# Patient Record
Sex: MALE | Race: WHITE | NOT HISPANIC OR LATINO | Employment: OTHER | URBAN - METROPOLITAN AREA
[De-identification: names, ages, dates, MRNs, and addresses within clinical notes are randomized per-mention and may not be internally consistent; named-entity substitution may affect disease eponyms.]

---

## 2017-01-06 ENCOUNTER — TRANSFERRED RECORDS (OUTPATIENT)
Dept: HEALTH INFORMATION MANAGEMENT | Facility: CLINIC | Age: 82
End: 2017-01-06

## 2017-01-06 ENCOUNTER — ANTICOAGULATION THERAPY VISIT (OUTPATIENT)
Dept: ANTICOAGULATION | Facility: CLINIC | Age: 82
End: 2017-01-06
Payer: COMMERCIAL

## 2017-01-06 VITALS — HEART RATE: 64 BPM | DIASTOLIC BLOOD PRESSURE: 88 MMHG | SYSTOLIC BLOOD PRESSURE: 144 MMHG

## 2017-01-06 DIAGNOSIS — Z79.01 LONG-TERM (CURRENT) USE OF ANTICOAGULANTS: Primary | ICD-10-CM

## 2017-01-06 LAB — INR POINT OF CARE: 2.5 (ref 0.86–1.14)

## 2017-01-06 PROCEDURE — 99207 ZZC NO CHARGE NURSE ONLY: CPT

## 2017-01-06 PROCEDURE — 36416 COLLJ CAPILLARY BLOOD SPEC: CPT

## 2017-01-06 PROCEDURE — 85610 PROTHROMBIN TIME: CPT | Mod: QW

## 2017-01-06 NOTE — PROGRESS NOTES
ANTICOAGULATION FOLLOW-UP CLINIC VISIT    Patient Name:  Melvin Abad  Date:  1/6/2017  Contact Type:  Face to Face    SUBJECTIVE:     Patient Findings     Positives No Problem Findings           OBJECTIVE    INR PROTIME   Date Value Ref Range Status   01/06/2017 2.5* 0.86 - 1.14 Final       ASSESSMENT / PLAN  INR assessment THER    Recheck INR In: 6 WEEKS    INR Location Clinic      Anticoagulation Summary as of 1/6/2017     INR goal 2.0-3.0   Selected INR 2.5 (1/6/2017)   Maintenance plan 2.5 mg (5 mg x 0.5) on Mon, Fri; 5 mg (5 mg x 1) all other days   Full instructions 2.5 mg on Mon, Fri; 5 mg all other days   Weekly total 30 mg   No change documented Shani Collins RN   Plan last modified Shani Collins RN (4/11/2016)   Next INR check 2/15/2017   Target end date     Indications   Atrial fibrillation (Resolved) [I48.91]  Long-term (current) use of anticoagulants [Z79.01] [Z79.01]         Anticoagulation Episode Summary     INR check location     Preferred lab     Send INR reminders to [facility] POOL    Comments 5 mg tablets, AM taker, aies book       Anticoagulation Care Providers     Provider Role Specialty Phone number    Lazaro Huston MD [facility] [phone]            See the Encounter Report to view Anticoagulation Flowsheet and Dosing Calendar (Go to Encounters tab in chart review, and find the Anticoagulation Therapy Visit)    Dosage adjustment made based on physician directed care plan.    Shani Collins, ZULEIMA

## 2017-02-07 ENCOUNTER — OFFICE VISIT (OUTPATIENT)
Dept: INTERNAL MEDICINE | Facility: CLINIC | Age: 82
End: 2017-02-07
Payer: COMMERCIAL

## 2017-02-07 VITALS
OXYGEN SATURATION: 95 % | BODY MASS INDEX: 34.69 KG/M2 | WEIGHT: 221 LBS | HEART RATE: 82 BPM | RESPIRATION RATE: 16 BRPM | TEMPERATURE: 97 F | DIASTOLIC BLOOD PRESSURE: 84 MMHG | HEIGHT: 67 IN | SYSTOLIC BLOOD PRESSURE: 144 MMHG

## 2017-02-07 DIAGNOSIS — I48.20 CHRONIC ATRIAL FIBRILLATION (H): Primary | ICD-10-CM

## 2017-02-07 DIAGNOSIS — Z79.01 LONG-TERM (CURRENT) USE OF ANTICOAGULANTS: ICD-10-CM

## 2017-02-07 DIAGNOSIS — E11.9 TYPE 2 DIABETES MELLITUS WITHOUT COMPLICATION, WITHOUT LONG-TERM CURRENT USE OF INSULIN (H): ICD-10-CM

## 2017-02-07 DIAGNOSIS — E78.5 HYPERLIPIDEMIA LDL GOAL <100: ICD-10-CM

## 2017-02-07 DIAGNOSIS — I10 ESSENTIAL HYPERTENSION, BENIGN: ICD-10-CM

## 2017-02-07 LAB
ALBUMIN SERPL-MCNC: 3.7 G/DL (ref 3.4–5)
ALP SERPL-CCNC: 64 U/L (ref 40–150)
ALT SERPL W P-5'-P-CCNC: 20 U/L (ref 0–70)
ANION GAP SERPL CALCULATED.3IONS-SCNC: 5 MMOL/L (ref 3–14)
AST SERPL W P-5'-P-CCNC: 17 U/L (ref 0–45)
BILIRUB SERPL-MCNC: 1.3 MG/DL (ref 0.2–1.3)
BUN SERPL-MCNC: 18 MG/DL (ref 7–30)
CALCIUM SERPL-MCNC: 9 MG/DL (ref 8.5–10.1)
CHLORIDE SERPL-SCNC: 109 MMOL/L (ref 94–109)
CHOLEST SERPL-MCNC: 144 MG/DL
CO2 SERPL-SCNC: 32 MMOL/L (ref 20–32)
CREAT SERPL-MCNC: 0.97 MG/DL (ref 0.66–1.25)
GFR SERPL CREATININE-BSD FRML MDRD: 73 ML/MIN/1.7M2
GLUCOSE SERPL-MCNC: 122 MG/DL (ref 70–99)
HBA1C MFR BLD: 6.3 % (ref 4.3–6)
HDLC SERPL-MCNC: 51 MG/DL
LDLC SERPL CALC-MCNC: 75 MG/DL
NONHDLC SERPL-MCNC: 93 MG/DL
POTASSIUM SERPL-SCNC: 4.5 MMOL/L (ref 3.4–5.3)
PROT SERPL-MCNC: 6.9 G/DL (ref 6.8–8.8)
SODIUM SERPL-SCNC: 146 MMOL/L (ref 133–144)
TRIGL SERPL-MCNC: 88 MG/DL

## 2017-02-07 PROCEDURE — 99214 OFFICE O/P EST MOD 30 MIN: CPT | Performed by: INTERNAL MEDICINE

## 2017-02-07 PROCEDURE — 36415 COLL VENOUS BLD VENIPUNCTURE: CPT | Performed by: INTERNAL MEDICINE

## 2017-02-07 PROCEDURE — 83036 HEMOGLOBIN GLYCOSYLATED A1C: CPT | Performed by: INTERNAL MEDICINE

## 2017-02-07 PROCEDURE — 80061 LIPID PANEL: CPT | Performed by: INTERNAL MEDICINE

## 2017-02-07 PROCEDURE — 80053 COMPREHEN METABOLIC PANEL: CPT | Performed by: INTERNAL MEDICINE

## 2017-02-07 RX ORDER — WARFARIN SODIUM 5 MG/1
TABLET ORAL
Qty: 90 TABLET | Refills: 3 | Status: SHIPPED | OUTPATIENT
Start: 2017-02-07 | End: 2018-03-13

## 2017-02-07 RX ORDER — SIMVASTATIN 40 MG
TABLET ORAL
Qty: 90 TABLET | Refills: 3 | Status: SHIPPED | OUTPATIENT
Start: 2017-02-07 | End: 2018-03-13

## 2017-02-07 RX ORDER — LISINOPRIL 5 MG/1
5 TABLET ORAL DAILY
Qty: 90 TABLET | Refills: 3 | Status: SHIPPED | OUTPATIENT
Start: 2017-02-07 | End: 2018-03-13

## 2017-02-07 RX ORDER — METOPROLOL SUCCINATE 50 MG/1
50 TABLET, EXTENDED RELEASE ORAL DAILY
Qty: 90 TABLET | Refills: 3 | Status: SHIPPED | OUTPATIENT
Start: 2017-02-07 | End: 2018-03-13

## 2017-02-07 ASSESSMENT — PAIN SCALES - GENERAL: PAINLEVEL: NO PAIN (0)

## 2017-02-07 NOTE — Clinical Note
[address] [address]  Phone: [phone]          February 7, 2017    Melvin Abad  [address] [place] [address]          Dear Melvin,      LAB RESULTS:     The results of your recent lab work were good, no change with plan of care.  If you have any further questions or problems, please contact our office.          Sincerely,      Lazaro Huston MD

## 2017-02-07 NOTE — NURSING NOTE
"Chief Complaint   Patient presents with     Recheck Medication     diabetes, a fib and lipids       Initial /84 mmHg  Pulse 82  Temp(Src) 97  F (36.1  C) (Temporal)  Resp 16  Ht 5' 6.5\" (1.689 m)  Wt 221 lb (100.245 kg)  BMI 35.14 kg/m2  SpO2 95% Estimated body mass index is 35.14 kg/(m^2) as calculated from the following:    Height as of this encounter: 5' 6.5\" (1.689 m).    Weight as of this encounter: 221 lb (100.245 kg).  Medication Reconciliation: complete   Denae Warren MA    "

## 2017-02-07 NOTE — PROGRESS NOTES
SUBJECTIVE:                                                    Melvin Abad is a 88 year old male who presents to clinic today for the following health issues:      Chief Complaint   Patient presents with     Recheck Medication     diabetes, a fib and lipids     Feeling good, just needs refills.  Some hand numbness and lower blood flow, fingers go cold at times. BP has been ok at 140/70 range.  Glucose is very goo at  range.     Has coumadin clinic next week. Sees dermatology next week.    Past Medical History   Diagnosis Date     Unspecified essential hypertension      Pure hypercholesterolemia      Cough      chronic cough     Cardiac dysrhythmia, unspecified      Generalized osteoarthrosis, unspecified site      djd of the knees, hands, and neck     Atrial fibrillation (H)      Other diseases of lung, not elsewhere classified      pulmonary nodule, benign     Basal cell carcinoma nos 06/10     Mohs excision, rt upper lip & rt temple     Diabetic eye exam (H) 8/29/14     Actinic keratosis      Current Outpatient Prescriptions   Medication     metoprolol (TOPROL-XL) 50 MG 24 hr tablet     lisinopril (PRINIVIL,ZESTRIL) 5 MG tablet     simvastatin (ZOCOR) 40 MG tablet     warfarin (COUMADIN) 5 MG tablet     Cholecalciferol (VITAMIN D) 2000 UNITS tablet     polyethylene glycol (MIRALAX) powder     acetaminophen 650 MG TABS     multivitamin (OCUVITE) TABS     blood glucose monitoring (ONE TOUCH ULTRASOFT) lancets     blood glucose monitoring (NO BRAND SPECIFIED) test strip     No current facility-administered medications for this visit.     Social History   Substance Use Topics     Smoking status: Former Smoker     Types: Cigarettes     Smokeless tobacco: Never Used      Comment: 5 years     Alcohol Use: No      Comment: denies     Review of Systems  Constitutional-No fevers, chills, or weight changes..  ENT-No earpain, sore throat, voice changes or rhinitis.  Cardiac-No chest pain or  "palpitations.  Respiratory-No cough, sob, or hemoptysis.    Physical Exam  /84 mmHg  Pulse 82  Temp(Src) 97  F (36.1  C) (Temporal)  Resp 16  Ht 5' 6.5\" (1.689 m)  Wt 221 lb (100.245 kg)  BMI 35.14 kg/m2  SpO2 95%  General Appearance-healthy, alert, no distress  ENT-ENT exam normal, no neck nodes or sinus tenderness  Cardiac-regular rate and rhythm  with normal S1, S2 ; no murmur, rub or gallops  Lungs-clear to auscultation    ASSESSMENT:  Patient is here for his annual recheck. He is overall doing well. He is 88 years old. Lives on his own and takes care of his place.    Problem #1 chronic atrial fibrillation-the patient is rate controlled on beta blocker. He is on anticoagulation with Coumadin and sees the Coumadin clinic.    Problem #2 diabetes-the patient is doing all of his diabetes does check his sugar and the running . We will check an A1c.    Problem #3 hyperlipidemia-patient is stable takes a statin or changes.      Electronically signed by Lazaro Huston MD    "

## 2017-02-07 NOTE — MR AVS SNAPSHOT
"              After Visit Summary   2/7/2017    Melvin Abad    MRN: [id number]           Patient Information     Date Of Birth          4/24/1928        Visit Information        Provider Department      2/7/2017 8:00 AM Lazaro Huston MD [facility]         Follow-ups after your visit        Your next 10 appointments already scheduled     Feb 14, 2017 10:45 AM   Return Visit with Sandra Amaya MD   [facility]    [address] [place] [address] [phone]            Feb 15, 2017  8:30 AM   Anticoagulation Visit with PH ANTI COAG   [facility] ([facility])    [address] [address] [phone]              Who to contact     If you have questions or need follow up information about today's clinic visit or your schedule please contact [facility] directly at [phone].  Normal or non-critical lab and imaging results will be communicated to you by Oncolixhart, letter or phone within 4 business days after the clinic has received the results. If you do not hear from us within 7 days, please contact the clinic through WSO2t or phone. If you have a critical or abnormal lab result, we will notify you by phone as soon as possible.  Submit refill requests through Floobits or call your pharmacy and they will forward the refill request to us. Please allow 3 business days for your refill to be completed.          Additional Information About Your Visit        Oncolixhart Information     Floobits lets you send messages to your doctor, view your test results, renew your prescriptions, schedule appointments and more. To sign up, go to [email or web address] . Click on \"Log in\" on the left side of the screen, which will take you to the Welcome page. Then click on \"Sign up Now\" on the right side of the page.     You will be asked to enter the access code listed below, as well as " "some personal information. Please follow the directions to create your username and password.     Your access code is: [email or web address]  Expires: 2017  8:01 AM     Your access code will  in 90 days. If you need help or a new code, please call your [facility] clinic or [phone].        Care EveryWhere ID     This is your Care EveryWhere ID. This could be used by other organizations to access your [facility] medical records  [id number]        Your Vitals Were     Pulse Temperature Respirations Height BMI (Body Mass Index) Pulse Oximetry    82 97  F (36.1  C) (Temporal) 16 5' 6.5\" (1.689 m) 35.14 kg/m2 95%       Blood Pressure from Last 3 Encounters:   17 144/84   17 144/88   16 138/78    Weight from Last 3 Encounters:   17 221 lb (100.245 kg)   16 221 lb (100.245 kg)   08/28/15 219 lb (99.338 kg)              Today, you had the following     No orders found for display       Primary Care Provider Office Phone # Fax #    Lazaro Huston [phone]       [facility] [address] [place] [address]        Thank you!     Thank you for choosing [facility]  for your care. Our goal is always to provide you with excellent care. Hearing back from our patients is one way we can continue to improve our services. Please take a few minutes to complete the written survey that you may receive in the mail after your visit with us. Thank you!             Your Updated Medication List - Protect others around you: Learn how to safely use, store and throw away your medicines at www.disposemymeds.org.          This list is accurate as of: 17  8:01 AM.  Always use your most recent med list.                   Brand Name Dispense Instructions for use    ACETAMINOPHEN 8 HOUR 650 MG 8 hour tablet   Generic drug:  acetaminophen     100 tablet    Take 650 mg by mouth every 4 hours as needed       blood glucose monitoring lancets     1 Box    1 each 2 " times daily Test before meals and at bedtime       blood glucose monitoring test strip    no brand specified    100 each    Test before meals and at bedtime.       lisinopril 5 MG tablet    PRINIVIL/ZESTRIL    90 tablet    TAKE ONE TABLET BY MOUTH ONCE DAILY       metoprolol 50 MG 24 hr tablet    TOPROL-XL    90 tablet    TAKE ONE TABLET BY MOUTH ONCE DAILY       multivitamin Tabs tablet      Take 1 tablet by mouth daily.       polyethylene glycol powder    MIRALAX    510 g    Take 17 g by mouth daily       simvastatin 40 MG tablet    ZOCOR    90 tablet    TAKE ONE TABLET BY MOUTH AT BEDTIME       vitamin D 2000 UNITS tablet      Take 1 tablet by mouth daily       warfarin 5 MG tablet    COUMADIN    90 tablet    TAKE ONE-HALF TABLET ON MONDAYS AND FRIDAYS AND ONE TABLET ON ALL THE OTHER DAYS OF THE WEEK OR AS DIRECTED BY COUMADIN CLINIC

## 2017-02-14 ENCOUNTER — OFFICE VISIT (OUTPATIENT)
Dept: DERMATOLOGY | Facility: CLINIC | Age: 82
End: 2017-02-14
Payer: COMMERCIAL

## 2017-02-14 DIAGNOSIS — Z85.828 HISTORY OF NONMELANOMA SKIN CANCER: Primary | ICD-10-CM

## 2017-02-14 DIAGNOSIS — R21 RASH: ICD-10-CM

## 2017-02-14 DIAGNOSIS — D18.01 CHERRY ANGIOMA: ICD-10-CM

## 2017-02-14 DIAGNOSIS — D48.5 NEOPLASM OF UNCERTAIN BEHAVIOR OF SKIN: ICD-10-CM

## 2017-02-14 DIAGNOSIS — L82.1 SEBORRHEIC KERATOSIS: ICD-10-CM

## 2017-02-14 PROCEDURE — 99213 OFFICE O/P EST LOW 20 MIN: CPT | Mod: 25 | Performed by: DERMATOLOGY

## 2017-02-14 PROCEDURE — 88305 TISSUE EXAM BY PATHOLOGIST: CPT | Performed by: DERMATOLOGY

## 2017-02-14 PROCEDURE — 11100 HC BIOPSY SKIN/SUBQ/MUC MEM, SINGLE LESION: CPT | Performed by: DERMATOLOGY

## 2017-02-14 RX ORDER — HYDROCORTISONE VALERATE CREAM 2 MG/G
CREAM TOPICAL
Qty: 60 G | Refills: 0 | Status: SHIPPED | OUTPATIENT
Start: 2017-02-14 | End: 2017-03-30

## 2017-02-14 NOTE — PATIENT INSTRUCTIONS

## 2017-02-14 NOTE — MR AVS SNAPSHOT
After Visit Summary   2/14/2017    Melvin Abad    MRN: [id number]           Patient Information     Date Of Birth          4/24/1928        Visit Information        Provider Department      2/14/2017 10:45 AM Sandra Amaya MD [facility]        Today's Diagnoses     History of nonmelanoma skin cancer    -  1    Neoplasm of uncertain behavior of skin        Rash        Seborrheic keratosis        Cherry angioma          Care Instructions    Wound Care After a Biopsy    What is a skin biopsy?  A skin biopsy allows the doctor to examine a very small piece of tissue under the microscope to determine the diagnosis and the best treatment for the skin condition. A local anesthetic (numbing medicine)  is injected with a very small needle into the skin area to be tested. A small piece of skin is taken from the area. Sometimes a suture (stitch) is used.     What are the risks of a skin biopsy?  I will experience scar, bleeding, swelling, pain, crusting and redness. I may experience incomplete removal or recurrence. Risks of this procedure are excessive bleeding, bruising, infection, nerve damage, numbness, thick (hypertrophic or keloidal) scar and non-diagnostic biopsy.    How should I care for my wound for the first 24 hours?    Keep the wound dry and covered for 24 hours    If it bleeds, hold direct pressure on the area for 15 minutes. If bleeding does not stop then go to the emergency room    Avoid strenuous exercise the first 1-2 days or as your doctor instructs you    How should I care for the wound after 24 hours?    After 24 hours, remove the bandage    You may bathe or shower as normal    If you had a scalp biopsy, you can shampoo as usual and can use shower water to clean the biopsy site daily    Clean the wound twice a day with gentle soap and water    Do not scrub, be gentle    Apply white petroleum/Vaseline after cleaning the wound with a cotton swab or a clean finger, and keep  the site covered with a Bandaid /bandage. Bandages are not necessary with a scalp biopsy    If you are unable to cover the site with a Bandaid /bandage, re-apply ointment 2-3 times a day to keep the site moist. Moisture will help with healing    Avoid strenuous activity for first 1-2 days    Avoid lakes, rivers, pools, and oceans until the stitches are removed or the site is healed    How do I clean my wound?    Wash hands for 15 minutes with soap or use hand  before all wound care    Clean the wound with gentle soap and water    Apply white petroleum/Vaseline  to wound after it is clean    Replace the Bandaid /bandage to keep the wound covered for the first few days or as instructed by your doctor    If you had a scalp biopsy, warm shower water to the area on a daily basis should suffice    What should I use to clean my wound?     Cotton-tipped applicators (Qtips )    White petroleum jelly (Vaseline ). Use a clean new container and use Q-tips to apply.    Bandaids   as needed    Gentle soap     How should I care for my wound long term?    Do not get your wound dirty    Keep up with wound care for one week or until the area is healed.    A small scab will form and fall off by itself when the area is completely healed. The area will be red and will become pink in color as it heals. Sun protection is very important for how your scar will turn out. Sunscreen with an SPF 30 or greater is recommended once the area is healed.    You should have some soreness but it should be mild and slowly go away over several days. Talk to your doctor about using tylenol for pain,    When should I call my doctor?  If you have increased:     Pain or swelling    Pus or drainage (clear or slightly yellow drainage is ok)    Temperature over 100F    Spreading redness or warmth around wound    When will I hear about my results?  The biopsy results can take 2-3 weeks to come back. The clinic will call you with the results, send you a  Coraid message, or have you schedule a follow-up clinic or phone time to discuss the results. Contact our clinics if you do not hear from us in 3 weeks.     Who should I call with questions?    [facility]: [phone]     [facility]: [phone]    For urgent needs outside of business hours call the [facility] at [phone] and ask for the dermatology resident on call            Follow-ups after your visit        Your next 10 appointments already scheduled     Feb 15, 2017  8:30 AM CST   Anticoagulation Visit with PH ANTI COAG   [facility]    [address] [address] [phone]            Apr 25, 2017 11:00 AM CDT   [facility]   [facility]    [address] [address] [place] [address] [phone]              Who to contact     If you have questions or need follow up information about today's clinic visit or your schedule please contact [facility] directly at [phone].  Normal or non-critical lab and imaging results will be communicated to you by Everyclickhart, letter or phone within 4 business days after the clinic has received the results. If you do not hear from us within 7 days, please contact the clinic through Everyclickhart or phone. If you have a critical or abnormal lab result, we will notify you by phone as soon as possible.  Submit refill requests through Xenoport or call your pharmacy and they will forward the refill request to us. Please allow 3 business days for your refill to be completed.          Additional Information About Your Visit        Xenoport Information     Xenoport is an electronic gateway that provides easy, online access to your medical records. With Xenoport, you can request a clinic appointment, read your test results, renew a prescription or communicate with  your care team.     To sign up for GuestMetricshart visit the website at [email or web address]   You will be asked to enter the access code listed below, as well as some personal information. Please follow the directions to create your username and password.     Your access code is: [email or web address]  Expires: 2017  8:01 AM     Your access code will  in 90 days. If you need help or a new code, please contact your [facility] or call [phone] for assistance.        Care EveryWhere ID     This is your Care EveryWhere ID. This could be used by other organizations to access your [facility] medical records  [id number]         Blood Pressure from Last 3 Encounters:   17 144/84   17 144/88   16 138/78    Weight from Last 3 Encounters:   17 100.2 kg (221 lb)   16 100.2 kg (221 lb)   08/28/15 99.3 kg (219 lb)              We Performed the Following     BIOPSY SKIN/SUBQ/MUC MEM, SINGLE LESION     Surgical pathology exam          Today's Medication Changes          These changes are accurate as of: 17 11:56 AM.  If you have any questions, ask your nurse or doctor.               Start taking these medicines.        Dose/Directions    hydrocortisone 0.2 % cream   Commonly known as:  WESTCORT   Used for:  Rash        Apply to neck twice daily for 14 days.   Quantity:  60 g   Refills:  0            Where to get your medicines      These medications were sent to [facility] - [place], [place] - [address]  [address], [facility] [address]     Phone:  [phone]     hydrocortisone 0.2 % cream                Primary Care Provider Office Phone # Fax #    Lazaro Huston [phone]       [facility] [address] [place] [address]        Thank you!     Thank you for choosing [facility]  for your care. Our goal is always to provide you with excellent care. Hearing back from our patients is one way we can  continue to improve our services. Please take a few minutes to complete the written survey that you may receive in the mail after your visit with us. Thank you!             Your Updated Medication List - Protect others around you: Learn how to safely use, store and throw away your medicines at www.disposemymeds.org.          This list is accurate as of: 2/14/17 11:56 AM.  Always use your most recent med list.                   Brand Name Dispense Instructions for use    ACETAMINOPHEN 8 HOUR 650 MG 8 hour tablet   Generic drug:  acetaminophen     100 tablet    Take 650 mg by mouth every 4 hours as needed       blood glucose monitoring lancets     1 Box    1 each 2 times daily Test before meals and at bedtime       blood glucose monitoring test strip    no brand specified    100 each    Test before meals and at bedtime.       hydrocortisone 0.2 % cream    WESTCORT    60 g    Apply to neck twice daily for 14 days.       lisinopril 5 MG tablet    PRINIVIL/ZESTRIL    90 tablet    Take 1 tablet (5 mg) by mouth daily       metoprolol 50 MG 24 hr tablet    TOPROL-XL    90 tablet    Take 1 tablet (50 mg) by mouth daily       multivitamin Tabs tablet      Take 1 tablet by mouth daily.       polyethylene glycol powder    MIRALAX    510 g    Take 17 g by mouth daily       simvastatin 40 MG tablet    ZOCOR    90 tablet    TAKE ONE TABLET BY MOUTH AT BEDTIME       vitamin D 2000 UNITS tablet      Take 1 tablet by mouth daily       warfarin 5 MG tablet    COUMADIN    90 tablet    TAKE ONE-HALF TABLET ON MONDAYS AND FRIDAYS AND ONE TABLET ON ALL THE OTHER DAYS OF THE WEEK OR AS DIRECTED BY COUMADIN CLINIC

## 2017-02-14 NOTE — NURSING NOTE
Dermatology Rooming Note    Melvin Abad's goals for this visit include:   Chief Complaint   Patient presents with     RECHECK     1 year skin check - Hx of BCC - used Efudex on scalp about 2 months ago       Is a scribe okay for this visit: YES    Are records needed for this visit(If yes, obtain release of information): NO     Vitals: There were no vitals taken for this visit.    Referring Provider:  No referring provider defined for this encounter.    Jyoti Montoya, CMA

## 2017-02-14 NOTE — LETTER
Patient:  Melvin Cohen  :   1928  MRN:     [id number]        Mr.Edward KELLEE Cohen  [address] [place] [address]        2017    Dear ,    We are writing to inform you of your test results that show a precancer called an actinic keratosis on your right forearm on 2/15/2017.  We would like to see you in clinic to make sure it is gone so it does not turn into skin cancer and cause seriously health problems. If you refuse returning to dermatology, a second option would be to have you primary care doctor look at the area the next time you are in their clinic. Please, let us know if we can do anything to make this process easier. We want to make sure you receive the best care. If you would like to seek care at another clinic, we are happy to help you coordinate.     Thank you for taking the time to be seen in our dermatology clinic. If you have further questions or concerns, please contact the clinic(see phone number listed below).      Sincerely,    Sandra Zacarias MD    Department of Dermatology  [facility]: Phone: [phone], Fax:[phone]  [facility]: Phone: [phone], Fax: [phone]    Resulted Orders   Surgical pathology exam   Result Value Ref Range    Copath Report       Patient Name: MELVIN COHEN  MR#: [id number]  Specimen #:   Collected: 2017  Received: 2/15/2017  Reported: 2017 11:36  Ordering Phy(s): SANDRA ZACARIAS    For improved result formatting, select 'View Enhanced Report Format'  under Linked Documents section.    SPECIMEN(S):  Skin, right forearm    FINAL DIAGNOSIS:  Skin, back, left lower:  - Lichenoid actinic keratosis - (see comment)    COMMENT:  The lesion broadly extends to the deep margin.  Thus if it persists or  recurs, additional deeper sampling is recommended.    I have personally reviewed all specimens and or slides,  "including the  listed special stains, and used them with my medical judgement to  determine the final diagnosis.    Electronically signed out by:    Barry Benton M.D., [facility]    CLINICAL HISTORY:  The patient is an 88-year-old male.    GROSS:  The specimen is received in formalin with proper patient's  identification, labeled \"skin, left lower back\".  it consists of 0.5 x  0.4 x 0.1 cm  shave skin biopsy tissue fragment.  The skin shows a  pigmented macule. The base margin is inked in black.  Bisected and  entirely submitted in one cassette. (Dictated by: Matheus Mathew 2/15/2017  11:35 AM)    MICROSCOPIC:  The epidermis is hyperkeratotic and focally parakeratotic.  The chief  change is dysplasia of the rete with atypical cells and lack of normal  maturation progression.  The basement membrane appears intact and there  is no evidence of invasive carcinoma.  The specimen also exhibits a  lichenoid lymphocytic infiltrate.    CPT Codes:  A: 39090-NA0.T, 86617-KK3.P    TESTING LAB LOCATION:  [facility], [address]   [address]  [phone]    COLLECTION SITE:  Client: [facility]  Location: [facility] ([facility])                       "

## 2017-02-14 NOTE — LETTER
Patient:  Melvin Cohen  :   1928  MRN:     [id number]        Mr.Edward KELLEE Cohen  [address] [place] [id number]        2017    Dear ,    We are writing to inform you of your test results that show a precancer called an actinic keratosis on your left lower back on 2/15/2017.  We would like to see you in clinic to make sure it is gone so it does not turn into skin cancer and cause seriously health problems. If you refuse returning to dermatology, a second option would be to have you primary care doctor look at the area the next time you are in their clinic. Please, let us know if we can do anything to make this process easier. We want to make sure you receive the best care. If you would like to seek care at another clinic, we are happy to help you coordinate.     Thank you for taking the time to be seen in our dermatology clinic. If you have further questions or concerns, please contact the clinic(see phone number listed below).      Sincerely,    Sandra Zacarias MD    Department of Dermatology  [facility]: Phone: [phone], Fax:[phone]  [facility]: Phone: [phone], Fax: [phone]    Resulted Orders   Surgical pathology exam   Result Value Ref Range    Copath Report       Patient Name: MELVIN COHEN  MR#: [id number]  Specimen #:   Collected: 2017  Received: 2/15/2017  Reported: 2017 11:36  Ordering Phy(s): SANDRA ZACARIAS    For improved result formatting, select 'View Enhanced Report Format'  under Linked Documents section.    SPECIMEN(S):  Skin, right forearm    FINAL DIAGNOSIS:  Skin, back, left lower:  - Lichenoid actinic keratosis - (see comment)    COMMENT:  The lesion broadly extends to the deep margin.  Thus if it persists or  recurs, additional deeper sampling is recommended.    I have personally reviewed all specimens and or  "slides, including the  listed special stains, and used them with my medical judgement to  determine the final diagnosis.    Electronically signed out by:    Barry Benton M.D., [facility]    CLINICAL HISTORY:  The patient is an 88-year-old male.    GROSS:  The specimen is received in formalin with proper patient's  identification, labeled \"skin, left lower back\".  it consists of 0.5 x  0.4 x 0.1 cm  shave skin biopsy tissue fragment.  The skin shows a  pigmented macule. The base margin is inked in black.  Bisected and  entirely submitted in one cassette. (Dictated by: Matheus Mathew 2/15/2017  11:35 AM)    MICROSCOPIC:  The epidermis is hyperkeratotic and focally parakeratotic.  The chief  change is dysplasia of the rete with atypical cells and lack of normal  maturation progression.  The basement membrane appears intact and there  is no evidence of invasive carcinoma.  The specimen also exhibits a  lichenoid lymphocytic infiltrate.    CPT Codes:  A: 20792-UN0.T, 15350-EY8.P    TESTING LAB LOCATION:  [facility], [address]   [address]  [phone]    COLLECTION SITE:  Client: [facility]  Location: [facility] ([facility])                       "

## 2017-02-15 ENCOUNTER — ANTICOAGULATION THERAPY VISIT (OUTPATIENT)
Dept: ANTICOAGULATION | Facility: CLINIC | Age: 82
End: 2017-02-15
Payer: COMMERCIAL

## 2017-02-15 VITALS — DIASTOLIC BLOOD PRESSURE: 87 MMHG | SYSTOLIC BLOOD PRESSURE: 135 MMHG | HEART RATE: 71 BPM

## 2017-02-15 DIAGNOSIS — Z79.01 LONG-TERM (CURRENT) USE OF ANTICOAGULANTS: ICD-10-CM

## 2017-02-15 LAB — INR POINT OF CARE: 2.2 (ref 0.86–1.14)

## 2017-02-15 PROCEDURE — 36416 COLLJ CAPILLARY BLOOD SPEC: CPT

## 2017-02-15 PROCEDURE — 85610 PROTHROMBIN TIME: CPT | Mod: QW

## 2017-02-15 PROCEDURE — 99207 ZZC NO CHARGE NURSE ONLY: CPT

## 2017-02-15 NOTE — MR AVS SNAPSHOT
Felicewilmar KELLEE Abad   2/15/2017 8:30 AM   Anticoagulation Therapy Visit    Description:  88 year old male   Provider:  PH ANTI COAG   Department:  Ph Anticoag           INR as of 2/15/2017     Today's INR 2.2      Anticoagulation Summary as of 2/15/2017     INR goal 2.0-3.0   Today's INR 2.2   Full instructions 2.5 mg on Mon, Fri; 5 mg all other days   Next INR check 3/29/2017    Indications   Atrial fibrillation (Resolved) [I48.91]  Long-term (current) use of anticoagulants [Z79.01] [Z79.01]         Your next Anticoagulation Clinic appointment(s)     Feb 15, 2017  8:30 AM CST   Anticoagulation Visit with PH ANTI COAG   [facility] ([address] [address]   [phone]            Mar 29, 2017  8:30 AM CDT   Anticoagulation Visit with PH ANTI COAG   [facility] ([address] [address]   [phone]              Contact Numbers     Clinic Number:         February 2017 Details    Sun Mon Tue Wed Thu Fri Sat        1               2               3               4                 5               6               7               8               9               10               11                 12               13               14               15      5 mg   See details      16      5 mg         17      2.5 mg         18      5 mg           19      5 mg         20      2.5 mg         21      5 mg         22      5 mg         23      5 mg         24      2.5 mg         25      5 mg           26      5 mg         27      2.5 mg         28      5 mg              Date Details   02/15 This INR check               How to take your warfarin dose     To take:  2.5 mg Take 0.5 of a 5 mg tablet.    To take:  5 mg Take 1 of the 5 mg tablets.           March 2017 Details    Sun Mon Tue Wed Thu Fri Sat        1      5 mg         2      5 mg         3      2.5 mg         4      5 mg           5       5 mg         6      2.5 mg         7      5 mg         8      5 mg         9      5 mg         10      2.5 mg         11      5 mg           12      5 mg         13      2.5 mg         14      5 mg         15      5 mg         16      5 mg         17      2.5 mg         18      5 mg           19      5 mg         20      2.5 mg         21      5 mg         22      5 mg         23      5 mg         24      2.5 mg         25      5 mg           26      5 mg         27      2.5 mg         28      5 mg         29            30               31                 Date Details   No additional details    Date of next INR:  3/29/2017         How to take your warfarin dose     To take:  2.5 mg Take 0.5 of a 5 mg tablet.    To take:  5 mg Take 1 of the 5 mg tablets.

## 2017-02-15 NOTE — PROGRESS NOTES
ANTICOAGULATION FOLLOW-UP CLINIC VISIT    Patient Name:  Melvin Abad  Date:  2/15/2017  Contact Type:  Face to Face    SUBJECTIVE:     Patient Findings     Positives No Problem Findings           OBJECTIVE    INR Protime   Date Value Ref Range Status   02/15/2017 2.2 (A) 0.86 - 1.14 Final       ASSESSMENT / PLAN  INR assessment THER    Recheck INR In: 6 WEEKS    INR Location Clinic      Anticoagulation Summary as of 2/15/2017     INR goal 2.0-3.0   Today's INR 2.2   Maintenance plan 2.5 mg (5 mg x 0.5) on Mon, Fri; 5 mg (5 mg x 1) all other days   Full instructions 2.5 mg on Mon, Fri; 5 mg all other days   Weekly total 30 mg   No change documented Shani Collins RN   Plan last modified Shani Collins RN (4/11/2016)   Next INR check 3/29/2017   Target end date     Indications   Atrial fibrillation (Resolved) [I48.91]  Long-term (current) use of anticoagulants [Z79.01] [Z79.01]         Anticoagulation Episode Summary     INR check location     Preferred lab     Send INR reminders to [facility] POOL    Comments 5 mg tablets, AM taker, lkes book       Anticoagulation Care Providers     Provider Role Specialty Phone number    Lazaro Huston MD [facility] Internal Medicine [phone]            See the Encounter Report to view Anticoagulation Flowsheet and Dosing Calendar (Go to Encounters tab in chart review, and find the Anticoagulation Therapy Visit)    Dosage adjustment made based on physician directed care plan.      Shani Collins RN

## 2017-02-20 LAB — COPATH REPORT: NORMAL

## 2017-02-28 ENCOUNTER — TELEPHONE (OUTPATIENT)
Dept: DERMATOLOGY | Facility: CLINIC | Age: 82
End: 2017-02-28

## 2017-02-28 NOTE — TELEPHONE ENCOUNTER
Left message for patient to call [employer] at [phone]. Please give pathology results to patient. Follow up appointment already scheduled.    Status:  Final result   Visible to patient:  No (Not Released) Dx:  Neoplasm of uncertain behavior of skin Order: [id number]       Notes Recorded by Stephen Amaya MD on 2/27/2017 at 12:49 PM  --- Dr. Benton, site is right forearm. Can you addend? I checked the note and the photo.     Nursing- we will recheck precancerous lesion at his 2 month follow up he should have scheduled. Please, call        2wk ago       Copath Report Patient Name: WIL COHEN   MR#: [id number]   Specimen #:    Collected: 2/14/2017   Received: 2/15/2017   Reported: 2/20/2017 11:36   Ordering Phy(s): STEPHEN AMAYA     For improved result formatting, select 'View Enhanced Report Format'   under Linked Documents section.     SPECIMEN(S):   Skin, right forearm     FINAL DIAGNOSIS:   Skin, back, left lower:   - Lichenoid actinic keratosis - (see comment)               Eunice Brown LPN

## 2017-03-06 ENCOUNTER — TELEPHONE (OUTPATIENT)
Dept: DERMATOLOGY | Facility: CLINIC | Age: 82
End: 2017-03-06

## 2017-03-06 NOTE — TELEPHONE ENCOUNTER
Left a detailed message explaining that Dr. Amaya will not be in clinic on 4/25/17 and needed to reschedule there appointment. Please give our office a call [phone] [facility]    Maddy Chew Medical Assistant

## 2017-03-07 NOTE — TELEPHONE ENCOUNTER
Patient called back again and stated he doesn't want to reschedule at this time.    Amrita Pratt CMA

## 2017-03-07 NOTE — TELEPHONE ENCOUNTER
Patient returned call and I notified him of the need to reschedule his appointment as Dr. Amaya won't be in that day.  Patient stated he would call back to reschedule.    Mildred Ferro RN

## 2017-03-29 ENCOUNTER — ANTICOAGULATION THERAPY VISIT (OUTPATIENT)
Dept: ANTICOAGULATION | Facility: CLINIC | Age: 82
End: 2017-03-29
Payer: COMMERCIAL

## 2017-03-29 VITALS — SYSTOLIC BLOOD PRESSURE: 140 MMHG | DIASTOLIC BLOOD PRESSURE: 91 MMHG | HEART RATE: 71 BPM

## 2017-03-29 DIAGNOSIS — Z79.01 LONG-TERM (CURRENT) USE OF ANTICOAGULANTS: ICD-10-CM

## 2017-03-29 LAB — INR POINT OF CARE: 2.3 (ref 0.86–1.14)

## 2017-03-29 PROCEDURE — 36416 COLLJ CAPILLARY BLOOD SPEC: CPT

## 2017-03-29 PROCEDURE — 85610 PROTHROMBIN TIME: CPT | Mod: QW

## 2017-03-29 PROCEDURE — 99207 ZZC NO CHARGE NURSE ONLY: CPT

## 2017-03-29 NOTE — PROGRESS NOTES
ANTICOAGULATION FOLLOW-UP CLINIC VISIT    Patient Name:  Melvin Abad  Date:  3/29/2017  Contact Type:  Face to Face    SUBJECTIVE:     Patient Findings     Positives No Problem Findings           OBJECTIVE    INR Protime   Date Value Ref Range Status   03/29/2017 2.3 (A) 0.86 - 1.14 Final       ASSESSMENT / PLAN  INR assessment THER    Recheck INR In: 6 WEEKS    INR Location Clinic      Anticoagulation Summary as of 3/29/2017     INR goal 2.0-3.0   Today's INR 2.3   Maintenance plan 2.5 mg (5 mg x 0.5) on Mon, Fri; 5 mg (5 mg x 1) all other days   Full instructions 2.5 mg on Mon, Fri; 5 mg all other days   Weekly total 30 mg   No change documented Shani Collins RN   Plan last modified Shani Collins RN (4/11/2016)   Next INR check 5/10/2017   Target end date     Indications   Atrial fibrillation (Resolved) [I48.91]  Long-term (current) use of anticoagulants [Z79.01] [Z79.01]         Anticoagulation Episode Summary     INR check location     Preferred lab     Send INR reminders to [facility] POOL    Comments 5 mg tablets, AM taker, lkes book       Anticoagulation Care Providers     Provider Role Specialty Phone number    Lazaro Huston MD [facility] Internal Medicine [phone]            See the Encounter Report to view Anticoagulation Flowsheet and Dosing Calendar (Go to Encounters tab in chart review, and find the Anticoagulation Therapy Visit)    Dosage adjustment made based on physician directed care plan.      Shani Collins RN

## 2017-03-29 NOTE — MR AVS SNAPSHOT
Melvin Abad   3/29/2017 8:30 AM   Anticoagulation Therapy Visit    Description:  88 year old male   Provider:  PH ANTI COAG   Department:  Ph Anticoag           INR as of 3/29/2017     Today's INR 2.3      Anticoagulation Summary as of 3/29/2017     INR goal 2.0-3.0   Today's INR 2.3   Full instructions 2.5 mg on Mon, Fri; 5 mg all other days   Next INR check 5/10/2017    Indications   Atrial fibrillation (Resolved) [I48.91]  Long-term (current) use of anticoagulants [Z79.01] [Z79.01]         Your next Anticoagulation Clinic appointment(s)     Mar 29, 2017  8:30 AM CDT   Anticoagulation Visit with PH ANTI COAG   [facility] ([address] [address]   [phone]            May 10, 2017  8:15 AM CDT   Anticoagulation Visit with PH ANTI COAG   [facility] ([address] [address]   [phone]              Contact Numbers     Clinic Number:         March 2017 Details    Sun Mon Tue Wed Thu Fri Sat        1               2               3               4                 5               6               7               8               9               10               11                 12               13               14               15               16               17               18                 19               20               21               22               23               24               25                 26               27               28               29      5 mg   See details      30      5 mg         31      2.5 mg           Date Details   03/29 This INR check               How to take your warfarin dose     To take:  2.5 mg Take 0.5 of a 5 mg tablet.    To take:  5 mg Take 1 of the 5 mg tablets.           April 2017 Details    Sun Mon Tue Wed Thu Fri Sat           1      5 mg           2      5 mg         3      2.5 mg         4      5 mg         5       5 mg         6      5 mg         7      2.5 mg         8      5 mg           9      5 mg         10      2.5 mg         11      5 mg         12      5 mg         13      5 mg         14      2.5 mg         15      5 mg           16      5 mg         17      2.5 mg         18      5 mg         19      5 mg         20      5 mg         21      2.5 mg         22      5 mg           23      5 mg         24      2.5 mg         25      5 mg         26      5 mg         27      5 mg         28      2.5 mg         29      5 mg           30      5 mg                Date Details   No additional details            How to take your warfarin dose     To take:  2.5 mg Take 0.5 of a 5 mg tablet.    To take:  5 mg Take 1 of the 5 mg tablets.           May 2017 Details    Sun Mon Tue Wed Thu Fri Sat      1      2.5 mg         2      5 mg         3      5 mg         4      5 mg         5      2.5 mg         6      5 mg           7      5 mg         8      2.5 mg         9      5 mg         10            11               12               13                 14               15               16               17               18               19               20                 21               22               23               24               25               26               27                 28               29               30               31                   Date Details   No additional details    Date of next INR:  5/10/2017         How to take your warfarin dose     To take:  2.5 mg Take 0.5 of a 5 mg tablet.    To take:  5 mg Take 1 of the 5 mg tablets.

## 2017-03-30 ENCOUNTER — APPOINTMENT (OUTPATIENT)
Dept: GENERAL RADIOLOGY | Facility: CLINIC | Age: 82
End: 2017-03-30
Attending: EMERGENCY MEDICINE
Payer: COMMERCIAL

## 2017-03-30 ENCOUNTER — HOSPITAL ENCOUNTER (OUTPATIENT)
Facility: CLINIC | Age: 82
Setting detail: OBSERVATION
Discharge: HOME | End: 2017-03-31
Attending: EMERGENCY MEDICINE | Admitting: FAMILY MEDICINE
Payer: COMMERCIAL

## 2017-03-30 DIAGNOSIS — S80.11XA TRAUMATIC HEMATOMA OF RIGHT LOWER LEG, INITIAL ENCOUNTER: ICD-10-CM

## 2017-03-30 LAB
ANION GAP SERPL CALCULATED.3IONS-SCNC: 10 MMOL/L (ref 3–14)
APTT PPP: 48 SEC (ref 22–37)
BASOPHILS # BLD AUTO: 0 10E9/L (ref 0–0.2)
BASOPHILS NFR BLD AUTO: 0.1 %
BUN SERPL-MCNC: 21 MG/DL (ref 7–30)
CALCIUM SERPL-MCNC: 9 MG/DL (ref 8.5–10.1)
CHLORIDE SERPL-SCNC: 108 MMOL/L (ref 94–109)
CO2 SERPL-SCNC: 26 MMOL/L (ref 20–32)
CREAT SERPL-MCNC: 1.02 MG/DL (ref 0.66–1.25)
DIFFERENTIAL METHOD BLD: NORMAL
EOSINOPHIL # BLD AUTO: 0.2 10E9/L (ref 0–0.7)
EOSINOPHIL NFR BLD AUTO: 1.6 %
ERYTHROCYTE [DISTWIDTH] IN BLOOD BY AUTOMATED COUNT: 13.2 % (ref 10–15)
GFR SERPL CREATININE-BSD FRML MDRD: 69 ML/MIN/1.7M2
GLUCOSE SERPL-MCNC: 117 MG/DL (ref 70–99)
HCT VFR BLD AUTO: 45 % (ref 40–53)
HGB BLD-MCNC: 15 G/DL (ref 13.3–17.7)
IMM GRANULOCYTES # BLD: 0 10E9/L (ref 0–0.4)
IMM GRANULOCYTES NFR BLD: 0.2 %
INR PPP: 2.58 (ref 0.86–1.14)
LYMPHOCYTES # BLD AUTO: 2.4 10E9/L (ref 0.8–5.3)
LYMPHOCYTES NFR BLD AUTO: 26.3 %
MCH RBC QN AUTO: 32 PG (ref 26.5–33)
MCHC RBC AUTO-ENTMCNC: 33.3 G/DL (ref 31.5–36.5)
MCV RBC AUTO: 96 FL (ref 78–100)
MONOCYTES # BLD AUTO: 0.9 10E9/L (ref 0–1.3)
MONOCYTES NFR BLD AUTO: 10.3 %
NEUTROPHILS # BLD AUTO: 5.6 10E9/L (ref 1.6–8.3)
NEUTROPHILS NFR BLD AUTO: 61.5 %
PLATELET # BLD AUTO: 171 10E9/L (ref 150–450)
POTASSIUM SERPL-SCNC: 4.3 MMOL/L (ref 3.4–5.3)
RBC # BLD AUTO: 4.69 10E12/L (ref 4.4–5.9)
SODIUM SERPL-SCNC: 144 MMOL/L (ref 133–144)
WBC # BLD AUTO: 9.2 10E9/L (ref 4–11)

## 2017-03-30 PROCEDURE — 99284 EMERGENCY DEPT VISIT MOD MDM: CPT | Performed by: EMERGENCY MEDICINE

## 2017-03-30 PROCEDURE — 99285 EMERGENCY DEPT VISIT HI MDM: CPT | Mod: 25

## 2017-03-30 PROCEDURE — 73562 X-RAY EXAM OF KNEE 3: CPT | Mod: TC,RT

## 2017-03-30 PROCEDURE — 85610 PROTHROMBIN TIME: CPT | Performed by: EMERGENCY MEDICINE

## 2017-03-30 PROCEDURE — 85025 COMPLETE CBC W/AUTO DIFF WBC: CPT | Performed by: EMERGENCY MEDICINE

## 2017-03-30 PROCEDURE — 80048 BASIC METABOLIC PNL TOTAL CA: CPT | Performed by: EMERGENCY MEDICINE

## 2017-03-30 PROCEDURE — 73590 X-RAY EXAM OF LOWER LEG: CPT | Mod: TC,RT

## 2017-03-30 PROCEDURE — 25000128 H RX IP 250 OP 636: Performed by: EMERGENCY MEDICINE

## 2017-03-30 PROCEDURE — 85730 THROMBOPLASTIN TIME PARTIAL: CPT | Performed by: EMERGENCY MEDICINE

## 2017-03-30 PROCEDURE — 96374 THER/PROPH/DIAG INJ IV PUSH: CPT

## 2017-03-30 RX ORDER — MORPHINE SULFATE 4 MG/ML
4 INJECTION, SOLUTION INTRAMUSCULAR; INTRAVENOUS ONCE
Status: DISCONTINUED | OUTPATIENT
Start: 2017-03-30 | End: 2017-03-30

## 2017-03-30 RX ORDER — MORPHINE SULFATE 4 MG/ML
4 INJECTION, SOLUTION INTRAMUSCULAR; INTRAVENOUS ONCE
Status: COMPLETED | OUTPATIENT
Start: 2017-03-30 | End: 2017-03-30

## 2017-03-30 RX ORDER — LIDOCAINE 40 MG/G
CREAM TOPICAL
Status: DISCONTINUED | OUTPATIENT
Start: 2017-03-30 | End: 2017-03-31 | Stop reason: HOSPADM

## 2017-03-30 RX ADMIN — MORPHINE SULFATE 4 MG: 4 INJECTION, SOLUTION INTRAMUSCULAR; INTRAVENOUS at 22:58

## 2017-03-30 NOTE — IP AVS SNAPSHOT
MRN:[id number]                      After Visit Summary   3/30/2017    Melvin Abad    MRN: [id number]           Thank you!     Thank you for choosing [facility] for your care. Our goal is always to provide you with excellent care. Hearing back from our patients is one way we can continue to improve our services. Please take a few minutes to complete the written survey that you may receive in the mail after you visit with us. Thank you!        Patient Information     Date Of Birth          4/24/1928        About your hospital stay     You were admitted on:  March 31, 2017 You last received care in the:  [address] [facility]    You were discharged on:  March 31, 2017       Who to Call     For medical emergencies, please call 911.  For non-urgent questions about your medical care, please call your primary care provider or clinic, [phone]          Attending Provider     Provider Specialty    Elliott Obregon MD Emergency Medicine    Key, Foster Arredondo MD Family Practice       Primary Care Provider Office Phone # Fax #    Lazaro Huston [phone]       [address] [address]        After Care Instructions     Activity       Your activity upon discharge: activity as tolerated            Diet       Follow this diet upon discharge: Orders Placed This Encounter      Moderate Consistent CHO Diet                  Follow-up Appointments     Follow-up and recommended labs and tests        Follow up with primary care provider, Lazaro Huston, within 7 days for hospital follow- up.  No follow up labs or test are needed.                  Your next 10 appointments already scheduled     May 10, 2017  8:15 AM CDT   [facility]    [address] [place] [id number] [phone]              Further instructions from your care  "team       Monitor bruising for worsening conditions and increased pain.  Dr Huston's office will call Monday to schedule follow up appt    Pending Results     No orders found for last 3 day(s).            Statement of Approval     Ordered          17 1737  I have reviewed and agree with all the recommendations and orders detailed in this document.  EFFECTIVE NOW     Approved and electronically signed by:  Foster Key MD             Admission Information     Date & Time Provider Department Dept. Phone    3/30/2017 Foster Key MD [address] [facility] [phone]      Your Vitals Were     Blood Pressure Pulse Temperature Respirations Height Weight    153/87 (BP Location: Left arm) 68 96  F (35.6  C) (Oral) 18 1.753 m (5' 9\") 98.5 kg (217 lb 2.5 oz)    Pulse Oximetry BMI (Body Mass Index)                97% 32.07 kg/m2          [employer] Information     [employer] lets you send messages to your doctor, view your test results, renew your prescriptions, schedule appointments and more. To sign up, go to [email or web address] . Click on \"Log in\" on the left side of the screen, which will take you to the Welcome page. Then click on \"Sign up Now\" on the right side of the page.     You will be asked to enter the access code listed below, as well as some personal information. Please follow the directions to create your username and password.     Your access code is: [email or web address]  Expires: 2017  9:01 AM     Your access code will  in 90 days. If you need help or a new code, please call your [facility] clinic or [phone].        Care EveryWhere ID     This is your Care EveryWhere ID. This could be used by other organizations to access your [facility] medical records  [id number]           Review of your medicines      CONTINUE these medicines which have NOT CHANGED        Dose / Directions    ACETAMINOPHEN 8 HOUR 650 MG 8 hour tablet   Generic drug:  acetaminophen        Dose:  " 650 mg   Take 650 mg by mouth every 4 hours as needed   Quantity:  100 tablet   Refills:  0       blood glucose monitoring lancets   Used for:  Type 2 diabetes mellitus without complication (H)        Dose:  1 each   1 each 2 times daily Test before meals and at bedtime   Quantity:  1 Box   Refills:  3       blood glucose monitoring test strip   Commonly known as:  no brand specified   Used for:  Type 2 diabetes mellitus without complication (H)        Test before meals and at bedtime.   Quantity:  100 each   Refills:  3       lisinopril 5 MG tablet   Commonly known as:  PRINIVIL/ZESTRIL        Dose:  5 mg   Take 1 tablet (5 mg) by mouth daily   Quantity:  90 tablet   Refills:  3       metoprolol 50 MG 24 hr tablet   Commonly known as:  TOPROL-XL   Used for:  Essential hypertension, benign        Dose:  50 mg   Take 1 tablet (50 mg) by mouth daily   Quantity:  90 tablet   Refills:  3       multivitamin Tabs tablet        Dose:  1 tablet   Take 1 tablet by mouth daily.   Refills:  0       polyethylene glycol powder   Commonly known as:  MIRALAX   Used for:  Constipation, unspecified constipation type        Dose:  1 capful   Take 17 g by mouth daily   Quantity:  510 g   Refills:  1       simvastatin 40 MG tablet   Commonly known as:  ZOCOR   Used for:  Hyperlipidemia LDL goal <100        TAKE ONE TABLET BY MOUTH AT BEDTIME   Quantity:  90 tablet   Refills:  3       vitamin D 2000 UNITS tablet        Dose:  1 tablet   Take 1 tablet by mouth daily   Refills:  0       warfarin 5 MG tablet   Commonly known as:  COUMADIN   Used for:  Long-term (current) use of anticoagulants, Chronic atrial fibrillation (H)        TAKE ONE-HALF TABLET ON MONDAYS AND FRIDAYS AND ONE TABLET ON ALL THE OTHER DAYS OF THE WEEK OR AS DIRECTED BY COUMADIN CLINIC   Quantity:  90 tablet   Refills:  3                Protect others around you: Learn how to safely use, store and throw away your medicines at www.disposemymeds.org.              Medication List: This is a list of all your medications and when to take them. Check marks below indicate your daily home schedule. Keep this list as a reference.      Medications           Morning Afternoon Evening Bedtime As Needed    ACETAMINOPHEN 8 HOUR 650 MG 8 hour tablet   Take 650 mg by mouth every 4 hours as needed   Generic drug:  acetaminophen                                   blood glucose monitoring lancets   1 each 2 times daily Test before meals and at bedtime                                   blood glucose monitoring test strip   Commonly known as:  no brand specified   Test before meals and at bedtime.                                   lisinopril 5 MG tablet   Commonly known as:  PRINIVIL/ZESTRIL   Take 1 tablet (5 mg) by mouth daily   Last time this was given:  5 mg on 3/31/2017  8:18 AM                                   metoprolol 50 MG 24 hr tablet   Commonly known as:  TOPROL-XL   Take 1 tablet (50 mg) by mouth daily   Last time this was given:  50 mg on 3/31/2017  8:18 AM                                   multivitamin Tabs tablet   Take 1 tablet by mouth daily.                                   polyethylene glycol powder   Commonly known as:  MIRALAX   Take 17 g by mouth daily                                   simvastatin 40 MG tablet   Commonly known as:  ZOCOR   TAKE ONE TABLET BY MOUTH AT BEDTIME                                   vitamin D 2000 UNITS tablet   Take 1 tablet by mouth daily                                   warfarin 5 MG tablet   Commonly known as:  COUMADIN   TAKE ONE-HALF TABLET ON MONDAYS AND FRIDAYS AND ONE TABLET ON ALL THE OTHER DAYS OF THE WEEK OR AS DIRECTED BY COUMADIN CLINIC   Last time this was given:  5 mg on 3/31/2017  5:50 PM

## 2017-03-30 NOTE — IP AVS SNAPSHOT
[address] [facility]    [address] [place] [address]    Phone:  [phone]                                       After Visit Summary   3/30/2017    Melvin Abad    MRN: [id number]           After Visit Summary Signature Page     I have received my discharge instructions, and my questions have been answered. I have discussed any challenges I see with this plan with the nurse or doctor.    ..........................................................................................................................................  Patient/Patient Representative Signature      ..........................................................................................................................................  Patient Representative Print Name and Relationship to Patient    ..................................................               ................................................  Date                                            Time    ..........................................................................................................................................  Reviewed by Signature/Title    ...................................................              ..............................................  Date                                                            Time

## 2017-03-31 ENCOUNTER — TELEPHONE (OUTPATIENT)
Dept: INTERNAL MEDICINE | Facility: CLINIC | Age: 82
End: 2017-03-31

## 2017-03-31 VITALS
HEART RATE: 68 BPM | WEIGHT: 217.15 LBS | DIASTOLIC BLOOD PRESSURE: 87 MMHG | HEIGHT: 69 IN | BODY MASS INDEX: 32.16 KG/M2 | RESPIRATION RATE: 18 BRPM | OXYGEN SATURATION: 97 % | SYSTOLIC BLOOD PRESSURE: 153 MMHG | TEMPERATURE: 96 F

## 2017-03-31 PROBLEM — M79.661 PAIN OF RIGHT LOWER LEG: Status: ACTIVE | Noted: 2017-03-31

## 2017-03-31 PROBLEM — M79.606 LEG PAIN: Status: ACTIVE | Noted: 2017-03-31

## 2017-03-31 PROBLEM — S80.11XA CONTUSION OF RIGHT LOWER EXTREMITY: Status: ACTIVE | Noted: 2017-03-31

## 2017-03-31 LAB
GLUCOSE BLDC GLUCOMTR-MCNC: 130 MG/DL (ref 70–99)
GLUCOSE BLDC GLUCOMTR-MCNC: 143 MG/DL (ref 70–99)
HGB BLD-MCNC: 14.4 G/DL (ref 13.3–17.7)
INR PPP: 2.5 (ref 0.86–1.14)

## 2017-03-31 PROCEDURE — G0378 HOSPITAL OBSERVATION PER HR: HCPCS

## 2017-03-31 PROCEDURE — 36415 COLL VENOUS BLD VENIPUNCTURE: CPT | Performed by: FAMILY MEDICINE

## 2017-03-31 PROCEDURE — 85018 HEMOGLOBIN: CPT | Performed by: FAMILY MEDICINE

## 2017-03-31 PROCEDURE — 99235 HOSP IP/OBS SAME DATE MOD 70: CPT | Performed by: FAMILY MEDICINE

## 2017-03-31 PROCEDURE — 25000132 ZZH RX MED GY IP 250 OP 250 PS 637: Performed by: FAMILY MEDICINE

## 2017-03-31 PROCEDURE — 00000146 ZZHCL STATISTIC GLUCOSE BY METER IP

## 2017-03-31 PROCEDURE — 85610 PROTHROMBIN TIME: CPT | Performed by: FAMILY MEDICINE

## 2017-03-31 RX ORDER — METOPROLOL SUCCINATE 50 MG/1
50 TABLET, EXTENDED RELEASE ORAL DAILY
Status: DISCONTINUED | OUTPATIENT
Start: 2017-03-31 | End: 2017-03-31 | Stop reason: HOSPADM

## 2017-03-31 RX ORDER — LISINOPRIL 2.5 MG/1
5 TABLET ORAL DAILY
Status: DISCONTINUED | OUTPATIENT
Start: 2017-03-31 | End: 2017-03-31 | Stop reason: HOSPADM

## 2017-03-31 RX ORDER — WARFARIN SODIUM 5 MG/1
5 TABLET ORAL
Status: COMPLETED | OUTPATIENT
Start: 2017-03-31 | End: 2017-03-31

## 2017-03-31 RX ORDER — ACETAMINOPHEN 325 MG/1
650 TABLET ORAL EVERY 4 HOURS PRN
Status: DISCONTINUED | OUTPATIENT
Start: 2017-03-31 | End: 2017-03-31 | Stop reason: HOSPADM

## 2017-03-31 RX ORDER — HYDROCODONE BITARTRATE AND ACETAMINOPHEN 5; 325 MG/1; MG/1
1-2 TABLET ORAL EVERY 4 HOURS PRN
Status: DISCONTINUED | OUTPATIENT
Start: 2017-03-31 | End: 2017-03-31 | Stop reason: HOSPADM

## 2017-03-31 RX ORDER — NALOXONE HYDROCHLORIDE 0.4 MG/ML
.1-.4 INJECTION, SOLUTION INTRAMUSCULAR; INTRAVENOUS; SUBCUTANEOUS
Status: DISCONTINUED | OUTPATIENT
Start: 2017-03-31 | End: 2017-03-31 | Stop reason: HOSPADM

## 2017-03-31 RX ADMIN — HYDROCODONE BITARTRATE AND ACETAMINOPHEN 2 TABLET: 5; 325 TABLET ORAL at 02:12

## 2017-03-31 RX ADMIN — METOPROLOL SUCCINATE 50 MG: 50 TABLET, EXTENDED RELEASE ORAL at 08:18

## 2017-03-31 RX ADMIN — WARFARIN SODIUM 5 MG: 5 TABLET ORAL at 17:50

## 2017-03-31 RX ADMIN — LISINOPRIL 5 MG: 2.5 TABLET ORAL at 08:18

## 2017-03-31 ASSESSMENT — ENCOUNTER SYMPTOMS
WOUND: 1
CHEST TIGHTNESS: 0
NUMBNESS: 0
WEAKNESS: 0
SHORTNESS OF BREATH: 0
ARTHRALGIAS: 1

## 2017-03-31 NOTE — PLAN OF CARE
Problem: Goal Outcome Summary  Goal: Goal Outcome Summary  Outcome: Improving  VSS, afebrile, Ra good appetite an UOP, patient states the pain is at a tolerable level and denies medication throughout shift. Ambulated in the halls with SBA with a decrease in the pain since yesterday. Good CMS to the RL/extremity.  Will continue to monitor per POC.

## 2017-03-31 NOTE — DISCHARGE SUMMARY
[facility]    Discharge Summary  Hospitalist    Date of Admission:  3/30/2017  Date of Discharge:  3/31/2017  Discharging Provider: Foster Key MD  Date of Service (when I saw the patient): 03/31/17    Discharge Diagnoses   Principal Problem:    Pain of right lower leg  Active Problems:    Contusion of right lower extremity    Type 2 diabetes mellitus without complication (H)    Chronic atrial fibrillation (H)    Leg pain    Hyperlipidemia LDL goal <100        History of Present Illness   Copied from H&P:  Melvin Abad is a 88 year old male who presents with right lower leg pain. Intricate this morning when he was putting his riding lawnmower in the garage. He was sitting on it when it lurched forward and then stopped and he hit his knee against the dashboard. Add some immediate pain, but was able to get to the house with little difficulty. 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 During the day he's had some increase in pain and by this evening was having more discomfort and felt that he could not sleep. He called the son who brought him to the emergency room. In the emergency department is having some pain down the front of the right shin. Seems worse when he attempts to pull up his toes. Seems better when he actually walks. He has some bruising on the inside of the right knee which is minimally tender. He has chronic numbness in both feet which has not changed. Son who was with him is concerned that patient's wife had a fracture of her leg complicated by compartment syndrome while she was on Coumadin. Patient himself is on Coumadin with history of chronic atrial fibrillation. His INR check today was normal. Patient has history of diet controlled diabetes. He's been feeling well with no shortness of breath, no chest pain, no bowel pain.    Hospital Course   Melvin Abad was admitted on 3/30/2017.  The following problems were addressed during  his hospitalization:    Principal Problem:    Pain of right lower leg  Active Problems:    Contusion of right lower extremity    Type 2 diabetes mellitus without complication (H)    Chronic atrial fibrillation (H)    Leg pain    Hyperlipidemia LDL goal <100    Patient was registered to observation and treated with one dose of norco and later tylenol for pain. He had minimal pain and was able to ambulate without any issues. By evening he was feeling at baseline and asking to go home. He will be discharged to home, to continue his home regimen. Suggest for leg pain to continue ice to area as needed, tylenol for pain. There was no adjustments made to his coumadin dosing.    Foster Key MD    Significant Results and Procedures   No procedures performed during this admission    Pending Results   These results will be followed up by Dr. Huston  Unresulted Labs Ordered in the Past 30 Days of this Admission     No orders found for last 61 day(s).          Code Status   DNR / DNI       Primary Care Physician   Lazaro Huston    Physical Exam   Temp: 96  F (35.6  C) Temp src: Oral BP: 153/87 Pulse: 68 Heart Rate: 68 Resp: 18 SpO2: 97 % O2 Device: None (Room air)    Vitals:    03/30/17 2213 03/31/17 0212   Weight: 99.3 kg (219 lb) 98.5 kg (217 lb 2.5 oz)     Vital Signs with Ranges  Temp:  [96  F (35.6  C)-98  F (36.7  C)] 96  F (35.6  C)  Pulse:  [64-76] 68  Heart Rate:  [64-82] 68  Resp:  [16-20] 18  BP: (135-169)/() 153/87  SpO2:  [94 %-97 %] 97 %       Constitutional: awake, alert, cooperative, no apparent distress, and appears stated age  Musculoskeletal: mild bruising below and medial to right knee. Mild tenderness. Full ROM of knee and leg, ambulating without pain    Discharge Disposition   Discharged to home  Condition at discharge: Good    Consultations This Hospital Stay   PHARMACY TO DOSE WARFARIN    Time Spent on this Encounter   I, Foster Key MD, personally saw the patient today and spent  less than or equal to 30 minutes discharging this patient.    Discharge Orders     Follow-up and recommended labs and tests    Follow up with primary care provider, Lazaro Huston, within 7 days for hospital follow- up.  No follow up labs or test are needed.     Activity   Your activity upon discharge: activity as tolerated     DNR/DNI     Diet   Follow this diet upon discharge: Orders Placed This Encounter     Moderate Consistent CHO Diet       Discharge Medications   Current Discharge Medication List      CONTINUE these medications which have NOT CHANGED    Details   metoprolol (TOPROL-XL) 50 MG 24 hr tablet Take 1 tablet (50 mg) by mouth daily  Qty: 90 tablet, Refills: 3    Associated Diagnoses: Essential hypertension, benign      warfarin (COUMADIN) 5 MG tablet TAKE ONE-HALF TABLET ON MONDAYS AND FRIDAYS AND ONE TABLET ON ALL THE OTHER DAYS OF THE WEEK OR AS DIRECTED BY COUMADIN CLINIC  Qty: 90 tablet, Refills: 3    Associated Diagnoses: Long-term (current) use of anticoagulants; Chronic atrial fibrillation (H)      simvastatin (ZOCOR) 40 MG tablet TAKE ONE TABLET BY MOUTH AT BEDTIME  Qty: 90 tablet, Refills: 3    Associated Diagnoses: Hyperlipidemia LDL goal <100      lisinopril (PRINIVIL/ZESTRIL) 5 MG tablet Take 1 tablet (5 mg) by mouth daily  Qty: 90 tablet, Refills: 3      Cholecalciferol (VITAMIN D) 2000 UNITS tablet Take 1 tablet by mouth daily      polyethylene glycol (MIRALAX) powder Take 17 g by mouth daily  Qty: 510 g, Refills: 1    Associated Diagnoses: Constipation, unspecified constipation type      acetaminophen 650 MG TABS Take 650 mg by mouth every 4 hours as needed  Qty: 100 tablet      multivitamin (OCUVITE) TABS Take 1 tablet by mouth daily.  Refills: 0      blood glucose monitoring (ONE TOUCH ULTRASOFT) lancets 1 each 2 times daily Test before meals and at bedtime  Qty: 1 Box, Refills: 3    Associated Diagnoses: Type 2 diabetes mellitus without complication (H)      blood glucose monitoring  (NO BRAND SPECIFIED) test strip Test before meals and at bedtime.  Qty: 100 each, Refills: 3    Associated Diagnoses: Type 2 diabetes mellitus without complication (H)           Allergies   No Known Allergies  Data   Most Recent 3 CBC's:  Recent Labs   Lab Test  03/31/17   0945  03/30/17   2245  07/15/15   1213  11/21/13   0604   WBC   --   9.2  6.8  7.2   HGB  14.4  15.0  15.0  13.3   MCV   --   96  97  103*   PLT   --   171  158  193      Most Recent 3 BMP's:  Recent Labs   Lab Test  03/30/17 2245 02/07/17   0833  01/19/16   0823   NA  144  146*  143   POTASSIUM  4.3  4.5  4.4   CHLORIDE  108  109  108   CO2  26  32  28   BUN  21  18  19   CR  1.02  0.97  0.89   ANIONGAP  10  5  7   DARIUS  9.0  9.0  8.7   GLC  117*  122*  117*     Most Recent 2 LFT's:  Recent Labs   Lab Test  02/07/17   0833  01/19/16   0823   AST  17  17   ALT  20  22   ALKPHOS  64  70   BILITOTAL  1.3  1.1     Most Recent INR's and Anticoagulation Dosing History:  Anticoagulation Dose History     Recent Dosing and Labs Latest Ref Rng & Units 10/11/2016 11/22/2016 1/6/2017 2/15/2017 3/29/2017 3/30/2017 3/31/2017    INR 0.86 - 1.14 - - - - - 2.58(H) 2.50(H)    INR 0.86 - 1.14 1.6(A) 2.1(A) 2.5(A) 2.2(A) 2.3(A) - -    INR Point of Care 0.86 - 1.14 - - - - - - -        Most Recent 3 Troponin's:No lab results found.  Most Recent Cholesterol Panel:  Recent Labs   Lab Test  02/07/17   0833   CHOL  144   LDL  75   HDL  51   TRIG  88     Most Recent 6 Bacteria Isolates From Any Culture (See EPIC Reports for Culture Details):  Recent Labs   Lab Test  11/20/13   2000   CULT  No MRSA isolated     Most Recent TSH, T4 and A1c Labs:  Recent Labs   Lab Test  02/07/17   0833   07/15/15   1213   TSH   --    --   1.56   A1C  6.3*   < >  6.5*    < > = values in this interval not displayed.     Results for orders placed or performed during the hospital encounter of 03/30/17   Knee XR, 3 views, right    Narrative    RIGHT KNEE AND TIBIA AND FIBULA 5 VIEWS  3/30/2017  11:49 PM     HISTORY: Pain.    COMPARISON: None.      Impression    IMPRESSION:   1. A right knee arthroplasty is in place. No evidence of hardware  failure or malalignment.  2. No visualized acute fracture, malalignment or other acute osseous  abnormality of the right knee, tibia or fibula.    ALFRED GUERRA MD   Tib/Fib XR, right    Narrative    RIGHT KNEE AND TIBIA AND FIBULA 5 VIEWS  3/30/2017 11:49 PM     HISTORY: Pain.    COMPARISON: None.      Impression    IMPRESSION:   1. A right knee arthroplasty is in place. No evidence of hardware  failure or malalignment.  2. No visualized acute fracture, malalignment or other acute osseous  abnormality of the right knee, tibia or fibula.    ALFRED GUERRA MD

## 2017-03-31 NOTE — PROGRESS NOTES
S-(situation): Patient discharged to home via walkout with patient son    B-(background): Observation goals met. Pt was pain free et had met baseline ambulatory status    A-(assessment): VS stable. Pain denies pain. Ambulates as tolerated independently. DC instructions were given. Pt was instructed that Dr Moss office would call him on Monday to schedule an appt. Scheduling could not schedule a follow up today.    R-(recommendations): Discharge instructions reviewed with pt. Listed belongings gathered and returned to patient.  Patient Education resolved: Yes  New medications-Pt. Has been educated about reason of use and side effects NA  Home and hospital acquired medications returned to patient NA  Medication Bin checked and emptied on discharge Yes

## 2017-03-31 NOTE — TELEPHONE ENCOUNTER
Female from [facility] called wanting to schedule an important for a follow up appointment for patient with Dr. Huston in the next 7 days. Nothing available that I can schedule for. Please contact patient at [phone], or son Terry at [phone] for availabilities.     Outreach ,  Treasure Adams

## 2017-03-31 NOTE — H&P
[facility]    History and Physical  Hospitalist       Date of Admission:  3/30/2017  Date of Service (when I saw the patient): 03/31/17    Assessment & Plan   Melvin Abad is a 88 year old male who presents with worsening pain involving this right lower leg. Patient has chronic atrial fibrillation on chronic Coumadin therapy and when getting off his lawn tractor this morning dumped it against tractor. Had a little bit of pain at the time, was able ambulate but as the day is gone on, he's had more pain and some swelling involving the inside of his right knee and increasing pain down the front of his leg. He presents to the emergency room with his son who is concerned that his mother had swelling after a broken leg well taking Coumadin which led to compartment syndrome and loss of muscle. He is very concerned that this could happen to his father. In the emergency department the pain was worsening somewhat, not well controlled with IV morphine. Imaging of the leg is unremarkable showing an old right total knee arthroplasty. There is no signs of any new bone injury. Clinical exam showing good peripheral pulses with somewhat diminished sensation which is chronic due to neuropathy. Patient will be registered observation with monitoring. Will attempt to control his pain with use of oral meds including Tylenol and Norco. If the pain is controlled in the swelling is not worsening, we anticipate discharged home later today.    Principal Problem:    Pain of right lower leg    Assessment: related to bumping his leg this morning. Exam showing some mild hematoma formation on the inner side of the lower leg. He has tenderness down the front of the anterior tibia with no obvious bony deformity. X-ray exam of the leg is unremarkable.    Plan: likely due to bruising, no real signs to suggest compartment syndrome. Will treat with ice, elevation, Tylenol and Norco. If worsening, consider orthopedic  consultation later.  Active Problems:    Contusion of right lower extremity    Assessment: as above    Plan: as above    Type 2 diabetes mellitus without complication (H)    Assessment: well-controlled on diet, last hemoglobin A-1 C was 6.3    Plan: continue diet    Chronic atrial fibrillation (H)    Assessment: chronic, on rate control and Coumadin. INR today therapeutic at 2.53    Plan: no change. Pharmacy to manage Coumadin dose    Hyperlipidemia LDL goal <100    Assessment: stable, taking simvastatin    Plan: restart at discharge  DVT Prophylaxis: Warfarin  Code Status: DNR / DNI per previous discussions    Disposition: Expected discharge in 1 days once pain controlled, able to ambulate.    Foster Key MD    Primary Care Physician   Lazaro Huston    Chief Complaint   88-year-old male with increasing pain in his right lower leg    History is obtained from the patient, electronic health record, emergency department physician and patient's son    History of Present Illness   Melvin Abad is a 88 year old male who presents with right lower leg pain. Intricate this morning when he was putting his riding lawnmower in the garage. He was sitting on it when it lurched forward and then stopped and he hit his knee against the dashboard. Add some immediate pain, but was able to get to the house with little difficulty. 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 During the day he's had some increase in pain and by this evening was having more discomfort and felt that he could not sleep. He called the son who brought him to the emergency room. In the emergency department is having some pain down the front of the right shin. Seems worse when he attempts to pull up his toes. Seems better when he actually walks. He has some bruising on the inside of the right knee which is minimally tender. He has chronic numbness in both feet which has not changed. Son who was with him is concerned that patient's  wife had a fracture of her leg complicated by compartment syndrome while she was on Coumadin. Patient himself is on Coumadin with history of chronic atrial fibrillation. His INR check today was normal. Patient has history of diet controlled diabetes. He's been feeling well with no shortness of breath, no chest pain, no bowel pain.    Past Medical History    I have reviewed this patient's medical history and updated it with pertinent information if needed.   Past Medical History:   Diagnosis Date     Actinic keratosis      Atrial fibrillation (H)      Basal cell carcinoma nos 06/10    Mohs excision, rt upper lip & rt temple     Cardiac dysrhythmia, unspecified      Cough     chronic cough     Diabetic eye exam (H) 8/29/14     Generalized osteoarthrosis, unspecified site     djd of the knees, hands, and neck     Other diseases of lung, not elsewhere classified     pulmonary nodule, benign     Pure hypercholesterolemia      Unspecified essential hypertension        Past Surgical History   I have reviewed this patient's surgical history and updated it with pertinent information if needed.  Past Surgical History:   Procedure Laterality Date     C TOTAL KNEE ARTHROPLASTY  1997    Knee Replacement, Total     COLONOSCOPY  05/02/2007    Multiple bxs of diminutive polyps of ascending colon.     COLONOSCOPY  11/10/2010    COLONOSCOPY performed by MARISELA GRIMM at  GI     HC COLONOSCOPY W/WO BRUSH/WASH  10/19/2001     HC COLONOSCOPY W/WO BRUSH/WASH  11/02/2004     HC HEMORRHOIDOPEXY BY STAPLING  09/26/08       Prior to Admission Medications   Prior to Admission Medications   Prescriptions Last Dose Informant Patient Reported? Taking?   Cholecalciferol (VITAMIN D) 2000 UNITS tablet 3/30/2017 at 0700  Yes Yes   Sig: Take 1 tablet by mouth daily   acetaminophen 650 MG TABS 3/30/2017 at 2000  Yes Yes   Sig: Take 650 mg by mouth every 4 hours as needed   blood glucose monitoring (NO BRAND SPECIFIED) test strip   No No   Sig: Test  before meals and at bedtime.   blood glucose monitoring (ONE TOUCH ULTRASOFT) lancets   No No   Si each 2 times daily Test before meals and at bedtime   lisinopril (PRINIVIL/ZESTRIL) 5 MG tablet 3/30/2017 at 2100  No Yes   Sig: Take 1 tablet (5 mg) by mouth daily   metoprolol (TOPROL-XL) 50 MG 24 hr tablet 3/30/2017 at 0700  No Yes   Sig: Take 1 tablet (50 mg) by mouth daily   multivitamin (OCUVITE) TABS 3/30/2017 at 0700  Yes Yes   Sig: Take 1 tablet by mouth daily.   polyethylene glycol (MIRALAX) powder Past Week at Unknown time  No Yes   Sig: Take 17 g by mouth daily   simvastatin (ZOCOR) 40 MG tablet 3/30/2017 at 0700  No Yes   Sig: TAKE ONE TABLET BY MOUTH AT BEDTIME   warfarin (COUMADIN) 5 MG tablet 3/30/2017 at 0700  No Yes   Sig: TAKE ONE-HALF TABLET ON  AND  AND ONE TABLET ON ALL THE OTHER DAYS OF THE WEEK OR AS DIRECTED BY COUMADIN CLINIC      Facility-Administered Medications: None     Allergies   No Known Allergies    Social History   I have reviewed this patient's social history and updated it with pertinent information if needed. Melvin Abad  reports that he has quit smoking. His smoking use included Cigarettes. He has never used smokeless tobacco. He reports that he does not drink alcohol or use illicit drugs.    Family History   I have reviewed this patient's family history and updated it with pertinent information if needed.   Family History   Problem Relation Age of Onset     CANCER Mother      Prostate Cancer Father        Review of Systems   The 10 point Review of Systems is negative other than noted in the HPI or here.     Physical Exam   Temp: 97.4  F (36.3  C) Temp src: Temporal BP: (!) 148/106   Heart Rate: 82 Resp: 20 SpO2: 96 % O2 Device: None (Room air)    Vital Signs with Ranges  Temp:  [97.4  F (36.3  C)] 97.4  F (36.3  C)  Heart Rate:  [82] 82  Resp:  [20] 20  BP: (145-160)/() 148/106  SpO2:  [94 %-96 %] 96 %  219 lbs 0 oz    EXAM:  Constitutional: healthy,  alert and no distress   Cardiovascular: irregular, irregular rate and rhythm. No overheard  Respiratory: Percussion normal. Good diaphragmatic excursion. Lungs clear  Psychiatric: mentation appears normal and affect normal/bright  Head: Normocephalic. No masses, lesions, tenderness or abnormalities  Neck: Neck supple. No adenopathy. Thyroid symmetric, normal size,, Carotids without bruits.  ENT: ENT exam normal, no neck nodes or sinus tenderness  Abdomen: Abdomen soft, non-tender. BS normal. No masses, organomegaly  NEURO: subjective decreased sensation in both feet.  SKIN: stasis changes in both lower legs. Appears to have a fresh bruise just below the knee on the medial aspect of the right lower leg. Maybe a small scrape on the anterior shin.  LYMPH: Normal cervical lymph nodes  JOINT/EXTREMITIES: normal muscle tone     Data   Data reviewed today:  I personally reviewed the X-ray image(s) showing No deformity involving the right to fib. Right knee is showing a normal appearing knee arthroplasty..    Recent Labs  Lab 03/30/17  2245 03/29/17   WBC 9.2  --    HGB 15.0  --    MCV 96  --      --    INR 2.58* 2.3*     --    POTASSIUM 4.3  --    CHLORIDE 108  --    CO2 26  --    BUN 21  --    CR 1.02  --    ANIONGAP 10  --    DARIUS 9.0  --    *  --        Recent Results (from the past 24 hour(s))   Tib/Fib XR, right    Narrative    RIGHT KNEE AND TIBIA AND FIBULA 5 VIEWS  3/30/2017 11:49 PM     HISTORY: Pain.    COMPARISON: None.      Impression    IMPRESSION:   1. A right knee arthroplasty is in place. No evidence of hardware  failure or malalignment.  2. No visualized acute fracture, malalignment or other acute osseous  abnormality of the right knee, tibia or fibula.    ALFRED GUERRA MD   Knee XR, 3 views, right    Narrative    RIGHT KNEE AND TIBIA AND FIBULA 5 VIEWS  3/30/2017 11:49 PM     HISTORY: Pain.    COMPARISON: None.      Impression    IMPRESSION:   1. A right knee arthroplasty is in place.  No evidence of hardware  failure or malalignment.  2. No visualized acute fracture, malalignment or other acute osseous  abnormality of the right knee, tibia or fibula.    ALFRED GUERRA MD

## 2017-03-31 NOTE — DISCHARGE INSTRUCTIONS
Monitor bruising for worsening conditions and increased pain.  Dr Huston's office will call Monday to schedule follow up appt

## 2017-03-31 NOTE — ED NOTES
Was putting away his lawn tractor about 10 this morning and bumped his right knee on something. Pt reports he has some pain at time of incident but is now having increased pain and swelling.

## 2017-03-31 NOTE — ED PROVIDER NOTES
History     Chief Complaint   Patient presents with     Knee Pain     The history is provided by the patient, a relative and medical records.     Melvin Abad is a 88 year old male who presents to the ED for evaluation of an injury she incurred to his right leg at about 10 this morning when he stepped off of his riding lawnmower and bumped the knee against the edge of the tractor.  The patient is anticoagulated with warfarin for chronic atrial fibrillation.    I have reviewed the Medications, Allergies, Past Medical and Surgical History, and Social History in the [facility] system.    Past Medical History:   Diagnosis Date     Actinic keratosis      Atrial fibrillation (H)      Basal cell carcinoma nos 06/10    Mohs excision, rt upper lip & rt temple     Cardiac dysrhythmia, unspecified      Cough     chronic cough     Diabetic eye exam (H) 8/29/14     Generalized osteoarthrosis, unspecified site     djd of the knees, hands, and neck     Other diseases of lung, not elsewhere classified     pulmonary nodule, benign     Pure hypercholesterolemia      Unspecified essential hypertension        Past Surgical History:   Procedure Laterality Date     C TOTAL KNEE ARTHROPLASTY  1997    Knee Replacement, Total     COLONOSCOPY  05/02/2007    Multiple bxs of diminutive polyps of ascending colon.     COLONOSCOPY  11/10/2010    COLONOSCOPY performed by MARISELA GRIMM at  GI     HC COLONOSCOPY W/WO BRUSH/WASH  10/19/2001     HC COLONOSCOPY W/WO BRUSH/WASH  11/02/2004     HC HEMORRHOIDOPEXY BY STAPLING  09/26/08       Family History   Problem Relation Age of Onset     CANCER Mother      Prostate Cancer Father        Social History   Substance Use Topics     Smoking status: Former Smoker     Types: Cigarettes     Smokeless tobacco: Never Used      Comment: 5 years     Alcohol use No      Comment: denies        Immunization History   Administered Date(s) Administered     Influenza (High Dose) 3 valent vaccine 10/07/2010,  "11/03/2011, 11/16/2012     Influenza (IIV3) 10/24/1995, 10/24/1996, 10/14/1997, 10/09/1998, 10/08/1999, 11/10/2000, 10/24/2001, 10/23/2002, 10/07/2003, 11/12/2004, 10/12/2005, 12/11/2006, 10/15/2007, 09/24/2013, 09/08/2014     Pneumococcal 23 valent 12/11/2006     TD (ADULT, 7+) 08/30/2007        No Known Allergies    Current Outpatient Prescriptions   Medication Sig Dispense Refill     metoprolol (TOPROL-XL) 50 MG 24 hr tablet Take 1 tablet (50 mg) by mouth daily 90 tablet 3     warfarin (COUMADIN) 5 MG tablet TAKE ONE-HALF TABLET ON MONDAYS AND FRIDAYS AND ONE TABLET ON ALL THE OTHER DAYS OF THE WEEK OR AS DIRECTED BY COUMADIN CLINIC 90 tablet 3     simvastatin (ZOCOR) 40 MG tablet TAKE ONE TABLET BY MOUTH AT BEDTIME 90 tablet 3     lisinopril (PRINIVIL/ZESTRIL) 5 MG tablet Take 1 tablet (5 mg) by mouth daily 90 tablet 3     Cholecalciferol (VITAMIN D) 2000 UNITS tablet Take 1 tablet by mouth daily       polyethylene glycol (MIRALAX) powder Take 17 g by mouth daily 510 g 1     acetaminophen 650 MG TABS Take 650 mg by mouth every 4 hours as needed 100 tablet      multivitamin (OCUVITE) TABS Take 1 tablet by mouth daily.  0     blood glucose monitoring (ONE TOUCH ULTRASOFT) lancets 1 each 2 times daily Test before meals and at bedtime 1 Box 3     blood glucose monitoring (NO BRAND SPECIFIED) test strip Test before meals and at bedtime. 100 each 3      Review of Systems   Respiratory: Negative for chest tightness and shortness of breath.    Cardiovascular: Positive for leg swelling. Negative for chest pain.   Musculoskeletal: Positive for arthralgias.   Skin: Positive for wound.   Neurological: Negative for weakness and numbness.   All other systems reviewed and are negative.      Physical Exam   BP: (!) 160/98  Heart Rate: 82  Temp: 97.4  F (36.3  C)  Resp: 20  Height: 175.3 cm (5' 9\")  Weight: 99.3 kg (219 lb)  SpO2: 95 %  Physical Exam   Constitutional: He is oriented to person, place, and time. No distress. "   HENT:   Head: Normocephalic.   Musculoskeletal:        Right knee: He exhibits swelling and ecchymosis. He exhibits normal range of motion.        Legs:  Neurological: He is alert and oriented to person, place, and time. He has normal strength. No cranial nerve deficit or sensory deficit. GCS eye subscore is 4. GCS verbal subscore is 5. GCS motor subscore is 6.   No evidence for acute neurologic changes distal to the injury on the right leg.   Skin: He is not diaphoretic.        Nursing note and vitals reviewed.      ED Course     ED Course     Procedures        Results for orders placed or performed during the hospital encounter of 03/30/17 (from the past 24 hour(s))   CBC with platelets differential   Result Value Ref Range    WBC 9.2 4.0 - 11.0 10e9/L    RBC Count 4.69 4.4 - 5.9 10e12/L    Hemoglobin 15.0 13.3 - 17.7 g/dL    Hematocrit 45.0 40.0 - 53.0 %    MCV 96 78 - 100 fl    MCH 32.0 26.5 - 33.0 pg    MCHC 33.3 31.5 - 36.5 g/dL    RDW 13.2 10.0 - 15.0 %    Platelet Count 171 150 - 450 10e9/L    Diff Method Automated Method     % Neutrophils 61.5 %    % Lymphocytes 26.3 %    % Monocytes 10.3 %    % Eosinophils 1.6 %    % Basophils 0.1 %    % Immature Granulocytes 0.2 %    Absolute Neutrophil 5.6 1.6 - 8.3 10e9/L    Absolute Lymphocytes 2.4 0.8 - 5.3 10e9/L    Absolute Monocytes 0.9 0.0 - 1.3 10e9/L    Absolute Eosinophils 0.2 0.0 - 0.7 10e9/L    Absolute Basophils 0.0 0.0 - 0.2 10e9/L    Abs Immature Granulocytes 0.0 0 - 0.4 10e9/L   INR   Result Value Ref Range    INR 2.58 (H) 0.86 - 1.14   Partial thromboplastin time   Result Value Ref Range    PTT 48 (H) 22 - 37 sec   Basic metabolic panel   Result Value Ref Range    Sodium 144 133 - 144 mmol/L    Potassium 4.3 3.4 - 5.3 mmol/L    Chloride 108 94 - 109 mmol/L    Carbon Dioxide 26 20 - 32 mmol/L    Anion Gap 10 3 - 14 mmol/L    Glucose 117 (H) 70 - 99 mg/dL    Urea Nitrogen 21 7 - 30 mg/dL    Creatinine 1.02 0.66 - 1.25 mg/dL    GFR Estimate 69 >60  mL/min/1.7m2    GFR Estimate If Black 83 >60 mL/min/1.7m2    Calcium 9.0 8.5 - 10.1 mg/dL   Tib/Fib XR, right    Narrative    RIGHT KNEE AND TIBIA AND FIBULA 5 VIEWS  3/30/2017 11:49 PM     HISTORY: Pain.    COMPARISON: None.      Impression    IMPRESSION:   1. A right knee arthroplasty is in place. No evidence of hardware  failure or malalignment.  2. No visualized acute fracture, malalignment or other acute osseous  abnormality of the right knee, tibia or fibula.    ALFRED GUERRA MD   Knee XR, 3 views, right    Narrative    RIGHT KNEE AND TIBIA AND FIBULA 5 VIEWS  3/30/2017 11:49 PM     HISTORY: Pain.    COMPARISON: None.      Impression    IMPRESSION:   1. A right knee arthroplasty is in place. No evidence of hardware  failure or malalignment.  2. No visualized acute fracture, malalignment or other acute osseous  abnormality of the right knee, tibia or fibula.    ALFRED GUERRA MD              Assessments & Plan (with Medical Decision Making)  Melvin Abad is an 88-year-old male who presents to the ED for evaluation of worsening right lower leg pain that started around 10 AM this morning when the patient was stepping off of his lawn tractor and bumped his right knee on the edge of the tractor.  He reports initially the pain was not very severe, however, throughout the day it continued to swell and worsen prompting his presentation for evaluation tonight.  The patient is anticoagulated on Coumadin for chronic atrial fibrillation.  On examination, there is significant swelling on the medial aspect of the tibia as well as some abrasions to the anterior upper tibia and upper fibula.  X-rays of the knee and tibia/fibula were obtained and were unremarkable for any acute changes.  The patient does have a prosthetic knee replacement which appears to be well seated and appropriately aligned.  In discussion of the findings on my exam with the patient and his son, the son was concerned that he might be developing  compartment syndrome as he is anticoagulated and apparently his wife developed swelling similar to this in relation to a fracture and ended up with compartment syndrome and ultimately lost her leg as a result.  With that information, the patient will be admitted under observation for pain control and further observation with the intent that he'll probably go home tomorrow.  The concern is that nobody will be with the patient early this morning and this afternoon.  I discussed the case with Dr. Key who has agreed to admit the patient under observation for this purpose.       I have reviewed the nursing notes.    I have reviewed the findings, diagnosis, plan and need for follow up with the patient.    New Prescriptions    No medications on file       Final diagnoses:   Traumatic hematoma of right lower leg, initial encounter       3/30/2017   [facility] EMERGENCY DEPARTMENT     Elliott Obregon MD  03/31/17 0136

## 2017-03-31 NOTE — PROGRESS NOTES
S-(situation): Patient registered to Observation. Patient arrived to room 248 via W/C from ED    B-(background): Right knee injury    A-(assessment): Pt is A&O.  VSS.  Afebrile.  Has bruises noted on arms and legs.  Lower extremities have a dusky cruz color to them.  Pt states this is normal.  Hematoma noted on inner right knee area where pt states had injury yesterday from his tractor.  Ice applied and medication given as pt states pain was starting to shoot down to his lower leg.       R-(recommendations): Orders and observation goals reviewed with pt.  Will cont to monitor the above.    Nursing Observation criteria listed below was met:    Skin issues/needs documented:Yes  Isolation needs addressed, if appropriate: NA  Fall Prevention: Education given and documented: Yes  Education Assessment documented:Yes  Education Documented (Pre-existing chronic infection such as, MRSA/VRE need education on admission): No  New medication patient education completed and documented (Possible Side Effects of Common Medications handout): Yes  Home medications if not able to send immediately home with family stored here: No  Reminder note placed in discharge instructions: NA  Patient has discharge needs (If yes, please explain): No- will return to home

## 2017-03-31 NOTE — CONSULTS
Clinical Pharmacy - Warfarin Dosing Consult     Pharmacy has been consulted to manage this patient s warfarin therapy.  Indication: Atrial Fibrillation  Therapy Goal: INR 2-3  Provider/Team: Dr. Key  Warfarin Prior to Admission: Yes  Warfarin PTA Regimen: 2.5 mg on Mon and Fri, 5 mg all other days  Significant drug interactions: none  Dose Comments: took dose on 3/30    INR   Date Value Ref Range Status   03/30/2017 2.58 (H) 0.86 - 1.14 Final     INR Protime   Date Value Ref Range Status   03/29/2017 2.3 (A) 0.86 - 1.14 Final       Recommend warfarin 2.5 mg today.  Pharmacy will monitor Melvin Abad daily and order warfarin doses to achieve specified goal.      Please contact pharmacy as soon as possible if the warfarin needs to be held for a procedure or if the warfarin goals change.

## 2017-03-31 NOTE — ED NOTES
ED Nursing criteria listed below was addressed during verbal handoff:     Abnormal vitals: Yes  Abnormal results: Yes  Med Reconciliation completed: Yes  Meds given in ED: Yes  Any Overdue Meds: Yes  Core Measures: Yes    Isolation: NA      Special needs: Yes  Skin assessment: Yes    Observation Patient  Education provided: Yes

## 2017-04-03 ENCOUNTER — OFFICE VISIT (OUTPATIENT)
Dept: INTERNAL MEDICINE | Facility: CLINIC | Age: 82
End: 2017-04-03
Payer: COMMERCIAL

## 2017-04-03 VITALS
WEIGHT: 223 LBS | HEART RATE: 82 BPM | OXYGEN SATURATION: 95 % | RESPIRATION RATE: 16 BRPM | TEMPERATURE: 97.8 F | SYSTOLIC BLOOD PRESSURE: 144 MMHG | BODY MASS INDEX: 32.93 KG/M2 | DIASTOLIC BLOOD PRESSURE: 84 MMHG

## 2017-04-03 DIAGNOSIS — Z79.01 LONG-TERM (CURRENT) USE OF ANTICOAGULANTS: ICD-10-CM

## 2017-04-03 DIAGNOSIS — I48.20 CHRONIC ATRIAL FIBRILLATION (H): ICD-10-CM

## 2017-04-03 DIAGNOSIS — S80.11XD CONTUSION OF RIGHT LOWER EXTREMITY, SUBSEQUENT ENCOUNTER: Primary | ICD-10-CM

## 2017-04-03 PROCEDURE — 99495 TRANSJ CARE MGMT MOD F2F 14D: CPT | Performed by: INTERNAL MEDICINE

## 2017-04-03 ASSESSMENT — PAIN SCALES - GENERAL: PAINLEVEL: NO PAIN (0)

## 2017-04-03 NOTE — NURSING NOTE
"Chief Complaint   Patient presents with     Hospital F/U       Initial /84 (BP Location: Right arm, Patient Position: Chair, Cuff Size: Adult Large)  Pulse 82  Temp 97.8  F (36.6  C) (Temporal)  Resp 16  Wt 223 lb (101.2 kg)  SpO2 95%  BMI 32.93 kg/m2 Estimated body mass index is 32.93 kg/(m^2) as calculated from the following:    Height as of 3/30/17: 5' 9\" (1.753 m).    Weight as of this encounter: 223 lb (101.2 kg).  Medication Reconciliation: complete    "

## 2017-04-03 NOTE — PROGRESS NOTES
SUBJECTIVE:                                                    Melvin Abad is a 88 year old male who presents to clinic today for the following health issues:          Hospital Follow-up Visit:    Hospital/Nursing Home/IP Rehab Facility: [facility]  Date of Admission: 3/30/17  Date of Discharge: 3/31/17  Reason(s) for Admission: right lower leg pain            Problems taking medications regularly:  None       Medication changes since discharge: None       Problems adhering to non-medication therapy:  None    Summary of hospitalization:  [facility] discharge summary reviewed  Diagnostic Tests/Treatments reviewed.  Follow up needed: none  Other Healthcare Providers Involved in Patient s Care:         None  Update since discharge: improved.     Post Discharge Medication Reconciliation: discharge medications reconciled and changed, per note/orders (see AVS).  Plan of care communicated with patient        He was driving his lawn tractor and lurched into the wall, hit his right knee on the dash.  Right knee had a large hematoma on the medial side.  Was in the hospital over night and got one pain pill.  Now pain is ok, stretches it slowly, getting better daily.      Past Medical History:   Diagnosis Date     Actinic keratosis      Atrial fibrillation (H)      Basal cell carcinoma nos 06/10    Mohs excision, rt upper lip & rt temple     Cardiac dysrhythmia, unspecified      Cough     chronic cough     Diabetic eye exam (H) 8/29/14     Generalized osteoarthrosis, unspecified site     djd of the knees, hands, and neck     Other diseases of lung, not elsewhere classified     pulmonary nodule, benign     Pure hypercholesterolemia      Unspecified essential hypertension      Current Outpatient Prescriptions   Medication     metoprolol (TOPROL-XL) 50 MG 24 hr tablet     warfarin (COUMADIN) 5 MG tablet     simvastatin (ZOCOR) 40 MG tablet     lisinopril (PRINIVIL/ZESTRIL) 5 MG tablet      Cholecalciferol (VITAMIN D) 2000 UNITS tablet     polyethylene glycol (MIRALAX) powder     blood glucose monitoring (ONE TOUCH ULTRASOFT) lancets     blood glucose monitoring (NO BRAND SPECIFIED) test strip     acetaminophen 650 MG TABS     multivitamin (OCUVITE) TABS     No current facility-administered medications for this visit.      Physical Exam  /84 (BP Location: Right arm, Patient Position: Chair, Cuff Size: Adult Large)  Pulse 82  Temp 97.8  F (36.6  C) (Temporal)  Resp 16  Wt 223 lb (101.2 kg)  SpO2 95%  BMI 32.93 kg/m2  General Appearance-healthy, alert, no distress  Right knee with good movement, medial side with a knobby hematoma     ASSESSMENT:  Patient on coumadin with a traumatic injury to the right knee, medial edge.  He had a hematoma and pain from bleeding.  Swelling and hematoma are stable.  INR was ok at 2.5 still needs to be on coumadin with his a fib.  hgb was stable.  Explained that it will take weeks to resolve the hematoma.       Electronically signed by Lazaro Huston MD

## 2017-04-03 NOTE — MR AVS SNAPSHOT
After Visit Summary   4/3/2017    Melvin Abad    MRN: [id number]           Patient Information     Date Of Birth          4/24/1928        Visit Information        Provider Department      4/3/2017 2:45 PM Lazaro Huston MD [facility]         Follow-ups after your visit        Your next 10 appointments already scheduled     Apr 03, 2017  2:45 PM CDT   Office Visit with Lazaro Huston MD   [facility] ([facility])    [address] [place] [address] [id number] [phone]           Bring a current list of meds and any records pertaining to this visit.  For Physicals, please bring immunization records and any forms needing to be filled out.  Please arrive 10 minutes early to complete paperwork.            May 10, 2017  8:15 AM CDT   Anticoagulation Visit with PH ANTI COAG   [facility] ([facility])    [address] [place] [address] [id number] [phone]              Who to contact     If you have questions or need follow up information about today's clinic visit or your schedule please contact [facility] directly at [phone].  Normal or non-critical lab and imaging results will be communicated to you by MyChart, letter or phone within 4 business days after the clinic has received the results. If you do not hear from us within 7 days, please contact the clinic through [employer] or phone. If you have a critical or abnormal lab result, we will notify you by phone as soon as possible.  Submit refill requests through [employer] or call your pharmacy and they will forward the refill request to us. Please allow 3 business days for your refill to be completed.          Additional Information About Your Visit        MyChart Information     [employer] lets you send messages to your doctor, view your test results, renew your prescriptions, schedule appointments and more. To sign up, go to  [email or web address] . Click on \"Log in\" on the left side of the screen, which will take you to the Welcome page. Then click on \"Sign up Now\" on the right side of the page.     You will be asked to enter the access code listed below, as well as some personal information. Please follow the directions to create your username and password.     Your access code is: [email or web address]  Expires: 2017  9:01 AM     Your access code will  in 90 days. If you need help or a new code, please call your [facility] clinic or [phone].        Care EveryWhere ID     This is your Care EveryWhere ID. This could be used by other organizations to access your [facility] medical records  [id number]        Your Vitals Were     Pulse Temperature Respirations Pulse Oximetry BMI (Body Mass Index)       82 97.8  F (36.6  C) (Temporal) 16 95% 32.93 kg/m2        Blood Pressure from Last 3 Encounters:   17 144/84   17 153/87   17 (!) 140/91    Weight from Last 3 Encounters:   17 223 lb (101.2 kg)   17 217 lb 2.5 oz (98.5 kg)   17 221 lb (100.2 kg)              Today, you had the following     No orders found for display       Primary Care Provider Office Phone # Fax #    Lazaro Huston [phone]       [facility] [address] [place] [address]        Thank you!     Thank you for choosing [facility]  for your care. Our goal is always to provide you with excellent care. Hearing back from our patients is one way we can continue to improve our services. Please take a few minutes to complete the written survey that you may receive in the mail after your visit with us. Thank you!             Your Updated Medication List - Protect others around you: Learn how to safely use, store and throw away your medicines at www.disposemymeds.org.          This list is accurate as of: 4/3/17  2:43 PM.  Always use your most recent med list.                   Brand Name " Dispense Instructions for use    ACETAMINOPHEN 8 HOUR 650 MG 8 hour tablet   Generic drug:  acetaminophen     100 tablet    Take 650 mg by mouth every 4 hours as needed       blood glucose monitoring lancets     1 Box    1 each 2 times daily Test before meals and at bedtime       blood glucose monitoring test strip    no brand specified    100 each    Test before meals and at bedtime.       lisinopril 5 MG tablet    PRINIVIL/ZESTRIL    90 tablet    Take 1 tablet (5 mg) by mouth daily       metoprolol 50 MG 24 hr tablet    TOPROL-XL    90 tablet    Take 1 tablet (50 mg) by mouth daily       multivitamin Tabs tablet      Take 1 tablet by mouth daily.       polyethylene glycol powder    MIRALAX    510 g    Take 17 g by mouth daily       simvastatin 40 MG tablet    ZOCOR    90 tablet    TAKE ONE TABLET BY MOUTH AT BEDTIME       vitamin D 2000 UNITS tablet      Take 1 tablet by mouth daily       warfarin 5 MG tablet    COUMADIN    90 tablet    TAKE ONE-HALF TABLET ON MONDAYS AND FRIDAYS AND ONE TABLET ON ALL THE OTHER DAYS OF THE WEEK OR AS DIRECTED BY COUMADIN CLINIC

## 2017-04-05 ENCOUNTER — HOSPITAL ENCOUNTER (EMERGENCY)
Facility: CLINIC | Age: 82
Discharge: HOME | End: 2017-04-05
Attending: FAMILY MEDICINE | Admitting: FAMILY MEDICINE
Payer: COMMERCIAL

## 2017-04-05 ENCOUNTER — TELEPHONE (OUTPATIENT)
Dept: INTERNAL MEDICINE | Facility: CLINIC | Age: 82
End: 2017-04-05

## 2017-04-05 VITALS
TEMPERATURE: 97.6 F | RESPIRATION RATE: 18 BRPM | BODY MASS INDEX: 32.58 KG/M2 | SYSTOLIC BLOOD PRESSURE: 163 MMHG | HEART RATE: 77 BPM | HEIGHT: 69 IN | WEIGHT: 220 LBS | OXYGEN SATURATION: 97 % | DIASTOLIC BLOOD PRESSURE: 105 MMHG

## 2017-04-05 DIAGNOSIS — L03.115 CELLULITIS OF RIGHT LOWER LEG: Primary | ICD-10-CM

## 2017-04-05 LAB
ANION GAP SERPL CALCULATED.3IONS-SCNC: 8 MMOL/L (ref 3–14)
BASOPHILS # BLD AUTO: 0 10E9/L (ref 0–0.2)
BASOPHILS NFR BLD AUTO: 0.3 %
BUN SERPL-MCNC: 20 MG/DL (ref 7–30)
CALCIUM SERPL-MCNC: 8.6 MG/DL (ref 8.5–10.1)
CHLORIDE SERPL-SCNC: 109 MMOL/L (ref 94–109)
CO2 SERPL-SCNC: 26 MMOL/L (ref 20–32)
CREAT SERPL-MCNC: 0.89 MG/DL (ref 0.66–1.25)
DIFFERENTIAL METHOD BLD: NORMAL
EOSINOPHIL # BLD AUTO: 0.2 10E9/L (ref 0–0.7)
EOSINOPHIL NFR BLD AUTO: 1.9 %
ERYTHROCYTE [DISTWIDTH] IN BLOOD BY AUTOMATED COUNT: 13.1 % (ref 10–15)
GFR SERPL CREATININE-BSD FRML MDRD: 80 ML/MIN/1.7M2
GLUCOSE SERPL-MCNC: 107 MG/DL (ref 70–99)
HCT VFR BLD AUTO: 43.6 % (ref 40–53)
HGB BLD-MCNC: 14.5 G/DL (ref 13.3–17.7)
IMM GRANULOCYTES # BLD: 0 10E9/L (ref 0–0.4)
IMM GRANULOCYTES NFR BLD: 0.1 %
INR PPP: 2.37 (ref 0.86–1.14)
LYMPHOCYTES # BLD AUTO: 1.8 10E9/L (ref 0.8–5.3)
LYMPHOCYTES NFR BLD AUTO: 22.6 %
MCH RBC QN AUTO: 31.9 PG (ref 26.5–33)
MCHC RBC AUTO-ENTMCNC: 33.3 G/DL (ref 31.5–36.5)
MCV RBC AUTO: 96 FL (ref 78–100)
MONOCYTES # BLD AUTO: 0.8 10E9/L (ref 0–1.3)
MONOCYTES NFR BLD AUTO: 10.4 %
NEUTROPHILS # BLD AUTO: 5.1 10E9/L (ref 1.6–8.3)
NEUTROPHILS NFR BLD AUTO: 64.7 %
PLATELET # BLD AUTO: 195 10E9/L (ref 150–450)
POTASSIUM SERPL-SCNC: 4.1 MMOL/L (ref 3.4–5.3)
RBC # BLD AUTO: 4.55 10E12/L (ref 4.4–5.9)
SODIUM SERPL-SCNC: 143 MMOL/L (ref 133–144)
WBC # BLD AUTO: 7.9 10E9/L (ref 4–11)

## 2017-04-05 PROCEDURE — 85610 PROTHROMBIN TIME: CPT | Performed by: FAMILY MEDICINE

## 2017-04-05 PROCEDURE — 85025 COMPLETE CBC W/AUTO DIFF WBC: CPT | Performed by: FAMILY MEDICINE

## 2017-04-05 PROCEDURE — 80048 BASIC METABOLIC PNL TOTAL CA: CPT | Performed by: FAMILY MEDICINE

## 2017-04-05 PROCEDURE — 36415 COLL VENOUS BLD VENIPUNCTURE: CPT | Performed by: FAMILY MEDICINE

## 2017-04-05 PROCEDURE — 99284 EMERGENCY DEPT VISIT MOD MDM: CPT | Mod: Z6 | Performed by: FAMILY MEDICINE

## 2017-04-05 PROCEDURE — 99283 EMERGENCY DEPT VISIT LOW MDM: CPT | Performed by: FAMILY MEDICINE

## 2017-04-05 RX ORDER — CEPHALEXIN 500 MG/1
500 CAPSULE ORAL 4 TIMES DAILY
Qty: 28 CAPSULE | Refills: 0 | Status: SHIPPED | OUTPATIENT
Start: 2017-04-05 | End: 2017-04-12

## 2017-04-05 ASSESSMENT — ENCOUNTER SYMPTOMS
BRUISES/BLEEDS EASILY: 1
COUGH: 0
NAUSEA: 0
WEAKNESS: 0
BACK PAIN: 0
SHORTNESS OF BREATH: 0
FEVER: 0
WOUND: 0
VOMITING: 0
CHILLS: 0
ACTIVITY CHANGE: 0
LIGHT-HEADEDNESS: 0
MYALGIAS: 0
DIFFICULTY URINATING: 0
DIZZINESS: 0
ARTHRALGIAS: 0

## 2017-04-05 NOTE — ED NOTES
"Pt recently was admitted and discharged for fall/leg pain while on coumadin.  He got home and was doing fine until last evening where he noted a redness to his right medial LE.  Pulses are present.  Area is not warm to touch.  Pt has chronic areas of irritation on both LE that have not changed.  Pt is concerned about possible infx.  BP (!) 165/111  Pulse 72  Temp 97.6  F (36.4  C) (Oral)  Resp 18  Ht 1.753 m (5' 9\")  Wt 99.8 kg (220 lb)  SpO2 96%  BMI 32.49 kg/m2     Pt states he did take his bp meds this am.  Will cont to eval bp.     "

## 2017-04-05 NOTE — DISCHARGE INSTRUCTIONS
Discharge Instructions for Cellulitis  You have been diagnosed with cellulitis. This is an infection in the deepest layer of the skin. In some cases, the infection also affects the muscle. Cellulitis is caused by bacteria. The bacteria can enter the body through broken skin. This can happen with a cut, scratch, animal bite, or an insect bite that has been scratched. I did send a prescription to [facility] for antibiotics.     This sheet will help you take care of yourself at home.  Home Care  When you are home:    Take the prescribed antibiotic medication you are given as directed until it is gone. Take it even if you feel better. It treats the infection and stops it from returning. Not taking all of the medication can make future infections hard to treat.    Keep the infected area clean.    When possible, raise the infected area above the level of your heart. This helps keep swelling down.    Talk to your doctor if you are in pain. Ask what kind of over-the-counter medication you can take for pain.    Apply clean bandages as advised.    Take your temperature once a day for a week.    Wash your hands often to prevent spreading the infection.  In the future, wash your hands before and after you touch cuts, scratches, or bandages. This will help prevent infection.      Follow Up  Please follow up with Dr Alejandro on Thursday, April 13, at 11:00 AM in [place].     When to Call Your Doctor  Call your doctor immediately if you have any of the following:    Vomiting    Fever of100.4 F (38 C) or higher, or as directed by your health care provider    Shaking chills    Redness that gets worse in or around the infected area    Swelling of the infected area    Pain that gets worse in or around the infected area    Difficulty or pain when moving the joints above or below the infected area    Discharge or pus draining from the area     Thank you for choosing our Emergency Department for your care.     Sincerely,    Dr Tidwell  NAY Brown.

## 2017-04-05 NOTE — ED PROVIDER NOTES
History     Chief Complaint   Patient presents with     Leg Pain     The history is provided by the patient and a relative.     Melvin Abad is a 88 year old male who is here with increased right knee pain and right lower leg redness.  He has a right total knee and unfortunately fell a few days ago.  He had quite a bit of swelling and pain.  He was admitted to the hospital March 30 and discharged the next day.  He was part of that note:  Principal Problem:  Pain of right lower leg  Active Problems:  Contusion of right lower extremity  Type 2 diabetes mellitus without complication (H)  Chronic atrial fibrillation (H)  Leg pain  Hyperlipidemia LDL goal <100     Patient was registered to observation and treated with one dose of norco and later tylenol for pain. He had minimal pain and was able to ambulate without any issues. By evening he was feeling at baseline and asking to go home. He will be discharged to home, to continue his home regimen. Suggest for leg pain to continue ice to area as needed, tylenol for pain. There was no adjustments made to his coumadin dosing.    Since that time the patient has increased redness of the right lower leg.  He has some anterior tibial skin discoloration and skin rash which is chronic, but now he has redness and tenderness down by the inside of the ankle.  Both he and his son are concerned he might have a skin infection here.    Nurse Note from today:  Pt recently was admitted and discharged for fall/leg pain while on coumadin. He got home and was doing fine until last evening where he noted a redness to his right medial LE. Pulses are present. Area is not warm to touch. Pt has chronic areas of irritation on both LE that have not changed. Pt is concerned about possible infx.       I have reviewed the Medications, Allergies, Past Medical and Surgical History, and Social History in the Epic system.    Review of Systems   Constitutional: Negative for activity change, chills  "and fever.   Respiratory: Negative for cough and shortness of breath.    Cardiovascular: Negative for leg swelling.   Gastrointestinal: Negative for nausea and vomiting.   Genitourinary: Negative for decreased urine volume and difficulty urinating.   Musculoskeletal: Negative for arthralgias, back pain and myalgias.   Skin: Positive for rash. Negative for wound.   Neurological: Negative for dizziness, weakness and light-headedness.   Hematological: Bruises/bleeds easily.        He is on Coumadin and his last INR was 2.8   All other systems reviewed and are negative.      Physical Exam   BP: (!) 165/111  Pulse: 72  Temp: 97.6  F (36.4  C)  Resp: 18  Height: 175.3 cm (5' 9\")  Weight: 99.8 kg (220 lb)  SpO2: 96 %    Physical Exam   Constitutional: He is oriented to person, place, and time. He appears well-developed and well-nourished. No distress.   HENT:   Head: Normocephalic and atraumatic.   Right Ear: External ear normal.   Left Ear: External ear normal.   Nose: Nose normal.   Mouth/Throat: Oropharynx is clear and moist.   Eyes: Conjunctivae and EOM are normal.   Neck: Normal range of motion. Neck supple.   Cardiovascular: Normal rate, normal heart sounds and intact distal pulses.    No murmur heard.  Pulmonary/Chest: Effort normal and breath sounds normal. No respiratory distress. He has no wheezes. He has no rales.   Abdominal: Soft. Bowel sounds are normal. He exhibits no distension. There is no tenderness.   Musculoskeletal: He exhibits no tenderness or deformity.   He has chronic anterior tibial skin rash.  This is bilateral and is not warm to the touch.  He has medial lower right extremity erythema with warmth to the touch as well, consistent with cellulitis.  He has no tenderness of the right calf with palpation.   Neurological: He is alert and oriented to person, place, and time.   Skin: Skin is warm and dry. Rash noted. There is erythema.   Nursing note and vitals reviewed.      ED Course     ED Course "     Procedures    Results for orders placed or performed during the hospital encounter of 04/05/17 (from the past 24 hour(s))   CBC with platelets differential   Result Value Ref Range    WBC 7.9 4.0 - 11.0 10e9/L    RBC Count 4.55 4.4 - 5.9 10e12/L    Hemoglobin 14.5 13.3 - 17.7 g/dL    Hematocrit 43.6 40.0 - 53.0 %    MCV 96 78 - 100 fl    MCH 31.9 26.5 - 33.0 pg    MCHC 33.3 31.5 - 36.5 g/dL    RDW 13.1 10.0 - 15.0 %    Platelet Count 195 150 - 450 10e9/L    Diff Method Automated Method     % Neutrophils 64.7 %    % Lymphocytes 22.6 %    % Monocytes 10.4 %    % Eosinophils 1.9 %    % Basophils 0.3 %    % Immature Granulocytes 0.1 %    Absolute Neutrophil 5.1 1.6 - 8.3 10e9/L    Absolute Lymphocytes 1.8 0.8 - 5.3 10e9/L    Absolute Monocytes 0.8 0.0 - 1.3 10e9/L    Absolute Eosinophils 0.2 0.0 - 0.7 10e9/L    Absolute Basophils 0.0 0.0 - 0.2 10e9/L    Abs Immature Granulocytes 0.0 0 - 0.4 10e9/L   Basic metabolic panel   Result Value Ref Range    Sodium 143 133 - 144 mmol/L    Potassium 4.1 3.4 - 5.3 mmol/L    Chloride 109 94 - 109 mmol/L    Carbon Dioxide 26 20 - 32 mmol/L    Anion Gap 8 3 - 14 mmol/L    Glucose 107 (H) 70 - 99 mg/dL    Urea Nitrogen 20 7 - 30 mg/dL    Creatinine 0.89 0.66 - 1.25 mg/dL    GFR Estimate 80 >60 mL/min/1.7m2    GFR Estimate If Black >90   GFR Calc   >60 mL/min/1.7m2    Calcium 8.6 8.5 - 10.1 mg/dL   INR   Result Value Ref Range    INR 2.37 (H) 0.86 - 1.14       Medications - No data to display      Assessments & Plan (with Medical Decision Making)  Melvin is an 88-year-old male who was recently hospitalized for right lower leg pain and swelling after a fall.  He has had increased redness of the skin of his right lower leg.  He does have a bump on the inside of his knee where there is swelling and a hematoma from his fall.  This is not painful and he has been able to walk without any difficulty.  He apparently has a cellulitis in the right lower leg as well however, on  the medial side of the ankle.  His white count is normal today.  His basic metabolic panel shows a sugar of 107 and is otherwise normal.  His INR is 2.37.  He has no tenderness of the right calf and does have a therapeutic INR, so we did not to an ultrasound of the right lower extremity.  I am going to treat him with antibiotics for cellulitis.  We do have a follow-up set up with Dr. Robert Alejandro in one week, and both he and his son said that they will come back if he's does not seem to be doing well prior to that visit.  He was discharged from the ED.       I have reviewed the nursing notes.    I have reviewed the findings, diagnosis, plan and need for follow up with the patient.    Discharge Medication List as of 4/5/2017 10:19 AM      START taking these medications    Details   cephALEXin (KEFLEX) 500 MG capsule Take 1 capsule (500 mg) by mouth 4 times daily for 7 days, Disp-28 capsule, R-0, E-Prescribe             Final diagnoses:   Cellulitis of right lower leg       4/5/2017   [facility] EMERGENCY DEPARTMENT     Lisandro Brown MD  04/05/17 1027

## 2017-04-05 NOTE — ED AVS SNAPSHOT
[facility] Emergency Department    [address]     [place] [address]    Phone:  [phone]    Fax:  [phone]                                       Melvin Abad   MRN: [id number]    Department:  [facility] Emergency Department   Date of Visit:  4/5/2017           Patient Information     Date Of Birth          4/24/1928        Your diagnoses for this visit were:     Cellulitis of right lower leg        You were seen by Lisandro Brown MD.      Follow-up Information     Go to Alfredo Alejandro DO.    Specialty:  Internal Medicine    Why:  For wound re-check    Contact information:    [place] [address] [place] [address]  [phone]          Discharge Instructions         Discharge Instructions for Cellulitis  You have been diagnosed with cellulitis. This is an infection in the deepest layer of the skin. In some cases, the infection also affects the muscle. Cellulitis is caused by bacteria. The bacteria can enter the body through broken skin. This can happen with a cut, scratch, animal bite, or an insect bite that has been scratched. I did send a prescription to [facility] for antibiotics.     This sheet will help you take care of yourself at home.  Home Care  When you are home:    Take the prescribed antibiotic medication you are given as directed until it is gone. Take it even if you feel better. It treats the infection and stops it from returning. Not taking all of the medication can make future infections hard to treat.    Keep the infected area clean.    When possible, raise the infected area above the level of your heart. This helps keep swelling down.    Talk to your doctor if you are in pain. Ask what kind of over-the-counter medication you can take for pain.    Apply clean bandages as advised.    Take your temperature once a day for a week.    Wash your hands often to prevent spreading the infection.  In the future, wash your hands  before and after you touch cuts, scratches, or bandages. This will help prevent infection.      Follow Up  Please follow up with Dr Alejandro on Thursday, April 13, at 11:00 AM in [place].     When to Call Your Doctor  Call your doctor immediately if you have any of the following:    Vomiting    Fever of100.4 F (38 C) or higher, or as directed by your health care provider    Shaking chills    Redness that gets worse in or around the infected area    Swelling of the infected area    Pain that gets worse in or around the infected area    Difficulty or pain when moving the joints above or below the infected area    Discharge or pus draining from the area     Thank you for choosing our Emergency Department for your care.     Sincerely,    Dr Yaw Brown M.D.          Future Appointments        Provider Department Dept Phone Center    4/13/2017 11:00 AM Alfredo Alejadnro, DO [facility] [phone] [facility]    5/10/2017 8:15 AM [facility] [phone] [facility]      24 Hour Appointment Hotline       To make an appointment at any [facility], call [phone] ([phone]). If you don't have a family doctor or clinic, we will help you find one. [facility] are conveniently located to serve the needs of you and your family.             Review of your medicines      START taking        Dose / Directions Last dose taken    cephALEXin 500 MG capsule   Commonly known as:  KEFLEX   Dose:  500 mg   Quantity:  28 capsule        Take 1 capsule (500 mg) by mouth 4 times daily for 7 days   Refills:  0          Our records show that you are taking the medicines listed below. If these are incorrect, please call your family doctor or clinic.        Dose / Directions Last dose taken    ACETAMINOPHEN 8 HOUR 650 MG 8 hour tablet   Dose:  650 mg   Quantity:  100 tablet   Generic drug:  acetaminophen        Take 650 mg by mouth every 4 hours as  needed   Refills:  0        blood glucose monitoring lancets   Dose:  1 each   Quantity:  1 Box        1 each 2 times daily Test before meals and at bedtime   Refills:  3        blood glucose monitoring test strip   Commonly known as:  no brand specified   Quantity:  100 each        Test before meals and at bedtime.   Refills:  3        lisinopril 5 MG tablet   Commonly known as:  PRINIVIL/ZESTRIL   Dose:  5 mg   Quantity:  90 tablet        Take 1 tablet (5 mg) by mouth daily   Refills:  3        metoprolol 50 MG 24 hr tablet   Commonly known as:  TOPROL-XL   Dose:  50 mg   Quantity:  90 tablet        Take 1 tablet (50 mg) by mouth daily   Refills:  3        multivitamin Tabs tablet   Dose:  1 tablet        Take 1 tablet by mouth daily.   Refills:  0        polyethylene glycol powder   Commonly known as:  MIRALAX   Dose:  1 capful   Quantity:  510 g        Take 17 g by mouth daily   Refills:  1        simvastatin 40 MG tablet   Commonly known as:  ZOCOR   Quantity:  90 tablet        TAKE ONE TABLET BY MOUTH AT BEDTIME   Refills:  3        vitamin D 2000 UNITS tablet   Dose:  1 tablet        Take 1 tablet by mouth daily   Refills:  0        warfarin 5 MG tablet   Commonly known as:  COUMADIN   Quantity:  90 tablet        TAKE ONE-HALF TABLET ON MONDAYS AND FRIDAYS AND ONE TABLET ON ALL THE OTHER DAYS OF THE WEEK OR AS DIRECTED BY COUMADIN CLINIC   Refills:  3                Prescriptions were sent or printed at these locations (1 Prescription)                   [facility] [address] [place] [address] - [address]   [address] [address]    Telephone:  [phone]   Fax:  [phone]   Hours:                  E-Prescribed (1 of 1)         cephALEXin (KEFLEX) 500 MG capsule                Procedures and tests performed during your visit     Basic metabolic panel    CBC with platelets differential    INR      Orders Needing Specimen Collection     None      Pending Results     No orders found from 4/3/2017 to  "2017.            Pending Culture Results     No orders found from 4/3/2017 to 2017.            Thank you for choosing [facility]       Thank you for choosing [facility] for your care. Our goal is always to provide you with excellent care. Hearing back from our patients is one way we can continue to improve our services. Please take a few minutes to complete the written survey that you may receive in the mail after you visit with us. Thank you!        [employer] Information     [employer] lets you send messages to your doctor, view your test results, renew your prescriptions, schedule appointments and more. To sign up, go to [email or web address] . Click on \"Log in\" on the left side of the screen, which will take you to the Welcome page. Then click on \"Sign up Now\" on the right side of the page.     You will be asked to enter the access code listed below, as well as some personal information. Please follow the directions to create your username and password.     Your access code is: [email or web address]  Expires: 2017  9:01 AM     Your access code will  in 90 days. If you need help or a new code, please call your [facility] clinic or [phone].        Care EveryWhere ID     This is your Care EveryWhere ID. This could be used by other organizations to access your [facility] medical records  [id number]        After Visit Summary       This is your record. Keep this with you and show to your community pharmacist(s) and doctor(s) at your next visit.                  "

## 2017-04-05 NOTE — TELEPHONE ENCOUNTER
Patient's grand daughter is calling today with patient in the background. Patient was seen in the ER on 3/30/17. Grand daughter said the patient hit his knee on a  and was having shooting pain. Patient is on Coumadin. Patient saw Dr. Huston for a follow up on 4/3/17 and everything was good.     Grand daughter said this morning the patient is having a lot of pain, redness, dark purple, and warmth to the lower leg. She said the area is about 8 inches by 3 inches. She is wondering what they should do as it is red all the way up to his knee.     Since calf on leg is red, warm to the touch and very painful, RN advised he should be seen. Advised the ER would be a great option as they can check out the area and run any needed labs. Also advised a message could go out to Dr. Huston to see if he can work the patient in. Grand daughter asked the patient and he would like to head back to the ER this morning. Grand daughter said she would call the patient's son to get him to the ER. They will call with any other questions or concerns.     RECOMMENDED DISPOSITION:  To ED, another person to drive - patient agrees. Patient's son will bring him in now  Will comply with recommendation: YES   If further questions/concerns or if Sx do not improve, worsen or new Sx develop, call your PCP or [facility] as soon as possible.    NOTES:  Disposition was determined by the first positive assessment question, therefore all previous assessment questions were negative.  Informed to check provider manual or call insurance company to assure coverage.    Guideline used: Leg pain/swelling  Telephone Triage Protocols for Nurses, Fifth Edition, Claire White RN

## 2017-04-05 NOTE — ED AVS SNAPSHOT
[facility] Emergency Department    [address] [place] [address]    Phone:  [phone]    Fax:  [phone]                                       Melvin Abad   MRN: [id number]    Department:  [facility] Emergency Department   Date of Visit:  4/5/2017           After Visit Summary Signature Page     I have received my discharge instructions, and my questions have been answered. I have discussed any challenges I see with this plan with the nurse or doctor.    ..........................................................................................................................................  Patient/Patient Representative Signature      ..........................................................................................................................................  Patient Representative Print Name and Relationship to Patient    ..................................................               ................................................  Date                                            Time    ..........................................................................................................................................  Reviewed by Signature/Title    ...................................................              ..............................................  Date                                                            Time

## 2017-04-13 ENCOUNTER — OFFICE VISIT (OUTPATIENT)
Dept: INTERNAL MEDICINE | Facility: CLINIC | Age: 82
End: 2017-04-13
Payer: COMMERCIAL

## 2017-04-13 VITALS
OXYGEN SATURATION: 96 % | HEART RATE: 67 BPM | SYSTOLIC BLOOD PRESSURE: 132 MMHG | BODY MASS INDEX: 32.93 KG/M2 | DIASTOLIC BLOOD PRESSURE: 82 MMHG | TEMPERATURE: 97.3 F | WEIGHT: 223 LBS

## 2017-04-13 DIAGNOSIS — Z71.89 ADVANCED DIRECTIVES, COUNSELING/DISCUSSION: ICD-10-CM

## 2017-04-13 DIAGNOSIS — E11.9 TYPE 2 DIABETES MELLITUS WITHOUT COMPLICATION, WITHOUT LONG-TERM CURRENT USE OF INSULIN (H): ICD-10-CM

## 2017-04-13 DIAGNOSIS — L03.119 CELLULITIS AND ABSCESS OF LEG: Primary | ICD-10-CM

## 2017-04-13 DIAGNOSIS — L02.419 CELLULITIS AND ABSCESS OF LEG: Primary | ICD-10-CM

## 2017-04-13 DIAGNOSIS — I48.20 CHRONIC ATRIAL FIBRILLATION (H): ICD-10-CM

## 2017-04-13 DIAGNOSIS — Z79.01 LONG-TERM (CURRENT) USE OF ANTICOAGULANTS: ICD-10-CM

## 2017-04-13 PROCEDURE — 99213 OFFICE O/P EST LOW 20 MIN: CPT | Performed by: INTERNAL MEDICINE

## 2017-04-13 RX ORDER — CEPHALEXIN 500 MG/1
500 CAPSULE ORAL 3 TIMES DAILY
Qty: 30 CAPSULE | Refills: 0 | Status: SHIPPED | OUTPATIENT
Start: 2017-04-13 | End: 2017-05-18

## 2017-04-13 ASSESSMENT — PAIN SCALES - GENERAL: PAINLEVEL: MILD PAIN (2)

## 2017-04-13 NOTE — NURSING NOTE
"Chief Complaint   Patient presents with     ER F/U       Initial /82 (BP Location: Right arm, Patient Position: Chair, Cuff Size: Adult Large)  Pulse 67  Temp 97.3  F (36.3  C) (Temporal)  Wt 223 lb (101.2 kg)  SpO2 96%  BMI 32.93 kg/m2 Estimated body mass index is 32.93 kg/(m^2) as calculated from the following:    Height as of 4/5/17: 5' 9\" (1.753 m).    Weight as of this encounter: 223 lb (101.2 kg).  Medication Reconciliation: complete   Tracy FOUNTAIN      "

## 2017-04-13 NOTE — MR AVS SNAPSHOT
"              After Visit Summary   4/13/2017    Melvin Abad    MRN: [id number]           Patient Information     Date Of Birth          4/24/1928        Visit Information        Provider Department      4/13/2017 11:00 AM Alfredo Alejandro DO [facility]        Today's Diagnoses     Cellulitis and abscess of leg    -  1    Type 2 diabetes mellitus without complication, without long-term current use of insulin (H)        Chronic atrial fibrillation (H)        Long-term (current) use of anticoagulants [Z79.01]        Advanced directives, counseling/discussion           Follow-ups after your visit        Your next 10 appointments already scheduled     May 10, 2017  8:15 AM CDT   An[facility] with [facility] ANTI COAG   [facility]    [address] [address] [phone]              Who to contact     If you have questions or need follow up information about today's clinic visit or your schedule please contact [facility] directly at [phone].  Normal or non-critical lab and imaging results will be communicated to you by [employer], letter or phone within 4 business days after the clinic has received the results. If you do not hear from us within 7 days, please contact the clinic through [employer] or phone. If you have a critical or abnormal lab result, we will notify you by phone as soon as possible.  Submit refill requests through [facility] or call your pharmacy and they will forward the refill request to us. Please allow 3 business days for your refill to be completed.          Additional Information About Your Visit        [employer] Information     [employer] lets you send messages to your doctor, view your test results, renew your prescriptions, schedule appointments and more. To sign up, go to [email or web address] . Click on \"Log in\" on the left side of the screen, which will take you to the Welcome " "page. Then click on \"Sign up Now\" on the right side of the page.     You will be asked to enter the access code listed below, as well as some personal information. Please follow the directions to create your username and password.     Your access code is: [email or web address]  Expires: 2017  9:01 AM     Your access code will  in 90 days. If you need help or a new code, please call your [facility] or [phone].        Care EveryWhere ID     This is your Care EveryWhere ID. This could be used by other organizations to access your [facility] medical records  [id number]        Your Vitals Were     Pulse Temperature Pulse Oximetry BMI (Body Mass Index)          67 97.3  F (36.3  C) (Temporal) 96% 32.93 kg/m2         Blood Pressure from Last 3 Encounters:   17 132/82   17 (!) 163/105   17 144/84    Weight from Last 3 Encounters:   17 223 lb (101.2 kg)   17 220 lb (99.8 kg)   17 223 lb (101.2 kg)              Today, you had the following     No orders found for display         Today's Medication Changes          These changes are accurate as of: 17 11:59 PM.  If you have any questions, ask your nurse or doctor.               Start taking these medicines.        Dose/Directions    cephALEXin 500 MG capsule   Commonly known as:  KEFLEX   Used for:  Cellulitis and abscess of leg   Started by:  Alfredo Alejandro DO        Dose:  500 mg   Take 1 capsule (500 mg) by mouth 3 times daily   Quantity:  30 capsule   Refills:  0            Where to get your medicines      These medications were sent to [facility] - [place] - [address]  [address], [facility] [address]     Phone:  [phone]     cephALEXin 500 MG capsule                Primary Care Provider Office Phone # Fax #    Lazaro Huston [phone]       [facility] [address] [place] [address]        Thank you!     Thank you for choosing [facility] " [place]  for your care. Our goal is always to provide you with excellent care. Hearing back from our patients is one way we can continue to improve our services. Please take a few minutes to complete the written survey that you may receive in the mail after your visit with us. Thank you!             Your Updated Medication List - Protect others around you: Learn how to safely use, store and throw away your medicines at www.disposemymeds.org.          This list is accurate as of: 4/13/17 11:59 PM.  Always use your most recent med list.                   Brand Name Dispense Instructions for use    ACETAMINOPHEN 8 HOUR 650 MG 8 hour tablet   Generic drug:  acetaminophen     100 tablet    Take 650 mg by mouth every 4 hours as needed       blood glucose monitoring lancets     1 Box    1 each 2 times daily Test before meals and at bedtime       blood glucose monitoring test strip    no brand specified    100 each    Test before meals and at bedtime.       cephALEXin 500 MG capsule    KEFLEX    30 capsule    Take 1 capsule (500 mg) by mouth 3 times daily       lisinopril 5 MG tablet    PRINIVIL/ZESTRIL    90 tablet    Take 1 tablet (5 mg) by mouth daily       metoprolol 50 MG 24 hr tablet    TOPROL-XL    90 tablet    Take 1 tablet (50 mg) by mouth daily       multivitamin Tabs tablet      Take 1 tablet by mouth daily.       polyethylene glycol powder    MIRALAX    510 g    Take 17 g by mouth daily       simvastatin 40 MG tablet    ZOCOR    90 tablet    TAKE ONE TABLET BY MOUTH AT BEDTIME       vitamin D 2000 UNITS tablet      Take 1 tablet by mouth daily       warfarin 5 MG tablet    COUMADIN    90 tablet    TAKE ONE-HALF TABLET ON MONDAYS AND FRIDAYS AND ONE TABLET ON ALL THE OTHER DAYS OF THE WEEK OR AS DIRECTED BY COUMADIN CLINIC

## 2017-04-13 NOTE — PROGRESS NOTES
SUBJECTIVE:                                                    Melvin Abad is a 88 year old male who presents to clinic today for the following health issues:    ED/UC Followup:    Facility:  [facility]  Date of visit: 4/5/17  Reason for visit: cellulitis  Current Status: looks better       CHIEF COMPLAINT:    The patient is a pleasant 89-year-old gentleman who presents today with a family member.notably, he is recently had some redness and inflammation in his right lower leg.  He was previously hospitalized with us last month.  He was seen about a week ago in the emergency department placed on additional antibiotics.  DVT was ruled out.  He is now doing much better in the area of cellulitis has decreased based on the previous geographic outline markings.  He notes that it is still somewhat tender touch in the middle which is the anterior aspect of the lower tibial area.  No evidence of abscess is seen at this time.  He denies any fever or chills, he is eating well. Does have a history of type II diabetes which is generally well-controlled and is on chronic anticoagulation for atrial fibrillation.  This decreases his likelihood/treatment options for DVT.                         PAST, FAMILY,SOCIAL HISTORY:     Medical  History:   has a past medical history of Actinic keratosis; Atrial fibrillation (H); Basal cell carcinoma nos (06/10); Cardiac dysrhythmia, unspecified; Cough; Diabetic eye exam (H) (8/29/14); Generalized osteoarthrosis, unspecified site; Other diseases of lung, not elsewhere classified; Pure hypercholesterolemia; and Unspecified essential hypertension.     Surgical History:   has a past surgical history that includes TOTAL KNEE ARTHROPLASTY (1997); Colonoscopy w/wo [place] **Performed** (10/19/2001); Colonoscopy w/wo Brush **Performed** (11/02/2004); colonoscopy (05/02/2007); HEMORRHOIDOPEXY BY STAPLING (09/26/08); and Colonoscopy (11/10/2010).     Social History:   reports that he has  quit smoking. His smoking use included Cigarettes. He has never used smokeless tobacco. He reports that he does not drink alcohol or use illicit drugs.     Family History:  family history includes CANCER in his mother; Prostate Cancer in his father.            MEDICATIONS  Current Outpatient Prescriptions   Medication Sig Dispense Refill     cephALEXin (KEFLEX) 500 MG capsule Take 1 capsule (500 mg) by mouth 3 times daily 30 capsule 0     metoprolol (TOPROL-XL) 50 MG 24 hr tablet Take 1 tablet (50 mg) by mouth daily 90 tablet 3     warfarin (COUMADIN) 5 MG tablet TAKE ONE-HALF TABLET ON MONDAYS AND FRIDAYS AND ONE TABLET ON ALL THE OTHER DAYS OF THE WEEK OR AS DIRECTED BY COUMADIN CLINIC 90 tablet 3     simvastatin (ZOCOR) 40 MG tablet TAKE ONE TABLET BY MOUTH AT BEDTIME 90 tablet 3     lisinopril (PRINIVIL/ZESTRIL) 5 MG tablet Take 1 tablet (5 mg) by mouth daily 90 tablet 3     Cholecalciferol (VITAMIN D) 2000 UNITS tablet Take 1 tablet by mouth daily       polyethylene glycol (MIRALAX) powder Take 17 g by mouth daily 510 g 1     blood glucose monitoring (ONE TOUCH ULTRASOFT) lancets 1 each 2 times daily Test before meals and at bedtime 1 Box 3     blood glucose monitoring (NO BRAND SPECIFIED) test strip Test before meals and at bedtime. 100 each 3     acetaminophen 650 MG TABS Take 650 mg by mouth every 4 hours as needed 100 tablet      multivitamin (OCUVITE) TABS Take 1 tablet by mouth daily.  0         --------------------------------------------------------------------------------------------------------------------                  Review of Systems     LUNGS: Pt denies: cough,excess sputum, hemoptysis, or shortness of breath.   HEART: Pt denies: chest pain,sensed arrythmia, syncope, tachy or bradyarrhythmia.   GI: Pt denies: nausea, vomitting, diarrhea, constipation, melena, or hematochezia.   NEURO: Pt denies: seizures, strokes, diplopia, weakness, paraesthesias, or paralysis.                         Examination       Vital Signs:  B/P: 132/82, T: 97.3, P: 67, R: Data Unavailable, BMI: Body mass index is 32.93 kg/(m^2).   Constitutional: The patient appears to be in no acute distress. The patient appears to be adequately hydrated. No acute respiratory or hemodynamic distress is noted at this time.   LUNGS: clear bilaterally, airflow is brisk, no intercostal retraction or stridor is noted. No coughing is noted during visit.   HEART:  Irregularly irregular without rubs, clicks, gallops, or murmurs. PMI is nondisplaced. Upstrokes are brisk. S1,S2 are heard.   SKIN:  warm and dry.an area of cellulitis on the lower leg is as described.  It is primarily anterior and non-circumferential at this time.  Measures approximately 4 cm in width and 6 cm in overall vertical extension.                         Decision-Making  1. Cellulitis and abscess of leg  Continue to elevate leg and continue antibiotics for an additional 10 days  - cephALEXin (KEFLEX) 500 MG capsule; Take 1 capsule (500 mg) by mouth 3 times daily  Dispense: 30 capsule; Refill: 0    2. Type 2 diabetes mellitus without complication, without long-term current use of insulin (H)  Continue carbohydrate management, statin, ACE inhibitor.  No aspirin as patient is on warfarin.  Last hemoglobin A1c was well-controlled at 6.3.    3. Chronic atrial fibrillation (H)  Continue anticoagulation and rate control    4. Long-term (current) use of anticoagulants [Z79.01]  Follow INR    5. Advanced directives, counseling/discussion  Discussed                             FOLLOW UP   I have asked the patient to make an appointment for followup with me In 10 days if not completely resolved.  He will return sooner or if there are any problems.  He will follow up with Dr. Huston for his routine health maintenance and follow-up and surveillance of his medical conditions          I have carefully explained the diagnosis and treatment options with the patient. The patient has  displayed an understanding of the above, and all subsequent questions were answered.               DO LAURENCE Osorio    Portions of this note were produced using vLine  Although every attempt at real-time proof reading has been made, occasional grammar/syntax errors may have been missed.

## 2017-05-10 ENCOUNTER — ANTICOAGULATION THERAPY VISIT (OUTPATIENT)
Dept: ANTICOAGULATION | Facility: CLINIC | Age: 82
End: 2017-05-10
Payer: COMMERCIAL

## 2017-05-10 ENCOUNTER — OFFICE VISIT (OUTPATIENT)
Dept: FAMILY MEDICINE | Facility: OTHER | Age: 82
End: 2017-05-10
Payer: COMMERCIAL

## 2017-05-10 VITALS
WEIGHT: 220 LBS | BODY MASS INDEX: 32.49 KG/M2 | OXYGEN SATURATION: 94 % | SYSTOLIC BLOOD PRESSURE: 132 MMHG | TEMPERATURE: 97.4 F | HEART RATE: 84 BPM | DIASTOLIC BLOOD PRESSURE: 80 MMHG | RESPIRATION RATE: 16 BRPM

## 2017-05-10 VITALS — HEART RATE: 63 BPM | DIASTOLIC BLOOD PRESSURE: 75 MMHG | SYSTOLIC BLOOD PRESSURE: 132 MMHG

## 2017-05-10 DIAGNOSIS — Z79.01 LONG-TERM (CURRENT) USE OF ANTICOAGULANTS: ICD-10-CM

## 2017-05-10 DIAGNOSIS — L03.115 CELLULITIS OF RIGHT LOWER EXTREMITY: Primary | ICD-10-CM

## 2017-05-10 DIAGNOSIS — E11.51 TYPE II DIABETES MELLITUS WITH PERIPHERAL CIRCULATORY DISORDER (H): ICD-10-CM

## 2017-05-10 PROBLEM — E11.9 TYPE 2 DIABETES MELLITUS WITHOUT COMPLICATION, WITHOUT LONG-TERM CURRENT USE OF INSULIN (H): Status: ACTIVE | Noted: 2017-05-10

## 2017-05-10 LAB — INR POINT OF CARE: 2.9 (ref 0.86–1.14)

## 2017-05-10 PROCEDURE — 85610 PROTHROMBIN TIME: CPT | Mod: QW

## 2017-05-10 PROCEDURE — 36416 COLLJ CAPILLARY BLOOD SPEC: CPT

## 2017-05-10 PROCEDURE — 99214 OFFICE O/P EST MOD 30 MIN: CPT | Performed by: INTERNAL MEDICINE

## 2017-05-10 PROCEDURE — 99207 ZZC NO CHARGE NURSE ONLY: CPT

## 2017-05-10 RX ORDER — CEPHALEXIN 500 MG/1
500 CAPSULE ORAL 3 TIMES DAILY
Qty: 30 CAPSULE | Refills: 0 | Status: SHIPPED | OUTPATIENT
Start: 2017-05-10 | End: 2017-08-21

## 2017-05-10 RX ORDER — SILVER SULFADIAZINE 10 MG/G
CREAM TOPICAL 2 TIMES DAILY
Qty: 50 G | Refills: 1 | Status: SHIPPED | OUTPATIENT
Start: 2017-05-10 | End: 2017-05-17

## 2017-05-10 ASSESSMENT — PAIN SCALES - GENERAL: PAINLEVEL: NO PAIN (0)

## 2017-05-10 NOTE — PROGRESS NOTES
SUBJECTIVE:                                                    Melvin Abad is a 89 year old male who presents to clinic today for the following health issues:    Patient reports a he had a blister over the integument on covering the anterior tibia.  The blister burst and he now has a healing ulcer.  He describes that it was sore and had quite a bit of redness around the area.  It has been painful, aching in quality, and constant. He states that he hasn't seen a podiatrist since he had an partial amputation of the right toe after a safe fell on it and crushed it in the 1950s. He states the pain is almost gone now and is just worried about the sore and possible infection.     He denies any fever, chills, appetite loss, headache, recent illness, chest pain, dyspnea, abdominal pain, constipation, diarrhea, dysuria.    Chief Complaint   Patient presents with     Wound Check     Right lower leg. hurt his leg on a lawm mower and now wants to look at the wound       Problem list and histories reviewed & adjusted, as indicated.  Additional history: as documented    Patient Active Problem List   Diagnosis     Long term current use of anticoagulant therapy     Hyperlipidemia LDL goal <100     Type 2 diabetes mellitus without complication (H)     Diverticulosis     Health Care Home     Advanced directives, counseling/discussion     History of skin cancer     AK (actinic keratosis)     Gilbert syndrome     CTS (carpal tunnel syndrome)     Cellulitis     Anemia due to blood loss, acute     DVT prophylaxis     Obesity (BMI 30-39.9)     Chronic atrial fibrillation (H)     Long-term (current) use of anticoagulants [Z79.01]     Pain of right lower leg     Contusion of right lower extremity     Leg pain     Type 2 diabetes mellitus without complication, without long-term current use of insulin (H)     Past Surgical History:   Procedure Laterality Date     C TOTAL KNEE ARTHROPLASTY  1997    Knee Replacement, Total      COLONOSCOPY  05/02/2007    Multiple bxs of diminutive polyps of ascending colon.     COLONOSCOPY  11/10/2010    COLONOSCOPY performed by MARISELA GRIMM at  GI      COLONOSCOPY W/WO BRUSH/WASH  10/19/2001     HC COLONOSCOPY W/WO BRUSH/WASH  11/02/2004      HEMORRHOIDOPEXY BY STAPLING  09/26/08       Social History   Substance Use Topics     Smoking status: Former Smoker     Types: Cigarettes     Smokeless tobacco: Never Used      Comment: 5 years     Alcohol use No      Comment: denies     Family History   Problem Relation Age of Onset     CANCER Mother      Prostate Cancer Father          Current Outpatient Prescriptions   Medication Sig Dispense Refill     silver sulfADIAZINE (SILVADENE) 1 % cream Apply topically 2 times daily for 7 days 50 g 1     cephALEXin (KEFLEX) 500 MG capsule Take 1 capsule (500 mg) by mouth 3 times daily 30 capsule 0     cephALEXin (KEFLEX) 500 MG capsule Take 1 capsule (500 mg) by mouth 3 times daily 30 capsule 0     metoprolol (TOPROL-XL) 50 MG 24 hr tablet Take 1 tablet (50 mg) by mouth daily 90 tablet 3     warfarin (COUMADIN) 5 MG tablet TAKE ONE-HALF TABLET ON MONDAYS AND FRIDAYS AND ONE TABLET ON ALL THE OTHER DAYS OF THE WEEK OR AS DIRECTED BY COUMADIN CLINIC 90 tablet 3     simvastatin (ZOCOR) 40 MG tablet TAKE ONE TABLET BY MOUTH AT BEDTIME 90 tablet 3     lisinopril (PRINIVIL/ZESTRIL) 5 MG tablet Take 1 tablet (5 mg) by mouth daily 90 tablet 3     Cholecalciferol (VITAMIN D) 2000 UNITS tablet Take 1 tablet by mouth daily       polyethylene glycol (MIRALAX) powder Take 17 g by mouth daily 510 g 1     blood glucose monitoring (ONE TOUCH ULTRASOFT) lancets 1 each 2 times daily Test before meals and at bedtime 1 Box 3     blood glucose monitoring (NO BRAND SPECIFIED) test strip Test before meals and at bedtime. 100 each 3     acetaminophen 650 MG TABS Take 650 mg by mouth every 4 hours as needed 100 tablet      multivitamin (OCUVITE) TABS Take 1 tablet by mouth daily.  0     No  Known Allergies  Recent Labs   Lab Test  04/05/17   0930  03/30/17   2245  02/07/17   0833  01/19/16   0823  07/15/15   1213  05/30/14   0732   A1C   --    --   6.3*  6.3*  6.5*  6.2*   LDL   --    --   75   --   64  91   HDL   --    --   51   --   49  46   TRIG   --    --   88   --   98  83   ALT   --    --   20  22  21  19   CR  0.89  1.02  0.97  0.89  0.94  1.06   GFRESTIMATED  80  69  73  81  76  66   GFRESTBLACK  >90   GFR Calc    83  89  >90   GFR Calc    >90   GFR Calc    80   POTASSIUM  4.1  4.3  4.5  4.4  4.2  4.4   TSH   --    --    --    --   1.56  2.53        Reviewed and updated as needed this visit by clinical staff       Reviewed and updated as needed this visit by Provider         ROS:  Constitutional, HEENT, cardiovascular, pulmonary, gi and gu systems are negative, except as otherwise noted.    OBJECTIVE:                                                    /80  Pulse 84  Temp 97.4  F (36.3  C) (Tympanic)  Resp 16  Wt 220 lb (99.8 kg)  SpO2 94%  BMI 32.49 kg/m2  Body mass index is 32.49 kg/(m^2).  General: A&Ox3, not toxic appearing, in no acute distress  Head: Normocephalic/atraumatic.  Neck: Trachea midline, no anterior or posterior cervical adenopathy.  No masses present.  Lungs: Respirations easy, CTA bilaterally with good chest wall excursion  Heart: RRR w/o rubs, murmurs, S3 or S4  Abdomen: Bowel sounds active, no tenderness to palpation  Integument: Intact. Bilateral lipodermatosclerosis over bilateral anterior tibial integument in gator's area.  Healing ulcer over right anterior tibia with some surrounding erythema.  No pain with palpation.  Extremities: Feet: Right second toe shifted midline quite severely with erythema around it's toenail that is quite thickened and yellow from presumed fungal infection.  Right great toe partially amputated.    Diagnostic Test Results:  none      ASSESSMENT/PLAN:                                                     1. Cellulitis of right lower extremity  Blister appeared, then an ulcer over the anterior tibia on the right.  This appeared to patient to then become infected.  He has treated it well and it is healing and has minimal pain  - silver sulfADIAZINE (SILVADENE) 1 % cream; Apply topically 2 times daily for 7 days  Dispense: 50 g; Refill: 1  - Keflex 500 mg TID for ten days  - Be mindful of your legs and avoid trauma if at all possible.    2. Type 2 diabetes mellitus with peripheral circulatory disorder  Patient has what appears to be PVD with poor pulses in bilateral feet.  As well as the healing ulcer on his anterior tibia he has a slightly erythematous right fourth toe around the toe nail. The right great toe is partially amputated after a crush trauma in the 1950s.  His right fourth toe, with the toe nail and erythema in question is severely shifted to the midline and pressing on his shoe.  Accompanied by his diabetes this is suspicious for some deeper pathology or worsening integument integrity at the toe in question.  - PODIATRY/FOOT & ANKLE SURGERY REFERRAL    Follow up in 1-3 months following podiatry appointment to see how things are going    Alfredo Alejandro,   [facility]    Accompanied by Giorgio Porter, MS4

## 2017-05-10 NOTE — MR AVS SNAPSHOT
After Visit Summary   5/10/2017    Melvin Abad    MRN: [id number]           Patient Information     Date Of Birth          4/24/1928        Visit Information        Provider Department      5/10/2017 3:00 PM Alrfedo Alejandro DO [facility]        Today's Diagnoses     Cellulitis of right lower extremity    -  1    Type II diabetes mellitus with peripheral circulatory disorder (H)           Follow-ups after your visit        Additional Services     PODIATRY/FOOT & ANKLE SURGERY REFERRAL       Your provider has referred you to: [facility]: [facility]  [phone]    Please be aware that coverage of these services is subject to the terms and limitations of your health insurance plan.  Call member services at your health plan with any benefit or coverage questions.      Please bring the following to your appointment:  >>   Any x-rays, CTs or MRIs which have been performed.  Contact the facility where they were done to arrange for  prior to your scheduled appointment.    >>   List of current medications   >>   This referral request   >>   Any documents/labs given to you for this referral                  Your next 10 appointments already scheduled     May 15, 2017  1:15 PM CDT   Anticoagulation Visit with [facility]   [facility] ([address] [id number] [phone]            Jun 21, 2017  8:15 AM CDT   Anticoagulation Visit with PH ANTI COAG   [facility] ([address] [id number] [phone]              Who to contact     If you have questions or need follow up information about today's clinic visit or your schedule please contact [facility] directly at [phone].  Normal or non-critical lab and imaging results will be communicated to you by MyChart, letter or phone within 4 business days after the clinic  "has received the results. If you do not hear from us within 7 days, please contact the clinic through [employer] or phone. If you have a critical or abnormal lab result, we will notify you by phone as soon as possible.  Submit refill requests through [employer] or call your pharmacy and they will forward the refill request to us. Please allow 3 business days for your refill to be completed.          Additional Information About Your Visit        [employer] Information     [employer] lets you send messages to your doctor, view your test results, renew your prescriptions, schedule appointments and more. To sign up, go to [email or web address] . Click on \"Log in\" on the left side of the screen, which will take you to the Welcome page. Then click on \"Sign up Now\" on the right side of the page.     You will be asked to enter the access code listed below, as well as some personal information. Please follow the directions to create your username and password.     Your access code is: [email or web address]  Expires: 2017  3:55 PM     Your access code will  in 90 days. If you need help or a new code, please call your [facility] clinic or [phone].        Care EveryWhere ID     This is your Care EveryWhere ID. This could be used by other organizations to access your [facility] medical records  [id number]        Your Vitals Were     Pulse Temperature Respirations Pulse Oximetry BMI (Body Mass Index)       84 97.4  F (36.3  C) (Tympanic) 16 94% 32.49 kg/m2        Blood Pressure from Last 3 Encounters:   05/10/17 132/80   05/10/17 132/75   17 132/82    Weight from Last 3 Encounters:   05/10/17 220 lb (99.8 kg)   17 223 lb (101.2 kg)   17 220 lb (99.8 kg)              We Performed the Following     PODIATRY/FOOT & ANKLE SURGERY REFERRAL          Today's Medication Changes          These changes are accurate as of: 5/10/17  3:55 PM.  If you have any questions, ask your nurse or doctor.               Start taking these " medicines.        Dose/Directions    silver sulfADIAZINE 1 % cream   Commonly known as:  SILVADENE   Used for:  Cellulitis of right lower extremity   Started by:  Alfredo Alejandro DO        Apply topically 2 times daily for 7 days   Quantity:  50 g   Refills:  1         These medicines have changed or have updated prescriptions.        Dose/Directions    * cephALEXin 500 MG capsule   Commonly known as:  KEFLEX   This may have changed:  Another medication with the same name was added. Make sure you understand how and when to take each.   Used for:  Cellulitis and abscess of leg   Changed by:  Alfredo Alejandro DO        Dose:  500 mg   Take 1 capsule (500 mg) by mouth 3 times daily   Quantity:  30 capsule   Refills:  0       * cephALEXin 500 MG capsule   Commonly known as:  KEFLEX   This may have changed:  You were already taking a medication with the same name, and this prescription was added. Make sure you understand how and when to take each.   Used for:  Cellulitis of right lower extremity   Changed by:  Alfredo Alejandro DO        Dose:  500 mg   Take 1 capsule (500 mg) by mouth 3 times daily   Quantity:  30 capsule   Refills:  0       * Notice:  This list has 2 medication(s) that are the same as other medications prescribed for you. Read the directions carefully, and ask your doctor or other care provider to review them with you.         Where to get your medicines      These medications were sent to [facility] [address] [place] [address] - [address]  [address], [facility] [address]     Phone:  [phone]     cephALEXin 500 MG capsule    silver sulfADIAZINE 1 % cream                Primary Care Provider Office Phone # Fax #    Lazaro Huston [phone]       [place] [id number] [facility]   [facility] [place] [address]        Thank you!     Thank you for choosing [facility]  for your care. Our goal is always to provide you with excellent care.  Hearing back from our patients is one way we can continue to improve our services. Please take a few minutes to complete the written survey that you may receive in the mail after your visit with us. Thank you!             Your Updated Medication List - Protect others around you: Learn how to safely use, store and throw away your medicines at www.disposemymeds.org.          This list is accurate as of: 5/10/17  3:55 PM.  Always use your most recent med list.                   Brand Name Dispense Instructions for use    ACETAMINOPHEN 8 HOUR 650 MG 8 hour tablet   Generic drug:  acetaminophen     100 tablet    Take 650 mg by mouth every 4 hours as needed       blood glucose monitoring lancets     1 Box    1 each 2 times daily Test before meals and at bedtime       blood glucose monitoring test strip    no brand specified    100 each    Test before meals and at bedtime.       * cephALEXin 500 MG capsule    KEFLEX    30 capsule    Take 1 capsule (500 mg) by mouth 3 times daily       * cephALEXin 500 MG capsule    KEFLEX    30 capsule    Take 1 capsule (500 mg) by mouth 3 times daily       lisinopril 5 MG tablet    PRINIVIL/ZESTRIL    90 tablet    Take 1 tablet (5 mg) by mouth daily       metoprolol 50 MG 24 hr tablet    TOPROL-XL    90 tablet    Take 1 tablet (50 mg) by mouth daily       multivitamin Tabs tablet      Take 1 tablet by mouth daily.       polyethylene glycol powder    MIRALAX    510 g    Take 17 g by mouth daily       silver sulfADIAZINE 1 % cream    SILVADENE    50 g    Apply topically 2 times daily for 7 days       simvastatin 40 MG tablet    ZOCOR    90 tablet    TAKE ONE TABLET BY MOUTH AT BEDTIME       vitamin D 2000 UNITS tablet      Take 1 tablet by mouth daily       warfarin 5 MG tablet    COUMADIN    90 tablet    TAKE ONE-HALF TABLET ON MONDAYS AND FRIDAYS AND ONE TABLET ON ALL THE OTHER DAYS OF THE WEEK OR AS DIRECTED BY COUMADIN CLINIC       * Notice:  This list has 2 medication(s) that are the  same as other medications prescribed for you. Read the directions carefully, and ask your doctor or other care provider to review them with you.

## 2017-05-10 NOTE — MR AVS SNAPSHOT
Melvin Abad   5/10/2017 8:15 AM   Anticoagulation Therapy Visit    Description:  89 year old male   Provider:  PH ANTI COAG   Department:  Ph Anticoag           INR as of 5/10/2017     Today's INR 2.9      Anticoagulation Summary as of 5/10/2017     INR goal 2.0-3.0   Today's INR 2.9   Full instructions 2.5 mg on Mon, Fri; 5 mg all other days   Next INR check 6/21/2017    Indications   Atrial fibrillation (Resolved) [I48.91]  Long-term (current) use of anticoagulants [Z79.01] [Z79.01]         Your next Anticoagulation Clinic appointment(s)     May 15, 2017  1:15 PM CDT   Anticoagulation Visit with PH ANTI COAG   [facility] ([address] [address]   [phone]            Jun 21, 2017  8:15 AM CDT   Anticoagulation Visit with PH ANTI COAG   [facility] ([address] [address]   [phone]              Contact Numbers     Clinic Number:         May 2017 Details    Sun Mon Tue Wed Thu Fri Sat      1               2               3               4               5               6                 7               8               9               10      5 mg   See details      11      5 mg         12      2.5 mg         13      5 mg           14      5 mg         15      2.5 mg         16      5 mg         17      5 mg         18      5 mg         19      2.5 mg         20      5 mg           21      5 mg         22      2.5 mg         23      5 mg         24      5 mg         25      5 mg         26      2.5 mg         27      5 mg           28      5 mg         29      2.5 mg         30      5 mg         31      5 mg             Date Details   05/10 This INR check               How to take your warfarin dose     To take:  2.5 mg Take 0.5 of a 5 mg tablet.    To take:  5 mg Take 1 of the 5 mg tablets.           June 2017 Details    Sun Mon Tue Wed Thu Fri Sat         1       5 mg         2      2.5 mg         3      5 mg           4      5 mg         5      2.5 mg         6      5 mg         7      5 mg         8      5 mg         9      2.5 mg         10      5 mg           11      5 mg         12      2.5 mg         13      5 mg         14      5 mg         15      5 mg         16      2.5 mg         17      5 mg           18      5 mg         19      2.5 mg         20      5 mg         21            22               23               24                 25               26               27               28               29               30                 Date Details   No additional details    Date of next INR:  6/21/2017         How to take your warfarin dose     To take:  2.5 mg Take 0.5 of a 5 mg tablet.    To take:  5 mg Take 1 of the 5 mg tablets.

## 2017-05-10 NOTE — NURSING NOTE
"Chief Complaint   Patient presents with     Wound Check     Right lower leg. hurt his leg on a lawm mower and now wants to look at the wound       Initial /80  Pulse 84  Temp 97.4  F (36.3  C) (Tympanic)  Resp 16  Wt 220 lb (99.8 kg)  SpO2 94%  BMI 32.49 kg/m2 Estimated body mass index is 32.49 kg/(m^2) as calculated from the following:    Height as of 4/5/17: 5' 9\" (1.753 m).    Weight as of this encounter: 220 lb (99.8 kg).  Medication Reconciliation: complete    "

## 2017-05-10 NOTE — PROGRESS NOTES
ANTICOAGULATION FOLLOW-UP CLINIC VISIT    Patient Name:  Melvin Abad  Date:  5/10/2017  Contact Type:  Face to Face    SUBJECTIVE:     Patient Findings     Positives Change in medications (on keflex about a week ago)           OBJECTIVE    INR Protime   Date Value Ref Range Status   05/10/2017 2.9 (A) 0.86 - 1.14 Final       ASSESSMENT / PLAN  INR assessment THER    Recheck INR In: 6 WEEKS    INR Location Clinic      Anticoagulation Summary as of 5/10/2017     INR goal 2.0-3.0   Today's INR 2.9   Maintenance plan 2.5 mg (5 mg x 0.5) on Mon, Fri; 5 mg (5 mg x 1) all other days   Full instructions 2.5 mg on Mon, Fri; 5 mg all other days   Weekly total 30 mg   No change documented Shani Collins RN   Plan last modified Shani Collins RN (4/11/2016)   Next INR check 6/21/2017   Target end date     Indications   Atrial fibrillation (Resolved) [I48.91]  Long-term (current) use of anticoagulants [Z79.01] [Z79.01]         Anticoagulation Episode Summary     INR check location     Preferred lab     Send INR reminders to [facility] POOL    Comments 5 mg tablets, AM taker, mariola book       Anticoagulation Care Providers     Provider Role Specialty Phone number    Lazaro Huston MD [facility] [phone]            See the Encounter Report to view Anticoagulation Flowsheet and Dosing Calendar (Go to Encounters tab in chart review, and find the Anticoagulation Therapy Visit)    Dosage adjustment made based on physician directed care plan.    Pt is seeing provider today to see if he needs another round of antibiotics for cellulitis. I did schedule him to see me on Monday if he is started on a new med.     Shani Collins, ZULEIMA

## 2017-05-15 ENCOUNTER — ANTICOAGULATION THERAPY VISIT (OUTPATIENT)
Dept: ANTICOAGULATION | Facility: CLINIC | Age: 82
End: 2017-05-15
Payer: COMMERCIAL

## 2017-05-15 VITALS — SYSTOLIC BLOOD PRESSURE: 159 MMHG | HEART RATE: 71 BPM | DIASTOLIC BLOOD PRESSURE: 77 MMHG

## 2017-05-15 DIAGNOSIS — Z79.01 LONG-TERM (CURRENT) USE OF ANTICOAGULANTS: ICD-10-CM

## 2017-05-15 LAB — INR POINT OF CARE: 3 (ref 0.86–1.14)

## 2017-05-15 PROCEDURE — 36416 COLLJ CAPILLARY BLOOD SPEC: CPT

## 2017-05-15 PROCEDURE — 99207 ZZC NO CHARGE NURSE ONLY: CPT

## 2017-05-15 PROCEDURE — 85610 PROTHROMBIN TIME: CPT | Mod: QW

## 2017-05-15 NOTE — PROGRESS NOTES
ANTICOAGULATION FOLLOW-UP CLINIC VISIT    Patient Name:  Melvin Abad  Date:  5/15/2017  Contact Type:  Face to Face    SUBJECTIVE:     Patient Findings     Positives Change in medications (keflex 5/10-5/20)           OBJECTIVE    INR Protime   Date Value Ref Range Status   05/15/2017 3.0 (A) 0.86 - 1.14 Final       ASSESSMENT / PLAN  INR assessment THER    Recheck INR In: 6 WEEKS    INR Location Clinic      Anticoagulation Summary as of 5/15/2017     INR goal 2.0-3.0   Today's INR 3.0   Maintenance plan 2.5 mg (5 mg x 0.5) on Mon, Fri; 5 mg (5 mg x 1) all other days   Full instructions 2.5 mg on Mon, Fri; 5 mg all other days   Weekly total 30 mg   No change documented Shani Collins RN   Plan last modified Shani Collins RN (4/11/2016)   Next INR check    Target end date     Indications   Atrial fibrillation (Resolved) [I48.91]  Long-term (current) use of anticoagulants [Z79.01] [Z79.01]         Anticoagulation Episode Summary     INR check location     Preferred lab     Send INR reminders to [facility] POOL    Comments 5 mg tablets, AM taker, mariola book       Anticoagulation Care Providers     Provider Role Specialty Phone number    Lazaro Huston MD [facility] [phone]            See the Encounter Report to view Anticoagulation Flowsheet and Dosing Calendar (Go to Encounters tab in chart review, and find the Anticoagulation Therapy Visit)    Dosage adjustment made based on physician directed care plan.      Shani Collins, RN

## 2017-05-15 NOTE — MR AVS SNAPSHOT
Melvin Abad   5/15/2017 1:15 PM   Anticoagulation Therapy Visit    Description:  89 year old male   Provider:  PH ANTI COAG   Department:  Ph Anticoag           INR as of 5/15/2017     Today's INR 3.0      Anticoagulation Summary as of 5/15/2017     INR goal 2.0-3.0   Today's INR 3.0   Full instructions 2.5 mg on Mon, Fri; 5 mg all other days   Next INR check 6/21/2017    Indications   Atrial fibrillation (Resolved) [I48.91]  Long-term (current) use of anticoagulants [Z79.01] [Z79.01]         Your next Anticoagulation Clinic appointment(s)     May 15, 2017  1:15 PM CDT   Anticoagulation Visit with PH ANTI COAG   [facility] ([address] [address]   [phone]            Jun 21, 2017  8:15 AM CDT   Anticoagulation Visit with PH ANTI COAG   [facility] ([address] [address]   [phone]              Contact Numbers     Clinic Number:         May 2017 Details    Sun Mon Tue Wed Thu Fri Sat      1               2               3               4               5               6                 7               8               9               10               11               12               13                 14               15      2.5 mg   See details      16      5 mg         17      5 mg         18      5 mg         19      2.5 mg         20      5 mg           21      5 mg         22      2.5 mg         23      5 mg         24      5 mg         25      5 mg         26      2.5 mg         27      5 mg           28      5 mg         29      2.5 mg         30      5 mg         31      5 mg             Date Details   05/15 This INR check               How to take your warfarin dose     To take:  2.5 mg Take 0.5 of a 5 mg tablet.    To take:  5 mg Take 1 of the 5 mg tablets.           June 2017 Details    Sun Mon Tue Wed Thu Fri Sat         1      5 mg         2       2.5 mg         3      5 mg           4      5 mg         5      2.5 mg         6      5 mg         7      5 mg         8      5 mg         9      2.5 mg         10      5 mg           11      5 mg         12      2.5 mg         13      5 mg         14      5 mg         15      5 mg         16      2.5 mg         17      5 mg           18      5 mg         19      2.5 mg         20      5 mg         21            22               23               24                 25               26               27               28               29               30                 Date Details   No additional details    Date of next INR:  6/21/2017         How to take your warfarin dose     To take:  2.5 mg Take 0.5 of a 5 mg tablet.    To take:  5 mg Take 1 of the 5 mg tablets.

## 2017-05-18 ENCOUNTER — OFFICE VISIT (OUTPATIENT)
Dept: PODIATRY | Facility: CLINIC | Age: 82
End: 2017-05-18
Payer: COMMERCIAL

## 2017-05-18 VITALS — TEMPERATURE: 97.2 F | WEIGHT: 220 LBS | BODY MASS INDEX: 32.58 KG/M2 | HEIGHT: 69 IN

## 2017-05-18 DIAGNOSIS — E11.51 DIABETES MELLITUS WITH PERIPHERAL VASCULAR DISEASE (H): Primary | ICD-10-CM

## 2017-05-18 DIAGNOSIS — L85.9 HYPERKERATOSIS: ICD-10-CM

## 2017-05-18 DIAGNOSIS — E11.40 TYPE 2 DIABETES MELLITUS WITH DIABETIC NEUROPATHY, WITHOUT LONG-TERM CURRENT USE OF INSULIN (H): ICD-10-CM

## 2017-05-18 DIAGNOSIS — M20.42 HAMMER TOES OF BOTH FEET: ICD-10-CM

## 2017-05-18 DIAGNOSIS — M20.41 HAMMER TOES OF BOTH FEET: ICD-10-CM

## 2017-05-18 DIAGNOSIS — Z89.9 HISTORY OF AMPUTATION: ICD-10-CM

## 2017-05-18 PROCEDURE — 99203 OFFICE O/P NEW LOW 30 MIN: CPT | Mod: 25 | Performed by: PODIATRIST

## 2017-05-18 PROCEDURE — 11721 DEBRIDE NAIL 6 OR MORE: CPT | Mod: 59 | Performed by: PODIATRIST

## 2017-05-18 PROCEDURE — 11055 PARING/CUTG B9 HYPRKER LES 1: CPT | Mod: Q7 | Performed by: PODIATRIST

## 2017-05-18 ASSESSMENT — PAIN SCALES - GENERAL: PAINLEVEL: MILD PAIN (2)

## 2017-05-18 NOTE — PROGRESS NOTES
HPI:  Melvin Abad is a 89 year old male who is seen in consultation at the request of Alfredo Sykes DO.    Pt presents for eval of:   (Onset, Location, L/R, Character, Treatments, Injury if yes)     1. Onset 3/30/2017, cellulitis Right Lower Leg - foot slipped off foot pedal of garden tractor - currently being treated by Dr. Alejandro and is on a refill of Keflex 500mg, 1 capsule, 3 times daily for 10 days  Drainage, redness, anterior discoloration    2. Ongoing for years, Thick Right toenails    3. Ongoing for years, Left and Right ball and arch of foot pain    4. DM - Type 2    Retired.    Weight management plan: Patient was referred to their PCP to discuss a diet and exercise plan.     Review of Systems:  Patient denies fever, chills, rash, wound, stiffness, limping, numbness, weakness, heart burn, blood in stool, chest pain with activity, calf pain when walking, shortness of breath with activity, chronic cough, easy bleeding/bruising, swelling of ankles, excessive thirst, fatigue, depression, anxiety.  Patient admits only to symptoms noted in history.     PAST MEDICAL HISTORY:   Past Medical History:   Diagnosis Date     Actinic keratosis      Atrial fibrillation (H)      Basal cell carcinoma nos 06/10    Mohs excision, rt upper lip & rt temple     Cardiac dysrhythmia, unspecified      Cough     chronic cough     Diabetic eye exam (H) 8/29/14     Generalized osteoarthrosis, unspecified site     djd of the knees, hands, and neck     Other diseases of lung, not elsewhere classified     pulmonary nodule, benign     Pure hypercholesterolemia      Unspecified essential hypertension      PAST SURGICAL HISTORY:   Past Surgical History:   Procedure Laterality Date     C TOTAL KNEE ARTHROPLASTY  1997    Knee Replacement, Total     COLONOSCOPY  05/02/2007    Multiple bxs of diminutive polyps of ascending colon.     COLONOSCOPY  11/10/2010    COLONOSCOPY performed by MARISELA GRIMM at [facility] COLONOSCOPY  W/WO BRUSH/WASH  10/19/2001     HC COLONOSCOPY W/WO BRUSH/WASH  11/02/2004     HC HEMORRHOIDOPEXY BY STAPLING  09/26/08     MEDICATIONS:   Current Outpatient Prescriptions:      cephALEXin (KEFLEX) 500 MG capsule, Take 1 capsule (500 mg) by mouth 3 times daily, Disp: 30 capsule, Rfl: 0     metoprolol (TOPROL-XL) 50 MG 24 hr tablet, Take 1 tablet (50 mg) by mouth daily, Disp: 90 tablet, Rfl: 3     warfarin (COUMADIN) 5 MG tablet, TAKE ONE-HALF TABLET ON MONDAYS AND FRIDAYS AND ONE TABLET ON ALL THE OTHER DAYS OF THE WEEK OR AS DIRECTED BY COUMADIN CLINIC, Disp: 90 tablet, Rfl: 3     simvastatin (ZOCOR) 40 MG tablet, TAKE ONE TABLET BY MOUTH AT BEDTIME, Disp: 90 tablet, Rfl: 3     lisinopril (PRINIVIL/ZESTRIL) 5 MG tablet, Take 1 tablet (5 mg) by mouth daily, Disp: 90 tablet, Rfl: 3     Cholecalciferol (VITAMIN D) 2000 UNITS tablet, Take 1 tablet by mouth daily, Disp: , Rfl:      polyethylene glycol (MIRALAX) powder, Take 17 g by mouth daily, Disp: 510 g, Rfl: 1     blood glucose monitoring (ONE TOUCH ULTRASOFT) lancets, 1 each 2 times daily Test before meals and at bedtime, Disp: 1 Box, Rfl: 3     blood glucose monitoring (NO BRAND SPECIFIED) test strip, Test before meals and at bedtime., Disp: 100 each, Rfl: 3     acetaminophen 650 MG TABS, Take 650 mg by mouth every 4 hours as needed, Disp: 100 tablet, Rfl:      multivitamin (OCUVITE) TABS, Take 1 tablet by mouth daily., Disp: , Rfl: 0  ALLERGIES:  No Known Allergies  SOCIAL HISTORY:   Social History     Social History     Marital status:      Spouse name: N/A     Number of children: N/A     Years of education: N/A     Occupational History     Not on file.     Social History Main Topics     Smoking status: Former Smoker     Types: Cigarettes     Smokeless tobacco: Never Used      Comment: 5 years     Alcohol use No      Comment: denies     Drug use: No     Sexual activity: No     Other Topics Concern     Parent/Sibling W/ Cabg, Mi Or Angioplasty Before 65f  "55m? No     Social History Narrative     FAMILY HISTORY:   Family History   Problem Relation Age of Onset     CANCER Mother      Prostate Cancer Father      EXAM:Vitals: Temp 97.2  F (36.2  C) (Temporal)  Ht 5' 9\" (1.753 m)  Wt 220 lb (99.8 kg)  BMI 32.49 kg/m2  BMI= Body mass index is 32.49 kg/(m^2).    General appearance: Patient is alert and fully cooperative with history & exam.  No sign of distress is noted during the visit.     Psychiatric: Affect is pleasant & appropriate.  Patient appears motivated to improve health.     Respiratory: Breathing is regular & unlabored while sitting.     HEENT: Hearing is intact to spoken word.  Speech is clear.  No gross evidence of visual impairment that would impact ambulation.     Vascular: DP 1/4 & PT 1/4 left & right.  CFT delayed with dependent rubor noted about the digits.  Diminished hair growth distal to mid tibia and no hair about the foot and toes.  Temperature changes noted, warm to cool proximal to distal.  Hemosiderin pigmentation noted with multiple varicosities legs and feet bilateral. Generalized edema bilateral legs and feet.  Pt denies claudication history.     Neurologic: Normal plantar response bilateral.  Diminished protective threshold plus 6/10 applications of a 5.07 monofilament.  Pt admits no burning and paraesthesias about the feet and toes with palpation.     Dermatologic: All 9 nails are thickened, elongated, discolored and painful with palpation and debridement.  Diminished texture turgor and tone about the integument.  Skin is thin & shiny.  Pre ulcerative hyperkeratosis noted distal medial right fifth hammertoe.  No abscess or full thickness ulcerations noted.     Musculoskeletal: Patient is ambulatory without assistive device or brace.  Right hallux has been previously amputated with mild hyperkeratosis distal stump of the distal phalanx. Also medially deviated lesser toes on the right foot after the hallux amputation. All lesser toes are " semi-rigidly contracted partially reducible. Second toenail is very bulky and hyperkeratosis noted about the lateral border of the right fourth toenail.    Hemoglobin A1C (%)   Date Value   02/07/2017 6.3 (H)   01/19/2016 6.3 (H)   07/15/2015 6.5 (H)   05/30/2014 6.2 (H)   08/16/2013 6.3 (H)   02/06/2013 6.6 (H)   05/22/2012 6.5 (H)   12/20/2011 6.4 (H)     Creatinine (mg/dL)   Date Value   04/05/2017 0.89   03/30/2017 1.02   02/07/2017 0.97   01/19/2016 0.89   07/15/2015 0.94   05/30/2014 1.06   03/15/2011 0.8     ASSESSMENT:       ICD-10-CM    1. Diabetes mellitus with peripheral vascular disease (H) E11.51 ORTHOTICS REFERRAL   2. History of amputation Z89.9    3. Type 2 diabetes mellitus with diabetic neuropathy, without long-term current use of insulin (H) E11.40    4. 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 Hyperkeratosis L85.9      PLAN:    5/18/2017  All 9 nails were debrided with a nail nipper.   I sharply debrided 1 pre ulcerative hyperkeratotic lesions to the level of the dermis as noted above with a 15 blade.    We discussed risk factors and preventive measures.    We discussed appropriate hygiene, shoe gear, daily foot exam, and reinforced management of weight, diet, activity goals and HA1C goal for diabetic patients.    Dispensed written foot care instructions.    All questions were answered to their satisfaction.    RTC 3 months to confirm he is able to manage nails and callous and DM shoes protect hi right foot better.     Written instructions for help with hygiene and chronic nail debridement.  Follow up with me when this is not working for further instruction and eval/help.   Also order for diabetic shoes. This will accommodate his deformed toes and amputation on the right foot better. She does have neuropathy with diminished protective threshold +6/10 applications of a 5.07. May also consider compression stockings in the future. She would have to be evaluated to determine if he can apply  these appropriately and this would help protect his legs and reduce the tension on the skin. We'll monitor this over time at his next presentation.    Jey Samano DPM

## 2017-05-18 NOTE — NURSING NOTE
"Chief Complaint   Patient presents with     Consult     cellulitis Right lower leg, onset 3/30/2017; new pt     Diabetes     Type 2     Consult     Thick toenails Right foot, B/L ball of foot pain, ongoing for years       Initial Temp 97.2  F (36.2  C) (Temporal)  Ht 5' 9\" (1.753 m)  Wt 220 lb (99.8 kg)  BMI 32.49 kg/m2 Estimated body mass index is 32.49 kg/(m^2) as calculated from the following:    Height as of this encounter: 5' 9\" (1.753 m).    Weight as of this encounter: 220 lb (99.8 kg).  BP completed using cuff size: NA (Not Taken)  Medication Reconciliation: complete    Jennifer Huber CMA, May 18, 2017    "

## 2017-05-18 NOTE — MR AVS SNAPSHOT
"              After Visit Summary   5/18/2017    Melvin Abad    MRN: [id number]           Patient Information     Date Of Birth          4/24/1928        Visit Information        Provider Department      5/18/2017 7:30 AM Jey Samano DPM [facility]        Today's Diagnoses     Diabetes mellitus with peripheral vascular disease (H)    -  1      Care Instructions    Nail Debridement    A high quality instrument makes trimming toenails MUCH easier.  Search ebEyeScribes for any 5\" nail nipper manufactured by reliable brands such as Miltex, Integra or Jarit as these quality instruments will help manage difficult nails more effectively and comfortably. Search \"Miltex -CH\" for chrome nippers about $67 or \"Miltex -SS\" for stainless steel and are my preferred instrument in the clinic as they are processed through the autoclave after each use.  A physician is not necessary to trim nails even if you are taking blood thinners or are diabetic.  Your family or care givers may help manage your toenails.      Trim or sand the nails once weekly.  Do not wait until they are long and painful or trimming will become too difficult and painful and will increase your risk of complications or infection.  A course file or 120 grit sandpaper on a block can be helpful.  For very thick nails many people prefer battery operated borden such as an Amope', Personal Pedi and Emjoi for regular use or heavy painful callouses or thick toenails.    Trim or skive any portion of nail that is thick, loose, crumbling, or not well attached. Do not tear the nail away, but rather cut them with a nail nipper.  You may follow up with your Podiatric Physician if you have pain, bleeding, infection, questions or other concerns.      You may also contact the following Registered Nurses for further help with nail debridement and minor hygiene concnerns.  They will come to your home, trim your toenails and soak your feet, as well " as monitor for any complications that would require evaluation by a Physician.      [employer]  Yancy Salcido RN, [facility]  [phone]  [email or web address]    [employer]  Pauline Schneider RN  Office [phone]  [email or web address]    [employer] by Amisha Gardner RN  [phone]  Call or text for appointment        Calluses, Corns, IPKs, Porokeratosis    When there is excessive friction or pressure on the skin, the body responds by making the skin thicker.  While this may protect the deeper structures, the thickened skin can take up more space and thus increase pressure over a bony prominence or become an open sore or skin ulcer as this skin becomes less flexible.    Flat, diffuse thickening are simple calluses and they are usually caused by friction.  Often these are the result of rubbing on a shoe or going barefoot.    Calluses with a central core between the toes are called corns.  These often result from prominent joints on adjacent toes rubbing together.  Theses are often a symptom of bone malalignment and will usually recur unless the underlying bones are addressed.    Many of these lesions can be kept comfortable with routine maintenance. This consists of filing them with a Ped Egg, callus file, or 120 grit sandpaper on a block, every day during your bath or shower.  Most people prefer battery operated borden such as an Amope', Personal Pedi and Emjoi for regular use or heavy painful callouses.  Heavy creams or ointments can be applied 1-2 times every day to keep them soft. Toe spacers can be used for corns, gel pads can be used for other lesions on the bottom of the foot. If there is a deformity noted, such as a prominent bone, often this can be addressed to minimize recurrence. However, sometimes the pressure and lesion simply migrates to another spot after surgery, so it is not a guaranteed cure.     If you have severe callouses and cracking, you may apply heavy  greasy ointments that you scoop up such as Cetaphil, Eucerin, Aquaphor or Vaseline.  For more aggressive help apply heavy creams or ointment under occlusive dressings such as Saran Wrap or Jelly Feet while sleeping.   Jelly Feet can be obtained at [email or web address].     To be successful with managing hyperkeratotic skin, you must manage hygiene daily.  At your bath or shower time is the easiest time to work on this when skin is most soft.  There is no medical or surgical treatment that will absolutely eliminate many of these symptoms.      Pedifix is a reliable source for all sorts of foot pads, cushions, or interdigital spacers and foot appliances. Go to www.Storactive or request a catalog at [employer].        Please call with any additional questions.       DIABETES AND YOUR FEET    What effect does diabetes have on the feet?  Diabetes can result in several problems in the feet including contractures of the tendons leading to deformities and reduced function of the bones, skin ulcers or open sores on pressure points or prominent deformities, reduced sensation, reduced blood flow and thus reduced oxygen and immune cells to the tiny vessels in our feet. This all leads to higher risk of hospitalization, infections, and amputations.     What is neuropathy?  Neuropathy is a term used to describe a loss of nerve function.  Patients with diabetes are at risk of developing neuropathy if their sugars continue to run high and are above the normal value of 140.  The elevated blood sugar in the body enters the nerves causing it to swell and impair nerve function.  The higher the blood sugar and the longer it is elevated, the more damage is done to nerves.  This damage is permanent and irreversible.  These damaged sensory nerves can then cause reduced feeling or cause pain.  Damaged motor nerves can reduce blood flow and white blood cells into into your foot, skin and bones reducing your ability to heal a small problem.  "And neuropathy can cause tendons to become unbalanced and contribute to the formation of deformity and contractures in our feet. Often times, neuropathy can be prevented by controlling your blood sugar.  Your risk of developing neuropathy goes up dramatically as your hemoglobin A1C raises above 7.5.      How do I know if I have neuropathy?  When a person develops neuropathy, they usually begin to feel numbness or tingling in their feet and sometimes in their legs.  Other symptoms may include painful burning or hot feet, tingling, electrical sensations or feeling like insects or ants are crawling on your feet or legs.  If blood sugar remains above 140  for long periods of time, neuropathy can also occur in the hands.  When a person loses their \"protective threshold\" or ability to detect a 5.07 [place] Lilly monofilament is when they have elevated risk for developing foot deformity, contractures, foot infections, amputations, Charcot arthropathy, or other complications. Keep your hemoglobin A1C below 7.5 to reduce this risk.    What is vascular disease?  Peripheral vascular disease is a term used to describe a loss or decrease in circulation (blood flow).  There is a problem in getting blood, immune cells, and oxygen to areas that need it.  Similar to neuropathy, sugars can build up in the walls of the arteries (blood vessels) and cause them to become swollen, thickened and hardened.  This decreases the amount of blood that can go to an area that needs it.  Though this is common in the legs of diabetic patients, it can also affect other arteries (blood vessels) in the body such as in the heart, kidney, eyes, and the blood flow into bones.  It is often seen first in the small vessels of her body notably our feet and toes.    How do I know if I have vascular disease?  In the legs, vascular disease usually results in cramping.  Patients who develop leg cramps after walking the same distance every time (i.e. One block, " half a mile, ect.) need to let their doctors know so that their circulation may be checked.  Cramps causing severe pain in the feet and/or legs while sleeping and the cramps go away when you stand or hang your legs off the side of the bed, may also be a sign of poor blood circulation.  Occasional cramping in cold weather or on rare occasions with activity may not be due to poor circulation, but you should inform your doctor.    How can these problems be prevented?  The key to prevention is good blood sugar control all day every day.  Inadequate blood sugar control is the most common way patients experience these problems. Reducing, controlling and measuring your daily consumption of sugar or carbohydrates is essential to understanding and managing diabetes.  Physical activity (exercise) is a very good way to help decrease your blood sugars.  Exercise can lower your blood sugar, blood pressure, and cholesterol.  It also reduces your risk for heart disease and stroke, relieves stress, and strengthens your heart, muscles and bones. Physical activity also increases your balance and reduces development of contractures and foot deformities over time. In addition, regular activity helps insulin work better, improves your blood circulation, and keeps your joints flexible.  If you're trying to lose weight, a combination of exercise and wise food choices can help you reach your target weight and maintain it.  Activity and exercise alone can not make up for poor diet choices, eating too much, or eating too many sugars or carbohydrates.  Ask your doctor for help when you are not meeting your blood sugar goals. Changes or increases in medication are powerful tools in reducing your blood sugar.    Know your blood sugar and hemoglobin A1C trend.  Upon first diagnosis or during acute illness, checking your blood sugar 4 times a day can help you understand how your diet, activity, and lifestyle affect your blood sugar.  Monitoring  your hemoglobin A1C can help you understand how well you are managing blood sugar over the long run.  Your hemoglobin A1C tells you what your blood sugar averages all day, every day, over the past 90 days.       To experience the lowest risk of complications associated with diabetes such as neuropathy, loss of blood flow, bone or joint infection, charcot arthropathy, or amputation, the American Diabetes Association recommends a target hemoglobin A1C of less than 7.0%, while the American Association of Clinical Endocrinologists' recommendation is 6.5% or less.  Both organizations advise that the goals be individualized based on patient factors such as other health conditions, history of hypoglycemia, education, and life expectancy.  A patients risk of experiencing complications associated with diabetes is only slightly elevated with a hemoglobin A1C above 6.0.  However, this risk goes up exponentially when the hemoglobin A1C is above 7.5.  The longer the hemoglobin A1C is elevated, the more risk that patient will experience in their lifetime. The damage that occurs to nerves, blood vessels, tendons, bones and body organs, while their hemoglobin A1C is elevated is mostly irreversible and worsens with each additional time period of elevated hemoglobin A1C.     You must understand and manage your disease.  Your health insurance or medical team cannot manage this disease for you.  When you take responsibility for understanding and managing your disease, you can expect to experience fewer problems associated with diabetes in your lifetime.  You will  Also experience a higher quality of life and health and reduced cost of health care.              Diabetic Foot Care Recommendations  The following are recommendations for avoiding serious foot problems or injury    DO'S  1. Be aware of your hemoglobin A1C and continue to follow up with your medical team for adjustments in your lifestyle and medication until your reach your  A1C goal.  Keep this below 7.5 to reduce your risk of developing complications associated with diabetes.    2.  Wash your feet with lukewarm water and a mild soap and then dry them thoroughly, especially between the toes.  Gently floss your towel or washcloth between each toe at every bath.  Soaking your feet in water cannot clean dead skin, debris, and bacteria from your feet and is not necessary.   3. Examine your feet daily looking for cuts, corns, blisters, cracks, ect..., especially after wearing new shoes or increased or changed activities.  Make sure to look between your toes.  If you cannot see the bottom of your feet, set a mirror on the floor and hold your foot over it, or ask a family member to examine your feet for you daily.  Contact your doctor immediately if new problems are noted or if sores are not healing.  4.  Immediately apply moisturizer cream such as Cetaphil to the tops and bottoms of your feet, avoiding areas between the toes.  Apply sunscreen or cover your feet if they will be exposed to extended sunlight.  5.Use clean comfortable shoes.  Socks should not have thick seams or cut off the circulation around the leg.  Break in new shoes slowly and rotate with older shoes until broken in.  Check the inside of your shoes with your hand to look for areas of irritation or objects that may have fallen into your shoes.    6. Keep slippers by the side of your bed for use during the night.  7. Shoes should be fitted by a professional and should not cause areas of irritation.  Check your feet regularly when wearing a new pair of shoes and replace them as needed.  8.  Talk to your doctor about proper exercise.  Exercise and stretching stimulate blood flow to your feet and maintain proper glucose levels.  Use it or lose it!  9.  Monitor your blood glucose level and your hemoglobin A1C.  Notify your doctor immediately if your blood sugar is abnormally high or low.  10.  Cut your nails straight across, but  then gently round any sharp edges with a nail file.  If you have neuropathy, peripheral vascular disease or cannot see that well to trim your own toenails, see a medical professional for care.    DONT'S  1.  Do not soak your feet if you have an open sore or your provider has informed you that you have neuropathy or loss of protective threshold.  Use only lukewarm water and always check the temperature with your hand as hot water can easily burn your feet.    2.  Never use a hot water bottle or heating pad on your feet.  Also do not apply hot or cold compresses to your feet.  With decreased sensation, you could burn or freeze your feet.  Do not rest your feet near a heat source such as a heater or heat register.    3.  Do not apply any of these to your feet:    - over the counter medicine for corns or warts    -  Harsh chemicals like boric acid    -  Do not self-treat corns, cuts, blisters or infections.  Always consult your doctor.   4.  Do not wear sandals, slippers or walk barefoot, especially on harsh surfaces.  5.  If you smoke, stop!!!                Follow-ups after your visit        Additional Services     ORTHOTICS REFERRAL       One pair extra depth diabetic shoes and 3 pair of custom molded Plastizote inserts per Medicare guidelines.  velcro if possible, soft leather and roomy for toes on right foot are medially deviated after crushed right 4th toe and amp right hallux.    [facility] Central Scheduling staff will contact patient to arrange appointments. Central Scheduling Phone #:  [phone], [place], [place].                  Your next 10 appointments already scheduled     Jun 21, 2017  8:15 AM CDT   Anticoagulation Visit with PH ANTI COAG   [facility] ([facility])    [address] [place] [id number] [phone]              Who to contact     If you have questions or need follow up information about today's clinic visit or your schedule please  "contact [facility] directly at [phone].  Normal or non-critical lab and imaging results will be communicated to you by MyChart, letter or phone within 4 business days after the clinic has received the results. If you do not hear from us within 7 days, please contact the clinic through MyChart or phone. If you have a critical or abnormal lab result, we will notify you by phone as soon as possible.  Submit refill requests through rollApp or call your pharmacy and they will forward the refill request to us. Please allow 3 business days for your refill to be completed.          Additional Information About Your Visit        rollApp Information     rollApp lets you send messages to your doctor, view your test results, renew your prescriptions, schedule appointments and more. To sign up, go to [email or web address] . Click on \"Log in\" on the left side of the screen, which will take you to the Welcome page. Then click on \"Sign up Now\" on the right side of the page.     You will be asked to enter the access code listed below, as well as some personal information. Please follow the directions to create your username and password.     Your access code is: [email or web address]  Expires: 2017  3:55 PM     Your access code will  in 90 days. If you need help or a new code, please call your [place] clinic or [phone].        Care EveryWhere ID     This is your Care EveryWhere ID. This could be used by other organizations to access your [place] medical records  [id number]        Your Vitals Were     Temperature Height BMI (Body Mass Index)             97.2  F (36.2  C) (Temporal) 5' 9\" (1.753 m) 32.49 kg/m2          Blood Pressure from Last 3 Encounters:   05/15/17 159/77   05/10/17 132/80   05/10/17 132/75    Weight from Last 3 Encounters:   17 220 lb (99.8 kg)   05/10/17 220 lb (99.8 kg)   17 223 lb (101.2 kg)              We Performed the Following     ORTHOTICS REFERRAL        " Primary Care Provider Office Phone # Fax #    Lazaro Huston [phone]       [facility] [address] [place] [address]        Thank you!     Thank you for choosing [facility]  for your care. Our goal is always to provide you with excellent care. Hearing back from our patients is one way we can continue to improve our services. Please take a few minutes to complete the written survey that you may receive in the mail after your visit with us. Thank you!             Your Updated Medication List - Protect others around you: Learn how to safely use, store and throw away your medicines at www.disposemymeds.org.          This list is accurate as of: 5/18/17  7:55 AM.  Always use your most recent med list.                   Brand Name Dispense Instructions for use    ACETAMINOPHEN 8 HOUR 650 MG 8 hour tablet   Generic drug:  acetaminophen     100 tablet    Take 650 mg by mouth every 4 hours as needed       blood glucose monitoring lancets     1 Box    1 each 2 times daily Test before meals and at bedtime       blood glucose monitoring test strip    no brand specified    100 each    Test before meals and at bedtime.       cephALEXin 500 MG capsule    KEFLEX    30 capsule    Take 1 capsule (500 mg) by mouth 3 times daily       lisinopril 5 MG tablet    PRINIVIL/ZESTRIL    90 tablet    Take 1 tablet (5 mg) by mouth daily       metoprolol 50 MG 24 hr tablet    TOPROL-XL    90 tablet    Take 1 tablet (50 mg) by mouth daily       multivitamin Tabs tablet      Take 1 tablet by mouth daily.       polyethylene glycol powder    MIRALAX    510 g    Take 17 g by mouth daily       simvastatin 40 MG tablet    ZOCOR    90 tablet    TAKE ONE TABLET BY MOUTH AT BEDTIME       vitamin D 2000 UNITS tablet      Take 1 tablet by mouth daily       warfarin 5 MG tablet    COUMADIN    90 tablet    TAKE ONE-HALF TABLET ON MONDAYS AND FRIDAYS AND ONE TABLET ON ALL THE OTHER DAYS OF THE WEEK OR  AS DIRECTED BY COUMADIN CLINIC

## 2017-05-18 NOTE — LETTER
5/18/2017       RE: Melvin Abad  [address] [place] [id number]           Dear Colleague,    Thank you for referring your patient, Melvin Abad, to the [facility]. Please see a copy of my visit note below.    HPI:  Melvin Abad is a 89 year old male who is seen in consultation at the request of Alfredo Sykes DO.    Pt presents for eval of:   (Onset, Location, L/R, Character, Treatments, Injury if yes)     1. Onset 3/30/2017, cellulitis Right Lower Leg - foot slipped off foot pedal of garden tractor - currently being treated by Dr. Alejandro and is on a refill of Keflex 500mg, 1 capsule, 3 times daily for 10 days  Drainage, redness, anterior discoloration    2. Ongoing for years, Thick Right toenails    3. Ongoing for years, Left and Right ball and arch of foot pain    4. DM - Type 2    Retired.    Weight management plan: Patient was referred to their PCP to discuss a diet and exercise plan.     Review of Systems:  Patient denies fever, chills, rash, wound, stiffness, limping, numbness, weakness, heart burn, blood in stool, chest pain with activity, calf pain when walking, shortness of breath with activity, chronic cough, easy bleeding/bruising, swelling of ankles, excessive thirst, fatigue, depression, anxiety.  Patient admits only to symptoms noted in history.     PAST MEDICAL HISTORY:   Past Medical History:   Diagnosis Date     Actinic keratosis      Atrial fibrillation (H)      Basal cell carcinoma nos 06/10    Mohs excision, rt upper lip & rt temple     Cardiac dysrhythmia, unspecified      Cough     chronic cough     Diabetic eye exam (H) 8/29/14     Generalized osteoarthrosis, unspecified site     djd of the knees, hands, and neck     Other diseases of lung, not elsewhere classified     pulmonary nodule, benign     Pure hypercholesterolemia      Unspecified essential hypertension      PAST SURGICAL HISTORY:   Past Surgical History:   Procedure Laterality Date      C TOTAL KNEE ARTHROPLASTY  1997    Knee Replacement, Total     COLONOSCOPY  05/02/2007    Multiple bxs of diminutive polyps of ascending colon.     COLONOSCOPY  11/10/2010    COLONOSCOPY performed by MARISELA GRIMM at  GI      COLONOSCOPY W/WO BRUSH/WASH  10/19/2001     HC COLONOSCOPY W/WO BRUSH/WASH  11/02/2004      HEMORRHOIDOPEXY BY STAPLING  09/26/08     MEDICATIONS:   Current Outpatient Prescriptions:      cephALEXin (KEFLEX) 500 MG capsule, Take 1 capsule (500 mg) by mouth 3 times daily, Disp: 30 capsule, Rfl: 0     metoprolol (TOPROL-XL) 50 MG 24 hr tablet, Take 1 tablet (50 mg) by mouth daily, Disp: 90 tablet, Rfl: 3     warfarin (COUMADIN) 5 MG tablet, TAKE ONE-HALF TABLET ON MONDAYS AND FRIDAYS AND ONE TABLET ON ALL THE OTHER DAYS OF THE WEEK OR AS DIRECTED BY COUMADIN CLINIC, Disp: 90 tablet, Rfl: 3     simvastatin (ZOCOR) 40 MG tablet, TAKE ONE TABLET BY MOUTH AT BEDTIME, Disp: 90 tablet, Rfl: 3     lisinopril (PRINIVIL/ZESTRIL) 5 MG tablet, Take 1 tablet (5 mg) by mouth daily, Disp: 90 tablet, Rfl: 3     Cholecalciferol (VITAMIN D) 2000 UNITS tablet, Take 1 tablet by mouth daily, Disp: , Rfl:      polyethylene glycol (MIRALAX) powder, Take 17 g by mouth daily, Disp: 510 g, Rfl: 1     blood glucose monitoring (ONE TOUCH ULTRASOFT) lancets, 1 each 2 times daily Test before meals and at bedtime, Disp: 1 Box, Rfl: 3     blood glucose monitoring (NO BRAND SPECIFIED) test strip, Test before meals and at bedtime., Disp: 100 each, Rfl: 3     acetaminophen 650 MG TABS, Take 650 mg by mouth every 4 hours as needed, Disp: 100 tablet, Rfl:      multivitamin (OCUVITE) TABS, Take 1 tablet by mouth daily., Disp: , Rfl: 0  ALLERGIES:  No Known Allergies  SOCIAL HISTORY:   Social History     Social History     Marital status:      Spouse name: N/A     Number of children: N/A     Years of education: N/A     Occupational History     Not on file.     Social History Main Topics     Smoking status: Former  "Smoker     Types: Cigarettes     Smokeless tobacco: Never Used      Comment: 5 years     Alcohol use No      Comment: denies     Drug use: No     Sexual activity: No     Other Topics Concern     Parent/Sibling W/ Cabg, Mi Or Angioplasty Before 65f 55m? No     Social History Narrative     FAMILY HISTORY:   Family History   Problem Relation Age of Onset     CANCER Mother      Prostate Cancer Father      EXAM:Vitals: Temp 97.2  F (36.2  C) (Temporal)  Ht 5' 9\" (1.753 m)  Wt 220 lb (99.8 kg)  BMI 32.49 kg/m2  BMI= Body mass index is 32.49 kg/(m^2).    General appearance: Patient is alert and fully cooperative with history & exam.  No sign of distress is noted during the visit.     Psychiatric: Affect is pleasant & appropriate.  Patient appears motivated to improve health.     Respiratory: Breathing is regular & unlabored while sitting.     HEENT: Hearing is intact to spoken word.  Speech is clear.  No gross evidence of visual impairment that would impact ambulation.     Vascular: DP 1/4 & PT 1/4 left & right.  CFT delayed with dependent rubor noted about the digits.  Diminished hair growth distal to mid tibia and no hair about the foot and toes.  Temperature changes noted, warm to cool proximal to distal.  Hemosiderin pigmentation noted with multiple varicosities legs and feet bilateral. Generalized edema bilateral legs and feet.  Pt denies claudication history.     Neurologic: Normal plantar response bilateral.  Diminished protective threshold plus 6/10 applications of a 5.07 monofilament.  Pt admits no burning and paraesthesias about the feet and toes with palpation.     Dermatologic: All 9 nails are thickened, elongated, discolored and painful with palpation and debridement.  Diminished texture turgor and tone about the integument.  Skin is thin & shiny.  Pre ulcerative hyperkeratosis noted distal medial right fifth hammertoe.  No abscess or full thickness ulcerations noted.     Musculoskeletal: Patient is " ambulatory without assistive device or brace.  Right hallux has been previously amputated with mild hyperkeratosis distal stump of the distal phalanx. Also medially deviated lesser toes on the right foot after the hallux amputation. All lesser toes are semi-rigidly contracted partially reducible. Second toenail is very bulky and hyperkeratosis noted about the lateral border of the right fourth toenail.    Hemoglobin A1C (%)   Date Value   02/07/2017 6.3 (H)   01/19/2016 6.3 (H)   07/15/2015 6.5 (H)   05/30/2014 6.2 (H)   08/16/2013 6.3 (H)   02/06/2013 6.6 (H)   05/22/2012 6.5 (H)   12/20/2011 6.4 (H)     Creatinine (mg/dL)   Date Value   04/05/2017 0.89   03/30/2017 1.02   02/07/2017 0.97   01/19/2016 0.89   07/15/2015 0.94   05/30/2014 1.06   03/15/2011 0.8     ASSESSMENT:       ICD-10-CM    1. Diabetes mellitus with peripheral vascular disease (H) E11.51 ORTHOTICS REFERRAL   2. History of amputation Z89.9    3. Type 2 diabetes mellitus with diabetic neuropathy, without long-term current use of insulin (H) E11.40    4. 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 Hyperkeratosis L85.9      PLAN:    5/18/2017  All 9 nails were debrided with a nail nipper.   I sharply debrided 1 pre ulcerative hyperkeratotic lesions to the level of the dermis as noted above with a 15 blade.    We discussed risk factors and preventive measures.    We discussed appropriate hygiene, shoe gear, daily foot exam, and reinforced management of weight, diet, activity goals and HA1C goal for diabetic patients.    Dispensed written foot care instructions.    All questions were answered to their satisfaction.    RTC 3 months to confirm he is able to manage nails and callous and DM shoes protect hi right foot better.     Written instructions for help with hygiene and chronic nail debridement.  Follow up with me when this is not working for further instruction and eval/help.   Also order for diabetic shoes. This will accommodate his deformed  toes and amputation on the right foot better. She does have neuropathy with diminished protective threshold +6/10 applications of a 5.07. May also consider compression stockings in the future. She would have to be evaluated to determine if he can apply these appropriately and this would help protect his legs and reduce the tension on the skin. We'll monitor this over time at his next presentation.    Jey Samano DPM      Again, thank you for allowing me to participate in the care of your patient.        Sincerely,              Jey Samano DPM

## 2017-05-18 NOTE — PATIENT INSTRUCTIONS
"Nail Debridement    A high quality instrument makes trimming toenails MUCH easier.  Search [employer] for any 5\" nail nipper manufactured by reliable brands such as Miltex, Integra or Jarit as these quality instruments will help manage difficult nails more effectively and comfortably. Search \"Miltex -CH\" for chrome nippers about $67 or \"Miltex -SS\" for stainless steel and are my preferred instrument in the clinic as they are processed through the autoclave after each use.  A physician is not necessary to trim nails even if you are taking blood thinners or are diabetic.  Your family or care givers may help manage your toenails.      Trim or sand the nails once weekly.  Do not wait until they are long and painful or trimming will become too difficult and painful and will increase your risk of complications or infection.  A course file or 120 grit sandpaper on a block can be helpful.  For very thick nails many people prefer battery operated borden such as an Amope', Personal Pedi and Emjoi for regular use or heavy painful callouses or thick toenails.    Trim or skive any portion of nail that is thick, loose, crumbling, or not well attached. Do not tear the nail away, but rather cut them with a nail nipper.  You may follow up with your Podiatric Physician if you have pain, bleeding, infection, questions or other concerns.      You may also contact the following Registered Nurses for further help with nail debridement and minor hygiene concnerns.  They will come to your home, trim your toenails and soak your feet, as well as monitor for any complications that would require evaluation by a Physician.      [employer]  Yancy Salcido RN, [facility]  [phone]  [email or web address]    [employer]  Pauline Schneider RN  Office [phone]  [email or web address]    [employer] by Amisha Gardner RN  [phone]  Call or text for appointment        Calluses, Corns, IPKs, " Porokeratosis    When there is excessive friction or pressure on the skin, the body responds by making the skin thicker.  While this may protect the deeper structures, the thickened skin can take up more space and thus increase pressure over a bony prominence or become an open sore or skin ulcer as this skin becomes less flexible.    Flat, diffuse thickening are simple calluses and they are usually caused by friction.  Often these are the result of rubbing on a shoe or going barefoot.    Calluses with a central core between the toes are called corns.  These often result from prominent joints on adjacent toes rubbing together.  Theses are often a symptom of bone malalignment and will usually recur unless the underlying bones are addressed.    Many of these lesions can be kept comfortable with routine maintenance. This consists of filing them with a Ped Egg, callus file, or 120 grit sandpaper on a block, every day during your bath or shower.  Most people prefer battery operated borden such as an Amope', Personal Pedi and Emjoi for regular use or heavy painful callouses.  Heavy creams or ointments can be applied 1-2 times every day to keep them soft. Toe spacers can be used for corns, gel pads can be used for other lesions on the bottom of the foot. If there is a deformity noted, such as a prominent bone, often this can be addressed to minimize recurrence. However, sometimes the pressure and lesion simply migrates to another spot after surgery, so it is not a guaranteed cure.     If you have severe callouses and cracking, you may apply heavy greasy ointments that you scoop up such as Cetaphil, Eucerin, Aquaphor or Vaseline.  For more aggressive help apply heavy creams or ointment under occlusive dressings such as Saran Wrap or Jelly Feet while sleeping.   Jelly Feet can be obtained at [email or web address].     To be successful with managing hyperkeratotic skin, you must manage hygiene daily.  At your bath or shower time  is the easiest time to work on this when skin is most soft.  There is no medical or surgical treatment that will absolutely eliminate many of these symptoms.      Pedifix is a reliable source for all sorts of foot pads, cushions, or interdigital spacers and foot appliances. Go to [email or web address] or request a catalog at 1-456-PED2359 Media.        Please call with any additional questions.       DIABETES AND YOUR FEET    What effect does diabetes have on the feet?  Diabetes can result in several problems in the feet including contractures of the tendons leading to deformities and reduced function of the bones, skin ulcers or open sores on pressure points or prominent deformities, reduced sensation, reduced blood flow and thus reduced oxygen and immune cells to the tiny vessels in our feet. This all leads to higher risk of hospitalization, infections, and amputations.     What is neuropathy?  Neuropathy is a term used to describe a loss of nerve function.  Patients with diabetes are at risk of developing neuropathy if their sugars continue to run high and are above the normal value of 140.  The elevated blood sugar in the body enters the nerves causing it to swell and impair nerve function.  The higher the blood sugar and the longer it is elevated, the more damage is done to nerves.  This damage is permanent and irreversible.  These damaged sensory nerves can then cause reduced feeling or cause pain.  Damaged motor nerves can reduce blood flow and white blood cells into into your foot, skin and bones reducing your ability to heal a small problem. And neuropathy can cause tendons to become unbalanced and contribute to the formation of deformity and contractures in our feet. Often times, neuropathy can be prevented by controlling your blood sugar.  Your risk of developing neuropathy goes up dramatically as your hemoglobin A1C raises above 7.5.      How do I know if I have neuropathy?  When a person develops neuropathy, they  "usually begin to feel numbness or tingling in their feet and sometimes in their legs.  Other symptoms may include painful burning or hot feet, tingling, electrical sensations or feeling like insects or ants are crawling on your feet or legs.  If blood sugar remains above 140  for long periods of time, neuropathy can also occur in the hands.  When a person loses their \"protective threshold\" or ability to detect a 5.07 [employer] monofilament is when they have elevated risk for developing foot deformity, contractures, foot infections, amputations, Charcot arthropathy, or other complications. Keep your hemoglobin A1C below 7.5 to reduce this risk.    What is vascular disease?  Peripheral vascular disease is a term used to describe a loss or decrease in circulation (blood flow).  There is a problem in getting blood, immune cells, and oxygen to areas that need it.  Similar to neuropathy, sugars can build up in the walls of the arteries (blood vessels) and cause them to become swollen, thickened and hardened.  This decreases the amount of blood that can go to an area that needs it.  Though this is common in the legs of diabetic patients, it can also affect other arteries (blood vessels) in the body such as in the heart, kidney, eyes, and the blood flow into bones.  It is often seen first in the small vessels of her body notably our feet and toes.    How do I know if I have vascular disease?  In the legs, vascular disease usually results in cramping.  Patients who develop leg cramps after walking the same distance every time (i.e. One block, half a mile, ect.) need to let their doctors know so that their circulation may be checked.  Cramps causing severe pain in the feet and/or legs while sleeping and the cramps go away when you stand or hang your legs off the side of the bed, may also be a sign of poor blood circulation.  Occasional cramping in cold weather or on rare occasions with activity may not be due to poor " circulation, but you should inform your doctor.    How can these problems be prevented?  The key to prevention is good blood sugar control all day every day.  Inadequate blood sugar control is the most common way patients experience these problems. Reducing, controlling and measuring your daily consumption of sugar or carbohydrates is essential to understanding and managing diabetes.  Physical activity (exercise) is a very good way to help decrease your blood sugars.  Exercise can lower your blood sugar, blood pressure, and cholesterol.  It also reduces your risk for heart disease and stroke, relieves stress, and strengthens your heart, muscles and bones. Physical activity also increases your balance and reduces development of contractures and foot deformities over time. In addition, regular activity helps insulin work better, improves your blood circulation, and keeps your joints flexible.  If you're trying to lose weight, a combination of exercise and wise food choices can help you reach your target weight and maintain it.  Activity and exercise alone can not make up for poor diet choices, eating too much, or eating too many sugars or carbohydrates.  Ask your doctor for help when you are not meeting your blood sugar goals. Changes or increases in medication are powerful tools in reducing your blood sugar.    Know your blood sugar and hemoglobin A1C trend.  Upon first diagnosis or during acute illness, checking your blood sugar 4 times a day can help you understand how your diet, activity, and lifestyle affect your blood sugar.  Monitoring your hemoglobin A1C can help you understand how well you are managing blood sugar over the long run.  Your hemoglobin A1C tells you what your blood sugar averages all day, every day, over the past 90 days.       To experience the lowest risk of complications associated with diabetes such as neuropathy, loss of blood flow, bone or joint infection, charcot arthropathy, or  amputation, the American Diabetes Association recommends a target hemoglobin A1C of less than 7.0%, while the American Association of Clinical Endocrinologists' recommendation is 6.5% or less.  Both organizations advise that the goals be individualized based on patient factors such as other health conditions, history of hypoglycemia, education, and life expectancy.  A patients risk of experiencing complications associated with diabetes is only slightly elevated with a hemoglobin A1C above 6.0.  However, this risk goes up exponentially when the hemoglobin A1C is above 7.5.  The longer the hemoglobin A1C is elevated, the more risk that patient will experience in their lifetime. The damage that occurs to nerves, blood vessels, tendons, bones and body organs, while their hemoglobin A1C is elevated is mostly irreversible and worsens with each additional time period of elevated hemoglobin A1C.     You must understand and manage your disease.  Your health insurance or medical team cannot manage this disease for you.  When you take responsibility for understanding and managing your disease, you can expect to experience fewer problems associated with diabetes in your lifetime.  You will  Also experience a higher quality of life and health and reduced cost of health care.              Diabetic Foot Care Recommendations  The following are recommendations for avoiding serious foot problems or injury    DO'S  1. Be aware of your hemoglobin A1C and continue to follow up with your medical team for adjustments in your lifestyle and medication until your reach your A1C goal.  Keep this below 7.5 to reduce your risk of developing complications associated with diabetes.    2.  Wash your feet with lukewarm water and a mild soap and then dry them thoroughly, especially between the toes.  Gently floss your towel or washcloth between each toe at every bath.  Soaking your feet in water cannot clean dead skin, debris, and bacteria from your  feet and is not necessary.   3. Examine your feet daily looking for cuts, corns, blisters, cracks, ect..., especially after wearing new shoes or increased or changed activities.  Make sure to look between your toes.  If you cannot see the bottom of your feet, set a mirror on the floor and hold your foot over it, or ask a family member to examine your feet for you daily.  Contact your doctor immediately if new problems are noted or if sores are not healing.  4.  Immediately apply moisturizer cream such as Cetaphil to the tops and bottoms of your feet, avoiding areas between the toes.  Apply sunscreen or cover your feet if they will be exposed to extended sunlight.  5.Use clean comfortable shoes.  Socks should not have thick seams or cut off the circulation around the leg.  Break in new shoes slowly and rotate with older shoes until broken in.  Check the inside of your shoes with your hand to look for areas of irritation or objects that may have fallen into your shoes.    6. Keep slippers by the side of your bed for use during the night.  7. Shoes should be fitted by a professional and should not cause areas of irritation.  Check your feet regularly when wearing a new pair of shoes and replace them as needed.  8.  Talk to your doctor about proper exercise.  Exercise and stretching stimulate blood flow to your feet and maintain proper glucose levels.  Use it or lose it!  9.  Monitor your blood glucose level and your hemoglobin A1C.  Notify your doctor immediately if your blood sugar is abnormally high or low.  10.  Cut your nails straight across, but then gently round any sharp edges with a nail file.  If you have neuropathy, peripheral vascular disease or cannot see that well to trim your own toenails, see a medical professional for care.    DONT'S  1.  Do not soak your feet if you have an open sore or your provider has informed you that you have neuropathy or loss of protective threshold.  Use only lukewarm water and  always check the temperature with your hand as hot water can easily burn your feet.    2.  Never use a hot water bottle or heating pad on your feet.  Also do not apply hot or cold compresses to your feet.  With decreased sensation, you could burn or freeze your feet.  Do not rest your feet near a heat source such as a heater or heat register.    3.  Do not apply any of these to your feet:    - over the counter medicine for corns or warts    -  Harsh chemicals like boric acid    -  Do not self-treat corns, cuts, blisters or infections.  Always consult your doctor.   4.  Do not wear sandals, slippers or walk barefoot, especially on harsh surfaces.  5.  If you smoke, stop!!!

## 2017-06-06 NOTE — TELEPHONE ENCOUNTER
"Notes Recorded by Karina James LPN on 6/6/2017 at 2:05 PM  Writer spoke with patient who states that he cancelled his April appointment due to not being concerned with the area on his arm and that it healed well. Writer informed patient that Dr. Amaya wanted to recheck the area for it was a precancer and we wanted to do further treatment if needed so this wouldn't turn into a cancer. Patient refused at this time stating \"There is nothing I am worried about there\". Writer explained to patient that the doctors are trained in what they look for and that lots of times the providers find concerning things on patients that patients were unaware of. Writer also offered to help schedule for a follow up in August for a skin check for that will be 6 months since his last appointment to which he refused. Patient states at this time he does not have \"anything concerning\" and he will call the clinic if he feels he needs an appointment.     Karina Frias LPN    ------    Notes Recorded by Sandra Amaya MD on 6/6/2017 at 1:32 PM  Patient not rescheduled. Please, reach out once more  ------  "

## 2017-06-07 ENCOUNTER — DOCUMENTATION ONLY (OUTPATIENT)
Dept: ORTHOPEDICS | Facility: CLINIC | Age: 82
End: 2017-06-07

## 2017-06-07 NOTE — PROGRESS NOTES
"I saw Melvin Abad at [facility] for fitting and delivery of extra depth diabetic type orthopedic shoes and custom molded multi density foot orthoses per Rx of Jey Samano DPM. 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 His feet manifested challenging conditions exacerbated by diabetic peripheral neuropathy. His right 2nd toe is adducted significantly into the space left vacant by the missing 1st toe, and his 4th and 5th toes impact each other causing a pre-ulcerative condition. His second toenail was blackened, an apparent result of inadequate toe room within the shoe per Mr. Abad's own description.  Extra depth orthopedic shoes and custom molded multi density inserts are to protect his neuropathic feet with RT great toe amputation and 2nd toe compromise. I have fit three pairs of multi density trilaminar custom molded foot orthoses and a pair of Dr. Curiel \"Bronson\" extra depth orthopedic shoes, size 12 W, black. I have provided the written instruction for use, care and break in period provided by the  and cautioned him to get used to his new footwear gradually while monitoring for any changes or problems. He will follow up as needed.  "

## 2017-06-21 ENCOUNTER — ANTICOAGULATION THERAPY VISIT (OUTPATIENT)
Dept: ANTICOAGULATION | Facility: CLINIC | Age: 82
End: 2017-06-21
Payer: COMMERCIAL

## 2017-06-21 VITALS — SYSTOLIC BLOOD PRESSURE: 124 MMHG | HEART RATE: 62 BPM | DIASTOLIC BLOOD PRESSURE: 64 MMHG

## 2017-06-21 DIAGNOSIS — Z79.01 LONG-TERM (CURRENT) USE OF ANTICOAGULANTS: ICD-10-CM

## 2017-06-21 LAB — INR POINT OF CARE: 2.7 (ref 0.86–1.14)

## 2017-06-21 PROCEDURE — 99207 ZZC NO CHARGE NURSE ONLY: CPT

## 2017-06-21 PROCEDURE — 36416 COLLJ CAPILLARY BLOOD SPEC: CPT

## 2017-06-21 PROCEDURE — 85610 PROTHROMBIN TIME: CPT | Mod: QW

## 2017-06-21 NOTE — PROGRESS NOTES
ANTICOAGULATION FOLLOW-UP CLINIC VISIT    Patient Name:  Melvin Abad  Date:  6/21/2017  Contact Type:  Face to Face    SUBJECTIVE:     Patient Findings     Positives No Problem Findings           OBJECTIVE    INR Protime   Date Value Ref Range Status   06/21/2017 2.7 (A) 0.86 - 1.14 Final       ASSESSMENT / PLAN  INR assessment THER    Recheck INR In: 6 WEEKS    INR Location Clinic      Anticoagulation Summary as of 6/21/2017     INR goal 2.0-3.0   Today's INR 2.7   Maintenance plan 2.5 mg (5 mg x 0.5) on Mon, Fri; 5 mg (5 mg x 1) all other days   Full instructions 2.5 mg on Mon, Fri; 5 mg all other days   Weekly total 30 mg   No change documented Shani Ross RN   Plan last modified Shani Ross RN (4/11/2016)   Next INR check 8/2/2017   Target end date     Indications   Atrial fibrillation (Resolved) [I48.91]  Long-term (current) use of anticoagulants [Z79.01] [Z79.01]         Anticoagulation Episode Summary     INR check location     Preferred lab     Send INR reminders to [facility] POOL    Comments 5 mg tablets, AM taker, mariola book       Anticoagulation Care Providers     Provider Role Specialty Phone number    Lazaro Huston MD [facility] Internal Medicine [phone]            See the Encounter Report to view Anticoagulation Flowsheet and Dosing Calendar (Go to Encounters tab in chart review, and find the Anticoagulation Therapy Visit)    Dosage adjustment made based on physician directed care plan.      Shnai Ross RN

## 2017-06-21 NOTE — MR AVS SNAPSHOT
Melvin Abad   6/21/2017 8:15 AM   Anticoagulation Therapy Visit    Description:  89 year old male   Provider:  [facility] ANTI COAG   Department:  Ph Anticoag           INR as of 6/21/2017     Today's INR 2.7      Anticoagulation Summary as of 6/21/2017     INR goal 2.0-3.0   Today's INR 2.7   Full instructions 2.5 mg on Mon, Fri; 5 mg all other days   Next INR check 8/2/2017    Indications   Atrial fibrillation (Resolved) [I48.91]  Long-term (current) use of anticoagulants [Z79.01] [Z79.01]         Your next Anticoagulation Clinic appointment(s)     Jun 21, 2017  8:15 AM CDT   Anticoagulation Visit with [facility] ANTI COAG   [facility] ([address] [address]   [phone]            Aug 02, 2017  8:15 AM CDT   Anticoagulation Visit with PH ANTI COAG   [facility] ([address] [address]   [phone]              Contact Numbers     Clinic Number:         June 2017 Details    Sun Mon Tue Wed Thu Fri Sat         1               2               3                 4               5               6               7               8               9               10                 11               12               13               14               15               16               17                 18               19               20               21      5 mg   See details      22      5 mg         23      2.5 mg         24      5 mg           25      5 mg         26      2.5 mg         27      5 mg         28      5 mg         29      5 mg         30      2.5 mg           Date Details   06/21 This INR check               How to take your warfarin dose     To take:  2.5 mg Take 0.5 of a 5 mg tablet.    To take:  5 mg Take 1 of the 5 mg tablets.           July 2017 Details    Sun Mon Tue Wed Thu Fri Sat           1      5 mg           2      5 mg         3      2.5 mg         4      5 mg          5      5 mg         6      5 mg         7      2.5 mg         8      5 mg           9      5 mg         10      2.5 mg         11      5 mg         12      5 mg         13      5 mg         14      2.5 mg         15      5 mg           16      5 mg         17      2.5 mg         18      5 mg         19      5 mg         20      5 mg         21      2.5 mg         22      5 mg           23      5 mg         24      2.5 mg         25      5 mg         26      5 mg         27      5 mg         28      2.5 mg         29      5 mg           30      5 mg         31      2.5 mg               Date Details   No additional details            How to take your warfarin dose     To take:  2.5 mg Take 0.5 of a 5 mg tablet.    To take:  5 mg Take 1 of the 5 mg tablets.           August 2017 Details    Sun Mon Tue Wed Thu Fri Sat       1      5 mg         2            3               4               5                 6               7               8               9               10               11               12                 13               14               15               16               17               18               19                 20               21               22               23               24               25               26                 27               28               29               30               31                  Date Details   No additional details    Date of next INR:  8/2/2017         How to take your warfarin dose     To take:  5 mg Take 1 of the 5 mg tablets.

## 2017-08-02 ENCOUNTER — ANTICOAGULATION THERAPY VISIT (OUTPATIENT)
Dept: ANTICOAGULATION | Facility: CLINIC | Age: 82
End: 2017-08-02
Payer: COMMERCIAL

## 2017-08-02 VITALS — DIASTOLIC BLOOD PRESSURE: 71 MMHG | HEART RATE: 64 BPM | SYSTOLIC BLOOD PRESSURE: 134 MMHG

## 2017-08-02 DIAGNOSIS — Z79.01 LONG-TERM (CURRENT) USE OF ANTICOAGULANTS: ICD-10-CM

## 2017-08-02 LAB — INR POINT OF CARE: 1.8 (ref 0.86–1.14)

## 2017-08-02 PROCEDURE — 99207 ZZC NO CHARGE NURSE ONLY: CPT

## 2017-08-02 PROCEDURE — 36416 COLLJ CAPILLARY BLOOD SPEC: CPT

## 2017-08-02 PROCEDURE — 85610 PROTHROMBIN TIME: CPT | Mod: QW

## 2017-08-02 NOTE — MR AVS SNAPSHOT
Melvin Abad   8/2/2017 8:15 AM   Anticoagulation Therapy Visit    Description:  89 year old male   Provider:  [facility]   Department:  Ph Anticoag           INR as of 8/2/2017     Today's INR 1.8!      Anticoagulation Summary as of 8/2/2017     INR goal 2.0-3.0   Today's INR 1.8!   Full instructions 2.5 mg on Mon, Fri; 5 mg all other days   Next INR check 9/13/2017    Indications   Atrial fibrillation (Resolved) [I48.91]  Long-term (current) use of anticoagulants [Z79.01] [Z79.01]         Your next Anticoagulation Clinic appointment(s)     Aug 02, 2017  8:15 AM CDT   Anticoagulation Visit with PH ANTI COAG   [facility] ([address] [address]   [phone]            Sep 13, 2017  8:15 AM CDT   Anticoagulation Visit with PH ANTI COAG   [facility] ([address] [address]   [phone]              Contact Numbers     Clinic Number:         August 2017 Details    Sun Mon Tue Wed Thu Fri Sat       1               2      5 mg   See details      3      5 mg         4      2.5 mg         5      5 mg           6      5 mg         7      2.5 mg         8      5 mg         9      5 mg         10      5 mg         11      2.5 mg         12      5 mg           13      5 mg         14      2.5 mg         15      5 mg         16      5 mg         17      5 mg         18      2.5 mg         19      5 mg           20      5 mg         21      2.5 mg         22      5 mg         23      5 mg         24      5 mg         25      2.5 mg         26      5 mg           27      5 mg         28      2.5 mg         29      5 mg         30      5 mg         31      5 mg            Date Details   08/02 This INR check               How to take your warfarin dose     To take:  2.5 mg Take 0.5 of a 5 mg tablet.    To take:  5 mg Take 1 of the 5 mg tablets.           September 2017 Details     Sun Mon Tue Wed Thu Fri Sat          1      2.5 mg         2      5 mg           3      5 mg         4      2.5 mg         5      5 mg         6      5 mg         7      5 mg         8      2.5 mg         9      5 mg           10      5 mg         11      2.5 mg         12      5 mg         13            14               15               16                 17               18               19               20               21               22               23                 24               25               26               27               28               29               30                Date Details   No additional details    Date of next INR:  9/13/2017         How to take your warfarin dose     To take:  2.5 mg Take 0.5 of a 5 mg tablet.    To take:  5 mg Take 1 of the 5 mg tablets.

## 2017-08-02 NOTE — PROGRESS NOTES
ANTICOAGULATION FOLLOW-UP CLINIC VISIT    Patient Name:  Melvin Abad  Date:  8/2/2017  Contact Type:  Face to Face    SUBJECTIVE:     Patient Findings     Positives Missed doses (missed last nights dose)           OBJECTIVE    INR Protime   Date Value Ref Range Status   08/02/2017 1.8 (A) 0.86 - 1.14 Final       ASSESSMENT / PLAN  INR assessment THER    Recheck INR In: 6 WEEKS    INR Location Clinic      Anticoagulation Summary as of 8/2/2017     INR goal 2.0-3.0   Today's INR 1.8!   Maintenance plan 2.5 mg (5 mg x 0.5) on Mon, Fri; 5 mg (5 mg x 1) all other days   Full instructions 2.5 mg on Mon, Fri; 5 mg all other days   Weekly total 30 mg   No change documented Shani Ross RN   Plan last modified Shani Ross RN (4/11/2016)   Next INR check 9/13/2017   Target end date     Indications   Atrial fibrillation (Resolved) [I48.91]  Long-term (current) use of anticoagulants [Z79.01] [Z79.01]         Anticoagulation Episode Summary     INR check location     Preferred lab     Send INR reminders to [facility] POOL    Comments 5 mg tablets, AM taker, mariola book       Anticoagulation Care Providers     Provider Role Specialty Phone number    Lazaro Huston MD [facility] Internal Medicine [phone]            See the Encounter Report to view Anticoagulation Flowsheet and Dosing Calendar (Go to Encounters tab in chart review, and find the Anticoagulation Therapy Visit)    Dosage adjustment made based on physician directed care plan.      Shani Ross RN

## 2017-08-21 ENCOUNTER — OFFICE VISIT (OUTPATIENT)
Dept: PODIATRY | Facility: CLINIC | Age: 82
End: 2017-08-21
Payer: COMMERCIAL

## 2017-08-21 VITALS — WEIGHT: 221 LBS | HEIGHT: 69 IN | TEMPERATURE: 97.1 F | BODY MASS INDEX: 32.73 KG/M2

## 2017-08-21 DIAGNOSIS — E11.40 TYPE 2 DIABETES MELLITUS WITH DIABETIC NEUROPATHY, WITHOUT LONG-TERM CURRENT USE OF INSULIN (H): ICD-10-CM

## 2017-08-21 DIAGNOSIS — M20.42 HAMMER TOES OF BOTH FEET: ICD-10-CM

## 2017-08-21 DIAGNOSIS — M20.41 HAMMER TOES OF BOTH FEET: ICD-10-CM

## 2017-08-21 DIAGNOSIS — Z89.9 HISTORY OF AMPUTATION: ICD-10-CM

## 2017-08-21 DIAGNOSIS — E11.51 DIABETES MELLITUS WITH PERIPHERAL VASCULAR DISEASE (H): Primary | ICD-10-CM

## 2017-08-21 PROCEDURE — 99213 OFFICE O/P EST LOW 20 MIN: CPT | Performed by: PODIATRIST

## 2017-08-21 ASSESSMENT — PAIN SCALES - GENERAL: PAINLEVEL: NO PAIN (0)

## 2017-08-21 NOTE — PROGRESS NOTES
Chief Complaint   Patient presents with     WOUND CARE     improvement - cellulitis Right lower leg, onset 3/30/2017; LOV 5/18/2017     Diabetes     obtained new DM Shoes and inserts - late June 2017 - Type 2     Consult     anterior lesion Right lower leg, onset 8/14/2017; new issue       Weight management plan: Patient was referred to their PCP to discuss a diet and exercise plan.     Patient presents today with a new lesion Anterior Right lower leg, onset 8/14/2017    HPI:  Melvin Abad is a 89 year old male who is seen in consultation at the request of Alfredo Sykes DO.    Pt presents for eval of:   (Onset, Location, L/R, Character, Treatments, Injury if yes)     1. Onset 3/30/2017, cellulitis Right Lower Leg - foot slipped off foot pedal of garden alexys - currently being treated by Dr. Alejandro and is on a refill of Keflex 500mg, 1 capsule, 3 times daily for 10 days  Drainage, redness, anterior discoloration    2. Ongoing for years, Thick Right toenails    3. Ongoing for years, Left and Right ball and arch of foot pain    4. DM - Type 2    Retired.    Weight management plan: Patient was referred to their PCP to discuss a diet and exercise plan.     Review of Systems:  Patient denies fever, chills, rash, wound, stiffness, limping, numbness, weakness, heart burn, blood in stool, chest pain with activity, calf pain when walking, shortness of breath with activity, chronic cough, easy bleeding/bruising, swelling of ankles, excessive thirst, fatigue, depression, anxiety.  Patient admits only to symptoms noted in history.     PAST MEDICAL HISTORY:   Past Medical History:   Diagnosis Date     Actinic keratosis      Atrial fibrillation (H)      Basal cell carcinoma nos 06/10    Mohs excision, rt upper lip & rt temple     Cardiac dysrhythmia, unspecified      Cough     chronic cough     Diabetic eye exam (H) 8/29/14     Generalized osteoarthrosis, unspecified site     djd of the knees, hands, and neck      Other diseases of lung, not elsewhere classified     pulmonary nodule, benign     Pure hypercholesterolemia      Unspecified essential hypertension      PAST SURGICAL HISTORY:   Past Surgical History:   Procedure Laterality Date     C TOTAL KNEE ARTHROPLASTY  1997    Knee Replacement, Total     COLONOSCOPY  05/02/2007    Multiple bxs of diminutive polyps of ascending colon.     COLONOSCOPY  11/10/2010    COLONOSCOPY performed by MARISELA GRIMM at  GI      COLONOSCOPY W/WO BRUSH/WASH  10/19/2001     HC COLONOSCOPY W/WO BRUSH/WASH  11/02/2004      HEMORRHOIDOPEXY BY STAPLING  09/26/08     MEDICATIONS:   Current Outpatient Prescriptions:      metoprolol (TOPROL-XL) 50 MG 24 hr tablet, Take 1 tablet (50 mg) by mouth daily, Disp: 90 tablet, Rfl: 3     warfarin (COUMADIN) 5 MG tablet, TAKE ONE-HALF TABLET ON MONDAYS AND FRIDAYS AND ONE TABLET ON ALL THE OTHER DAYS OF THE WEEK OR AS DIRECTED BY COUMADIN CLINIC, Disp: 90 tablet, Rfl: 3     simvastatin (ZOCOR) 40 MG tablet, TAKE ONE TABLET BY MOUTH AT BEDTIME, Disp: 90 tablet, Rfl: 3     lisinopril (PRINIVIL/ZESTRIL) 5 MG tablet, Take 1 tablet (5 mg) by mouth daily, Disp: 90 tablet, Rfl: 3     Cholecalciferol (VITAMIN D) 2000 UNITS tablet, Take 1 tablet by mouth daily, Disp: , Rfl:      polyethylene glycol (MIRALAX) powder, Take 17 g by mouth daily, Disp: 510 g, Rfl: 1     blood glucose monitoring (ONE TOUCH ULTRASOFT) lancets, 1 each 2 times daily Test before meals and at bedtime, Disp: 1 Box, Rfl: 3     blood glucose monitoring (NO BRAND SPECIFIED) test strip, Test before meals and at bedtime., Disp: 100 each, Rfl: 3     acetaminophen 650 MG TABS, Take 650 mg by mouth every 4 hours as needed, Disp: 100 tablet, Rfl:      multivitamin (OCUVITE) TABS, Take 1 tablet by mouth daily., Disp: , Rfl: 0  ALLERGIES:  No Known Allergies  SOCIAL HISTORY:   Social History     Social History     Marital status:      Spouse name: N/A     Number of children: N/A     Years of  "education: N/A     Occupational History     Not on file.     Social History Main Topics     Smoking status: Former Smoker     Types: Cigarettes     Smokeless tobacco: Never Used      Comment: 5 years     Alcohol use No      Comment: denies     Drug use: No     Sexual activity: No     Other Topics Concern     Parent/Sibling W/ Cabg, Mi Or Angioplasty Before 65f 55m? No     Social History Narrative     FAMILY HISTORY:   Family History   Problem Relation Age of Onset     CANCER Mother      Prostate Cancer Father      EXAM:Vitals: Temp 97.1  F (36.2  C) (Temporal)  Ht 5' 9\" (1.753 m)  Wt 221 lb (100.2 kg)  BMI 32.64 kg/m2  BMI= Body mass index is 32.64 kg/(m^2).    General appearance: Patient is alert and fully cooperative with history & exam.  No sign of distress is noted during the visit.     Psychiatric: Affect is pleasant & appropriate.  Patient appears motivated to improve health.     Respiratory: Breathing is regular & unlabored while sitting.     HEENT: Hearing is intact to spoken word.  Speech is clear.  No gross evidence of visual impairment that would impact ambulation.     Vascular: DP 1/4 & PT 1/4 left & right.  CFT delayed with dependent rubor noted about the digits.  Diminished hair growth distal to mid tibia and no hair about the foot and toes.  Temperature changes noted, warm to cool proximal to distal.  Hemosiderin pigmentation noted with multiple varicosities legs and feet bilateral. Generalized edema bilateral legs and feet.  Pt denies claudication history.     Neurologic: Normal plantar response bilateral.  Diminished protective threshold plus 6/10 applications of a 5.07 monofilament.  Pt admits no burning and paraesthesias about the feet and toes with palpation.     Dermatologic: All 9 nails are dystrophic but in reasonable repair.  Diminished texture turgor and tone about the integument. Severely diminished texture turgor tone about the anterior shins. Skin is thin & shiny.  Minimal " hyperkeratosis noted about the distal right plantar lateral proximal phalanx of the hallux at the amputation stump. No abscess or full thickness ulcerations noted.       Musculoskeletal: Patient is ambulatory without assistive device or brace.  Right hallux has been previously amputated with mild hyperkeratosis distal stump of the distal phalanx. Also medially deviated lesser toes on the right foot after the hallux amputation. All lesser toes are semi-rigidly contracted partially reducible. Second toenail is very bulky and hyperkeratosis noted about the lateral border of the right fourth toenail.    Hemoglobin A1C (%)   Date Value   02/07/2017 6.3 (H)   01/19/2016 6.3 (H)   07/15/2015 6.5 (H)   05/30/2014 6.2 (H)   08/16/2013 6.3 (H)   02/06/2013 6.6 (H)   05/22/2012 6.5 (H)   12/20/2011 6.4 (H)     Creatinine (mg/dL)   Date Value   04/05/2017 0.89   03/30/2017 1.02   02/07/2017 0.97   01/19/2016 0.89   07/15/2015 0.94   05/30/2014 1.06   03/15/2011 0.8     ASSESSMENT:       ICD-10-CM    1. Diabetes mellitus with peripheral vascular disease (H) E11.51    2. History of amputation Z89.9    3. Type 2 diabetes mellitus with diabetic neuropathy, without long-term current use of insulin (H) E11.40    4. 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   I sharply debrided 1 pre ulcerative hyperkeratotic lesions to the level of the dermis as noted above with a 15 blade.    We discussed risk factors and preventive measures.    We discussed appropriate hygiene, shoe gear, daily foot exam, and reinforced management of weight, diet, activity goals and HA1C goal for diabetic patients.    Dispensed written foot care instructions.    All questions were answered to their satisfaction.    RTC 3 months to confirm he is able to manage nails and callous and DM shoes protect hi right foot better.     Written instructions for help with hygiene and chronic nail debridement.   Follow up with me when this is not working for further instruction and eval/help.   Also order for diabetic shoes. This will accommodate his deformed toes and amputation on the right foot better. She does have neuropathy with diminished protective threshold +6/10 applications of a 5.07. May also consider compression stockings in the future. She would have to be evaluated to determine if he can apply these appropriately and this would help protect his legs and reduce the tension on the skin. We'll monitor this over time at his next presentation.    8/21/2017  No new lesions are noted.  The diabetic shoes conform to his foot quite well and things are quite stable. He does have considerable eschar noted about the anterior shins consistent with venous stasis and minor traumas repetitive. Recommended protecting and padding the area compression as well. All questions were answered.  Follow up 5/18 for yearly diabetic foot exam and evaluation of durability of shoes.      Jey Samano DPM

## 2017-08-21 NOTE — MR AVS SNAPSHOT
After Visit Summary   8/21/2017    Melvin Abad    MRN: [id number]           Patient Information     Date Of Birth          4/24/1928        Visit Information        Provider Department      8/21/2017 8:00 AM Jey Samano DPM [facility]        Today's Diagnoses     Diabetes mellitus with peripheral vascular disease (H)    -  1    History of amputation        Type 2 diabetes mellitus with diabetic neuropathy, without long-term current use of insulin (H)        Hammer toes of both feet          Care Instructions    DIABETES AND YOUR FEET    What effect does diabetes have on the feet?  Diabetes can result in several problems in the feet including contractures of the tendons leading to deformities and reduced function of the bones, skin ulcers or open sores on pressure points or prominent deformities, reduced sensation, reduced blood flow and thus reduced oxygen and immune cells to the tiny vessels in our feet. This all leads to higher risk of hospitalization, infections, and amputations.     What is neuropathy?  Neuropathy is a term used to describe a loss of nerve function.  Patients with diabetes are at risk of developing neuropathy if their sugars continue to run high and are above the normal value of 140.  The elevated blood sugar in the body enters the nerves causing it to swell and impair nerve function.  The higher the blood sugar and the longer it is elevated, the more damage is done to nerves.  This damage is permanent and irreversible.  These damaged sensory nerves can then cause reduced feeling or cause pain.  Damaged motor nerves can reduce blood flow and white blood cells into into your foot, skin and bones reducing your ability to heal a small problem. And neuropathy can cause tendons to become unbalanced and contribute to the formation of deformity and contractures in our feet. Often times, neuropathy can be prevented by controlling your blood sugar.  Your risk  "of developing neuropathy goes up dramatically as your hemoglobin A1C raises above 7.5.      How do I know if I have neuropathy?  When a person develops neuropathy, they usually begin to feel numbness or tingling in their feet and sometimes in their legs.  Other symptoms may include painful burning or hot feet, tingling, electrical sensations or feeling like insects or ants are crawling on your feet or legs.  If blood sugar remains above 140  for long periods of time, neuropathy can also occur in the hands.  When a person loses their \"protective threshold\" or ability to detect a 5.07 [place] [employer] monofilament is when they have elevated risk for developing foot deformity, contractures, foot infections, amputations, Charcot arthropathy, or other complications. Keep your hemoglobin A1C below 7.5 to reduce this risk.    What is vascular disease?  Peripheral vascular disease is a term used to describe a loss or decrease in circulation (blood flow).  There is a problem in getting blood, immune cells, and oxygen to areas that need it.  Similar to neuropathy, sugars can build up in the walls of the arteries (blood vessels) and cause them to become swollen, thickened and hardened.  This decreases the amount of blood that can go to an area that needs it.  Though this is common in the legs of diabetic patients, it can also affect other arteries (blood vessels) in the body such as in the heart, kidney, eyes, and the blood flow into bones.  It is often seen first in the small vessels of her body notably our feet and toes.    How do I know if I have vascular disease?  In the legs, vascular disease usually results in cramping.  Patients who develop leg cramps after walking the same distance every time (i.e. One block, half a mile, ect.) need to let their doctors know so that their circulation may be checked.  Cramps causing severe pain in the feet and/or legs while sleeping and the cramps go away when you stand or hang your " legs off the side of the bed, may also be a sign of poor blood circulation.  Occasional cramping in cold weather or on rare occasions with activity may not be due to poor circulation, but you should inform your doctor.    How can these problems be prevented?  The key to prevention is good blood sugar control all day every day.  Inadequate blood sugar control is the most common way patients experience these problems. Reducing, controlling and measuring your daily consumption of sugar or carbohydrates is essential to understanding and managing diabetes.  Physical activity (exercise) is a very good way to help decrease your blood sugars.  Exercise can lower your blood sugar, blood pressure, and cholesterol.  It also reduces your risk for heart disease and stroke, relieves stress, and strengthens your heart, muscles and bones. Physical activity also increases your balance and reduces development of contractures and foot deformities over time. In addition, regular activity helps insulin work better, improves your blood circulation, and keeps your joints flexible.  If you're trying to lose weight, a combination of exercise and wise food choices can help you reach your target weight and maintain it.  Activity and exercise alone can not make up for poor diet choices, eating too much, or eating too many sugars or carbohydrates.  Ask your doctor for help when you are not meeting your blood sugar goals. Changes or increases in medication are powerful tools in reducing your blood sugar.    Know your blood sugar and hemoglobin A1C trend.  Upon first diagnosis or during acute illness, checking your blood sugar 4 times a day can help you understand how your diet, activity, and lifestyle affect your blood sugar.  Monitoring your hemoglobin A1C can help you understand how well you are managing blood sugar over the long run.  Your hemoglobin A1C tells you what your blood sugar averages all day, every day, over the past 90 days.        To experience the lowest risk of complications associated with diabetes such as neuropathy, loss of blood flow, bone or joint infection, charcot arthropathy, or amputation, the American Diabetes Association recommends a target hemoglobin A1C of less than 7.0%, while the American Association of Clinical Endocrinologists' recommendation is 6.5% or less.  Both organizations advise that the goals be individualized based on patient factors such as other health conditions, history of hypoglycemia, education, and life expectancy.  A patients risk of experiencing complications associated with diabetes is only slightly elevated with a hemoglobin A1C above 6.0.  However, this risk goes up exponentially when the hemoglobin A1C is above 7.5.  The longer the hemoglobin A1C is elevated, the more risk that patient will experience in their lifetime. The damage that occurs to nerves, blood vessels, tendons, bones and body organs, while their hemoglobin A1C is elevated is mostly irreversible and worsens with each additional time period of elevated hemoglobin A1C.     You must understand and manage your disease.  Your health insurance or medical team cannot manage this disease for you.  When you take responsibility for understanding and managing your disease, you can expect to experience fewer problems associated with diabetes in your lifetime.  You will  Also experience a higher quality of life and health and reduced cost of health care.              Diabetic Foot Care Recommendations  The following are recommendations for avoiding serious foot problems or injury    DO'S  1. Be aware of your hemoglobin A1C and continue to follow up with your medical team for adjustments in your lifestyle and medication until your reach your A1C goal.  Keep this below 7.5 to reduce your risk of developing complications associated with diabetes.    2.  Wash your feet with lukewarm water and a mild soap and then dry them thoroughly, especially  between the toes.  Gently floss your towel or washcloth between each toe at every bath.  Soaking your feet in water cannot clean dead skin, debris, and bacteria from your feet and is not necessary.   3. Examine your feet daily looking for cuts, corns, blisters, cracks, ect..., especially after wearing new shoes or increased or changed activities.  Make sure to look between your toes.  If you cannot see the bottom of your feet, set a mirror on the floor and hold your foot over it, or ask a family member to examine your feet for you daily.  Contact your doctor immediately if new problems are noted or if sores are not healing.  4.  Immediately apply moisturizer cream such as Cetaphil to the tops and bottoms of your feet, avoiding areas between the toes.  Apply sunscreen or cover your feet if they will be exposed to extended sunlight.  5.Use clean comfortable shoes.  Socks should not have thick seams or cut off the circulation around the leg.  Break in new shoes slowly and rotate with older shoes until broken in.  Check the inside of your shoes with your hand to look for areas of irritation or objects that may have fallen into your shoes.    6. Keep slippers by the side of your bed for use during the night.  7. Shoes should be fitted by a professional and should not cause areas of irritation.  Check your feet regularly when wearing a new pair of shoes and replace them as needed.  8.  Talk to your doctor about proper exercise.  Exercise and stretching stimulate blood flow to your feet and maintain proper glucose levels.  Use it or lose it!  9.  Monitor your blood glucose level and your hemoglobin A1C.  Notify your doctor immediately if your blood sugar is abnormally high or low.  10.  Cut your nails straight across, but then gently round any sharp edges with a nail file.  If you have neuropathy, peripheral vascular disease or cannot see that well to trim your own toenails, see a medical professional for  care.    DONT'S  1.  Do not soak your feet if you have an open sore or your provider has informed you that you have neuropathy or loss of protective threshold.  Use only lukewarm water and always check the temperature with your hand as hot water can easily burn your feet.    2.  Never use a hot water bottle or heating pad on your feet.  Also do not apply hot or cold compresses to your feet.  With decreased sensation, you could burn or freeze your feet.  Do not rest your feet near a heat source such as a heater or heat register.    3.  Do not apply any of these to your feet:    - over the counter medicine for corns or warts    -  Harsh chemicals like boric acid    -  Do not self-treat corns, cuts, blisters or infections.  Always consult your doctor.   4.  Do not wear sandals, slippers or walk barefoot, especially on harsh surfaces.  5.  If you smoke, stop!!!                  Follow-ups after your visit        Your next 10 appointments already scheduled     Sep 13, 2017  8:15 AM CDT   [facility] with [facility] ANTI COAG   [facility]    [address] [place] [id number] [phone]            May 21, 2018  8:00 AM CDT   Return Visit with Jey Samano DPM   [facility] ([address] [address] [phone]              Who to contact     If you have questions or need follow up information about today's clinic visit or your schedule please contact [facility] directly at [phone].  Normal or non-critical lab and imaging results will be communicated to you by MyChart, letter or phone within 4 business days after the clinic has received the results. If you do not hear from us within 7 days, please contact the clinic through MyChart or phone. If you have a critical or abnormal lab result, we will notify you by phone as soon as possible.  Submit refill requests through  "MyChart or call your pharmacy and they will forward the refill request to us. Please allow 3 business days for your refill to be completed.          Additional Information About Your Visit        MyChart Information     [employer] lets you send messages to your doctor, view your test results, renew your prescriptions, schedule appointments and more. To sign up, go to [email or web address] . Click on \"Log in\" on the left side of the screen, which will take you to the Welcome page. Then click on \"Sign up Now\" on the right side of the page.     You will be asked to enter the access code listed below, as well as some personal information. Please follow the directions to create your username and password.     Your access code is: [email or web address]  Expires: 2017  8:14 AM     Your access code will  in 90 days. If you need help or a new code, please call your [facility] clinic or [phone].        Care EveryWhere ID     This is your Care EveryWhere ID. This could be used by other organizations to access your [facility] medical records  [id number]        Your Vitals Were     Temperature Height BMI (Body Mass Index)             97.1  F (36.2  C) (Temporal) 5' 9\" (1.753 m) 32.64 kg/m2          Blood Pressure from Last 3 Encounters:   17 134/71   17 124/64   05/15/17 159/77    Weight from Last 3 Encounters:   17 221 lb (100.2 kg)   17 220 lb (99.8 kg)   05/10/17 220 lb (99.8 kg)              Today, you had the following     No orders found for display       Primary Care Provider Office Phone # Fax #    Lazaro Huston [phone]       [facility] [address] [place] [address]        Equal Access to Services     CINDY MIN : Andrew Nathan, waaxda luqadaha, qaybta kaalmada jac, akris carrero. [facility] [phone].    ATENCIÓN: Si habla español, tiene a piña disposición servicios gratuitos de asistencia " lingüística. Cornel al [phone].    We comply with applicable federal civil rights laws and [place] laws. We do not discriminate on the basis of race, color, national origin, age, disability sex, sexual orientation or gender identity.            Thank you!     Thank you for choosing [facility]  for your care. Our goal is always to provide you with excellent care. Hearing back from our patients is one way we can continue to improve our services. Please take a few minutes to complete the written survey that you may receive in the mail after your visit with us. Thank you!             Your Updated Medication List - Protect others around you: Learn how to safely use, store and throw away your medicines at www.disposemymeds.org.          This list is accurate as of: 8/21/17  8:32 AM.  Always use your most recent med list.                   Brand Name Dispense Instructions for use Diagnosis    ACETAMINOPHEN 8 HOUR 650 MG 8 hour tablet   Generic drug:  acetaminophen     100 tablet    Take 650 mg by mouth every 4 hours as needed        blood glucose monitoring lancets     1 Box    1 each 2 times daily Test before meals and at bedtime    Type 2 diabetes mellitus without complication (H)       blood glucose monitoring test strip    no brand specified    100 each    Test before meals and at bedtime.    Type 2 diabetes mellitus without complication (H)       lisinopril 5 MG tablet    PRINIVIL/ZESTRIL    90 tablet    Take 1 tablet (5 mg) by mouth daily        metoprolol 50 MG 24 hr tablet    TOPROL-XL    90 tablet    Take 1 tablet (50 mg) by mouth daily    Essential hypertension, benign       multivitamin Tabs tablet      Take 1 tablet by mouth daily.        polyethylene glycol powder    MIRALAX    510 g    Take 17 g by mouth daily    Constipation, unspecified constipation type       simvastatin 40 MG tablet    ZOCOR    90 tablet    TAKE ONE TABLET BY MOUTH AT BEDTIME    Hyperlipidemia LDL goal <100        vitamin D 2000 UNITS tablet      Take 1 tablet by mouth daily        warfarin 5 MG tablet    COUMADIN    90 tablet    TAKE ONE-HALF TABLET ON MONDAYS AND FRIDAYS AND ONE TABLET ON ALL THE OTHER DAYS OF THE WEEK OR AS DIRECTED BY COUMADIN CLINIC    Long-term (current) use of anticoagulants, Chronic atrial fibrillation (H)

## 2017-08-21 NOTE — PATIENT INSTRUCTIONS
"DIABETES AND YOUR FEET    What effect does diabetes have on the feet?  Diabetes can result in several problems in the feet including contractures of the tendons leading to deformities and reduced function of the bones, skin ulcers or open sores on pressure points or prominent deformities, reduced sensation, reduced blood flow and thus reduced oxygen and immune cells to the tiny vessels in our feet. This all leads to higher risk of hospitalization, infections, and amputations.     What is neuropathy?  Neuropathy is a term used to describe a loss of nerve function.  Patients with diabetes are at risk of developing neuropathy if their sugars continue to run high and are above the normal value of 140.  The elevated blood sugar in the body enters the nerves causing it to swell and impair nerve function.  The higher the blood sugar and the longer it is elevated, the more damage is done to nerves.  This damage is permanent and irreversible.  These damaged sensory nerves can then cause reduced feeling or cause pain.  Damaged motor nerves can reduce blood flow and white blood cells into into your foot, skin and bones reducing your ability to heal a small problem. And neuropathy can cause tendons to become unbalanced and contribute to the formation of deformity and contractures in our feet. Often times, neuropathy can be prevented by controlling your blood sugar.  Your risk of developing neuropathy goes up dramatically as your hemoglobin A1C raises above 7.5.      How do I know if I have neuropathy?  When a person develops neuropathy, they usually begin to feel numbness or tingling in their feet and sometimes in their legs.  Other symptoms may include painful burning or hot feet, tingling, electrical sensations or feeling like insects or ants are crawling on your feet or legs.  If blood sugar remains above 140  for long periods of time, neuropathy can also occur in the hands.  When a person loses their \"protective threshold\" " or ability to detect a 5.07 Michie Lilly monofilament is when they have elevated risk for developing foot deformity, contractures, foot infections, amputations, Charcot arthropathy, or other complications. Keep your hemoglobin A1C below 7.5 to reduce this risk.    What is vascular disease?  Peripheral vascular disease is a term used to describe a loss or decrease in circulation (blood flow).  There is a problem in getting blood, immune cells, and oxygen to areas that need it.  Similar to neuropathy, sugars can build up in the walls of the arteries (blood vessels) and cause them to become swollen, thickened and hardened.  This decreases the amount of blood that can go to an area that needs it.  Though this is common in the legs of diabetic patients, it can also affect other arteries (blood vessels) in the body such as in the heart, kidney, eyes, and the blood flow into bones.  It is often seen first in the small vessels of her body notably our feet and toes.    How do I know if I have vascular disease?  In the legs, vascular disease usually results in cramping.  Patients who develop leg cramps after walking the same distance every time (i.e. One block, half a mile, ect.) need to let their doctors know so that their circulation may be checked.  Cramps causing severe pain in the feet and/or legs while sleeping and the cramps go away when you stand or hang your legs off the side of the bed, may also be a sign of poor blood circulation.  Occasional cramping in cold weather or on rare occasions with activity may not be due to poor circulation, but you should inform your doctor.    How can these problems be prevented?  The key to prevention is good blood sugar control all day every day.  Inadequate blood sugar control is the most common way patients experience these problems. Reducing, controlling and measuring your daily consumption of sugar or carbohydrates is essential to understanding and managing diabetes.   Physical activity (exercise) is a very good way to help decrease your blood sugars.  Exercise can lower your blood sugar, blood pressure, and cholesterol.  It also reduces your risk for heart disease and stroke, relieves stress, and strengthens your heart, muscles and bones. Physical activity also increases your balance and reduces development of contractures and foot deformities over time. In addition, regular activity helps insulin work better, improves your blood circulation, and keeps your joints flexible.  If you're trying to lose weight, a combination of exercise and wise food choices can help you reach your target weight and maintain it.  Activity and exercise alone can not make up for poor diet choices, eating too much, or eating too many sugars or carbohydrates.  Ask your doctor for help when you are not meeting your blood sugar goals. Changes or increases in medication are powerful tools in reducing your blood sugar.    Know your blood sugar and hemoglobin A1C trend.  Upon first diagnosis or during acute illness, checking your blood sugar 4 times a day can help you understand how your diet, activity, and lifestyle affect your blood sugar.  Monitoring your hemoglobin A1C can help you understand how well you are managing blood sugar over the long run.  Your hemoglobin A1C tells you what your blood sugar averages all day, every day, over the past 90 days.       To experience the lowest risk of complications associated with diabetes such as neuropathy, loss of blood flow, bone or joint infection, charcot arthropathy, or amputation, the American Diabetes Association recommends a target hemoglobin A1C of less than 7.0%, while the American Association of Clinical Endocrinologists' recommendation is 6.5% or less.  Both organizations advise that the goals be individualized based on patient factors such as other health conditions, history of hypoglycemia, education, and life expectancy.  A patients risk of  experiencing complications associated with diabetes is only slightly elevated with a hemoglobin A1C above 6.0.  However, this risk goes up exponentially when the hemoglobin A1C is above 7.5.  The longer the hemoglobin A1C is elevated, the more risk that patient will experience in their lifetime. The damage that occurs to nerves, blood vessels, tendons, bones and body organs, while their hemoglobin A1C is elevated is mostly irreversible and worsens with each additional time period of elevated hemoglobin A1C.     You must understand and manage your disease.  Your health insurance or medical team cannot manage this disease for you.  When you take responsibility for understanding and managing your disease, you can expect to experience fewer problems associated with diabetes in your lifetime.  You will  Also experience a higher quality of life and health and reduced cost of health care.              Diabetic Foot Care Recommendations  The following are recommendations for avoiding serious foot problems or injury    DO'S  1. Be aware of your hemoglobin A1C and continue to follow up with your medical team for adjustments in your lifestyle and medication until your reach your A1C goal.  Keep this below 7.5 to reduce your risk of developing complications associated with diabetes.    2.  Wash your feet with lukewarm water and a mild soap and then dry them thoroughly, especially between the toes.  Gently floss your towel or washcloth between each toe at every bath.  Soaking your feet in water cannot clean dead skin, debris, and bacteria from your feet and is not necessary.   3. Examine your feet daily looking for cuts, corns, blisters, cracks, ect..., especially after wearing new shoes or increased or changed activities.  Make sure to look between your toes.  If you cannot see the bottom of your feet, set a mirror on the floor and hold your foot over it, or ask a family member to examine your feet for you daily.  Contact your  doctor immediately if new problems are noted or if sores are not healing.  4.  Immediately apply moisturizer cream such as Cetaphil to the tops and bottoms of your feet, avoiding areas between the toes.  Apply sunscreen or cover your feet if they will be exposed to extended sunlight.  5.Use clean comfortable shoes.  Socks should not have thick seams or cut off the circulation around the leg.  Break in new shoes slowly and rotate with older shoes until broken in.  Check the inside of your shoes with your hand to look for areas of irritation or objects that may have fallen into your shoes.    6. Keep slippers by the side of your bed for use during the night.  7. Shoes should be fitted by a professional and should not cause areas of irritation.  Check your feet regularly when wearing a new pair of shoes and replace them as needed.  8.  Talk to your doctor about proper exercise.  Exercise and stretching stimulate blood flow to your feet and maintain proper glucose levels.  Use it or lose it!  9.  Monitor your blood glucose level and your hemoglobin A1C.  Notify your doctor immediately if your blood sugar is abnormally high or low.  10.  Cut your nails straight across, but then gently round any sharp edges with a nail file.  If you have neuropathy, peripheral vascular disease or cannot see that well to trim your own toenails, see a medical professional for care.    DONT'S  1.  Do not soak your feet if you have an open sore or your provider has informed you that you have neuropathy or loss of protective threshold.  Use only lukewarm water and always check the temperature with your hand as hot water can easily burn your feet.    2.  Never use a hot water bottle or heating pad on your feet.  Also do not apply hot or cold compresses to your feet.  With decreased sensation, you could burn or freeze your feet.  Do not rest your feet near a heat source such as a heater or heat register.    3.  Do not apply any of these to your  feet:    - over the counter medicine for corns or warts    -  Harsh chemicals like boric acid    -  Do not self-treat corns, cuts, blisters or infections.  Always consult your doctor.   4.  Do not wear sandals, slippers or walk barefoot, especially on harsh surfaces.  5.  If you smoke, stop!!!

## 2017-08-21 NOTE — NURSING NOTE
"Chief Complaint   Patient presents with     WOUND CARE     improvement - cellulitis Right lower leg, onset 3/30/2017; LOV 5/18/2017     Diabetes     obtained new DM Shoes and inserts - late June 2017 - Type 2     Consult     anterior lesion Right lower leg, onset 8/14/2017; new issue       Initial Temp 97.1  F (36.2  C) (Temporal)  Ht 5' 9\" (1.753 m)  Wt 221 lb (100.2 kg)  BMI 32.64 kg/m2 Estimated body mass index is 32.64 kg/(m^2) as calculated from the following:    Height as of this encounter: 5' 9\" (1.753 m).    Weight as of this encounter: 221 lb (100.2 kg).  BP completed using cuff size: NA (Not Taken)  Medication Reconciliation: complete    Jennifer Huber CMA, August 21, 2017    "

## 2017-09-13 ENCOUNTER — ANTICOAGULATION THERAPY VISIT (OUTPATIENT)
Dept: ANTICOAGULATION | Facility: CLINIC | Age: 82
End: 2017-09-13
Payer: COMMERCIAL

## 2017-09-13 ENCOUNTER — TELEPHONE (OUTPATIENT)
Dept: ANTICOAGULATION | Facility: CLINIC | Age: 82
End: 2017-09-13

## 2017-09-13 VITALS — DIASTOLIC BLOOD PRESSURE: 86 MMHG | HEART RATE: 73 BPM | SYSTOLIC BLOOD PRESSURE: 135 MMHG

## 2017-09-13 DIAGNOSIS — I48.20 CHRONIC ATRIAL FIBRILLATION (H): Primary | ICD-10-CM

## 2017-09-13 DIAGNOSIS — Z79.01 LONG-TERM (CURRENT) USE OF ANTICOAGULANTS: ICD-10-CM

## 2017-09-13 LAB — INR POINT OF CARE: 2.4 (ref 0.86–1.14)

## 2017-09-13 PROCEDURE — 36416 COLLJ CAPILLARY BLOOD SPEC: CPT

## 2017-09-13 PROCEDURE — 85610 PROTHROMBIN TIME: CPT | Mod: QW

## 2017-09-13 PROCEDURE — 99207 ZZC NO CHARGE NURSE ONLY: CPT

## 2017-09-13 NOTE — TELEPHONE ENCOUNTER
Please review and renew this patients INR referral, orders pending. Thank you!      Shani Ross RN

## 2017-09-13 NOTE — MR AVS SNAPSHOT
Felicewilmar KELLEE Abad   9/13/2017 8:15 AM   Anticoagulation Therapy Visit    Description:  89 year old male   Provider:  [facility]   Department:  Ph Anticoag           INR as of 9/13/2017     Today's INR 2.4      Anticoagulation Summary as of 9/13/2017     INR goal 2.0-3.0   Today's INR 2.4   Full instructions 2.5 mg on Mon, Fri; 5 mg all other days   Next INR check 10/25/2017    Indications   Atrial fibrillation (Resolved) [I48.91]  Long-term (current) use of anticoagulants [Z79.01] [Z79.01]         Your next Anticoagulation Clinic appointment(s)     Oct 25, 2017  8:15 AM CDT   Anticoagulation Visit with [facility]   [facility]    [address] [place] [id number] [phone]              Contact Numbers     Clinic Number:         September 2017 Details    Sun Mon Tue Wed Thu Fri Sat          1               2                 3               4               5               6               7               8               9                 10               11               12               13      5 mg   See details      14      5 mg         15      2.5 mg         16      5 mg           17      5 mg         18      2.5 mg         19      5 mg         20      5 mg         21      5 mg         22      2.5 mg         23      5 mg           24      5 mg         25      2.5 mg         26      5 mg         27      5 mg         28      5 mg         29      2.5 mg         30      5 mg          Date Details   09/13 This INR check               How to take your warfarin dose     To take:  2.5 mg Take 0.5 of a 5 mg tablet.    To take:  5 mg Take 1 of the 5 mg tablets.           October 2017 Details    Sun Mon Tue Wed Thu Fri Sat     1      5 mg         2      2.5 mg         3      5 mg         4      5 mg         5      5 mg         6      2.5 mg         7      5 mg           8      5 mg         9      2.5 mg         10      5 mg         11      5 mg         12       5 mg         13      2.5 mg         14      5 mg           15      5 mg         16      2.5 mg         17      5 mg         18      5 mg         19      5 mg         20      2.5 mg         21      5 mg           22      5 mg         23      2.5 mg         24      5 mg         25            26               27               28                 29               30               31                    Date Details   No additional details    Date of next INR:  10/25/2017         How to take your warfarin dose     To take:  2.5 mg Take 0.5 of a 5 mg tablet.    To take:  5 mg Take 1 of the 5 mg tablets.

## 2017-09-13 NOTE — PROGRESS NOTES
ANTICOAGULATION FOLLOW-UP CLINIC VISIT    Patient Name:  Melvin Abad  Date:  9/13/2017  Contact Type:  Face to Face    SUBJECTIVE:     Patient Findings     Positives No Problem Findings           OBJECTIVE    INR Protime   Date Value Ref Range Status   09/13/2017 2.4 (A) 0.86 - 1.14 Final       ASSESSMENT / PLAN  INR assessment THER    Recheck INR In: 6 WEEKS    INR Location Clinic      Anticoagulation Summary as of 9/13/2017     INR goal 2.0-3.0   Today's INR 2.4   Maintenance plan 2.5 mg (5 mg x 0.5) on Mon, Fri; 5 mg (5 mg x 1) all other days   Full instructions 2.5 mg on Mon, Fri; 5 mg all other days   Weekly total 30 mg   No change documented Shani Ross RN   Plan last modified Shani Ross RN (4/11/2016)   Next INR check 10/25/2017   Target end date     Indications   Atrial fibrillation (Resolved) [I48.91]  Long-term (current) use of anticoagulants [Z79.01] [Z79.01]         Anticoagulation Episode Summary     INR check location     Preferred lab     Send INR reminders to [facility] POOL    Comments 5 mg tablets, AM taker, mariola book       Anticoagulation Care Providers     Provider Role Specialty Phone number    Lazaro Huston MD [facility] Internal Medicine [phone]            See the Encounter Report to view Anticoagulation Flowsheet and Dosing Calendar (Go to Encounters tab in chart review, and find the Anticoagulation Therapy Visit)    Dosage adjustment made based on physician directed care plan.      Shani Ross RN

## 2017-10-25 ENCOUNTER — ANTICOAGULATION THERAPY VISIT (OUTPATIENT)
Dept: ANTICOAGULATION | Facility: CLINIC | Age: 82
End: 2017-10-25
Payer: COMMERCIAL

## 2017-10-25 VITALS — SYSTOLIC BLOOD PRESSURE: 133 MMHG | DIASTOLIC BLOOD PRESSURE: 88 MMHG | HEART RATE: 61 BPM

## 2017-10-25 DIAGNOSIS — Z79.01 LONG-TERM (CURRENT) USE OF ANTICOAGULANTS: ICD-10-CM

## 2017-10-25 LAB — INR POINT OF CARE: 2.3 (ref 0.86–1.14)

## 2017-10-25 PROCEDURE — 36416 COLLJ CAPILLARY BLOOD SPEC: CPT

## 2017-10-25 PROCEDURE — 99207 ZZC NO CHARGE NURSE ONLY: CPT

## 2017-10-25 PROCEDURE — 85610 PROTHROMBIN TIME: CPT | Mod: QW

## 2017-10-25 NOTE — MR AVS SNAPSHOT
Melvin Abad   10/25/2017 8:15 AM   Anticoagulation Therapy Visit    Description:  89 year old male   Provider:  [facility]   Department:  Ph Anticoag           INR as of 10/25/2017     Today's INR 2.3      Anticoagulation Summary as of 10/25/2017     INR goal 2.0-3.0   Today's INR 2.3   Full instructions 2.5 mg on Mon, Fri; 5 mg all other days   Next INR check 12/6/2017    Indications   Atrial fibrillation (Resolved) [I48.91]  Long-term (current) use of anticoagulants [Z79.01] [Z79.01]         Your next Anticoagulation Clinic appointment(s)     Oct 25, 2017  8:15 AM CDT   Anticoagulation Visit with [facility]   [facility] ([address] [address]   [phone]            Dec 06, 2017  8:15 AM CST   Anticoagulation Visit with [facility] ANTI [facility]AG   [facility] ([address] [address]   [phone]              Contact Numbers     Clinic Number:         October 2017 Details    Sun Mon Tue Wed Thu Fri Sat     1               2               3               4               5               6               7                 8               9               10               11               12               13               14                 15               16               17               18               19               20               21                 22               23               24               25      5 mg   See details      26      5 mg         27      2.5 mg         28      5 mg           29      5 mg         30      2.5 mg         31      5 mg              Date Details   10/25 This INR check               How to take your warfarin dose     To take:  2.5 mg Take 0.5 of a 5 mg tablet.    To take:  5 mg Take 1 of the 5 mg tablets.           November 2017 Details    Sun Mon Tue Wed Thu Fri Sat        1      5 mg         2      5 mg         3      2.5 mg         4       5 mg           5      5 mg         6      2.5 mg         7      5 mg         8      5 mg         9      5 mg         10      2.5 mg         11      5 mg           12      5 mg         13      2.5 mg         14      5 mg         15      5 mg         16      5 mg         17      2.5 mg         18      5 mg           19      5 mg         20      2.5 mg         21      5 mg         22      5 mg         23      5 mg         24      2.5 mg         25      5 mg           26      5 mg         27      2.5 mg         28      5 mg         29      5 mg         30      5 mg            Date Details   No additional details            How to take your warfarin dose     To take:  2.5 mg Take 0.5 of a 5 mg tablet.    To take:  5 mg Take 1 of the 5 mg tablets.           December 2017 Details    Sun Mon Tue Wed Thu Fri Sat          1      2.5 mg         2      5 mg           3      5 mg         4      2.5 mg         5      5 mg         6            7               8               9                 10               11               12               13               14               15               16                 17               18               19               20               21               22               23                 24               25               26               27               28               29               30                 31                      Date Details   No additional details    Date of next INR:  12/6/2017         How to take your warfarin dose     To take:  2.5 mg Take 0.5 of a 5 mg tablet.    To take:  5 mg Take 1 of the 5 mg tablets.

## 2017-10-25 NOTE — PROGRESS NOTES
ANTICOAGULATION FOLLOW-UP CLINIC VISIT    Patient Name:  Melvin Abad  Date:  10/25/2017  Contact Type:  Face to Face    SUBJECTIVE:     Patient Findings     Positives No Problem Findings           OBJECTIVE    INR Protime   Date Value Ref Range Status   10/25/2017 2.3 (A) 0.86 - 1.14 Final       ASSESSMENT / PLAN  INR assessment THER    Recheck INR In: 6 WEEKS    INR Location Clinic      Anticoagulation Summary as of 10/25/2017     INR goal 2.0-3.0   Today's INR 2.3   Maintenance plan 2.5 mg (5 mg x 0.5) on Mon, Fri; 5 mg (5 mg x 1) all other days   Full instructions 2.5 mg on Mon, Fri; 5 mg all other days   Weekly total 30 mg   No change documented Shani Ross RN   Plan last modified Shani Ross RN (4/11/2016)   Next INR check 12/6/2017   Target end date     Indications   Atrial fibrillation (Resolved) [I48.91]  Long-term (current) use of anticoagulants [Z79.01] [Z79.01]         Anticoagulation Episode Summary     INR check location     Preferred lab     Send INR reminders to [facility] POOL    Comments 5 mg tablets, AM taker, mariola book       Anticoagulation Care Providers     Provider Role Specialty Phone number    Lazaro Huston MD [facility] Internal Medicine [phone]            See the Encounter Report to view Anticoagulation Flowsheet and Dosing Calendar (Go to Encounters tab in chart review, and find the Anticoagulation Therapy Visit)    Dosage adjustment made based on physician directed care plan.        Shani Ross RN

## 2017-12-06 ENCOUNTER — ANTICOAGULATION THERAPY VISIT (OUTPATIENT)
Dept: ANTICOAGULATION | Facility: CLINIC | Age: 82
End: 2017-12-06
Payer: COMMERCIAL

## 2017-12-06 VITALS — HEART RATE: 65 BPM | DIASTOLIC BLOOD PRESSURE: 93 MMHG | SYSTOLIC BLOOD PRESSURE: 151 MMHG

## 2017-12-06 DIAGNOSIS — Z79.01 LONG-TERM (CURRENT) USE OF ANTICOAGULANTS: ICD-10-CM

## 2017-12-06 LAB — INR POINT OF CARE: 2.7 (ref 0.86–1.14)

## 2017-12-06 PROCEDURE — 36416 COLLJ CAPILLARY BLOOD SPEC: CPT

## 2017-12-06 PROCEDURE — 85610 PROTHROMBIN TIME: CPT | Mod: QW

## 2017-12-06 PROCEDURE — 99207 ZZC NO CHARGE NURSE ONLY: CPT

## 2017-12-06 NOTE — MR AVS SNAPSHOT
Melvin Abad   12/6/2017 8:15 AM   Anticoagulation Therapy Visit    Description:  89 year old male   Provider:  [facility]   Department:  [facility]           INR as of 12/6/2017     Today's INR 2.7      Anticoagulation Summary as of 12/6/2017     INR goal 2.0-3.0   Today's INR 2.7   Full instructions 2.5 mg on Mon, Fri; 5 mg all other days   Next INR check 1/17/2018    Indications   Atrial fibrillation (Resolved) [I48.91]  Long-term (current) use of anticoagulants [Z79.01] [Z79.01]         Your next Anticoagulation Clinic appointment(s)     Jan 17, 2018  8:15 AM CST   Anticoagulation Visit with SAMUEL ANTI COAG   [facility]    [address] [place] [id number] [phone]              Contact Numbers     Clinic Number:         December 2017 Details    Sun Mon Tue Wed Thu Fri Sat          1               2                 3               4               5               6      5 mg   See details      7      5 mg         8      2.5 mg         9      5 mg           10      5 mg         11      2.5 mg         12      5 mg         13      5 mg         14      5 mg         15      2.5 mg         16      5 mg           17      5 mg         18      2.5 mg         19      5 mg         20      5 mg         21      5 mg         22      2.5 mg         23      5 mg           24      5 mg         25      2.5 mg         26      5 mg         27      5 mg         28      5 mg         29      2.5 mg         30      5 mg           31      5 mg                Date Details   12/06 This INR check               How to take your warfarin dose     To take:  2.5 mg Take 0.5 of a 5 mg tablet.    To take:  5 mg Take 1 of the 5 mg tablets.           January 2018 Details    Sun Mon Tue Wed Thu Fri Sat      1      2.5 mg         2      5 mg         3      5 mg         4      5 mg         5      2.5 mg         6      5 mg           7      5 mg         8      2.5 mg         9       5 mg         10      5 mg         11      5 mg         12      2.5 mg         13      5 mg           14      5 mg         15      2.5 mg         16      5 mg         17            18               19               20                 21               22               23               24               25               26               27                 28               29               30               31                   Date Details   No additional details    Date of next INR:  1/17/2018         How to take your warfarin dose     To take:  2.5 mg Take 0.5 of a 5 mg tablet.    To take:  5 mg Take 1 of the 5 mg tablets.

## 2017-12-06 NOTE — PROGRESS NOTES
ANTICOAGULATION FOLLOW-UP CLINIC VISIT    Patient Name:  Melvin Abad  Date:  12/6/2017  Contact Type:  Face to Face    SUBJECTIVE:     Patient Findings     Positives No Problem Findings           OBJECTIVE    INR Protime   Date Value Ref Range Status   12/06/2017 2.7 (A) 0.86 - 1.14 Final       ASSESSMENT / PLAN  INR assessment THER    Recheck INR In: 6 WEEKS    INR Location Clinic      Anticoagulation Summary as of 12/6/2017     INR goal 2.0-3.0   Today's INR 2.7   Maintenance plan 2.5 mg (5 mg x 0.5) on Mon, Fri; 5 mg (5 mg x 1) all other days   Full instructions 2.5 mg on Mon, Fri; 5 mg all other days   Weekly total 30 mg   No change documented Shani Ross RN   Plan last modified Shani Ross RN (4/11/2016)   Next INR check 1/17/2018   Target end date     Indications   Atrial fibrillation (Resolved) [I48.91]  Long-term (current) use of anticoagulants [Z79.01] [Z79.01]         Anticoagulation Episode Summary     INR check location     Preferred lab     Send INR reminders to [facility] POOL    Comments 5 mg tablets, AM taker, mariola book       Anticoagulation Care Providers     Provider Role Specialty Phone number    Lazaro Huston MD [facility] Internal Medicine [phone]            See the Encounter Report to view Anticoagulation Flowsheet and Dosing Calendar (Go to Encounters tab in chart review, and find the Anticoagulation Therapy Visit)    Dosage adjustment made based on physician directed care plan.      Shani Ross RN

## 2018-01-17 ENCOUNTER — ANTICOAGULATION THERAPY VISIT (OUTPATIENT)
Dept: ANTICOAGULATION | Facility: CLINIC | Age: 83
End: 2018-01-17
Payer: COMMERCIAL

## 2018-01-17 VITALS — SYSTOLIC BLOOD PRESSURE: 149 MMHG | DIASTOLIC BLOOD PRESSURE: 86 MMHG | HEART RATE: 61 BPM

## 2018-01-17 DIAGNOSIS — Z79.01 LONG-TERM (CURRENT) USE OF ANTICOAGULANTS: ICD-10-CM

## 2018-01-17 LAB — INR POINT OF CARE: 2.3 (ref 0.86–1.14)

## 2018-01-17 PROCEDURE — 36416 COLLJ CAPILLARY BLOOD SPEC: CPT

## 2018-01-17 PROCEDURE — 99207 ZZC NO CHARGE NURSE ONLY: CPT

## 2018-01-17 PROCEDURE — 85610 PROTHROMBIN TIME: CPT | Mod: QW

## 2018-01-17 NOTE — MR AVS SNAPSHOT
Melvin Abad   1/17/2018 8:15 AM   Anticoagulation Therapy Visit    Description:  89 year old male   Provider:  PH ANTI COAG   Department:  Ph Anticoag           INR as of 1/17/2018     Today's INR 2.3      Anticoagulation Summary as of 1/17/2018     INR goal 2.0-3.0   Today's INR 2.3   Full instructions 2.5 mg on Mon, Fri; 5 mg all other days   Next INR check 2/28/2018    Indications   Atrial fibrillation (Resolved) [I48.91]  Long-term (current) use of anticoagulants [Z79.01] [Z79.01]         Your next Anticoagulation Clinic appointment(s)     Jan 17, 2018  8:15 AM CST   [facility]    [address] [place] [address]   [phone]            Feb 28, 2018  8:30 AM CST   Anticoagulation Visit with PH ANTI COAG   [facility] ([address] [address]   [phone]              Contact Numbers     Clinic Number:         January 2018 Details    Sun Mon Tue Wed Thu Fri Sat      1               2               3               4               5               6                 7               8               9               10               11               12               13                 14               15               16               17      5 mg   See details      18      5 mg         19      2.5 mg         20      5 mg           21      5 mg         22      2.5 mg         23      5 mg         24      5 mg         25      5 mg         26      2.5 mg         27      5 mg           28      5 mg         29      2.5 mg         30      5 mg         31      5 mg             Date Details   01/17 This INR check               How to take your warfarin dose     To take:  2.5 mg Take 0.5 of a 5 mg tablet.    To take:  5 mg Take 1 of the 5 mg tablets.           February 2018 Details    Sun Mon Tue Wed Thu Fri Sat         1      5 mg         2      2.5  mg         3      5 mg           4      5 mg         5      2.5 mg         6      5 mg         7      5 mg         8      5 mg         9      2.5 mg         10      5 mg           11      5 mg         12      2.5 mg         13      5 mg         14      5 mg         15      5 mg         16      2.5 mg         17      5 mg           18      5 mg         19      2.5 mg         20      5 mg         21      5 mg         22      5 mg         23      2.5 mg         24      5 mg           25      5 mg         26      2.5 mg         27      5 mg         28                Date Details   No additional details    Date of next INR:  2/28/2018         How to take your warfarin dose     To take:  2.5 mg Take 0.5 of a 5 mg tablet.    To take:  5 mg Take 1 of the 5 mg tablets.

## 2018-01-17 NOTE — PROGRESS NOTES
ANTICOAGULATION FOLLOW-UP CLINIC VISIT    Patient Name:  Melvin Abad  Date:  1/17/2018  Contact Type:  Face to Face    SUBJECTIVE:     Patient Findings     Positives No Problem Findings           OBJECTIVE    INR Protime   Date Value Ref Range Status   01/17/2018 2.3 (A) 0.86 - 1.14 Final       ASSESSMENT / PLAN  INR assessment THER    Recheck INR In: 6 WEEKS    INR Location Clinic      Anticoagulation Summary as of 1/17/2018     INR goal 2.0-3.0   Today's INR 2.3   Maintenance plan 2.5 mg (5 mg x 0.5) on Mon, Fri; 5 mg (5 mg x 1) all other days   Full instructions 2.5 mg on Mon, Fri; 5 mg all other days   Weekly total 30 mg   No change documented Shani Ross RN   Plan last modified Shani Ross RN (4/11/2016)   Next INR check 2/28/2018   Target end date     Indications   Atrial fibrillation (Resolved) [I48.91]  Long-term (current) use of anticoagulants [Z79.01] [Z79.01]         Anticoagulation Episode Summary     INR check location     Preferred lab     Send INR reminders to [facility] POOL    Comments 5 mg tablets, AM taker, mariola book       Anticoagulation Care Providers     Provider Role Specialty Phone number    Lazaro Huston MD [facility] Internal Medicine [phone]            See the Encounter Report to view Anticoagulation Flowsheet and Dosing Calendar (Go to Encounters tab in chart review, and find the Anticoagulation Therapy Visit)    Dosage adjustment made based on physician directed care plan.      Shani Ross RN

## 2018-02-28 ENCOUNTER — ANTICOAGULATION THERAPY VISIT (OUTPATIENT)
Dept: ANTICOAGULATION | Facility: CLINIC | Age: 83
End: 2018-02-28
Payer: COMMERCIAL

## 2018-02-28 VITALS — SYSTOLIC BLOOD PRESSURE: 143 MMHG | HEART RATE: 65 BPM | DIASTOLIC BLOOD PRESSURE: 78 MMHG

## 2018-02-28 DIAGNOSIS — Z79.01 LONG-TERM (CURRENT) USE OF ANTICOAGULANTS: ICD-10-CM

## 2018-02-28 LAB — INR POINT OF CARE: 2.6 (ref 0.86–1.14)

## 2018-02-28 PROCEDURE — 85610 PROTHROMBIN TIME: CPT | Mod: QW

## 2018-02-28 PROCEDURE — 99207 ZZC NO CHARGE NURSE ONLY: CPT

## 2018-02-28 PROCEDURE — 36416 COLLJ CAPILLARY BLOOD SPEC: CPT

## 2018-02-28 NOTE — PROGRESS NOTES
ANTICOAGULATION FOLLOW-UP CLINIC VISIT    Patient Name:  Melvin Abad  Date:  2/28/2018  Contact Type:  Face to Face    SUBJECTIVE:     Patient Findings     Positives No Problem Findings           OBJECTIVE    INR Protime   Date Value Ref Range Status   02/28/2018 2.6 (A) 0.86 - 1.14 Final       ASSESSMENT / PLAN  INR assessment THER    Recheck INR In: 6 WEEKS    INR Location Clinic      Anticoagulation Summary as of 2/28/2018     INR goal 2.0-3.0   Today's INR 2.6   Maintenance plan 2.5 mg (5 mg x 0.5) on Mon, Fri; 5 mg (5 mg x 1) all other days   Full instructions 2.5 mg on Mon, Fri; 5 mg all other days   Weekly total 30 mg   No change documented Shani Ross RN   Plan last modified Shani Ross RN (4/11/2016)   Next INR check 4/11/2018   Target end date     Indications   Atrial fibrillation (Resolved) [I48.91]  Long-term (current) use of anticoagulants [Z79.01] [Z79.01]         Anticoagulation Episode Summary     INR check location     Preferred lab     Send INR reminders to [facility] POOL    Comments 5 mg tablets, AM taker, likes book, BP       Anticoagulation Care Providers     Provider Role Specialty Phone number    Lazaro Huston MD [facility] Internal Medicine [phone]            See the Encounter Report to view Anticoagulation Flowsheet and Dosing Calendar (Go to Encounters tab in chart review, and find the Anticoagulation Therapy Visit)    Dosage adjustment made based on physician directed care plan.      Shani Ross RN

## 2018-03-13 ENCOUNTER — OFFICE VISIT (OUTPATIENT)
Dept: INTERNAL MEDICINE | Facility: CLINIC | Age: 83
End: 2018-03-13
Payer: COMMERCIAL

## 2018-03-13 VITALS
TEMPERATURE: 97.8 F | BODY MASS INDEX: 32.58 KG/M2 | SYSTOLIC BLOOD PRESSURE: 128 MMHG | DIASTOLIC BLOOD PRESSURE: 76 MMHG | HEART RATE: 74 BPM | RESPIRATION RATE: 16 BRPM | WEIGHT: 220 LBS | OXYGEN SATURATION: 96 % | HEIGHT: 69 IN

## 2018-03-13 DIAGNOSIS — I48.20 CHRONIC ATRIAL FIBRILLATION (H): ICD-10-CM

## 2018-03-13 DIAGNOSIS — I10 ESSENTIAL HYPERTENSION, BENIGN: ICD-10-CM

## 2018-03-13 DIAGNOSIS — E78.5 HYPERLIPIDEMIA LDL GOAL <100: ICD-10-CM

## 2018-03-13 DIAGNOSIS — Z79.01 LONG-TERM (CURRENT) USE OF ANTICOAGULANTS: ICD-10-CM

## 2018-03-13 DIAGNOSIS — R82.90 ABNORMAL URINE ODOR: Primary | ICD-10-CM

## 2018-03-13 LAB
ALBUMIN UR-MCNC: NEGATIVE MG/DL
APPEARANCE UR: CLEAR
BILIRUB UR QL STRIP: NEGATIVE
COLOR UR AUTO: YELLOW
GLUCOSE UR STRIP-MCNC: NEGATIVE MG/DL
HGB UR QL STRIP: NEGATIVE
KETONES UR STRIP-MCNC: NEGATIVE MG/DL
LEUKOCYTE ESTERASE UR QL STRIP: NEGATIVE
NITRATE UR QL: NEGATIVE
PH UR STRIP: 7 PH (ref 5–7)
SOURCE: ABNORMAL
SP GR UR STRIP: 1.01 (ref 1–1.03)
UROBILINOGEN UR STRIP-MCNC: 4 MG/DL (ref 0–2)

## 2018-03-13 PROCEDURE — 99214 OFFICE O/P EST MOD 30 MIN: CPT | Performed by: INTERNAL MEDICINE

## 2018-03-13 PROCEDURE — 81003 URINALYSIS AUTO W/O SCOPE: CPT | Performed by: INTERNAL MEDICINE

## 2018-03-13 RX ORDER — WARFARIN SODIUM 5 MG/1
TABLET ORAL
Qty: 90 TABLET | Refills: 3 | Status: SHIPPED | OUTPATIENT
Start: 2018-03-13 | End: 2019-03-20

## 2018-03-13 RX ORDER — SIMVASTATIN 40 MG
TABLET ORAL
Qty: 90 TABLET | Refills: 3 | Status: SHIPPED | OUTPATIENT
Start: 2018-03-13 | End: 2019-03-15

## 2018-03-13 RX ORDER — LISINOPRIL 5 MG/1
5 TABLET ORAL DAILY
Qty: 90 TABLET | Refills: 3 | Status: CANCELLED | OUTPATIENT
Start: 2018-03-13

## 2018-03-13 RX ORDER — LISINOPRIL 10 MG/1
10 TABLET ORAL DAILY
Qty: 90 TABLET | Refills: 3 | Status: SHIPPED | OUTPATIENT
Start: 2018-03-13 | End: 2019-03-12

## 2018-03-13 RX ORDER — METOPROLOL SUCCINATE 50 MG/1
50 TABLET, EXTENDED RELEASE ORAL DAILY
Qty: 90 TABLET | Refills: 3 | Status: SHIPPED | OUTPATIENT
Start: 2018-03-13 | End: 2019-04-10

## 2018-03-13 RX ORDER — CARBOXYMETHYLCELLULOSE SODIUM 5 MG/ML
1 SOLUTION/ DROPS OPHTHALMIC 3 TIMES DAILY PRN
COMMUNITY
End: 2020-01-23

## 2018-03-13 ASSESSMENT — PAIN SCALES - GENERAL: PAINLEVEL: NO PAIN (0)

## 2018-03-13 NOTE — PROGRESS NOTES
SUBJECTIVE:   Melvin Abad is a 89 year old male who presents to clinic today for the following health issues:      Chief Complaint   Patient presents with     Recheck Medication     Htn  lipids     Had some abnormal urine smell, cleared up some today.  Gets some nocturia each night.      Blood pressure has been running high, 145 to 180 range. Pulse is good at 61 to 80.     Glucose is good, .    No chest pains, no sob.    Past Medical History:   Diagnosis Date     Actinic keratosis      Atrial fibrillation (H)      Basal cell carcinoma nos 06/10    Mohs excision, rt upper lip & rt temple     Cardiac dysrhythmia, unspecified      Cough     chronic cough     Diabetic eye exam (H) 8/29/14     Generalized osteoarthrosis, unspecified site     djd of the knees, hands, and neck     Other diseases of lung, not elsewhere classified     pulmonary nodule, benign     Pure hypercholesterolemia      Unspecified essential hypertension      Current Outpatient Prescriptions   Medication     Carboxymethylcellulose Sod PF (RETAINE CMC) 0.5 % SOLN ophthalmic solution     metoprolol (TOPROL-XL) 50 MG 24 hr tablet     warfarin (COUMADIN) 5 MG tablet     lisinopril (PRINIVIL/ZESTRIL) 5 MG tablet     polyethylene glycol (MIRALAX) powder     multivitamin (OCUVITE) TABS     simvastatin (ZOCOR) 40 MG tablet     Cholecalciferol (VITAMIN D) 2000 UNITS tablet     blood glucose monitoring (ONE TOUCH ULTRASOFT) lancets     blood glucose monitoring (NO BRAND SPECIFIED) test strip     No current facility-administered medications for this visit.      Social History   Substance Use Topics     Smoking status: Former Smoker     Types: Cigarettes     Smokeless tobacco: Never Used      Comment: 5 years     Alcohol use No      Comment: denies     Review of Systems  Constitutional-No fevers, chills, or weight changes..  ENT-No earpain, sore throat, voice changes or rhinitis.  Cardiac-No chest pain or palpitations.  Respiratory-No cough, sob, or  "hemoptysis.  GI-No nausea, vomitting, diarrhea, constipation, or blood in the stool.      Physical Exam  /76  Pulse 74  Temp 97.8  F (36.6  C) (Temporal)  Resp 16  Ht 5' 9\" (1.753 m)  Wt 220 lb (99.8 kg)  SpO2 96%  BMI 32.49 kg/m2  General Appearance-healthy, alert, no distress  Cardiac-irregular but controlled ratewith normal S1, S2 ; no murmur, rub or gallops  Lungs-clear to auscultation  GI-Soft, nontender.  Normal bowel sounds.  No hepatosplenomegaly or abnormal masses    ASSESSMENT:  89-year-old gentleman is here for recheck.  He does have atrial fibrillation and is on Coumadin.  He is doing well continues to the Coumadin clinic and is stable INR.  He has no signs or symptoms of bleeding.  He is on metoprolol and has good rate control.    His hypertension is been running a little bit high at the outside checks in the 150-180 range systolic.  Today's good at 128/76.  We will increase his lisinopril from 5-10 mg.    Hyperlipidemia patient continues on simvastatin 40 mg a day no changes needed.  Continue that.    Urinary symptoms with malodorous urine.  A UA is done and reviewed today it is clear, no leukocyte esterase no nitrates no white cells.  Did recommend he continue to push fluids, he has mild constipation he can take MiraLAX every day.  He also try adding cranberry juice.    Electronically signed by Lazaro Huston MD    "

## 2018-03-13 NOTE — MR AVS SNAPSHOT
"              After Visit Summary   3/13/2018    Melvin Abad    MRN: [id number]           Patient Information     Date Of Birth          4/24/1928        Visit Information        Provider Department      3/13/2018 3:30 PM Lazaro Huston MD [facility]        Today's Diagnoses     Abnormal urine odor    -  1       Follow-ups after your visit        Your next 10 appointments already scheduled     Apr 11, 2018  8:15 AM CDT   Anticoagulation Visit with PH ANTI COAG   [facility]    [address] [place] [id number] [phone]            May 21, 2018  8:00 AM CDT   Return Visit with Jey Samano DPM   [facility]    [address] [place] [address] [phone]              Who to contact     If you have questions or need follow up information about today's clinic visit or your schedule please contact [facility] directly at [phone].  Normal or non-critical lab and imaging results will be communicated to you by Liquidhart, letter or phone within 4 business days after the clinic has received the results. If you do not hear from us within 7 days, please contact the clinic through VoiceTrustt or phone. If you have a critical or abnormal lab result, we will notify you by phone as soon as possible.  Submit refill requests through Intermolecular or call your pharmacy and they will forward the refill request to us. Please allow 3 business days for your refill to be completed.          Additional Information About Your Visit        LiquidharVubiquity Information     Intermolecular lets you send messages to your doctor, view your test results, renew your prescriptions, schedule appointments and more. To sign up, go to [email or web address] . Click on \"Log in\" on the left side of the screen, which will take you to the Welcome page. Then click on \"Sign up Now\" on the right side of the page. " "    You will be asked to enter the access code listed below, as well as some personal information. Please follow the directions to create your username and password.     Your access code is: [email or web address]  Expires: 2018  3:40 PM     Your access code will  in 90 days. If you need help or a new code, please call your [facility] or [phone].        Care EveryWhere ID     This is your Care EveryWhere ID. This could be used by other organizations to access your [place] medical records  [id number]        Your Vitals Were     Pulse Temperature Respirations Height Pulse Oximetry BMI (Body Mass Index)    74 97.8  F (36.6  C) (Temporal) 16 5' 9\" (1.753 m) 96% 32.49 kg/m2       Blood Pressure from Last 3 Encounters:   18 128/76   18 143/78   18 149/86    Weight from Last 3 Encounters:   18 220 lb (99.8 kg)   17 221 lb (100.2 kg)   17 220 lb (99.8 kg)              We Performed the Following     UA reflex to Microscopic ([place])        Primary Care Provider Office Phone # Fax #    Lazaro Huston [phone]       [facility] [address] [place] [address]        Equal Access to Services     VERONICA MIN : Hadii javier ku hadasho Soomaali, waaxda luqadaha, qaybta kaalmada jellyyada, karis carrero. So [facility] [phone].    ATENCIÓN: Si habla español, tiene a piña disposición servicios gratuitos de asistencia lingüística. Cornel al [phone].    We comply with applicable federal civil rights laws and [place] laws. We do not discriminate on the basis of race, color, national origin, age, disability, sex, sexual orientation, or gender identity.            Thank you!     Thank you for choosing [facility]  for your care. Our goal is always to provide you with excellent care. Hearing back from our patients is one way we can continue to improve our services. Please take a few " minutes to complete the written survey that you may receive in the mail after your visit with us. Thank you!             Your Updated Medication List - Protect others around you: Learn how to safely use, store and throw away your medicines at www.disposemymeds.org.          This list is accurate as of 3/13/18  3:40 PM.  Always use your most recent med list.                   Brand Name Dispense Instructions for use Diagnosis    blood glucose monitoring lancets     1 Box    1 each 2 times daily Test before meals and at bedtime    Type 2 diabetes mellitus without complication (H)       blood glucose monitoring test strip    no brand specified    100 each    Test before meals and at bedtime.    Type 2 diabetes mellitus without complication (H)       lisinopril 5 MG tablet    PRINIVIL/ZESTRIL    90 tablet    Take 1 tablet (5 mg) by mouth daily        metoprolol succinate 50 MG 24 hr tablet    TOPROL-XL    90 tablet    Take 1 tablet (50 mg) by mouth daily    Essential hypertension, benign       multivitamin Tabs tablet      Take 1 tablet by mouth daily.        polyethylene glycol powder    MIRALAX    510 g    Take 17 g by mouth daily    Constipation, unspecified constipation type       simvastatin 40 MG tablet    ZOCOR    90 tablet    TAKE ONE TABLET BY MOUTH AT BEDTIME    Hyperlipidemia LDL goal <100       vitamin D 2000 UNITS tablet      Take 1 tablet by mouth daily        warfarin 5 MG tablet    COUMADIN    90 tablet    TAKE ONE-HALF TABLET ON MONDAYS AND FRIDAYS AND ONE TABLET ON ALL THE OTHER DAYS OF THE WEEK OR AS DIRECTED BY COUMADIN CLINIC    Long-term (current) use of anticoagulants, Chronic atrial fibrillation (H)

## 2018-03-13 NOTE — NURSING NOTE
"Chief Complaint   Patient presents with     Recheck Medication     Htn  lipids       Initial /76  Pulse 74  Temp 97.8  F (36.6  C) (Temporal)  Resp 16  Ht 5' 9\" (1.753 m)  Wt 220 lb (99.8 kg)  SpO2 96%  BMI 32.49 kg/m2 Estimated body mass index is 32.49 kg/(m^2) as calculated from the following:    Height as of this encounter: 5' 9\" (1.753 m).    Weight as of this encounter: 220 lb (99.8 kg).  Medication Reconciliation: complete    "

## 2018-04-11 ENCOUNTER — ANTICOAGULATION THERAPY VISIT (OUTPATIENT)
Dept: ANTICOAGULATION | Facility: CLINIC | Age: 83
End: 2018-04-11
Payer: COMMERCIAL

## 2018-04-11 VITALS — HEART RATE: 71 BPM | SYSTOLIC BLOOD PRESSURE: 155 MMHG | DIASTOLIC BLOOD PRESSURE: 82 MMHG

## 2018-04-11 DIAGNOSIS — Z79.01 LONG-TERM (CURRENT) USE OF ANTICOAGULANTS: ICD-10-CM

## 2018-04-11 LAB — INR POINT OF CARE: 2.5 (ref 0.86–1.14)

## 2018-04-11 PROCEDURE — 36416 COLLJ CAPILLARY BLOOD SPEC: CPT

## 2018-04-11 PROCEDURE — 85610 PROTHROMBIN TIME: CPT | Mod: QW

## 2018-04-11 PROCEDURE — 99207 ZZC NO CHARGE NURSE ONLY: CPT

## 2018-04-11 NOTE — MR AVS SNAPSHOT
Melvin Abad   4/11/2018 8:15 AM   Anticoagulation Therapy Visit    Description:  89 year old male   Provider:  PH ANTI COAG   Department:  Ph Anticoag           INR as of 4/11/2018     Today's INR 2.5      Anticoagulation Summary as of 4/11/2018     INR goal 2.0-3.0   Today's INR 2.5   Full instructions 2.5 mg on Mon, Fri; 5 mg all other days   Next INR check 5/23/2018    Indications   Atrial fibrillation (Resolved) [I48.91]  Long-term (current) use of anticoagulants [Z79.01] [Z79.01]         Your next Anticoagulation Clinic appointment(s)     Apr 11, 2018  8:15 AM CDT   [facility] PH ANTI [facility]AG   [facility]    [address] [place] [address]   [phone]            May 23, 2018  8:15 AM CDT   Anticoagulation Visit with PH ANTI COAG   [facility] ([address] [address]   [phone]              Contact Numbers     Clinic Number:         April 2018 Details    Sun Mon Tue Wed Thu Fri Sat     1               2               3               4               5               6               7                 8               9               10               11      5 mg   See details      12      5 mg         13      2.5 mg         14      5 mg           15      5 mg         16      2.5 mg         17      5 mg         18      5 mg         19      5 mg         20      2.5 mg         21      5 mg           22      5 mg         23      2.5 mg         24      5 mg         25      5 mg         26      5 mg         27      2.5 mg         28      5 mg           29      5 mg         30      2.5 mg               Date Details   04/11 This INR check               How to take your warfarin dose     To take:  2.5 mg Take 0.5 of a 5 mg tablet.    To take:  5 mg Take 1 of the 5 mg tablets.           May 2018 Details    Sun Mon Tue Wed Thu Fri Sat       1      5 mg         2      5 mg          3      5 mg         4      2.5 mg         5      5 mg           6      5 mg         7      2.5 mg         8      5 mg         9      5 mg         10      5 mg         11      2.5 mg         12      5 mg           13      5 mg         14      2.5 mg         15      5 mg         16      5 mg         17      5 mg         18      2.5 mg         19      5 mg           20      5 mg         21      2.5 mg         22      5 mg         23            24               25               26                 27               28               29               30               31                  Date Details   No additional details    Date of next INR:  5/23/2018         How to take your warfarin dose     To take:  2.5 mg Take 0.5 of a 5 mg tablet.    To take:  5 mg Take 1 of the 5 mg tablets.

## 2018-05-07 ENCOUNTER — HOSPITAL ENCOUNTER (EMERGENCY)
Facility: CLINIC | Age: 83
Discharge: HOME | End: 2018-05-07
Attending: FAMILY MEDICINE | Admitting: FAMILY MEDICINE
Payer: COMMERCIAL

## 2018-05-07 VITALS
RESPIRATION RATE: 14 BRPM | SYSTOLIC BLOOD PRESSURE: 186 MMHG | TEMPERATURE: 98.6 F | DIASTOLIC BLOOD PRESSURE: 92 MMHG | OXYGEN SATURATION: 100 %

## 2018-05-07 DIAGNOSIS — Z79.01 LONG TERM CURRENT USE OF ANTICOAGULANT THERAPY: ICD-10-CM

## 2018-05-07 DIAGNOSIS — S51.819A SKIN TEAR OF FOREARM WITHOUT COMPLICATION, INITIAL ENCOUNTER: ICD-10-CM

## 2018-05-07 PROCEDURE — 99283 EMERGENCY DEPT VISIT LOW MDM: CPT | Mod: 25 | Performed by: FAMILY MEDICINE

## 2018-05-07 PROCEDURE — 12001 RPR S/N/AX/GEN/TRNK 2.5CM/<: CPT | Mod: Z6 | Performed by: FAMILY MEDICINE

## 2018-05-07 PROCEDURE — 99283 EMERGENCY DEPT VISIT LOW MDM: CPT | Performed by: FAMILY MEDICINE

## 2018-05-07 PROCEDURE — 12001 RPR S/N/AX/GEN/TRNK 2.5CM/<: CPT | Performed by: FAMILY MEDICINE

## 2018-05-07 NOTE — ED TRIAGE NOTES
Here with cut to left arm. States he cut I on his screen door yesterday. He removed his dressing during the night and the bleeding started again. He is on blood thinners and was concerned when the bleeding did not stop. Currently the dressing is dry and intact

## 2018-05-07 NOTE — ED AVS SNAPSHOT
[facility]    [address] [place] [address] [address] [place] [place] [address]    Phone:  [phone]    Fax:  [phone]                                       Melvin Abad   MRN: [id number]    Department:  [facility]   Date of Visit:  5/7/2018           Patient Information     Date Of Birth          4/24/1928        Your diagnoses for this visit were:     Skin tear of forearm without complication, initial encounter flap tear, proximal forearm    Long term current use of anticoagulant therapy        You were seen by Aaron Jerry MD.      Follow-up Information     Follow up with Lazaro Huston MD.    Specialty:  Internal Medicine    Contact information:    [address] [facility] [place] [id number] [phone]          Discharge Instructions       I cauterized the edge that was bleeding.  You can keep the pressure dressing on the rest of today to help stop any further bleeding and to protect the wound.    You can gently remove the clear dressing that we put on tonight in about a week or so.  Be careful not to disrupt the flap when you are removing it.  It was a pleasure visiting with you this morning.  I hope this heals up quickly for you.    Thank you for choosing [facility]. We appreciate the opportunity to meet your urgent medical needs. Please let us know if we could have done anything to make your stay more satisfying.    After discharge, please closely monitor for any new or worsening symptoms. Return to the Emergency Department if you develop any acute worsening signs or symptoms.    If you had lab work, cultures or imaging studies done during your stay, the final results may still be pending. We will call you if your plan of care needs to change. However, if you are not improving as expected, please follow up with your primary care provider or clinic.     Start any prescription medications that were prescribed to you and take them as  directed.     Please see additional handouts that may be pertinent to your condition.        Your next 10 appointments already scheduled     May 21, 2018  8:00 AM CDT   Return Visit with Jey Samano DPM   [facility]    [address] [place] [id number] [phone]            May 23, 2018  8:15 AM CDT   Anticoagulation Visit with PH ANTI COAG   [facility] ([facility])    [address] [place] [id number] [phone]              24 Hour Appointment Hotline       To make an appointment at any [facility], call [phone] ([phone]). If you don't have a family doctor or clinic, we will help you find one. [facility] are conveniently located to serve the needs of you and your family.             Review of your medicines      Our records show that you are taking the medicines listed below. If these are incorrect, please call your family doctor or clinic.        Dose / Directions Last dose taken    blood glucose monitoring lancets   Dose:  1 each   Quantity:  1 Box        1 each 2 times daily Test before meals and at bedtime   Refills:  3        blood glucose monitoring test strip   Commonly known as:  no brand specified   Quantity:  100 each        Test before meals and at bedtime.   Refills:  3        lisinopril 10 MG tablet   Commonly known as:  PRINIVIL/ZESTRIL   Dose:  10 mg   Quantity:  90 tablet        Take 1 tablet (10 mg) by mouth daily   Refills:  3        metoprolol succinate 50 MG 24 hr tablet   Commonly known as:  TOPROL-XL   Dose:  50 mg   Quantity:  90 tablet        Take 1 tablet (50 mg) by mouth daily   Refills:  3        multivitamin Tabs tablet   Dose:  1 tablet        Take 1 tablet by mouth daily.   Refills:  0        polyethylene glycol powder   Commonly known as:  MIRALAX   Dose:  1 capful   Quantity:  510 g        Take 17 g by mouth daily   Refills:  1        RETAINE CMC  "0.5 % Soln ophthalmic solution   Dose:  1 drop   Generic drug:  Carboxymethylcellulose Sod PF        Place 1 drop into the right eye 3 times daily as needed for dry eyes   Refills:  0        simvastatin 40 MG tablet   Commonly known as:  ZOCOR   Quantity:  90 tablet        TAKE ONE TABLET BY MOUTH AT BEDTIME   Refills:  3        vitamin D 2000 units tablet   Dose:  1 tablet        Take 1 tablet by mouth daily   Refills:  0        warfarin 5 MG tablet   Commonly known as:  COUMADIN   Quantity:  90 tablet        TAKE ONE-HALF TABLET ON MONDAYS AND FRIDAYS AND ONE TABLET ON ALL THE OTHER DAYS OF THE WEEK OR AS DIRECTED BY COUMADIN CLINIC   Refills:  3                Orders Needing Specimen Collection     None      Pending Results     No orders found from 5/5/2018 to 5/8/2018.            Pending Culture Results     No orders found from 5/5/2018 to 5/8/2018.            Pending Results Instructions     If you had any lab results that were not finalized at the time of your Discharge, you can call the ED Lab Result RN at [phone]. You will be contacted by this team for any positive Lab results or changes in treatment. The nurses are available 7 days a week from 10A to 6:30P.  You can leave a message 24 hours per day and they will return your call.        Thank you for choosing [facility]       Thank you for choosing [facility] for your care. Our goal is always to provide you with excellent care. Hearing back from our patients is one way we can continue to improve our services. Please take a few minutes to complete the written survey that you may receive in the mail after you visit with us. Thank you!        [employer] Information     [employer] lets you send messages to your doctor, view your test results, renew your prescriptions, schedule appointments and more. To sign up, go to [email or web address] . Click on \"Log in\" on the left side of the screen, which will take you to the Welcome page. Then click on \"Sign up Now\" on " the right side of the page.     You will be asked to enter the access code listed below, as well as some personal information. Please follow the directions to create your username and password.     Your access code is: [email or web address]  Expires: 2018  3:40 PM     Your access code will  in 90 days. If you need help or a new code, please call your [facility] clinic or [phone].        Care EveryWhere ID     This is your Care EveryWhere ID. This could be used by other organizations to access your [facility] medical records  [id number]        Equal Access to Services     [facility] : Andrew Nathan, yana tabor, bharath nathan, karis schaeffer . So [facility] [phone].    ATENCIÓN: Si habla español, tiene a piña disposición servicios gratuitos de asistencia lingüística. Llame al [phone].    We comply with applicable federal civil rights laws and [place] laws. We do not discriminate on the basis of race, color, national origin, age, disability, sex, sexual orientation, or gender identity.            After Visit Summary       This is your record. Keep this with you and show to your community pharmacist(s) and doctor(s) at your next visit.

## 2018-05-07 NOTE — ED NOTES
Dr. Jerry at bedside-bleeding stopped with cautery. Tega derm applied to wound. Wrapped with for comfort

## 2018-05-07 NOTE — DISCHARGE INSTRUCTIONS
I cauterized the edge that was bleeding.  You can keep the pressure dressing on the rest of today to help stop any further bleeding and to protect the wound.    You can gently remove the clear dressing that we put on tonight in about a week or so.  Be careful not to disrupt the flap when you are removing it.  It was a pleasure visiting with you this morning.  I hope this heals up quickly for you.    Thank you for choosing [facility]. We appreciate the opportunity to meet your urgent medical needs. Please let us know if we could have done anything to make your stay more satisfying.    After discharge, please closely monitor for any new or worsening symptoms. Return to the Emergency Department if you develop any acute worsening signs or symptoms.    If you had lab work, cultures or imaging studies done during your stay, the final results may still be pending. We will call you if your plan of care needs to change. However, if you are not improving as expected, please follow up with your primary care provider or clinic.     Start any prescription medications that were prescribed to you and take them as directed.     Please see additional handouts that may be pertinent to your condition.

## 2018-05-07 NOTE — ED AVS SNAPSHOT
[facility] Emergency Department    [address] [place] [address]    Phone:  [phone]    Fax:  [phone]                                       Melvin Abad   MRN: [id number]    Department:  [facility] Emergency Department   Date of Visit:  5/7/2018           After Visit Summary Signature Page     I have received my discharge instructions, and my questions have been answered. I have discussed any challenges I see with this plan with the nurse or doctor.    ..........................................................................................................................................  Patient/Patient Representative Signature      ..........................................................................................................................................  Patient Representative Print Name and Relationship to Patient    ..................................................               ................................................  Date                                            Time    ..........................................................................................................................................  Reviewed by Signature/Title    ...................................................              ..............................................  Date                                                            Time

## 2018-05-07 NOTE — ED PROVIDER NOTES
History     Chief Complaint   Patient presents with     Laceration     HPI  Melvin Abad is a 89 year old male who presents to the ED with a skin tear to the left forearm.  He bumped it on the latch of his screen door yesterday afternoon about 4 PM.  He put a bandage on as it was not bleeding very much.  Seem to get it to stop but he woke up this morning around 3 AM and the bandage was loose so he was going to change it.  He took care not to disrupt the flap but it started bleeding on the edge and just will not stop.  He is on Coumadin.  His INR has been stable and his dose has not changed in quite some time.    Problem List:    Patient Active Problem List    Diagnosis Date Noted     Type 2 diabetes mellitus without complication, without long-term current use of insulin (H) 05/10/2017     Priority: Medium     Pain of right lower leg 03/31/2017     Priority: Medium     Contusion of right lower extremity 03/31/2017     Priority: Medium     Leg pain 03/31/2017     Priority: Medium     Long-term (current) use of anticoagulants [Z79.01] 04/05/2016     Priority: Medium     Chronic atrial fibrillation (H) 01/19/2016     Priority: Medium     Obesity (BMI 30-39.9) 10/28/2015     Priority: Medium     Cellulitis 11/20/2013     Priority: Medium     Anemia due to blood loss, acute 11/20/2013     Priority: Medium     DVT prophylaxis 11/20/2013     Priority: Medium     CTS (carpal tunnel syndrome) 08/16/2013     Priority: Medium     Gilbert syndrome 01/31/2012     Priority: Medium     History of skin cancer 01/03/2012     Priority: Medium     AK (actinic keratosis) 01/03/2012     Priority: Medium     Advanced directives, counseling/discussion 11/15/2011     Priority: Medium     Advance Directive Problem List Overview:   Name Relationship Phone    Primary Health Care Agent            Alternative Health Care Agent         Advance Directive Initial Visit--11/9/11  Melvin Abad presents in person for initial session  regarding completion of advanced directive form. He was referred to the facilitator by self.  He currently has no advance directive.  Plan:  Patient presents for Honoring Choices Informational meeting. Patient given Honoring Choices folder. Patient will complete Advance Care Directive and bring to clinic.  Follow up meeting: As needed.    .Vandana Prasad RN         [facility] 04/08/2011     Priority: Medium     Natalie Ferraro RN-PHN  FPA / [facility]   [phone]        DX V65.8 REPLACED WITH [id number] [facility] CARE HOME (04/08/2013)       Diverticulosis 03/25/2011     Priority: Medium     Type 2 diabetes mellitus without complication (H) 03/10/2011     Priority: Medium     Hyperlipidemia LDL goal <100 10/31/2010     Priority: Medium     Long term current use of anticoagulant therapy 01/31/2001     Priority: Medium     Problem list name updated by automated process. Provider to review          Past Medical History:    Past Medical History:   Diagnosis Date     Actinic keratosis      Atrial fibrillation (H)      Basal cell carcinoma nos 06/10     Cardiac dysrhythmia, unspecified      Cough      Diabetic eye exam (H) 8/29/14     Generalized osteoarthrosis, unspecified site      Other diseases of lung, not elsewhere classified      Pure hypercholesterolemia      Unspecified essential hypertension        Past Surgical History:    Past Surgical History:   Procedure Laterality Date     C TOTAL KNEE ARTHROPLASTY  1997    Knee Replacement, Total     COLONOSCOPY  05/02/2007    Multiple bxs of diminutive polyps of ascending colon.     COLONOSCOPY  11/10/2010    COLONOSCOPY performed by MARISELA GRIMM at  GI     HC COLONOSCOPY W/WO BRUSH/WASH  10/19/2001     HC COLONOSCOPY W/WO BRUSH/WASH  11/02/2004      HEMORRHOIDOPEXY BY STAPLING  09/26/08       Family History:    Family History   Problem Relation Age of Onset     CANCER Mother      Prostate Cancer Father        Social History:  Marital Status:    [5]  Social History   Substance Use Topics     Smoking status: Former Smoker     Types: Cigarettes     Smokeless tobacco: Never Used      Comment: 5 years     Alcohol use No      Comment: denies        Medications:      blood glucose monitoring (NO BRAND SPECIFIED) test strip   blood glucose monitoring (ONE TOUCH ULTRASOFT) lancets   Carboxymethylcellulose Sod PF (RETAINE CMC) 0.5 % SOLN ophthalmic solution   Cholecalciferol (VITAMIN D) 2000 UNITS tablet   lisinopril (PRINIVIL/ZESTRIL) 10 MG tablet   metoprolol succinate (TOPROL-XL) 50 MG 24 hr tablet   multivitamin (OCUVITE) TABS   polyethylene glycol (MIRALAX) powder   simvastatin (ZOCOR) 40 MG tablet   warfarin (COUMADIN) 5 MG tablet         Review of Systems   All other systems reviewed and are negative.      Physical Exam   BP: (!) 186/92  Heart Rate: 76  Temp: 98.6  F (37  C)  Resp: 14  SpO2: 100 %      Physical Exam   Constitutional: He is oriented to person, place, and time. He appears well-developed and well-nourished. No distress.   Very pleasant, sharp minded gentleman who appears younger than his stated age, in no acute distress.   Pulmonary/Chest: Effort normal. No respiratory distress.   Musculoskeletal:        Left forearm: He exhibits laceration (flap type skin tear, proximal forearm, inferior edge with steady ooze).   Neurological: He is alert and oriented to person, place, and time.   Psychiatric: He has a normal mood and affect.         Skin tear after cautery of the inferior edge and covered with tegaderm.            ED Course  (with Medical Decision Making)      I applied firm pressure but the inferior edge continued to bleed.  I cleaned the wound and then readjusted the skin flap as the superior edge was turned under.  I used silver nitrate to cauterize the inferior edge and applied pressure with good hemostasis.  We then applied a Tegaderm dressing and a bulky her pressure dressing over top of that that he can remove later today.  He  will leave the Tegaderm on for up to a week and then gently remove it taking care not to disrupt the flap.       ED Course     Procedures               Critical Care time:  none               No results found for this or any previous visit (from the past 24 hour(s)).    Medications - No data to display    Assessments & Plan     I have reviewed the nursing notes.    I have reviewed the findings, diagnosis, plan and need for follow up with the patient.       New Prescriptions    No medications on file       Final diagnoses:   Skin tear of forearm without complication, initial encounter - flap tear, proximal forearm   Long term current use of anticoagulant therapy       5/7/2018   [facility] EMERGENCY DEPARTMENT     Aaron Jerry MD  05/07/18 0429

## 2018-05-21 ENCOUNTER — OFFICE VISIT (OUTPATIENT)
Dept: PODIATRY | Facility: CLINIC | Age: 83
End: 2018-05-21
Payer: COMMERCIAL

## 2018-05-21 VITALS
TEMPERATURE: 97.4 F | HEIGHT: 69 IN | DIASTOLIC BLOOD PRESSURE: 80 MMHG | WEIGHT: 222 LBS | SYSTOLIC BLOOD PRESSURE: 120 MMHG | BODY MASS INDEX: 32.88 KG/M2

## 2018-05-21 DIAGNOSIS — M20.41 HAMMER TOES OF BOTH FEET: ICD-10-CM

## 2018-05-21 DIAGNOSIS — M20.42 HAMMER TOES OF BOTH FEET: ICD-10-CM

## 2018-05-21 DIAGNOSIS — E11.51 DIABETES MELLITUS WITH PERIPHERAL VASCULAR DISEASE (H): Primary | ICD-10-CM

## 2018-05-21 DIAGNOSIS — L85.9 HYPERKERATOSIS: ICD-10-CM

## 2018-05-21 DIAGNOSIS — Z89.9 HISTORY OF AMPUTATION: ICD-10-CM

## 2018-05-21 DIAGNOSIS — E11.40 TYPE 2 DIABETES MELLITUS WITH DIABETIC NEUROPATHY, WITHOUT LONG-TERM CURRENT USE OF INSULIN (H): ICD-10-CM

## 2018-05-21 PROCEDURE — 99213 OFFICE O/P EST LOW 20 MIN: CPT | Mod: 25 | Performed by: PODIATRIST

## 2018-05-21 PROCEDURE — 11056 PARNG/CUTG B9 HYPRKR LES 2-4: CPT | Mod: Q7 | Performed by: PODIATRIST

## 2018-05-21 ASSESSMENT — PAIN SCALES - GENERAL: PAINLEVEL: NO PAIN (0)

## 2018-05-21 NOTE — Clinical Note
5/21/2018         RE: Melvin Abad  [address] [place] [id number]        Dear Colleague,    Thank you for referring your patient, Melvin Abad, to the [facility]. Please see a copy of my visit note below.    Chief Complaint   Patient presents with     Diabetes     DM Type 2 exam, DM shoes; LOV 8/21/2017       Weight management plan: Patient was referred to their PCP to discuss a diet and exercise plan.     Patient presents today with a new lesion Anterior Right lower leg, onset 8/14/2017    HPI:  Melvin Abad is a 89 year old male who is seen in consultation at the request of Alfredo Sykes DO.    Pt presents for eval of:   (Onset, Location, L/R, Character, Treatments, Injury if yes)     1. Onset 3/30/2017, cellulitis Right Lower Leg - foot slipped off foot pedal of garden tractor - currently being treated by Dr. Alejandro and is on a refill of Keflex 500mg, 1 capsule, 3 times daily for 10 days  Drainage, redness, anterior discoloration    2. Ongoing for years, Thick Right toenails    3. Ongoing for years, Left and Right ball and arch of foot pain    4. DM - Type 2    Retired.    Review of Systems:  Patient denies fever, chills, rash, wound, stiffness, limping, numbness, weakness, heart burn, blood in stool, chest pain with activity, calf pain when walking, shortness of breath with activity, chronic cough, easy bleeding/bruising, swelling of ankles, excessive thirst, fatigue, depression, anxiety.  Patient admits only to symptoms noted in history.     PAST MEDICAL HISTORY:   Past Medical History:   Diagnosis Date     Actinic keratosis      Atrial fibrillation (H)      Basal cell carcinoma nos 06/10    Mohs excision, rt upper lip & rt temple     Cardiac dysrhythmia, unspecified      Cough     chronic cough     Diabetic eye exam (H) 8/29/14     Generalized osteoarthrosis, unspecified site     djd of the knees, hands, and neck     Other diseases of lung, not elsewhere  classified     pulmonary nodule, benign     Pure hypercholesterolemia      Unspecified essential hypertension      PAST SURGICAL HISTORY:   Past Surgical History:   Procedure Laterality Date     C TOTAL KNEE ARTHROPLASTY  1997    Knee Replacement, Total     COLONOSCOPY  05/02/2007    Multiple bxs of diminutive polyps of ascending colon.     COLONOSCOPY  11/10/2010    [facility]ON[facility] performed [facility] at  [facility]     HC COLONOSCOPY W/WO BRUSH/WASH  10/19/2001     HC COLONOSCOPY W/WO BRUSH/WASH  11/02/2004      HEMORRHOIDOPEXY BY STAPLING  09/26/08     MEDICATIONS:   Current Outpatient Prescriptions:      blood glucose monitoring (NO BRAND SPECIFIED) test strip, Test before meals and at bedtime., Disp: 100 each, Rfl: 3     blood glucose monitoring (ONE TOUCH ULTRASOFT) lancets, 1 each 2 times daily Test before meals and at bedtime, Disp: 1 Box, Rfl: 3     Carboxymethylcellulose Sod PF (RETAINE CMC) 0.5 % SOLN ophthalmic solution, Place 1 drop into the right eye 3 times daily as needed for dry eyes, Disp: , Rfl:      Cholecalciferol (VITAMIN D) 2000 UNITS tablet, Take 1 tablet by mouth daily, Disp: , Rfl:      lisinopril (PRINIVIL/ZESTRIL) 10 MG tablet, Take 1 tablet (10 mg) by mouth daily, Disp: 90 tablet, Rfl: 3     metoprolol succinate (TOPROL-XL) 50 MG 24 hr tablet, Take 1 tablet (50 mg) by mouth daily, Disp: 90 tablet, Rfl: 3     multivitamin (OCUVITE) TABS, Take 1 tablet by mouth daily., Disp: , Rfl: 0     polyethylene glycol (MIRALAX) powder, Take 17 g by mouth daily, Disp: 510 g, Rfl: 1     simvastatin (ZOCOR) 40 MG tablet, TAKE ONE TABLET BY MOUTH AT BEDTIME, Disp: 90 tablet, Rfl: 3     warfarin (COUMADIN) 5 MG tablet, TAKE ONE-HALF TABLET ON MONDAYS AND FRIDAYS AND ONE TABLET ON ALL THE OTHER DAYS OF THE WEEK OR AS DIRECTED BY COUMADIN CLINIC, Disp: 90 tablet, Rfl: 3  ALLERGIES:  No Known Allergies  SOCIAL HISTORY:   Social History     Social History     Marital status:      Spouse name: N/A      "Number of children: N/A     Years of education: N/A     Occupational History     Not on file.     Social History Main Topics     Smoking status: Former Smoker     Types: Cigarettes     Smokeless tobacco: Never Used      Comment: 5 years     Alcohol use No      Comment: denies     Drug use: No     Sexual activity: No     Other Topics Concern     Parent/Sibling W/ Cabg, Mi Or Angioplasty Before 65f 55m? No     Social History Narrative     FAMILY HISTORY:   Family History   Problem Relation Age of Onset     CANCER Mother      Prostate Cancer Father      EXAM:Vitals: /80  Temp 97.4  F (36.3  C) (Temporal)  Ht 5' 9\" (1.753 m)  Wt 222 lb (100.7 kg)  BMI 32.78 kg/m2  BMI= Body mass index is 32.78 kg/(m^2).    General appearance: Patient is alert and fully cooperative with history & exam.  No sign of distress is noted during the visit.     Psychiatric: Affect is pleasant & appropriate.  Patient appears motivated to improve health.     Respiratory: Breathing is regular & unlabored while sitting.     HEENT: Hearing is intact to spoken word.  Speech is clear.  No gross evidence of visual impairment that would impact ambulation.     Vascular: DP 1/4 & PT 1/4 left & right.  CFT delayed with dependent rubor noted about the digits.  Diminished hair growth distal to mid tibia and no hair about the foot and toes.  Temperature changes noted, warm to cool proximal to distal.  Hemosiderin pigmentation noted with multiple varicosities legs and feet bilateral. Generalized edema bilateral legs and feet.  Pt denies claudication history.     Neurologic: Normal plantar response bilateral.  Diminished protective threshold plus 6/10 applications of a 5.07 monofilament.  Pt admits no burning and paraesthesias about the feet and toes with palpation.     Dermatologic: All 9 nails are dystrophic but in reasonable repair.  Diminished texture turgor and tone about the integument. Severely diminished texture turgor tone about the anterior " shins. Skin is thin & shiny.  Symptomatically pre-ulcerative hyperkeratosis noted about the lateral fifth digit right foot and lateral fourth digit PIPJ right foot.  Upon debridement no full-thickness ulcerations.     Musculoskeletal: Patient is ambulatory without assistive device or brace.  Right hallux has been previously amputated with mild hyperkeratosis distal stump of the distal phalanx. Also medially deviated lesser toes on the right foot after the hallux amputation. All lesser toes are semi-rigidly contracted partially reducible. Second toenail is very bulky and hyperkeratosis noted about the lateral border of the right fourth toenail.    Hemoglobin A1C (%)   Date Value   02/07/2017 6.3 (H)   01/19/2016 6.3 (H)   07/15/2015 6.5 (H)   05/30/2014 6.2 (H)   08/16/2013 6.3 (H)   02/06/2013 6.6 (H)   05/22/2012 6.5 (H)   12/20/2011 6.4 (H)     Creatinine (mg/dL)   Date Value   04/05/2017 0.89   03/30/2017 1.02   02/07/2017 0.97   01/19/2016 0.89   07/15/2015 0.94   05/30/2014 1.06   03/15/2011 0.8     ASSESSMENT:       ICD-10-CM    1. Diabetes mellitus with peripheral vascular disease (H) E11.51 TRIM HYPERKERATOTIC SKIN LESION,2-4   2. History of amputation Z89.9 TRIM HYPERKERATOTIC SKIN LESION,2-4   3. Type 2 diabetes mellitus with diabetic neuropathy, without long-term current use of insulin (H) E11.40 TRIM HYPERKERATOTIC SKIN LESION,2-4   4. Hammer toes of both feet M20.41 TRIM HYPERKERATOTIC SKIN LESION,2-4    M20.42    5. Hyperkeratosis L85.9 TRIM HYPERKERATOTIC SKIN LESION,2-4     PLAN:    5/18/2017  All 9 nails were debrided with a nail nipper.   I sharply debrided 1 pre ulcerative hyperkeratotic lesions to the level of the dermis as noted above with a 15 blade.    We discussed risk factors and preventive measures.    We discussed appropriate hygiene, shoe gear, daily foot exam, and reinforced management of weight, diet, activity goals and HA1C goal for diabetic patients.    Dispensed written foot care  instructions.    All questions were answered to their satisfaction.    RTC 3 months to confirm he is able to manage nails and callous and DM shoes protect hi right foot better.     Written instructions for help with hygiene and chronic nail debridement.  Follow up with me when this is not working for further instruction and eval/help.   Also order for diabetic shoes. This will accommodate his deformed toes and amputation on the right foot better. She does have neuropathy with diminished protective threshold +6/10 applications of a 5.07. May also consider compression stockings in the future. She would have to be evaluated to determine if he can apply these appropriately and this would help protect his legs and reduce the tension on the skin. We'll monitor this over time at his next presentation.    8/21/2017  No new lesions are noted.  The diabetic shoes conform to his foot quite well and things are quite stable. He does have considerable eschar noted about the anterior shins consistent with venous stasis and minor traumas repetitive. Recommended protecting and padding the area compression as well. All questions were answered.  Follow up 5/18 for yearly diabetic foot exam and evaluation of durability of shoes.    5/21/2018  Order for DM shoes  Debrided two lesions sharply with 15 blade to dermis no dressing applied  Dispensed interdigital spacer  Discussed potential risks and complications  Follow-up one year or as needed      Jey Samano DPM    Again, thank you for allowing me to participate in the care of your patient.        Sincerely,        Jey Samano DPM

## 2018-05-21 NOTE — PROGRESS NOTES
Chief Complaint   Patient presents with     Diabetes     DM Type 2 exam, DM shoes; LOV 8/21/2017       Weight management plan: Patient was referred to their PCP to discuss a diet and exercise plan.     Patient presents today with a new lesion Anterior Right lower leg, onset 8/14/2017    HPI:  Melvin Abad is a 89 year old male who is seen in consultation at the request of Alfredo Sykes DO.    Pt presents for eval of:   (Onset, Location, L/R, Character, Treatments, Injury if yes)     1. Onset 3/30/2017, cellulitis Right Lower Leg - foot slipped off foot pedal of garden tractor - currently being treated by Dr. Alejandro and is on a refill of Keflex 500mg, 1 capsule, 3 times daily for 10 days  Drainage, redness, anterior discoloration    2. Ongoing for years, Thick Right toenails    3. Ongoing for years, Left and Right ball and arch of foot pain    4. DM - Type 2    Retired.    Review of Systems:  Patient denies fever, chills, rash, wound, stiffness, limping, numbness, weakness, heart burn, blood in stool, chest pain with activity, calf pain when walking, shortness of breath with activity, chronic cough, easy bleeding/bruising, swelling of ankles, excessive thirst, fatigue, depression, anxiety.  Patient admits only to symptoms noted in history.     PAST MEDICAL HISTORY:   Past Medical History:   Diagnosis Date     Actinic keratosis      Atrial fibrillation (H)      Basal cell carcinoma nos 06/10    Mohs excision, rt upper lip & rt temple     Cardiac dysrhythmia, unspecified      Cough     chronic cough     Diabetic eye exam (H) 8/29/14     Generalized osteoarthrosis, unspecified site     djd of the knees, hands, and neck     Other diseases of lung, not elsewhere classified     pulmonary nodule, benign     Pure hypercholesterolemia      Unspecified essential hypertension      PAST SURGICAL HISTORY:   Past Surgical History:   Procedure Laterality Date     C TOTAL KNEE ARTHROPLASTY  1997    Knee Replacement,  Total     COLONOSCOPY  05/02/2007    Multiple bxs of diminutive polyps of ascending colon.     COLONOSCOPY  11/10/2010    COLONOSCOPY performed by [facility]M at  [facility]     HC COLONOSCOPY W/WO BRUSH/WASH  10/19/2001     HC COLONOSCOPY W/WO BRUSH/WASH  11/02/2004      HEMORRHOIDOPEXY BY STAPLING  09/26/08     MEDICATIONS:   Current Outpatient Prescriptions:      blood glucose monitoring (NO BRAND SPECIFIED) test strip, Test before meals and at bedtime., Disp: 100 each, Rfl: 3     blood glucose monitoring (ONE TOUCH ULTRASOFT) lancets, 1 each 2 times daily Test before meals and at bedtime, Disp: 1 Box, Rfl: 3     Carboxymethylcellulose Sod PF (RETAINE CMC) 0.5 % SOLN ophthalmic solution, Place 1 drop into the right eye 3 times daily as needed for dry eyes, Disp: , Rfl:      Cholecalciferol (VITAMIN D) 2000 UNITS tablet, Take 1 tablet by mouth daily, Disp: , Rfl:      lisinopril (PRINIVIL/ZESTRIL) 10 MG tablet, Take 1 tablet (10 mg) by mouth daily, Disp: 90 tablet, Rfl: 3     metoprolol succinate (TOPROL-XL) 50 MG 24 hr tablet, Take 1 tablet (50 mg) by mouth daily, Disp: 90 tablet, Rfl: 3     multivitamin (OCUVITE) TABS, Take 1 tablet by mouth daily., Disp: , Rfl: 0     polyethylene glycol (MIRALAX) powder, Take 17 g by mouth daily, Disp: 510 g, Rfl: 1     simvastatin (ZOCOR) 40 MG tablet, TAKE ONE TABLET BY MOUTH AT BEDTIME, Disp: 90 tablet, Rfl: 3     warfarin (COUMADIN) 5 MG tablet, TAKE ONE-HALF TABLET ON MONDAYS AND FRIDAYS AND ONE TABLET ON ALL THE OTHER DAYS OF THE WEEK OR AS DIRECTED BY COUMADIN CLINIC, Disp: 90 tablet, Rfl: 3  ALLERGIES:  No Known Allergies  SOCIAL HISTORY:   Social History     Social History     Marital status:      Spouse name: N/A     Number of children: N/A     Years of education: N/A     Occupational History     Not on file.     Social History Main Topics     Smoking status: Former Smoker     Types: Cigarettes     Smokeless tobacco: Never Used      Comment: 5 years     Alcohol  "use No      Comment: denies     Drug use: No     Sexual activity: No     Other Topics Concern     Parent/Sibling W/ Cabg, Mi Or Angioplasty Before 65f 55m? No     Social History Narrative     FAMILY HISTORY:   Family History   Problem Relation Age of Onset     CANCER Mother      Prostate Cancer Father      EXAM:Vitals: /80  Temp 97.4  F (36.3  C) (Temporal)  Ht 5' 9\" (1.753 m)  Wt 222 lb (100.7 kg)  BMI 32.78 kg/m2  BMI= Body mass index is 32.78 kg/(m^2).    General appearance: Patient is alert and fully cooperative with history & exam.  No sign of distress is noted during the visit.     Psychiatric: Affect is pleasant & appropriate.  Patient appears motivated to improve health.     Respiratory: Breathing is regular & unlabored while sitting.     HEENT: Hearing is intact to spoken word.  Speech is clear.  No gross evidence of visual impairment that would impact ambulation.     Vascular: DP 1/4 & PT 1/4 left & right.  CFT delayed with dependent rubor noted about the digits.  Diminished hair growth distal to mid tibia and no hair about the foot and toes.  Temperature changes noted, warm to cool proximal to distal.  Hemosiderin pigmentation noted with multiple varicosities legs and feet bilateral. Generalized edema bilateral legs and feet.  Pt denies claudication history.     Neurologic: Normal plantar response bilateral.  Diminished protective threshold plus 6/10 applications of a 5.07 monofilament.  Pt admits no burning and paraesthesias about the feet and toes with palpation.     Dermatologic: All 9 nails are dystrophic but in reasonable repair.  Diminished texture turgor and tone about the integument. Severely diminished texture turgor tone about the anterior shins. Skin is thin & shiny.  Symptomatically pre-ulcerative hyperkeratosis noted about the lateral fifth digit right foot and lateral fourth digit PIPJ right foot.  Upon debridement no full-thickness ulcerations.     Musculoskeletal: Patient is " ambulatory without assistive device or brace.  Right hallux has been previously amputated with mild hyperkeratosis distal stump of the distal phalanx. Also medially deviated lesser toes on the right foot after the hallux amputation. All lesser toes are semi-rigidly contracted partially reducible. Second toenail is very bulky and hyperkeratosis noted about the lateral border of the right fourth toenail.    Hemoglobin A1C (%)   Date Value   02/07/2017 6.3 (H)   01/19/2016 6.3 (H)   07/15/2015 6.5 (H)   05/30/2014 6.2 (H)   08/16/2013 6.3 (H)   02/06/2013 6.6 (H)   05/22/2012 6.5 (H)   12/20/2011 6.4 (H)     Creatinine (mg/dL)   Date Value   04/05/2017 0.89   03/30/2017 1.02   02/07/2017 0.97   01/19/2016 0.89   07/15/2015 0.94   05/30/2014 1.06   03/15/2011 0.8     ASSESSMENT:       ICD-10-CM    1. Diabetes mellitus with peripheral vascular disease (H) E11.51 TRIM HYPERKERATOTIC SKIN LESION,2-4   2. History of amputation Z89.9 TRIM HYPERKERATOTIC SKIN LESION,2-4   3. Type 2 diabetes mellitus with diabetic neuropathy, without long-term current use of insulin (H) E11.40 TRIM HYPERKERATOTIC SKIN LESION,2-4   4. Hammer toes of both feet M20.41 TRIM HYPERKERATOTIC SKIN LESION,2-4    M20.42    5. Hyperkeratosis L85.9 TRIM HYPERKERATOTIC SKIN LESION,2-4     PLAN:    5/18/2017  All 9 nails were debrided with a nail nipper.   I sharply debrided 1 pre ulcerative hyperkeratotic lesions to the level of the dermis as noted above with a 15 blade.    We discussed risk factors and preventive measures.    We discussed appropriate hygiene, shoe gear, daily foot exam, and reinforced management of weight, diet, activity goals and HA1C goal for diabetic patients.    Dispensed written foot care instructions.    All questions were answered to their satisfaction.    RTC 3 months to confirm he is able to manage nails and callous and DM shoes protect hi right foot better.     Written instructions for help with hygiene and chronic nail  debridement.  Follow up with me when this is not working for further instruction and eval/help.   Also order for diabetic shoes. This will accommodate his deformed toes and amputation on the right foot better. She does have neuropathy with diminished protective threshold +6/10 applications of a 5.07. May also consider compression stockings in the future. She would have to be evaluated to determine if he can apply these appropriately and this would help protect his legs and reduce the tension on the skin. We'll monitor this over time at his next presentation.    8/21/2017  No new lesions are noted.  The diabetic shoes conform to his foot quite well and things are quite stable. He does have considerable eschar noted about the anterior shins consistent with venous stasis and minor traumas repetitive. Recommended protecting and padding the area compression as well. All questions were answered.  Follow up 5/18 for yearly diabetic foot exam and evaluation of durability of shoes.    5/21/2018  Order for DM shoes  Debrided two lesions sharply with 15 blade to dermis no dressing applied  Dispensed interdigital spacer  Discussed potential risks and complications  Follow-up one year or as needed      Jey Samano DPM

## 2018-05-21 NOTE — PATIENT INSTRUCTIONS
[employer], or 8-800-[employer]IX.  They have many types of pads and splints.    DIABETES AND YOUR FEET    What effect does diabetes have on the feet?  Diabetes can result in several problems in the feet including contractures of the tendons leading to deformities and reduced function of the bones, skin ulcers or open sores on pressure points or prominent deformities, reduced sensation, reduced blood flow and thus reduced oxygen and immune cells to the tiny vessels in our feet. This all leads to higher risk of hospitalization, infections, and amputations.     What is neuropathy?  Neuropathy is a term used to describe a loss of nerve function.  Patients with diabetes are at risk of developing neuropathy if their sugars continue to run high and are above the normal value of 140.  The elevated blood sugar in the body enters the nerves causing it to swell and impair nerve function.  The higher the blood sugar and the longer it is elevated, the more damage is done to nerves.  This damage is permanent and irreversible.  These damaged sensory nerves can then cause reduced feeling or cause pain.  Damaged motor nerves can reduce blood flow and white blood cells into into your foot, skin and bones reducing your ability to heal a small problem. And neuropathy can cause tendons to become unbalanced and contribute to the formation of deformity and contractures in our feet. Often times, neuropathy can be prevented by controlling your blood sugar.  Your risk of developing neuropathy goes up dramatically as your hemoglobin A1C raises above 7.5.      How do I know if I have neuropathy?  When a person develops neuropathy, they usually begin to feel numbness or tingling in their feet and sometimes in their legs.  Other symptoms may include painful burning or hot feet, tingling, electrical sensations or feeling like insects or ants are crawling on your feet or legs.  If blood sugar remains above 140  for long periods of time, neuropathy  "can also occur in the hands.  When a person loses their \"protective threshold\" or ability to detect a 5.[employer] [place] [employer] monofilament is when they have elevated risk for developing foot deformity, contractures, foot infections, amputations, Charcot arthropathy, or other complications. Keep your hemoglobin A1C below 7.5 to reduce this risk.    What is vascular disease?  Peripheral vascular disease is a term used to describe a loss or decrease in circulation (blood flow).  There is a problem in getting blood, immune cells, and oxygen to areas that need it.  Similar to neuropathy, sugars can build up in the walls of the arteries (blood vessels) and cause them to become swollen, thickened and hardened.  This decreases the amount of blood that can go to an area that needs it.  Though this is common in the legs of diabetic patients, it can also affect other arteries (blood vessels) in the body such as in the heart, kidney, eyes, and the blood flow into bones.  It is often seen first in the small vessels of her body notably our feet and toes.    How do I know if I have vascular disease?  In the legs, vascular disease usually results in cramping.  Patients who develop leg cramps after walking the same distance every time (i.e. One block, half a mile, ect.) need to let their doctors know so that their circulation may be checked.  Cramps causing severe pain in the feet and/or legs while sleeping and the cramps go away when you stand or hang your legs off the side of the bed, may also be a sign of poor blood circulation.  Occasional cramping in cold weather or on rare occasions with activity may not be due to poor circulation, but you should inform your doctor.    How can these problems be prevented?  The key to prevention is good blood sugar control all day every day.  Inadequate blood sugar control is the most common way patients experience these problems. Reducing, controlling and measuring your daily consumption of " sugar or carbohydrates is essential to understanding and managing diabetes.  Physical activity (exercise) is a very good way to help decrease your blood sugars.  Exercise can lower your blood sugar, blood pressure, and cholesterol.  It also reduces your risk for heart disease and stroke, relieves stress, and strengthens your heart, muscles and bones. Physical activity also increases your balance and reduces development of contractures and foot deformities over time. In addition, regular activity helps insulin work better, improves your blood circulation, and keeps your joints flexible.  If you're trying to lose weight, a combination of exercise and wise food choices can help you reach your target weight and maintain it.  Activity and exercise alone can not make up for poor diet choices, eating too much, or eating too many sugars or carbohydrates.  Ask your doctor for help when you are not meeting your blood sugar goals. Changes or increases in medication are powerful tools in reducing your blood sugar.    Know your blood sugar and hemoglobin A1C trend.  Upon first diagnosis or during acute illness, checking your blood sugar 4 times a day can help you understand how your diet, activity, and lifestyle affect your blood sugar.  Monitoring your hemoglobin A1C can help you understand how well you are managing blood sugar over the long run.  Your hemoglobin A1C tells you what your blood sugar averages all day, every day, over the past 90 days.       To experience the lowest risk of complications associated with diabetes such as neuropathy, loss of blood flow, bone or joint infection, charcot arthropathy, or amputation, the American Diabetes Association recommends a target hemoglobin A1C of less than 7.0%, while the American Association of Clinical Endocrinologists' recommendation is 6.5% or less.  Both organizations advise that the goals be individualized based on patient factors such as other health conditions, history  of hypoglycemia, education, and life expectancy.  A patients risk of experiencing complications associated with diabetes is only slightly elevated with a hemoglobin A1C above 6.0.  However, this risk goes up exponentially when the hemoglobin A1C is above 7.5.  The longer the hemoglobin A1C is elevated, the more risk that patient will experience in their lifetime. The damage that occurs to nerves, blood vessels, tendons, bones and body organs, while their hemoglobin A1C is elevated is mostly irreversible and worsens with each additional time period of elevated hemoglobin A1C.     You must understand and manage your disease.  Your health insurance or medical team cannot manage this disease for you.  When you take responsibility for understanding and managing your disease, you can expect to experience fewer problems associated with diabetes in your lifetime.  You will  Also experience a higher quality of life and health and reduced cost of health care.    Diabetic Foot Care Recommendations  The following are recommendations for avoiding serious foot problems or injury    DO'S  1. Be aware of your hemoglobin A1C and continue to follow up with your medical team for adjustments in your lifestyle and medication until your reach your A1C goal.  Keep this below 7.5 to reduce your risk of developing complications associated with diabetes.    2.  Wash your feet with lukewarm water and a mild soap and then dry them thoroughly, especially between the toes.  Gently floss your towel or washcloth between each toe at every bath.  Soaking your feet in water cannot clean dead skin, debris, and bacteria from your feet and is not necessary.   3. Examine your feet daily looking for cuts, corns, blisters, cracks, ect..., especially after wearing new shoes or increased or changed activities.  Make sure to look between your toes.  If you cannot see the bottom of your feet, set a mirror on the floor and hold your foot over it, or ask a  family member to examine your feet for you daily.  Contact your doctor immediately if new problems are noted or if sores are not healing.  4.  Immediately apply moisturizer cream such as Cetaphil to the tops and bottoms of your feet, avoiding areas between the toes.  Apply sunscreen or cover your feet if they will be exposed to extended sunlight.  5.Use clean comfortable shoes.  Socks should not have thick seams or cut off the circulation around the leg.  Break in new shoes slowly and rotate with older shoes until broken in.  Check the inside of your shoes with your hand to look for areas of irritation or objects that may have fallen into your shoes.    6. Keep slippers by the side of your bed for use during the night.  7. Shoes should be fitted by a professional and should not cause areas of irritation.  Check your feet regularly when wearing a new pair of shoes and replace them as needed.  8.  Talk to your doctor about proper exercise.  Exercise and stretching stimulate blood flow to your feet and maintain proper glucose levels.  Use it or lose it!  9.  Monitor your blood glucose level and your hemoglobin A1C.  Notify your doctor immediately if your blood sugar is abnormally high or low.  10.  Cut your nails straight across, but then gently round any sharp edges with a nail file.  If you have neuropathy, peripheral vascular disease or cannot see that well to trim your own toenails, see a medical professional for care.    DONT'S  1.  Do not soak your feet if you have an open sore or your provider has informed you that you have neuropathy or loss of protective threshold.  Use only lukewarm water and always check the temperature with your hand as hot water can easily burn your feet.    2.  Never use a hot water bottle or heating pad on your feet.  Also do not apply hot or cold compresses to your feet.  With decreased sensation, you could burn or freeze your feet.  Do not rest your feet near a heat source such as a  heater or heat register.    3.  Do not apply any of these to your feet:    - over the counter medicine for corns or warts    -  Harsh chemicals like boric acid    -  Do not self-treat corns, cuts, blisters or infections.  Always consult your doctor.   4.  Do not wear sandals, slippers or walk barefoot, especially on harsh surfaces.  5.  If you smoke, stop!!!

## 2018-05-21 NOTE — MR AVS SNAPSHOT
After Visit Summary   5/21/2018    Melvin Abad    MRN: [id number]           Patient Information     Date Of Birth          4/24/1928        Visit Information        Provider Department      5/21/2018 8:00 AM Jey Samano DPM [facility]        Today's Diagnoses     Diabetes mellitus with peripheral vascular disease (H)    -  1    History of amputation        Type 2 diabetes mellitus with diabetic neuropathy, without long-term current use of insulin (H)        Hammer toes of both feet        Hyperkeratosis          Care Instructions    [employer], or 1-800-PEDIFIX.  They have many types of pads and splints.    DIABETES AND YOUR FEET    What effect does diabetes have on the feet?  Diabetes can result in several problems in the feet including contractures of the tendons leading to deformities and reduced function of the bones, skin ulcers or open sores on pressure points or prominent deformities, reduced sensation, reduced blood flow and thus reduced oxygen and immune cells to the tiny vessels in our feet. This all leads to higher risk of hospitalization, infections, and amputations.     What is neuropathy?  Neuropathy is a term used to describe a loss of nerve function.  Patients with diabetes are at risk of developing neuropathy if their sugars continue to run high and are above the normal value of 140.  The elevated blood sugar in the body enters the nerves causing it to swell and impair nerve function.  The higher the blood sugar and the longer it is elevated, the more damage is done to nerves.  This damage is permanent and irreversible.  These damaged sensory nerves can then cause reduced feeling or cause pain.  Damaged motor nerves can reduce blood flow and white blood cells into into your foot, skin and bones reducing your ability to heal a small problem. And neuropathy can cause tendons to become unbalanced and contribute to the formation of deformity and contractures  "in our feet. Often times, neuropathy can be prevented by controlling your blood sugar.  Your risk of developing neuropathy goes up dramatically as your hemoglobin A1C raises above 7.5.      How do I know if I have neuropathy?  When a person develops neuropathy, they usually begin to feel numbness or tingling in their feet and sometimes in their legs.  Other symptoms may include painful burning or hot feet, tingling, electrical sensations or feeling like insects or ants are crawling on your feet or legs.  If blood sugar remains above 140  for long periods of time, neuropathy can also occur in the hands.  When a person loses their \"protective threshold\" or ability to detect a 5.07 Cordova Lilly monofilament is when they have elevated risk for developing foot deformity, contractures, foot infections, amputations, Charcot arthropathy, or other complications. Keep your hemoglobin A1C below 7.5 to reduce this risk.    What is vascular disease?  Peripheral vascular disease is a term used to describe a loss or decrease in circulation (blood flow).  There is a problem in getting blood, immune cells, and oxygen to areas that need it.  Similar to neuropathy, sugars can build up in the walls of the arteries (blood vessels) and cause them to become swollen, thickened and hardened.  This decreases the amount of blood that can go to an area that needs it.  Though this is common in the legs of diabetic patients, it can also affect other arteries (blood vessels) in the body such as in the heart, kidney, eyes, and the blood flow into bones.  It is often seen first in the small vessels of her body notably our feet and toes.    How do I know if I have vascular disease?  In the legs, vascular disease usually results in cramping.  Patients who develop leg cramps after walking the same distance every time (i.e. One block, half a mile, ect.) need to let their doctors know so that their circulation may be checked.  Cramps causing severe " pain in the feet and/or legs while sleeping and the cramps go away when you stand or hang your legs off the side of the bed, may also be a sign of poor blood circulation.  Occasional cramping in cold weather or on rare occasions with activity may not be due to poor circulation, but you should inform your doctor.    How can these problems be prevented?  The key to prevention is good blood sugar control all day every day.  Inadequate blood sugar control is the most common way patients experience these problems. Reducing, controlling and measuring your daily consumption of sugar or carbohydrates is essential to understanding and managing diabetes.  Physical activity (exercise) is a very good way to help decrease your blood sugars.  Exercise can lower your blood sugar, blood pressure, and cholesterol.  It also reduces your risk for heart disease and stroke, relieves stress, and strengthens your heart, muscles and bones. Physical activity also increases your balance and reduces development of contractures and foot deformities over time. In addition, regular activity helps insulin work better, improves your blood circulation, and keeps your joints flexible.  If you're trying to lose weight, a combination of exercise and wise food choices can help you reach your target weight and maintain it.  Activity and exercise alone can not make up for poor diet choices, eating too much, or eating too many sugars or carbohydrates.  Ask your doctor for help when you are not meeting your blood sugar goals. Changes or increases in medication are powerful tools in reducing your blood sugar.    Know your blood sugar and hemoglobin A1C trend.  Upon first diagnosis or during acute illness, checking your blood sugar 4 times a day can help you understand how your diet, activity, and lifestyle affect your blood sugar.  Monitoring your hemoglobin A1C can help you understand how well you are managing blood sugar over the long run.  Your  hemoglobin A1C tells you what your blood sugar averages all day, every day, over the past 90 days.       To experience the lowest risk of complications associated with diabetes such as neuropathy, loss of blood flow, bone or joint infection, charcot arthropathy, or amputation, the American Diabetes Association recommends a target hemoglobin A1C of less than 7.0%, while the American Association of Clinical Endocrinologists' recommendation is 6.5% or less.  Both organizations advise that the goals be individualized based on patient factors such as other health conditions, history of hypoglycemia, education, and life expectancy.  A patients risk of experiencing complications associated with diabetes is only slightly elevated with a hemoglobin A1C above 6.0.  However, this risk goes up exponentially when the hemoglobin A1C is above 7.5.  The longer the hemoglobin A1C is elevated, the more risk that patient will experience in their lifetime. The damage that occurs to nerves, blood vessels, tendons, bones and body organs, while their hemoglobin A1C is elevated is mostly irreversible and worsens with each additional time period of elevated hemoglobin A1C.     You must understand and manage your disease.  Your health insurance or medical team cannot manage this disease for you.  When you take responsibility for understanding and managing your disease, you can expect to experience fewer problems associated with diabetes in your lifetime.  You will  Also experience a higher quality of life and health and reduced cost of health care.    Diabetic Foot Care Recommendations  The following are recommendations for avoiding serious foot problems or injury    DO'S  1. Be aware of your hemoglobin A1C and continue to follow up with your medical team for adjustments in your lifestyle and medication until your reach your A1C goal.  Keep this below 7.5 to reduce your risk of developing complications associated with diabetes.    2.  Wash  your feet with lukewarm water and a mild soap and then dry them thoroughly, especially between the toes.  Gently floss your towel or washcloth between each toe at every bath.  Soaking your feet in water cannot clean dead skin, debris, and bacteria from your feet and is not necessary.   3. Examine your feet daily looking for cuts, corns, blisters, cracks, ect..., especially after wearing new shoes or increased or changed activities.  Make sure to look between your toes.  If you cannot see the bottom of your feet, set a mirror on the floor and hold your foot over it, or ask a family member to examine your feet for you daily.  Contact your doctor immediately if new problems are noted or if sores are not healing.  4.  Immediately apply moisturizer cream such as Cetaphil to the tops and bottoms of your feet, avoiding areas between the toes.  Apply sunscreen or cover your feet if they will be exposed to extended sunlight.  5.Use clean comfortable shoes.  Socks should not have thick seams or cut off the circulation around the leg.  Break in new shoes slowly and rotate with older shoes until broken in.  Check the inside of your shoes with your hand to look for areas of irritation or objects that may have fallen into your shoes.    6. Keep slippers by the side of your bed for use during the night.  7. Shoes should be fitted by a professional and should not cause areas of irritation.  Check your feet regularly when wearing a new pair of shoes and replace them as needed.  8.  Talk to your doctor about proper exercise.  Exercise and stretching stimulate blood flow to your feet and maintain proper glucose levels.  Use it or lose it!  9.  Monitor your blood glucose level and your hemoglobin A1C.  Notify your doctor immediately if your blood sugar is abnormally high or low.  10.  Cut your nails straight across, but then gently round any sharp edges with a nail file.  If you have neuropathy, peripheral vascular disease or cannot  see that well to trim your own toenails, see a medical professional for care.    DONT'S  1.  Do not soak your feet if you have an open sore or your provider has informed you that you have neuropathy or loss of protective threshold.  Use only lukewarm water and always check the temperature with your hand as hot water can easily burn your feet.    2.  Never use a hot water bottle or heating pad on your feet.  Also do not apply hot or cold compresses to your feet.  With decreased sensation, you could burn or freeze your feet.  Do not rest your feet near a heat source such as a heater or heat register.    3.  Do not apply any of these to your feet:    - over the counter medicine for corns or warts    -  Harsh chemicals like boric acid    -  Do not self-treat corns, cuts, blisters or infections.  Always consult your doctor.   4.  Do not wear sandals, slippers or walk barefoot, especially on harsh surfaces.  5.  If you smoke, stop!!!          Follow-ups after your visit        Your next 10 appointments already scheduled     May 23, 2018  8:15 AM CDT   Anticoagulation Visit with PH ANTI COAG   [facility]    [address] [place] [id number] [phone]              Who to contact     If you have questions or need follow up information about today's clinic visit or your schedule please contact [facility] directly at [phone].  Normal or non-critical lab and imaging results will be communicated to you by MyChart, letter or phone within 4 business days after the clinic has received the results. If you do not hear from us within 7 days, please contact the clinic through [employer] or phone. If you have a critical or abnormal lab result, we will notify you by phone as soon as possible.  Submit refill requests through [employer] or call your pharmacy and they will forward the refill request to us. Please allow 3 business days for your refill to be  "completed.          Additional Information About Your Visit        [employer] Information     [employer] lets you send messages to your doctor, view your test results, renew your prescriptions, schedule appointments and more. To sign up, go to [email or web address] . Click on \"Log in\" on the left side of the screen, which will take you to the Welcome page. Then click on \"Sign up Now\" on the right side of the page.     You will be asked to enter the access code listed below, as well as some personal information. Please follow the directions to create your username and password.     Your access code is: [email or web address]  Expires: 2018  3:40 PM     Your access code will  in 90 days. If you need help or a new code, please call your [facility] clinic or [phone].        Care EveryWhere ID     This is your Care EveryWhere ID. This could be used by other organizations to access your [facility] medical records  [id number]        Your Vitals Were     Temperature Height BMI (Body Mass Index)             97.4  F (36.3  C) (Temporal) 5' 9\" (1.753 m) 32.78 kg/m2          Blood Pressure from Last 3 Encounters:   18 120/80   18 (!) 186/92   18 155/82    Weight from Last 3 Encounters:   18 222 lb (100.7 kg)   18 220 lb (99.8 kg)   17 221 lb (100.2 kg)              We Performed the Following     TRIM HYPERKERATOTIC SKIN LESION,2-4        Primary Care Provider Office Phone # Fax #    Lazaro Huston [phone]       [address] [facility] [address]        Equal Access to Services     [facility] AH: Hadcorey Nathan, wapinada luqadaha, qaybta kaalmada jac, karis carrero. So [facility] [phone].    ATENCIÓN: Si habla español, tiene a piña disposición servicios gratuitos de asistencia lingüística. Llame al [phone].    We comply with applicable federal civil rights laws and [place] laws. We do not discriminate on the basis of " race, color, national origin, age, disability, sex, sexual orientation, or gender identity.            Thank you!     Thank you for choosing [facility]  for your care. Our goal is always to provide you with excellent care. Hearing back from our patients is one way we can continue to improve our services. Please take a few minutes to complete the written survey that you may receive in the mail after your visit with us. Thank you!             Your Updated Medication List - Protect others around you: Learn how to safely use, store and throw away your medicines at www.disposemymeds.org.          This list is accurate as of 5/21/18  8:26 AM.  Always use your most recent med list.                   Brand Name Dispense Instructions for use Diagnosis    blood glucose monitoring lancets     1 Box    1 each 2 times daily Test before meals and at bedtime    Type 2 diabetes mellitus without complication (H)       blood glucose monitoring test strip    no brand specified    100 each    Test before meals and at bedtime.    Type 2 diabetes mellitus without complication (H)       lisinopril 10 MG tablet    PRINIVIL/ZESTRIL    90 tablet    Take 1 tablet (10 mg) by mouth daily    Essential hypertension, benign       metoprolol succinate 50 MG 24 hr tablet    TOPROL-XL    90 tablet    Take 1 tablet (50 mg) by mouth daily    Essential hypertension, benign       multivitamin Tabs tablet      Take 1 tablet by mouth daily.        polyethylene glycol powder    MIRALAX    510 g    Take 17 g by mouth daily    Constipation, unspecified constipation type       RETAINE CMC 0.5 % Soln ophthalmic solution   Generic drug:  Carboxymethylcellulose Sod PF      Place 1 drop into the right eye 3 times daily as needed for dry eyes        simvastatin 40 MG tablet    ZOCOR    90 tablet    TAKE ONE TABLET BY MOUTH AT BEDTIME    Hyperlipidemia LDL goal <100       vitamin D 2000 units tablet      Take 1 tablet by mouth daily        warfarin 5  MG tablet    COUMADIN    90 tablet    TAKE ONE-HALF TABLET ON MONDAYS AND FRIDAYS AND ONE TABLET ON ALL THE OTHER DAYS OF THE WEEK OR AS DIRECTED BY COUMADIN CLINIC    Long-term (current) use of anticoagulants, Chronic atrial fibrillation (H)

## 2018-05-23 ENCOUNTER — ANTICOAGULATION THERAPY VISIT (OUTPATIENT)
Dept: ANTICOAGULATION | Facility: CLINIC | Age: 83
End: 2018-05-23
Payer: COMMERCIAL

## 2018-05-23 VITALS — HEART RATE: 57 BPM | SYSTOLIC BLOOD PRESSURE: 133 MMHG | DIASTOLIC BLOOD PRESSURE: 77 MMHG

## 2018-05-23 DIAGNOSIS — Z79.01 LONG-TERM (CURRENT) USE OF ANTICOAGULANTS: ICD-10-CM

## 2018-05-23 LAB — INR POINT OF CARE: 2.3 (ref 0.86–1.14)

## 2018-05-23 PROCEDURE — 85610 PROTHROMBIN TIME: CPT | Mod: QW

## 2018-05-23 PROCEDURE — 36416 COLLJ CAPILLARY BLOOD SPEC: CPT

## 2018-05-23 PROCEDURE — 99207 ZZC NO CHARGE NURSE ONLY: CPT

## 2018-05-23 NOTE — PROGRESS NOTES
ANTICOAGULATION FOLLOW-UP CLINIC VISIT    Patient Name:  Melvin Abad  Date:  5/23/2018  Contact Type:  Face to Face    SUBJECTIVE:     Patient Findings     Positives No Problem Findings           OBJECTIVE    INR Protime   Date Value Ref Range Status   05/23/2018 2.3 (A) 0.86 - 1.14 Final       ASSESSMENT / PLAN  INR assessment THER    Recheck INR In: 6 WEEKS    INR Location Clinic      Anticoagulation Summary as of 5/23/2018     INR goal 2.0-3.0   Today's INR 2.3   Warfarin maintenance plan 2.5 mg (5 mg x 0.5) on Mon, Fri; 5 mg (5 mg x 1) all other days   Full warfarin instructions 2.5 mg on Mon, Fri; 5 mg all other days   Weekly warfarin total 30 mg   No change documented Shani Ross RN   Plan last modified Shani Ross RN (4/11/2016)   Next INR check 7/11/2018   Target end date     Indications   Atrial fibrillation (Resolved) [I48.91]  Long-term (current) use of anticoagulants [Z79.01] [Z79.01]         Anticoagulation Episode Summary     INR check location     Preferred lab     Send INR reminders to [facility] POOL    Comments 5 mg tablets, AM taker, likes book, BP       Anticoagulation Care Providers     Provider Role Specialty Phone number    Lazaro Huston MD [facility] Internal Medicine [phone]            See the Encounter Report to view Anticoagulation Flowsheet and Dosing Calendar (Go to Encounters tab in chart review, and find the Anticoagulation Therapy Visit)    Dosage adjustment made based on physician directed care plan.      Shani Ross RN

## 2018-05-23 NOTE — MR AVS SNAPSHOT
Melvin Abad   5/23/2018 8:15 AM   Anticoagulation Therapy Visit    Description:  89 year old male   Provider:  SAMUEL ANTI COAG   Department:  Ph Anticoag           INR as of 5/23/2018     Today's INR 2.3      Anticoagulation Summary as of 5/23/2018     INR goal 2.0-3.0   Today's INR 2.3   Full warfarin instructions 2.5 mg on Mon, Fri; 5 mg all other days   Next INR check 7/11/2018    Indications   Atrial fibrillation (Resolved) [I48.91]  Long-term (current) use of anticoagulants [Z79.01] [Z79.01]         Your next Anticoagulation Clinic appointment(s)     May 23, 2018  8:15 AM CDT   Anticoagulation Visit with PH ANTI COAG   [facility] ([address] [address]   [phone]            Jul 11, 2018  8:15 AM CDT   Anticoagulation Visit with PH ANTI COAG   [facility] ([address] [address]   [phone]              Contact Numbers     Clinic Number:         May 2018 Details    Sun Mon Tue Wed Thu Fri Sat       1               2               3               4               5                 6               7               8               9               10               11               12                 13               14               15               16               17               18               19                 20               21               22               23      5 mg   See details      24      5 mg         25      2.5 mg         26      5 mg           27      5 mg         28      2.5 mg         29      5 mg         30      5 mg         31      5 mg            Date Details   05/23 This INR check               How to take your warfarin dose     To take:  2.5 mg Take 0.5 of a 5 mg tablet.    To take:  5 mg Take 1 of the 5 mg tablets.           June 2018 Details    Sun Mon Tue Wed Thu Fri Sat          1      2.5 mg         2      5 mg           3      5 mg          4      2.5 mg         5      5 mg         6      5 mg         7      5 mg         8      2.5 mg         9      5 mg           10      5 mg         11      2.5 mg         12      5 mg         13      5 mg         14      5 mg         15      2.5 mg         16      5 mg           17      5 mg         18      2.5 mg         19      5 mg         20      5 mg         21      5 mg         22      2.5 mg         23      5 mg           24      5 mg         25      2.5 mg         26      5 mg         27      5 mg         28      5 mg         29      2.5 mg         30      5 mg          Date Details   No additional details            How to take your warfarin dose     To take:  2.5 mg Take 0.5 of a 5 mg tablet.    To take:  5 mg Take 1 of the 5 mg tablets.           July 2018 Details    Sun Mon Tue Wed Thu Fri Sat     1      5 mg         2      2.5 mg         3      5 mg         4      5 mg         5      5 mg         6      2.5 mg         7      5 mg           8      5 mg         9      2.5 mg         10      5 mg         11            12               13               14                 15               16               17               18               19               20               21                 22               23               24               25               26               27               28                 29               30               31                    Date Details   No additional details    Date of next INR:  7/11/2018         How to take your warfarin dose     To take:  2.5 mg Take 0.5 of a 5 mg tablet.    To take:  5 mg Take 1 of the 5 mg tablets.

## 2018-07-10 ENCOUNTER — ANTICOAGULATION THERAPY VISIT (OUTPATIENT)
Dept: ANTICOAGULATION | Facility: CLINIC | Age: 83
End: 2018-07-10
Payer: COMMERCIAL

## 2018-07-10 VITALS — HEART RATE: 63 BPM | DIASTOLIC BLOOD PRESSURE: 81 MMHG | SYSTOLIC BLOOD PRESSURE: 146 MMHG

## 2018-07-10 DIAGNOSIS — Z79.01 LONG-TERM (CURRENT) USE OF ANTICOAGULANTS: ICD-10-CM

## 2018-07-10 LAB — INR POINT OF CARE: 3.2 (ref 0.86–1.14)

## 2018-07-10 PROCEDURE — 85610 PROTHROMBIN TIME: CPT | Mod: QW

## 2018-07-10 PROCEDURE — 36416 COLLJ CAPILLARY BLOOD SPEC: CPT

## 2018-07-10 PROCEDURE — 99207 ZZC NO CHARGE NURSE ONLY: CPT

## 2018-07-10 NOTE — PROGRESS NOTES
ANTICOAGULATION FOLLOW-UP CLINIC VISIT    Patient Name:  Melvin Abad  Date:  7/10/2018  Contact Type:  Face to Face    SUBJECTIVE:     Patient Findings     Positives Change in medications (Zyrtec for allergies -7/9), Inflammation (Allergy symptoms)           OBJECTIVE    INR Protime   Date Value Ref Range Status   07/10/2018 3.2 (A) 0.86 - 1.14 Final       ASSESSMENT / PLAN  No question data found.  Anticoagulation Summary as of 7/10/2018     INR goal 2.0-3.0   Today's INR 3.2!   Warfarin maintenance plan 2.5 mg (5 mg x 0.5) on Mon, Fri; 5 mg (5 mg x 1) all other days   Full warfarin instructions 2.5 mg on Mon, Fri; 5 mg all other days   Weekly warfarin total 30 mg   No change documented Farrah Moscoso, RN   Plan last modified Shani Ross RN (4/11/2016)   Next INR check 8/2/2018   Target end date     Indications   Atrial fibrillation (Resolved) [I48.91]  Long-term (current) use of anticoagulants [Z79.01] [Z79.01]         Anticoagulation Episode Summary     INR check location     Preferred lab     Send INR reminders to [facility] POOL    Comments 5 mg tablets, AM taker, likes book, BP       Anticoagulation Care Providers     Provider Role Specialty Phone number    Lazaro Huston MD [facility] Internal Medicine [phone]            See the Encounter Report to view Anticoagulation Flowsheet and Dosing Calendar (Go to Encounters tab in chart review, and find the Anticoagulation Therapy Visit)    Allergy symptoms.  Took a Zyrtec yesterday.  Will eat extra greens today.    Farrah Moscoso, ZULEIMA

## 2018-07-10 NOTE — MR AVS SNAPSHOT
Melvin Abad   7/10/2018 10:15 AM   Anticoagulation Therapy Visit    Description:  89 year old male   Provider:  SAMUEL ANTI COAPIPPA   Department:  [facility]           INR as of 7/10/2018     Today's INR 3.2!      Anticoagulation Summary as of 7/10/2018     INR goal 2.0-3.0   Today's INR 3.2!   Full warfarin instructions 2.5 mg on Mon, Fri; 5 mg all other days   Next INR check 8/2/2018    Indications   Atrial fibrillation (Resolved) [I48.91]  Long-term (current) use of anticoagulants [Z79.01] [Z79.01]         Your next Anticoagulation Clinic appointment(s)     Aug 02, 2018  8:00 AM CDT   Anticoagulation Visit with SAMUEL ANTI JUSTICE   [facility]    [address] [place] [id number] [phone]              Contact Numbers     Clinic Number:         July 2018 Details    Sun Mon Tue Wed Thu Fri Sat     1               2               3               4               5               6               7                 8               9               10      5 mg   See details      11      5 mg         12      5 mg         13      2.5 mg         14      5 mg           15      5 mg         16      2.5 mg         17      5 mg         18      5 mg         19      5 mg         20      2.5 mg         21      5 mg           22      5 mg         23      2.5 mg         24      5 mg         25      5 mg         26      5 mg         27      2.5 mg         28      5 mg           29      5 mg         30      2.5 mg         31      5 mg              Date Details   07/10 This INR check               How to take your warfarin dose     To take:  2.5 mg Take 0.5 of a 5 mg tablet.    To take:  5 mg Take 1 of the 5 mg tablets.           August 2018 Details    Sun Mon Tue Wed Thu Fri Sat        1      5 mg         2            3               4                 5               6               7               8               9               10               11                 12                13               14               15               16               17               18                 19               20               21               22               23               24               25                 26               27               28               29               30               31                 Date Details   No additional details    Date of next INR:  8/2/2018         How to take your warfarin dose     To take:  5 mg Take 1 of the 5 mg tablets.

## 2018-08-02 ENCOUNTER — ANTICOAGULATION THERAPY VISIT (OUTPATIENT)
Dept: ANTICOAGULATION | Facility: CLINIC | Age: 83
End: 2018-08-02
Payer: COMMERCIAL

## 2018-08-02 DIAGNOSIS — Z79.01 LONG-TERM (CURRENT) USE OF ANTICOAGULANTS: ICD-10-CM

## 2018-08-02 LAB — INR POINT OF CARE: 2.4 (ref 0.86–1.14)

## 2018-08-02 PROCEDURE — 36416 COLLJ CAPILLARY BLOOD SPEC: CPT

## 2018-08-02 PROCEDURE — 85610 PROTHROMBIN TIME: CPT | Mod: QW

## 2018-08-02 PROCEDURE — 99207 ZZC NO CHARGE NURSE ONLY: CPT

## 2018-08-02 NOTE — MR AVS SNAPSHOT
Felicewilmar KELLEE Abad   8/2/2018 8:00 AM   Anticoagulation Therapy Visit    Description:  89 year old male   Provider:  SAMUEL ANTI COAG   Department:  [facility]           INR as of 8/2/2018     Today's INR 2.4      Anticoagulation Summary as of 8/2/2018     INR goal 2.0-3.0   Today's INR 2.4   Full warfarin instructions 2.5 mg on Mon, Fri; 5 mg all other days   Next INR check 9/11/2018    Indications   Atrial fibrillation (Resolved) [I48.91]  Long-term (current) use of anticoagulants [Z79.01] [Z79.01]         Your next Anticoagulation Clinic appointment(s)     Sep 11, 2018  8:15 AM CDT   Anticoagulation Visit with SAMUEL ANTI JUSTICE   [facility]    [address] [place] [id number] [phone]              Contact Numbers     Clinic Number:         August 2018 Details    Sun Mon Tue Wed Thu Fri Sat        1               2      5 mg   See details      3      2.5 mg         4      5 mg           5      5 mg         6      2.5 mg         7      5 mg         8      5 mg         9      5 mg         10      2.5 mg         11      5 mg           12      5 mg         13      2.5 mg         14      5 mg         15      5 mg         16      5 mg         17      2.5 mg         18      5 mg           19      5 mg         20      2.5 mg         21      5 mg         22      5 mg         23      5 mg         24      2.5 mg         25      5 mg           26      5 mg         27      2.5 mg         28      5 mg         29      5 mg         30      5 mg         31      2.5 mg           Date Details   08/02 This INR check               How to take your warfarin dose     To take:  2.5 mg Take 0.5 of a 5 mg tablet.    To take:  5 mg Take 1 of the 5 mg tablets.           September 2018 Details    Sun Mon Tue Wed Thu Fri Sat           1      5 mg           2      5 mg         3      2.5 mg         4      5 mg         5      5 mg         6      5 mg         7      2.5 mg         8       5 mg           9      5 mg         10      2.5 mg         11            12               13               14               15                 16               17               18               19               20               21               22                 23               24               25               26               27               28               29                 30                      Date Details   No additional details    Date of next INR:  9/11/2018         How to take your warfarin dose     To take:  2.5 mg Take 0.5 of a 5 mg tablet.    To take:  5 mg Take 1 of the 5 mg tablets.

## 2018-08-02 NOTE — PROGRESS NOTES
ANTICOAGULATION FOLLOW-UP CLINIC VISIT    Patient Name:  Melvin Abad  Date:  8/2/2018  Contact Type:  Face to Face    SUBJECTIVE:     Patient Findings     Positives No Problem Findings           OBJECTIVE    INR Protime   Date Value Ref Range Status   08/02/2018 2.4 (A) 0.86 - 1.14 Final       ASSESSMENT / PLAN  INR assessment THER    Recheck INR In: 6 WEEKS    INR Location Clinic      Anticoagulation Summary as of 8/2/2018     INR goal 2.0-3.0   Today's INR 2.4   Warfarin maintenance plan 2.5 mg (5 mg x 0.5) on Mon, Fri; 5 mg (5 mg x 1) all other days   Full warfarin instructions 2.5 mg on Mon, Fri; 5 mg all other days   Weekly warfarin total 30 mg   Plan last modified Shani Ross RN (4/11/2016)   Next INR check 9/11/2018   Target end date     Indications   Atrial fibrillation (Resolved) [I48.91]  Long-term (current) use of anticoagulants [Z79.01] [Z79.01]         Anticoagulation Episode Summary     INR check location     Preferred lab     Send INR reminders to [facility] POOL    Comments 5 mg tablets, AM taker, likes book, BP       Anticoagulation Care Providers     Provider Role Specialty Phone number    Lazaro Huston MD [facility] Internal Medicine [phone]            See the Encounter Report to view Anticoagulation Flowsheet and Dosing Calendar (Go to Encounters tab in chart review, and find the Anticoagulation Therapy Visit)    Dosage adjustment made based on physician directed care plan.    Farrah Moscoso RN

## 2018-09-06 ENCOUNTER — TRANSFERRED RECORDS (OUTPATIENT)
Dept: HEALTH INFORMATION MANAGEMENT | Facility: CLINIC | Age: 83
End: 2018-09-06

## 2018-09-11 ENCOUNTER — ANTICOAGULATION THERAPY VISIT (OUTPATIENT)
Dept: ANTICOAGULATION | Facility: CLINIC | Age: 83
End: 2018-09-11
Payer: COMMERCIAL

## 2018-09-11 DIAGNOSIS — Z79.01 LONG-TERM (CURRENT) USE OF ANTICOAGULANTS: ICD-10-CM

## 2018-09-11 LAB — INR POINT OF CARE: 2.6 (ref 0.86–1.14)

## 2018-09-11 PROCEDURE — 99207 ZZC NO CHARGE NURSE ONLY: CPT

## 2018-09-11 PROCEDURE — 36416 COLLJ CAPILLARY BLOOD SPEC: CPT

## 2018-09-11 PROCEDURE — 85610 PROTHROMBIN TIME: CPT | Mod: QW

## 2018-09-11 NOTE — MR AVS SNAPSHOT
Felicewilmar KELLEE Abad   9/11/2018 8:15 AM   Anticoagulation Therapy Visit    Description:  89 year old male   Provider:  SAMUEL ANTI COAG   Department:  [facility]           INR as of 9/11/2018     Today's INR 2.6      Anticoagulation Summary as of 9/11/2018     INR goal 2.0-3.0   Today's INR 2.6   Full warfarin instructions 2.5 mg on Mon, Fri; 5 mg all other days   Next INR check 10/23/2018    Indications   Atrial fibrillation (Resolved) [I48.91]  Long-term (current) use of anticoagulants [Z79.01] [Z79.01]         Your next Anticoagulation Clinic appointment(s)     Oct 23, 2018  8:15 AM CDT   Anticoagulation Visit with SAMUEL ANTI JUSTICE   [facility]    [address] [place] [id number] [phone]              Contact Numbers     Clinic Number:         September 2018 Details    Sun Mon Tue Wed Thu Fri Sat           1                 2               3               4               5               6               7               8                 9               10               11      5 mg   See details      12      5 mg         13      5 mg         14      2.5 mg         15      5 mg           16      5 mg         17      2.5 mg         18      5 mg         19      5 mg         20      5 mg         21      2.5 mg         22      5 mg           23      5 mg         24      2.5 mg         25      5 mg         26      5 mg         27      5 mg         28      2.5 mg         29      5 mg           30      5 mg                Date Details   09/11 This INR check               How to take your warfarin dose     To take:  2.5 mg Take 0.5 of a 5 mg tablet.    To take:  5 mg Take 1 of the 5 mg tablets.           October 2018 Details    Sun Mon Tue Wed Thu Fri Sat      1      2.5 mg         2      5 mg         3      5 mg         4      5 mg         5      2.5 mg         6      5 mg           7      5 mg         8      2.5 mg         9      5 mg         10      5 mg          11      5 mg         12      2.5 mg         13      5 mg           14      5 mg         15      2.5 mg         16      5 mg         17      5 mg         18      5 mg         19      2.5 mg         20      5 mg           21      5 mg         22      2.5 mg         23            24               25               26               27                 28               29               30               31                   Date Details   No additional details    Date of next INR:  10/23/2018         How to take your warfarin dose     To take:  2.5 mg Take 0.5 of a 5 mg tablet.    To take:  5 mg Take 1 of the 5 mg tablets.

## 2018-09-11 NOTE — PROGRESS NOTES
ANTICOAGULATION FOLLOW-UP CLINIC VISIT    Patient Name:  Melvin Abad  Date:  9/11/2018  Contact Type:  Face to Face    SUBJECTIVE:     Patient Findings     Positives No Problem Findings           OBJECTIVE    INR Protime   Date Value Ref Range Status   09/11/2018 2.6 (A) 0.86 - 1.14 Final       ASSESSMENT / PLAN  INR assessment THER    Recheck INR In: 6 WEEKS    INR Location Clinic      Anticoagulation Summary as of 9/11/2018     INR goal 2.0-3.0   Today's INR 2.6   Warfarin maintenance plan 2.5 mg (5 mg x 0.5) on Mon, Fri; 5 mg (5 mg x 1) all other days   Full warfarin instructions 2.5 mg on Mon, Fri; 5 mg all other days   Weekly warfarin total 30 mg   No change documented Rubne Siddiqui RN   Plan last modified Shani Ross RN (4/11/2016)   Next INR check 10/23/2018   Target end date     Indications   Atrial fibrillation (Resolved) [I48.91]  Long-term (current) use of anticoagulants [Z79.01] [Z79.01]         Anticoagulation Episode Summary     INR check location     Preferred lab     Send INR reminders to [facility] POOL    Comments 5 mg tablets, AM taker, likes book, BP       Anticoagulation Care Providers     Provider Role Specialty Phone number    Lazaro Huston MD [facility] Internal Medicine [phone]            See the Encounter Report to view Anticoagulation Flowsheet and Dosing Calendar (Go to Encounters tab in chart review, and find the Anticoagulation Therapy Visit)    Dosage adjustment made based on physician directed care plan.    Ruben Siddiqui RN

## 2018-09-16 ENCOUNTER — OFFICE VISIT (OUTPATIENT)
Dept: URGENT CARE | Facility: RETAIL CLINIC | Age: 83
End: 2018-09-16
Payer: COMMERCIAL

## 2018-09-16 VITALS — SYSTOLIC BLOOD PRESSURE: 158 MMHG | TEMPERATURE: 97.1 F | HEART RATE: 68 BPM | DIASTOLIC BLOOD PRESSURE: 87 MMHG

## 2018-09-16 DIAGNOSIS — L03.119 CELLULITIS AND ABSCESS OF LEG: Primary | ICD-10-CM

## 2018-09-16 DIAGNOSIS — S80.811A ABRASION, LEG W/ INFECTION, RIGHT, INITIAL ENCOUNTER: ICD-10-CM

## 2018-09-16 DIAGNOSIS — L02.419 CELLULITIS AND ABSCESS OF LEG: Primary | ICD-10-CM

## 2018-09-16 DIAGNOSIS — L08.9 ABRASION, LEG W/ INFECTION, RIGHT, INITIAL ENCOUNTER: ICD-10-CM

## 2018-09-16 PROCEDURE — 99213 OFFICE O/P EST LOW 20 MIN: CPT | Performed by: FAMILY MEDICINE

## 2018-09-16 RX ORDER — SILVER SULFADIAZINE 10 MG/G
CREAM TOPICAL 2 TIMES DAILY
Qty: 25 G | Refills: 1 | Status: SHIPPED | OUTPATIENT
Start: 2018-09-16 | End: 2018-09-23

## 2018-09-16 RX ORDER — CEPHALEXIN 500 MG/1
500 CAPSULE ORAL 3 TIMES DAILY
Qty: 30 CAPSULE | Refills: 0 | Status: SHIPPED | OUTPATIENT
Start: 2018-09-16 | End: 2019-04-10

## 2018-09-16 NOTE — PROGRESS NOTES
SUBJECTIVE:  Melvin Abad is a 89 year old male who presents to the clinic today for red tender area on rt ankle.  Small abrasion two weeks ago.  Healing very slowly and now developing red area on periphery..    Associated symptoms include: nothing.    Past Medical History:   Diagnosis Date     Actinic keratosis      Atrial fibrillation (H)      Basal cell carcinoma nos 06/10    Mohs excision, rt upper lip & rt temple     Cardiac dysrhythmia, unspecified      Cough     chronic cough     Diabetic eye exam (H) 8/29/14     Generalized osteoarthrosis, unspecified site     djd of the knees, hands, and neck     Other diseases of lung, not elsewhere classified     pulmonary nodule, benign     Pure hypercholesterolemia      Unspecified essential hypertension      Current Outpatient Prescriptions   Medication Sig Dispense Refill     blood glucose monitoring (NO BRAND SPECIFIED) test strip Test before meals and at bedtime. 100 each 3     blood glucose monitoring (ONE TOUCH ULTRASOFT) lancets 1 each 2 times daily Test before meals and at bedtime 1 Box 3     Carboxymethylcellulose Sod PF (RETAINE CMC) 0.5 % SOLN ophthalmic solution Place 1 drop into the right eye 3 times daily as needed for dry eyes       cephALEXin (KEFLEX) 500 MG capsule Take 1 capsule (500 mg) by mouth 3 times daily 30 capsule 0     Cholecalciferol (VITAMIN D) 2000 UNITS tablet Take 1 tablet by mouth daily       lisinopril (PRINIVIL/ZESTRIL) 10 MG tablet Take 1 tablet (10 mg) by mouth daily 90 tablet 3     metoprolol succinate (TOPROL-XL) 50 MG 24 hr tablet Take 1 tablet (50 mg) by mouth daily 90 tablet 3     multivitamin (OCUVITE) TABS Take 1 tablet by mouth daily.  0     polyethylene glycol (MIRALAX) powder Take 17 g by mouth daily 510 g 1     silver sulfADIAZINE (SILVADENE) 1 % cream Apply topically 2 times daily for 7 days 25 g 1     simvastatin (ZOCOR) 40 MG tablet TAKE ONE TABLET BY MOUTH AT BEDTIME 90 tablet 3     warfarin (COUMADIN) 5 MG  tablet TAKE ONE-HALF TABLET ON MONDAYS AND FRIDAYS AND ONE TABLET ON ALL THE OTHER DAYS OF THE WEEK OR AS DIRECTED BY COUMADIN CLINIC 90 tablet 3     History   Smoking Status     Former Smoker     Types: Cigarettes   Smokeless Tobacco     Never Used     Comment: 5 years       ROS:  Review of systems negative except as stated above.    EXAM:   /87 (BP Location: Left arm)  Pulse 68  Temp 97.1  F (36.2  C) (Temporal)  GENERAL: alert, no acute distress.  SKIN: Rash description:    Small abrasion on the posterior rt ankle over tendon.  Gait is normal.  Skin is dry, no weeping or purulence.  2-3 cm of surrounding redness and tenderness.  No fluctuant areas.  No pedal edema.  ASSESSMENT:  Abrasion with moderate surrounding cellulitis.    PLAN:  Silvadene has used successfully in the past.  Cephalexin.  Cleanse daily and apply silvadene.  Follow up with primary care provider if no improvement.

## 2018-09-16 NOTE — MR AVS SNAPSHOT
"              After Visit Summary   2018    Melvin Abad    MRN: [id number]           Patient Information     Date Of Birth          1928        Visit Information        Provider Department      2018 10:20 AM Elliott Banda MD [facility]        Today's Diagnoses     Cellulitis and abscess of leg    -  1    Abrasion, leg w/ infection, right, initial encounter           Follow-ups after your visit        Your next 10 appointments already scheduled     Oct 23, 2018  8:15 AM CDT   Anticoagulation Visit with PH ANTI COAG   [facility]    [address] [place] [phone] [phone]              Who to contact     You can reach your care team any time of the day by calling [phone].  Notification of test results:  If you have an abnormal lab result, we will notify you by phone as soon as possible.         Additional Information About Your Visit        MyChart Information     [facility] lets you send messages to your doctor, view your test results, renew your prescriptions, schedule appointments and more. To sign up, go to [email or web address] . Click on \"Log in\" on the left side of the screen, which will take you to the Welcome page. Then click on \"Sign up Now\" on the right side of the page.     You will be asked to enter the access code listed below, as well as some personal information. Please follow the directions to create your username and password.     Your access code is: [email or web address]  Expires: 12/15/2018 10:34 AM     Your access code will  in 90 days. If you need help or a new code, please call your [facility] clinic or [phone].        Care EveryWhere ID     This is your Care EveryWhere ID. This could be used by other organizations to access your [facility] medical records  [id number]        Your Vitals Were     Pulse Temperature                68 97.1  F (36.2  C) (Temporal)           Blood Pressure " from Last 3 Encounters:   09/16/18 158/87   07/10/18 146/81   05/23/18 133/77    Weight from Last 3 Encounters:   05/21/18 222 lb (100.7 kg)   03/13/18 220 lb (99.8 kg)   08/21/17 221 lb (100.2 kg)              Today, you had the following     No orders found for display         Today's Medication Changes          These changes are accurate as of 9/16/18 10:34 AM.  If you have any questions, ask your nurse or doctor.               Start taking these medicines.        Dose/Directions    cephALEXin 500 MG capsule   Commonly known as:  KEFLEX   Used for:  Cellulitis and abscess of leg, Abrasion, leg w/ infection, right, initial encounter        Dose:  500 mg   Take 1 capsule (500 mg) by mouth 3 times daily   Quantity:  30 capsule   Refills:  0       silver sulfADIAZINE 1 % cream   Commonly known as:  SILVADENE   Used for:  Abrasion, leg w/ infection, right, initial encounter        Apply topically 2 times daily for 7 days   Quantity:  25 g   Refills:  1            Where to get your medicines      These medications were sent to [address]  [address] [place] [address]     Phone:  [phone]     cephALEXin 500 MG capsule    silver sulfADIAZINE 1 % cream                Primary Care Provider Office Phone # Fax #    Lazaro Huston [phone]       [facility] [address]        Equal Access to Services     [facility] AH: Hadii javier freitas hadasho Somari, waaxda luqadaha, qaybta kaalmada adenadineyada, karis schaeffer . So [facility] [phone].    ATENCIÓN: Si habla español, tiene a piña disposición servicios gratuitos de asistencia lingüística. Llame al [phone].    We comply with applicable federal civil rights laws and [place] laws. We do not discriminate on the basis of race, color, national origin, age, disability, sex, sexual orientation, or gender identity.            Thank you!     Thank you for choosing [facility]  for your care. Our goal is always to provide you with excellent care. Hearing back from our patients is one way we can continue to improve our services. Please take a few minutes to complete the written survey that you may receive in the mail after your visit with us. Thank you!             Your Updated Medication List - Protect others around you: Learn how to safely use, store and throw away your medicines at www.disposemymeds.org.          This list is accurate as of 9/16/18 10:34 AM.  Always use your most recent med list.                   Brand Name Dispense Instructions for use Diagnosis    blood glucose monitoring lancets     1 Box    1 each 2 times daily Test before meals and at bedtime    Type 2 diabetes mellitus without complication (H)       blood glucose monitoring test strip    no brand specified    100 each    Test before meals and at bedtime.    Type 2 diabetes mellitus without complication (H)       cephALEXin 500 MG capsule    KEFLEX    30 capsule    Take 1 capsule (500 mg) by mouth 3 times daily    Cellulitis and abscess of leg, Abrasion, leg w/ infection, right, initial encounter       lisinopril 10 MG tablet    PRINIVIL/ZESTRIL    90 tablet    Take 1 tablet (10 mg) by mouth daily    Essential hypertension, benign       metoprolol succinate 50 MG 24 hr tablet    TOPROL-XL    90 tablet    Take 1 tablet (50 mg) by mouth daily    Essential hypertension, benign       multivitamin Tabs tablet      Take 1 tablet by mouth daily.        polyethylene glycol powder    MIRALAX    510 g    Take 17 g by mouth daily    Constipation, unspecified constipation type       RETAINE CMC 0.5 % Soln ophthalmic solution   Generic drug:  Carboxymethylcellulose Sod PF      Place 1 drop into the right eye 3 times daily as needed for dry eyes        silver sulfADIAZINE 1 % cream    SILVADENE    25 g    Apply topically 2 times daily for 7 days    Abrasion, leg w/ infection, right, initial encounter        simvastatin 40 MG tablet    ZOCOR    90 tablet    TAKE ONE TABLET BY MOUTH AT BEDTIME    Hyperlipidemia LDL goal <100       vitamin D 2000 units tablet      Take 1 tablet by mouth daily        warfarin 5 MG tablet    COUMADIN    90 tablet    TAKE ONE-HALF TABLET ON MONDAYS AND FRIDAYS AND ONE TABLET ON ALL THE OTHER DAYS OF THE WEEK OR AS DIRECTED BY COUMADIN CLINIC    Long-term (current) use of anticoagulants, Chronic atrial fibrillation (H)

## 2018-10-18 ENCOUNTER — OFFICE VISIT (OUTPATIENT)
Dept: DERMATOLOGY | Facility: CLINIC | Age: 83
End: 2018-10-18
Payer: COMMERCIAL

## 2018-10-18 DIAGNOSIS — L82.1 SEBORRHEIC KERATOSIS: ICD-10-CM

## 2018-10-18 DIAGNOSIS — D48.5 NEOPLASM OF UNCERTAIN BEHAVIOR OF SKIN: Primary | ICD-10-CM

## 2018-10-18 DIAGNOSIS — L57.0 AK (ACTINIC KERATOSIS): ICD-10-CM

## 2018-10-18 DIAGNOSIS — D18.01 CHERRY ANGIOMA: ICD-10-CM

## 2018-10-18 DIAGNOSIS — Z85.828 HISTORY OF NONMELANOMA SKIN CANCER: ICD-10-CM

## 2018-10-18 DIAGNOSIS — L81.4 SOLAR LENTIGO: ICD-10-CM

## 2018-10-18 PROCEDURE — 11100 HC BIOPSY SKIN/SUBQ/MUC MEM, SINGLE LESION: CPT | Mod: 59 | Performed by: DERMATOLOGY

## 2018-10-18 PROCEDURE — 99214 OFFICE O/P EST MOD 30 MIN: CPT | Mod: 25 | Performed by: DERMATOLOGY

## 2018-10-18 PROCEDURE — 17003 DESTRUCT PREMALG LES 2-14: CPT | Performed by: DERMATOLOGY

## 2018-10-18 PROCEDURE — 88305 TISSUE EXAM BY PATHOLOGIST: CPT | Mod: TC | Performed by: DERMATOLOGY

## 2018-10-18 PROCEDURE — 17000 DESTRUCT PREMALG LESION: CPT | Performed by: DERMATOLOGY

## 2018-10-18 ASSESSMENT — PAIN SCALES - GENERAL: PAINLEVEL: NO PAIN (0)

## 2018-10-18 NOTE — NURSING NOTE
Drug Administration Record    Drug Name: Lidocaine with Epi  Dose: 1.5 cc  Route administered: SQ  NDC #: Lidocaine with Epi (1024-3784-79)  Amount of waste(mL): 0  Reason for waste: n/a    LOT #: -[id number]  SITE: see procedure note  : [facility]  EXPIRATION DATE: 1-sept-2019    Sandie Robins LPN

## 2018-10-18 NOTE — PATIENT INSTRUCTIONS
Cryotherapy    What is it?    Use of a very cold liquid, such as liquid nitrogen, to freeze and destroy abnormal skin cells that need to be removed    What should I expect?    Tenderness and redness    A small blister that might grow and fill with dark purple blood. There may be crusting.    More than one treatment may be needed if the lesions do not go away.    How do I care for the treated area?    Gently wash the area with your hands when bathing.    Use a thin layer of Vaseline to help with healing. You may use a Band-Aid.     The area should heal within 7-10 days and may leave behind a pink or lighter color.     Do not use an antibiotic or Neosporin ointment.     You may take acetaminophen (Tylenol) for pain.     Call your Doctor if you have:    Severe pain    Signs of infection (warmth, redness, cloudy yellow drainage, and or a bad smell)    Questions or concerns    Who should I call with questions?       [facility]: [phone]       [facility]: [phone]       For urgent needs outside of business hours call the [facility] at [phone]        and ask for the dermatology resident on call        Wound Care After a Biopsy    What is a skin biopsy?  A skin biopsy allows the doctor to examine a very small piece of tissue under the microscope to determine the diagnosis and the best treatment for the skin condition. A local anesthetic (numbing medicine)  is injected with a very small needle into the skin area to be tested. A small piece of skin is taken from the area. Sometimes a suture (stitch) is used.     What are the risks of a skin biopsy?  I will experience scar, bleeding, swelling, pain, crusting and redness. I may experience incomplete removal or recurrence. Risks of this procedure are excessive bleeding, bruising, infection, nerve damage, numbness, thick (hypertrophic or keloidal) scar and non-diagnostic biopsy.    How should I  care for my wound for the first 24 hours?    Keep the wound dry and covered for 24 hours    If it bleeds, hold direct pressure on the area for 15 minutes. If bleeding does not stop then go to the emergency room    Avoid strenuous exercise the first 1-2 days or as your doctor instructs you    How should I care for the wound after 24 hours?    After 24 hours, remove the bandage    You may bathe or shower as normal    If you had a scalp biopsy, you can shampoo as usual and can use shower water to clean the biopsy site daily    Clean the wound twice a day with gentle soap and water    Do not scrub, be gentle    Apply white petroleum/Vaseline after cleaning the wound with a cotton swab or a clean finger, and keep the site covered with a Bandaid /bandage. Bandages are not necessary with a scalp biopsy    If you are unable to cover the site with a Bandaid /bandage, re-apply ointment 2-3 times a day to keep the site moist. Moisture will help with healing    Avoid strenuous activity for first 1-2 days    Avoid lakes, rivers, pools, and oceans until the stitches are removed or the site is healed    How do I clean my wound?    Wash hands thoroughly with soap or use hand  before all wound care    Clean the wound with gentle soap and water    Apply white petroleum/Vaseline  to wound after it is clean    Replace the Bandaid /bandage to keep the wound covered for the first few days or as instructed by your doctor    If you had a scalp biopsy, warm shower water to the area on a daily basis should suffice    What should I use to clean my wound?     Cotton-tipped applicators (Qtips )    White petroleum jelly (Vaseline ). Use a clean new container and use Q-tips to apply.    Bandaids   as needed    Gentle soap     How should I care for my wound long term?    Do not get your wound dirty    Keep up with wound care for one week or until the area is healed.    A small scab will form and fall off by itself when the area is  completely healed. The area will be red and will become pink in color as it heals. Sun protection is very important for how your scar will turn out. Sunscreen with an SPF 30 or greater is recommended once the area is healed.    If you have stitches, stitches need to be removed in N/A days. You may return to our clinic for this or you may have it done locally at your doctor s office.    You should have some soreness but it should be mild and slowly go away over several days. Talk to your doctor about using tylenol for pain,    When should I call my doctor?  If you have increased:     Pain or swelling    Pus or drainage (clear or slightly yellow drainage is ok)    Temperature over 100F    Spreading redness or warmth around wound    When will I hear about my results?  The biopsy results can take 2-3 weeks to come back. The clinic will call you with the results, send you a [employer] message, or have you schedule a follow-up clinic or phone time to discuss the results. Contact our clinics if you do not hear from us in 3 weeks.     Who should I call with questions?    [facility]: [phone]     [facility]: [phone]    For urgent needs outside of business hours call the [facility] at [phone] and ask for the dermatology resident on call

## 2018-10-18 NOTE — MR AVS SNAPSHOT
After Visit Summary   10/18/2018    Melvin Abad    MRN: [id number]           Patient Information     Date Of Birth          4/24/1928        Visit Information        Provider Department      10/18/2018 11:15 AM Ryann Morocho MD [facility]        Today's Diagnoses     Neoplasm of uncertain behavior of skin    -  1      Care Instructions    Cryotherapy    What is it?    Use of a very cold liquid, such as liquid nitrogen, to freeze and destroy abnormal skin cells that need to be removed    What should I expect?    Tenderness and redness    A small blister that might grow and fill with dark purple blood. There may be crusting.    More than one treatment may be needed if the lesions do not go away.    How do I care for the treated area?    Gently wash the area with your hands when bathing.    Use a thin layer of Vaseline to help with healing. You may use a Band-Aid.     The area should heal within 7-10 days and may leave behind a pink or lighter color.     Do not use an antibiotic or Neosporin ointment.     You may take acetaminophen (Tylenol) for pain.     Call your Doctor if you have:    Severe pain    Signs of infection (warmth, redness, cloudy yellow drainage, and or a bad smell)    Questions or concerns    Who should I call with questions?       [facility]: [phone]       [facility]: [phone]       For urgent needs outside of business hours call the [facility] at [phone]        and ask for the dermatology resident on call        Wound Care After a Biopsy    What is a skin biopsy?  A skin biopsy allows the doctor to examine a very small piece of tissue under the microscope to determine the diagnosis and the best treatment for the skin condition. A local anesthetic (numbing medicine)  is injected with a very small needle into the skin area to be tested. A small piece of skin is taken from the  area. Sometimes a suture (stitch) is used.     What are the risks of a skin biopsy?  I will experience scar, bleeding, swelling, pain, crusting and redness. I may experience incomplete removal or recurrence. Risks of this procedure are excessive bleeding, bruising, infection, nerve damage, numbness, thick (hypertrophic or keloidal) scar and non-diagnostic biopsy.    How should I care for my wound for the first 24 hours?    Keep the wound dry and covered for 24 hours    If it bleeds, hold direct pressure on the area for 15 minutes. If bleeding does not stop then go to the emergency room    Avoid strenuous exercise the first 1-2 days or as your doctor instructs you    How should I care for the wound after 24 hours?    After 24 hours, remove the bandage    You may bathe or shower as normal    If you had a scalp biopsy, you can shampoo as usual and can use shower water to clean the biopsy site daily    Clean the wound twice a day with gentle soap and water    Do not scrub, be gentle    Apply white petroleum/Vaseline after cleaning the wound with a cotton swab or a clean finger, and keep the site covered with a Bandaid /bandage. Bandages are not necessary with a scalp biopsy    If you are unable to cover the site with a Bandaid /bandage, re-apply ointment 2-3 times a day to keep the site moist. Moisture will help with healing    Avoid strenuous activity for first 1-2 days    Avoid lakes, rivers, pools, and oceans until the stitches are removed or the site is healed    How do I clean my wound?    Wash hands thoroughly with soap or use hand  before all wound care    Clean the wound with gentle soap and water    Apply white petroleum/Vaseline  to wound after it is clean    Replace the Bandaid /bandage to keep the wound covered for the first few days or as instructed by your doctor    If you had a scalp biopsy, warm shower water to the area on a daily basis should suffice    What should I use to clean my wound?      Cotton-tipped applicators (Qtips )    White petroleum jelly (Vaseline ). Use a clean new container and use Q-tips to apply.    Bandaids   as needed    Gentle soap     How should I care for my wound long term?    Do not get your wound dirty    Keep up with wound care for one week or until the area is healed.    A small scab will form and fall off by itself when the area is completely healed. The area will be red and will become pink in color as it heals. Sun protection is very important for how your scar will turn out. Sunscreen with an SPF 30 or greater is recommended once the area is healed.    If you have stitches, stitches need to be removed in N/A days. You may return to our clinic for this or you may have it done locally at your doctor s office.    You should have some soreness but it should be mild and slowly go away over several days. Talk to your doctor about using tylenol for pain,    When should I call my doctor?  If you have increased:     Pain or swelling    Pus or drainage (clear or slightly yellow drainage is ok)    Temperature over 100F    Spreading redness or warmth around wound    When will I hear about my results?  The biopsy results can take 2-3 weeks to come back. The clinic will call you with the results, send you a [facility] message, or have you schedule a follow-up clinic or phone time to discuss the results. Contact our clinics if you do not hear from us in 3 weeks.     Who should I call with questions?    [facility]: [phone]     [facility]: [phone]    For urgent needs outside of business hours call the [facility] at [phone] and ask for the dermatology resident on call              Follow-ups after your visit        Your next 10 appointments already scheduled     Oct 23, 2018  8:15 AM CDT   Anticoagulation Visit with PH ANTI COAG   [facility] ([facility]    [address] [place] [address]  [place]  [facility] [address] [phone]              Who to contact     If you have questions or need follow up information about today's clinic visit or your schedule please contact [facility] directly at [phone].  Normal or non-critical lab and imaging results will be communicated to you by MyChart, letter or phone within 4 business days after the clinic has received the results. If you do not hear from us within 7 days, please contact the clinic through MyChart or phone. If you have a critical or abnormal lab result, we will notify you by phone as soon as possible.  Submit refill requests through [employer] or call your pharmacy and they will forward the refill request to us. Please allow 3 business days for your refill to be completed.          Additional Information About Your Visit        [employer] Information     [employer] is an electronic gateway that provides easy, online access to your medical records. With [employer], you can request a clinic appointment, read your test results, renew a prescription or communicate with your care team.     To sign up for [employer] visit the website at [email or web address]   You will be asked to enter the access code listed below, as well as some personal information. Please follow the directions to create your username and password.     Your access code is: [email or web address]  Expires: 12/15/2018 10:34 AM     Your access code will  in 90 days. If you need help or a new code, please contact your [facility] or call [phone] for assistance.        Care EveryWhere ID     This is your Care EveryWhere ID. This could be used by other organizations to access your [place] medical records  [id number]         Blood Pressure from Last 3 Encounters:   18 158/87   07/10/18 146/81   18 133/77    Weight from Last 3 Encounters:   18 100.7 kg (222 lb)   18 99.8 kg (220 lb)   17 100.2 kg (221 lb)               Today, you had the following     No orders found for display       Primary Care Provider Office Phone # Fax #    Lazaro Huston [phone]       [address] [place] [id number]        Equal Access to Services     VERONICA MIN : Andrew herrera ku nathanielo Sokarthikali, waaxda luqadaha, qaybta kaalmada adeegyada, waxpeewee idiin hayaan jelly carver laTellolazaro carrero. So [facility] [phone].    ATENCIÓN: Si habla español, tiene a piña disposición servicios gratuitos de asistencia lingüística. Llame al [phone].    We comply with applicable federal civil rights laws and [place] laws. We do not discriminate on the basis of race, color, national origin, age, disability, sex, sexual orientation, or gender identity.            Thank you!     Thank you for choosing [facility]  for your care. Our goal is always to provide you with excellent care. Hearing back from our patients is one way we can continue to improve our services. Please take a few minutes to complete the written survey that you may receive in the mail after your visit with us. Thank you!             Your Updated Medication List - Protect others around you: Learn how to safely use, store and throw away your medicines at www.disposemymeds.org.          This list is accurate as of 10/18/18 11:42 AM.  Always use your most recent med list.                   Brand Name Dispense Instructions for use Diagnosis    blood glucose monitoring lancets     1 Box    1 each 2 times daily Test before meals and at bedtime    Type 2 diabetes mellitus without complication (H)       blood glucose monitoring test strip    no brand specified    100 each    Test before meals and at bedtime.    Type 2 diabetes mellitus without complication (H)       cephALEXin 500 MG capsule    KEFLEX    30 capsule    Take 1 capsule (500 mg) by mouth 3 times daily    Cellulitis and abscess of leg, Abrasion, leg w/ infection, right, initial encounter       lisinopril 10  MG tablet    PRINIVIL/ZESTRIL    90 tablet    Take 1 tablet (10 mg) by mouth daily    Essential hypertension, benign       metoprolol succinate 50 MG 24 hr tablet    TOPROL-XL    90 tablet    Take 1 tablet (50 mg) by mouth daily    Essential hypertension, benign       multivitamin Tabs tablet      Take 1 tablet by mouth daily.        polyethylene glycol powder    MIRALAX    510 g    Take 17 g by mouth daily    Constipation, unspecified constipation type       RETAINE CMC 0.5 % Soln ophthalmic solution   Generic drug:  Carboxymethylcellulose Sod PF      Place 1 drop into the right eye 3 times daily as needed for dry eyes        simvastatin 40 MG tablet    ZOCOR    90 tablet    TAKE ONE TABLET BY MOUTH AT BEDTIME    Hyperlipidemia LDL goal <100       vitamin D 2000 units tablet      Take 1 tablet by mouth daily        warfarin 5 MG tablet    COUMADIN    90 tablet    TAKE ONE-HALF TABLET ON MONDAYS AND FRIDAYS AND ONE TABLET ON ALL THE OTHER DAYS OF THE WEEK OR AS DIRECTED BY COUMADIN CLINIC    Long-term (current) use of anticoagulants, Chronic atrial fibrillation (H)

## 2018-10-18 NOTE — NURSING NOTE
Melvin Abad's goals for this visit include:   Chief Complaint   Patient presents with     Skin Check     Spot of concern on left wrist, and on right ear. Personal hx of SC.     He requests these members of his care team be copied on today's visit information:     PCP: Lazaro Huston    Referring Provider:  Referred Self, MD  No address on file    There were no vitals taken for this visit.    Do you need any medication refills at today's visit? No    Sandie Robins LPN

## 2018-10-18 NOTE — LETTER
10/18/2018         RE: Melvin Abad  [address] [place] [id number]        Dear Colleague,    Thank you for referring your patient, Melvin Abad, to the [facility]. Please see a copy of my visit note below.    [facility] Dermatology Note      Dermatology Problem List:  1. NMSC  -BCC, right upper cutaneous llip, s/p Mohs 6/2010  -BCC, right lower eyelid, s/p Mohs prior to 2012  -BCC, recurrent, right [place], s/p Mohs 6/2010  2. Actinic keratoses  -Previous Tx: Efudex 1/2012 to the forehead and dorsal nose X6 weeks by Dr. Bonilla, treatment for dorsal arms recommended 5/2013  3. Hypertrophic actinic keratosis, right posterior helix  -s/p biopsy 12/11/14  4. Stasis dermatitis, evaluated by Dr. Bonilla 1/2011 and recommended compression socks  5. NUB, left forearm, bx 10/18/18. Patient self treated with efudex cream prior to biopsy. SK vs AK vs SCC      Encounter Date: Oct 18, 2018    CC:  Chief Complaint   Patient presents with     Skin Check     Spot of concern on left wrist, and on right ear. Personal hx of SC.         History of Present Illness:  Mr. Melvin Abad is a 89 year old male who presents as a follow-up for full body skin exam. His son accompanied him to the visit. Patient has a history of non-melanoma skin cancer. States that he has a couple marks on the forehead that he notices because they are rough. They do not hurt. He has another lesion of concern on the L forearm. He tried to self treat with an old tube he had of fluorouracil cream.  It did not get inflamed with treatment, but did seem to get smaller and less scaly after treatment. The area is not tender to the touch. The patient has no further areas of concern.     Past Medical History:   Patient Active Problem List   Diagnosis     Long term current use of anticoagulant therapy     Hyperlipidemia LDL goal <100     Type 2 diabetes mellitus without complication (H)      Diverticulosis     Health Care Home     Advanced directives, counseling/discussion     History of skin cancer     AK (actinic keratosis)     Gilbert syndrome     CTS (carpal tunnel syndrome)     Cellulitis     Anemia due to blood loss, acute     DVT prophylaxis     Obesity (BMI 30-39.9)     Chronic atrial fibrillation (H)     Long-term (current) use of anticoagulants [Z79.01]     Pain of right lower leg     Contusion of right lower extremity     Leg pain     Type 2 diabetes mellitus without complication, without long-term current use of insulin (H)     Past Medical History:   Diagnosis Date     Actinic keratosis      Atrial fibrillation (H)      Basal cell carcinoma nos 06/10    Mohs excision, rt upper lip & rt temple     Cardiac dysrhythmia, unspecified      Cough     chronic cough     Diabetic eye exam (H) 8/29/14     Generalized osteoarthrosis, unspecified site     djd of the knees, hands, and neck     Other diseases of lung, not elsewhere classified     pulmonary nodule, benign     Pure hypercholesterolemia      Unspecified essential hypertension      Past Surgical History:   Procedure Laterality Date     C TOTAL KNEE ARTHROPLASTY  1997    Knee Replacement, Total     COLONOSCOPY  05/02/2007    Multiple bxs of diminutive polyps of ascending colon.     COLONOSCOPY  11/10/2010    COLONOSCOPY performed by MARISELA GRIMM at  GI     HC COLONOSCOPY W/WO BRUSH/WASH  10/19/2001     HC COLONOSCOPY W/WO BRUSH/WASH  11/02/2004      HEMORRHOIDOPEXY BY STAPLING  09/26/08       Social History:   reports that he has quit smoking. His smoking use included Cigarettes. He has never used smokeless tobacco. He reports that he does not drink alcohol or use illicit drugs.    Family History:  Family History   Problem Relation Age of Onset     Cancer Mother      Prostate Cancer Father        Medications:  Current Outpatient Prescriptions   Medication Sig Dispense Refill     blood glucose monitoring (NO BRAND SPECIFIED) test strip  Test before meals and at bedtime. 100 each 3     blood glucose monitoring (ONE TOUCH ULTRASOFT) lancets 1 each 2 times daily Test before meals and at bedtime 1 Box 3     Carboxymethylcellulose Sod PF (RETAINE CMC) 0.5 % SOLN ophthalmic solution Place 1 drop into the right eye 3 times daily as needed for dry eyes       Cholecalciferol (VITAMIN D) 2000 UNITS tablet Take 1 tablet by mouth daily       lisinopril (PRINIVIL/ZESTRIL) 10 MG tablet Take 1 tablet (10 mg) by mouth daily 90 tablet 3     metoprolol succinate (TOPROL-XL) 50 MG 24 hr tablet Take 1 tablet (50 mg) by mouth daily 90 tablet 3     multivitamin (OCUVITE) TABS Take 1 tablet by mouth daily.  0     polyethylene glycol (MIRALAX) powder Take 17 g by mouth daily 510 g 1     simvastatin (ZOCOR) 40 MG tablet TAKE ONE TABLET BY MOUTH AT BEDTIME 90 tablet 3     warfarin (COUMADIN) 5 MG tablet TAKE ONE-HALF TABLET ON MONDAYS AND FRIDAYS AND ONE TABLET ON ALL THE OTHER DAYS OF THE WEEK OR AS DIRECTED BY COUMADIN CLINIC 90 tablet 3     cephALEXin (KEFLEX) 500 MG capsule Take 1 capsule (500 mg) by mouth 3 times daily (Patient not taking: Reported on 10/18/2018) 30 capsule 0       No Known Allergies    Review of Systems:  -Constitutional:  Feeling well, in usual state of health.  -Skin:  As per HPI, no additional concerns.      Physical exam:  Vitals: There were no vitals taken for this visit.  GEN: This is a well developed, well-nourished male in no acute distress, in a pleasant mood.    SKIN: Total skin excluding the undergarment areas was performed. The exam included the head/face, neck, both arms, chest, back, abdomen, both legs, digits and/or nails.   -Leo's skin type I  -Well healed scars in areas of past skin cancers. There is no erythema, telangectasias, nodularity, or pigmentation.  -Uniform solar lentigines in sun exposed areas.  -Gritty pink papules on the forehead x2 , left cheek, right nasal side wall   -There are dome shaped bright red papules on  the trunk.   -There are waxy stuck on tan to brown papules on the trunk.  -Dime size scaly hyperpigmented papule on the left forearm. No true pigment network on dermoscopy.  -No other lesions of concern on areas examined.       Impression/Plan:  1. History of non-melanoma skin cancer. No clinical evidence of recurrence. Will continue skin checks every year at this time.     Recommend sunscreens SPF #30 or greater, protective clothing and avoidance of tanning beds.    No further intervention required. Patient to report changes.    2. Neoplasm of uncertain behavior on the left foreharm. The differential diagnosis includes SK vs AK vs SCC. Most consistent with an SK clinically, but patient notes improvement in lesion after treating with an old tube of efudex (although he denies that the lesion became inflamed with treatment). Will biopsy today to rule out AK/SCC.    Shave biopsy:  After discussion of benefits and risks including but not limited to bleeding/bruising, pain/swelling, infection, scar, incomplete removal, nerve damage/numbness, recurrence, and non-diagnostic biopsy, written consent, verbal consent and photographs were obtained. Time-out was performed. The area was cleaned with isopropyl alcohol.  was injected to obtain adequate anesthesia of the lesion on the left foreharm. 0.5ml of 1% lidocaine with 1:100,000 epinephrine was injected to obtain adequate anesthesia. A  shave biopsy was performed. Hemostasis was achieved with aluminium chloride. Vaseline and a sterile dressing were applied. The patient tolerated the procedure and no complications were noted. The patient was provided with verbal and written post care instructions.      Photograph was obtained for clinical monitoring and inclusion in medical record.     3. Actinic keratosis x4    Discussed precancerous nature. Recommended treatment to prevent progression to SCC.    Cryotherapy procedure note: After verbal consent and discussion of risks and  benefits including but no limited to dyspigmentation/scar, blister, and pain, 4 was(were) treated with 1-2mm freeze border for 2 cycles with liquid nitrogen. Post cryotherapy instructions were provided.     4. Multiply benign skin findings including:  cherry angiomas, solar lentigines, and seborrheic keratoses    Reassured patient that these spots are benign     No further intervention required. Patient to report changes.     Follow-up in 1 year, earlier for new or changing lesions.       Staff Involved:    Scribe Disclosure  I, Lalito Posey, am serving as a scribe to document services personally performed by ,  based on data collection and the provider's statements to me.     Provider Disclosure:   The documentation recorded by the scribe accurately reflects the services I personally performed and the decisions made by me.    Ryann Morocho MD    Department of Dermatology  [facility]: Phone: [phone], Fax:[phone]  [facility]: Phone: [phone], Fax: [phone]                    Again, thank you for allowing me to participate in the care of your patient.        Sincerely,        Ryann Morocho MD

## 2018-10-18 NOTE — PROGRESS NOTES
[facility] Dermatology Note      Dermatology Problem List:  1. NMSC  -BCC, right upper cutaneous llip, s/p Mohs 6/2010  -BCC, right lower eyelid, s/p Mohs prior to 2012  -BCC, recurrent, right [place], s/p Mohs 6/2010  2. Actinic keratoses  -Previous Tx: Efudex 1/2012 to the forehead and dorsal nose X6 weeks by Dr. Bonilla, treatment for dorsal arms recommended 5/2013  3. Hypertrophic actinic keratosis, right posterior helix  -s/p biopsy 12/11/14  4. Stasis dermatitis, evaluated by Dr. Bonilla 1/2011 and recommended compression socks  5. NUB, left forearm, bx 10/18/18. Patient self treated with efudex cream prior to biopsy. SK vs AK vs SCC      Encounter Date: Oct 18, 2018    CC:  Chief Complaint   Patient presents with     Skin Check     Spot of concern on left wrist, and on right ear. Personal hx of SC.         History of Present Illness:  Mr. Melvin Abad is a 89 year old male who presents as a follow-up for full body skin exam. His son accompanied him to the visit. Patient has a history of non-melanoma skin cancer. States that he has a couple marks on the forehead that he notices because they are rough. They do not hurt. He has another lesion of concern on the L forearm. He tried to self treat with an old tube he had of fluorouracil cream.  It did not get inflamed with treatment, but did seem to get smaller and less scaly after treatment. The area is not tender to the touch. The patient has no further areas of concern.     Past Medical History:   Patient Active Problem List   Diagnosis     Long term current use of anticoagulant therapy     Hyperlipidemia LDL goal <100     Type 2 diabetes mellitus without complication (H)     Diverticulosis     Health Care Home     Advanced directives, counseling/discussion     History of skin cancer     AK (actinic keratosis)     Gilbert syndrome     CTS (carpal tunnel syndrome)     Cellulitis     Anemia due to blood loss, acute     DVT prophylaxis      Obesity (BMI 30-39.9)     Chronic atrial fibrillation (H)     Long-term (current) use of anticoagulants [Z79.01]     Pain of right lower leg     Contusion of right lower extremity     Leg pain     Type 2 diabetes mellitus without complication, without long-term current use of insulin (H)     Past Medical History:   Diagnosis Date     Actinic keratosis      Atrial fibrillation (H)      Basal cell carcinoma nos 06/10    Mohs excision, rt upper lip & rt temple     Cardiac dysrhythmia, unspecified      Cough     chronic cough     Diabetic eye exam (H) 8/29/14     Generalized osteoarthrosis, unspecified site     djd of the knees, hands, and neck     Other diseases of lung, not elsewhere classified     pulmonary nodule, benign     Pure hypercholesterolemia      Unspecified essential hypertension      Past Surgical History:   Procedure Laterality Date     C TOTAL KNEE ARTHROPLASTY  1997    Knee Replacement, Total     COLONOSCOPY  05/02/2007    Multiple bxs of diminutive polyps of ascending colon.     COLONOSCOPY  11/10/2010    COLONOSCOPY performed by MARISELA GRIMM at  GI     HC COLONOSCOPY W/WO BRUSH/WASH  10/19/2001     HC COLONOSCOPY W/WO BRUSH/WASH  11/02/2004      HEMORRHOIDOPEXY BY STAPLING  09/26/08       Social History:   reports that he has quit smoking. His smoking use included Cigarettes. He has never used smokeless tobacco. He reports that he does not drink alcohol or use illicit drugs.    Family History:  Family History   Problem Relation Age of Onset     Cancer Mother      Prostate Cancer Father        Medications:  Current Outpatient Prescriptions   Medication Sig Dispense Refill     blood glucose monitoring (NO BRAND SPECIFIED) test strip Test before meals and at bedtime. 100 each 3     blood glucose monitoring (ONE TOUCH ULTRASOFT) lancets 1 each 2 times daily Test before meals and at bedtime 1 Box 3     Carboxymethylcellulose Sod PF (RETAINE CMC) 0.5 % SOLN ophthalmic solution Place 1 drop into the  right eye 3 times daily as needed for dry eyes       Cholecalciferol (VITAMIN D) 2000 UNITS tablet Take 1 tablet by mouth daily       lisinopril (PRINIVIL/ZESTRIL) 10 MG tablet Take 1 tablet (10 mg) by mouth daily 90 tablet 3     metoprolol succinate (TOPROL-XL) 50 MG 24 hr tablet Take 1 tablet (50 mg) by mouth daily 90 tablet 3     multivitamin (OCUVITE) TABS Take 1 tablet by mouth daily.  0     polyethylene glycol (MIRALAX) powder Take 17 g by mouth daily 510 g 1     simvastatin (ZOCOR) 40 MG tablet TAKE ONE TABLET BY MOUTH AT BEDTIME 90 tablet 3     warfarin (COUMADIN) 5 MG tablet TAKE ONE-HALF TABLET ON MONDAYS AND FRIDAYS AND ONE TABLET ON ALL THE OTHER DAYS OF THE WEEK OR AS DIRECTED BY COUMADIN CLINIC 90 tablet 3     cephALEXin (KEFLEX) 500 MG capsule Take 1 capsule (500 mg) by mouth 3 times daily (Patient not taking: Reported on 10/18/2018) 30 capsule 0       No Known Allergies    Review of Systems:  -Constitutional:  Feeling well, in usual state of health.  -Skin:  As per HPI, no additional concerns.      Physical exam:  Vitals: There were no vitals taken for this visit.  GEN: This is a well developed, well-nourished male in no acute distress, in a pleasant mood.    SKIN: Total skin excluding the undergarment areas was performed. The exam included the head/face, neck, both arms, chest, back, abdomen, both legs, digits and/or nails.   -Leo's skin type I  -Well healed scars in areas of past skin cancers. There is no erythema, telangectasias, nodularity, or pigmentation.  -Uniform solar lentigines in sun exposed areas.  -Gritty pink papules on the forehead x2 , left cheek, right nasal side wall   -There are dome shaped bright red papules on the trunk.   -There are waxy stuck on tan to brown papules on the trunk.  -Dime size scaly hyperpigmented papule on the left forearm. No true pigment network on dermoscopy.  -No other lesions of concern on areas examined.       Impression/Plan:  1. History of  non-melanoma skin cancer. No clinical evidence of recurrence. Will continue skin checks every year at this time.     Recommend sunscreens SPF #30 or greater, protective clothing and avoidance of tanning beds.    No further intervention required. Patient to report changes.    2. Neoplasm of uncertain behavior on the left foreharm. The differential diagnosis includes SK vs AK vs SCC. Most consistent with an SK clinically, but patient notes improvement in lesion after treating with an old tube of efudex (although he denies that the lesion became inflamed with treatment). Will biopsy today to rule out AK/SCC.    Shave biopsy:  After discussion of benefits and risks including but not limited to bleeding/bruising, pain/swelling, infection, scar, incomplete removal, nerve damage/numbness, recurrence, and non-diagnostic biopsy, written consent, verbal consent and photographs were obtained. Time-out was performed. The area was cleaned with isopropyl alcohol.  was injected to obtain adequate anesthesia of the lesion on the left foreharm. 0.5ml of 1% lidocaine with 1:100,000 epinephrine was injected to obtain adequate anesthesia. A  shave biopsy was performed. Hemostasis was achieved with aluminium chloride. Vaseline and a sterile dressing were applied. The patient tolerated the procedure and no complications were noted. The patient was provided with verbal and written post care instructions.      Photograph was obtained for clinical monitoring and inclusion in medical record.     3. Actinic keratosis x4    Discussed precancerous nature. Recommended treatment to prevent progression to SCC.    Cryotherapy procedure note: After verbal consent and discussion of risks and benefits including but no limited to dyspigmentation/scar, blister, and pain, 4 was(were) treated with 1-2mm freeze border for 2 cycles with liquid nitrogen. Post cryotherapy instructions were provided.     4. Multiply benign skin findings including:  cherry  angiomas, solar lentigines, and seborrheic keratoses    Reassured patient that these spots are benign     No further intervention required. Patient to report changes.     Follow-up in 1 year, earlier for new or changing lesions.       Staff Involved:    Scribe Disclosure  I, Lalito Posey, am serving as a scribe to document services personally performed by ,  based on data collection and the provider's statements to me.     Provider Disclosure:   The documentation recorded by the scribe accurately reflects the services I personally performed and the decisions made by me.    Ryann Morocho MD    Department of Dermatology  [facility]: Phone: [phone], Fax:[phone]  [facility]: Phone: [phone], Fax: [phone]

## 2018-10-23 ENCOUNTER — TELEPHONE (OUTPATIENT)
Dept: ANTICOAGULATION | Facility: CLINIC | Age: 83
End: 2018-10-23

## 2018-10-23 ENCOUNTER — ANTICOAGULATION THERAPY VISIT (OUTPATIENT)
Dept: ANTICOAGULATION | Facility: CLINIC | Age: 83
End: 2018-10-23
Payer: COMMERCIAL

## 2018-10-23 VITALS — HEART RATE: 63 BPM | SYSTOLIC BLOOD PRESSURE: 132 MMHG | DIASTOLIC BLOOD PRESSURE: 70 MMHG

## 2018-10-23 DIAGNOSIS — I48.20 CHRONIC ATRIAL FIBRILLATION (H): Primary | ICD-10-CM

## 2018-10-23 LAB — INR POINT OF CARE: 3.1 (ref 0.86–1.14)

## 2018-10-23 PROCEDURE — 99207 ZZC NO CHARGE NURSE ONLY: CPT

## 2018-10-23 PROCEDURE — 36416 COLLJ CAPILLARY BLOOD SPEC: CPT

## 2018-10-23 PROCEDURE — 85610 PROTHROMBIN TIME: CPT | Mod: QW

## 2018-10-23 NOTE — TELEPHONE ENCOUNTER
Please review and renew this patients INR referral, orders pending. Thank you!      Has the patient previously taken warfarin? yes  If yes, for what indication? A Fib    Does the patient have any of the following indications for a higher range of 2.5-3.5:    Mitral position mechanical valve? no    Riana-Shiley, Ball and Cage or Monoleaflet valve (regardless of position) no    Other (if yes, please explain) no      Shani Ross RN

## 2018-10-23 NOTE — PROGRESS NOTES
ANTICOAGULATION FOLLOW-UP CLINIC VISIT    Patient Name:  Melvin Abad  Date:  10/23/2018  Contact Type:  Face to Face    SUBJECTIVE:     Patient Findings     Positives No Problem Findings           OBJECTIVE    INR Protime   Date Value Ref Range Status   10/23/2018 3.1 (A) 0.86 - 1.14 Final       ASSESSMENT / PLAN  INR assessment THER    Recheck INR In: 6 WEEKS    INR Location Clinic      Anticoagulation Summary as of 10/23/2018     INR goal 2.0-3.0   Today's INR 3.1!   Warfarin maintenance plan 2.5 mg (5 mg x 0.5) on Mon, Fri; 5 mg (5 mg x 1) all other days   Full warfarin instructions 2.5 mg on Mon, Fri; 5 mg all other days   Weekly warfarin total 30 mg   No change documented Shani Ross RN   Plan last modified Shani Ross RN (4/11/2016)   Next INR check 12/4/2018   Target end date     Indications   Atrial fibrillation (Resolved) [I48.91]  Long-term (current) use of anticoagulants [Z79.01] [Z79.01]         Anticoagulation Episode Summary     INR check location     Preferred lab     Send INR reminders to [facility] POOL    Comments 5 mg tablets, AM taker, likes book, BP       Anticoagulation Care Providers     Provider Role Specialty Phone number    Lazaro Huston MD [facility] Internal Medicine [phone]            See the Encounter Report to view Anticoagulation Flowsheet and Dosing Calendar (Go to Encounters tab in chart review, and find the Anticoagulation Therapy Visit)    Dosage adjustment made based on physician directed care plan.        Shani Ross RN

## 2018-10-24 LAB — COPATH REPORT: NORMAL

## 2018-10-25 ENCOUNTER — TELEPHONE (OUTPATIENT)
Dept: DERMATOLOGY | Facility: CLINIC | Age: 83
End: 2018-10-25

## 2018-10-25 NOTE — TELEPHONE ENCOUNTER
Left message for patient to call [phone].  We do not have a consent to communicate.  We also do not have a phone number for his daughter.    Jyoti Montoya, CMA

## 2018-10-25 NOTE — TELEPHONE ENCOUNTER
[facility] Call Center    Phone Message    May a detailed message be left on voicemail: yes    Reason for Call: Patient called with his daughter.  He said he was having a hard time understanding the reason for the call.  He thought it was for results.  Daughter asked if the clinic could call her.  No authorization to discuss.  They are requesting results be mailed.  Thank you.     Action Taken: Message routed to:  Adult Clinics: Dermatology p [phone]

## 2018-11-06 ENCOUNTER — OFFICE VISIT (OUTPATIENT)
Dept: INTERNAL MEDICINE | Facility: CLINIC | Age: 83
End: 2018-11-06
Payer: COMMERCIAL

## 2018-11-06 VITALS
HEART RATE: 60 BPM | BODY MASS INDEX: 32.44 KG/M2 | DIASTOLIC BLOOD PRESSURE: 88 MMHG | RESPIRATION RATE: 16 BRPM | OXYGEN SATURATION: 98 % | TEMPERATURE: 97.3 F | WEIGHT: 219.7 LBS | SYSTOLIC BLOOD PRESSURE: 138 MMHG

## 2018-11-06 DIAGNOSIS — K64.4 EXTERNAL HEMORRHOIDS: Primary | ICD-10-CM

## 2018-11-06 DIAGNOSIS — Z89.411 ACQUIRED ABSENCE OF RIGHT GREAT TOE (H): ICD-10-CM

## 2018-11-06 PROCEDURE — 99213 OFFICE O/P EST LOW 20 MIN: CPT | Performed by: INTERNAL MEDICINE

## 2018-11-06 RX ORDER — HYDROCORTISONE ACETATE 25 MG/1
25 SUPPOSITORY RECTAL 2 TIMES DAILY
Qty: 10 SUPPOSITORY | Refills: 1 | Status: SHIPPED | OUTPATIENT
Start: 2018-11-06 | End: 2019-04-10 | Stop reason: ALTCHOICE

## 2018-11-06 ASSESSMENT — PAIN SCALES - GENERAL: PAINLEVEL: NO PAIN (0)

## 2018-11-06 NOTE — PROGRESS NOTES
SUBJECTIVE:   Melvin Abad is a 89 year old male who presents to clinic today for the following health issues:      Chief Complaint   Patient presents with     Rectal Problem     hemorrhoids     Rectal itching and needs preparation H which seems to help but needs it all the time. Had surgery 2008.  Was doing ok until a month ago, had a hard BM and then felt something give way, hard to clean.  Cleans well then preparation H keeps itching.  Not bleeding.      Past Medical History:   Diagnosis Date     Actinic keratosis      Atrial fibrillation (H)      Basal cell carcinoma nos 06/10    Mohs excision, rt upper lip & rt temple     Cardiac dysrhythmia, unspecified      Cough     chronic cough     Diabetic eye exam (H) 8/29/14     Generalized osteoarthrosis, unspecified site     djd of the knees, hands, and neck     Other diseases of lung, not elsewhere classified     pulmonary nodule, benign     Pure hypercholesterolemia      Unspecified essential hypertension      Current Outpatient Prescriptions   Medication     blood glucose monitoring (NO BRAND SPECIFIED) test strip     blood glucose monitoring (ONE TOUCH ULTRASOFT) lancets     Carboxymethylcellulose Sod PF (RETAINE CMC) 0.5 % SOLN ophthalmic solution     Cholecalciferol (VITAMIN D) 2000 UNITS tablet     hydrocortisone (ANUSOL-HC) 2.5 % cream     hydrocortisone (ANUSOL-HC) 25 MG Suppository     lisinopril (PRINIVIL/ZESTRIL) 10 MG tablet     metoprolol succinate (TOPROL-XL) 50 MG 24 hr tablet     multivitamin (OCUVITE) TABS     polyethylene glycol (MIRALAX) powder     simvastatin (ZOCOR) 40 MG tablet     warfarin (COUMADIN) 5 MG tablet     cephALEXin (KEFLEX) 500 MG capsule     No current facility-administered medications for this visit.      Physical Exam  /88  Pulse 60  Temp 97.3  F (36.3  C) (Temporal)  Resp 16  Wt 219 lb 11.2 oz (99.7 kg)  SpO2 98%  BMI 32.44 kg/m2  General Appearance-healthy, alert, no distress  -rectal exam shows external  hemorrhoids and one internal hemorrhoid    ASSESSMENT:  Will treat him for hemorrhoids, will try rectal cream and suppository.  He should use miralax daily, call back if not better, could then see leonarda as option.    Electronically signed by Lazaro Huston MD

## 2018-11-06 NOTE — MR AVS SNAPSHOT
After Visit Summary   11/6/2018    Melvin Abad    MRN: [id number]           Patient Information     Date Of Birth          4/24/1928        Visit Information        Provider Department      11/6/2018 2:45 PM Lazaro Huston MD [facility]         Follow-ups after your visit        Your next 10 appointments already scheduled     Nov 21, 2018 11:15 AM CST   Return Visit with Ryann Morocho MD   [facility]    [address] [place] [address] [phone]            Dec 04, 2018  8:15 AM CST   Anticoagulation Visit with PH ANTI COAG   [facility] ([facility])    [address] [facility] [address] [phone]              Who to contact     If you have questions or need follow up information about today's clinic visit or your schedule please contact [facility] directly at [phone].  Normal or non-critical lab and imaging results will be communicated to you by MyChart, letter or phone within 4 business days after the clinic has received the results. If you do not hear from us within 7 days, please contact the clinic through MyChart or phone. If you have a critical or abnormal lab result, we will notify you by phone as soon as possible.  Submit refill requests through [employer] or call your pharmacy and they will forward the refill request to us. Please allow 3 business days for your refill to be completed.          Additional Information About Your Visit        Care EveryWhere ID     This is your Care EveryWhere ID. This could be used by other organizations to access your [facility] medical records  [id number]        Your Vitals Were     Pulse Temperature Respirations Pulse Oximetry BMI (Body Mass Index)       60 97.3  F (36.3  C) (Temporal) 16 98% 32.44 kg/m2        Blood Pressure from Last 3 Encounters:   11/06/18 138/88   10/23/18 132/70   09/16/18 158/87     Weight from Last 3 Encounters:   11/06/18 219 lb 11.2 oz (99.7 kg)   05/21/18 222 lb (100.7 kg)   03/13/18 220 lb (99.8 kg)              Today, you had the following     No orders found for display       Primary Care Provider Office Phone # Fax #    Lazaro Huston [phone]       [address] [facility] [address]        Equal Access to Services     VERONICA MIN : Hadii aad ku hadasho Soomaali, waaxda luqadaha, qaybta kaalmada adeegyada, waxay idiin hayaan adeeg kharash la'lazaro . So [facility] [phone].    ATENCIÓN: Si habla español, tiene a piña disposición servicios gratuitos de asistencia lingüística. Llame al [phone].    We comply with applicable federal civil rights laws and [place] laws. We do not discriminate on the basis of race, color, national origin, age, disability, sex, sexual orientation, or gender identity.            Thank you!     Thank you for choosing [facility]  for your care. Our goal is always to provide you with excellent care. Hearing back from our patients is one way we can continue to improve our services. Please take a few minutes to complete the written survey that you may receive in the mail after your visit with us. Thank you!             Your Updated Medication List - Protect others around you: Learn how to safely use, store and throw away your medicines at www.disposemymeds.org.          This list is accurate as of 11/6/18  2:48 PM.  Always use your most recent med list.                   Brand Name Dispense Instructions for use Diagnosis    blood glucose monitoring lancets     1 Box    1 each 2 times daily Test before meals and at bedtime    Type 2 diabetes mellitus without complication (H)       blood glucose monitoring test strip    no brand specified    100 each    Test before meals and at bedtime.    Type 2 diabetes mellitus without complication (H)       cephALEXin 500 MG capsule    KEFLEX    30 capsule    Take 1 capsule (500 mg) by  mouth 3 times daily    Cellulitis and abscess of leg, Abrasion, leg w/ infection, right, initial encounter       lisinopril 10 MG tablet    PRINIVIL/ZESTRIL    90 tablet    Take 1 tablet (10 mg) by mouth daily    Essential hypertension, benign       metoprolol succinate 50 MG 24 hr tablet    TOPROL-XL    90 tablet    Take 1 tablet (50 mg) by mouth daily    Essential hypertension, benign       multivitamin Tabs tablet      Take 1 tablet by mouth daily.        polyethylene glycol powder    MIRALAX    510 g    Take 17 g by mouth daily    Constipation, unspecified constipation type       RETAINE CMC 0.5 % Soln ophthalmic solution   Generic drug:  Carboxymethylcellulose Sod PF      Place 1 drop into the right eye 3 times daily as needed for dry eyes        simvastatin 40 MG tablet    ZOCOR    90 tablet    TAKE ONE TABLET BY MOUTH AT BEDTIME    Hyperlipidemia LDL goal <100       vitamin D 2000 units tablet      Take 1 tablet by mouth daily        warfarin 5 MG tablet    COUMADIN    90 tablet    TAKE ONE-HALF TABLET ON MONDAYS AND FRIDAYS AND ONE TABLET ON ALL THE OTHER DAYS OF THE WEEK OR AS DIRECTED BY COUMADIN CLINIC    Long-term (current) use of anticoagulants, Chronic atrial fibrillation (H)

## 2018-11-21 ENCOUNTER — OFFICE VISIT (OUTPATIENT)
Dept: DERMATOLOGY | Facility: CLINIC | Age: 83
End: 2018-11-21
Payer: COMMERCIAL

## 2018-11-21 DIAGNOSIS — L57.0 AK (ACTINIC KERATOSIS): Primary | ICD-10-CM

## 2018-11-21 PROCEDURE — 17003 DESTRUCT PREMALG LES 2-14: CPT | Performed by: DERMATOLOGY

## 2018-11-21 PROCEDURE — 17000 DESTRUCT PREMALG LESION: CPT | Performed by: DERMATOLOGY

## 2018-11-21 NOTE — LETTER
11/21/2018         RE: Melvin Aabd  [address] [place] [id number]        Dear Colleague,    Thank you for referring your patient, Melvin Abad, to the [facility]. Please see a copy of my visit note below.    [facility] Note      Dermatology Problem List:  1. NMSC  -BCC, right upper cutaneous llip, s/p Mohs 6/2010  -BCC, right lower eyelid, s/p Mohs prior to 2012  -BCC, recurrent, right [place], s/p Mohs 6/2010  2. Actinic keratoses  -left forearm, s/p cryo 11/21/2018  -Previous Tx: Efudex 1/2012 to the forehead and dorsal nose X6 weeks by Dr. Bonilla, treatment for dorsal arms recommended 5/2013  3. Hypertrophic actinic keratosis, right posterior helix  -s/p biopsy 12/11/14  4. Stasis dermatitis, evaluated by Dr. Bonilla 1/2011 and recommended compression socks  5. SK, symptomatic left and right ear, s/p cryotherapy      Encounter Date: Nov 21, 2018    CC:  Chief Complaint   Patient presents with     Lesion     AK s/p bx - cryo         History of Present Illness:  Mr. Melvin Abad is a 89 year old male who presents as a follow-up for treatment of AKs with cryotherapy. He was last seen by Dr. Morocho 10/18/2018 for a full body skin exam. Presents with son today. He also has lesions of concern on his ears that he picks at constantly. No other concerns addressed today.     Past Medical History:   Patient Active Problem List   Diagnosis     Long term current use of anticoagulant therapy     Hyperlipidemia LDL goal <100     Type 2 diabetes mellitus without complication (H)     Diverticulosis     Health Care Home     Advanced directives, counseling/discussion     History of skin cancer     AK (actinic keratosis)     Gilbert syndrome     CTS (carpal tunnel syndrome)     Cellulitis     Anemia due to blood loss, acute     DVT prophylaxis     Obesity (BMI 30-39.9)     Chronic atrial fibrillation (H)     Long-term (current) use of  anticoagulants [Z79.01]     Pain of right lower leg     Contusion of right lower extremity     Leg pain     Type 2 diabetes mellitus without complication, without long-term current use of insulin (H)     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)     Past Medical History:   Diagnosis Date     Actinic keratosis      Atrial fibrillation (H)      Basal cell carcinoma nos 06/10    Mohs excision, rt upper lip & rt temple     Cardiac dysrhythmia, unspecified      Cough     chronic cough     Diabetic eye exam (H) 8/29/14     Generalized osteoarthrosis, unspecified site     djd of the knees, hands, and neck     Other diseases of lung, not elsewhere classified     pulmonary nodule, benign     Pure hypercholesterolemia      Unspecified essential hypertension      Past Surgical History:   Procedure Laterality Date     C TOTAL KNEE ARTHROPLASTY  1997    Knee Replacement, Total     COLONOSCOPY  05/02/2007    Multiple bxs of diminutive polyps of ascending colon.     COLONOSCOPY  11/10/2010    COLONOSCOPY performed by MARISELA GRIMM at  GI     HC COLONOSCOPY W/WO BRUSH/WASH  10/19/2001     HC COLONOSCOPY W/WO BRUSH/WASH  11/02/2004      HEMORRHOIDOPEXY BY STAPLING  09/26/08       Social History:   reports that he has quit smoking. His smoking use included Cigarettes. He has never used smokeless tobacco. He reports that he does not drink alcohol or use illicit drugs.    Family History:  Family History   Problem Relation Age of Onset     Cancer Mother      Prostate Cancer Father        Medications:  Current Outpatient Prescriptions   Medication Sig Dispense Refill     blood glucose monitoring (NO BRAND SPECIFIED) test strip Test before meals and at bedtime. 100 each 3     blood glucose monitoring (ONE TOUCH ULTRASOFT) lancets 1 each 2 times daily Test before meals and at bedtime 1 Box 3     Carboxymethylcellulose Sod PF (RETAINE CMC) 0.5 % SOLN ophthalmic solution Place 1 drop into the right eye 3 times daily as needed for dry eyes        cephALEXin (KEFLEX) 500 MG capsule Take 1 capsule (500 mg) by mouth 3 times daily 30 capsule 0     Cholecalciferol (VITAMIN D) 2000 UNITS tablet Take 1 tablet by mouth daily       hydrocortisone (ANUSOL-HC) 2.5 % cream Place rectally 2 times daily as needed for hemorrhoids 30 g 1     hydrocortisone (ANUSOL-HC) 25 MG Suppository Place 1 suppository (25 mg) rectally 2 times daily 10 suppository 1     lisinopril (PRINIVIL/ZESTRIL) 10 MG tablet Take 1 tablet (10 mg) by mouth daily 90 tablet 3     metoprolol succinate (TOPROL-XL) 50 MG 24 hr tablet Take 1 tablet (50 mg) by mouth daily 90 tablet 3     multivitamin (OCUVITE) TABS Take 1 tablet by mouth daily.  0     polyethylene glycol (MIRALAX) powder Take 17 g by mouth daily 510 g 1     simvastatin (ZOCOR) 40 MG tablet TAKE ONE TABLET BY MOUTH AT BEDTIME 90 tablet 3     warfarin (COUMADIN) 5 MG tablet TAKE ONE-HALF TABLET ON MONDAYS AND FRIDAYS AND ONE TABLET ON ALL THE OTHER DAYS OF THE WEEK OR AS DIRECTED BY COUMADIN CLINIC 90 tablet 3       No Known Allergies    Review of Systems:  -Constitutional:  Feeling well, in usual state of health.  -Skin:  As per HPI, no additional concerns.      Physical exam:  Vitals: There were no vitals taken for this visit.  GEN: This is a well developed, well-nourished male in no acute distress, in a pleasant mood.    SKIN: Focused examination of the face, neck, and hands was performed.  -Leo's skin type I  -There are erythematous macules with overyling adherent scale on the left ear x 3, right ear x 2  -well healed scar on left wrist with a small amount of remaining scale  -No other lesions of concern on areas examined.       Impression/Plan:     1. Actinic keratosis. Discussed precancerous nature of these lesions. Recommended treatment to prevent progression to a squamous cell carcinoma.     Cryotherapy procedure note: After verbal consent and discussion of risks and benefits including but no limited to  dyspigmentation/scar, blister, and pain, 6 was(were) treated with 1-2mm freeze border for 2 cycles with liquid nitrogen. Post cryotherapy instructions were provided.      Follow up 1 year for skin check. Sooner for new or changing lesions.     Staff Involved:    Scribe Disclosure  I, Regine Thom, am serving as a scribe to document services personally performed by Dr. Ryann Morocho MD, based on data collection and the provider's statements to me.     Provider Disclosure:   The documentation recorded by the scribe accurately reflects the services I personally performed and the decisions made by me.    Ryann Morocho MD    Department of Dermatology  [facility]: Phone: [phone], Fax:[phone]  [facility]: Phone: [phone], Fax: [phone]                              Again, thank you for allowing me to participate in the care of your patient.        Sincerely,        Ryann Morocho MD

## 2018-11-21 NOTE — NURSING NOTE
@Melvin Abad's goals for this visit include:   Chief Complaint   Patient presents with     Lesion     AK s/p bx - cryo       He requests these members of his care team be copied on today's visit information: NO    PCP: Lazaro Huston    Referring Provider:  No referring provider defined for this encounter.    There were no vitals taken for this visit.    Do you need any medication refills at today's visit? NO    Jyoti Montoya CMA

## 2018-11-21 NOTE — MR AVS SNAPSHOT
After Visit Summary   11/21/2018    Melvin Abad    MRN: [id number]           Patient Information     Date Of Birth          4/24/1928        Visit Information        Provider Department      11/21/2018 11:15 AM Ryann Morocho MD [facility]        Today's Diagnoses     AK (actinic keratosis)    -  1      Care Instructions    Cryotherapy    What is it?    Use of a very cold liquid, such as liquid nitrogen, to freeze and destroy abnormal skin cells that need to be removed    What should I expect?    Tenderness and redness    A small blister that might grow and fill with dark purple blood. There may be crusting.    More than one treatment may be needed if the lesions do not go away.    How do I care for the treated area?    Gently wash the area with your hands when bathing.    Use a thin layer of Vaseline to help with healing. You may use a Band-Aid.     The area should heal within 7-10 days and may leave behind a pink or lighter color.     Do not use an antibiotic or Neosporin ointment.     You may take acetaminophen (Tylenol) for pain.     Call your Doctor if you have:    Severe pain    Signs of infection (warmth, redness, cloudy yellow drainage, and or a bad smell)    Questions or concerns    Who should I call with questions?       [facility]: [phone]       [facility]: [phone]       For urgent needs outside of business hours call the [facility] at [phone]        and ask for the dermatology resident on call            Follow-ups after your visit        Follow-up notes from your care team     Return in about 1 year (around 11/21/2019) for Skin check - history NMSC.      Your next 10 appointments already scheduled     Dec 04, 2018  8:15 AM CST   Anticoagulation Visit with PH ANTI COAG   [facility]    [address] [facility] [address]    [phone]            Nov 21, 2019 11:00 AM CST   Return Visit with Ryann Morocho MD   [facility]    [address] [place] [address] [phone]              Who to contact     If you have questions or need follow up information about today's clinic visit or your schedule please contact [facility] directly at [phone].  Normal or non-critical lab and imaging results will be communicated to you by MyChart, letter or phone within 4 business days after the clinic has received the results. If you do not hear from us within 7 days, please contact the clinic through MyChart or phone. If you have a critical or abnormal lab result, we will notify you by phone as soon as possible.  Submit refill requests through [employer] or call your pharmacy and they will forward the refill request to us. Please allow 3 business days for your refill to be completed.          Additional Information About Your Visit        Care EveryWhere ID     This is your Care EveryWhere ID. This could be used by other organizations to access your [facility] medical records  [id number]         Blood Pressure from Last 3 Encounters:   11/06/18 138/88   10/23/18 132/70   09/16/18 158/87    Weight from Last 3 Encounters:   11/06/18 99.7 kg (219 lb 11.2 oz)   05/21/18 100.7 kg (222 lb)   03/13/18 99.8 kg (220 lb)              We Performed the Following     DESTRUCT PREMALIGNANT LESION, 2-14     DESTRUCT PREMALIGNANT LESION, FIRST        Primary Care Provider Office Phone # Fax #    Lazaro Huston [phone] [address] [facility] [address]        Equal Access to Services     [facility] : Hadii aad ku hadasho Soomaali, waaxda luqadaha, qaybta kaalmada adeegyada, karis carrero. So [facility] [phone].    ATENCIÓN: Si habla español, tiene a piña disposición servicios gratuitos de asistencia lingüística. Llame al  [phone].    We comply with applicable federal civil rights laws and [place] laws. We do not discriminate on the basis of race, color, national origin, age, disability, sex, sexual orientation, or gender identity.            Thank you!     Thank you for choosing [facility]  for your care. Our goal is always to provide you with excellent care. Hearing back from our patients is one way we can continue to improve our services. Please take a few minutes to complete the written survey that you may receive in the mail after your visit with us. Thank you!             Your Updated Medication List - Protect others around you: Learn how to safely use, store and throw away your medicines at www.disposemymeds.org.          This list is accurate as of 11/21/18 11:25 AM.  Always use your most recent med list.                   Brand Name Dispense Instructions for use Diagnosis    blood glucose monitoring lancets     1 Box    1 each 2 times daily Test before meals and at bedtime    Type 2 diabetes mellitus without complication (H)       blood glucose monitoring test strip    no brand specified    100 each    Test before meals and at bedtime.    Type 2 diabetes mellitus without complication (H)       cephALEXin 500 MG capsule    KEFLEX    30 capsule    Take 1 capsule (500 mg) by mouth 3 times daily    Cellulitis and abscess of leg, Abrasion, leg w/ infection, right, initial encounter       hydrocortisone 2.5 % cream    ANUSOL-HC    30 g    Place rectally 2 times daily as needed for hemorrhoids    External hemorrhoids       hydrocortisone 25 MG Suppository    ANUSOL-HC    10 suppository    Place 1 suppository (25 mg) rectally 2 times daily    External hemorrhoids       lisinopril 10 MG tablet    PRINIVIL/ZESTRIL    90 tablet    Take 1 tablet (10 mg) by mouth daily    Essential hypertension, benign       metoprolol succinate 50 MG 24 hr tablet    TOPROL-XL    90 tablet    Take 1 tablet (50 mg) by mouth daily     Essential hypertension, benign       multivitamin Tabs tablet      Take 1 tablet by mouth daily.        polyethylene glycol powder    MIRALAX    510 g    Take 17 g by mouth daily    Constipation, unspecified constipation type       RETAINE CMC 0.5 % Soln ophthalmic solution   Generic drug:  Carboxymethylcellulose Sod PF      Place 1 drop into the right eye 3 times daily as needed for dry eyes        simvastatin 40 MG tablet    ZOCOR    90 tablet    TAKE ONE TABLET BY MOUTH AT BEDTIME    Hyperlipidemia LDL goal <100       vitamin D3 2000 units tablet    CHOLECALCIFEROL     Take 1 tablet by mouth daily        warfarin 5 MG tablet    COUMADIN    90 tablet    TAKE ONE-HALF TABLET ON MONDAYS AND FRIDAYS AND ONE TABLET ON ALL THE OTHER DAYS OF THE WEEK OR AS DIRECTED BY COUMADIN CLINIC    Long-term (current) use of anticoagulants, Chronic atrial fibrillation (H)

## 2018-11-21 NOTE — PATIENT INSTRUCTIONS
Cryotherapy    What is it?    Use of a very cold liquid, such as liquid nitrogen, to freeze and destroy abnormal skin cells that need to be removed    What should I expect?    Tenderness and redness    A small blister that might grow and fill with dark purple blood. There may be crusting.    More than one treatment may be needed if the lesions do not go away.    How do I care for the treated area?    Gently wash the area with your hands when bathing.    Use a thin layer of Vaseline to help with healing. You may use a Band-Aid.     The area should heal within 7-10 days and may leave behind a pink or lighter color.     Do not use an antibiotic or Neosporin ointment.     You may take acetaminophen (Tylenol) for pain.     Call your Doctor if you have:    Severe pain    Signs of infection (warmth, redness, cloudy yellow drainage, and or a bad smell)    Questions or concerns    Who should I call with questions?       [facility]: [phone]       [facility]: [phone]       For urgent needs outside of business hours call the [facility] at [phone]        and ask for the dermatology resident on call

## 2018-11-21 NOTE — PROGRESS NOTES
[facility] Note      Dermatology Problem List:  1. NMSC  -BCC, right upper cutaneous llip, s/p Mohs 6/2010  -BCC, right lower eyelid, s/p Mohs prior to 2012  -BCC, recurrent, right Restorationism, s/p Mohs 6/2010  2. Actinic keratoses  -left forearm, s/p cryo 11/21/2018  -Previous Tx: Efudex 1/2012 to the forehead and dorsal nose X6 weeks by Dr. Bonilla, treatment for dorsal arms recommended 5/2013  3. Hypertrophic actinic keratosis, right posterior helix  -s/p biopsy 12/11/14  4. Stasis dermatitis, evaluated by Dr. Bonilla 1/2011 and recommended compression socks  5. SK, symptomatic left and right ear, s/p cryotherapy      Encounter Date: Nov 21, 2018    CC:  Chief Complaint   Patient presents with     Lesion     AK s/p bx - cryo         History of Present Illness:  Mr. Melvin Abad is a 89 year old male who presents as a follow-up for treatment of AKs with cryotherapy. He was last seen by Dr. Morocho 10/18/2018 for a full body skin exam. Presents with son today. He also has lesions of concern on his ears that he picks at constantly. No other concerns addressed today.     Past Medical History:   Patient Active Problem List   Diagnosis     Long term current use of anticoagulant therapy     Hyperlipidemia LDL goal <100     Type 2 diabetes mellitus without complication (H)     Diverticulosis     Health Care Home     Advanced directives, counseling/discussion     History of skin cancer     AK (actinic keratosis)     Gilbert syndrome     CTS (carpal tunnel syndrome)     Cellulitis     Anemia due to blood loss, acute     DVT prophylaxis     Obesity (BMI 30-39.9)     Chronic atrial fibrillation (H)     Long-term (current) use of anticoagulants [Z79.01]     Pain of right lower leg     Contusion of right lower extremity     Leg pain     Type 2 diabetes mellitus without complication, without long-term current use of insulin (H)     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)     Past Medical  History:   Diagnosis Date     Actinic keratosis      Atrial fibrillation (H)      Basal cell carcinoma nos 06/10    Mohs excision, rt upper lip & rt temple     Cardiac dysrhythmia, unspecified      Cough     chronic cough     Diabetic eye exam (H) 8/29/14     Generalized osteoarthrosis, unspecified site     djd of the knees, hands, and neck     Other diseases of lung, not elsewhere classified     pulmonary nodule, benign     Pure hypercholesterolemia      Unspecified essential hypertension      Past Surgical History:   Procedure Laterality Date     C TOTAL KNEE ARTHROPLASTY  1997    Knee Replacement, Total     COLONOSCOPY  05/02/2007    Multiple bxs of diminutive polyps of ascending colon.     COLONOSCOPY  11/10/2010    COLONOSCOPY performed by MARISELA GRIMM at  [facility]     HC COLONOSCOPY W/WO BRUSH/WASH  10/19/2001     HC COLONOSCOPY W/WO BRUSH/WASH  11/02/2004     HC HEMORRHOIDOPEXY BY STAPLING  09/26/08       Social History:   reports that he has quit smoking. His smoking use included Cigarettes. He has never used smokeless tobacco. He reports that he does not drink alcohol or use illicit drugs.    Family History:  Family History   Problem Relation Age of Onset     Cancer Mother      Prostate Cancer Father        Medications:  Current Outpatient Prescriptions   Medication Sig Dispense Refill     blood glucose monitoring (NO BRAND SPECIFIED) test strip Test before meals and at bedtime. 100 each 3     blood glucose monitoring (ONE TOUCH ULTRASOFT) lancets 1 each 2 times daily Test before meals and at bedtime 1 Box 3     Carboxymethylcellulose Sod PF (RETAINE CMC) 0.5 % SOLN ophthalmic solution Place 1 drop into the right eye 3 times daily as needed for dry eyes       cephALEXin (KEFLEX) 500 MG capsule Take 1 capsule (500 mg) by mouth 3 times daily 30 capsule 0     Cholecalciferol (VITAMIN D) 2000 UNITS tablet Take 1 tablet by mouth daily       hydrocortisone (ANUSOL-HC) 2.5 % cream Place rectally 2 times daily as  needed for hemorrhoids 30 g 1     hydrocortisone (ANUSOL-HC) 25 MG Suppository Place 1 suppository (25 mg) rectally 2 times daily 10 suppository 1     lisinopril (PRINIVIL/ZESTRIL) 10 MG tablet Take 1 tablet (10 mg) by mouth daily 90 tablet 3     metoprolol succinate (TOPROL-XL) 50 MG 24 hr tablet Take 1 tablet (50 mg) by mouth daily 90 tablet 3     multivitamin (OCUVITE) TABS Take 1 tablet by mouth daily.  0     polyethylene glycol (MIRALAX) powder Take 17 g by mouth daily 510 g 1     simvastatin (ZOCOR) 40 MG tablet TAKE ONE TABLET BY MOUTH AT BEDTIME 90 tablet 3     warfarin (COUMADIN) 5 MG tablet TAKE ONE-HALF TABLET ON MONDAYS AND FRIDAYS AND ONE TABLET ON ALL THE OTHER DAYS OF THE WEEK OR AS DIRECTED BY COUMADIN CLINIC 90 tablet 3       No Known Allergies    Review of Systems:  -Constitutional:  Feeling well, in usual state of health.  -Skin:  As per HPI, no additional concerns.      Physical exam:  Vitals: There were no vitals taken for this visit.  GEN: This is a well developed, well-nourished male in no acute distress, in a pleasant mood.    SKIN: Focused examination of the face, neck, and hands was performed.  -Leo's skin type I  -There are erythematous macules with overyling adherent scale on the left ear x 3, right ear x 2  -well healed scar on left wrist with a small amount of remaining scale  -No other lesions of concern on areas examined.       Impression/Plan:     1. Actinic keratosis. Discussed precancerous nature of these lesions. Recommended treatment to prevent progression to a squamous cell carcinoma.     Cryotherapy procedure note: After verbal consent and discussion of risks and benefits including but no limited to dyspigmentation/scar, blister, and pain, 6 was(were) treated with 1-2mm freeze border for 2 cycles with liquid nitrogen. Post cryotherapy instructions were provided.      Follow up 1 year for skin check. Sooner for new or changing lesions.     Staff Involved:    Scribe  Disclosure  I, Regine Lucero, am serving as a scribe to document services personally performed by Dr. Ryann Morocho MD, based on data collection and the provider's statements to me.     Provider Disclosure:   The documentation recorded by the scribe accurately reflects the services I personally performed and the decisions made by me.    Ryann Morocho MD    Department of Dermatology  [facility]: Phone: [phone], Fax:[phone]  [facility]: Phone: [phone], Fax: [phone]

## 2018-12-04 ENCOUNTER — ANTICOAGULATION THERAPY VISIT (OUTPATIENT)
Dept: ANTICOAGULATION | Facility: CLINIC | Age: 83
End: 2018-12-04
Payer: COMMERCIAL

## 2018-12-04 VITALS — SYSTOLIC BLOOD PRESSURE: 137 MMHG | HEART RATE: 64 BPM | DIASTOLIC BLOOD PRESSURE: 79 MMHG

## 2018-12-04 LAB — INR POINT OF CARE: 2.1 (ref 0.86–1.14)

## 2018-12-04 PROCEDURE — 99207 ZZC NO CHARGE NURSE ONLY: CPT

## 2018-12-04 PROCEDURE — 36416 COLLJ CAPILLARY BLOOD SPEC: CPT

## 2018-12-04 PROCEDURE — 85610 PROTHROMBIN TIME: CPT | Mod: QW

## 2018-12-04 NOTE — PROGRESS NOTES
ANTICOAGULATION FOLLOW-UP CLINIC VISIT    Patient Name:  Melvin Abad  Date:  12/4/2018  Contact Type:  Face to Face    SUBJECTIVE:     Patient Findings     Positives No Problem Findings           OBJECTIVE    INR Protime   Date Value Ref Range Status   12/04/2018 2.1 (A) 0.86 - 1.14 Final       ASSESSMENT / PLAN  INR assessment THER    Recheck INR In: 6 WEEKS    INR Location Clinic      Anticoagulation Summary as of 12/4/2018     INR goal 2.0-3.0   Today's INR 2.1   Warfarin maintenance plan 2.5 mg (5 mg x 0.5) on Mon, Fri; 5 mg (5 mg x 1) all other days   Full warfarin instructions 2.5 mg on Mon, Fri; 5 mg all other days   Weekly warfarin total 30 mg   No change documented Shani Ross RN   Plan last modified Shani Ross RN (4/11/2016)   Next INR check 1/15/2019   Target end date     Indications   Atrial fibrillation (Resolved) [I48.91]  Long-term (current) use of anticoagulants [Z79.01] [Z79.01]         Anticoagulation Episode Summary     INR check location     Preferred lab     Send INR reminders to [facility] POOL    Comments 5 mg tablets, AM taker, likes book, BP       Anticoagulation Care Providers     Provider Role Specialty Phone number    Lazaro Huston MD [facility] Internal Medicine [phone]            See the Encounter Report to view Anticoagulation Flowsheet and Dosing Calendar (Go to Encounters tab in chart review, and find the Anticoagulation Therapy Visit)    Dosage adjustment made based on physician directed care plan.      Shani Ross RN

## 2018-12-04 NOTE — MR AVS SNAPSHOT
Melvin Abad   12/4/2018 8:15 AM   Anticoagulation Therapy Visit    Description:  89 year old male   Provider:  SAMUEL ANTI COAG   Department:  Ph Anticoag           INR as of 12/4/2018     Today's INR 2.1      Anticoagulation Summary as of 12/4/2018     INR goal 2.0-3.0   Today's INR 2.1   Full warfarin instructions 2.5 mg on Mon, Fri; 5 mg all other days   Next INR check 1/15/2019    Indications   Atrial fibrillation (Resolved) [I48.91]  Long-term (current) use of anticoagulants [Z79.01] [Z79.01]         Your next Anticoagulation Clinic appointment(s)     Dec 04, 2018  8:15 AM CST   Anticoagulation Visit with PH ANTI COAG   [facility] ([address] [address]   [phone]            Shan 15, 2019  8:15 AM CST   Anticoagulation Visit with PH ANTI COAG   [facility] ([address] [address]   [phone]              Contact Numbers     Clinic Number:         December 2018 Details    Sun Mon Tue Wed Thu Fri Sat           1                 2               3               4      5 mg   See details      5      5 mg         6      5 mg         7      2.5 mg         8      5 mg           9      5 mg         10      2.5 mg         11      5 mg         12      5 mg         13      5 mg         14      2.5 mg         15      5 mg           16      5 mg         17      2.5 mg         18      5 mg         19      5 mg         20      5 mg         21      2.5 mg         22      5 mg           23      5 mg         24      2.5 mg         25      5 mg         26      5 mg         27      5 mg         28      2.5 mg         29      5 mg           30      5 mg         31      2.5 mg               Date Details   12/04 This INR check               How to take your warfarin dose     To take:  2.5 mg Take 0.5 of a 5 mg tablet.    To take:  5 mg Take 1 of the 5 mg tablets.           January 2019  Details    Sun Mon Tue Wed Thu Fri Sat       1      5 mg         2      5 mg         3      5 mg         4      2.5 mg         5      5 mg           6      5 mg         7      2.5 mg         8      5 mg         9      5 mg         10      5 mg         11      2.5 mg         12      5 mg           13      5 mg         14      2.5 mg         15            16               17               18               19                 20               21               22               23               24               25               26                 27               28               29               30               31                  Date Details   No additional details    Date of next INR:  1/15/2019         How to take your warfarin dose     To take:  2.5 mg Take 0.5 of a 5 mg tablet.    To take:  5 mg Take 1 of the 5 mg tablets.

## 2019-01-18 ENCOUNTER — ANTICOAGULATION THERAPY VISIT (OUTPATIENT)
Dept: ANTICOAGULATION | Facility: CLINIC | Age: 84
End: 2019-01-18
Payer: COMMERCIAL

## 2019-01-18 LAB — INR POINT OF CARE: 2.5 (ref 0.86–1.14)

## 2019-01-18 PROCEDURE — 36416 COLLJ CAPILLARY BLOOD SPEC: CPT

## 2019-01-18 PROCEDURE — 85610 PROTHROMBIN TIME: CPT | Mod: QW

## 2019-01-18 PROCEDURE — 99207 ZZC NO CHARGE NURSE ONLY: CPT

## 2019-01-18 NOTE — PROGRESS NOTES
ANTICOAGULATION FOLLOW-UP CLINIC VISIT    Patient Name:  Melvin Abad  Date:  2019  Contact Type:  Face to Face    SUBJECTIVE:     Patient Findings     Positives:   No Problem Findings           OBJECTIVE    INR Protime   Date Value Ref Range Status   2019 2.5 (A) 0.86 - 1.14 Final       ASSESSMENT / PLAN  INR assessment THER    Recheck INR In: 6 WEEKS    INR Location Clinic      Anticoagulation Summary  As of 2019    INR goal:   2.0-3.0   TTR:   86.7 % (2.8 y)   INR used for dosin.5 (2019)   Warfarin maintenance plan:   2.5 mg (5 mg x 0.5) every Mon, Fri; 5 mg (5 mg x 1) all other days   Full warfarin instructions:   2.5 mg every Mon, Fri; 5 mg all other days   Weekly warfarin total:   30 mg   No change documented:   Shani Ross RN   Plan last modified:   Shani Ross RN (2016)   Next INR check:   3/1/2019   Target end date:       Indications    Atrial fibrillation (Resolved) [I48.91]  Long-term (current) use of anticoagulants [Z79.01] [Z79.01]             Anticoagulation Episode Summary     INR check location:       Preferred lab:       Send INR reminders to:   [facility] NOEMI    Comments:   5 mg tablets, AM taker, likes book, BP       Anticoagulation Care Providers     Provider Role Specialty Phone number    Lazaro Huston MD [facility] Internal Medicine [phone]            See the Encounter Report to view Anticoagulation Flowsheet and Dosing Calendar (Go to Encounters tab in chart review, and find the Anticoagulation Therapy Visit)    Dosage adjustment made based on physician directed care plan.      Shani Ross RN

## 2019-03-01 ENCOUNTER — ANTICOAGULATION THERAPY VISIT (OUTPATIENT)
Dept: ANTICOAGULATION | Facility: CLINIC | Age: 84
End: 2019-03-01
Payer: COMMERCIAL

## 2019-03-01 VITALS — SYSTOLIC BLOOD PRESSURE: 149 MMHG | DIASTOLIC BLOOD PRESSURE: 76 MMHG | HEART RATE: 65 BPM

## 2019-03-01 LAB — INR POINT OF CARE: 2.6 (ref 0.9–1.1)

## 2019-03-01 PROCEDURE — 36416 COLLJ CAPILLARY BLOOD SPEC: CPT

## 2019-03-01 PROCEDURE — 85610 PROTHROMBIN TIME: CPT | Mod: QW

## 2019-03-01 NOTE — PROGRESS NOTES
ANTICOAGULATION FOLLOW-UP CLINIC VISIT    Patient Name:  Melvin Abad  Date:  3/1/2019  Contact Type:  Face to Face    SUBJECTIVE:     Patient Findings     Positives:   No Problem Findings           OBJECTIVE    INR Protime   Date Value Ref Range Status   2019 2.6 (A) 0.9 - 1.1 Final       ASSESSMENT / PLAN  INR assessment THER    Recheck INR In: 6 WEEKS    INR Location Clinic      Anticoagulation Summary  As of 3/1/2019    INR goal:   2.0-3.0   TTR:   87.2 % (2.9 y)   INR used for dosin.6 (3/1/2019)   Warfarin maintenance plan:   2.5 mg (5 mg x 0.5) every Mon, Fri; 5 mg (5 mg x 1) all other days   Full warfarin instructions:   2.5 mg every Mon, Fri; 5 mg all other days   Weekly warfarin total:   30 mg   No change documented:   Shani Ross RN   Plan last modified:   Shani Ross RN (2016)   Next INR check:   2019   Target end date:       Indications    Atrial fibrillation (Resolved) [I48.91]  Long-term (current) use of anticoagulants [Z79.01] [Z79.01]             Anticoagulation Episode Summary     INR check location:       Preferred lab:       Send INR reminders to:   [facility] NOEMI    Comments:   5 mg tablets, AM taker, likes book, BP       Anticoagulation Care Providers     Provider Role Specialty Phone number    Lazaro Huston MD [facility] Internal Medicine [phone]            See the Encounter Report to view Anticoagulation Flowsheet and Dosing Calendar (Go to Encounters tab in chart review, and find the Anticoagulation Therapy Visit)    Dosage adjustment made based on physician directed care plan.      Shani Ross RN

## 2019-03-12 DIAGNOSIS — I10 ESSENTIAL HYPERTENSION, BENIGN: ICD-10-CM

## 2019-03-13 NOTE — TELEPHONE ENCOUNTER
"Requested Prescriptions   Pending Prescriptions Disp Refills     lisinopril (PRINIVIL/ZESTRIL) 10 MG tablet [Pharmacy Med Name: LISINOPRIL 10MG TABS] 90 tablet 3    Last Written Prescription Date:  3/13/18  Last Fill Quantity: 90,  # refills: 3   Last office visit: 11/6/2018 with prescribing provider:     Future Office Visit:     Sig: TAKE ONE TABLET BY MOUTH ONCE DAILY    ACE Inhibitors (Including Combos) Protocol Failed - 3/12/2019 10:17 AM       Failed - Blood pressure under 140/90 in past 12 months    BP Readings from Last 3 Encounters:   03/01/19 149/76   12/04/18 137/79   11/06/18 138/88          Failed - Normal serum creatinine on file in past 12 months    Recent Labs   Lab Test 04/05/17  0930  03/15/11  0809   CR 0.89   < >  --    CREAT  --   --  0.8    < > = values in this interval not displayed.          Failed - Normal serum potassium on file in past 12 months    Recent Labs   Lab Test 04/05/17  0930   POTASSIUM 4.1          Passed - Recent (12 mo) or future (30 days) visit within the authorizing provider's specialty    Patient had office visit in the last 12 months or has a visit in the next 30 days with authorizing provider or within the authorizing provider's specialty.  See \"Patient Info\" tab in inbasket, or \"Choose Columns\" in Meds & Orders section of the refill encounter.             Passed - Medication is active on med list       Passed - Patient is age 18 or older      Routing refill request to provider for review/approval because:  Labs not current:  Potassium, serum creatinine    T'd up 1 month for provider review.    Tatianna Kirkland RN            "

## 2019-03-14 RX ORDER — LISINOPRIL 10 MG/1
TABLET ORAL
Qty: 30 TABLET | Refills: 0 | Status: SHIPPED | OUTPATIENT
Start: 2019-03-14 | End: 2019-04-10

## 2019-03-15 DIAGNOSIS — E78.5 HYPERLIPIDEMIA LDL GOAL <100: ICD-10-CM

## 2019-03-18 RX ORDER — SIMVASTATIN 40 MG
TABLET ORAL
Qty: 90 TABLET | Refills: 3 | Status: SHIPPED | OUTPATIENT
Start: 2019-03-18 | End: 2020-05-11

## 2019-03-18 NOTE — TELEPHONE ENCOUNTER
"Routing to provider to address refill. Routing to scheduling to contact pt for lab appointment. Pt has appt 4/10 at 0930. Pt should be fasting for lab appt.     Last Written Prescription Date:  3/13/18  Last Fill Quantity: 90,  # refills: 3   Last office visit: 11/6/2018 with prescribing provider:  Lazaro Huston   Future Office Visit:   Next 5 appointments (look out 90 days)    Apr 10, 2019  9:30 AM CDT  Office Visit with Lazaro Huston MD  [facility] ([address] [address] [phone]         Requested Prescriptions   Pending Prescriptions Disp Refills     simvastatin (ZOCOR) 40 MG tablet [Pharmacy Med Name: SIMVASTATIN 40MG TABS] 90 tablet 3     Sig: TAKE 1 TABLET BY MOUTH EVERY NIGHT AT BEDTIME    Statins Protocol Failed - 3/15/2019  9:42 AM       Failed - LDL on file in past 12 months    Recent Labs   Lab Test 02/07/17  0833   LDL 75            Passed - No abnormal creatine kinase in past 12 months    No lab results found.            Passed - Recent (12 mo) or future (30 days) visit within the authorizing provider's specialty    Patient had office visit in the last 12 months or has a visit in the next 30 days with authorizing provider or within the authorizing provider's specialty.  See \"Patient Info\" tab in inbasket, or \"Choose Columns\" in Meds & Orders section of the refill encounter.             Passed - Medication is active on med list       Passed - Patient is age 18 or older        Routing refill request to provider for review/approval because:  Labs not current:  LDL   Routing to provider to address refill. Routing to scheduling to contact pt for lab appointment. Pt has appt 4/10 at 0930. Pt should be fasting for lab appt.     Angélica Ellis RN on 3/18/2019 at 10:01 AM        "

## 2019-03-18 NOTE — TELEPHONE ENCOUNTER
Will have lab work done at the same time as his upcoming appt.   Thank you,  Sudha Ramirez   for [facility]

## 2019-03-20 DIAGNOSIS — I48.20 CHRONIC ATRIAL FIBRILLATION (H): ICD-10-CM

## 2019-03-20 DIAGNOSIS — Z79.01 LONG TERM CURRENT USE OF ANTICOAGULANT THERAPY: ICD-10-CM

## 2019-03-20 RX ORDER — WARFARIN SODIUM 5 MG/1
TABLET ORAL
Qty: 80 TABLET | Refills: 3 | Status: SHIPPED | OUTPATIENT
Start: 2019-03-20 | End: 2020-04-07

## 2019-04-10 ENCOUNTER — OFFICE VISIT (OUTPATIENT)
Dept: INTERNAL MEDICINE | Facility: CLINIC | Age: 84
End: 2019-04-10
Payer: COMMERCIAL

## 2019-04-10 VITALS
RESPIRATION RATE: 16 BRPM | HEART RATE: 72 BPM | WEIGHT: 214 LBS | HEIGHT: 69 IN | TEMPERATURE: 96.9 F | OXYGEN SATURATION: 97 % | DIASTOLIC BLOOD PRESSURE: 84 MMHG | SYSTOLIC BLOOD PRESSURE: 124 MMHG | BODY MASS INDEX: 31.7 KG/M2

## 2019-04-10 DIAGNOSIS — I48.20 CHRONIC ATRIAL FIBRILLATION (H): Primary | ICD-10-CM

## 2019-04-10 DIAGNOSIS — Z79.01 LONG TERM CURRENT USE OF ANTICOAGULANT THERAPY: ICD-10-CM

## 2019-04-10 DIAGNOSIS — K64.4 EXTERNAL HEMORRHOIDS: ICD-10-CM

## 2019-04-10 DIAGNOSIS — I10 ESSENTIAL HYPERTENSION, BENIGN: ICD-10-CM

## 2019-04-10 DIAGNOSIS — E11.9 TYPE 2 DIABETES MELLITUS WITHOUT COMPLICATION, WITHOUT LONG-TERM CURRENT USE OF INSULIN (H): ICD-10-CM

## 2019-04-10 LAB
ALBUMIN SERPL-MCNC: 3.7 G/DL (ref 3.4–5)
ALP SERPL-CCNC: 72 U/L (ref 40–150)
ALT SERPL W P-5'-P-CCNC: 17 U/L (ref 0–70)
ANION GAP SERPL CALCULATED.3IONS-SCNC: 5 MMOL/L (ref 3–14)
AST SERPL W P-5'-P-CCNC: 15 U/L (ref 0–45)
BILIRUB SERPL-MCNC: 1.3 MG/DL (ref 0.2–1.3)
BUN SERPL-MCNC: 17 MG/DL (ref 7–30)
CALCIUM SERPL-MCNC: 8.8 MG/DL (ref 8.5–10.1)
CHLORIDE SERPL-SCNC: 107 MMOL/L (ref 94–109)
CHOLEST SERPL-MCNC: 125 MG/DL
CO2 SERPL-SCNC: 29 MMOL/L (ref 20–32)
CREAT SERPL-MCNC: 0.93 MG/DL (ref 0.66–1.25)
CREAT UR-MCNC: 62 MG/DL
ERYTHROCYTE [DISTWIDTH] IN BLOOD BY AUTOMATED COUNT: 13 % (ref 10–15)
GFR SERPL CREATININE-BSD FRML MDRD: 72 ML/MIN/{1.73_M2}
GLUCOSE SERPL-MCNC: 114 MG/DL (ref 70–99)
HBA1C MFR BLD: 6.1 % (ref 0–5.6)
HCT VFR BLD AUTO: 47.4 % (ref 40–53)
HDLC SERPL-MCNC: 55 MG/DL
HGB BLD-MCNC: 15.2 G/DL (ref 13.3–17.7)
LDLC SERPL CALC-MCNC: 55 MG/DL
MCH RBC QN AUTO: 32.1 PG (ref 26.5–33)
MCHC RBC AUTO-ENTMCNC: 32.1 G/DL (ref 31.5–36.5)
MCV RBC AUTO: 100 FL (ref 78–100)
MICROALBUMIN UR-MCNC: 11 MG/L
MICROALBUMIN/CREAT UR: 17.89 MG/G CR (ref 0–17)
NONHDLC SERPL-MCNC: 70 MG/DL
PLATELET # BLD AUTO: 164 10E9/L (ref 150–450)
POTASSIUM SERPL-SCNC: 4.2 MMOL/L (ref 3.4–5.3)
PROT SERPL-MCNC: 7 G/DL (ref 6.8–8.8)
RBC # BLD AUTO: 4.74 10E12/L (ref 4.4–5.9)
SODIUM SERPL-SCNC: 141 MMOL/L (ref 133–144)
TRIGL SERPL-MCNC: 74 MG/DL
TSH SERPL DL<=0.005 MIU/L-ACNC: 1.72 MU/L (ref 0.4–4)
WBC # BLD AUTO: 6.4 10E9/L (ref 4–11)

## 2019-04-10 PROCEDURE — 85027 COMPLETE CBC AUTOMATED: CPT | Performed by: INTERNAL MEDICINE

## 2019-04-10 PROCEDURE — 80053 COMPREHEN METABOLIC PANEL: CPT | Performed by: INTERNAL MEDICINE

## 2019-04-10 PROCEDURE — 80061 LIPID PANEL: CPT | Performed by: INTERNAL MEDICINE

## 2019-04-10 PROCEDURE — 36415 COLL VENOUS BLD VENIPUNCTURE: CPT | Performed by: INTERNAL MEDICINE

## 2019-04-10 PROCEDURE — 99214 OFFICE O/P EST MOD 30 MIN: CPT | Performed by: INTERNAL MEDICINE

## 2019-04-10 PROCEDURE — 84443 ASSAY THYROID STIM HORMONE: CPT | Performed by: INTERNAL MEDICINE

## 2019-04-10 PROCEDURE — 83036 HEMOGLOBIN GLYCOSYLATED A1C: CPT | Performed by: INTERNAL MEDICINE

## 2019-04-10 PROCEDURE — 82043 UR ALBUMIN QUANTITATIVE: CPT | Performed by: INTERNAL MEDICINE

## 2019-04-10 RX ORDER — LISINOPRIL 10 MG/1
10 TABLET ORAL DAILY
Qty: 90 TABLET | Refills: 3 | Status: SHIPPED | OUTPATIENT
Start: 2019-04-10 | End: 2020-05-11

## 2019-04-10 RX ORDER — METOPROLOL SUCCINATE 50 MG/1
50 TABLET, EXTENDED RELEASE ORAL DAILY
Qty: 90 TABLET | Refills: 3 | Status: SHIPPED | OUTPATIENT
Start: 2019-04-10 | End: 2020-05-11

## 2019-04-10 RX ORDER — LANCETS
1 EACH MISCELLANEOUS DAILY
Qty: 100 EACH | Refills: 1 | Status: SHIPPED | OUTPATIENT
Start: 2019-04-10 | End: 2020-02-06

## 2019-04-10 ASSESSMENT — MIFFLIN-ST. JEOR: SCORE: 1621.08

## 2019-04-10 ASSESSMENT — PAIN SCALES - GENERAL: PAINLEVEL: NO PAIN (0)

## 2019-04-10 NOTE — LETTER
April 10, 2019      Melvin Abad  [address] [place] [id number]        Dear ,    We are writing to inform you of your test results.    Your test results fall within the expected range(s) or remain unchanged from previous results.  Please continue with current treatment plan.    Resulted Orders   Lipid Profile   Result Value Ref Range    Cholesterol 125 <200 mg/dL    Triglycerides 74 <150 mg/dL      Comment:      Fasting specimen    HDL Cholesterol 55 >39 mg/dL    LDL Cholesterol Calculated 55 <100 mg/dL      Comment:      Desirable:       <100 mg/dl    Non HDL Cholesterol 70 <130 mg/dL   TSH with free T4 reflex   Result Value Ref Range    TSH 1.72 0.40 - 4.00 mU/L   CBC with platelets   Result Value Ref Range    WBC 6.4 4.0 - 11.0 10e9/L    RBC Count 4.74 4.4 - 5.9 10e12/L    Hemoglobin 15.2 13.3 - 17.7 g/dL    Hematocrit 47.4 40.0 - 53.0 %     78 - 100 fl    MCH 32.1 26.5 - 33.0 pg    MCHC 32.1 31.5 - 36.5 g/dL    RDW 13.0 10.0 - 15.0 %    Platelet Count 164 150 - 450 10e9/L   Comprehensive metabolic panel   Result Value Ref Range    Sodium 141 133 - 144 mmol/L    Potassium 4.2 3.4 - 5.3 mmol/L    Chloride 107 94 - 109 mmol/L    Carbon Dioxide 29 20 - 32 mmol/L    Anion Gap 5 3 - 14 mmol/L    Glucose 114 (H) 70 - 99 mg/dL      Comment:      Fasting specimen    Urea Nitrogen 17 7 - 30 mg/dL    Creatinine 0.93 0.66 - 1.25 mg/dL    GFR Estimate 72 >60 mL/min/[1.73_m2]      Comment:      Non  GFR Calc  Starting 12/18/2018, serum creatinine based estimated GFR (eGFR) will be   calculated using the Chronic Kidney Disease Epidemiology Collaboration   (CKD-EPI) equation.      GFR Estimate If Black 83 >60 mL/min/[1.73_m2]      Comment:       GFR Calc  Starting 12/18/2018, serum creatinine based estimated GFR (eGFR) will be   calculated using the Chronic Kidney Disease Epidemiology Collaboration   (CKD-EPI) equation.      Calcium 8.8 8.5 - 10.1 mg/dL     Bilirubin Total 1.3 0.2 - 1.3 mg/dL    Albumin 3.7 3.4 - 5.0 g/dL    Protein Total 7.0 6.8 - 8.8 g/dL    Alkaline Phosphatase 72 40 - 150 U/L    ALT 17 0 - 70 U/L    AST 15 0 - 45 U/L   Hemoglobin A1c   Result Value Ref Range    Hemoglobin A1C 6.1 (H) 0 - 5.6 %      Comment:      Normal <5.7% Prediabetes 5.7-6.4%  Diabetes 6.5% or higher - adopted from ADA   consensus guidelines.     Albumin Random Urine Quantitative with Creat Ratio   Result Value Ref Range    Creatinine Urine 62 mg/dL    Albumin Urine mg/L 11 mg/L    Albumin Urine mg/g Cr 17.89 (H) 0 - 17 mg/g Cr       If you have any questions or concerns, please call the clinic at the number listed above.       Sincerely,        Lazaro Huston MD

## 2019-04-10 NOTE — PROGRESS NOTES
SUBJECTIVE:   Melvin Abad is a 89 year old male who presents to clinic today for the following   health issues:    Chief Complaint   Patient presents with     Recheck Medication     Diabetes  Lipids  Atrial Fib     Lives here in town, has a renter in the basement.  Sons help him as well.      BP at home are 124-140 range normally.    Sugars are  range.  Weight is down a few pounds.  Eats at senior dining.  Breakfast may go out at times.    No treatment for diabetes, just checking sugars.      No heart problems, tires out at times. Doing coumadin, no bleeding issues.      Having some issues with hemorrhoids. Had some constipation and then felt something let loose, now hard to keep clean or empty out.  No pain.      Past Medical History:   Diagnosis Date     Actinic keratosis      Atrial fibrillation (H)      Basal cell carcinoma nos 06/10    Mohs excision, rt upper lip & rt temple     Cardiac dysrhythmia, unspecified      Cough     chronic cough     Diabetic eye exam (H) 8/29/14     Generalized osteoarthrosis, unspecified site     djd of the knees, hands, and neck     Other diseases of lung, not elsewhere classified     pulmonary nodule, benign     Pure hypercholesterolemia      Unspecified essential hypertension      Current Outpatient Medications   Medication     blood glucose monitoring (NO BRAND SPECIFIED) test strip     blood glucose monitoring (ONE TOUCH ULTRASOFT) lancets     Carboxymethylcellulose Sod PF (RETAINE CMC) 0.5 % SOLN ophthalmic solution     Cholecalciferol (VITAMIN D) 2000 UNITS tablet     hydrocortisone (ANUSOL-HC) 2.5 % cream     lisinopril (PRINIVIL/ZESTRIL) 10 MG tablet     metoprolol succinate (TOPROL-XL) 50 MG 24 hr tablet     multivitamin (OCUVITE) TABS     simvastatin (ZOCOR) 40 MG tablet     warfarin (COUMADIN) 5 MG tablet     polyethylene glycol (MIRALAX) powder     No current facility-administered medications for this visit.      Social History     Tobacco Use     Smoking  "status: Former Smoker     Types: Cigarettes     Smokeless tobacco: Never Used     Tobacco comment: 5 years   Substance Use Topics     Alcohol use: No     Comment: denies     Drug use: No     Review of Systems  Constitutional-No fevers, chills, or weight changes..  ENT-No earpain, sore throat, voice changes or rhinitis.  Cardiac-No chest pain or palpitations.  Respiratory-No cough, sob, or hemoptysis.  GI-difficult to empty stool. .    Physical Exam  BP (!) 142/94   Pulse 72   Temp 96.9  F (36.1  C) (Temporal)   Resp 16   Ht 1.753 m (5' 9\")   Wt 97.1 kg (214 lb)   SpO2 97%   BMI 31.60 kg/m    General Appearance-healthy, alert, no distress  Cardiac-regular rate and rhythm  with normal S1, S2 ; no murmur, rub or gallops  Lungs-clear to auscultation  GI-Soft, nontender.  Normal bowel sounds.  No hepatosplenomegaly or abnormal masses  -rectal exam is normal, no stricture, a couple ext hem    ASSESSMENT:  This is a 89-year-old patient who comes in for recheck.  He is been doing rather well he continues to live on his own, he drives, goes to [employer].    Atrial fibrillation he is rate controlled, he is on Coumadin doing well, will check his CBC today.  Refill his Coumadin as needed.    Diabetes is diet controlled he checks his sugars daily his blood sugars are anywhere from , he is not on any medications we will check an A1c but no change in therapy at this time.    Hypertension the patient is on lisinopril and metoprolol we will refill those today, check his kidney function and microalbumin.    Hyperlipidemia the patient is on simvastatin 40 mg a day we will check his lipids and continue that medication.    He is having some issues with his bowels, he has some external hemorrhoids some irritation at the rectum and difficulty with fully emptying.  Would like to get him on Metamucil daily and we will prescribe him some hydrocortisone cream as needed.    Electronically signed by Lazaro Huston MD          "

## 2019-04-12 ENCOUNTER — ANTICOAGULATION THERAPY VISIT (OUTPATIENT)
Dept: ANTICOAGULATION | Facility: CLINIC | Age: 84
End: 2019-04-12
Payer: COMMERCIAL

## 2019-04-12 LAB — INR POINT OF CARE: 2.6 (ref 0.9–1.1)

## 2019-04-12 PROCEDURE — 85610 PROTHROMBIN TIME: CPT | Mod: QW

## 2019-04-12 PROCEDURE — 99207 ZZC NO CHARGE NURSE ONLY: CPT

## 2019-04-12 PROCEDURE — 36416 COLLJ CAPILLARY BLOOD SPEC: CPT

## 2019-04-12 NOTE — PROGRESS NOTES
ANTICOAGULATION FOLLOW-UP CLINIC VISIT    Patient Name:  Melvin Abad  Date:  2019  Contact Type:  Face to Face    SUBJECTIVE:     Patient Findings     Comments:   The patient was assessed for diet, medication, and activity level changes, missed or extra doses, bruising or bleeding, with no problem findings.  Shani Ross RN               OBJECTIVE    INR Protime   Date Value Ref Range Status   2019 2.6 (A) 0.9 - 1.1 Final       ASSESSMENT / PLAN  INR assessment THER    Recheck INR In: 5 WEEKS    INR Location Clinic      Anticoagulation Summary  As of 2019    INR goal:   2.0-3.0   TTR:   87.7 % (3 y)   INR used for dosin.6 (2019)   Warfarin maintenance plan:   2.5 mg (5 mg x 0.5) every Mon, Fri; 5 mg (5 mg x 1) all other days   Full warfarin instructions:   2.5 mg every Mon, Fri; 5 mg all other days   Weekly warfarin total:   30 mg   No change documented:   Shani Ross RN   Plan last modified:   Shani Ross RN (2016)   Next INR check:   2019   Target end date:       Indications    Atrial fibrillation (Resolved) [I48.91]  Long-term (current) use of anticoagulants [Z79.01] [Z79.01]             Anticoagulation Episode Summary     INR check location:       Preferred lab:       Send INR reminders to:   [facility] NOEMI    Comments:   5 mg tablets, AM taker, likes book, BP       Anticoagulation Care Providers     Provider Role Specialty Phone number    Lazaro Huston MD [facility] Internal Medicine [phone]            See the Encounter Report to view Anticoagulation Flowsheet and Dosing Calendar (Go to Encounters tab in chart review, and find the Anticoagulation Therapy Visit)    Dosage adjustment made based on physician directed care plan.      Shani Ross RN

## 2019-05-17 ENCOUNTER — ANTICOAGULATION THERAPY VISIT (OUTPATIENT)
Dept: ANTICOAGULATION | Facility: CLINIC | Age: 84
End: 2019-05-17
Payer: COMMERCIAL

## 2019-05-17 LAB — INR POINT OF CARE: 2.8 (ref 0.9–1.1)

## 2019-05-17 PROCEDURE — 85610 PROTHROMBIN TIME: CPT | Mod: QW

## 2019-05-17 PROCEDURE — 99207 ZZC NO CHARGE NURSE ONLY: CPT

## 2019-05-17 PROCEDURE — 36416 COLLJ CAPILLARY BLOOD SPEC: CPT

## 2019-05-17 NOTE — PROGRESS NOTES
ANTICOAGULATION FOLLOW-UP CLINIC VISIT    Patient Name:  Melvin Abad  Date:  2019  Contact Type:  Face to Face    SUBJECTIVE:  Patient Findings     Comments:   The patient was assessed for diet, medication, and activity level changes, missed or extra doses, bruising or bleeding, with no problem findings.  Shani Ross RN            Clinical Outcomes     Negatives:   Major bleeding event, Thromboembolic event, Anticoagulation-related hospital admission, Anticoagulation-related ED visit, Anticoagulation-related fatality    Comments:   The patient was assessed for diet, medication, and activity level changes, missed or extra doses, bruising or bleeding, with no problem findings.  Shani Ross RN               OBJECTIVE    INR Protime   Date Value Ref Range Status   2019 2.8 (A) 0.9 - 1.1 Final       ASSESSMENT / PLAN  INR assessment THER    Recheck INR In: 6 WEEKS    INR Location Clinic      Anticoagulation Summary  As of 2019    INR goal:   2.0-3.0   TTR:   88.1 % (3.1 y)   INR used for dosin.8 (2019)   Warfarin maintenance plan:   2.5 mg (5 mg x 0.5) every Mon, Fri; 5 mg (5 mg x 1) all other days   Full warfarin instructions:   2.5 mg every Mon, Fri; 5 mg all other days   Weekly warfarin total:   30 mg   No change documented:   Shani Ross RN   Plan last modified:   Shani Ross RN (2016)   Next INR check:   2019   Target end date:       Indications    Atrial fibrillation (Resolved) [I48.91]  Long-term (current) use of anticoagulants [Z79.01] [Z79.01]             Anticoagulation Episode Summary     INR check location:       Preferred lab:       Send INR reminders to:   CHANELLE FRANKLIN    Comments:   5 mg tablets, AM taker, likes book, BP       Anticoagulation Care Providers     Provider Role Specialty Phone number    Lazaro Huston MD [facility] Internal Medicine [phone]            See the Encounter Report to view Anticoagulation Flowsheet and  Dosing Calendar (Go to Encounters tab in chart review, and find the Anticoagulation Therapy Visit)    Dosage adjustment made based on physician directed care plan.    Shani Ross RN

## 2019-06-28 ENCOUNTER — ANTICOAGULATION THERAPY VISIT (OUTPATIENT)
Dept: ANTICOAGULATION | Facility: CLINIC | Age: 84
End: 2019-06-28
Payer: COMMERCIAL

## 2019-06-28 VITALS — SYSTOLIC BLOOD PRESSURE: 121 MMHG | HEART RATE: 58 BPM | DIASTOLIC BLOOD PRESSURE: 71 MMHG

## 2019-06-28 DIAGNOSIS — Z79.01 LONG TERM CURRENT USE OF ANTICOAGULANT THERAPY: ICD-10-CM

## 2019-06-28 LAB — INR POINT OF CARE: 2.7 (ref 0.9–1.1)

## 2019-06-28 PROCEDURE — 99207 ZZC NO CHARGE NURSE ONLY: CPT

## 2019-06-28 PROCEDURE — 36416 COLLJ CAPILLARY BLOOD SPEC: CPT

## 2019-06-28 PROCEDURE — 85610 PROTHROMBIN TIME: CPT | Mod: QW

## 2019-06-28 NOTE — PROGRESS NOTES
ANTICOAGULATION FOLLOW-UP CLINIC VISIT    Patient Name:  Melvin Abad  Date:  2019  Contact Type:  Face to Face    SUBJECTIVE:  Patient Findings     Comments:   The patient was assessed for diet, medication, and activity level changes, missed or extra doses, bruising or bleeding, with no problem findings.  Shani Ross RN            Clinical Outcomes     Negatives:   Major bleeding event, Thromboembolic event, Anticoagulation-related hospital admission, Anticoagulation-related ED visit, Anticoagulation-related fatality    Comments:   The patient was assessed for diet, medication, and activity level changes, missed or extra doses, bruising or bleeding, with no problem findings.  Shani Ross RN               OBJECTIVE    INR Protime   Date Value Ref Range Status   2019 2.7 (A) 0.9 - 1.1 Final       ASSESSMENT / PLAN  INR assessment THER    Recheck INR In: 6 WEEKS    INR Location Clinic      Anticoagulation Summary  As of 2019    INR goal:   2.0-3.0   TTR:   88.5 % (3.2 y)   INR used for dosin.7 (2019)   Warfarin maintenance plan:   2.5 mg (5 mg x 0.5) every Mon, Fri; 5 mg (5 mg x 1) all other days   Full warfarin instructions:   2.5 mg every Mon, Fri; 5 mg all other days   Weekly warfarin total:   30 mg   No change documented:   Shani Ross RN   Plan last modified:   Shani Ross RN (2016)   Next INR check:   2019   Target end date:       Indications    Atrial fibrillation (Resolved) [I48.91]  Long-term (current) use of anticoagulants [Z79.01] [Z79.01]             Anticoagulation Episode Summary     INR check location:       Preferred lab:       Send INR reminders to:   [facility] [place]    Comments:   5 mg tablets, AM taker, likes book, BP       Anticoagulation Care Providers     Provider Role Specialty Phone number    Lazaro Huston MD [facility] Internal Medicine [phone]            See the Encounter Report to view Anticoagulation Flowsheet  and Dosing Calendar (Go to Encounters tab in chart review, and find the Anticoagulation Therapy Visit)    Dosage adjustment made based on physician directed care plan.      Shani Ross RN

## 2019-07-21 ENCOUNTER — HOSPITAL ENCOUNTER (EMERGENCY)
Facility: CLINIC | Age: 84
Discharge: HOME | End: 2019-07-21
Attending: FAMILY MEDICINE | Admitting: FAMILY MEDICINE
Payer: COMMERCIAL

## 2019-07-21 VITALS
OXYGEN SATURATION: 97 % | RESPIRATION RATE: 18 BRPM | DIASTOLIC BLOOD PRESSURE: 117 MMHG | TEMPERATURE: 98.8 F | SYSTOLIC BLOOD PRESSURE: 151 MMHG

## 2019-07-21 DIAGNOSIS — S80.829A BLISTER (NONTHERMAL), UNSPECIFIED LOWER LEG, INITIAL ENCOUNTER: ICD-10-CM

## 2019-07-21 PROCEDURE — 99282 EMERGENCY DEPT VISIT SF MDM: CPT | Performed by: FAMILY MEDICINE

## 2019-07-21 PROCEDURE — 99284 EMERGENCY DEPT VISIT MOD MDM: CPT | Mod: Z6 | Performed by: FAMILY MEDICINE

## 2019-07-21 RX ORDER — CEPHALEXIN 500 MG/1
500 CAPSULE ORAL 3 TIMES DAILY
Qty: 21 CAPSULE | Refills: 0 | Status: SHIPPED | OUTPATIENT
Start: 2019-07-21 | End: 2019-12-18

## 2019-07-21 ASSESSMENT — ENCOUNTER SYMPTOMS
WOUND: 1
SHORTNESS OF BREATH: 0
FEVER: 0

## 2019-07-21 NOTE — ED AVS SNAPSHOT
[facility] Emergency Department  [address] [place] [address]  Phone:  [phone]  Fax:  [phone]                                    Melvin Abad   MRN: [id number]    Department:  [facility] Emergency Department   Date of Visit:  7/21/2019           After Visit Summary Signature Page    I have received my discharge instructions, and my questions have been answered. I have discussed any challenges I see with this plan with the nurse or doctor.    ..........................................................................................................................................  Patient/Patient Representative Signature      ..........................................................................................................................................  Patient Representative Print Name and Relationship to Patient    ..................................................               ................................................  Date                                   Time    ..........................................................................................................................................  Reviewed by Signature/Title    ...................................................              ..............................................  Date                                               Time          22EPIC Rev 08/18

## 2019-07-22 NOTE — ED TRIAGE NOTES
"Patient has bilateral lower leg wounds that are open.  Patient in concerned about possible infection.  Patient states they started with \"water blisters\" that burst open.  Patient denies fevers.  "

## 2019-07-22 NOTE — ED PROVIDER NOTES
"  History     Chief Complaint   Patient presents with     Wound Check     Bilateral Lower Legs     The history is provided by the patient and a relative.     Melvin Abad is a 89 year old male who presents to the ED complaining of sores on his left  LE for the past two weeks, he is concerned that it may be infected. Patient stated that he bumps his leg and it causes a blister on his skin, clear liquid with blood comes from that area. He has been using silvadene cream and dressings to help with the treatment. Patient does take blood thinners. He had not had new shoes, socks or lotions.    Last INR was therapeutic at 2.7.  He is quite concerned that he could be developing a skin infection and asked about \"cellulitis\".  Accompanied by his son, Thuan.      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) 11/06/2018     Priority: Medium     Type 2 diabetes mellitus without complication, without long-term current use of insulin (H) 05/10/2017     Priority: Medium     Pain of right lower leg 03/31/2017     Priority: Medium     Contusion of right lower extremity 03/31/2017     Priority: Medium     Leg pain 03/31/2017     Priority: Medium     Long-term (current) use of anticoagulants [Z79.01] 04/05/2016     Priority: Medium     Chronic atrial fibrillation (H) 01/19/2016     Priority: Medium     Obesity (BMI 30-39.9) 10/28/2015     Priority: Medium     Cellulitis 11/20/2013     Priority: Medium     Anemia due to blood loss, acute 11/20/2013     Priority: Medium     DVT prophylaxis 11/20/2013     Priority: Medium     CTS (carpal tunnel syndrome) 08/16/2013     Priority: Medium     Gilbert syndrome 01/31/2012     Priority: Medium     History of skin cancer 01/03/2012     Priority: Medium     AK (actinic keratosis) 01/03/2012     Priority: Medium     Advanced directives, counseling/discussion 11/15/2011     Priority: Medium     " Advance Directive Problem List Overview:   Name Relationship Phone    Primary Health Care Agent            Alternative Health Care Agent         Advance Directive Initial Visit--11/9/11  Melvin Abad presents in person for initial session regarding completion of advanced directive form. He was referred to the facilitator by self.  He currently has no advance directive.  Plan:  Patient presents for [employer] Informational meeting. Patient given Honoring Choices folder. Patient will complete Advance Care Directive and bring to clinic.  Follow up meeting: As needed.    .Vandana Prasad RN         [facility] 04/08/2011     Priority: Medium     Natalie Ferraro RN-PHN  F[facility] / [facility]   [phone]        DX V65.8 REPLACED WITH [id number] [facility] (04/08/2013)       Diverticulosis 03/25/2011     Priority: Medium     Type 2 diabetes mellitus without complication (H) 03/10/2011     Priority: Medium     Hyperlipidemia LDL goal <100 10/31/2010     Priority: Medium     Long term current use of anticoagulant therapy 01/31/2001     Priority: Medium     Problem list name updated by automated process. Provider to review          Past Medical History:    Past Medical History:   Diagnosis Date     Actinic keratosis      Atrial fibrillation (H)      Basal cell carcinoma nos 06/10     Cardiac dysrhythmia, unspecified      Cough      Diabetic eye exam (H) 8/29/14     Generalized osteoarthrosis, unspecified site      Other diseases of lung, not elsewhere classified      Pure hypercholesterolemia      Unspecified essential hypertension        Past Surgical History:    Past Surgical History:   Procedure Laterality Date     C TOTAL KNEE ARTHROPLASTY  1997    Knee Replacement, Total     COLONOSCOPY  05/02/2007    Multiple bxs of diminutive polyps of ascending colon.     COLONOSCOPY  11/10/2010    COLONOSCOPY performed by MARISELA GRIMM at  GI     HC COLONOSCOPY W/WO BRUSH/WASH  10/19/2001       COLONOSCOPY W/WO BRUSH/WASH  11/02/2004      HEMORRHOIDOPEXY BY STAPLING  09/26/08       Family History:    Family History   Problem Relation Age of Onset     Cancer Mother      Prostate Cancer Father        Social History:  Marital Status:   [5]  Social History     Tobacco Use     Smoking status: Former Smoker     Types: Cigarettes     Smokeless tobacco: Never Used     Tobacco comment: 5 years   Substance Use Topics     Alcohol use: No     Comment: denies     Drug use: No        Medications:      cephALEXin (KEFLEX) 500 MG capsule   blood glucose (NO BRAND SPECIFIED) test strip   blood glucose monitoring (ONE TOUCH ULTRASOFT) lancets   Carboxymethylcellulose Sod PF (RETAINE CMC) 0.5 % SOLN ophthalmic solution   Cholecalciferol (VITAMIN D) 2000 UNITS tablet   hydrocortisone (ANUSOL-HC) 2.5 % cream   hydrocortisone (ANUSOL-HC) 2.5 % cream   lisinopril (PRINIVIL/ZESTRIL) 10 MG tablet   metoprolol succinate ER (TOPROL-XL) 50 MG 24 hr tablet   multivitamin (OCUVITE) TABS   polyethylene glycol (MIRALAX) powder   simvastatin (ZOCOR) 40 MG tablet   warfarin (COUMADIN) 5 MG tablet         Review of Systems   Constitutional: Negative for fever.   Respiratory: Negative for shortness of breath.    Cardiovascular: Negative for chest pain.   Skin: Positive for wound.   All other systems reviewed and are negative.      Physical Exam   BP: (!) 151/117  Heart Rate: 72  Temp: 98.8  F (37.1  C)  Resp: 18  SpO2: 97 %      Physical Exam   Constitutional: He is oriented to person, place, and time. He appears well-developed and well-nourished. No distress.   HENT:   Head: Normocephalic.   Eyes: No scleral icterus.   Musculoskeletal: He exhibits edema (1+ bilateral).   Neurological: He is alert and oriented to person, place, and time.   Skin: Skin is warm and dry. He is not diaphoretic. There is erythema ( mild).        Chronic stasis changes on both legs.  He has several small deroofed blisters on the posterior aspect of both  lower legs some mild surrounding edema.  No purulence is noted.   Psychiatric: He has a normal mood and affect. His behavior is normal.   Nursing note and vitals reviewed.      ED Course  (with Medical Decision Making)    89-year-old gentleman with mild lower extremity edema and blister type lesions on the backs of both legs without any known trauma.  They drained clear to slightly bloody fluid but nothing that sounds purulent.  Some mild surrounding erythema and he is worried that he could be developing an infection.  On exam I do not appreciate any significant warmth.  Is a little bit of mild erythema surrounding these deroofed blisters.  These were dressed and suggested support stockings and elevation to help with the swelling.  And he is worried about infection so I did put him on Keflex 500 mg 3 times a day for 7 days and he can use topical Silvadene that he has at home or bacitracin.  I would like him to follow-up in clinic with Dr. Huston this coming week.  I asked registration to set up an appointment for him.  He will keep the wounds covered.  He is comfortable with this plan.              Procedures               Critical Care time:  none               No results found for this or any previous visit (from the past 24 hour(s)).    Medications - No data to display    Assessments & Plan     I have reviewed the nursing notes.    I have reviewed the findings, diagnosis, plan and need for follow up with the patient.          Medication List      Started    cephALEXin 500 MG capsule  Commonly known as:  KEFLEX  500 mg, Oral, 3 TIMES DAILY            Final diagnoses:   Blister (nonthermal), unspecified lower leg, initial encounter - bilateral lower legs, posteriorly       This document serves as a record of services personally performed by Aaron Kearns MD.  It was created on their behalf by Carlene Powell, a trained medical scribe. The creation of this record is based on the provider's personal observations  and the statements of the patient. This document has been checked and approved by the attending provider.    Disclaimer : This note consists of symbols derived from keyboarding, dictation and/or voice recognition software. As a result, there may be errors in the script that have gone undetected. Please consider this when interpreting information found in this chart.      7/21/2019   [facility] EMERGENCY DEPARTMENT     Aaron Jerry MD  07/21/19 [id number]

## 2019-07-22 NOTE — DISCHARGE INSTRUCTIONS
Keep the wounds covered.  Elevate your legs above heart level at least 2 or 3 times a day to help decrease the swelling.  Compression stockings may be helpful.  You can take the antibiotic to cover any infection.  You can use your Silvadene cream or bacitracin when you change the dressings.  See Dr. Huston in clinic this week.  It was nice visiting with both of you this evening.  I hope this settles down quickly for you.    Thank you for choosing [facility]. We appreciate the opportunity to meet your urgent medical needs. Please let us know if we could have done anything to make your stay more satisfying.    After discharge, please closely monitor for any new or worsening symptoms. Return to the Emergency Department if you develop any acute worsening signs or symptoms.    If you had lab work, cultures or imaging studies done during your stay, the final results may still be pending. We will call you if your plan of care needs to change. However, if you are not improving as expected, please follow up with your primary care provider or clinic.     Start any prescription medications that were prescribed to you and take them as directed.     Please see additional handouts that may be pertinent to your condition.

## 2019-07-24 ENCOUNTER — OFFICE VISIT (OUTPATIENT)
Dept: INTERNAL MEDICINE | Facility: CLINIC | Age: 84
End: 2019-07-24
Payer: COMMERCIAL

## 2019-07-24 VITALS
TEMPERATURE: 97.5 F | OXYGEN SATURATION: 97 % | SYSTOLIC BLOOD PRESSURE: 148 MMHG | DIASTOLIC BLOOD PRESSURE: 82 MMHG | HEART RATE: 64 BPM | WEIGHT: 213 LBS | BODY MASS INDEX: 31.45 KG/M2 | RESPIRATION RATE: 16 BRPM

## 2019-07-24 DIAGNOSIS — B36.9 FUNGAL RASH OF TORSO: ICD-10-CM

## 2019-07-24 DIAGNOSIS — L03.119 CELLULITIS OF LOWER LEG: Primary | ICD-10-CM

## 2019-07-24 PROCEDURE — 99213 OFFICE O/P EST LOW 20 MIN: CPT | Performed by: INTERNAL MEDICINE

## 2019-07-24 RX ORDER — MICONAZOLE NITRATE 20 MG/G
CREAM TOPICAL 2 TIMES DAILY
Qty: 56 G | Refills: 0 | Status: SHIPPED | OUTPATIENT
Start: 2019-07-24 | End: 2020-01-23

## 2019-07-24 ASSESSMENT — PAIN SCALES - GENERAL: PAINLEVEL: NO PAIN (0)

## 2019-07-24 NOTE — PROGRESS NOTES
Subjective     Melvin Abad is a 89 year old male who presents to clinic today for the following health issues:    HPI   ED/UC Followup:    Facility:  [facility]  Date of visit: 7/21/19  Reason for visit: Blister lower leg  Current Status: Follow up     Was in the ER on Sunday, he got some antibiotics. Healing up now.  No cuts or sores on his toes.  Had some swelling.      Rash under the left arm, tried otc medications.  Comes and goes.      Past Medical History:   Diagnosis Date     Actinic keratosis      Atrial fibrillation (H)      Basal cell carcinoma nos 06/10    Mohs excision, rt upper lip & rt temple     Cardiac dysrhythmia, unspecified      Cough     chronic cough     Diabetic eye exam (H) 8/29/14     Generalized osteoarthrosis, unspecified site     djd of the knees, hands, and neck     Other diseases of lung, not elsewhere classified     pulmonary nodule, benign     Pure hypercholesterolemia      Unspecified essential hypertension      Current Outpatient Medications   Medication     blood glucose (NO BRAND SPECIFIED) test strip     blood glucose monitoring (ONE TOUCH ULTRASOFT) lancets     Carboxymethylcellulose Sod PF (RETAINE CMC) 0.5 % SOLN ophthalmic solution     cephALEXin (KEFLEX) 500 MG capsule     Cholecalciferol (VITAMIN D) 2000 UNITS tablet     hydrocortisone (ANUSOL-HC) 2.5 % cream     lisinopril (PRINIVIL/ZESTRIL) 10 MG tablet     metoprolol succinate ER (TOPROL-XL) 50 MG 24 hr tablet     multivitamin (OCUVITE) TABS     polyethylene glycol (MIRALAX) powder     simvastatin (ZOCOR) 40 MG tablet     warfarin (COUMADIN) 5 MG tablet     No current facility-administered medications for this visit.      Physical Exam  /82   Pulse 64   Temp 97.5  F (36.4  C) (Temporal)   Resp 16   Wt 96.6 kg (213 lb)   SpO2 97%   BMI 31.45 kg/m    General Appearance-healthy, alert, no distress  Skin-left underarm has erythema, small red dots  Posterior lower leg with healing sores.      ASSESSMENT:  Left underarm with a fungal rash, we will try him on some Micatin cream can continue to use cornstarch topically.    Healing lower legs sores swelling is down recommended to continue to elevate his legs.  Will continue to take the Keflex.    Electronically signed by Lazaro Huston MD

## 2019-08-09 ENCOUNTER — ANTICOAGULATION THERAPY VISIT (OUTPATIENT)
Dept: ANTICOAGULATION | Facility: CLINIC | Age: 84
End: 2019-08-09
Payer: COMMERCIAL

## 2019-08-09 VITALS — HEART RATE: 62 BPM | SYSTOLIC BLOOD PRESSURE: 133 MMHG | DIASTOLIC BLOOD PRESSURE: 70 MMHG

## 2019-08-09 DIAGNOSIS — Z79.01 LONG TERM CURRENT USE OF ANTICOAGULANT THERAPY: ICD-10-CM

## 2019-08-09 LAB — INR POINT OF CARE: 2.8 (ref 0.9–1.1)

## 2019-08-09 PROCEDURE — 85610 PROTHROMBIN TIME: CPT | Mod: QW

## 2019-08-09 PROCEDURE — 36416 COLLJ CAPILLARY BLOOD SPEC: CPT

## 2019-08-09 PROCEDURE — 99207 ZZC NO CHARGE NURSE ONLY: CPT

## 2019-08-09 NOTE — PROGRESS NOTES
ANTICOAGULATION FOLLOW-UP CLINIC VISIT    Patient Name:  Melvin Abad  Date:  2019  Contact Type:  Face to Face    SUBJECTIVE:  Patient Findings     Comments:   The patient was assessed for diet, medication, and activity level changes, missed or extra doses, bruising or bleeding, with no problem findings.  Shani Ross RN          Clinical Outcomes     Negatives:   Major bleeding event, Thromboembolic event, Anticoagulation-related hospital admission, Anticoagulation-related ED visit, Anticoagulation-related fatality    Comments:   The patient was assessed for diet, medication, and activity level changes, missed or extra doses, bruising or bleeding, with no problem findings.  Shani Ross RN             OBJECTIVE    INR Protime   Date Value Ref Range Status   2019 2.8 (A) 0.9 - 1.1 Final       ASSESSMENT / PLAN  INR assessment THER    Recheck INR In: 6 WEEKS    INR Location Clinic      Anticoagulation Summary  As of 2019    INR goal:   2.0-3.0   TTR:   88.9 % (3.3 y)   INR used for dosin.8 (2019)   Warfarin maintenance plan:   2.5 mg (5 mg x 0.5) every Mon, Fri; 5 mg (5 mg x 1) all other days   Full warfarin instructions:   2.5 mg every Mon, Fri; 5 mg all other days   Weekly warfarin total:   30 mg   No change documented:   Shani Ross RN   Plan last modified:   Shani Ross RN (2016)   Next INR check:   2019   Target end date:       Indications    Atrial fibrillation (Resolved) [I48.91]  Long-term (current) use of anticoagulants [Z79.01] [Z79.01]             Anticoagulation Episode Summary     INR check location:       Preferred lab:       Send INR reminders to:   [facility] [facility]    Comments:   5 mg tablets, AM taker, likes book, BP       Anticoagulation Care Providers     Provider Role Specialty Phone number    Lazaro Huston MD [facility] [phone]            See the Encounter Report to view Anticoagulation Flowsheet and  Dosing Calendar (Go to Encounters tab in chart review, and find the Anticoagulation Therapy Visit)    Dosage adjustment made based on physician directed care plan.      Shani Ross RN

## 2019-08-31 ENCOUNTER — HOSPITAL ENCOUNTER (EMERGENCY)
Facility: CLINIC | Age: 84
Discharge: HOME | End: 2019-08-31
Attending: EMERGENCY MEDICINE | Admitting: EMERGENCY MEDICINE
Payer: COMMERCIAL

## 2019-08-31 ENCOUNTER — NURSE TRIAGE (OUTPATIENT)
Dept: NURSING | Facility: CLINIC | Age: 84
End: 2019-08-31

## 2019-08-31 VITALS
DIASTOLIC BLOOD PRESSURE: 72 MMHG | TEMPERATURE: 97 F | SYSTOLIC BLOOD PRESSURE: 132 MMHG | RESPIRATION RATE: 18 BRPM | HEART RATE: 66 BPM | OXYGEN SATURATION: 96 % | BODY MASS INDEX: 31.38 KG/M2 | WEIGHT: 212.5 LBS

## 2019-08-31 DIAGNOSIS — L03.119 CELLULITIS AND ABSCESS OF LEG: ICD-10-CM

## 2019-08-31 DIAGNOSIS — L02.419 CELLULITIS AND ABSCESS OF LEG: ICD-10-CM

## 2019-08-31 LAB
ANION GAP SERPL CALCULATED.3IONS-SCNC: 8 MMOL/L (ref 3–14)
BASOPHILS # BLD AUTO: 0 10E9/L (ref 0–0.2)
BASOPHILS NFR BLD AUTO: 0.1 %
BUN SERPL-MCNC: 16 MG/DL (ref 7–30)
CALCIUM SERPL-MCNC: 9.1 MG/DL (ref 8.5–10.1)
CHLORIDE SERPL-SCNC: 110 MMOL/L (ref 94–109)
CO2 SERPL-SCNC: 26 MMOL/L (ref 20–32)
CREAT SERPL-MCNC: 0.84 MG/DL (ref 0.66–1.25)
DIFFERENTIAL METHOD BLD: ABNORMAL
EOSINOPHIL NFR BLD AUTO: 1.3 %
ERYTHROCYTE [DISTWIDTH] IN BLOOD BY AUTOMATED COUNT: 12.8 % (ref 10–15)
GFR SERPL CREATININE-BSD FRML MDRD: 76 ML/MIN/{1.73_M2}
GLUCOSE SERPL-MCNC: 106 MG/DL (ref 70–99)
HCT VFR BLD AUTO: 44.4 % (ref 40–53)
HGB BLD-MCNC: 14.9 G/DL (ref 13.3–17.7)
IMM GRANULOCYTES # BLD: 0 10E9/L (ref 0–0.4)
IMM GRANULOCYTES NFR BLD: 0.4 %
INR PPP: 2.49 (ref 0.86–1.14)
LYMPHOCYTES # BLD AUTO: 1.7 10E9/L (ref 0.8–5.3)
LYMPHOCYTES NFR BLD AUTO: 22.4 %
MCH RBC QN AUTO: 33.1 PG (ref 26.5–33)
MCHC RBC AUTO-ENTMCNC: 33.6 G/DL (ref 31.5–36.5)
MCV RBC AUTO: 99 FL (ref 78–100)
MONOCYTES # BLD AUTO: 0.7 10E9/L (ref 0–1.3)
MONOCYTES NFR BLD AUTO: 8.9 %
NEUTROPHILS # BLD AUTO: 5.2 10E9/L (ref 1.6–8.3)
NEUTROPHILS NFR BLD AUTO: 66.9 %
NRBC # BLD AUTO: 0 10*3/UL
NRBC BLD AUTO-RTO: 0 /100
PLATELET # BLD AUTO: 162 10E9/L (ref 150–450)
POTASSIUM SERPL-SCNC: 4.3 MMOL/L (ref 3.4–5.3)
RBC # BLD AUTO: 4.5 10E12/L (ref 4.4–5.9)
SODIUM SERPL-SCNC: 144 MMOL/L (ref 133–144)
WBC # BLD AUTO: 7.8 10E9/L (ref 4–11)

## 2019-08-31 PROCEDURE — 99284 EMERGENCY DEPT VISIT MOD MDM: CPT | Mod: Z6 | Performed by: EMERGENCY MEDICINE

## 2019-08-31 PROCEDURE — 99283 EMERGENCY DEPT VISIT LOW MDM: CPT | Performed by: EMERGENCY MEDICINE

## 2019-08-31 PROCEDURE — 85610 PROTHROMBIN TIME: CPT | Performed by: EMERGENCY MEDICINE

## 2019-08-31 PROCEDURE — 80048 BASIC METABOLIC PNL TOTAL CA: CPT | Performed by: EMERGENCY MEDICINE

## 2019-08-31 PROCEDURE — 85025 COMPLETE CBC W/AUTO DIFF WBC: CPT | Performed by: EMERGENCY MEDICINE

## 2019-08-31 RX ORDER — CEPHALEXIN 500 MG/1
500 CAPSULE ORAL 4 TIMES DAILY
Qty: 40 CAPSULE | Refills: 0 | Status: SHIPPED | OUTPATIENT
Start: 2019-08-31 | End: 2019-12-18

## 2019-08-31 NOTE — TELEPHONE ENCOUNTER
Vadim, son, calls to ask if patient should be seen today.  FNA advised that triage needs to be with the patient.   Vadim reports he will go over to his dad's place and call back if needed.     Reason for Disposition    [1] Caller is not with the adult (patient) AND [2] probable NON-URGENT symptoms    Additional Information    Negative: [1] Caller is not with the adult (patient) AND [2] reporting urgent symptoms    Negative: Lab result questions    Negative: Medication questions    Negative: Caller can't be reached by phone    Negative: Caller has already spoken to PCP or another triager    Negative: RN needs further essential information from caller in order to complete triage    Negative: Requesting regular office appointment    Negative: [1] Caller requesting NON-URGENT health information AND [2] PCP's office is the best resource    Negative: Health Information question, no triage required and triager able to answer question    Negative: General information question, no triage required and triager able to answer question    Negative: Question about upcoming scheduled test, no triage required and triager able to answer question    Protocols used: INFORMATION ONLY CALL-A-

## 2019-08-31 NOTE — DISCHARGE INSTRUCTIONS
Return to the ER if you develop new or worsening symptoms.  Please take the antibiotic as directed.  Call your clinic early next week and let them know that you are taking antibiotics as this may affect your INR.  Your labs look good today.  Your INR was 2.49.

## 2019-08-31 NOTE — ED TRIAGE NOTES
"Presents to ED with sore to the lower left leg that has been present approx. 10 days. States it started as \"a blister with clear liquid\" and thinks it popped during the night at some point a few nights ago. Near the wound is bright red and warm to touch.  Notes a new small light red spot to the top of the foot that is new today.  "

## 2019-08-31 NOTE — ED AVS SNAPSHOT
[facility] Emergency Department  [address] [place] [address]  Phone:  [phone]  Fax:  [phone]                                    Melvin Abad   MRN: [id number]    Department:  [facility] Emergency Department   Date of Visit:  8/31/2019           After Visit Summary Signature Page    I have received my discharge instructions, and my questions have been answered. I have discussed any challenges I see with this plan with the nurse or doctor.    ..........................................................................................................................................  Patient/Patient Representative Signature      ..........................................................................................................................................  Patient Representative Print Name and Relationship to Patient    ..................................................               ................................................  Date                                   Time    ..........................................................................................................................................  Reviewed by Signature/Title    ...................................................              ..............................................  Date                                               Time          22EPIC Rev 08/18

## 2019-08-31 NOTE — ED PROVIDER NOTES
History     Chief Complaint   Patient presents with     Wound Check     HPI  Melvin Abad is a 89 year old male who resents to the emergency department with skin changes to the right lower extremity.  He had a blister in the area approximately 10 days ago and feels like he may have rubbed it and now he has redness swelling and pain and warmth to that area concerning for cellulitis.  No fever, chills, nausea, vomiting, chest pain, abdominal pain or other symptoms.    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) 11/06/2018     Priority: Medium     Type 2 diabetes mellitus without complication, without long-term current use of insulin (H) 05/10/2017     Priority: Medium     Pain of right lower leg 03/31/2017     Priority: Medium     Contusion of right lower extremity 03/31/2017     Priority: Medium     Leg pain 03/31/2017     Priority: Medium     Long-term (current) use of anticoagulants [Z79.01] 04/05/2016     Priority: Medium     Chronic atrial fibrillation (H) 01/19/2016     Priority: Medium     Obesity (BMI 30-39.9) 10/28/2015     Priority: Medium     Cellulitis 11/20/2013     Priority: Medium     Anemia due to blood loss, acute 11/20/2013     Priority: Medium     DVT prophylaxis 11/20/2013     Priority: Medium     CTS (carpal tunnel syndrome) 08/16/2013     Priority: Medium     Gilbert syndrome 01/31/2012     Priority: Medium     History of skin cancer 01/03/2012     Priority: Medium     AK (actinic keratosis) 01/03/2012     Priority: Medium     Advanced directives, counseling/discussion 11/15/2011     Priority: Medium     Advance Directive Problem List Overview:   Name Relationship Phone    Primary Health Care Agent            Alternative Health Care Agent         Advance Directive Initial Visit--11/9/11  Melvin Abad presents in person for initial session regarding completion of advanced directive  form. He was referred to the facilitator by self.  He currently has no advance directive.  Plan:  Patient presents for Honoring Choices Informational meeting. Patient given Honoring Choices folder. Patient will complete Advance Care Directive and bring to clinic.  Follow up meeting: As needed.    .Vandana Prasad RN         [facility] 04/08/2011     Priority: Medium     Natalie Ferraro RN-PHN  FPJEAN / DANNY [facility]   [phone]        DX V65.8 REPLACED WITH [id number] [facility] (04/08/2013)       Diverticulosis 03/25/2011     Priority: Medium     Type 2 diabetes mellitus without complication (H) 03/10/2011     Priority: Medium     Hyperlipidemia LDL goal <100 10/31/2010     Priority: Medium     Long term current use of anticoagulant therapy 01/31/2001     Priority: Medium     Problem list name updated by automated process. Provider to review          Past Medical History:    Past Medical History:   Diagnosis Date     Actinic keratosis      Atrial fibrillation (H)      Basal cell carcinoma nos 06/10     Cardiac dysrhythmia, unspecified      Cough      Diabetic eye exam (H) 8/29/14     Generalized osteoarthrosis, unspecified site      Other diseases of lung, not elsewhere classified      Pure hypercholesterolemia      Unspecified essential hypertension        Past Surgical History:    Past Surgical History:   Procedure Laterality Date     C TOTAL KNEE ARTHROPLASTY  1997    Knee Replacement, Total     COLONOSCOPY  05/02/2007    Multiple bxs of diminutive polyps of ascending colon.     COLONOSCOPY  11/10/2010    COLONOSCOPY performed by MARISELA GRIMM at  GI     HC COLONOSCOPY W/WO BRUSH/WASH  10/19/2001     HC COLONOSCOPY W/WO BRUSH/WASH  11/02/2004      HEMORRHOIDOPEXY BY STAPLING  09/26/08       Family History:    Family History   Problem Relation Age of Onset     Cancer Mother      Prostate Cancer Father        Social History:  Marital Status:   [5]  Social History     Tobacco  Use     Smoking status: Former Smoker     Types: Cigarettes     Smokeless tobacco: Never Used     Tobacco comment: 5 years   Substance Use Topics     Alcohol use: No     Comment: denies     Drug use: No        Medications:      cephALEXin (KEFLEX) 500 MG capsule   blood glucose (NO BRAND SPECIFIED) test strip   blood glucose monitoring (ONE TOUCH ULTRASOFT) lancets   Carboxymethylcellulose Sod PF (RETAINE CMC) 0.5 % SOLN ophthalmic solution   Cholecalciferol (VITAMIN D) 2000 UNITS tablet   hydrocortisone (ANUSOL-HC) 2.5 % cream   lisinopril (PRINIVIL/ZESTRIL) 10 MG tablet   metoprolol succinate ER (TOPROL-XL) 50 MG 24 hr tablet   miconazole (MICATIN) 2 % external cream   multivitamin (OCUVITE) TABS   polyethylene glycol (MIRALAX) powder   simvastatin (ZOCOR) 40 MG tablet   warfarin (COUMADIN) 5 MG tablet         Review of Systems   All other systems reviewed and are negative.      Physical Exam   BP: (!) 157/106  Pulse: 72  Temp: 97  F (36.1  C)  Resp: 18  Weight: 96.4 kg (212 lb 8 oz)  SpO2: 96 %      Physical Exam   Constitutional: He is oriented to person, place, and time. He appears well-developed and well-nourished. No distress.   HENT:   Head: Normocephalic and atraumatic.   Eyes: No scleral icterus.   Neck: Normal range of motion. Neck supple.   Cardiovascular: Normal rate.   Pulmonary/Chest: Effort normal.   Neurological: He is alert and oriented to person, place, and time.   Skin: Skin is warm and dry. No rash noted. He is not diaphoretic. There is erythema.   Approximately 3 cm oval scab on the distal aspect of the right shin with surrounding erythema especially distally down towards the ankle.  There is warmth and tenderness to this area.  No fluctuance.  The proximal shin is a dark chronic venous stasis disease looking skin which matches the skin on the left shin.   Psychiatric: He has a normal mood and affect. His behavior is normal.   Nursing note and vitals reviewed.      ED Course         Procedures                   Results for orders placed or performed during the hospital encounter of 08/31/19 (from the past 24 hour(s))   CBC with platelets differential   Result Value Ref Range    WBC 7.8 4.0 - 11.0 10e9/L    RBC Count 4.50 4.4 - 5.9 10e12/L    Hemoglobin 14.9 13.3 - 17.7 g/dL    Hematocrit 44.4 40.0 - 53.0 %    MCV 99 78 - 100 fl    MCH 33.1 (H) 26.5 - 33.0 pg    MCHC 33.6 31.5 - 36.5 g/dL    RDW 12.8 10.0 - 15.0 %    Platelet Count 162 150 - 450 10e9/L    Diff Method Automated Method     % Neutrophils 66.9 %    % Lymphocytes 22.4 %    % Monocytes 8.9 %    % Eosinophils 1.3 %    % Basophils 0.1 %    % Immature Granulocytes 0.4 %    Nucleated RBCs 0 0 /100    Absolute Neutrophil 5.2 1.6 - 8.3 10e9/L    Absolute Lymphocytes 1.7 0.8 - 5.3 10e9/L    Absolute Monocytes 0.7 0.0 - 1.3 10e9/L    Absolute Basophils 0.0 0.0 - 0.2 10e9/L    Abs Immature Granulocytes 0.0 0 - 0.4 10e9/L    Absolute Nucleated RBC 0.0    Basic metabolic panel   Result Value Ref Range    Sodium 144 133 - 144 mmol/L    Potassium 4.3 3.4 - 5.3 mmol/L    Chloride 110 (H) 94 - 109 mmol/L    Carbon Dioxide 26 20 - 32 mmol/L    Anion Gap 8 3 - 14 mmol/L    Glucose 106 (H) 70 - 99 mg/dL    Urea Nitrogen 16 7 - 30 mg/dL    Creatinine 0.84 0.66 - 1.25 mg/dL    GFR Estimate 76 >60 mL/min/[1.73_m2]    GFR Estimate If Black 88 >60 mL/min/[1.73_m2]    Calcium 9.1 8.5 - 10.1 mg/dL   INR   Result Value Ref Range    INR 2.49 (H) 0.86 - 1.14       Medications - No data to display    Assessments & Plan (with Medical Decision Making)  89-year-old male with cellulitis.  He has tolerated Keflex in the past.  The diagnosis, treatment options, risks and follow-up discussed with a competent patient who agrees with the plan.  I also discussed it with his son who is in the room with him.  Labs are unremarkable.  INR is 2.49.     I have reviewed the nursing notes.    I have reviewed the findings, diagnosis, plan and need for follow up with the  patient.      New Prescriptions    CEPHALEXIN (KEFLEX) 500 MG CAPSULE    Take 1 capsule (500 mg) by mouth 4 times daily for 10 days       Final diagnoses:   Cellulitis and abscess of leg       8/31/2019   [facility] EMERGENCY DEPARTMENT     Barry Llanos MD  08/31/19 1043

## 2019-09-03 ENCOUNTER — TELEPHONE (OUTPATIENT)
Dept: ANTICOAGULATION | Facility: CLINIC | Age: 84
End: 2019-09-03

## 2019-09-03 NOTE — TELEPHONE ENCOUNTER
Pt was in the ED on 8/31 and was started on keflex x10 days (8/31-9/10). This can increase INR so he will need to be seen this week for an INR check.   I attempted to contact him, but the line was busy x2. Will need to try again later.   Shani Ross RN

## 2019-09-04 ENCOUNTER — ANTICOAGULATION THERAPY VISIT (OUTPATIENT)
Dept: ANTICOAGULATION | Facility: CLINIC | Age: 84
End: 2019-09-04
Payer: COMMERCIAL

## 2019-09-04 VITALS — SYSTOLIC BLOOD PRESSURE: 130 MMHG | DIASTOLIC BLOOD PRESSURE: 87 MMHG | HEART RATE: 69 BPM

## 2019-09-04 DIAGNOSIS — Z79.01 LONG TERM CURRENT USE OF ANTICOAGULANT THERAPY: ICD-10-CM

## 2019-09-04 LAB — INR POINT OF CARE: 2.3 (ref 0.9–1.1)

## 2019-09-04 PROCEDURE — 99207 ZZC NO CHARGE NURSE ONLY: CPT

## 2019-09-04 PROCEDURE — 36416 COLLJ CAPILLARY BLOOD SPEC: CPT

## 2019-09-04 PROCEDURE — 85610 PROTHROMBIN TIME: CPT | Mod: QW

## 2019-09-04 NOTE — PROGRESS NOTES
ANTICOAGULATION FOLLOW-UP CLINIC VISIT    Patient Name:  Melvin Abad  Date:  2019  Contact Type:  Face to Face    SUBJECTIVE:  Patient Findings         Clinical Outcomes     Negatives:   Major bleeding event, Thromboembolic event, Anticoagulation-related hospital admission, Anticoagulation-related ED visit, Anticoagulation-related fatality           OBJECTIVE    INR Protime   Date Value Ref Range Status   2019 2.3 (A) 0.9 - 1.1 Final       ASSESSMENT / PLAN  INR assessment THER    Recheck INR In: 6 WEEKS    INR Location Clinic      Anticoagulation Summary  As of 2019    INR goal:   2.0-3.0   TTR:   89.1 % (3.4 y)   INR used for dosin.3 (2019)   Warfarin maintenance plan:   2.5 mg (5 mg x 0.5) every Mon, Fri; 5 mg (5 mg x 1) all other days   Full warfarin instructions:   2.5 mg every Mon, Fri; 5 mg all other days   Weekly warfarin total:   30 mg   No change documented:   Shani Ross RN   Plan last modified:   Shani Ross RN (2016)   Next INR check:   10/16/2019   Target end date:       Indications    Atrial fibrillation (Resolved) [I48.91]  Long-term (current) use of anticoagulants [Z79.01] [Z79.01]             Anticoagulation Episode Summary     INR check location:       Preferred lab:       Send INR reminders to:   [facility] [place]    Comments:   5 mg tablets, AM taker, likes book, BP       Anticoagulation Care Providers     Provider Role Specialty Phone number    Lazaro Huston MD [facility] [phone]            See the Encounter Report to view Anticoagulation Flowsheet and Dosing Calendar (Go to Encounters tab in chart review, and find the Anticoagulation Therapy Visit)    Dosage adjustment made based on physician directed care plan.      Shani Ross RN

## 2019-09-04 NOTE — MR AVS SNAPSHOT
Melvin Abad   2/28/2018 8:30 AM   Anticoagulation Therapy Visit    Description:  89 year old male   Provider:  SAMUEL ANTI [facility]AG   Department:  Ph Anticoag           INR as of 2/28/2018     Today's INR 2.6      Anticoagulation Summary as of 2/28/2018     INR goal 2.0-3.0   Today's INR 2.6   Full instructions 2.5 mg on Mon, Fri; 5 mg all other days   Next INR check 4/11/2018    Indications   Atrial fibrillation (Resolved) [I48.91]  Long-term (current) use of anticoagulants [Z79.01] [Z79.01]         Your next Anticoagulation Clinic appointment(s)     Feb 28, 2018  8:30 AM CST   Anticoagulation Visit with PH ANTI COAG   [facility] ([address] [address]   [phone]            Apr 11, 2018  8:15 AM CDT   Anticoagulation Visit with PH ANTI COAG   [facility] ([address] [address]   [phone]              Contact Numbers     Clinic Number:         February 2018 Details    Sun Mon Tue Wed Thu Fri Sat         1               2               3                 4               5               6               7               8               9               10                 11               12               13               14               15               16               17                 18               19               20               21               22               23               24                 25               26               27               28      5 mg   See details          Date Details   02/28 This INR check               How to take your warfarin dose     To take:  5 mg Take 1 of the 5 mg tablets.           March 2018 Details    Sun Mon Tue Wed Thu Fri Sat         1      5 mg         2      2.5 mg         3      5 mg           4      5 mg         5      2.5 mg         6      5 mg         7      5 mg         8      5 mg         9      2.5 mg         10       5 mg           11      5 mg         12      2.5 mg         13      5 mg         14      5 mg         15      5 mg         16      2.5 mg         17      5 mg           18      5 mg         19      2.5 mg         20      5 mg         21      5 mg         22      5 mg         23      2.5 mg         24      5 mg           25      5 mg         26      2.5 mg         27      5 mg         28      5 mg         29      5 mg         30      2.5 mg         31      5 mg          Date Details   No additional details            How to take your warfarin dose     To take:  2.5 mg Take 0.5 of a 5 mg tablet.    To take:  5 mg Take 1 of the 5 mg tablets.           April 2018 Details    Sun Mon Tue Wed Thu Fri Sat     1      5 mg         2      2.5 mg         3      5 mg         4      5 mg         5      5 mg         6      2.5 mg         7      5 mg           8      5 mg         9      2.5 mg         10      5 mg         11            12               13               14                 15               16               17               18               19               20               21                 22               23               24               25               26               27               28                 29               30                     Date Details   No additional details    Date of next INR:  4/11/2018         How to take your warfarin dose     To take:  2.5 mg Take 0.5 of a 5 mg tablet.    To take:  5 mg Take 1 of the 5 mg tablets.            Yes

## 2019-10-16 ENCOUNTER — ANTICOAGULATION THERAPY VISIT (OUTPATIENT)
Dept: ANTICOAGULATION | Facility: CLINIC | Age: 84
End: 2019-10-16
Payer: COMMERCIAL

## 2019-10-16 VITALS — HEART RATE: 66 BPM | DIASTOLIC BLOOD PRESSURE: 74 MMHG | SYSTOLIC BLOOD PRESSURE: 137 MMHG

## 2019-10-16 DIAGNOSIS — Z79.01 LONG TERM CURRENT USE OF ANTICOAGULANT THERAPY: ICD-10-CM

## 2019-10-16 LAB — INR POINT OF CARE: 2.4 (ref 0.9–1.1)

## 2019-10-16 PROCEDURE — 85610 PROTHROMBIN TIME: CPT | Mod: QW

## 2019-10-16 PROCEDURE — 99207 ZZC NO CHARGE NURSE ONLY: CPT

## 2019-10-16 PROCEDURE — 36416 COLLJ CAPILLARY BLOOD SPEC: CPT

## 2019-11-21 ENCOUNTER — OFFICE VISIT (OUTPATIENT)
Dept: DERMATOLOGY | Facility: CLINIC | Age: 84
End: 2019-11-21
Payer: COMMERCIAL

## 2019-11-21 DIAGNOSIS — L81.4 SOLAR LENTIGO: ICD-10-CM

## 2019-11-21 DIAGNOSIS — D22.9 MULTIPLE BENIGN NEVI: ICD-10-CM

## 2019-11-21 DIAGNOSIS — D18.01 CHERRY ANGIOMA: ICD-10-CM

## 2019-11-21 DIAGNOSIS — L82.1 SEBORRHEIC KERATOSIS: ICD-10-CM

## 2019-11-21 DIAGNOSIS — I87.2 VENOUS STASIS DERMATITIS OF BOTH LOWER EXTREMITIES: ICD-10-CM

## 2019-11-21 DIAGNOSIS — Z85.828 HISTORY OF NONMELANOMA SKIN CANCER: ICD-10-CM

## 2019-11-21 DIAGNOSIS — L57.8 SUN-DAMAGED SKIN: ICD-10-CM

## 2019-11-21 DIAGNOSIS — L57.0 ACTINIC KERATOSIS: Primary | ICD-10-CM

## 2019-11-21 PROCEDURE — 99214 OFFICE O/P EST MOD 30 MIN: CPT | Mod: 25 | Performed by: DERMATOLOGY

## 2019-11-21 PROCEDURE — 17003 DESTRUCT PREMALG LES 2-14: CPT | Performed by: DERMATOLOGY

## 2019-11-21 PROCEDURE — 17000 DESTRUCT PREMALG LESION: CPT | Performed by: DERMATOLOGY

## 2019-11-21 ASSESSMENT — PAIN SCALES - GENERAL: PAINLEVEL: NO PAIN (0)

## 2019-11-21 NOTE — NURSING NOTE
Melvin Abad's goals for this visit include:   Chief Complaint   Patient presents with     Skin Check     Melvin is visiting with areas of concern on both ears     He requests these members of his care team be copied on today's visit information:     PCP: Lazaro Huston    Referring Provider:  No referring provider defined for this encounter.    There were no vitals taken for this visit.    Do you need any medication refills at today's visit? No    Janis Sparks LPN

## 2019-11-21 NOTE — LETTER
"    11/21/2019         RE: Melvin Abad  [address]  [facility] [address]        Dear Colleague,    Thank you for referring your patient, Melvin Abad, to the [facility]. Please see a copy of my visit note below.    [facility] Dermatology Note    Dermatology Problem List:  1. Hx of NMSC  -BCC, right upper cutaneous llip, s/p Mohs 6/2010  -BCC, right lower eyelid, s/p Mohs prior to 2012  -BCC, recurrent, right Yarsanism, s/p Mohs 6/2010  2. Actinic keratoses  -s/p cryotherapy   -s/p: Efudex 1/2012 to the forehead and dorsal nose X6 weeks by Dr. Bonilla, treatment for dorsal arms recommended 5/2013  -HAK, right posterior helix, s/p biopsy 12/11/14  3. Stasis dermatitis, evaluated by Dr. Bonilla 1/2011 and recommended compression socks and moisturizers. Not pruritic.   4. iSK  -s/p cryotherapy    Encounter Date: Nov 21, 2019    CC:  Chief Complaint   Patient presents with     Skin Check     Melvin is visiting with areas of concern on both ears     History of Present Illness:  Mr. Melvin Abad is a 89 year old male who presents as a follow-up skin check. He was last seen on 11/21/18 when AKs were treated with cryotherapy. He presents with his son Harshad. Today he reports several rough areas on his scalp and tops of his ears. He is concerned these need treatment today. He also notes that the skin on his shins is \"bad\". He has been putting CeraVe on this area as well as cornstarch on his socks. This seems to help. The legs are not itchy or symptomatic. He only notices that they look \"bad.\" Denies any other tender, nonhealing, bleeding skin lesions. No other concerns addressed today.    Past Medical History:   Patient Active Problem List   Diagnosis     Long term current use of anticoagulant therapy     Hyperlipidemia LDL goal <100     Type 2 diabetes mellitus without complication (H)     Diverticulosis     Health Care Home     Advanced directives, " counseling/discussion     History of skin cancer     AK (actinic keratosis)     Gilbert syndrome     CTS (carpal tunnel syndrome)     Cellulitis     Anemia due to blood loss, acute     DVT prophylaxis     Obesity (BMI 30-39.9)     Chronic atrial fibrillation     Long-term (current) use of anticoagulants [Z79.01]     Pain of right lower leg     Contusion of right lower extremity     Leg pain     Type 2 diabetes mellitus without complication, without long-term current use of insulin (H)     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)     Past Medical History:   Diagnosis Date     Actinic keratosis      Atrial fibrillation (H)      Basal cell carcinoma nos 06/10    Mohs excision, rt upper lip & rt temple     Cardiac dysrhythmia, unspecified      Cough     chronic cough     Diabetic eye exam (H) 8/29/14     Generalized osteoarthrosis, unspecified site     djd of the knees, hands, and neck     Other diseases of lung, not elsewhere classified     pulmonary nodule, benign     Pure hypercholesterolemia      Unspecified essential hypertension      Past Surgical History:   Procedure Laterality Date     C TOTAL KNEE ARTHROPLASTY  1997    Knee Replacement, Total     COLONOSCOPY  05/02/2007    Multiple bxs of diminutive polyps of ascending colon.     COLONOSCOPY  11/10/2010    COLONOSCOPY performed by [facility]ELA GRIMM at  GI     HC COLONOSCOPY W/WO BRUSH/WASH  10/19/2001     HC COLONOSCOPY W/WO BRUSH/WASH  11/02/2004      HEMORRHOIDOPEXY BY STAPLING  09/26/08     Social History:  Patient has quit smoking. Has a son Harshad.     Family History:  No family history of skin cancer.   Reviewed and left in chart for clinician convenience.     Medications:  Current Outpatient Medications   Medication Sig Dispense Refill     blood glucose (NO BRAND SPECIFIED) test strip Test daily 100 each 1     blood glucose monitoring (ONE TOUCH ULTRASOFT) lancets 1 each daily Test before meals and at bedtime 100 each 1     Carboxymethylcellulose Sod PF  (RETAINE CMC) 0.5 % SOLN ophthalmic solution Place 1 drop into the right eye 3 times daily as needed for dry eyes       Cholecalciferol (VITAMIN D) 2000 UNITS tablet Take 1 tablet by mouth daily       hydrocortisone (ANUSOL-HC) 2.5 % cream Place rectally 2 times daily as needed for hemorrhoids 30 g 1     lisinopril (PRINIVIL/ZESTRIL) 10 MG tablet Take 1 tablet (10 mg) by mouth daily 90 tablet 3     metoprolol succinate ER (TOPROL-XL) 50 MG 24 hr tablet Take 1 tablet (50 mg) by mouth daily 90 tablet 3     miconazole (MICATIN) 2 % external cream Apply topically 2 times daily 56 g 0     multivitamin (OCUVITE) TABS Take 1 tablet by mouth daily.  0     polyethylene glycol (MIRALAX) powder Take 17 g by mouth daily (Patient taking differently: Take 1 capful by mouth as needed ) 510 g 1     simvastatin (ZOCOR) 40 MG tablet TAKE 1 TABLET BY MOUTH EVERY NIGHT AT BEDTIME 90 tablet 3     warfarin (COUMADIN) 5 MG tablet TAKE ONE-HALF TABLET ON MONDAYS AND FRIDAYS AND ONE TABLET ON ALL THE OTHER DAYS OF THE WEEK OR AS DIRECTED BY COUMADIN CLINIC 80 tablet 3     No Known Allergies     Review of Systems:  -Constitutional: Patient is otherwise feeling well, in usual state of health.   -Skin: As above in HPI. No additional skin concerns.    Physical exam:  Vitals: There were no vitals taken for this visit.  GEN: This is a well developed, well-nourished male in no acute distress, in a pleasant mood.    SKIN: Total skin excluding the undergarment areas was performed. The exam included the head/face, neck, both arms, chest, back, abdomen, both legs, digits and/or nails and buttocks.   - Leo Type I  - Scattered brown macules on sun exposed areas.  - There are erythematous macules with overyling adherent scale on the right antihelix, left helix    - Well healed scars in areas of past NMSC. No pearly appearance or telangiectasias in any.   - There are dome shaped bright red papules on the trunk.   - Multiple regular brown pigmented  macules and papules are identified on the body, very few.   - There are waxy stuck on tan to brown papules on the trunk.  - Coppery brown hyperpigmentation on the bilateral lower extremities. Lichenified pink-purple plaques consistent with LSC on the anterior shins.   - No other lesions of concern on areas examined.     Impression/Plan:  1. History of NMSC. No evidence of recurrence.     Recommend sunscreens SPF #30 or greater, protective clothing and avoidance of tanning beds.     Recommended yearly skin exams.    2.  Sun damaged skin with solar lentigines    Recommend sunscreens SPF #30 or greater, protective clothing and avoidance of tanning beds.    3. Benign findings including: seborrheic keratoses, cherry angioma    No further intervention required. Patient to report changes.     Patient reassured of the benign nature of these lesions.    4. Stasis dermatitis: Not symptomatic. No further treatment needed. Recommended gentle skin care.    5. Multiple clinically benign nevi    No further intervention required. Patient to report changes.     Patient reassured of the benign nature of these lesions.    6. Actinic keratosis. Discussed precancerous nature of these lesions. Recommended treatment to prevent progression to a squamous cell carcinoma. Advised patient to call for follow up if not resolved in 1 month.     Cryotherapy procedure note: After verbal consent and discussion of risks and benefits including but no limited to dyspigmentation/scar, blister, and pain, 2 was(were) treated with 1-2mm freeze border for 2 cycles with liquid nitrogen. Post cryotherapy instructions were provided.    Follow up 1 year for skin check. Sooner for new or changing lesions.     Staff Involved:  Scribe/Staff    Scribe Disclosure  I, Suzy Warren am serving as a scribe to document services personally performed by Dr. Ryann Morocho MD, based on data collection and the provider's statements to me.     Provider Disclosure:   The  documentation recorded by the scribe accurately reflects the services I personally performed and the decisions made by me.    Ryann Morocho MD    Department of Dermatology  [facility]: Phone: [phone], Fax:[phone]  [facility] [place]: Phone: [phone], Fax: [phone]              Again, thank you for allowing me to participate in the care of your patient.        Sincerely,        Ryann Morocho MD

## 2019-11-21 NOTE — PATIENT INSTRUCTIONS
Cryotherapy    What is it?    Use of a very cold liquid, such as liquid nitrogen, to freeze and destroy abnormal skin cells that need to be removed    What should I expect?    Tenderness and redness    A small blister that might grow and fill with dark purple blood. There may be crusting.    More than one treatment may be needed if the lesions do not go away.    How do I care for the treated area?    Gently wash the area with your hands when bathing.    Use a thin layer of Vaseline to help with healing. You may use a Band-Aid.     The area should heal within 7-10 days and may leave behind a pink or lighter color.     Do not use an antibiotic or Neosporin ointment.     You may take acetaminophen (Tylenol) for pain.     Call your Doctor if you have:    Severe pain    Signs of infection (warmth, redness, cloudy yellow drainage, and or a bad smell)    Questions or concerns    Who should I call with questions?       [facility]: [phone]       [facility]: [phone]       For urgent needs outside of business hours call the [facility] at [phone]        and ask for the dermatology resident on call

## 2019-11-21 NOTE — PROGRESS NOTES
"[facility] Dermatology Note    Dermatology Problem List:  1. Hx of NMSC  -BCC, right upper cutaneous llip, s/p Mohs 6/2010  -BCC, right lower eyelid, s/p Mohs prior to 2012  -BCC, recurrent, right Scientologist, s/p Mohs 6/2010  2. Actinic keratoses  -s/p cryotherapy   -s/p: Efudex 1/2012 to the forehead and dorsal nose X6 weeks by Dr. Bonilla, treatment for dorsal arms recommended 5/2013  -HAK, right posterior helix, s/p biopsy 12/11/14  3. Stasis dermatitis, evaluated by Dr. Bonilla 1/2011 and recommended compression socks and moisturizers. Not pruritic.   4. iSK  -s/p cryotherapy    Encounter Date: Nov 21, 2019    CC:  Chief Complaint   Patient presents with     Skin Check     Melvin is visiting with areas of concern on both ears     History of Present Illness:  Mr. Melvin Abad is a 89 year old male who presents as a follow-up skin check. He was last seen on 11/21/18 when AKs were treated with cryotherapy. He presents with his son Harshad. Today he reports several rough areas on his scalp and tops of his ears. He is concerned these need treatment today. He also notes that the skin on his shins is \"bad\". He has been putting CeraVe on this area as well as cornstarch on his socks. This seems to help. The legs are not itchy or symptomatic. He only notices that they look \"bad.\" Denies any other tender, nonhealing, bleeding skin lesions. No other concerns addressed today.    Past Medical History:   Patient Active Problem List   Diagnosis     Long term current use of anticoagulant therapy     Hyperlipidemia LDL goal <100     Type 2 diabetes mellitus without complication (H)     Diverticulosis     Health Care Home     Advanced directives, counseling/discussion     History of skin cancer     AK (actinic keratosis)     Gilbert syndrome     CTS (carpal tunnel syndrome)     Cellulitis     Anemia due to blood loss, acute     DVT prophylaxis     Obesity (BMI 30-39.9)     Chronic atrial fibrillation     " Long-term (current) use of anticoagulants [Z79.01]     Pain of right lower leg     Contusion of right lower extremity     Leg pain     Type 2 diabetes mellitus without complication, without long-term current use of insulin (H)     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)     Past Medical History:   Diagnosis Date     Actinic keratosis      Atrial fibrillation (H)      Basal cell carcinoma nos 06/10    Mohs excision, rt upper lip & rt temple     Cardiac dysrhythmia, unspecified      Cough     chronic cough     Diabetic eye exam (H) 8/29/14     Generalized osteoarthrosis, unspecified site     djd of the knees, hands, and neck     Other diseases of lung, not elsewhere classified     pulmonary nodule, benign     Pure hypercholesterolemia      Unspecified essential hypertension      Past Surgical History:   Procedure Laterality Date     C TOTAL KNEE ARTHROPLASTY  1997    Knee Replacement, Total     COLONOSCOPY  05/02/2007    Multiple bxs of diminutive polyps of ascending colon.     COLONOSCOPY  11/10/2010    COLONOSCOPY performed by [facility]ELA GRIMM at  [facility]     HC COLONOSCOPY W/WO BRUSH/WASH  10/19/2001     HC COLONOSCOPY W/WO BRUSH/WASH  11/02/2004      HEMORRHOIDOPEXY BY STAPLING  09/26/08     Social History:  Patient has quit smoking. Has a son Harshad.     Family History:  No family history of skin cancer.   Reviewed and left in chart for clinician convenience.     Medications:  Current Outpatient Medications   Medication Sig Dispense Refill     blood glucose (NO BRAND SPECIFIED) test strip Test daily 100 each 1     blood glucose monitoring (ONE TOUCH ULTRASOFT) lancets 1 each daily Test before meals and at bedtime 100 each 1     Carboxymethylcellulose Sod PF (RETAINE CMC) 0.5 % SOLN ophthalmic solution Place 1 drop into the right eye 3 times daily as needed for dry eyes       Cholecalciferol (VITAMIN D) 2000 UNITS tablet Take 1 tablet by mouth daily       hydrocortisone (ANUSOL-HC) 2.5 % cream Place rectally 2 times  daily as needed for hemorrhoids 30 g 1     lisinopril (PRINIVIL/ZESTRIL) 10 MG tablet Take 1 tablet (10 mg) by mouth daily 90 tablet 3     metoprolol succinate ER (TOPROL-XL) 50 MG 24 hr tablet Take 1 tablet (50 mg) by mouth daily 90 tablet 3     miconazole (MICATIN) 2 % external cream Apply topically 2 times daily 56 g 0     multivitamin (OCUVITE) TABS Take 1 tablet by mouth daily.  0     polyethylene glycol (MIRALAX) powder Take 17 g by mouth daily (Patient taking differently: Take 1 capful by mouth as needed ) 510 g 1     simvastatin (ZOCOR) 40 MG tablet TAKE 1 TABLET BY MOUTH EVERY NIGHT AT BEDTIME 90 tablet 3     warfarin (COUMADIN) 5 MG tablet TAKE ONE-HALF TABLET ON MONDAYS AND FRIDAYS AND ONE TABLET ON ALL THE OTHER DAYS OF THE WEEK OR AS DIRECTED BY COUMADIN CLINIC 80 tablet 3     No Known Allergies     Review of Systems:  -Constitutional: Patient is otherwise feeling well, in usual state of health.   -Skin: As above in HPI. No additional skin concerns.    Physical exam:  Vitals: There were no vitals taken for this visit.  GEN: This is a well developed, well-nourished male in no acute distress, in a pleasant mood.    SKIN: Total skin excluding the undergarment areas was performed. The exam included the head/face, neck, both arms, chest, back, abdomen, both legs, digits and/or nails and buttocks.   - Leo Type I  - Scattered brown macules on sun exposed areas.  - There are erythematous macules with overyling adherent scale on the right antihelix, left helix    - Well healed scars in areas of past NMSC. No pearly appearance or telangiectasias in any.   - There are dome shaped bright red papules on the trunk.   - Multiple regular brown pigmented macules and papules are identified on the body, very few.   - There are waxy stuck on tan to brown papules on the trunk.  - Coppery brown hyperpigmentation on the bilateral lower extremities. Lichenified pink-purple plaques consistent with LSC on the anterior  shins.   - No other lesions of concern on areas examined.     Impression/Plan:  1. History of NMSC. No evidence of recurrence.     Recommend sunscreens SPF #30 or greater, protective clothing and avoidance of tanning beds.     Recommended yearly skin exams.    2.  Sun damaged skin with solar lentigines    Recommend sunscreens SPF #30 or greater, protective clothing and avoidance of tanning beds.    3. Benign findings including: seborrheic keratoses, cherry angioma    No further intervention required. Patient to report changes.     Patient reassured of the benign nature of these lesions.    4. Stasis dermatitis: Not symptomatic. No further treatment needed. Recommended gentle skin care.    5. Multiple clinically benign nevi    No further intervention required. Patient to report changes.     Patient reassured of the benign nature of these lesions.    6. Actinic keratosis. Discussed precancerous nature of these lesions. Recommended treatment to prevent progression to a squamous cell carcinoma. Advised patient to call for follow up if not resolved in 1 month.     Cryotherapy procedure note: After verbal consent and discussion of risks and benefits including but no limited to dyspigmentation/scar, blister, and pain, 2 was(were) treated with 1-2mm freeze border for 2 cycles with liquid nitrogen. Post cryotherapy instructions were provided.    Follow up 1 year for skin check. Sooner for new or changing lesions.     Staff Involved:  Scribe/Staff    Scribe Disclosure  I, Suzy Warren, am serving as a scribe to document services personally performed by Dr. Ryann Morocho MD, based on data collection and the provider's statements to me.     Provider Disclosure:   The documentation recorded by the scribe accurately reflects the services I personally performed and the decisions made by me.    Ryann Morocho MD    Department of Dermatology  [facility]  Clinics: Phone: [phone], Fax:[phone]  [facility]: Phone: [phone], Fax: [phone]

## 2019-11-27 ENCOUNTER — ANTICOAGULATION THERAPY VISIT (OUTPATIENT)
Dept: ANTICOAGULATION | Facility: CLINIC | Age: 84
End: 2019-11-27
Payer: COMMERCIAL

## 2019-11-27 DIAGNOSIS — Z79.01 LONG TERM CURRENT USE OF ANTICOAGULANT THERAPY: ICD-10-CM

## 2019-11-27 LAB — INR POINT OF CARE: 2.4 (ref 0.9–1.1)

## 2019-11-27 PROCEDURE — 99207 ZZC NO CHARGE NURSE ONLY: CPT

## 2019-11-27 PROCEDURE — 85610 PROTHROMBIN TIME: CPT | Mod: QW

## 2019-11-27 PROCEDURE — 36416 COLLJ CAPILLARY BLOOD SPEC: CPT

## 2019-11-27 NOTE — PROGRESS NOTES
ANTICOAGULATION FOLLOW-UP CLINIC VISIT    Patient Name:  Melvin Abad  Date:  2019  Contact Type:  Face to Face    SUBJECTIVE:  Patient Findings     Comments:   The patient was assessed for diet, medication, and activity level changes, missed or extra doses, bruising or bleeding, with no problem findings.  Shani Ross RN          Clinical Outcomes     Negatives:   Major bleeding event, Thromboembolic event, Anticoagulation-related hospital admission, Anticoagulation-related ED visit, Anticoagulation-related fatality    Comments:   The patient was assessed for diet, medication, and activity level changes, missed or extra doses, bruising or bleeding, with no problem findings.  Shani Ross RN             OBJECTIVE    INR Protime   Date Value Ref Range Status   2019 2.4 (A) 0.9 - 1.1 Final       ASSESSMENT / PLAN  INR assessment THER    Recheck INR In: 6 WEEKS    INR Location Clinic      Anticoagulation Summary  As of 2019    INR goal:   2.0-3.0   TTR:   100.0 % (1 y)   INR used for dosin.4 (2019)   Warfarin maintenance plan:   2.5 mg (5 mg x 0.5) every Mon, Fri; 5 mg (5 mg x 1) all other days   Full warfarin instructions:   2.5 mg every Mon, Fri; 5 mg all other days   Weekly warfarin total:   30 mg   No change documented:   Shani Ross RN   Plan last modified:   Shani Ross RN (2016)   Next INR check:   2020   Target end date:       Indications    Atrial fibrillation (Resolved) [I48.91]  Long-term (current) use of anticoagulants [Z79.01] [Z79.01]             Anticoagulation Episode Summary     INR check location:       Preferred lab:       Send INR reminders to:   [facility] [place]    Comments:   5 mg tablets, AM taker, likes book, BP       Anticoagulation Care Providers     Provider Role Specialty Phone number    Lazaro Huston MD [facility] Internal Medicine [phone]            See the Encounter Report to view Anticoagulation Flowsheet and  Dosing Calendar (Go to Encounters tab in chart review, and find the Anticoagulation Therapy Visit)    Dosage adjustment made based on physician directed care plan.      Shani Ross RN

## 2019-12-18 ENCOUNTER — HOSPITAL ENCOUNTER (EMERGENCY)
Facility: CLINIC | Age: 84
Discharge: HOME | End: 2019-12-18
Attending: EMERGENCY MEDICINE | Admitting: EMERGENCY MEDICINE
Payer: COMMERCIAL

## 2019-12-18 ENCOUNTER — APPOINTMENT (OUTPATIENT)
Dept: GENERAL RADIOLOGY | Facility: CLINIC | Age: 84
End: 2019-12-18
Attending: EMERGENCY MEDICINE
Payer: COMMERCIAL

## 2019-12-18 VITALS
BODY MASS INDEX: 29.49 KG/M2 | HEART RATE: 67 BPM | OXYGEN SATURATION: 97 % | DIASTOLIC BLOOD PRESSURE: 92 MMHG | RESPIRATION RATE: 20 BRPM | TEMPERATURE: 97.5 F | HEIGHT: 70 IN | WEIGHT: 206 LBS | SYSTOLIC BLOOD PRESSURE: 158 MMHG

## 2019-12-18 DIAGNOSIS — R29.898 WEAKNESS OF BOTH LOWER EXTREMITIES: ICD-10-CM

## 2019-12-18 LAB
ALBUMIN SERPL-MCNC: 3.5 G/DL (ref 3.4–5)
ALBUMIN UR-MCNC: NEGATIVE MG/DL
ALP SERPL-CCNC: 69 U/L (ref 40–150)
ALT SERPL W P-5'-P-CCNC: 18 U/L (ref 0–70)
ANION GAP SERPL CALCULATED.3IONS-SCNC: 4 MMOL/L (ref 3–14)
APPEARANCE UR: CLEAR
AST SERPL W P-5'-P-CCNC: 15 U/L (ref 0–45)
BASOPHILS # BLD AUTO: 0 10E9/L (ref 0–0.2)
BASOPHILS NFR BLD AUTO: 0.3 %
BILIRUB SERPL-MCNC: 1.4 MG/DL (ref 0.2–1.3)
BILIRUB UR QL STRIP: NEGATIVE
BUN SERPL-MCNC: 16 MG/DL (ref 7–30)
CALCIUM SERPL-MCNC: 8.5 MG/DL (ref 8.5–10.1)
CHLORIDE SERPL-SCNC: 110 MMOL/L (ref 94–109)
CO2 SERPL-SCNC: 25 MMOL/L (ref 20–32)
COLOR UR AUTO: YELLOW
CREAT SERPL-MCNC: 0.8 MG/DL (ref 0.66–1.25)
DIFFERENTIAL METHOD BLD: ABNORMAL
EOSINOPHIL NFR BLD AUTO: 0.6 %
ERYTHROCYTE [DISTWIDTH] IN BLOOD BY AUTOMATED COUNT: 13.1 % (ref 10–15)
GFR SERPL CREATININE-BSD FRML MDRD: 78 ML/MIN/{1.73_M2}
GLUCOSE SERPL-MCNC: 118 MG/DL (ref 70–99)
GLUCOSE UR STRIP-MCNC: NEGATIVE MG/DL
HCT VFR BLD AUTO: 45.1 % (ref 40–53)
HGB BLD-MCNC: 15.1 G/DL (ref 13.3–17.7)
HGB UR QL STRIP: NEGATIVE
IMM GRANULOCYTES # BLD: 0 10E9/L (ref 0–0.4)
IMM GRANULOCYTES NFR BLD: 0.3 %
INR PPP: 2.2 (ref 0.86–1.14)
KETONES UR STRIP-MCNC: NEGATIVE MG/DL
LEUKOCYTE ESTERASE UR QL STRIP: NEGATIVE
LYMPHOCYTES # BLD AUTO: 1.6 10E9/L (ref 0.8–5.3)
LYMPHOCYTES NFR BLD AUTO: 24.1 %
MCH RBC QN AUTO: 32.5 PG (ref 26.5–33)
MCHC RBC AUTO-ENTMCNC: 33.5 G/DL (ref 31.5–36.5)
MCV RBC AUTO: 97 FL (ref 78–100)
MONOCYTES # BLD AUTO: 0.5 10E9/L (ref 0–1.3)
MONOCYTES NFR BLD AUTO: 7 %
NEUTROPHILS # BLD AUTO: 4.4 10E9/L (ref 1.6–8.3)
NEUTROPHILS NFR BLD AUTO: 67.7 %
NITRATE UR QL: NEGATIVE
NRBC # BLD AUTO: 0 10*3/UL
NRBC BLD AUTO-RTO: 0 /100
PH UR STRIP: 6 PH (ref 5–7)
PLATELET # BLD AUTO: 134 10E9/L (ref 150–450)
POTASSIUM SERPL-SCNC: 4.1 MMOL/L (ref 3.4–5.3)
PROT SERPL-MCNC: 6.8 G/DL (ref 6.8–8.8)
RBC # BLD AUTO: 4.64 10E12/L (ref 4.4–5.9)
SODIUM SERPL-SCNC: 139 MMOL/L (ref 133–144)
SOURCE: NORMAL
SP GR UR STRIP: 1.01 (ref 1–1.03)
TROPONIN I SERPL-MCNC: 0.01 UG/L (ref 0–0.04)
UROBILINOGEN UR STRIP-MCNC: 0 MG/DL (ref 0–2)
WBC # BLD AUTO: 6.4 10E9/L (ref 4–11)

## 2019-12-18 PROCEDURE — 80053 COMPREHEN METABOLIC PANEL: CPT | Performed by: EMERGENCY MEDICINE

## 2019-12-18 PROCEDURE — 71046 X-RAY EXAM CHEST 2 VIEWS: CPT | Mod: TC

## 2019-12-18 PROCEDURE — 99285 EMERGENCY DEPT VISIT HI MDM: CPT | Mod: 25 | Performed by: EMERGENCY MEDICINE

## 2019-12-18 PROCEDURE — 36415 COLL VENOUS BLD VENIPUNCTURE: CPT | Performed by: EMERGENCY MEDICINE

## 2019-12-18 PROCEDURE — 93005 ELECTROCARDIOGRAM TRACING: CPT | Performed by: EMERGENCY MEDICINE

## 2019-12-18 PROCEDURE — 96360 HYDRATION IV INFUSION INIT: CPT | Performed by: EMERGENCY MEDICINE

## 2019-12-18 PROCEDURE — 85610 PROTHROMBIN TIME: CPT | Performed by: EMERGENCY MEDICINE

## 2019-12-18 PROCEDURE — 85025 COMPLETE CBC W/AUTO DIFF WBC: CPT | Performed by: EMERGENCY MEDICINE

## 2019-12-18 PROCEDURE — 93010 ELECTROCARDIOGRAM REPORT: CPT | Mod: Z6 | Performed by: EMERGENCY MEDICINE

## 2019-12-18 PROCEDURE — 81003 URINALYSIS AUTO W/O SCOPE: CPT | Performed by: EMERGENCY MEDICINE

## 2019-12-18 PROCEDURE — 25800030 ZZH RX IP 258 OP 636: Performed by: EMERGENCY MEDICINE

## 2019-12-18 PROCEDURE — 84484 ASSAY OF TROPONIN QUANT: CPT | Performed by: EMERGENCY MEDICINE

## 2019-12-18 PROCEDURE — 96361 HYDRATE IV INFUSION ADD-ON: CPT | Performed by: EMERGENCY MEDICINE

## 2019-12-18 RX ORDER — SODIUM CHLORIDE 9 MG/ML
1000 INJECTION, SOLUTION INTRAVENOUS CONTINUOUS
Status: DISCONTINUED | OUTPATIENT
Start: 2019-12-18 | End: 2019-12-18 | Stop reason: HOSPADM

## 2019-12-18 RX ORDER — DOCUSATE SODIUM 100 MG/1
100 CAPSULE, LIQUID FILLED ORAL 2 TIMES DAILY PRN
COMMUNITY
End: 2020-01-23

## 2019-12-18 RX ADMIN — SODIUM CHLORIDE 500 ML: 9 INJECTION, SOLUTION INTRAVENOUS at 09:45

## 2019-12-18 RX ADMIN — SODIUM CHLORIDE 1000 ML: 9 INJECTION, SOLUTION INTRAVENOUS at 11:04

## 2019-12-18 ASSESSMENT — MIFFLIN-ST. JEOR: SCORE: 1595.66

## 2019-12-18 NOTE — ED AVS SNAPSHOT
[facility] Emergency Department  [address] [place] [address]  Phone:  [phone]  Fax:  [phone]                                    Melvin Abad   MRN: [id number]    Department:  [facility] Emergency Department   Date of Visit:  12/18/2019           After Visit Summary Signature Page    I have received my discharge instructions, and my questions have been answered. I have discussed any challenges I see with this plan with the nurse or doctor.    ..........................................................................................................................................  Patient/Patient Representative Signature      ..........................................................................................................................................  Patient Representative Print Name and Relationship to Patient    ..................................................               ................................................  Date                                   Time    ..........................................................................................................................................  Reviewed by Signature/Title    ...................................................              ..............................................  Date                                               Time          22EPIC Rev 08/18

## 2019-12-18 NOTE — DISCHARGE INSTRUCTIONS
I am not sure what is causing your weakness but you may have a problem with your lumbar spine.  If your symptoms persist you may need to get an MRI.  Please follow-up with Dr. Huston as soon as possible.  We did send him a message to try to get you worked into the clinic.

## 2019-12-18 NOTE — ED TRIAGE NOTES
He was walking across the living room yesterday and his legs collapsed.  He lives in his home and has a renter downstairs that helps him. He denies chest pain but has had indigestion for the past 2 days.

## 2019-12-18 NOTE — ED NOTES
Pt gotten up and walked down hallway with nurse with transfer belt on and pt pushing WC. Then pt walked back to bed. Had to go the BR so walked pt with belt and walker to BR. Pt has a walker at home. States walking behind walker felt better. When asked pt stated he felt a little weak knees and down. Also states having some pain in LLback area with movement. Pt was able to get in and out of bed slowly by self. [place] walk to BR with walker went well. Son in room with pt. States he will stay with pt today if he goes home. Dr Llanos updated.

## 2019-12-18 NOTE — ED PROVIDER NOTES
"  History     Chief Complaint   Patient presents with     Generalized Weakness     HPI  Melvin Abad is a 89 year old diabetic male with a history of atrial fibrillation on Coumadin presents to the emergency department complaining of weakness which started yesterday and it is not associated with pain.  He has had difficulty climbing steps and also difficulty getting up from a seated position.  Yesterday his knees buckled and he fell to the ground bumping his left knee.  He did not sustain a head injury or neck injury.  There is no bruising over his knee.  He feels like his legs are weak.  He had malodorous urine but he no longer does.  No dysuria, hematuria, melena, abdominal pain.  He has had intermittent problems with \"sour stomach \".  It does not last long.  It is associated with bad taste in his mouth.  He has had minimal shortness of breath and no chest pain.    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) 11/06/2018     Priority: Medium     Type 2 diabetes mellitus without complication, without long-term current use of insulin (H) 05/10/2017     Priority: Medium     Pain of right lower leg 03/31/2017     Priority: Medium     Contusion of right lower extremity 03/31/2017     Priority: Medium     Leg pain 03/31/2017     Priority: Medium     Long-term (current) use of anticoagulants [Z79.01] 04/05/2016     Priority: Medium     Chronic atrial fibrillation 01/19/2016     Priority: Medium     Obesity (BMI 30-39.9) 10/28/2015     Priority: Medium     Cellulitis 11/20/2013     Priority: Medium     Anemia due to blood loss, acute 11/20/2013     Priority: Medium     DVT prophylaxis 11/20/2013     Priority: Medium     CTS (carpal tunnel syndrome) 08/16/2013     Priority: Medium     Gilbert syndrome 01/31/2012     Priority: Medium     History of skin cancer 01/03/2012     Priority: Medium     AK (actinic keratosis) " 01/03/2012     Priority: Medium     Advanced directives, counseling/discussion 11/15/2011     Priority: Medium     Advance Directive Problem List Overview:   Name Relationship Phone    Primary Health Care Agent            Alternative Health Care Agent         Advance Directive Initial Visit--11/9/11  Melvin Abad presents in person for initial session regarding completion of advanced directive form. He was referred to the facilitator by self.  He currently has no advance directive.  Plan:  Patient presents for [employer] Informational meeting. Patient given Honoring Choices folder. Patient will complete Advance Care Directive and bring to clinic.  Follow up meeting: As needed.    .Vandana Prasad RN         [facility] 04/08/2011     Priority: Medium     Natalie Ferraro RN-PHN  FPA / [facility]   [phone]        DX V65.8 REPLACED WITH [id number] [facility] (04/08/2013)       Diverticulosis 03/25/2011     Priority: Medium     Type 2 diabetes mellitus without complication (H) 03/10/2011     Priority: Medium     Hyperlipidemia LDL goal <100 10/31/2010     Priority: Medium     Long term current use of anticoagulant therapy 01/31/2001     Priority: Medium     Problem list name updated by automated process. Provider to review          Past Medical History:    Past Medical History:   Diagnosis Date     Actinic keratosis      Atrial fibrillation (H)      Basal cell carcinoma nos 06/10     Cardiac dysrhythmia, unspecified      Cough      Diabetic eye exam (H) 8/29/14     Generalized osteoarthrosis, unspecified site      Other diseases of lung, not elsewhere classified      Pure hypercholesterolemia      Unspecified essential hypertension        Past Surgical History:    Past Surgical History:   Procedure Laterality Date     C TOTAL KNEE ARTHROPLASTY  1997    Knee Replacement, Total     COLONOSCOPY  05/02/2007    Multiple bxs of diminutive polyps of ascending colon.     COLONOSCOPY   "11/10/2010    COLONOSCOPY performed by MARISELA GRIMM at  GI     HC COLONOSCOPY W/WO BRUSH/WASH  10/19/2001     HC COLONOSCOPY W/WO BRUSH/WASH  11/02/2004      HEMORRHOIDOPEXY BY STAPLING  09/26/08       Family History:    Family History   Problem Relation Age of Onset     Cancer Mother      Prostate Cancer Father        Social History:  Marital Status:   [5]  Social History     Tobacco Use     Smoking status: Former Smoker     Types: Cigarettes     Smokeless tobacco: Never Used     Tobacco comment: 5 years   Substance Use Topics     Alcohol use: No     Comment: denies     Drug use: No        Medications:    Carboxymethylcellulose Sod PF (RETAINE CMC) 0.5 % SOLN ophthalmic solution  Cholecalciferol (VITAMIN D) 2000 UNITS tablet  docusate sodium (COLACE) 100 MG capsule  lisinopril (PRINIVIL/ZESTRIL) 10 MG tablet  metoprolol succinate ER (TOPROL-XL) 50 MG 24 hr tablet  miconazole (MICATIN) 2 % external cream  multivitamin (OCUVITE) TABS  polyethylene glycol (MIRALAX) powder  simvastatin (ZOCOR) 40 MG tablet  warfarin (COUMADIN) 5 MG tablet  blood glucose (NO BRAND SPECIFIED) test strip  blood glucose monitoring (ONE TOUCH ULTRASOFT) lancets  hydrocortisone (ANUSOL-HC) 2.5 % cream          Review of Systems   All other systems reviewed and are negative.      Physical Exam   BP: (!) 174/111  Pulse: 76  Heart Rate: 71  Temp: 97.5  F (36.4  C)  Resp: 18  Height: 177.8 cm (5' 10\")  Weight: 93.4 kg (206 lb)  SpO2: 94 %      Physical Exam  Vitals signs and nursing note reviewed.   Constitutional:       General: He is not in acute distress.     Appearance: He is well-developed. He is not diaphoretic.   HENT:      Head: Normocephalic and atraumatic.      Nose: Nose normal. No congestion or rhinorrhea.      Mouth/Throat:      Mouth: Mucous membranes are moist.   Eyes:      General: No scleral icterus.  Neck:      Musculoskeletal: Normal range of motion and neck supple.   Cardiovascular:      Rate and Rhythm: Normal " rate and regular rhythm.   Pulmonary:      Effort: Pulmonary effort is normal.      Breath sounds: Normal breath sounds.   Musculoskeletal:         General: No swelling, tenderness, deformity or signs of injury.      Right lower leg: No edema.   Skin:     General: Skin is warm and dry.      Findings: No rash.      Comments: Chronic venous stasis appearing shins without edema.  No calf tenderness   Neurological:      General: No focal deficit present.      Mental Status: He is alert and oriented to person, place, and time.      Cranial Nerves: No cranial nerve deficit.      Sensory: No sensory deficit.      Motor: No weakness.      Coordination: Coordination normal.      Comments: Good  strength bilaterally.  Straight leg raise bilaterally is normal.  Legs are strong with good flexion and extension of the hips.   Psychiatric:         Mood and Affect: Mood normal.         Behavior: Behavior normal.         ED Course        Procedures               EKG Interpretation:      Interpreted by Barry Llanos MD  Time reviewed: 0833  Symptoms at time of EKG: weakness   Rhythm: normal sinus   Rate: normal  Axis: normal  Ectopy: none  Conduction: normal  ST Segments/ T Waves: No ST-T wave changes  Q Waves: none  Comparison to prior: no change    Clinical Impression: atrial fibrillation RBBB                 Results for orders placed or performed during the hospital encounter of 12/18/19 (from the past 24 hour(s))   CBC with platelets differential   Result Value Ref Range    WBC 6.4 4.0 - 11.0 10e9/L    RBC Count 4.64 4.4 - 5.9 10e12/L    Hemoglobin 15.1 13.3 - 17.7 g/dL    Hematocrit 45.1 40.0 - 53.0 %    MCV 97 78 - 100 fl    MCH 32.5 26.5 - 33.0 pg    MCHC 33.5 31.5 - 36.5 g/dL    RDW 13.1 10.0 - 15.0 %    Platelet Count 134 (L) 150 - 450 10e9/L    Diff Method Automated Method     % Neutrophils 67.7 %    % Lymphocytes 24.1 %    % Monocytes 7.0 %    % Eosinophils 0.6 %    % Basophils 0.3 %    % Immature Granulocytes 0.3 %     Nucleated RBCs 0 0 /100    Absolute Neutrophil 4.4 1.6 - 8.3 10e9/L    Absolute Lymphocytes 1.6 0.8 - 5.3 10e9/L    Absolute Monocytes 0.5 0.0 - 1.3 10e9/L    Absolute Basophils 0.0 0.0 - 0.2 10e9/L    Abs Immature Granulocytes 0.0 0 - 0.4 10e9/L    Absolute Nucleated RBC 0.0    Comprehensive metabolic panel   Result Value Ref Range    Sodium 139 133 - 144 mmol/L    Potassium 4.1 3.4 - 5.3 mmol/L    Chloride 110 (H) 94 - 109 mmol/L    Carbon Dioxide 25 20 - 32 mmol/L    Anion Gap 4 3 - 14 mmol/L    Glucose 118 (H) 70 - 99 mg/dL    Urea Nitrogen 16 7 - 30 mg/dL    Creatinine 0.80 0.66 - 1.25 mg/dL    GFR Estimate 78 >60 mL/min/[1.73_m2]    GFR Estimate If Black >90 >60 mL/min/[1.73_m2]    Calcium 8.5 8.5 - 10.1 mg/dL    Bilirubin Total 1.4 (H) 0.2 - 1.3 mg/dL    Albumin 3.5 3.4 - 5.0 g/dL    Protein Total 6.8 6.8 - 8.8 g/dL    Alkaline Phosphatase 69 40 - 150 U/L    ALT 18 0 - 70 U/L    AST 15 0 - 45 U/L   INR   Result Value Ref Range    INR 2.20 (H) 0.86 - 1.14   Troponin I   Result Value Ref Range    Troponin I ES 0.015 0.000 - 0.045 ug/L   UA reflex to Microscopic and Culture   Result Value Ref Range    Color Urine Yellow     Appearance Urine Clear     Glucose Urine Negative NEG^Negative mg/dL    Bilirubin Urine Negative NEG^Negative    Ketones Urine Negative NEG^Negative mg/dL    Specific Gravity Urine 1.009 1.003 - 1.035    Blood Urine Negative NEG^Negative    pH Urine 6.0 5.0 - 7.0 pH    Protein Albumin Urine Negative NEG^Negative mg/dL    Urobilinogen mg/dL 0.0 0.0 - 2.0 mg/dL    Nitrite Urine Negative NEG^Negative    Leukocyte Esterase Urine Negative NEG^Negative    Source Unspecified Urine    XR Chest 2 Views    Narrative    CHEST TWO VIEWS December 18, 2019 9:59 AM     HISTORY: Shortness of breath.    COMPARISON: Chest x-rays dated 10/27/2014.    FINDINGS: Calcified granulomata in the left base is again noted. Lungs  are otherwise grossly clear. Heart size and pulmonary vascularity are  grossly within  normal limits. There is mildly upturned cardiac apex  which could represent left ventricular hypertrophy as can be seen with  hypertension. No pneumothorax, significant pleural fluid collection,  or infiltrate is seen. There is mild tortuosity of thoracic aorta  which is stable since the prior study.       Impression    IMPRESSION: Stable findings of chronic granulomatous disease of the  chest, tortuous thoracic aorta, and mildly prominent left ventricle.  No other evidence of acute cardiopulmonary disease is seen.       Medications   0.9% sodium chloride BOLUS (0 mLs Intravenous Stopped 12/18/19 1103)     Followed by   sodium chloride 0.9% infusion (1,000 mLs Intravenous New Bag 12/18/19 1104)       Assessments & Plan (with Medical Decision Making)  89-year-old male with lower extremity weakness.  Labs and urinalysis along with chest x-ray are reassuring.  We were able to get him up and walking in the hallways with his walker.  He did well going to the bathroom independently.  They felt safe bringing him home.  His son will watch him and stay with him for the next couple of days.  Hopefully he can have follow-up with Dr. Huston.  Of asked registration to contact him.  He has had some problems with left leg pains and low back pain.  That seems to have improved from a few days ago.  He may need a lumbar MRI if his leg weakness persists.  The diagnosis, treatment options, risks and follow-up discussed with a competent patient and son who agree with the plan.     I have reviewed the nursing notes.    I have reviewed the findings, diagnosis, plan and need for follow up with the patient.      New Prescriptions    No medications on file       Final diagnoses:   Weakness of both lower extremities       12/18/2019   [facility] EMERGENCY DEPARTMENT     Barry Llanos MD  12/18/19 [id number]

## 2019-12-20 ENCOUNTER — APPOINTMENT (OUTPATIENT)
Dept: GENERAL RADIOLOGY | Facility: CLINIC | Age: 84
End: 2019-12-20
Attending: FAMILY MEDICINE
Payer: COMMERCIAL

## 2019-12-20 ENCOUNTER — HOSPITAL ENCOUNTER (EMERGENCY)
Facility: CLINIC | Age: 84
Discharge: HOME | End: 2019-12-20
Attending: FAMILY MEDICINE | Admitting: FAMILY MEDICINE
Payer: COMMERCIAL

## 2019-12-20 ENCOUNTER — APPOINTMENT (OUTPATIENT)
Dept: PHYSICAL THERAPY | Facility: CLINIC | Age: 84
End: 2019-12-20
Attending: FAMILY MEDICINE
Payer: COMMERCIAL

## 2019-12-20 ENCOUNTER — APPOINTMENT (OUTPATIENT)
Dept: CT IMAGING | Facility: CLINIC | Age: 84
End: 2019-12-20
Attending: FAMILY MEDICINE
Payer: COMMERCIAL

## 2019-12-20 ENCOUNTER — TELEPHONE (OUTPATIENT)
Dept: EMERGENCY MEDICINE | Facility: CLINIC | Age: 84
End: 2019-12-20

## 2019-12-20 VITALS
BODY MASS INDEX: 30.13 KG/M2 | SYSTOLIC BLOOD PRESSURE: 135 MMHG | DIASTOLIC BLOOD PRESSURE: 84 MMHG | HEART RATE: 61 BPM | RESPIRATION RATE: 17 BRPM | OXYGEN SATURATION: 96 % | WEIGHT: 210 LBS

## 2019-12-20 DIAGNOSIS — M54.50 ACUTE LOW BACK PAIN WITHOUT SCIATICA, UNSPECIFIED BACK PAIN LATERALITY: ICD-10-CM

## 2019-12-20 DIAGNOSIS — M62.81 GENERALIZED MUSCLE WEAKNESS: ICD-10-CM

## 2019-12-20 LAB
ALBUMIN SERPL-MCNC: 3.7 G/DL (ref 3.4–5)
ALBUMIN UR-MCNC: NEGATIVE MG/DL
ALP SERPL-CCNC: 68 U/L (ref 40–150)
ALT SERPL W P-5'-P-CCNC: 19 U/L (ref 0–70)
ANION GAP SERPL CALCULATED.3IONS-SCNC: 3 MMOL/L (ref 3–14)
APPEARANCE UR: CLEAR
AST SERPL W P-5'-P-CCNC: 16 U/L (ref 0–45)
BASOPHILS # BLD AUTO: 0 10E9/L (ref 0–0.2)
BASOPHILS NFR BLD AUTO: 0.3 %
BILIRUB SERPL-MCNC: 1.4 MG/DL (ref 0.2–1.3)
BILIRUB UR QL STRIP: NEGATIVE
BUN SERPL-MCNC: 16 MG/DL (ref 7–30)
CALCIUM SERPL-MCNC: 8.7 MG/DL (ref 8.5–10.1)
CHLORIDE SERPL-SCNC: 107 MMOL/L (ref 94–109)
CO2 SERPL-SCNC: 29 MMOL/L (ref 20–32)
COLOR UR AUTO: YELLOW
CREAT SERPL-MCNC: 0.91 MG/DL (ref 0.66–1.25)
DIFFERENTIAL METHOD BLD: NORMAL
EOSINOPHIL NFR BLD AUTO: 1.3 %
ERYTHROCYTE [DISTWIDTH] IN BLOOD BY AUTOMATED COUNT: 13.1 % (ref 10–15)
GFR SERPL CREATININE-BSD FRML MDRD: 73 ML/MIN/{1.73_M2}
GLUCOSE SERPL-MCNC: 160 MG/DL (ref 70–99)
GLUCOSE UR STRIP-MCNC: NEGATIVE MG/DL
HCT VFR BLD AUTO: 46.8 % (ref 40–53)
HGB BLD-MCNC: 15.5 G/DL (ref 13.3–17.7)
HGB UR QL STRIP: NEGATIVE
HYALINE CASTS #/AREA URNS LPF: 1 /LPF (ref 0–2)
IMM GRANULOCYTES # BLD: 0 10E9/L (ref 0–0.4)
IMM GRANULOCYTES NFR BLD: 0.3 %
KETONES UR STRIP-MCNC: NEGATIVE MG/DL
LEUKOCYTE ESTERASE UR QL STRIP: NEGATIVE
LYMPHOCYTES # BLD AUTO: 2.2 10E9/L (ref 0.8–5.3)
LYMPHOCYTES NFR BLD AUTO: 28.7 %
MCH RBC QN AUTO: 32.7 PG (ref 26.5–33)
MCHC RBC AUTO-ENTMCNC: 33.1 G/DL (ref 31.5–36.5)
MCV RBC AUTO: 99 FL (ref 78–100)
MONOCYTES # BLD AUTO: 0.6 10E9/L (ref 0–1.3)
MONOCYTES NFR BLD AUTO: 7.4 %
MUCOUS THREADS #/AREA URNS LPF: PRESENT /LPF
NEUTROPHILS # BLD AUTO: 4.7 10E9/L (ref 1.6–8.3)
NEUTROPHILS NFR BLD AUTO: 62 %
NITRATE UR QL: NEGATIVE
NRBC # BLD AUTO: 0 10*3/UL
NRBC BLD AUTO-RTO: 0 /100
PH UR STRIP: 5 PH (ref 5–7)
PLATELET # BLD AUTO: 164 10E9/L (ref 150–450)
POTASSIUM SERPL-SCNC: 3.8 MMOL/L (ref 3.4–5.3)
PROT SERPL-MCNC: 7 G/DL (ref 6.8–8.8)
RBC # BLD AUTO: 4.74 10E12/L (ref 4.4–5.9)
RBC #/AREA URNS AUTO: 0 /HPF (ref 0–2)
SODIUM SERPL-SCNC: 139 MMOL/L (ref 133–144)
SOURCE: ABNORMAL
SP GR UR STRIP: 1.01 (ref 1–1.03)
TROPONIN I SERPL-MCNC: 0.03 UG/L (ref 0–0.04)
UROBILINOGEN UR STRIP-MCNC: 0 MG/DL (ref 0–2)
WBC # BLD AUTO: 7.5 10E9/L (ref 4–11)
WBC #/AREA URNS AUTO: 1 /HPF (ref 0–5)

## 2019-12-20 PROCEDURE — 97162 PT EVAL MOD COMPLEX 30 MIN: CPT | Mod: GP

## 2019-12-20 PROCEDURE — 70450 CT HEAD/BRAIN W/O DYE: CPT

## 2019-12-20 PROCEDURE — 72100 X-RAY EXAM L-S SPINE 2/3 VWS: CPT | Mod: TC

## 2019-12-20 PROCEDURE — 85025 COMPLETE CBC W/AUTO DIFF WBC: CPT | Performed by: FAMILY MEDICINE

## 2019-12-20 PROCEDURE — 80053 COMPREHEN METABOLIC PANEL: CPT | Performed by: FAMILY MEDICINE

## 2019-12-20 PROCEDURE — 71045 X-RAY EXAM CHEST 1 VIEW: CPT | Mod: TC

## 2019-12-20 PROCEDURE — 81001 URINALYSIS AUTO W/SCOPE: CPT | Performed by: FAMILY MEDICINE

## 2019-12-20 PROCEDURE — 99291 CRITICAL CARE FIRST HOUR: CPT | Mod: 25 | Performed by: FAMILY MEDICINE

## 2019-12-20 PROCEDURE — 84484 ASSAY OF TROPONIN QUANT: CPT | Performed by: FAMILY MEDICINE

## 2019-12-20 PROCEDURE — 99285 EMERGENCY DEPT VISIT HI MDM: CPT | Mod: Z6 | Performed by: FAMILY MEDICINE

## 2019-12-20 NOTE — TELEPHONE ENCOUNTER
Reason for call:  Other   Patient called regarding (reason for call): appointment  Additional comments: ed follow up with Dr Huston for 12/23 or 24 for leg weakness per Dr Neri     Phone number to reach patient:  Home number on file [phone] (home)    Best Time:  anytime    Can we leave a detailed message on this number?  YES

## 2019-12-20 NOTE — ED PROVIDER NOTES
History     Chief Complaint   Patient presents with     Generalized Weakness     HPI  Melvin Abad is a 89 year old male who presents back to the emergency department with continued leg weakness.  Patient was seen here 2 days ago for the same problem.  Family is concerned because at that time there was no imaging done.  He had labs done which were normal.  They feel like he was better on yesterday and then symptoms got worse again this morning.  Before all this he was not having to use his walker to get around but now he has had to use this.  He had a lot of trouble even getting up out of the bed here today.  Patient denies any new symptoms like extremity numbness or weakness.  Patient denies any chest pain.  He has been dealing with some intermittent back pain, but they deny any recent trauma.  Patient denies a headache, or any visual changes.    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) 11/06/2018     Priority: Medium     Type 2 diabetes mellitus without complication, without long-term current use of insulin (H) 05/10/2017     Priority: Medium     Pain of right lower leg 03/31/2017     Priority: Medium     Contusion of right lower extremity 03/31/2017     Priority: Medium     Leg pain 03/31/2017     Priority: Medium     Long-term (current) use of anticoagulants [Z79.01] 04/05/2016     Priority: Medium     Chronic atrial fibrillation 01/19/2016     Priority: Medium     Obesity (BMI 30-39.9) 10/28/2015     Priority: Medium     Cellulitis 11/20/2013     Priority: Medium     Anemia due to blood loss, acute 11/20/2013     Priority: Medium     DVT prophylaxis 11/20/2013     Priority: Medium     CTS (carpal tunnel syndrome) 08/16/2013     Priority: Medium     Gilbert syndrome 01/31/2012     Priority: Medium     History of skin cancer 01/03/2012     Priority: Medium     AK (actinic keratosis) 01/03/2012      Priority: Medium     Advanced directives, counseling/discussion 11/15/2011     Priority: Medium     Advance Directive Problem List Overview:   Name Relationship Phone    Primary Health Care Agent            Alternative Health Care Agent         Advance Directive Initial Visit--11/9/11  Melvin Abad presents in person for initial session regarding completion of advanced directive form. He was referred to the facilitator by self.  He currently has no advance directive.  Plan:  Patient presents for [employer] Informational meeting. Patient given Honoring Choices folder. Patient will complete Advance Care Directive and bring to clinic.  Follow up meeting: As needed.    .Vandana Prasad RN         [facility] 04/08/2011     Priority: Medium     Natalie Ferraro RN-PHN  FPA / DAN[facility]   [phone]        DX V65.8 REPLACED WITH [id number] [facility] (04/08/2013)       Diverticulosis 03/25/2011     Priority: Medium     Type 2 diabetes mellitus without complication (H) 03/10/2011     Priority: Medium     Hyperlipidemia LDL goal <100 10/31/2010     Priority: Medium     Long term current use of anticoagulant therapy 01/31/2001     Priority: Medium     Problem list name updated by automated process. Provider to review          Past Medical History:    Past Medical History:   Diagnosis Date     Actinic keratosis      Atrial fibrillation (H)      Basal cell carcinoma nos 06/10     Cardiac dysrhythmia, unspecified      Cough      Diabetic eye exam (H) 8/29/14     Generalized osteoarthrosis, unspecified site      Other diseases of lung, not elsewhere classified      Pure hypercholesterolemia      Unspecified essential hypertension        Past Surgical History:    Past Surgical History:   Procedure Laterality Date     C TOTAL KNEE ARTHROPLASTY  1997    Knee Replacement, Total     COLONOSCOPY  05/02/2007    Multiple bxs of diminutive polyps of ascending colon.     COLONOSCOPY  11/10/2010     COLONOSCOPY performed by MARISELA GRIMM at  GI      COLONOSCOPY W/WO BRUSH/WASH  10/19/2001     HC COLONOSCOPY W/WO BRUSH/WASH  11/02/2004      HEMORRHOIDOPEXY BY STAPLING  09/26/08       Family History:    Family History   Problem Relation Age of Onset     Cancer Mother      Prostate Cancer Father        Social History:  Marital Status:   [5]  Social History     Tobacco Use     Smoking status: Former Smoker     Types: Cigarettes     Smokeless tobacco: Never Used     Tobacco comment: 5 years   Substance Use Topics     Alcohol use: No     Comment: denies     Drug use: No        Medications:    blood glucose (NO BRAND SPECIFIED) test strip  blood glucose monitoring (ONE TOUCH ULTRASOFT) lancets  Carboxymethylcellulose Sod PF (RETAINE CMC) 0.5 % SOLN ophthalmic solution  Cholecalciferol (VITAMIN D) 2000 UNITS tablet  docusate sodium (COLACE) 100 MG capsule  hydrocortisone (ANUSOL-HC) 2.5 % cream  lisinopril (PRINIVIL/ZESTRIL) 10 MG tablet  metoprolol succinate ER (TOPROL-XL) 50 MG 24 hr tablet  miconazole (MICATIN) 2 % external cream  multivitamin (OCUVITE) TABS  polyethylene glycol (MIRALAX) powder  simvastatin (ZOCOR) 40 MG tablet  warfarin (COUMADIN) 5 MG tablet          Review of Systems   All other systems reviewed and are negative.      Physical Exam   BP: (!) 172/102  Pulse: 60  Heart Rate: 68  Resp: 18  Weight: 95.3 kg (210 lb)  SpO2: 97 %      Physical Exam  Vitals signs and nursing note reviewed.   Constitutional:       General: He is not in acute distress.     Appearance: He is well-developed. He is not diaphoretic.   HENT:      Head: Normocephalic and atraumatic.   Eyes:      Conjunctiva/sclera: Conjunctivae normal.   Neck:      Musculoskeletal: Normal range of motion.   Cardiovascular:      Rate and Rhythm: Normal rate and regular rhythm.      Heart sounds: Normal heart sounds. No murmur.   Pulmonary:      Effort: Pulmonary effort is normal. No respiratory distress.      Breath sounds: Normal  breath sounds. No stridor. No wheezing.   Abdominal:      General: Bowel sounds are normal. There is no distension.      Palpations: Abdomen is soft.      Tenderness: There is no abdominal tenderness. There is no guarding.   Musculoskeletal: Normal range of motion.   Skin:     General: Skin is warm and dry.      Findings: No rash.   Neurological:      Mental Status: He is alert and oriented to person, place, and time.   Psychiatric:         Judgment: Judgment normal.         ED Course        Procedures        Results for orders placed or performed during the hospital encounter of 12/20/19 (from the past 24 hour(s))   CBC with platelets differential   Result Value Ref Range    WBC 7.5 4.0 - 11.0 10e9/L    RBC Count 4.74 4.4 - 5.9 10e12/L    Hemoglobin 15.5 13.3 - 17.7 g/dL    Hematocrit 46.8 40.0 - 53.0 %    MCV 99 78 - 100 fl    MCH 32.7 26.5 - 33.0 pg    MCHC 33.1 31.5 - 36.5 g/dL    RDW 13.1 10.0 - 15.0 %    Platelet Count 164 150 - 450 10e9/L    Diff Method Automated Method     % Neutrophils 62.0 %    % Lymphocytes 28.7 %    % Monocytes 7.4 %    % Eosinophils 1.3 %    % Basophils 0.3 %    % Immature Granulocytes 0.3 %    Nucleated RBCs 0 0 /100    Absolute Neutrophil 4.7 1.6 - 8.3 10e9/L    Absolute Lymphocytes 2.2 0.8 - 5.3 10e9/L    Absolute Monocytes 0.6 0.0 - 1.3 10e9/L    Absolute Basophils 0.0 0.0 - 0.2 10e9/L    Abs Immature Granulocytes 0.0 0 - 0.4 10e9/L    Absolute Nucleated RBC 0.0    Comprehensive metabolic panel   Result Value Ref Range    Sodium 139 133 - 144 mmol/L    Potassium 3.8 3.4 - 5.3 mmol/L    Chloride 107 94 - 109 mmol/L    Carbon Dioxide 29 20 - 32 mmol/L    Anion Gap 3 3 - 14 mmol/L    Glucose 160 (H) 70 - 99 mg/dL    Urea Nitrogen 16 7 - 30 mg/dL    Creatinine 0.91 0.66 - 1.25 mg/dL    GFR Estimate 73 >60 mL/min/[1.73_m2]    GFR Estimate If Black 84 >60 mL/min/[1.73_m2]    Calcium 8.7 8.5 - 10.1 mg/dL    Bilirubin Total 1.4 (H) 0.2 - 1.3 mg/dL    Albumin 3.7 3.4 - 5.0 g/dL    Protein  Total 7.0 6.8 - 8.8 g/dL    Alkaline Phosphatase 68 40 - 150 U/L    ALT 19 0 - 70 U/L    AST 16 0 - 45 U/L   Troponin I   Result Value Ref Range    Troponin I ES 0.031 0.000 - 0.045 ug/L   CT Head w/o Contrast    Narrative    CT SCAN OF THE HEAD WITHOUT CONTRAST   12/20/2019 9:18 AM     HISTORY: bilateral leg weakness    TECHNIQUE:  Axial images of the head and coronal reformations without  IV contrast material. Radiation dose for this scan was reduced using  automated exposure control, adjustment of the mA and/or kV according  to patient size, or iterative reconstruction technique.    COMPARISON: None.    FINDINGS:     Intracranial contents: There is diffuse parenchymal volume loss.   White matter changes are present in the cerebral hemispheres that are  consistent with small vessel ischemic disease in this age patient.  There is no evidence of intracranial hemorrhage, mass, acute infarct  or anomaly.    Visualized orbits/sinuses/mastoids:  The visualized portions of the  sinuses and mastoids appear normal.    Osseous structures/soft tissues:  Severe degenerative changes seen in  the temporomandibular joints.      Impression    IMPRESSION: No acute pathology. No bleed, mass, or areas of acute  infarction are identified.      SONIA CEE MD   UA with Microscopic   Result Value Ref Range    Color Urine Yellow     Appearance Urine Clear     Glucose Urine Negative NEG^Negative mg/dL    Bilirubin Urine Negative NEG^Negative    Ketones Urine Negative NEG^Negative mg/dL    Specific Gravity Urine 1.013 1.003 - 1.035    Blood Urine Negative NEG^Negative    pH Urine 5.0 5.0 - 7.0 pH    Protein Albumin Urine Negative NEG^Negative mg/dL    Urobilinogen mg/dL 0.0 0.0 - 2.0 mg/dL    Nitrite Urine Negative NEG^Negative    Leukocyte Esterase Urine Negative NEG^Negative    Source Unspecified Urine     WBC Urine 1 0 - 5 /HPF    RBC Urine 0 0 - 2 /HPF    Mucous Urine Present (A) NEG^Negative /LPF    Hyaline Casts 1 0 - 2 /LPF   XR  Chest Port 1 View    Narrative    XR CHEST PORT 1 VW 12/20/2019 9:48 AM    HISTORY: Weakness.     COMPARISON: December 18, 2019.       Impression    IMPRESSION: Right basilar atelectasis. Stable granuloma at the left  lung base. No focal pulmonary opacities otherwise. No pleural  effusions or pneumothorax. Stable heart size.     RADHA RIVAS MD   XR Lumbar Spine 2/3 Views    Narrative    LUMBAR SPINE TWO-THREE  VIEWS  12/20/2019 10:21 AM     HISTORY: low back pain    COMPARISON: None.    FINDINGS: There is normal osseous alignment.  No definite fractures  are identified.  There are degenerative changes in the facet joints of  the lower lumbar spine.      Impression    IMPRESSION: No definite fractures are identified on these views. There  are degenerative changes in the facet joints.    SONIA CEE MD       Medications - No data to display     Labs are reviewed and were unremarkable again.  I did do a scan of the head and it did not show any signs of stroke or head bleed.  For the back pain I did do x-rays of the back and did not see any new fractures.  X-ray was also unremarkable.  Patient was able to ambulate with a walker while here and did not need much assistance with this.  I did have physical therapy come do an eval and they were also concerned that the patient did have what appeared to be some left leg weakness but not significantly noticeable.  It seemed to be worse when he was upright and one thought I had was could the patient possibly have some degree of spinal stenosis.  It seems to be better when he is leaning forward on the walker which physical therapy seem to agree with.  At this point I do not see any signs of a stroke as a cause of the symptoms.  I think patient is safe to be discharged home with follow-up if things are not improving over the next couple days.  I would recommend patient to follow-up with his doctor on Monday.  May need to consider an MRI as an outpatient for further evaluation of  this back pain to see if there is some spinal stenosis.  Patient will use the walker over the weekend to get around.  The sons were very concerned initially and had lots of questions but after physical therapy evaluated the patient and we went over everything they were okay with this plan of taking the patient home.    Assessments & Plan (with Medical Decision Making)  Weakness, back pain     I have reviewed the nursing notes.    I have reviewed the findings, diagnosis, plan and need for follow up with the patient.              12/20/2019   [facility] EMERGENCY DEPARTMENT     Sky Neri MD  12/20/19 [id number]

## 2019-12-20 NOTE — ED TRIAGE NOTES
Was here on Wednesday with weakness, was doing better yesterday then this morning had trouble walking to the bathroom.

## 2019-12-20 NOTE — PROGRESS NOTES
12/20/19 1157   Quick Adds   Type of Visit Initial OP PT Evaluation       Present No   General Information   Start of Care Date 12/20/19   Referring Physician Sky Neri MD   Orders Evaluate and Treat as Indicated   Order Date 12/20/19   Medical Diagnosis Lower Extremity Weakness   Onset of illness/injury or Date of Surgery 12/19/19   Precautions/Limitations no known precautions/limitations   Surgical/Medical history reviewed Yes   Pertinent history of current problem (include personal factors and/or comorbidities that impact the POC) Patient is 89 year old male who has visited the emergency department for the second time this week due to gait instability and lower extremity weakness. Patient has undergone brain CT imaging and lumbar xray with unremarkable results. The patient has no history of trauma and reports one fall at home when his left leg gave out on him when walking from the bathroom to the bedroom. His weakness is inconsistent and does not come every day, however when it does it occurs most often at night when he is ambulating to/from the bathroom.  The patient has some low back pain that is also inconsistent with no numbness in his legs, no radicular pain but some left sided tingliness in his feet that is inconsistent. The patient's pain is worse with prone lying and lumbar extension while it is also relieved with slight trunk flexion or neutral stance. Patient presents with left sided lower extremity weakness (4+/5 hip flexion, knee extension, hip abduction and ankle dorsiflexion) as well as a trendelenburg gait pattern with an externally rotated left foot while ambulating. Patient has two sons that live less than 10 miles away from him who check on hime frequently. The patient is aware of his condition and has no signs of cognitive deficits.   Prior level of functional mobility Transfers;Ambulation;ADL   Transfers Independent   Ambulation Independent   ADL Independent    Diagnostic Tests X-ray;CT Scan   X-ray Results unremarkable   CT Results unremarkable   Patient role/Employment history Retired   Living environment House/[facility]   Home/Community Accessibility Comments No longer uses the stairs due to difficulty. Has rugs but family will remove them. Patient limits himself from high-fall risk situations. The patient has a 20 foot walk from his bedroom to his bathroom which is where most of his weakness occurs when ambulating this path during the night.   Assistive Devices Comments None at home. Patient recommended to use FWW and Bedside Commode.   Patient/Family Goals Statement To be able to walk aound his home without limitations or feeling his legs buckle.   Fall Risk Screen   Fall screen completed by PT   Have you fallen 2 or more times in the past year? No   Have you fallen and had an injury in the past year? No   Is patient a fall risk? Yes   Pain   Patient currently in pain Yes   Pain comments Pain only occurs with lumbar extension and prone lying and occurs in the left sided L4-L5-S1 region indicating possible stenosis.   Cognitive Status Examination   Orientation orientation to person, place and time   Level of Consciousness alert   Follows Commands and Answers Questions 100% of the time   Personal Safety and Judgment intact   Memory intact   Observation   Observation Left foot externally rotates with standing and gait. Patient has left trendelenburg with ambualtion.   Integumentary   Integumentary No deficits were identified   Posture   Posture Forward head position;Protracted shoulders;Kyphosis   Palpation   Palpation Pain with palpation of left lumbar erector spinae in L4-L5-S1 region   Range of Motion (ROM)   ROM Comment Slightly limitations with Hip ER otherwise WNL   Strength   Strength Comments All 5/5 except for: 4+ Left hip Flexion, 4+ left knee extension, 4+ left ankle dorsiflexion and 4+ left hip abduction   Musculoskeletal Special Tests   Musculoskeletal  Special Tests Negative Knee and Hip Exam. Prone lying positive for pain. Negative Slump Test.   Bed Mobility   Bed Mobility Bed mobility skill: Rolling/Turning;Bed mobility skill: Scooting/Bridging   Bed Mobility Skill: Rolling/Turning   Level of Bent: Rolling/Turning minimum assist (75% patients effort)   Physical/Nonphysical Assist: Rolling/Turning 1 person assist   Assistive Device: Rolling/Turning bed rails   Bed Mobility Skill: Scooting/Bridging, Rehab Eval   Level of Bent: Scooting/Bridging minimum assist (75% patients effort)   Physical/Nonphysical Assist: Scooting/Bridging 1 person assist   Assistive Device: Scooting/Bridging bed rails   Transfer Skills   Transfer Transfer Skill: Sit/Stand   Transfer Comments Had difficulty with sit to stand transfer from cushioned chair.   Transfer Skill: Sit to Stand   Level of Bent: Sit to Stand stand-by assist   Physical/Nonphysical Assist: Sit to Stand 1 person assist   Weighbearing Restrictions: Sit to Stand full weight-bearing   Assistive Device: Sit to Stand standard walker   Gait   Gait Gait Analysis;Gait Skill   Gait Skills   Level of Bent: Gait contact guard   Physical Assist/Nonphysical Assist: Gait supervision   Weight-Bearing Restrictions: Gait full weight-bearing   Assistive Device for Transfer: Gait standard walker   Gait Distance 50 feet   Gait Analysis   Gait Pattern Used 2-point gait   Gait Deviations Noted decreased henrry;decreased stride length;decreased step length;increased time in double stance;decreased toe-to-floor clearance   Impairments Contributing to Gait Deviations decreased strength;impaired balance   Balance   Balance other (describe)   Balance Comments Balance likely imparied by left leg weakness. Patient is safest using FWW.   Sensory Examination   Sensory Perception Comments Patient reported inconsistent tingliness in the left foot that was unable to be reproduced during the exam.   Coordination    Coordination no deficits were identified   Muscle Tone   Muscle Tone no deficits were identified   Clinical Impression   Criteria for Skilled Therapeutic Interventions Met evaluation only   PT Diagnosis Left Lower Extremity Weakness   Influenced by the following impairments Left Lower Extremity Weakness, Impaired Gait, Increased Fall Risk, Assistive Device needed for ambulation.   Functional limitations due to impairments Patient unable to walk, ambulate stairs, transfer or perform bed mobility without slight assistance due to left LE weakness.   Clinical Presentation Evolving/Changing   Clinical Presentation Rationale Based on systems involved, variability of symptoms, evaluation and clinical judgement   Clinical Decision Making (Complexity) Moderate complexity   Therapy Frequency other (see comments)   Predicted Duration of Therapy Intervention (days/wks) Eval Only   Risk & Benefits of therapy have been explained Yes   Patient, Family & other staff in agreement with plan of care Yes   Clinical Impression Comments Patient presents with left leg weakness possibly associated with Lumbar Stenosis but unconfirmed.  Patient is safe to discharge home with FWW, bedside commode and follow up with his primary care physician. The patient has family nearby who check on him frequently as well as responds quickly to any phone calls he makes to them.   GOALS   PT Eval Goals 1   Goal 1   Goal Identifier Independent   Goal Description Patient will be independent with use of a FWW for ambulation up to 50 feet.   Target Date 12/20/19   Date Met 12/20/19   Total Evaluation Time   PT Eval, Moderate Complexity Minutes (86409) 35

## 2019-12-20 NOTE — ED AVS SNAPSHOT
[facility] Emergency Department  [address] [place] [address]  Phone:  [phone]  Fax:  [phone]                                    Melvin Abad   MRN: [id number]    Department:  [facility] Emergency Department   Date of Visit:  12/20/2019           After Visit Summary Signature Page    I have received my discharge instructions, and my questions have been answered. I have discussed any challenges I see with this plan with the nurse or doctor.    ..........................................................................................................................................  Patient/Patient Representative Signature      ..........................................................................................................................................  Patient Representative Print Name and Relationship to Patient    ..................................................               ................................................  Date                                   Time    ..........................................................................................................................................  Reviewed by Signature/Title    ...................................................              ..............................................  Date                                               Time          22EPIC Rev 08/18

## 2019-12-20 NOTE — PROGRESS NOTES
[facility]        OUTPATIENT PHYSICAL THERAPY FUNCTIONAL EVALUATION  PLAN OF TREATMENT FOR OUTPATIENT REHABILITATION  (COMPLETE FOR INITIAL CLAIMS ONLY)  Patient's Last Name, First Name, M.I.  YOB: 1928  Melvin Abad     Provider's Name   [facility]   Medical Record No.  [id number]     Start of Care Date:  12/20/19   Onset Date:  12/19/19   Type:     _X__PT   ____OT  ____SLP Medical Diagnosis: Generalized Weakness       PT Diagnosis:  Left Lower Extremity Weakness Visits from SOC:  1                              __________________________________________________________________________________  Plan of Treatment/Functional Goals: Evaluation Only with discharge home and recommendations for FWW, Bedside Commode and Follow-Up Visit with PCP.              GOALS  Independent  Patient will be independent with use of a FWW for ambulation up to 50 feet.  12/20/19                    Therapy Frequency:  Eval Only  Predicted Duration of Therapy Intervention:  Eval Only    Josh Bee, PT, DPT, CSCS, CLT                                    I CERTIFY THE NEED FOR THESE SERVICES FURNISHED UNDER        THIS PLAN OF TREATMENT AND WHILE UNDER MY CARE     (Physician co-signature of this document indicates review and certification of the therapy plan).                Certification Date From:   12/20/2019  Certification Date To:   12/20/2019    Referring Provider:  Sky eNri MD    Initial Assessment  See Epic Evaluation- Start of Care Date: 12/20/19

## 2019-12-21 ENCOUNTER — TRANSFERRED RECORDS (OUTPATIENT)
Dept: HEALTH INFORMATION MANAGEMENT | Facility: CLINIC | Age: 84
End: 2019-12-21

## 2019-12-23 ENCOUNTER — PATIENT OUTREACH (OUTPATIENT)
Dept: FAMILY MEDICINE | Facility: CLINIC | Age: 84
End: 2019-12-23

## 2019-12-23 DIAGNOSIS — Z71.89 OTHER SPECIFIED COUNSELING: ICD-10-CM

## 2019-12-23 NOTE — PROGRESS NOTES
After chart review I noticed that the patient has an appointment with his PCP today for a follow up from the emergency room on Friday.     I will wait to see what Dr. Huston has to say and then reach out the family and offer care coordination services.     Referral: ED discharge report.  Patient was seen for Generalized weakness

## 2019-12-26 ENCOUNTER — TRANSFERRED RECORDS (OUTPATIENT)
Dept: HEALTH INFORMATION MANAGEMENT | Facility: CLINIC | Age: 84
End: 2019-12-26

## 2019-12-26 ENCOUNTER — PATIENT OUTREACH (OUTPATIENT)
Dept: FAMILY MEDICINE | Facility: CLINIC | Age: 84
End: 2019-12-26

## 2019-12-26 NOTE — PROGRESS NOTES
Scheduled Follow Up Call: Attempt 1 Community Health Worker called and left a message for the patient. If the patient is returning my call, please transfer the patient to Ling DAS at [phone].        Referral: ED discharge report.  Patient was seen for Generalized weakness    Notes:   Patient no showed his appointment with Dr. Huston on 12/23.     Patient has no follow up.

## 2019-12-27 ENCOUNTER — PATIENT OUTREACH (OUTPATIENT)
Dept: CARE COORDINATION | Facility: CLINIC | Age: 84
End: 2019-12-27

## 2019-12-27 NOTE — LETTER
Dear Melvin,                                                                         You were recently referred to the [facility]'s Clinic Care Coordination service.  This is a service offered through your Primary Care Clinic which can help you access resources, services in regard to your health and well-being goals. The clinic Community Health Worker has placed two calls to you to discuss the nature of this service and to offer enrollment.  If you are interested in learning more about Clinic Care Coordination, please call your Primary Care Clinic's Community Health Worker,  Ling DAS at [phone]                                                Sincerely,                                                                              ANIA Dubon                                                                                          Clinic Care Coordination                                                  [facility]

## 2019-12-27 NOTE — PROGRESS NOTES
Community Health Worker called and left a message for the patient.  If the patient is returning my call, please transfer the patient Radharandi DAS at [phone]   Patient has been mailed a unreachable letter and was provided with CHW contact information if they are interested in accessing Clinic Care Coordination.  Order for Care Management has been closed, no further outreach will be done at this time and patient can be re-referred.

## 2020-01-16 ENCOUNTER — TRANSFERRED RECORDS (OUTPATIENT)
Dept: HEALTH INFORMATION MANAGEMENT | Facility: CLINIC | Age: 85
End: 2020-01-16

## 2020-01-17 ASSESSMENT — MIFFLIN-ST. JEOR: SCORE: 1613.8

## 2020-01-20 ENCOUNTER — HOSPITAL LABORATORY (OUTPATIENT)
Dept: NURSING HOME | Facility: OTHER | Age: 85
End: 2020-01-20

## 2020-01-20 ENCOUNTER — NURSING HOME VISIT (OUTPATIENT)
Dept: GERIATRICS | Facility: CLINIC | Age: 85
End: 2020-01-20
Payer: COMMERCIAL

## 2020-01-20 VITALS
DIASTOLIC BLOOD PRESSURE: 74 MMHG | HEIGHT: 70 IN | RESPIRATION RATE: 18 BRPM | TEMPERATURE: 96.4 F | WEIGHT: 210 LBS | SYSTOLIC BLOOD PRESSURE: 126 MMHG | BODY MASS INDEX: 30.06 KG/M2 | OXYGEN SATURATION: 92 % | HEART RATE: 76 BPM

## 2020-01-20 DIAGNOSIS — E11.51 DIABETES MELLITUS WITH PERIPHERAL VASCULAR DISEASE (H): ICD-10-CM

## 2020-01-20 DIAGNOSIS — Z79.01 ANTICOAGULATION GOAL OF INR 2 TO 3: ICD-10-CM

## 2020-01-20 DIAGNOSIS — I63.50 RIGHT PONTINE STROKE (H): Primary | ICD-10-CM

## 2020-01-20 DIAGNOSIS — Z79.01 LONG TERM CURRENT USE OF ANTICOAGULANT THERAPY: ICD-10-CM

## 2020-01-20 DIAGNOSIS — E11.9 TYPE 2 DIABETES MELLITUS WITHOUT COMPLICATION, WITHOUT LONG-TERM CURRENT USE OF INSULIN (H): ICD-10-CM

## 2020-01-20 DIAGNOSIS — K59.01 SLOW TRANSIT CONSTIPATION: ICD-10-CM

## 2020-01-20 DIAGNOSIS — G56.01 CARPAL TUNNEL SYNDROME OF RIGHT WRIST: ICD-10-CM

## 2020-01-20 DIAGNOSIS — I69.354 HEMIPARESIS AFFECTING LEFT SIDE AS LATE EFFECT OF CEREBROVASCULAR ACCIDENT (CVA) (H): ICD-10-CM

## 2020-01-20 DIAGNOSIS — Z51.81 ANTICOAGULATION GOAL OF INR 2 TO 3: ICD-10-CM

## 2020-01-20 DIAGNOSIS — R13.12 OROPHARYNGEAL DYSPHAGIA: ICD-10-CM

## 2020-01-20 DIAGNOSIS — I48.11 LONGSTANDING PERSISTENT ATRIAL FIBRILLATION (H): ICD-10-CM

## 2020-01-20 DIAGNOSIS — E78.5 HYPERLIPIDEMIA LDL GOAL <100: ICD-10-CM

## 2020-01-20 DIAGNOSIS — G47.9 SLEEP DISTURBANCES: ICD-10-CM

## 2020-01-20 DIAGNOSIS — I10 ESSENTIAL HYPERTENSION: ICD-10-CM

## 2020-01-20 DIAGNOSIS — H04.123 DRY EYES: ICD-10-CM

## 2020-01-20 DIAGNOSIS — G25.81 RESTLESS LEGS SYNDROME (RLS): ICD-10-CM

## 2020-01-20 LAB — INR PPP: 1.84 (ref 0.86–1.14)

## 2020-01-20 PROCEDURE — 99310 SBSQ NF CARE HIGH MDM 45: CPT | Performed by: NURSE PRACTITIONER

## 2020-01-20 RX ORDER — ALBUTEROL SULFATE 90 UG/1
2 AEROSOL, METERED RESPIRATORY (INHALATION) EVERY 4 HOURS PRN
COMMUNITY
End: 2020-01-23

## 2020-01-20 RX ORDER — GABAPENTIN 100 MG/1
200 CAPSULE ORAL 2 TIMES DAILY
COMMUNITY
End: 2020-05-11

## 2020-01-20 RX ORDER — ACETAMINOPHEN 325 MG/1
325-650 TABLET ORAL EVERY 4 HOURS PRN
COMMUNITY
End: 2020-04-07

## 2020-01-20 RX ORDER — BISACODYL 10 MG
10 SUPPOSITORY, RECTAL RECTAL DAILY PRN
COMMUNITY
End: 2020-01-23

## 2020-01-20 RX ORDER — AMOXICILLIN 250 MG
2 CAPSULE ORAL DAILY
COMMUNITY
End: 2022-04-13

## 2020-01-20 RX ORDER — BACLOFEN 10 MG/1
5 TABLET ORAL AT BEDTIME
COMMUNITY
End: 2020-01-23

## 2020-01-20 RX ORDER — LANOLIN ALCOHOL/MO/W.PET/CERES
3 CREAM (GRAM) TOPICAL AT BEDTIME
COMMUNITY
End: 2020-08-10

## 2020-01-20 RX ORDER — CARBOXYMETHYLCELLULOSE SODIUM 10 MG/ML
1 GEL OPHTHALMIC 2 TIMES DAILY
COMMUNITY
End: 2020-02-06

## 2020-01-20 NOTE — PROGRESS NOTES
[place] [facility]  PRIMARY CARE PROVIDER AND CLINIC:  Lazaro Huston MD, [address] [facility] [address]  Chief Complaint   Patient presents with     Hospital F/U     [place] Medical Record Number:  [id number]  Place of Service where encounter took place:  [facility] [place] ([facility]) [[id number]]    Melvin Abad  is a 89 year old  (4/24/1928), admitted to the above facility from  [facility]. Hospital stay 12/21/19 through 1/17/20..  Admitted to this facility for  rehab, medical management and nursing care.    HPI:    HPI information obtained from: facility chart records, facility staff, patient report, [facility] chart review and Care Everywhere [facility] chart review.   Brief Summary of Hospital Course: Melvin was seen at [place] ED twice with intermittent weakness, possible back pain and no sensory changes.  CT scans and xrays were negative.  Did have a fall on 12/21/19 with increased left side weakness and inability to get out of bed.  With increased symptoms, Melvin was brought to [facility].  MRI done and showed right pontine stroke.  INR = 3.0  On coumadin for Atrial fib and that showed rate controlled while on Telemetry.  Ed continued to show left dense hemiparesis and so admitted to [facility] for rehab.  He made good gains with therapies.  LUE strength improved.  Ambulating with therapies with a right platform walker, mod assist and a wheelchair follow.  Dysphagia persisted and was on nectar thick liquids.  It was felt that subacute TCU would be beneficial.    While in the hospital, Melvin had right hand pain and numbness.  EMG reveled severe carpal tunnel syndrome and ulnar nerve neuropathy.  Right wrist brace and gabapentin 100mg twice a day started.      Updates on Status Since Skilled nursing Admission: meeting Ed today in his room.  He already had gone to therapies and stated he had walked with them.  His walker was in his room.  Has some  movement in the left upper extremity as well as the left leg.  Right wrist has a brace present.  Continues to have pain present.    Reviewed each medication with resident as wanting to know if he recognized certain ones.  Baclofen and Gabapentin are new to him.  Devised a plan of care and discussed how the MD/NP work and discharge plans.  He knows he has a meeting in 2 weeks and so explained the care conference to him.    CODE STATUS/ADVANCE DIRECTIVES DISCUSSION:   DNR / DNI  Patient's living condition: lives in a house with a renter  ALLERGIES: Patient has no known allergies.  PAST MEDICAL HISTORY:  has a past medical history of Actinic keratosis, Atrial fibrillation (H), Basal cell carcinoma nos (06/10), Cardiac dysrhythmia, unspecified, Cough, Diabetic eye exam (H) (8/29/14), Generalized osteoarthrosis, unspecified site, Other diseases of lung, not elsewhere classified, Pure hypercholesterolemia, and Unspecified essential hypertension.  PAST SURGICAL HISTORY:   has a past surgical history that includes TOTAL KNEE ARTHROPLASTY (1997); Colonoscopy w/wo [place] **Performed** (10/19/2001); Colonoscopy w/wo Brush **Performed** (11/02/2004); colonoscopy (05/02/2007); HEMORRHOIDOPEXY BY STAPLING (09/26/08); and Colonoscopy (11/10/2010).  FAMILY HISTORY: family history includes Cancer in his mother; Prostate Cancer in his father.  SOCIAL HISTORY:   reports that he has quit smoking. His smoking use included cigarettes. He has never used smokeless tobacco. He reports that he does not drink alcohol or use drugs.    Post Discharge Medication Reconciliation Status: discharge medications reconciled and changed, per note/orders (see AVS)    Current Outpatient Medications   Medication Sig Dispense Refill     acetaminophen (TYLENOL) 325 MG tablet Take 325-650 mg by mouth every 4 hours as needed for mild pain       carboxymethylcellulose PF (CELLUVISC/REFRESH LIQUIGEL) 1 % ophthalmic gel Place 1 drop into both eyes 2 times daily    "    cholecalciferol (VITAMIN D3) 1000 units (25 mcg) capsule Take 1 capsule by mouth daily       gabapentin (NEURONTIN) 100 MG capsule Take 200 mg by mouth 2 times daily        lisinopril (PRINIVIL/ZESTRIL) 10 MG tablet Take 1 tablet (10 mg) by mouth daily 90 tablet 3     melatonin 3 MG tablet Take 3 mg by mouth At Bedtime        metoprolol succinate ER (TOPROL-XL) 50 MG 24 hr tablet Take 1 tablet (50 mg) by mouth daily 90 tablet 3     polyethylene glycol (MIRALAX) powder Take 17 g by mouth daily 510 g 1     senna-docusate (SENOKOT-S/PERICOLACE) 8.6-50 MG tablet Take 2 tablets by mouth daily       simvastatin (ZOCOR) 40 MG tablet TAKE 1 TABLET BY MOUTH EVERY NIGHT AT BEDTIME 90 tablet 3     warfarin (COUMADIN) 5 MG tablet TAKE ONE-HALF TABLET ON MONDAYS AND FRIDAYS AND ONE TABLET ON ALL THE OTHER DAYS OF THE WEEK OR AS DIRECTED BY COUMADIN CLINIC 80 tablet 3     blood glucose (NO BRAND SPECIFIED) test strip Test daily 100 each 1     blood glucose monitoring (ONE TOUCH ULTRASOFT) lancets 1 each daily Test before meals and at bedtime 100 each 1     order for DME Equipment being ordered: bedside commode 1 Device 0     order for DME Equipment being ordered: 4 legged walker 1 Device 0       ROS:  10 point ROS of systems including Constitutional, Eyes, Respiratory, Cardiovascular, Gastroenterology, Genitourinary, Integumentary, Musculoskeletal, Psychiatric were all negative except for pertinent positives noted in my HPI.    Vitals:  /74   Pulse 76   Temp 96.4  F (35.8  C)   Resp 18   Ht 1.778 m (5' 10\")   Wt 95.3 kg (210 lb)   SpO2 92%   BMI 30.13 kg/m    Exam:  GENERAL APPEARANCE:  Alert, in no distress, oriented, cooperative  EYES:  PERRL, Conjunctiva and lids normal, wears corrective lenses  NECK:  No adenopathy,masses or thyromegaly  RESP:  respiratory effort and palpation of chest normal, lungs clear to auscultation , no respiratory distress  CV:  Palpation and auscultation of heart done , no edema, " heart rate regular/irregular, PVD present as staining seen on both shins.  scattered scabs on left shin that show no signs of infection  ABDOMEN:  normal bowel sounds, soft, nontender, no hepatosplenomegaly or other masses, no guarding or rebound  M/S:   Gait and station abnormal assist from staff for transfers, working in therapies for mobility, exercises, strengthening and ADLs  SKIN:  skin is pink, dry, and warm.  frail to touch.  NEURO:   Cranial nerves 2-12 are normal tested and grossly at patient's baseline  PSYCH:  oriented X 3, normal insight, judgement and memory, affect and mood normal    Lab/Diagnostic data:    1/16/20 - video swallow results  Impressions Performed At   IMPRESSION:      1.  Mild penetration and aspiration with thin liquid which resolved when the patient held the bolus in his mouth for 3 seconds prior to swallowing.    2.  Premature fall over base of tongue with thin liquid and mild stasis with thin liquid and cracker.        REPORT SIGNED BY DR. Shaheed Villasenor   TEST      Lab Results   Component Value Date    INR 1.84 01/20/2020     12.22.19   Ref Range & Units Value   SPECIMEN TYPE   Fasting    CHOLESTEROL <200 mg/dL 135    TRIGLYCERIDES PROFILE <150 mg/dL 76    LDL CHOL, CALC <100 mg/dL 60    HDL CHOLESTEROL >40 mg/dL 60    CHOL/HDL RATIO 0.0 - 4.9  2.3        ASSESSMENT/PLAN:  Right pontine stroke (H)  Hemiparesis affecting left side as late effect of cerebrovascular accident (CVA) (H)  Oropharyngeal dysphagia  - here for rehab after acute pontine stroke.  To have follow up with neurology in 4 weeks and Ed thought his daughter in law would know more of that as she handles those things.  Did leave a message for son about this visit and left number for any questions.    Goal is to improve in order for him to return to his community home.  Has a care conference end of February to discuss from all care providers here at the NH his progress.  He feels he continues to gain strength.  1.   Will check a CBC and BMP for monitoring.    Longstanding persistent atrial fibrillation  Long term current use of anticoagulant therapy  Anticoagulation goal of INR 2 to 3  - INR done today and below goal of 2-3.  Currently on coumadin 2.5mg daily.    Will increase to 3mg daily of coumadin  INR on Friday.    Hyperlipidemia LDL goal <100  - came with simvastatin 40mg at HS.  He recognized this medication and so will continue.  Good for having a CVA as well.    Type 2 diabetes mellitus without complication, without long-term current use of insulin (H)  Diabetes mellitus with peripheral vascular disease (H)  No medications nor sugar checks.  Lab Results   Component Value Date    A1C 6.1 04/10/2019     Monitor for symptoms of low or high sugars.    Essential Hypertension  Vitals done daily.  So far range is 110-140's/60-80's.  Came with Lisinopril 10mg daily and Toprol XL 50mg daily.    No adjustments.    Carpal tunnel syndrome of right wrist  Restless Leg Syndrome  - Gabapentin new to him as well an order for Baclofen 5mg daily.  Ed mentioned his right leg twitches especially at night and do not sleep well.  Since he is on Gabapentin, explained to him about the reason of being on the medication and having it used as well for RLS instead of starting a new medication.  Edward was for that thought.    1.  Discontinue Baclofen - such a small dose  2.  Increase Gabapentin to 200mg twice a day - carpel tunnel neuropathy and RLS  Will monitor effectiveness.    Slow transit constipation  Ed felt that his bowels were not working like they should. Is on PRN Senna and would like to change this to Senna S and continue with Miralax daily.    1.  Discontinue Senna  2.  Senna-S 2 tab po daily for constipation.     Sleep disturbances  With his trouble sleeping here, did mention to him he is on Melatonin PRN.  Offered to schedule this for him and he was open to the idea.  Will change the Melatonin to 3mg at HS.    Discontinue the PRN  order    Dry Eyes  Melvin requested that he get eyes drops that he brought in but medication taken away.  1.  Thera tears 2 drops each eye twice a day.             Orders written by provider at facility and transcribed by : Alessandra Jaime MA  1.  INR 1.84 - Increase Warfarin 3 mg po every day.  INR on Friday.  Dx: A fib  2.  Discontinue Baclofen, Bisacodyl.  3.  Increase Gabapentin 200 mg po BID   from 100 mg BID.  Dx: carpel tunnel right hand restless legs  4.  Thera-tears 1 gtt BID.  Dx: dry eyes  5.  Change Melatonin to 3 mg po at bedtime.  Dx: sleep disturbances.  6.  Discontinue Proventil.  7.  CBC & BMP on Friday.  Dx: CVA, HTN    Total time spent with patient visit at the skilled nursing facility was 45 min including patient visit, review of past records and phone call to patient contact. Greater than 50% of total time spent with counseling and coordinating care due to review of medications with explanations, hospitalization course, discharge planning.  Review of lab done today and orders for INR/coumadin.    Electronically signed by:  DINH Silva CNP

## 2020-01-20 NOTE — LETTER
1/20/2020        RE: Melvin Abad  [address]  [facility] [address]        [place] [facility]  PRIMARY CARE PROVIDER AND CLINIC:  Lazaro Huston MD, [address] [facility] [address]  Chief Complaint   Patient presents with     Hospital F/U     [place] Medical Record Number:  [id number]  Place of Service where encounter took place:  [facility] [[id number]]    Melvin Abad  is a 89 year old  (4/24/1928), admitted to the above facility from  [facility]. Hospital stay 12/21/19 through 1/17/20..  Admitted to this facility for  rehab, medical management and nursing care.    HPI:    HPI information obtained from: facility chart records, facility staff, patient report, [facility] chart review and Care Everywhere [facility] chart review.   Brief Summary of Hospital Course: Melvin was seen at [place] ED twice with intermittent weakness, possible back pain and no sensory changes.  CT scans and xrays were negative.  Did have a fall on 12/21/19 with increased left side weakness and inability to get out of bed.  With increased symptoms, Melvin was brought to [facility].  MRI done and showed right pontine stroke.  INR = 3.0  On coumadin for Atrial fib and that showed rate controlled while on Telemetry.  Ed continued to show left dense hemiparesis and so admitted to [facility] for rehab.  He made good gains with therapies.  LUE strength improved.  Ambulating with therapies with a right platform walker, mod assist and a wheelchair follow.  Dysphagia persisted and was on nectar thick liquids.  It was felt that subacute TCU would be beneficial.    While in the hospital, Melvin had right hand pain and numbness.  EMG reveled severe carpal tunnel syndrome and ulnar nerve neuropathy.  Right wrist brace and gabapentin 100mg twice a day started.      Updates on Status Since Skilled nursing Admission: meeting Ed today in his room.  He already had  gone to therapies and stated he had walked with them.  His walker was in his room.  Has some movement in the left upper extremity as well as the left leg.  Right wrist has a brace present.  Continues to have pain present.    Reviewed each medication with resident as wanting to know if he recognized certain ones.  Baclofen and Gabapentin are new to him.  Devised a plan of care and discussed how the MD/NP work and discharge plans.  He knows he has a meeting in 2 weeks and so explained the care conference to him.    CODE STATUS/ADVANCE DIRECTIVES DISCUSSION:   DNR / DNI  Patient's living condition: lives in a house with a renter  ALLERGIES: Patient has no known allergies.  PAST MEDICAL HISTORY:  has a past medical history of Actinic keratosis, Atrial fibrillation (H), Basal cell carcinoma nos (06/10), Cardiac dysrhythmia, unspecified, Cough, Diabetic eye exam (H) (8/29/14), Generalized osteoarthrosis, unspecified site, Other diseases of lung, not elsewhere classified, Pure hypercholesterolemia, and Unspecified essential hypertension.  PAST SURGICAL HISTORY:   has a past surgical history that includes TOTAL KNEE ARTHROPLASTY (1997); Colonoscopy w/wo [place] **Performed** (10/19/2001); Colonoscopy w/wo [place] **Performed** (11/02/2004); colonoscopy (05/02/2007); HEMORRHOIDOPEXY BY STAPLING (09/26/08); and Colonoscopy (11/10/2010).  FAMILY HISTORY: family history includes Cancer in his mother; Prostate Cancer in his father.  SOCIAL HISTORY:   reports that he has quit smoking. His smoking use included cigarettes. He has never used smokeless tobacco. He reports that he does not drink alcohol or use drugs.    Post Discharge Medication Reconciliation Status: discharge medications reconciled and changed, per note/orders (see AVS)    Current Outpatient Medications   Medication Sig Dispense Refill     acetaminophen (TYLENOL) 325 MG tablet Take 325-650 mg by mouth every 4 hours as needed for mild pain       carboxymethylcellulose PF  "(CELLUVISC/REFRESH LIQUIGEL) 1 % ophthalmic gel Place 1 drop into both eyes 2 times daily       cholecalciferol (VITAMIN D3) 1000 units (25 mcg) capsule Take 1 capsule by mouth daily       gabapentin (NEURONTIN) 100 MG capsule Take 200 mg by mouth 2 times daily        lisinopril (PRINIVIL/ZESTRIL) 10 MG tablet Take 1 tablet (10 mg) by mouth daily 90 tablet 3     melatonin 3 MG tablet Take 3 mg by mouth At Bedtime        metoprolol succinate ER (TOPROL-XL) 50 MG 24 hr tablet Take 1 tablet (50 mg) by mouth daily 90 tablet 3     polyethylene glycol (MIRALAX) powder Take 17 g by mouth daily 510 g 1     senna-docusate (SENOKOT-S/PERICOLACE) 8.6-50 MG tablet Take 2 tablets by mouth daily       simvastatin (ZOCOR) 40 MG tablet TAKE 1 TABLET BY MOUTH EVERY NIGHT AT BEDTIME 90 tablet 3     warfarin (COUMADIN) 5 MG tablet TAKE ONE-HALF TABLET ON MONDAYS AND FRIDAYS AND ONE TABLET ON ALL THE OTHER DAYS OF THE WEEK OR AS DIRECTED BY COUMADIN CLINIC 80 tablet 3     blood glucose (NO BRAND SPECIFIED) test strip Test daily 100 each 1     blood glucose monitoring (ONE TOUCH ULTRASOFT) lancets 1 each daily Test before meals and at bedtime 100 each 1     order for DME Equipment being ordered: bedside commode 1 Device 0     order for DME Equipment being ordered: 4 legged walker 1 Device 0       ROS:  10 point ROS of systems including Constitutional, Eyes, Respiratory, Cardiovascular, Gastroenterology, Genitourinary, Integumentary, Musculoskeletal, Psychiatric were all negative except for pertinent positives noted in my HPI.    Vitals:  /74   Pulse 76   Temp 96.4  F (35.8  C)   Resp 18   Ht 1.778 m (5' 10\")   Wt 95.3 kg (210 lb)   SpO2 92%   BMI 30.13 kg/m     Exam:  GENERAL APPEARANCE:  Alert, in no distress, oriented, cooperative  EYES:  PERRL, Conjunctiva and lids normal, wears corrective lenses  NECK:  No adenopathy,masses or thyromegaly  RESP:  respiratory effort and palpation of chest normal, lungs clear to " auscultation , no respiratory distress  CV:  Palpation and auscultation of heart done , no edema, heart rate regular/irregular, PVD present as staining seen on both shins.  scattered scabs on left shin that show no signs of infection  ABDOMEN:  normal bowel sounds, soft, nontender, no hepatosplenomegaly or other masses, no guarding or rebound  M/S:   Gait and station abnormal assist from staff for transfers, working in therapies for mobility, exercises, strengthening and ADLs  SKIN:  skin is pink, dry, and warm.  frail to touch.  NEURO:   Cranial nerves 2-12 are normal tested and grossly at patient's baseline  PSYCH:  oriented X 3, normal insight, judgement and memory, affect and mood normal    Lab/Diagnostic data:    1/16/20 - video swallow results  Impressions Performed At   IMPRESSION:      1.  Mild penetration and aspiration with thin liquid which resolved when the patient held the bolus in his mouth for 3 seconds prior to swallowing.    2.  Premature fall over base of tongue with thin liquid and mild stasis with thin liquid and cracker.        REPORT SIGNED BY DR. Shaheed Villasenor   TEST      Lab Results   Component Value Date    INR 1.84 01/20/2020     12.22.19   Ref Range & Units Value   SPECIMEN TYPE   Fasting    CHOLESTEROL <200 mg/dL 135    TRIGLYCERIDES PROFILE <150 mg/dL 76    LDL CHOL, CALC <100 mg/dL 60    HDL CHOLESTEROL >40 mg/dL 60    CHOL/HDL RATIO 0.0 - 4.9  2.3        ASSESSMENT/PLAN:  Right pontine stroke (H)  Hemiparesis affecting left side as late effect of cerebrovascular accident (CVA) (H)  Oropharyngeal dysphagia  - here for rehab after acute pontine stroke.  To have follow up with neurology in 4 weeks and Ed thought his daughter in law would know more of that as she handles those things.  Did leave a message for son about this visit and left number for any questions.    Goal is to improve in order for him to return to his community home.  Has a care conference end of February to discuss  from all care providers here at the NH his progress.  He feels he continues to gain strength.  1.  Will check a CBC and BMP for monitoring.    Longstanding persistent atrial fibrillation  Long term current use of anticoagulant therapy  Anticoagulation goal of INR 2 to 3  - INR done today and below goal of 2-3.  Currently on coumadin 2.5mg daily.    Will increase to 3mg daily of coumadin  INR on Friday.    Hyperlipidemia LDL goal <100  - came with simvastatin 40mg at HS.  He recognized this medication and so will continue.  Good for having a CVA as well.    Type 2 diabetes mellitus without complication, without long-term current use of insulin (H)  Diabetes mellitus with peripheral vascular disease (H)  No medications nor sugar checks.  Lab Results   Component Value Date    A1C 6.1 04/10/2019     Monitor for symptoms of low or high sugars.    Essential Hypertension  Vitals done daily.  So far range is 110-140's/60-80's.  Came with Lisinopril 10mg daily and Toprol XL 50mg daily.    No adjustments.    Carpal tunnel syndrome of right wrist  Restless Leg Syndrome  - Gabapentin new to him as well an order for Baclofen 5mg daily.  Ed mentioned his right leg twitches especially at night and do not sleep well.  Since he is on Gabapentin, explained to him about the reason of being on the medication and having it used as well for RLS instead of starting a new medication.  Edward was for that thought.    1.  Discontinue Baclofen - such a small dose  2.  Increase Gabapentin to 200mg twice a day - carpel tunnel neuropathy and RLS  Will monitor effectiveness.    Slow transit constipation  Ed felt that his bowels were not working like they should. Is on PRN Senna and would like to change this to Senna S and continue with Miralax daily.    1.  Discontinue Senna  2.  Senna-S 2 tab po daily for constipation.     Sleep disturbances  With his trouble sleeping here, did mention to him he is on Melatonin PRN.  Offered to schedule this for  him and he was open to the idea.  Will change the Melatonin to 3mg at HS.    Discontinue the PRN order    Dry Eyes  Melvin requested that he get eyes drops that he brought in but medication taken away.  1.  Thera tears 2 drops each eye twice a day.             Orders written by provider at facility and transcribed by : Alessandra Jaime MA  1.  INR 1.84 - Increase Warfarin 3 mg po every day.  INR on Friday.  Dx: A fib  2.  Discontinue Baclofen, Bisacodyl.  3.  Increase Gabapentin 200 mg po BID   from 100 mg BID.  Dx: carpel tunnel right hand restless legs  4.  Thera-tears 1 gtt BID.  Dx: dry eyes  5.  Change Melatonin to 3 mg po at bedtime.  Dx: sleep disturbances.  6.  Discontinue Proventil.  7.  CBC & BMP on Friday.  Dx: CVA, HTN    Total time spent with patient visit at the [facility] was 45 min including patient visit, review of past records and phone call to patient contact. Greater than 50% of total time spent with counseling and coordinating care due to review of medications with explanations, hospitalization course, discharge planning.  Review of lab done today and orders for INR/coumadin.    Electronically signed by:  DINH Silva CNP                         Sincerely,        [facility] Silva CNP

## 2020-01-21 NOTE — PROGRESS NOTES
[facility]  PRIMARY CARE PROVIDER AND CLINIC:  Lazaro Huston MD, [address] [facility] [address]  No chief complaint on file.    [place] Medical Record Number:  [id number]  Place of Service where encounter took place:  No question data found.    Melvin Abad  is a 89 year old  (4/24/1928), admitted to the above facility from  [facility]. Hospital stay 12/21/19 through 1/17/20..  Admitted to this facility for  rehab, medical management and nursing care.    HPI:    HPI information obtained from: facility staff, patient report and [facility] chart review.   Brief Summary of Hospital Course:   - - Pt with significant PMH of  Atrial fib on coumadin, had a fall on 12/21, presented to the hospital with intermittent weakness, was found to have right pontine stroke, causing left dens hemiparesis, started on rehab with improvement in LUE strength; started on dysphagia diet.  - Hospitalization was c/w  right hand numbness and tingling, EMG revealed  CTS and ulnar neuropathy, wrist brace applied and gabapentin started.     Updates on Status Since Skilled nursing Admission:   - GNP reports insomnia, started on melatonin with improvement, and neuropathy gabapentin increase.   - Pt seen and examined, reports soreness over left hand, aggravated with action, better with rest and meds.   - reports left arm strength is improving. On regular diet and has no concern.   - GNP reports started rehab program and transfer with one assist.    CODE STATUS/ADVANCE DIRECTIVES DISCUSSION:   DNR / DNI  Patient's living condition: lives alone  ALLERGIES: Patient has no known allergies.  PAST MEDICAL HISTORY:  has a past medical history of Actinic keratosis, Atrial fibrillation (H), Basal cell carcinoma nos (06/10), Cardiac dysrhythmia, unspecified, Cough, Diabetic eye exam (H) (8/29/14), Generalized osteoarthrosis, unspecified site, Other diseases of lung, not elsewhere classified, Pure  hypercholesterolemia, and Unspecified essential hypertension.  PAST SURGICAL HISTORY:   has a past surgical history that includes TOTAL KNEE ARTHROPLASTY (1997); Colonoscopy w/wo [place] **Performed** (10/19/2001); Colonoscopy w/wo Brush **Performed** (11/02/2004); colonoscopy (05/02/2007); HEMORRHOIDOPEXY BY STAPLING (09/26/08); and Colonoscopy (11/10/2010).  FAMILY HISTORY: family history includes Cancer in his mother; Prostate Cancer in his father.  SOCIAL HISTORY:   reports that he has quit smoking. His smoking use included cigarettes. He has never used smokeless tobacco. He reports that he does not drink alcohol or use drugs.    Post Discharge Medication Reconciliation Status: discharge medications reconciled and changed, per note/orders (see AVS)  Current Outpatient Medications   Medication Sig Dispense Refill     acetaminophen (TYLENOL) 325 MG tablet Take 325-650 mg by mouth every 4 hours as needed for mild pain       blood glucose (NO BRAND SPECIFIED) test strip Test daily 100 each 1     blood glucose monitoring (ONE TOUCH ULTRASOFT) lancets 1 each daily Test before meals and at bedtime 100 each 1     carboxymethylcellulose PF (CELLUVISC/REFRESH LIQUIGEL) 1 % ophthalmic gel Place 1 drop into both eyes 2 times daily       cholecalciferol (VITAMIN D3) 1000 units (25 mcg) capsule Take 1 capsule by mouth daily       gabapentin (NEURONTIN) 100 MG capsule Take 200 mg by mouth 2 times daily        lisinopril (PRINIVIL/ZESTRIL) 10 MG tablet Take 1 tablet (10 mg) by mouth daily 90 tablet 3     melatonin 3 MG tablet Take 3 mg by mouth At Bedtime        metoprolol succinate ER (TOPROL-XL) 50 MG 24 hr tablet Take 1 tablet (50 mg) by mouth daily 90 tablet 3     order for DME Equipment being ordered: bedside commode 1 Device 0     order for DME Equipment being ordered: 4 legged walker 1 Device 0     polyethylene glycol (MIRALAX) powder Take 17 g by mouth daily 510 g 1     senna-docusate (SENOKOT-S/PERICOLACE) 8.6-50 MG  tablet Take 2 tablets by mouth daily       simvastatin (ZOCOR) 40 MG tablet TAKE 1 TABLET BY MOUTH EVERY NIGHT AT BEDTIME 90 tablet 3     warfarin (COUMADIN) 5 MG tablet TAKE ONE-HALF TABLET ON MONDAYS AND FRIDAYS AND ONE TABLET ON ALL THE OTHER DAYS OF THE WEEK OR AS DIRECTED BY COUMADIN CLINIC 80 tablet 3     ROS:  10 point ROS of systems including Constitutional, Eyes, Respiratory, Cardiovascular, Gastroenterology, Genitourinary, Integumentary, Musculoskeletal, Psychiatric were all negative except for pertinent positives noted in my HPI.    Vitals:  /87   Pulse 67   Temp 96.7  F (35.9  C)   Resp 18   Wt 91.6 kg (202 lb)   SpO2 95%   BMI 28.98 kg/m    Exam:   GENERAL APPEARANCE:  in no distress, cooperative  ENT:  Mouth and posterior oropharynx normal, moist mucous membranes, oral mucosa moist, no lesion noted.   EYES:  EOMI, Pupil rounded and equal.  RESP:  lungs clear to auscultation   CV:  S1S2 audible, regular HR, no murmur appreciated.   ABDOMEN:  soft, NT/ND, BS audible. no mass appreciated on palpation.   M/S:   Splint right hand  SKIN:  Deep copper discoloration b/l leg.   NEURO:  CN 2-12 grossly intact. Ms strength: LUE 3/5, LLE 3.5, however 5/5 on the right side.  No NFD appreciated on observation. Hand  5/5 b/l  PSYCH:  normal insight, judgement and memory, affect and mood normal    Lab/Diagnostic data:Reviewed in the chart and EHR.      ASSESSMENT/PLAN:  -----------------------------  Hemiparesis affecting left side as late effect of cerebrovascular accident (CVA) (H)  Hx of Right pontine stroke  Dysphagia, Oropharyngeal  Hyperlipidemia LDL goal <100  Essential HTN  General weakness  - on simvastatin 40 mg,   - on lisinopril 10 mg, metoprolol succinate 50 mg. Keep SBP around 140 in this age group.   - started rehab program, making a progress, continue until desired goal is achieved.  - followed by neurology    # Type 2 diabetes mellitus diet controlled. (H)  # Diabetes mellitus with  peripheral vascular disease (H)   A1C 6.1 04/10/2019    A1C 6.3 02/07/2017   - over controlled in this patient with limited life expectancy to < 5 years.   - basaglar recently increased.     # Longstanding persistent atrial fibrillation  - Long term current use of anticoagulant therapy. On coumadin with INR followed closely.  - on metoprolol, CVR.     Carpal tunnel syndrome of right wrist  Restless Leg Syndrome  Sleep disturbances  - analgesia optimal, on gabapentin.   - sleep better with melatonin.   - continue splint for CTS, and rehab program.     Order: See above, otherwise, continue the rest of the current POC.       Electronically signed by:  Jacques Purcell MD

## 2020-01-23 ENCOUNTER — NURSING HOME VISIT (OUTPATIENT)
Dept: GERIATRICS | Facility: CLINIC | Age: 85
End: 2020-01-23
Payer: COMMERCIAL

## 2020-01-23 VITALS
WEIGHT: 202 LBS | HEART RATE: 67 BPM | OXYGEN SATURATION: 95 % | DIASTOLIC BLOOD PRESSURE: 87 MMHG | TEMPERATURE: 96.7 F | SYSTOLIC BLOOD PRESSURE: 126 MMHG | RESPIRATION RATE: 18 BRPM | BODY MASS INDEX: 28.98 KG/M2

## 2020-01-23 DIAGNOSIS — E78.5 HYPERLIPIDEMIA LDL GOAL <100: ICD-10-CM

## 2020-01-23 DIAGNOSIS — M62.81 GENERALIZED MUSCLE WEAKNESS: ICD-10-CM

## 2020-01-23 DIAGNOSIS — E11.51 DIABETES MELLITUS WITH PERIPHERAL VASCULAR DISEASE (H): ICD-10-CM

## 2020-01-23 DIAGNOSIS — R13.12 OROPHARYNGEAL DYSPHAGIA: ICD-10-CM

## 2020-01-23 DIAGNOSIS — I69.354 HEMIPARESIS AFFECTING LEFT SIDE AS LATE EFFECT OF CEREBROVASCULAR ACCIDENT (CVA) (H): Primary | ICD-10-CM

## 2020-01-23 DIAGNOSIS — I10 ESSENTIAL HYPERTENSION: ICD-10-CM

## 2020-01-23 DIAGNOSIS — E11.9 TYPE 2 DIABETES MELLITUS WITHOUT COMPLICATION, WITHOUT LONG-TERM CURRENT USE OF INSULIN (H): ICD-10-CM

## 2020-01-23 PROBLEM — Z79.01 ANTICOAGULATION GOAL OF INR 2 TO 3: Status: ACTIVE | Noted: 2020-01-23

## 2020-01-23 PROBLEM — G47.9 SLEEP DISTURBANCES: Status: ACTIVE | Noted: 2020-01-23

## 2020-01-23 PROBLEM — I48.11 LONGSTANDING PERSISTENT ATRIAL FIBRILLATION (H): Status: ACTIVE | Noted: 2020-01-23

## 2020-01-23 PROBLEM — I63.50 RIGHT PONTINE STROKE (H): Status: ACTIVE | Noted: 2020-01-23

## 2020-01-23 PROBLEM — H04.123 DRY EYES: Status: ACTIVE | Noted: 2020-01-23

## 2020-01-23 PROBLEM — G25.81 RESTLESS LEGS SYNDROME (RLS): Status: ACTIVE | Noted: 2020-01-23

## 2020-01-23 PROBLEM — Z51.81 ANTICOAGULATION GOAL OF INR 2 TO 3: Status: ACTIVE | Noted: 2020-01-23

## 2020-01-23 PROBLEM — K59.01 SLOW TRANSIT CONSTIPATION: Status: ACTIVE | Noted: 2020-01-23

## 2020-01-23 PROCEDURE — 99305 1ST NF CARE MODERATE MDM 35: CPT | Performed by: FAMILY MEDICINE

## 2020-01-23 NOTE — LETTER
1/23/2020        RE: Melvin Abad  [address]  [facility] [address]        [place] [facility]  PRIMARY CARE PROVIDER AND CLINIC:  Lazaro Huston MD, [address] [facility] [address]  No chief complaint on file.    [place] Medical Record Number:  [id number]  Place of Service where encounter took place:  No question data found.    Melvin Abad  is a 89 year old  (4/24/1928), admitted to the above facility from  [facility]. Hospital stay 12/21/19 through 1/17/20..  Admitted to this facility for  rehab, medical management and nursing care.    HPI:    HPI information obtained from: facility staff, patient report and [facility] chart review.   Brief Summary of Hospital Course:   - - Pt with significant PMH of  Atrial fib on coumadin, had a fall on 12/21, presented to the hospital with intermittent weakness, was found to have right pontine stroke, causing left dens hemiparesis, started on rehab with improvement in LUE strength; started on dysphagia diet.  - Hospitalization was c/w  right hand numbness and tingling, EMG revealed  CTS and ulnar neuropathy, wrist brace applied and gabapentin started.     Updates on Status Since Skilled nursing Admission:   - GNP reports insomnia, started on melatonin with improvement, and neuropathy gabapentin increase.   - Pt seen and examined, reports soreness over left hand, aggravated with action, better with rest and meds.   - reports left arm strength is improving. On regular diet and has no concern.   - GNP reports started rehab program and transfer with one assist.    CODE STATUS/ADVANCE DIRECTIVES DISCUSSION:   DNR / DNI  Patient's living condition: lives alone  ALLERGIES: Patient has no known allergies.  PAST MEDICAL HISTORY:  has a past medical history of Actinic keratosis, Atrial fibrillation (H), Basal cell carcinoma nos (06/10), Cardiac dysrhythmia, unspecified, Cough, Diabetic eye exam (H) (8/29/14), Generalized  osteoarthrosis, unspecified site, Other diseases of lung, not elsewhere classified, Pure hypercholesterolemia, and Unspecified essential hypertension.  PAST SURGICAL HISTORY:   has a past surgical history that includes TOTAL KNEE ARTHROPLASTY (1997); Colonoscopy w/wo [place] **Performed** (10/19/2001); Colonoscopy w/wo Brush **Performed** (11/02/2004); colonoscopy (05/02/2007); HEMORRHOIDOPEXY BY STAPLING (09/26/08); and Colonoscopy (11/10/2010).  FAMILY HISTORY: family history includes Cancer in his mother; Prostate Cancer in his father.  SOCIAL HISTORY:   reports that he has quit smoking. His smoking use included cigarettes. He has never used smokeless tobacco. He reports that he does not drink alcohol or use drugs.    Post Discharge Medication Reconciliation Status: discharge medications reconciled and changed, per note/orders (see AVS)  Current Outpatient Medications   Medication Sig Dispense Refill     acetaminophen (TYLENOL) 325 MG tablet Take 325-650 mg by mouth every 4 hours as needed for mild pain       blood glucose (NO BRAND SPECIFIED) test strip Test daily 100 each 1     blood glucose monitoring (ONE TOUCH ULTRASOFT) lancets 1 each daily Test before meals and at bedtime 100 each 1     carboxymethylcellulose PF (CELLUVISC/REFRESH LIQUIGEL) 1 % ophthalmic gel Place 1 drop into both eyes 2 times daily       cholecalciferol (VITAMIN D3) 1000 units (25 mcg) capsule Take 1 capsule by mouth daily       gabapentin (NEURONTIN) 100 MG capsule Take 200 mg by mouth 2 times daily        lisinopril (PRINIVIL/ZESTRIL) 10 MG tablet Take 1 tablet (10 mg) by mouth daily 90 tablet 3     melatonin 3 MG tablet Take 3 mg by mouth At Bedtime        metoprolol succinate ER (TOPROL-XL) 50 MG 24 hr tablet Take 1 tablet (50 mg) by mouth daily 90 tablet 3     order for DME Equipment being ordered: bedside commode 1 Device 0     order for DME Equipment being ordered: 4 legged walker 1 Device 0     polyethylene glycol (MIRALAX) powder  Take 17 g by mouth daily 510 g 1     senna-docusate (SENOKOT-S/PERICOLACE) 8.6-50 MG tablet Take 2 tablets by mouth daily       simvastatin (ZOCOR) 40 MG tablet TAKE 1 TABLET BY MOUTH EVERY NIGHT AT BEDTIME 90 tablet 3     warfarin (COUMADIN) 5 MG tablet TAKE ONE-HALF TABLET ON MONDAYS AND FRIDAYS AND ONE TABLET ON ALL THE OTHER DAYS OF THE WEEK OR AS DIRECTED BY COUMADIN CLINIC 80 tablet 3     ROS:  10 point ROS of systems including Constitutional, Eyes, Respiratory, Cardiovascular, Gastroenterology, Genitourinary, Integumentary, Musculoskeletal, Psychiatric were all negative except for pertinent positives noted in my HPI.    Vitals:  /87   Pulse 67   Temp 96.7  F (35.9  C)   Resp 18   Wt 91.6 kg (202 lb)   SpO2 95%   BMI 28.98 kg/m     Exam:   GENERAL APPEARANCE:  in no distress, cooperative  ENT:  Mouth and posterior oropharynx normal, moist mucous membranes, oral mucosa moist, no lesion noted.   EYES:  EOMI, Pupil rounded and equal.  RESP:  lungs clear to auscultation   CV:  S1S2 audible, regular HR, no murmur appreciated.   ABDOMEN:  soft, NT/ND, BS audible. no mass appreciated on palpation.   M/S:   Splint right hand  SKIN:  Deep copper discoloration b/l leg.   NEURO:  CN 2-12 grossly intact. Ms strength: LUE 3/5, LLE 3.5, however 5/5 on the right side.  No NFD appreciated on observation. Hand  5/5 b/l  PSYCH:  normal insight, judgement and memory, affect and mood normal    Lab/Diagnostic data:Reviewed in the chart and EHR.      ASSESSMENT/PLAN:  -----------------------------  Hemiparesis affecting left side as late effect of cerebrovascular accident (CVA) (H)  Hx of Right pontine stroke  Dysphagia, Oropharyngeal  Hyperlipidemia LDL goal <100  Essential HTN  General weakness  - on simvastatin 40 mg,   - on lisinopril 10 mg, metoprolol succinate 50 mg. Keep SBP around 140 in this age group.   - started rehab program, making a progress, continue until desired goal is achieved.  - followed by  neurology    # Type 2 diabetes mellitus diet controlled. (H)  # Diabetes mellitus with peripheral vascular disease (H)   A1C 6.1 04/10/2019    A1C 6.3 02/07/2017   - over controlled in this patient with limited life expectancy to < 5 years.   - basaglar recently increased.     # Longstanding persistent atrial fibrillation  - Long term current use of anticoagulant therapy. On coumadin with INR followed closely.  - on metoprolol, CVR.     Carpal tunnel syndrome of right wrist  Restless Leg Syndrome  Sleep disturbances  - analgesia optimal, on gabapentin.   - sleep better with melatonin.   - continue splint for CTS, and rehab program.     Order: See above, otherwise, continue the rest of the current POC.       Electronically signed by:  Jacques Purcell MD                         Sincerely,        Jacques Purcell MD

## 2020-01-24 ENCOUNTER — NURSING HOME VISIT (OUTPATIENT)
Dept: GERIATRICS | Facility: CLINIC | Age: 85
End: 2020-01-24
Payer: COMMERCIAL

## 2020-01-24 ENCOUNTER — HOSPITAL LABORATORY (OUTPATIENT)
Dept: NURSING HOME | Facility: OTHER | Age: 85
End: 2020-01-24

## 2020-01-24 VITALS
WEIGHT: 202.5 LBS | BODY MASS INDEX: 28.99 KG/M2 | SYSTOLIC BLOOD PRESSURE: 117 MMHG | OXYGEN SATURATION: 95 % | DIASTOLIC BLOOD PRESSURE: 73 MMHG | HEIGHT: 70 IN | TEMPERATURE: 97.6 F | RESPIRATION RATE: 20 BRPM | HEART RATE: 86 BPM

## 2020-01-24 DIAGNOSIS — Z51.81 ENCOUNTER FOR THERAPEUTIC DRUG MONITORING: ICD-10-CM

## 2020-01-24 DIAGNOSIS — I48.20 CHRONIC ATRIAL FIBRILLATION (H): Primary | ICD-10-CM

## 2020-01-24 DIAGNOSIS — Z79.01 LONG TERM CURRENT USE OF ANTICOAGULANT THERAPY: ICD-10-CM

## 2020-01-24 LAB
ANION GAP SERPL CALCULATED.3IONS-SCNC: 3 MMOL/L (ref 3–14)
BUN SERPL-MCNC: 12 MG/DL (ref 7–30)
CALCIUM SERPL-MCNC: 9 MG/DL (ref 8.5–10.1)
CHLORIDE SERPL-SCNC: 106 MMOL/L (ref 94–109)
CO2 SERPL-SCNC: 32 MMOL/L (ref 20–32)
CREAT SERPL-MCNC: 0.74 MG/DL (ref 0.66–1.25)
ERYTHROCYTE [DISTWIDTH] IN BLOOD BY AUTOMATED COUNT: 13.2 % (ref 10–15)
GFR SERPL CREATININE-BSD FRML MDRD: 80 ML/MIN/{1.73_M2}
GLUCOSE SERPL-MCNC: 89 MG/DL (ref 70–99)
HCT VFR BLD AUTO: 33.5 % (ref 40–53)
HGB BLD-MCNC: 10.6 G/DL (ref 13.3–17.7)
INR PPP: 1.12 (ref 0.86–1.14)
MCH RBC QN AUTO: 30.5 PG (ref 26.5–33)
MCHC RBC AUTO-ENTMCNC: 31.6 G/DL (ref 31.5–36.5)
MCV RBC AUTO: 97 FL (ref 78–100)
PLATELET # BLD AUTO: 272 10E9/L (ref 150–450)
POTASSIUM SERPL-SCNC: 3.7 MMOL/L (ref 3.4–5.3)
RBC # BLD AUTO: 3.47 10E12/L (ref 4.4–5.9)
SODIUM SERPL-SCNC: 141 MMOL/L (ref 133–144)
WBC # BLD AUTO: 9.1 10E9/L (ref 4–11)

## 2020-01-24 PROCEDURE — 99308 SBSQ NF CARE LOW MDM 20: CPT | Performed by: NURSE PRACTITIONER

## 2020-01-24 ASSESSMENT — MIFFLIN-ST. JEOR: SCORE: 1579.78

## 2020-01-24 NOTE — LETTER
"    1/24/2020        RE: Melvin Abad  [address]  [facility] [address]        [facility]  [place] Medical Record Number:  [id number]  Place of Service where encounter took place: [facility] AND REHAB [facility] ([facility]) [[id number]]    HPI:    Melvin Abad is a 89 year old  (4/24/1928), who is being seen today for an episodic care visit at the above location.   HPI information obtained from: facility chart records, facility staff and [facility] chart review. Today's concern is INR/Coumadin management for A. Fib    ROS/Subjective:  Bleeding Signs/Symptoms:  None  Thromboembolic Signs/Symptoms:  None  Medication Changes:  Yes: DC Baclofen & bisacodyl, increase in gabapentin, melatonin changed to schduled q HS, DC Proventil  Dietary Changes:  Yes: at TCU, participating in meal plan  Activity Changes: Yes: in TCU participating in therapies  Bacterial/Viral Infection:  No  Missed Coumadin Doses:  None  On ASA: No  Other Concerns:  Hgb 10.6 from 15.5 1 month ago    OBJECTIVE:  /73   Pulse 86   Temp 97.6  F (36.4  C)   Resp 20   Ht 1.778 m (5' 10\")   Wt 91.9 kg (202 lb 8 oz)   SpO2 95%   BMI 29.06 kg/m     Last INR: 1.84 on 1/20/20  INR Today:  1.12  Current Dose:  Patient has received 19.5 mg of Coumadin over last 7 days.  1/20-24: 3 mg daily  1/17-1/29: 2.5 mg daily     ASSESSMENT:     Chronic atrial fibrillation  Long term current use of anticoagulant therapy  Encounter for therapeutic drug monitoring  Subtherapeutic INR for goal of 2-3    PLAN:   Orders written by provider at facility  New Dose: Coumadin 4 mg Fri, Sat, Sun    Next INR: Mon, 1/27/20       Electronically signed by:  DINH Sánchez CNP   ]        Sincerely,        DINH Sánchez CNP    "

## 2020-01-25 ASSESSMENT — MIFFLIN-ST. JEOR: SCORE: 1477.72

## 2020-01-27 ENCOUNTER — NURSING HOME VISIT (OUTPATIENT)
Dept: GERIATRICS | Facility: CLINIC | Age: 85
End: 2020-01-27
Payer: COMMERCIAL

## 2020-01-27 ENCOUNTER — HOSPITAL LABORATORY (OUTPATIENT)
Dept: NURSING HOME | Facility: OTHER | Age: 85
End: 2020-01-27

## 2020-01-27 VITALS
WEIGHT: 180 LBS | SYSTOLIC BLOOD PRESSURE: 118 MMHG | BODY MASS INDEX: 25.77 KG/M2 | RESPIRATION RATE: 20 BRPM | HEIGHT: 70 IN | DIASTOLIC BLOOD PRESSURE: 80 MMHG | TEMPERATURE: 96 F | OXYGEN SATURATION: 96 % | HEART RATE: 80 BPM

## 2020-01-27 DIAGNOSIS — Z79.01 LONG TERM CURRENT USE OF ANTICOAGULANT THERAPY: Primary | ICD-10-CM

## 2020-01-27 DIAGNOSIS — I48.11 LONGSTANDING PERSISTENT ATRIAL FIBRILLATION (H): ICD-10-CM

## 2020-01-27 DIAGNOSIS — Z51.81 ENCOUNTER FOR THERAPEUTIC DRUG MONITORING: ICD-10-CM

## 2020-01-27 LAB — INR PPP: 1.92 (ref 0.86–1.14)

## 2020-01-27 PROCEDURE — 99308 SBSQ NF CARE LOW MDM 20: CPT | Performed by: NURSE PRACTITIONER

## 2020-01-27 NOTE — LETTER
"    1/27/2020        RE: Melvin Abad  [address]  [facility] [address]        [facility]  [place] Medical Record Number:  [id number]  Place of Service where encounter took place: [facility] (S) [[id number]]    HPI:    Melvin Abad is a 89 year old  (4/24/1928), who is being seen today for an episodic care visit at the above location.   HPI information obtained from: facility chart records, patient report and [facility] chart review. Today's concern is INR/Coumadin management for A. Fib    ROS/Subjective:  Bleeding Signs/Symptoms:  None  Thromboembolic Signs/Symptoms:  None  Medication Changes:  No  Dietary Changes:  No  Activity Changes: No  Bacterial/Viral Infection:  No  Missed Coumadin Doses:  None  On ASA: No  Other Concerns:  No    OBJECTIVE:  /80   Pulse 80   Temp 96  F (35.6  C)   Resp 20   Ht 1.778 m (5' 10\")   Wt 81.6 kg (180 lb)   SpO2 96%   BMI 25.83 kg/m       Lab Results   Component Value Date    INR 1.92 01/27/2020    INR 1.12 01/24/2020       Current Dose:  Coumadin 4mg daily  written on the PO sheets    ASSESSMENT:  1. Long term current use of anticoagulant therapy    2. Longstanding persistent atrial fibrillation    3. Encounter for therapeutic drug monitoring      No acute symptoms.    Therapeutic INR for goal of 2-3    PLAN:   Orders written by provider at facility  New Dose: No Change    Next INR: 1/31/20      Electronically signed by:  DINH Silva CNP         Sincerely,        DINH Silva CNP    "

## 2020-01-27 NOTE — PROGRESS NOTES
"[facility]  [place] Medical Record Number:  [id number]  [facility] place[facility] [[id number][facility]    HPI:    Melvin Abad is a 89 year old  (4/24/1928), who is being seen today for an episodic care visit at the above location.   HPI information obtained from: facility chart records, patient report and [facility] chart review. Today's concern is INR/Coumadin management for A. Fib    ROS/Subjective:  Bleeding Signs/Symptoms:  None  Thromboembolic Signs/Symptoms:  None  Medication Changes:  No  Dietary Changes:  No  Activity Changes: No  Bacterial/Viral Infection:  No  Missed Coumadin Doses:  None  On ASA: No  Other Concerns:  No    OBJECTIVE:  /80   Pulse 80   Temp 96  F (35.6  C)   Resp 20   Ht 1.778 m (5' 10\")   Wt 81.6 kg (180 lb)   SpO2 96%   BMI 25.83 kg/m      Lab Results   Component Value Date    INR 1.92 01/27/2020    INR 1.12 01/24/2020       Current Dose:  Coumadin 4mg daily  written on the PO sheets    ASSESSMENT:  1. Long term current use of anticoagulant therapy    2. Longstanding persistent atrial fibrillation    3. Encounter for therapeutic drug monitoring      No acute symptoms.    Therapeutic INR for goal of 2-3    PLAN:   Orders written by provider at facility  New Dose: No Change    Next INR: 1/31/20      Electronically signed by:  DINH Silva CNP     "

## 2020-01-30 ENCOUNTER — DOCUMENTATION ONLY (OUTPATIENT)
Dept: OTHER | Facility: CLINIC | Age: 85
End: 2020-01-30

## 2020-01-31 ENCOUNTER — NURSING HOME VISIT (OUTPATIENT)
Dept: GERIATRICS | Facility: CLINIC | Age: 85
End: 2020-01-31
Payer: COMMERCIAL

## 2020-01-31 ENCOUNTER — HOSPITAL LABORATORY (OUTPATIENT)
Dept: NURSING HOME | Facility: OTHER | Age: 85
End: 2020-01-31

## 2020-01-31 VITALS
RESPIRATION RATE: 16 BRPM | OXYGEN SATURATION: 95 % | TEMPERATURE: 96.2 F | BODY MASS INDEX: 29.13 KG/M2 | DIASTOLIC BLOOD PRESSURE: 73 MMHG | WEIGHT: 203 LBS | SYSTOLIC BLOOD PRESSURE: 122 MMHG | HEART RATE: 74 BPM

## 2020-01-31 DIAGNOSIS — Z79.01 LONG TERM CURRENT USE OF ANTICOAGULANT THERAPY: ICD-10-CM

## 2020-01-31 DIAGNOSIS — Z51.81 ENCOUNTER FOR THERAPEUTIC DRUG MONITORING: ICD-10-CM

## 2020-01-31 DIAGNOSIS — I48.11 LONGSTANDING PERSISTENT ATRIAL FIBRILLATION (H): Primary | ICD-10-CM

## 2020-01-31 LAB — INR PPP: 2.19 (ref 0.86–1.14)

## 2020-01-31 PROCEDURE — 99308 SBSQ NF CARE LOW MDM 20: CPT | Performed by: NURSE PRACTITIONER

## 2020-01-31 NOTE — LETTER
1/31/2020        RE: Melvin Abad  [address]  [facility] [address]        [place] [facility]  [place] Medical Record Number:  [id number]  Place of Service where encounter took place: [facility] AND REHAB [facility] (S) [[id number]]    HPI:    Melvin Abad is a 89 year old  (4/24/1928), who is being seen today for an episodic care visit at the above location.   HPI information obtained from: facility chart records, patient report and [facility] chart review. Today's concern is INR/Coumadin management for A. Fib    ROS/Subjective:  Bleeding Signs/Symptoms:  None  Thromboembolic Signs/Symptoms:  None  Medication Changes:  No  Dietary Changes:  No  Activity Changes: Yes: attending therapies  Bacterial/Viral Infection:  No  Missed Coumadin Doses:  None  On ASA: No  Other Concerns:  No    OBJECTIVE:  /73   Pulse 74   Temp 96.2  F (35.7  C)   Resp 16   Wt 92.1 kg (203 lb)   SpO2 95%   BMI 29.13 kg/m        Component      Latest Ref Rng & Units 1/27/2020 1/31/2020   INR      0.86 - 1.14 1.92 (H) 2.19 (H)     Current Dose:  Coumadin 4mg daily  written on the PO sheets    ASSESSMENT:  1. Longstanding persistent atrial fibrillation    2. Long term current use of anticoagulant therapy    3. Encounter for therapeutic drug monitoring      No complaints of chest pain.   No bleeding  Therapeutic INR for goal of 2-3    PLAN:   Orders written by provider at facility  New Dose: No Change    Next INR: 1 week      Electronically signed by:  DINH Silva CNP         Sincerely,        DINH Silva CNP

## 2020-01-31 NOTE — PROGRESS NOTES
[place] [facility]  [place] Medical Record Number:  [id number]  [facility] where encounter took [facility] [[id number][facility]    HPI:    Melvin Abad is a 89 year old  (4/24/1928), who is being seen today for an episodic care visit at the above location.   HPI information obtained from: facility chart records, patient report and [facility] chart review. Today's concern is INR/Coumadin management for A. Fib    ROS/Subjective:  Bleeding Signs/Symptoms:  None  Thromboembolic Signs/Symptoms:  None  Medication Changes:  No  Dietary Changes:  No  Activity Changes: Yes: attending therapies  Bacterial/Viral Infection:  No  Missed Coumadin Doses:  None  On ASA: No  Other Concerns:  No    OBJECTIVE:  /73   Pulse 74   Temp 96.2  F (35.7  C)   Resp 16   Wt 92.1 kg (203 lb)   SpO2 95%   BMI 29.13 kg/m       Component      Latest Ref Rng & Units 1/27/2020 1/31/2020   INR      0.86 - 1.14 1.92 (H) 2.19 (H)     Current Dose:  Coumadin 4mg daily  written on the PO sheets    ASSESSMENT:  1. Longstanding persistent atrial fibrillation    2. Long term current use of anticoagulant therapy    3. Encounter for therapeutic drug monitoring      No complaints of chest pain.   No bleeding  Therapeutic INR for goal of 2-3    PLAN:   Orders written by provider at facility  New Dose: No Change    Next INR: 1 week      Electronically signed by:  DINH Silva CNP

## 2020-02-03 ENCOUNTER — HOSPITAL LABORATORY (OUTPATIENT)
Dept: NURSING HOME | Facility: OTHER | Age: 85
End: 2020-02-03

## 2020-02-03 ENCOUNTER — NURSING HOME VISIT (OUTPATIENT)
Dept: GERIATRICS | Facility: CLINIC | Age: 85
End: 2020-02-03
Payer: COMMERCIAL

## 2020-02-03 VITALS
WEIGHT: 198.5 LBS | SYSTOLIC BLOOD PRESSURE: 124 MMHG | RESPIRATION RATE: 18 BRPM | HEIGHT: 70 IN | DIASTOLIC BLOOD PRESSURE: 78 MMHG | BODY MASS INDEX: 28.42 KG/M2 | OXYGEN SATURATION: 97 % | HEART RATE: 72 BPM | TEMPERATURE: 96.2 F

## 2020-02-03 DIAGNOSIS — Z79.01 LONG TERM CURRENT USE OF ANTICOAGULANT THERAPY: ICD-10-CM

## 2020-02-03 DIAGNOSIS — I48.11 LONGSTANDING PERSISTENT ATRIAL FIBRILLATION (H): Primary | ICD-10-CM

## 2020-02-03 DIAGNOSIS — Z51.81 ENCOUNTER FOR THERAPEUTIC DRUG MONITORING: ICD-10-CM

## 2020-02-03 LAB — INR PPP: 2.24 (ref 0.86–1.14)

## 2020-02-03 PROCEDURE — 99308 SBSQ NF CARE LOW MDM 20: CPT | Performed by: NURSE PRACTITIONER

## 2020-02-03 ASSESSMENT — MIFFLIN-ST. JEOR: SCORE: 1561.64

## 2020-02-03 NOTE — PROGRESS NOTES
"[place] [facility]  [place] Medical Record Number:  [id number]  [facility] where encounter took place[facility] [[id number][facility]    HPI:    Melvin Abad is a 89 year old  (4/24/1928), who is being seen today for an episodic care visit at the above location.   HPI information obtained from: facility chart records, patient report and [facility] chart review. Today's concern is INR/Coumadin management for A. Fib    ROS/Subjective:  Bleeding Signs/Symptoms:  None  Thromboembolic Signs/Symptoms:  None  Medication Changes:  Yes: started on Tamiflu prophylactic    Dietary Changes:  No  Activity Changes: Yes: attending therapies  Bacterial/Viral Infection:  No  Missed Coumadin Doses:  None  On ASA: No  Other Concerns:  No    OBJECTIVE:  /78   Pulse 72   Temp 96.2  F (35.7  C)   Resp 18   Ht 1.778 m (5' 10\")   Wt 90 kg (198 lb 8 oz)   SpO2 97%   BMI 28.48 kg/m      Lab Results   Component Value Date    INR 2.24 02/03/2020    INR 2.19 01/31/2020       Current Dose:  Coumadin 4mg daily  Written on PO sheets    ASSESSMENT:  1. Longstanding persistent atrial fibrillation    2. Long term current use of anticoagulant therapy    3. Encounter for therapeutic drug monitoring      No acute bleeding.  No signs of the flu as now on prophylaxis dose.  Therapeutic INR for goal of 2-3    PLAN:   Orders written by provider at facility  New Dose: No Change    Next INR: discontinue INR for this coming Friday and change to 2/14/2020      Electronically signed by:  DINH Silva CNP       "

## 2020-02-03 NOTE — LETTER
"    2/3/2020        RE: Melvin Abad  [address]  [facility] [address]        [place] [facility]  [place] Medical Record Number:  [id number]  Place of Service where encounter took place: [facility] [[id number]]    HPI:    Melvin Abad is a 89 year old  (4/24/1928), who is being seen today for an episodic care visit at the above location.   HPI information obtained from: facility chart records, patient report and [facility] chart review. Today's concern is INR/Coumadin management for A. Fib    ROS/Subjective:  Bleeding Signs/Symptoms:  None  Thromboembolic Signs/Symptoms:  None  Medication Changes:  Yes: started on Tamiflu prophylactic    Dietary Changes:  No  Activity Changes: Yes: attending therapies  Bacterial/Viral Infection:  No  Missed Coumadin Doses:  None  On ASA: No  Other Concerns:  No    OBJECTIVE:  /78   Pulse 72   Temp 96.2  F (35.7  C)   Resp 18   Ht 1.778 m (5' 10\")   Wt 90 kg (198 lb 8 oz)   SpO2 97%   BMI 28.48 kg/m       Lab Results   Component Value Date    INR 2.24 02/03/2020    INR 2.19 01/31/2020       Current Dose:  Coumadin 4mg daily  Written on PO sheets    ASSESSMENT:  1. Longstanding persistent atrial fibrillation    2. Long term current use of anticoagulant therapy    3. Encounter for therapeutic drug monitoring      No acute bleeding.  No signs of the flu as now on prophylaxis dose.  Therapeutic INR for goal of 2-3    PLAN:   Orders written by provider at facility  New Dose: No Change    Next INR: discontinue INR for this coming Friday and change to 2/14/2020      Electronically signed by:  DINH Silva CNP           Sincerely,        DINH Silva CNP    "

## 2020-02-05 NOTE — PROGRESS NOTES
[place] [facility]  [place] Medical Record Number:  [id number]  Place of Service where encounter took place:  [facility] [[id number]]  Chief Complaint   Patient presents with     RECHECK       HPI:    Melvin Abad  is a 89 year old (4/24/1928), who is being seen today for an episodic care visit.  HPI information obtained from: facility chart records, facility staff, patient report and [facility] chart review. Today's concern is:  Hx of right pontine stroke (H)  Hemiparesis affecting left side as late effect of cerebrovascular accident (CVA) (H)  Fall on 12/21/19 after weakness and imaging showing right pontine stroke  Echo with normal EF of 60-65% with no shunt and proximal aorta mildly dilated at 4.3 cm.    Total Chol 135 with LDL 60 and Triglycerides of 76  Tele with A Fib, rate controlled.   INR 3.0 on admission  Participated in inpatient rehab --> then outpatient rehab at TCU (current)                         Deficits include dysphagia and left-sided hemiparesis.  Remains on simvastatin and Coumadin for ppx.     Neuro exam today with left sided weakness (+3-4 left, +5 right), some LUE pronator drift but likely more related to weakness, possible very mild left facial droop.   F/u with neurology scheduled for 2/12/20 with Dr Peña, [place]     Oropharyngeal dysphagia  2/2 above, has been working with SLP  On regular diet with thin liquids    Longstanding persistent atrial fibrillation  Anticoagulation goal of INR 2 to 3  On Coumadin for a fib (latest dose is 4 mg daily)  Lab Results   Component Value Date    INR 2.24 02/03/2020    INR 2.19 01/31/2020     Pending INR recheck on 2/14/20    Rate controlled with Toprol XL 50 mg daily  HRs 66-87, irregular today but rate controlled     Left shoulder pain, unspecified chronicity  Patient endorses left shoulder pain; is unclear whether this just started post CVA or whether this was chronic, but endorses that it is  worse when dependent.   12/26/19 shoulder xray noting:  Glenohumeral joint alignment is normal. Osteophytes at the inferior joint. Fracture line not identified. Small osteophytes at the acromioclavicular joint.  No visible pleural fluid or gas. Calcified nodule in the lateral inferior left lung.    Analgesia currently with Tylenol PRN    Type 2 diabetes mellitus without complication, without long-term current use of insulin (H)  Lab Results   Component Value Date    A1C 6.1 04/10/2019    A1C 6.3 02/07/2017    A1C 6.3 01/19/2016    A1C 6.5 07/15/2015    A1C 6.2 05/30/2014      Diet controlled, on no meds  Patient is on statin and ACEI; no ASA d/t Coumadin    Essential hypertension  Hyperlipidemia LDL goal <100  On lisinopril 10 mg daily, Toprol XL 50 mg daily, simvastatin 40 mg q HS  BPs mostly 120-140s/60-80s  HRs 66-87  Patient denies CP, HA, lightheadedness     Carpal tunnel syndrome of right wrist  Ulnar neuropathy of right upper extremity  Complication while in-patient of right hand numbness and tingling.  EMG revealed carpal tunnel syndrome and ulnar neuropathy  Was given brace and Gabapentin started  Gabapentin increased in TCU, now at 200 gm BID    Patient reports right hand numbness still there but improving with increased gabapentin and brace.     Sleep disturbances  Started on melatonin 3 mg q HS    Past Medical and Surgical History reviewed in Epic today.    MEDICATIONS:  Current Outpatient Medications   Medication Sig Dispense Refill     acetaminophen (TYLENOL) 325 MG tablet Take 325-650 mg by mouth every 4 hours as needed for mild pain       Carboxymethylcellulose Sodium (THERATEARS) 0.25 % SOLN Apply 1 drop to eye 2 times daily       cholecalciferol (VITAMIN D3) 1000 units (25 mcg) capsule Take 1 capsule by mouth daily       gabapentin (NEURONTIN) 100 MG capsule Take 200 mg by mouth 2 times daily        lisinopril (PRINIVIL/ZESTRIL) 10 MG tablet Take 1 tablet (10 mg) by mouth daily 90 tablet 3      "melatonin 3 MG tablet Take 3 mg by mouth At Bedtime        metoprolol succinate ER (TOPROL-XL) 50 MG 24 hr tablet Take 1 tablet (50 mg) by mouth daily 90 tablet 3     nystatin (MYCOSTATIN) 240495 UNIT/GM external powder Apply topically 2 times daily       oseltamivir (TAMIFLU) 75 MG capsule Take 75 mg by mouth daily       polyethylene glycol (MIRALAX) powder Take 17 g by mouth daily 510 g 1     senna-docusate (SENOKOT-S/PERICOLACE) 8.6-50 MG tablet Take 2 tablets by mouth daily       simvastatin (ZOCOR) 40 MG tablet TAKE 1 TABLET BY MOUTH EVERY NIGHT AT BEDTIME 90 tablet 3     warfarin (COUMADIN) 5 MG tablet TAKE ONE-HALF TABLET ON MONDAYS AND FRIDAYS AND ONE TABLET ON ALL THE OTHER DAYS OF THE WEEK OR AS DIRECTED BY COUMADIN CLINIC 80 tablet 3     blood glucose (NO BRAND SPECIFIED) test strip Test daily 100 each 1     order for DME Equipment being ordered: bedside commode 1 Device 0     order for DME Equipment being ordered: 4 legged walker 1 Device 0     REVIEW OF SYSTEMS:  10 point ROS of systems including Constitutional, Eyes, Respiratory, Cardiovascular, Gastroenterology, Genitourinary, Integumentary, Musculoskeletal, Psychiatric were all negative except for pertinent positives noted in my HPI.    Objective:  /77   Pulse 71   Temp 95.7  F (35.4  C)   Resp 18   Ht 1.778 m (5' 10\")   Wt 92.3 kg (203 lb 8 oz)   SpO2 96%   BMI 29.20 kg/m    Exam:  GENERAL APPEARANCE:  Alert, in no distress  RESP:  respiratory effort and palpation of chest normal, auscultation of lungs clear , no respiratory distress  CV:  Palpation and auscultation of heart done , rate and rhythm irregular, rate controlled, no murmur, no LE peripheral edema  ABDOMEN:  normal bowel sounds, soft, nontender, no hepatosplenomegaly or other masses  M/S:   Gait and station in W/c today for mobility but ambulating with therapy >300 ft, Digits and nails with arthritic changes, reduced  muscle mass, +3-4 left-sided strength, +5 strengths on " right.   SKIN:  Inspection and Palpation of skin and subcutaneous tissue pale, dry, some mild bruising.   PSYCH:  insight and judgement, memory seemingly intact, affect and mood normal, follows commands readily       Labs:   CBC RESULTS:   Recent Labs   Lab Test 01/24/20  0730 12/20/19  0903   WBC 9.1 7.5   RBC 3.47* 4.74   HGB 10.6* 15.5   HCT 33.5* 46.8   MCV 97 99   MCH 30.5 32.7   MCHC 31.6 33.1   RDW 13.2 13.1    164       Last Basic Metabolic Panel:  Recent Labs   Lab Test 01/24/20  0730 12/20/19  0903    139   POTASSIUM 3.7 3.8   CHLORIDE 106 107   DARIUS 9.0 8.7   CO2 32 29   BUN 12 16   CR 0.74 0.91   GLC 89 160*       Liver Function Studies -   Recent Labs   Lab Test 12/20/19  0903 12/18/19  0934   PROTTOTAL 7.0 6.8   ALBUMIN 3.7 3.5   BILITOTAL 1.4* 1.4*   ALKPHOS 68 69   AST 16 15   ALT 19 18       TSH   Date Value Ref Range Status   04/10/2019 1.72 0.40 - 4.00 mU/L Final   07/15/2015 1.56 0.40 - 4.00 mU/L Final   ]    Lab Results   Component Value Date    A1C 6.1 04/10/2019    A1C 6.3 02/07/2017             ASSESSMENT/PLAN:  Hx of right pontine stroke (H)  Hemiparesis affecting left side as late effect of cerebrovascular accident (CVA) (H)  Exam stable today  Improving with therapy  Patient's goal is to return to previous living situation with full function.  We spoke about good goal and progress but may have some deficit  On statin and Coumadin for ppx.     Oropharyngeal dysphagia  SLP following and improved to regular diet with thins.     Longstanding persistent atrial fibrillation  Anticoagulation goal of INR 2 to 3  Next INR pending for 2/14/20  Goal INR 2-3  Coumadin for anticoagulation  Toprol XL for rate control     Type 2 diabetes mellitus without complication, without long-term current use of insulin (H)  Goal: HgbA1C between 8-9%. Per AGS there is potential harm in lowering the A1C to <6.5% in older adults with diabetes.   Stable with diet-controlled     Essential  hypertension  Hyperlipidemia LDL goal <100  BP goals are  <140/90 mm Hg.This is higher than ACC and AHA recommendations due to risk for hypotension, risk of dizziness and falls and risk of tissue/cerebral hypoperfusion. Patient is stable with current plan of care and routine assessment.      Carpal tunnel syndrome of right wrist  Ulnar neuropathy of right upper extremity  Sleep disturbances  Improved with brace and gabapentin increase.  Monitor for lethargy on gabapentin  Sleep stable with melatonin.     Left shoulder pain, unspecified chronicity  Offered steroid injection - patient declined at this time but will think about it. Can return and administer if patient desires.   Spoke with Physical Therapy about possible TENS unit for shoulder - Physical Therapy, Occupational Therapy will examine for appropriateness.   Continue Tylenol PRN        Total time spent with patient visit at the skilled nursing facility was 25 min including patient visit, review of past records and coordination of care with SLP, therapy, nursing. Greater than 50% of total time spent with counseling and coordinating care due to coordination of care with IDT; patient visit with discussion about left shoulder pain and possible interventions, discharge goal setting, diagnoses listed above and management of these, neuro exam and reason for monitoring, a fib and Coumadin and Toprol as ppx.  Electronically signed by:  DINH Sánchez CNP

## 2020-02-06 ENCOUNTER — NURSING HOME VISIT (OUTPATIENT)
Dept: GERIATRICS | Facility: CLINIC | Age: 85
End: 2020-02-06
Payer: COMMERCIAL

## 2020-02-06 VITALS
HEIGHT: 70 IN | BODY MASS INDEX: 29.13 KG/M2 | RESPIRATION RATE: 18 BRPM | HEART RATE: 71 BPM | TEMPERATURE: 95.7 F | WEIGHT: 203.5 LBS | DIASTOLIC BLOOD PRESSURE: 77 MMHG | SYSTOLIC BLOOD PRESSURE: 106 MMHG | OXYGEN SATURATION: 96 %

## 2020-02-06 DIAGNOSIS — I69.354 HEMIPARESIS AFFECTING LEFT SIDE AS LATE EFFECT OF CEREBROVASCULAR ACCIDENT (CVA) (H): ICD-10-CM

## 2020-02-06 DIAGNOSIS — G56.01 CARPAL TUNNEL SYNDROME OF RIGHT WRIST: ICD-10-CM

## 2020-02-06 DIAGNOSIS — M25.512 LEFT SHOULDER PAIN, UNSPECIFIED CHRONICITY: ICD-10-CM

## 2020-02-06 DIAGNOSIS — R13.12 OROPHARYNGEAL DYSPHAGIA: ICD-10-CM

## 2020-02-06 DIAGNOSIS — E78.5 HYPERLIPIDEMIA LDL GOAL <100: ICD-10-CM

## 2020-02-06 DIAGNOSIS — I48.11 LONGSTANDING PERSISTENT ATRIAL FIBRILLATION (H): ICD-10-CM

## 2020-02-06 DIAGNOSIS — I63.50 RIGHT PONTINE STROKE (H): Primary | ICD-10-CM

## 2020-02-06 DIAGNOSIS — Z79.01 ANTICOAGULATION GOAL OF INR 2 TO 3: ICD-10-CM

## 2020-02-06 DIAGNOSIS — E11.9 TYPE 2 DIABETES MELLITUS WITHOUT COMPLICATION, WITHOUT LONG-TERM CURRENT USE OF INSULIN (H): ICD-10-CM

## 2020-02-06 DIAGNOSIS — I10 ESSENTIAL HYPERTENSION: ICD-10-CM

## 2020-02-06 DIAGNOSIS — Z51.81 ANTICOAGULATION GOAL OF INR 2 TO 3: ICD-10-CM

## 2020-02-06 DIAGNOSIS — G47.9 SLEEP DISTURBANCES: ICD-10-CM

## 2020-02-06 DIAGNOSIS — G56.21 ULNAR NEUROPATHY OF RIGHT UPPER EXTREMITY: ICD-10-CM

## 2020-02-06 PROCEDURE — 99309 SBSQ NF CARE MODERATE MDM 30: CPT | Performed by: NURSE PRACTITIONER

## 2020-02-06 RX ORDER — OSELTAMIVIR PHOSPHATE 75 MG/1
75 CAPSULE ORAL DAILY
COMMUNITY
Start: 2020-02-01 | End: 2020-02-21

## 2020-02-06 RX ORDER — NYSTATIN 100000 [USP'U]/G
POWDER TOPICAL 2 TIMES DAILY
COMMUNITY
End: 2020-08-10

## 2020-02-06 ASSESSMENT — MIFFLIN-ST. JEOR: SCORE: 1584.32

## 2020-02-06 NOTE — LETTER
2/6/2020        RE: Melvin Abad  [address]  [facility] [address]        [place] [facility]  [place] Medical Record Number:  [id number]  Place of Service where encounter took place:  [facility] [place] ([facility]) [[id number]]  Chief Complaint   Patient presents with     RECHECK       HPI:    Melvin Abad  is a 89 year old (4/24/1928), who is being seen today for an episodic care visit.  HPI information obtained from: facility chart records, facility staff, patient report and [facility] chart review. Today's concern is:  Hx of right pontine stroke (H)  Hemiparesis affecting left side as late effect of cerebrovascular accident (CVA) (H)  Fall on 12/21/19 after weakness and imaging showing right pontine stroke  Echo with normal EF of 60-65% with no shunt and proximal aorta mildly dilated at 4.3 cm.    Total Chol 135 with LDL 60 and Triglycerides of 76  Tele with A Fib, rate controlled.   INR 3.0 on admission  Participated in inpatient rehab --> then outpatient rehab at TCU (current)                         Deficits include dysphagia and left-sided hemiparesis.  Remains on simvastatin and Coumadin for ppx.     Neuro exam today with left sided weakness (+3-4 left, +5 right), some LUE pronator drift but likely more related to weakness, possible very mild left facial droop.   F/u with neurology scheduled for 2/12/20 with Dr Peña, [place]     Oropharyngeal dysphagia  2/2 above, has been working with SLP  On regular diet with thin liquids    Longstanding persistent atrial fibrillation  Anticoagulation goal of INR 2 to 3  On Coumadin for a fib (latest dose is 4 mg daily)  Lab Results   Component Value Date    INR 2.24 02/03/2020    INR 2.19 01/31/2020     Pending INR recheck on 2/14/20    Rate controlled with Toprol XL 50 mg daily  HRs 66-87, irregular today but rate controlled     Left shoulder pain, unspecified chronicity  Patient endorses left shoulder pain; is unclear  whether this just started post CVA or whether this was chronic, but endorses that it is worse when dependent.   12/26/19 shoulder xray noting:  Glenohumeral joint alignment is normal. Osteophytes at the inferior joint. Fracture line not identified. Small osteophytes at the acromioclavicular joint.  No visible pleural fluid or gas. Calcified nodule in the lateral inferior left lung.    Analgesia currently with Tylenol PRN    Type 2 diabetes mellitus without complication, without long-term current use of insulin (H)  Lab Results   Component Value Date    A1C 6.1 04/10/2019    A1C 6.3 02/07/2017    A1C 6.3 01/19/2016    A1C 6.5 07/15/2015    A1C 6.2 05/30/2014      Diet controlled, on no meds  Patient is on statin and ACEI; no ASA d/t Coumadin    Essential hypertension  Hyperlipidemia LDL goal <100  On lisinopril 10 mg daily, Toprol XL 50 mg daily, simvastatin 40 mg q HS  BPs mostly 120-140s/60-80s  HRs 66-87  Patient denies CP, HA, lightheadedness     Carpal tunnel syndrome of right wrist  Ulnar neuropathy of right upper extremity  Complication while in-patient of right hand numbness and tingling.  EMG revealed carpal tunnel syndrome and ulnar neuropathy  Was given brace and Gabapentin started  Gabapentin increased in TCU, now at 200 gm BID    Patient reports right hand numbness still there but improving with increased gabapentin and brace.     Sleep disturbances  Started on melatonin 3 mg q HS    Past Medical and Surgical History reviewed in Epic today.    MEDICATIONS:  Current Outpatient Medications   Medication Sig Dispense Refill     acetaminophen (TYLENOL) 325 MG tablet Take 325-650 mg by mouth every 4 hours as needed for mild pain       Carboxymethylcellulose Sodium (THERATEARS) 0.25 % SOLN Apply 1 drop to eye 2 times daily       cholecalciferol (VITAMIN D3) 1000 units (25 mcg) capsule Take 1 capsule by mouth daily       gabapentin (NEURONTIN) 100 MG capsule Take 200 mg by mouth 2 times daily        lisinopril  "(PRINIVIL/ZESTRIL) 10 MG tablet Take 1 tablet (10 mg) by mouth daily 90 tablet 3     melatonin 3 MG tablet Take 3 mg by mouth At Bedtime        metoprolol succinate ER (TOPROL-XL) 50 MG 24 hr tablet Take 1 tablet (50 mg) by mouth daily 90 tablet 3     nystatin (MYCOSTATIN) 343616 UNIT/GM external powder Apply topically 2 times daily       oseltamivir (TAMIFLU) 75 MG capsule Take 75 mg by mouth daily       polyethylene glycol (MIRALAX) powder Take 17 g by mouth daily 510 g 1     senna-docusate (SENOKOT-S/PERICOLACE) 8.6-50 MG tablet Take 2 tablets by mouth daily       simvastatin (ZOCOR) 40 MG tablet TAKE 1 TABLET BY MOUTH EVERY NIGHT AT BEDTIME 90 tablet 3     warfarin (COUMADIN) 5 MG tablet TAKE ONE-HALF TABLET ON MONDAYS AND FRIDAYS AND ONE TABLET ON ALL THE OTHER DAYS OF THE WEEK OR AS DIRECTED BY COUMADIN CLINIC 80 tablet 3     blood glucose (NO BRAND SPECIFIED) test strip Test daily 100 each 1     order for DME Equipment being ordered: bedside commode 1 Device 0     order for DME Equipment being ordered: 4 legged walker 1 Device 0     REVIEW OF SYSTEMS:  10 point ROS of systems including Constitutional, Eyes, Respiratory, Cardiovascular, Gastroenterology, Genitourinary, Integumentary, Musculoskeletal, Psychiatric were all negative except for pertinent positives noted in my HPI.    Objective:  /77   Pulse 71   Temp 95.7  F (35.4  C)   Resp 18   Ht 1.778 m (5' 10\")   Wt 92.3 kg (203 lb 8 oz)   SpO2 96%   BMI 29.20 kg/m     Exam:  GENERAL APPEARANCE:  Alert, in no distress  RESP:  respiratory effort and palpation of chest normal, auscultation of lungs clear , no respiratory distress  CV:  Palpation and auscultation of heart done , rate and rhythm irregular, rate controlled, no murmur, no LE peripheral edema  ABDOMEN:  normal bowel sounds, soft, nontender, no hepatosplenomegaly or other masses  M/S:   Gait and station in W/c today for mobility but ambulating with therapy >300 ft, Digits and nails with " arthritic changes, reduced  muscle mass, +3-4 left-sided strength, +5 strengths on right.   SKIN:  Inspection and Palpation of skin and subcutaneous tissue pale, dry, some mild bruising.   PSYCH:  insight and judgement, memory seemingly intact, affect and mood normal, follows commands readily       Labs:   CBC RESULTS:   Recent Labs   Lab Test 01/24/20  0730 12/20/19  0903   WBC 9.1 7.5   RBC 3.47* 4.74   HGB 10.6* 15.5   HCT 33.5* 46.8   MCV 97 99   MCH 30.5 32.7   MCHC 31.6 33.1   RDW 13.2 13.1    164       Last Basic Metabolic Panel:  Recent Labs   Lab Test 01/24/20  0730 12/20/19  0903    139   POTASSIUM 3.7 3.8   CHLORIDE 106 107   DARIUS 9.0 8.7   CO2 32 29   BUN 12 16   CR 0.74 0.91   GLC 89 160*       Liver Function Studies -   Recent Labs   Lab Test 12/20/19  0903 12/18/19  0934   PROTTOTAL 7.0 6.8   ALBUMIN 3.7 3.5   BILITOTAL 1.4* 1.4*   ALKPHOS 68 69   AST 16 15   ALT 19 18       TSH   Date Value Ref Range Status   04/10/2019 1.72 0.40 - 4.00 mU/L Final   07/15/2015 1.56 0.40 - 4.00 mU/L Final   ]    Lab Results   Component Value Date    A1C 6.1 04/10/2019    A1C 6.3 02/07/2017             ASSESSMENT/PLAN:  Hx of right pontine stroke (H)  Hemiparesis affecting left side as late effect of cerebrovascular accident (CVA) (H)  Exam stable today  Improving with therapy  Patient's goal is to return to previous living situation with full function.  We spoke about good goal and progress but may have some deficit  On statin and Coumadin for ppx.     Oropharyngeal dysphagia  SLP following and improved to regular diet with thins.     Longstanding persistent atrial fibrillation  Anticoagulation goal of INR 2 to 3  Next INR pending for 2/14/20  Goal INR 2-3  Coumadin for anticoagulation  Toprol XL for rate control     Type 2 diabetes mellitus without complication, without long-term current use of insulin (H)  Goal: HgbA1C between 8-9%. Per AGS there is potential harm in lowering the A1C to <6.5% in older  adults with diabetes.   Stable with diet-controlled     Essential hypertension  Hyperlipidemia LDL goal <100  BP goals are  <140/90 mm Hg.This is higher than ACC and AHA recommendations due to risk for hypotension, risk of dizziness and falls and risk of tissue/cerebral hypoperfusion. Patient is stable with current plan of care and routine assessment.      Carpal tunnel syndrome of right wrist  Ulnar neuropathy of right upper extremity  Sleep disturbances  Improved with brace and gabapentin increase.  Monitor for lethargy on gabapentin  Sleep stable with melatonin.     Left shoulder pain, unspecified chronicity  Offered steroid injection - patient declined at this time but will think about it. Can return and administer if patient desires.   Spoke with Physical Therapy about possible TENS unit for shoulder - Physical Therapy, Occupational Therapy will examine for appropriateness.   Continue Tylenol PRN        Total time spent with patient visit at the [facility] nursing facility was 25 min including patient visit, review of past records and coordination of care with SLP, therapy, nursing. Greater than 50% of total time spent with counseling and coordinating care due to coordination of care with IDT; patient visit with discussion about left shoulder pain and possible interventions, discharge goal setting, diagnoses listed above and management of these, neuro exam and reason for monitoring, a fib and Coumadin and Toprol as ppx.  Electronically signed by:  DINH Sánchez CNP               Sincerely,        DINH Sánchez CNP

## 2020-02-13 NOTE — PROGRESS NOTES
[place] [facility]  [place] Medical Record Number:  [id number]  [facility] [[id number]]    HPI:    Melvin Abad is a 89 year old  (4/24/1928), who is being seen today for an episodic care visit at the above location.   HPI information obtained from: facility chart records, patient report and [place] Epic chart review. Today's concern is INR/Coumadin management for A. Fib    ROS/Subjective:  Bleeding Signs/Symptoms:  None  Thromboembolic Signs/Symptoms:  None  Medication Changes:  No  Dietary Changes:  No  Activity Changes: No  Bacterial/Viral Infection:  No  Missed Coumadin Doses:  None  On ASA: No  Other Concerns:  No    OBJECTIVE:  BP (!) 140/81   Pulse 65   Temp 95.6  F (35.3  C)   Resp 20   Wt 93.7 kg (206 lb 8 oz)   SpO2 96%   BMI 29.63 kg/m    Last INR: 2.24 on 2/3/20  INR Today:  2.16  Current Dose:  Coumadin 4mg daily  written on PO sheets    ASSESSMENT:  1. Longstanding persistent atrial fibrillation    2. Long term current use of anticoagulant therapy    3. Encounter for therapeutic drug monitoring      No acute complaints of heart palpitations.  Working in therapy and tolerating well  Therapeutic INR for goal of 2-3    PLAN:   Orders written by provider at facility  New Dose: coumadin 4mg daily    Next INR: 2/24/2020    Electronically signed by:  DINH Silva CNP

## 2020-02-14 ENCOUNTER — NURSING HOME VISIT (OUTPATIENT)
Dept: GERIATRICS | Facility: CLINIC | Age: 85
End: 2020-02-14
Payer: COMMERCIAL

## 2020-02-14 ENCOUNTER — HOSPITAL LABORATORY (OUTPATIENT)
Dept: NURSING HOME | Facility: OTHER | Age: 85
End: 2020-02-14

## 2020-02-14 VITALS
WEIGHT: 206.5 LBS | TEMPERATURE: 95.6 F | SYSTOLIC BLOOD PRESSURE: 140 MMHG | OXYGEN SATURATION: 96 % | HEART RATE: 65 BPM | BODY MASS INDEX: 29.63 KG/M2 | RESPIRATION RATE: 20 BRPM | DIASTOLIC BLOOD PRESSURE: 81 MMHG

## 2020-02-14 DIAGNOSIS — Z51.81 ENCOUNTER FOR THERAPEUTIC DRUG MONITORING: ICD-10-CM

## 2020-02-14 DIAGNOSIS — Z79.01 LONG TERM CURRENT USE OF ANTICOAGULANT THERAPY: ICD-10-CM

## 2020-02-14 DIAGNOSIS — I48.11 LONGSTANDING PERSISTENT ATRIAL FIBRILLATION (H): Primary | ICD-10-CM

## 2020-02-14 LAB — INR PPP: 2.16 (ref 0.86–1.14)

## 2020-02-14 PROCEDURE — 99308 SBSQ NF CARE LOW MDM 20: CPT | Performed by: NURSE PRACTITIONER

## 2020-02-14 NOTE — LETTER
2/14/2020        RE: Melvin Abad  [address]  [facility] [address]        [place] [facility]  [place] Medical Record Number:  [id number]  Place of Service where encounter took place: [facility] [[id number]]    HPI:    Melvin Abad is a 89 year old  (4/24/1928), who is being seen today for an episodic care visit at the above location.   HPI information obtained from: facility chart records, patient report and [facility] chart review. Today's concern is INR/Coumadin management for A. Fib    ROS/Subjective:  Bleeding Signs/Symptoms:  None  Thromboembolic Signs/Symptoms:  None  Medication Changes:  No  Dietary Changes:  No  Activity Changes: No  Bacterial/Viral Infection:  No  Missed Coumadin Doses:  None  On ASA: No  Other Concerns:  No    OBJECTIVE:  BP (!) 140/81   Pulse 65   Temp 95.6  F (35.3  C)   Resp 20   Wt 93.7 kg (206 lb 8 oz)   SpO2 96%   BMI 29.63 kg/m     Last INR: 2.24 on 2/3/20  INR Today:  2.16  Current Dose:  Coumadin 4mg daily  written on PO sheets    ASSESSMENT:  1. Longstanding persistent atrial fibrillation    2. Long term current use of anticoagulant therapy    3. Encounter for therapeutic drug monitoring      No acute complaints of heart palpitations.  Working in therapy and tolerating well  Therapeutic INR for goal of 2-3    PLAN:   Orders written by provider at facility  New Dose: coumadin 4mg daily    Next INR: 2/24/2020    Electronically signed by:  DINH iSlva CNP           Sincerely,        DINH Silva CNP

## 2020-02-20 ENCOUNTER — NURSING HOME VISIT (OUTPATIENT)
Dept: GERIATRICS | Facility: CLINIC | Age: 85
End: 2020-02-20
Payer: COMMERCIAL

## 2020-02-20 VITALS
TEMPERATURE: 96.1 F | OXYGEN SATURATION: 96 % | HEART RATE: 59 BPM | WEIGHT: 203.5 LBS | SYSTOLIC BLOOD PRESSURE: 115 MMHG | HEIGHT: 70 IN | DIASTOLIC BLOOD PRESSURE: 62 MMHG | BODY MASS INDEX: 29.13 KG/M2 | RESPIRATION RATE: 16 BRPM

## 2020-02-20 DIAGNOSIS — G25.81 RESTLESS LEGS SYNDROME (RLS): ICD-10-CM

## 2020-02-20 DIAGNOSIS — K59.01 SLOW TRANSIT CONSTIPATION: ICD-10-CM

## 2020-02-20 DIAGNOSIS — I69.354 HEMIPARESIS AFFECTING LEFT SIDE AS LATE EFFECT OF CEREBROVASCULAR ACCIDENT (CVA) (H): ICD-10-CM

## 2020-02-20 DIAGNOSIS — D62 ANEMIA DUE TO BLOOD LOSS, ACUTE: ICD-10-CM

## 2020-02-20 DIAGNOSIS — G47.9 SLEEP DISTURBANCES: ICD-10-CM

## 2020-02-20 DIAGNOSIS — I63.50 RIGHT PONTINE STROKE (H): ICD-10-CM

## 2020-02-20 DIAGNOSIS — I10 ESSENTIAL HYPERTENSION: Primary | ICD-10-CM

## 2020-02-20 PROCEDURE — 99309 SBSQ NF CARE MODERATE MDM 30: CPT | Performed by: NURSE PRACTITIONER

## 2020-02-20 ASSESSMENT — MIFFLIN-ST. JEOR: SCORE: 1584.32

## 2020-02-20 NOTE — LETTER
2/20/2020        RE: Melvin Abad  [address]  [facility] [address]        [place] [facility]  Chief Complaint   Patient presents with     detention Regulatory     [place] Medical Record Number:  [id number]  Place of Service where encounter took place:  [facility] [place] (S) [[id number]]    HPI:    Melvin Abad  is 89 year old (4/24/1928), who is being seen today for a federally mandated E/M visit.  HPI information obtained from: facility chart records, facility staff, patient report and [facility] chart review. Today's concerns are:    1. Essential hypertension    2. Hx of right pontine stroke (H)    3. Hemiparesis affecting left side as late effect of cerebrovascular accident (CVA) (H)    4. Anemia due to blood loss, acute    5. Restless legs syndrome (RLS)    6. Sleep disturbances    7. Slow transit constipation      Came to see Ed today as he is due for a 60 day visit.  Ed is here for rehab after suffering a right pontine stroke with left side hemiparesis.  Today he states that he is getting some use back in the left arm and able to show he can lift the elbow up to about shoulder height.  Uses a w/c to propel self but has been able to ambulate with therapies and staff on the floor.   Today he is being visited by one of his friends who he introduces as his girlfriend.  He is alert and pleasant.  No new real complaints.      ALLERGIES:Patient has no known allergies.  PAST MEDICAL HISTORY:   has a past medical history of Actinic keratosis, Atrial fibrillation (H), Basal cell carcinoma nos (06/10), Cardiac dysrhythmia, unspecified, Cough, Diabetic eye exam (H) (8/29/14), Generalized osteoarthrosis, unspecified site, Other diseases of lung, not elsewhere classified, Pure hypercholesterolemia, and Unspecified essential hypertension.  PAST SURGICAL HISTORY:   has a past surgical history that includes TOTAL KNEE ARTHROPLASTY (1997); Colonoscopy w/wo [place]  **Performed** (10/19/2001); Colonoscopy w/wo Brush **Performed** (11/02/2004); colonoscopy (05/02/2007); HEMORRHOIDOPEXY BY STAPLING (09/26/08); and Colonoscopy (11/10/2010).  FAMILY HISTORY: family history includes Cancer in his mother; Prostate Cancer in his father.  SOCIAL HISTORY:  reports that he has quit smoking. His smoking use included cigarettes. He has never used smokeless tobacco. He reports that he does not drink alcohol or use drugs.    MEDICATIONS:  Current Outpatient Medications   Medication Sig Dispense Refill     acetaminophen (TYLENOL) 325 MG tablet Take 325-650 mg by mouth every 4 hours as needed for mild pain       Carboxymethylcellulose Sodium (THERATEARS) 0.25 % SOLN Apply 1 drop to eye 2 times daily       cholecalciferol (VITAMIN D3) 1000 units (25 mcg) capsule Take 1 capsule by mouth daily       gabapentin (NEURONTIN) 100 MG capsule Take 200 mg by mouth 2 times daily        lisinopril (PRINIVIL/ZESTRIL) 10 MG tablet Take 1 tablet (10 mg) by mouth daily 90 tablet 3     melatonin 3 MG tablet Take 3 mg by mouth At Bedtime        metoprolol succinate ER (TOPROL-XL) 50 MG 24 hr tablet Take 1 tablet (50 mg) by mouth daily 90 tablet 3     nystatin (MYCOSTATIN) 495756 UNIT/GM external powder Apply topically 2 times daily       polyethylene glycol (MIRALAX) powder Take 17 g by mouth daily 510 g 1     senna-docusate (SENOKOT-S/PERICOLACE) 8.6-50 MG tablet Take 2 tablets by mouth daily       simvastatin (ZOCOR) 40 MG tablet TAKE 1 TABLET BY MOUTH EVERY NIGHT AT BEDTIME 90 tablet 3     warfarin (COUMADIN) 5 MG tablet TAKE ONE-HALF TABLET ON MONDAYS AND FRIDAYS AND ONE TABLET ON ALL THE OTHER DAYS OF THE WEEK OR AS DIRECTED BY COUMADIN CLINIC 80 tablet 3     blood glucose (NO BRAND SPECIFIED) test strip Test daily 100 each 1     order for DME Equipment being ordered: bedside commode 1 Device 0     order for DME Equipment being ordered: 4 legged walker 1 Device 0         Case Management:  I have reviewed  "the care plan and MDS and do agree with the plan. Patient's desire to return to the community is present, but is not able due to care needs . Information reviewed:  Medications, vital signs, orders, and nursing notes.    ROS:  4 point ROS including Respiratory, CV, GI and , other than that noted in the HPI,  is negative    Vitals:  /62   Pulse 59   Temp 96.1  F (35.6  C)   Resp 16   Ht 1.778 m (5' 10\")   Wt 92.3 kg (203 lb 8 oz)   SpO2 96%   BMI 29.20 kg/m     Body mass index is 29.2 kg/m .  Exam:  GENERAL APPEARANCE:  Alert, in no distress, appears healthy, oriented, cooperative  EYES:  EOM, conjunctivae, lids, pupils and irises normal, wears corrective lenses  RESP:  respiratory effort and palpation of chest normal, lungs clear to auscultation , no respiratory distress  CV:  Palpation and auscultation of heart done , no edema, heart rate regular/irregular  ABDOMEN:  normal bowel sounds, soft, nontender, no hepatosplenomegaly or other masses, no guarding or rebound  M/S:   Gait and station abnormal assist from staff to ambulate with walker with w/c behind, able to propel w/c with his feet and right arm.  SKIN:  pink, warm and dry.  no open areas  PSYCH:  oriented X 3, normal insight, judgement and memory, affect and mood normal    Lab/Diagnostic data:   Component      Latest Ref Rng & Units 2/14/2020   INR      0.86 - 1.14 2.16 (H)     Component      Latest Ref Rng & Units 1/24/2020   Hemoglobin      13.3 - 17.7 g/dL 10.6 (L)     ASSESSMENT/PLAN  Essential hypertension  Vitals daily.  Blood pressures range from 120-130's/60-70's.    Remains on lisinopril 10mg daily and Toprol XL 50mg daily.  No changes.    Hx of right pontine stroke (H)  Hemiparesis affecting left side as late effect of cerebrovascular accident (CVA) (H)  - continues to attend therapies for mobility, exercises and strengthening.    Has an upcoming appointment with Dr. Noel for osteoarthritis left side.  Wears a sling a tthis " time.  Is on coumadin but for Atrial Fib as well.    No new developments.  Hopes to return home when able.    Anemia due to blood loss, acute  Last HgB check was 10.6 for a results.  No supplements.  1.  HgB level next lab day on Friday.    Restless legs syndrome (RLS)  Remains on Gabapentin 200mg at HS.  No complaints.    No changes.    Sleep disturbances  Does have Melatonin 3mg at HS to help with sleep.  Continue with current treatment.      Slow transit constipation  Has a BM recorded daily.  Uses Miralax 17gm daily in fluids  No changes.      Orders written by provider at facility and transcribed by : Alessandra Jaime MA  1.  Hgb level tomorrow.  Dx: anemia, unspecified         Electronically signed by:  DINH Silva CNP              Sincerely,        DINH Silva CNP

## 2020-02-20 NOTE — PROGRESS NOTES
[place] [facility]  Chief Complaint   Patient presents with     [facility] Regulatory     [place] Medical Record Number:  [id number]  Place of Service where encounter took place:  [facility] ([facility]) [[id number]]    HPI:    Melvin Abad  is 89 year old (4/24/1928), who is being seen today for a federally mandated E/M visit.  HPI information obtained from: facility chart records, facility staff, patient report and [facility] chart review. Today's concerns are:    1. Essential hypertension    2. Hx of right pontine stroke (H)    3. Hemiparesis affecting left side as late effect of cerebrovascular accident (CVA) (H)    4. Anemia due to blood loss, acute    5. Restless legs syndrome (RLS)    6. Sleep disturbances    7. Slow transit constipation      Came to see Ed today as he is due for a 60 day visit.  Ed is here for rehab after suffering a right pontine stroke with left side hemiparesis.  Today he states that he is getting some use back in the left arm and able to show he can lift the elbow up to about shoulder height.  Uses a w/c to propel self but has been able to ambulate with therapies and staff on the floor.   Today he is being visited by one of his friends who he introduces as his girlfriend.  He is alert and pleasant.  No new real complaints.      ALLERGIES:Patient has no known allergies.  PAST MEDICAL HISTORY:   has a past medical history of Actinic keratosis, Atrial fibrillation (H), Basal cell carcinoma nos (06/10), Cardiac dysrhythmia, unspecified, Cough, Diabetic eye exam (H) (8/29/14), Generalized osteoarthrosis, unspecified site, Other diseases of lung, not elsewhere classified, Pure hypercholesterolemia, and Unspecified essential hypertension.  PAST SURGICAL HISTORY:   has a past surgical history that includes TOTAL KNEE ARTHROPLASTY (1997); Colonoscopy w/wo [place] **Performed** (10/19/2001); Colonoscopy w/wo Br[place] **Performed** (11/02/2004); colonoscopy  (05/02/2007); HEMORRHOIDOPEXY BY STAPLING (09/26/08); and Colonoscopy (11/10/2010).  FAMILY HISTORY: family history includes Cancer in his mother; Prostate Cancer in his father.  SOCIAL HISTORY:  reports that he has quit smoking. His smoking use included cigarettes. He has never used smokeless tobacco. He reports that he does not drink alcohol or use drugs.    MEDICATIONS:  Current Outpatient Medications   Medication Sig Dispense Refill     acetaminophen (TYLENOL) 325 MG tablet Take 325-650 mg by mouth every 4 hours as needed for mild pain       Carboxymethylcellulose Sodium (THERATEARS) 0.25 % SOLN Apply 1 drop to eye 2 times daily       cholecalciferol (VITAMIN D3) 1000 units (25 mcg) capsule Take 1 capsule by mouth daily       gabapentin (NEURONTIN) 100 MG capsule Take 200 mg by mouth 2 times daily        lisinopril (PRINIVIL/ZESTRIL) 10 MG tablet Take 1 tablet (10 mg) by mouth daily 90 tablet 3     melatonin 3 MG tablet Take 3 mg by mouth At Bedtime        metoprolol succinate ER (TOPROL-XL) 50 MG 24 hr tablet Take 1 tablet (50 mg) by mouth daily 90 tablet 3     nystatin (MYCOSTATIN) 677613 UNIT/GM external powder Apply topically 2 times daily       polyethylene glycol (MIRALAX) powder Take 17 g by mouth daily 510 g 1     senna-docusate (SENOKOT-S/PERICOLACE) 8.6-50 MG tablet Take 2 tablets by mouth daily       simvastatin (ZOCOR) 40 MG tablet TAKE 1 TABLET BY MOUTH EVERY NIGHT AT BEDTIME 90 tablet 3     warfarin (COUMADIN) 5 MG tablet TAKE ONE-HALF TABLET ON MONDAYS AND FRIDAYS AND ONE TABLET ON ALL THE OTHER DAYS OF THE WEEK OR AS DIRECTED BY COUMADIN CLINIC 80 tablet 3     blood glucose (NO BRAND SPECIFIED) test strip Test daily 100 each 1     order for DME Equipment being ordered: bedside commode 1 Device 0     order for DME Equipment being ordered: 4 legged walker 1 Device 0         Case Management:  I have reviewed the care plan and MDS and do agree with the plan. Patient's desire to return to the  "community is present, but is not able due to care needs . Information reviewed:  Medications, vital signs, orders, and nursing notes.    ROS:  4 point ROS including Respiratory, CV, GI and , other than that noted in the HPI,  is negative    Vitals:  /62   Pulse 59   Temp 96.1  F (35.6  C)   Resp 16   Ht 1.778 m (5' 10\")   Wt 92.3 kg (203 lb 8 oz)   SpO2 96%   BMI 29.20 kg/m    Body mass index is 29.2 kg/m .  Exam:  GENERAL APPEARANCE:  Alert, in no distress, appears healthy, oriented, cooperative  EYES:  EOM, conjunctivae, lids, pupils and irises normal, wears corrective lenses  RESP:  respiratory effort and palpation of chest normal, lungs clear to auscultation , no respiratory distress  CV:  Palpation and auscultation of heart done , no edema, heart rate regular/irregular  ABDOMEN:  normal bowel sounds, soft, nontender, no hepatosplenomegaly or other masses, no guarding or rebound  M/S:   Gait and station abnormal assist from staff to ambulate with walker with w/c behind, able to propel w/c with his feet and right arm.  SKIN:  pink, warm and dry.  no open areas  PSYCH:  oriented X 3, normal insight, judgement and memory, affect and mood normal    Lab/Diagnostic data:   Component      Latest Ref Rng & Units 2/14/2020   INR      0.86 - 1.14 2.16 (H)     Component      Latest Ref Rng & Units 1/24/2020   Hemoglobin      13.3 - 17.7 g/dL 10.6 (L)     ASSESSMENT/PLAN  Essential hypertension  Vitals daily.  Blood pressures range from 120-130's/60-70's.    Remains on lisinopril 10mg daily and Toprol XL 50mg daily.  No changes.    Hx of right pontine stroke (H)  Hemiparesis affecting left side as late effect of cerebrovascular accident (CVA) (H)  - continues to attend therapies for mobility, exercises and strengthening.    Has an upcoming appointment with Dr. Noel for osteoarthritis left side.  Wears a sling a tthis time.  Is on coumadin but for Atrial Fib as well.    No new developments.  Hopes to return " home when able.    Anemia due to blood loss, acute  Last HgB check was 10.6 for a results.  No supplements.  1.  HgB level next lab day on Friday.    Restless legs syndrome (RLS)  Remains on Gabapentin 200mg at HS.  No complaints.    No changes.    Sleep disturbances  Does have Melatonin 3mg at HS to help with sleep.  Continue with current treatment.      Slow transit constipation  Has a BM recorded daily.  Uses Miralax 17gm daily in fluids  No changes.      Orders written by provider at facility and transcribed by : Alessandra Jaime MA  1.  Hgb level tomorrow.  Dx: anemia, unspecified         Electronically signed by:  DINH Silva CNP

## 2020-02-21 ENCOUNTER — HOSPITAL LABORATORY (OUTPATIENT)
Dept: NURSING HOME | Facility: OTHER | Age: 85
End: 2020-02-21

## 2020-02-21 ENCOUNTER — NURSING HOME VISIT (OUTPATIENT)
Dept: GERIATRICS | Facility: CLINIC | Age: 85
End: 2020-02-21
Payer: COMMERCIAL

## 2020-02-21 VITALS
DIASTOLIC BLOOD PRESSURE: 62 MMHG | TEMPERATURE: 96.1 F | WEIGHT: 203 LBS | HEIGHT: 70 IN | SYSTOLIC BLOOD PRESSURE: 115 MMHG | OXYGEN SATURATION: 96 % | HEART RATE: 59 BPM | BODY MASS INDEX: 29.06 KG/M2 | RESPIRATION RATE: 16 BRPM

## 2020-02-21 DIAGNOSIS — D62 ANEMIA DUE TO BLOOD LOSS, ACUTE: ICD-10-CM

## 2020-02-21 DIAGNOSIS — L30.9 DERMATITIS: Primary | ICD-10-CM

## 2020-02-21 LAB — HGB BLD-MCNC: 13.6 G/DL (ref 13.3–17.7)

## 2020-02-21 PROCEDURE — 99309 SBSQ NF CARE MODERATE MDM 30: CPT | Performed by: NURSE PRACTITIONER

## 2020-02-21 RX ORDER — BENZOCAINE/MENTHOL 6 MG-10 MG
LOZENGE MUCOUS MEMBRANE 2 TIMES DAILY PRN
COMMUNITY
End: 2022-04-13

## 2020-02-21 ASSESSMENT — MIFFLIN-ST. JEOR: SCORE: 1582.05

## 2020-02-21 NOTE — PROGRESS NOTES
[facility]  [place] Medical Record Number:  [id number]  Place of Service where encounter took place:  [facility] ([facility]) [[id number]]  Chief Complaint   Patient presents with     Nursing Home Acute       HPI:    Melvin Abad  is a 89 year old (4/24/1928), who is being seen today for an episodic care visit.  HPI information obtained from: facility staff, patient report and [facility] chart review. Today's concern is:    1. Dermatitis    2. Anemia due to blood loss, acute      HgB came back today to check to see the level since a month ago the value was between 10-11.  Was going to just let him know all is well, but nursing came to state that Ed complained of itching on his back.  Nursing looked and if anything seen it was along his belt line.    Found Ed moving around in his wheelchair around the unit and so stopped to speak with him about the rash and his HgB level.    Past Medical and Surgical History reviewed in Epic today.    MEDICATIONS:    Current Outpatient Medications   Medication Sig Dispense Refill     acetaminophen (TYLENOL) 325 MG tablet Take 325-650 mg by mouth every 4 hours as needed for mild pain       Carboxymethylcellulose Sodium (THERATEARS) 0.25 % SOLN Apply 1 drop to eye 2 times daily       cholecalciferol (VITAMIN D3) 1000 units (25 mcg) capsule Take 1 capsule by mouth daily       gabapentin (NEURONTIN) 100 MG capsule Take 200 mg by mouth 2 times daily        lisinopril (PRINIVIL/ZESTRIL) 10 MG tablet Take 1 tablet (10 mg) by mouth daily 90 tablet 3     melatonin 3 MG tablet Take 3 mg by mouth At Bedtime        metoprolol succinate ER (TOPROL-XL) 50 MG 24 hr tablet Take 1 tablet (50 mg) by mouth daily 90 tablet 3     nystatin (MYCOSTATIN) 599358 UNIT/GM external powder Apply topically 2 times daily       polyethylene glycol (MIRALAX) powder Take 17 g by mouth daily 510 g 1     senna-docusate (SENOKOT-S/PERICOLACE) 8.6-50 MG tablet Take 2 tablets by  "mouth daily       simvastatin (ZOCOR) 40 MG tablet TAKE 1 TABLET BY MOUTH EVERY NIGHT AT BEDTIME 90 tablet 3     warfarin (COUMADIN) 5 MG tablet TAKE ONE-HALF TABLET ON MONDAYS AND FRIDAYS AND ONE TABLET ON ALL THE OTHER DAYS OF THE WEEK OR AS DIRECTED BY COUMADIN CLINIC 80 tablet 3     blood glucose (NO BRAND SPECIFIED) test strip Test daily 100 each 1     order for DME Equipment being ordered: bedside commode 1 Device 0     order for DME Equipment being ordered: 4 legged walker 1 Device 0       REVIEW OF SYSTEMS:  4 point ROS including Respiratory, CV, GI and , other than that noted in the HPI,  is negative    Objective:  /62   Pulse 59   Temp 96.1  F (35.6  C)   Resp 16   Ht 1.778 m (5' 10\")   Wt 92.1 kg (203 lb)   SpO2 96%   BMI 29.13 kg/m    Exam:  Alert and oriented x3.  Propelling self around with right side of body.    Leaned forward in chair and able to lift up the back of his shirt.  Do not see any real redness on his upper back.  Some redness along belt line.  Itches more than anything.    Heart rate regular/irregular  Lungs are clear.    Labs:   Lab Results   Component Value Date    HGB 13.6 02/21/2020    HGB 10.6 01/24/2020         ASSESSMENT/PLAN:  Dermatitis  Feel that it is dry in the air and prone to itch.    For the itching will order HC 1% cream twice a day prn and he is aware he can ask for it.      Anemia due to blood loss, acute  No supplement to replace iron.  Will not recheck HgB level either unless there is an acute situation.  Situation resolved.      Orders written by provider at facility and transcribed by : Alessandra Jaime MA  1.  HC 1% cream to back BID PRN.  Dx:prurititis       Electronically signed by:  DINH Silva CNP           "

## 2020-02-24 ENCOUNTER — OFFICE VISIT (OUTPATIENT)
Dept: ORTHOPEDICS | Facility: CLINIC | Age: 85
End: 2020-02-24
Payer: COMMERCIAL

## 2020-02-24 ENCOUNTER — ANCILLARY PROCEDURE (OUTPATIENT)
Dept: GENERAL RADIOLOGY | Facility: CLINIC | Age: 85
End: 2020-02-24
Attending: ORTHOPAEDIC SURGERY
Payer: COMMERCIAL

## 2020-02-24 ENCOUNTER — HOSPITAL LABORATORY (OUTPATIENT)
Dept: NURSING HOME | Facility: OTHER | Age: 85
End: 2020-02-24

## 2020-02-24 ENCOUNTER — NURSING HOME VISIT (OUTPATIENT)
Dept: GERIATRICS | Facility: CLINIC | Age: 85
End: 2020-02-24
Payer: COMMERCIAL

## 2020-02-24 VITALS
DIASTOLIC BLOOD PRESSURE: 62 MMHG | SYSTOLIC BLOOD PRESSURE: 112 MMHG | HEIGHT: 70 IN | BODY MASS INDEX: 29.29 KG/M2 | WEIGHT: 204.6 LBS

## 2020-02-24 VITALS
HEART RATE: 71 BPM | OXYGEN SATURATION: 95 % | DIASTOLIC BLOOD PRESSURE: 72 MMHG | BODY MASS INDEX: 29.13 KG/M2 | WEIGHT: 203.5 LBS | TEMPERATURE: 95.1 F | RESPIRATION RATE: 20 BRPM | HEIGHT: 70 IN | SYSTOLIC BLOOD PRESSURE: 133 MMHG

## 2020-02-24 DIAGNOSIS — Z51.81 ENCOUNTER FOR THERAPEUTIC DRUG MONITORING: ICD-10-CM

## 2020-02-24 DIAGNOSIS — Z79.01 LONG TERM CURRENT USE OF ANTICOAGULANT THERAPY: ICD-10-CM

## 2020-02-24 DIAGNOSIS — I48.11 LONGSTANDING PERSISTENT ATRIAL FIBRILLATION (H): Primary | ICD-10-CM

## 2020-02-24 DIAGNOSIS — M25.512 LEFT SHOULDER PAIN: ICD-10-CM

## 2020-02-24 DIAGNOSIS — M19.012 OSTEOARTHRITIS OF GLENOHUMERAL JOINT, LEFT: Primary | ICD-10-CM

## 2020-02-24 LAB — INR PPP: 2.11 (ref 0.86–1.14)

## 2020-02-24 PROCEDURE — 73030 X-RAY EXAM OF SHOULDER: CPT | Mod: TC

## 2020-02-24 PROCEDURE — 99308 SBSQ NF CARE LOW MDM 20: CPT | Performed by: NURSE PRACTITIONER

## 2020-02-24 PROCEDURE — 99203 OFFICE O/P NEW LOW 30 MIN: CPT | Performed by: ORTHOPAEDIC SURGERY

## 2020-02-24 ASSESSMENT — MIFFLIN-ST. JEOR
SCORE: 1589.31
SCORE: 1584.32

## 2020-02-24 NOTE — PROGRESS NOTES
ORTHOPEDIC CONSULT      Chief Complaint: Melvin Abad is a 89 year old male who is being seen for   Chief Complaint   Patient presents with     Shoulder Pain     left shoulder pain     Consult       History of Present Illness:   Presents with his son.  Complains of left anterior shoulder pain.  Been going on many months.  However it has gotten better as of late.  He is been doing physical therapy.  History of a CVA 2 months ago affecting his left side.  He reports no real pain now.  Does complain of limited motion.  No history of issues.        Patient's past medical, surgical, social and family histories reviewed.     Past Medical History:   Diagnosis Date     Actinic keratosis      Atrial fibrillation (H)      Basal cell carcinoma nos 06/10    Mohs excision, rt upper lip & rt temple     Cardiac dysrhythmia, unspecified      Cough     chronic cough     Diabetic eye exam (H) 8/29/14     Generalized osteoarthrosis, unspecified site     djd of the knees, hands, and neck     Other diseases of lung, not elsewhere classified     pulmonary nodule, benign     Pure hypercholesterolemia      Unspecified essential hypertension        Past Surgical History:   Procedure Laterality Date     C TOTAL KNEE ARTHROPLASTY  1997    Knee Replacement, Total     COLONOSCOPY  05/02/2007    Multiple bxs of diminutive polyps of ascending colon.     COLONOSCOPY  11/10/2010    COLONOSCOPY performed by MARISELA GRIMM at  GI     HC COLONOSCOPY W/WO BRUSH/WASH  10/19/2001     HC COLONOSCOPY W/WO BRUSH/WASH  11/02/2004     HC HEMORRHOIDOPEXY BY STAPLING  09/26/08       Medications:  acetaminophen (TYLENOL) 325 MG tablet, Take 325-650 mg by mouth every 4 hours as needed for mild pain  blood glucose (NO BRAND SPECIFIED) test strip, Test daily  Carboxymethylcellulose Sodium (THERATEARS) 0.25 % SOLN, Apply 1 drop to eye 2 times daily  cholecalciferol (VITAMIN D3) 1000 units (25 mcg) capsule, Take 1 capsule by mouth daily  gabapentin (NEURONTIN)  "100 MG capsule, Take 200 mg by mouth 2 times daily   hydrocortisone 1 % EX external cream, Apply topically 2 times daily as needed  lisinopril (PRINIVIL/ZESTRIL) 10 MG tablet, Take 1 tablet (10 mg) by mouth daily  melatonin 3 MG tablet, Take 3 mg by mouth At Bedtime   metoprolol succinate ER (TOPROL-XL) 50 MG 24 hr tablet, Take 1 tablet (50 mg) by mouth daily  nystatin (MYCOSTATIN) 597321 UNIT/GM external powder, Apply topically 2 times daily  order for DME, Equipment being ordered: bedside commode  order for DME, Equipment being ordered: 4 legged walker  polyethylene glycol (MIRALAX) powder, Take 17 g by mouth daily  senna-docusate (SENOKOT-S/PERICOLACE) 8.6-50 MG tablet, Take 2 tablets by mouth daily  simvastatin (ZOCOR) 40 MG tablet, TAKE 1 TABLET BY MOUTH EVERY NIGHT AT BEDTIME  warfarin (COUMADIN) 5 MG tablet, TAKE ONE-HALF TABLET ON MONDAYS AND FRIDAYS AND ONE TABLET ON ALL THE OTHER DAYS OF THE WEEK OR AS DIRECTED BY COUMADIN CLINIC    No current facility-administered medications on file prior to visit.       No Known Allergies    Social History     Occupational History     Not on file   Tobacco Use     Smoking status: Former Smoker     Types: Cigarettes     Smokeless tobacco: Never Used     Tobacco comment: 5 years   Substance and Sexual Activity     Alcohol use: No     Comment: denies     Drug use: No     Sexual activity: Never       Family History   Problem Relation Age of Onset     Cancer Mother      Prostate Cancer Father        REVIEW OF SYSTEMS  10 point review systems performed otherwise negative as noted as per history of present illness.    Physical Exam:  Vitals: /62   Ht 1.778 m (5' 10\")   Wt 92.8 kg (204 lb 9.6 oz)   BMI 29.36 kg/m    BMI= Body mass index is 29.36 kg/m .  Constitutional: healthy, alert and no acute distress   Psychiatric: mentation appears normal and affect normal/bright  NEURO: no focal deficits  RESP: Normal with easy respirations and no use of accessory muscles to " breathe, no audible wheezing or retractions  CV: LUE:   no edema         Regular rate and rhythm by palpation  SKIN: No erythema, rashes, excoriation, or breakdown. No evidence of infection.   JOINT/EXTREMITIES:left: Active motion 40/45/20.  Passively 80/90.  No focal areas of tenderness.  No gross deformity.  No edema.  Difficult to test provocative testing given limited motion     GAIT: not tested     Diagnostic Modalities:  left shoulder X-ray: with bone on bone glenohumeral wear  Independent visualization of the images was performed.      Impression: left shoulder glenohumeral osteoarthritis    Plan:  All of the above pertinent physical exam and imaging modalities findings was reviewed with Melvin and his son.    Diagnosis and options reviewed.  Currently the pain is much better.  History of a CVA 2 months ago affecting that side.  If he would have continued pain I recommended a steroid injection.  I provided him my card as well as Shan Valenzuela, and Dr. Kaylan Wilcox for injections in the future if he would like it.    Return to clinic PRN, or sooner as needed for changes.  Re-x-ray on return: No    Philip Noel D.O.

## 2020-02-24 NOTE — LETTER
"    2/24/2020        RE: Melvin Abad  [address]  [facility] [address]        [place] [facility]  [place] Medical Record Number:  [id number]  Place of Service where encounter took place: [facility] ([facility]) [[id number]]    HPI:    Melvin Abad is a 89 year old  (4/24/1928), who is being seen today for an episodic care visit at the above location.   HPI information obtained from: facility chart records, patient report and [facility] chart review. Today's concern is INR/Coumadin management for A. Fib    ROS/Subjective:  Bleeding Signs/Symptoms:  None  Thromboembolic Signs/Symptoms:  None  Medication Changes:  No  Dietary Changes:  No  Activity Changes: No  Bacterial/Viral Infection:  No  Missed Coumadin Doses:  None  On ASA: No  Other Concerns:  No    OBJECTIVE:  /72   Pulse 71   Temp 95.1  F (35.1  C)   Resp 20   Ht 1.778 m (5' 10\")   Wt 92.3 kg (203 lb 8 oz)   SpO2 95%   BMI 29.20 kg/m       Lab Results   Component Value Date    INR 2.11 02/24/2020    INR 2.16 02/14/2020       Current Dose:  Coumadin 4mg daily  written on PO sheets    ASSESSMENT:  1. Longstanding persistent atrial fibrillation    2. Long term current use of anticoagulant therapy    3. Encounter for therapeutic drug monitoring      Up in his wheelchair today visiting with his lady friend.  No complaints.  Went to orthopedic MD recently   No need to go back scheduled.  PRN basis    Therapeutic INR for goal of 2-3    PLAN:   Orders written by provider at facility  New Dose: No Change    Next INR: 3/4/20      Electronically signed by:  DINH Silva CNP           Sincerely,        DINH Silva CNP    "

## 2020-02-24 NOTE — PROGRESS NOTES
"[place] [facility]  [place] Medical Record Number:  [id number]  Place of Service where encounter took place[facility] [[id number]]    HPI:    Melvin Abad is a 89 year old  (4/24/1928), who is being seen today for an episodic care visit at the above location.   HPI information obtained from: facility chart records, patient report and [facility] chart review. Today's concern is INR/Coumadin management for A. Fib    ROS/Subjective:  Bleeding Signs/Symptoms:  None  Thromboembolic Signs/Symptoms:  None  Medication Changes:  No  Dietary Changes:  No  Activity Changes: No  Bacterial/Viral Infection:  No  Missed Coumadin Doses:  None  On ASA: No  Other Concerns:  No    OBJECTIVE:  /72   Pulse 71   Temp 95.1  F (35.1  C)   Resp 20   Ht 1.778 m (5' 10\")   Wt 92.3 kg (203 lb 8 oz)   SpO2 95%   BMI 29.20 kg/m      Lab Results   Component Value Date    INR 2.11 02/24/2020    INR 2.16 02/14/2020       Current Dose:  Coumadin 4mg daily  written on PO sheets    ASSESSMENT:  1. Longstanding persistent atrial fibrillation    2. Long term current use of anticoagulant therapy    3. Encounter for therapeutic drug monitoring      Up in his wheelchair today visiting with his lady friend.  No complaints.  Went to orthopedic MD recently   No need to go back scheduled.  PRN basis    Therapeutic INR for goal of 2-3    PLAN:   Orders written by provider at facility  New Dose: No Change    Next INR: 3/4/20      Electronically signed by:  DINH Silva CNP       "

## 2020-02-24 NOTE — LETTER
2/24/2020         RE: Melvin Abad  [address]  [facility] [address]        Dear Colleague,    Thank you for referring your patient, Melvin Abad, to the [facility]. Please see a copy of my visit note below.    ORTHOPEDIC CONSULT      Chief Complaint: Melvin Abad is a 89 year old male who is being seen for   Chief Complaint   Patient presents with     Shoulder Pain     left shoulder pain     Consult       History of Present Illness:   Presents with his son.  Complains of left anterior shoulder pain.  Been going on many months.  However it has gotten better as of late.  He is been doing physical therapy.  History of a CVA 2 months ago affecting his left side.  He reports no real pain now.  Does complain of limited motion.  No history of issues.        Patient's past medical, surgical, social and family histories reviewed.     Past Medical History:   Diagnosis Date     Actinic keratosis      Atrial fibrillation (H)      Basal cell carcinoma nos 06/10    Mohs excision, rt upper lip & rt temple     Cardiac dysrhythmia, unspecified      Cough     chronic cough     Diabetic eye exam (H) 8/29/14     Generalized osteoarthrosis, unspecified site     djd of the knees, hands, and neck     Other diseases of lung, not elsewhere classified     pulmonary nodule, benign     Pure hypercholesterolemia      Unspecified essential hypertension        Past Surgical History:   Procedure Laterality Date     C TOTAL KNEE ARTHROPLASTY  1997    Knee Replacement, Total     COLONOSCOPY  05/02/2007    Multiple bxs of diminutive polyps of ascending colon.     COLONOSCOPY  11/10/2010    COLONOSCOPY performed by MARISELA GRIMM at  GI     HC COLONOSCOPY W/WO BRUSH/WASH  10/19/2001     HC COLONOSCOPY W/WO BRUSH/WASH  11/02/2004      HEMORRHOIDOPEXY BY STAPLING  09/26/08       Medications:  acetaminophen (TYLENOL) 325 MG tablet, Take 325-650 mg by mouth every 4 hours as needed for mild pain  blood glucose  "(NO BRAND SPECIFIED) test strip, Test daily  Carboxymethylcellulose Sodium (THERATEARS) 0.25 % SOLN, Apply 1 drop to eye 2 times daily  cholecalciferol (VITAMIN D3) 1000 units (25 mcg) capsule, Take 1 capsule by mouth daily  gabapentin (NEURONTIN) 100 MG capsule, Take 200 mg by mouth 2 times daily   hydrocortisone 1 % EX external cream, Apply topically 2 times daily as needed  lisinopril (PRINIVIL/ZESTRIL) 10 MG tablet, Take 1 tablet (10 mg) by mouth daily  melatonin 3 MG tablet, Take 3 mg by mouth At Bedtime   metoprolol succinate ER (TOPROL-XL) 50 MG 24 hr tablet, Take 1 tablet (50 mg) by mouth daily  nystatin (MYCOSTATIN) 458766 UNIT/GM external powder, Apply topically 2 times daily  order for DME, Equipment being ordered: bedside commode  order for DME, Equipment being ordered: 4 legged walker  polyethylene glycol (MIRALAX) powder, Take 17 g by mouth daily  senna-docusate (SENOKOT-S/PERICOLACE) 8.6-50 MG tablet, Take 2 tablets by mouth daily  simvastatin (ZOCOR) 40 MG tablet, TAKE 1 TABLET BY MOUTH EVERY NIGHT AT BEDTIME  warfarin (COUMADIN) 5 MG tablet, TAKE ONE-HALF TABLET ON MONDAYS AND FRIDAYS AND ONE TABLET ON ALL THE OTHER DAYS OF THE WEEK OR AS DIRECTED BY COUMADIN CLINIC    No current facility-administered medications on file prior to visit.       No Known Allergies    Social History     Occupational History     Not on file   Tobacco Use     Smoking status: Former Smoker     Types: Cigarettes     Smokeless tobacco: Never Used     Tobacco comment: 5 years   Substance and Sexual Activity     Alcohol use: No     Comment: denies     Drug use: No     Sexual activity: Never       Family History   Problem Relation Age of Onset     Cancer Mother      Prostate Cancer Father        REVIEW OF SYSTEMS  10 point review systems performed otherwise negative as noted as per history of present illness.    Physical Exam:  Vitals: /62   Ht 1.778 m (5' 10\")   Wt 92.8 kg (204 lb 9.6 oz)   BMI 29.36 kg/m     BMI= " Body mass index is 29.36 kg/m .  Constitutional: healthy, alert and no acute distress   Psychiatric: mentation appears normal and affect normal/bright  NEURO: no focal deficits  RESP: Normal with easy respirations and no use of accessory muscles to breathe, no audible wheezing or retractions  CV: LUE:   no edema         Regular rate and rhythm by palpation  SKIN: No erythema, rashes, excoriation, or breakdown. No evidence of infection.   JOINT/EXTREMITIES:left: Active motion 40/45/20.  Passively 80/90.  No focal areas of tenderness.  No gross deformity.  No edema.  Difficult to test provocative testing given limited motion     GAIT: not tested     Diagnostic Modalities:  left shoulder X-ray: with bone on bone glenohumeral wear  Independent visualization of the images was performed.      Impression: left shoulder glenohumeral osteoarthritis    Plan:  All of the above pertinent physical exam and imaging modalities findings was reviewed with Melvin and his son.    Diagnosis and options reviewed.  Currently the pain is much better.  History of a CVA 2 months ago affecting that side.  If he would have continued pain I recommended a steroid injection.  I provided him my card as well as Shan Valenzuela, and Dr. Kaylan Wilcox for injections in the future if he would like it.    Return to clinic PRN, or sooner as needed for changes.  Re-x-ray on return: No    Philip Noel D.O.    Again, thank you for allowing me to participate in the care of your patient.        Sincerely,        Terry Noel, DO

## 2020-03-04 ENCOUNTER — NURSING HOME VISIT (OUTPATIENT)
Dept: GERIATRICS | Facility: CLINIC | Age: 85
End: 2020-03-04
Payer: COMMERCIAL

## 2020-03-04 ENCOUNTER — HOSPITAL LABORATORY (OUTPATIENT)
Dept: NURSING HOME | Facility: OTHER | Age: 85
End: 2020-03-04

## 2020-03-04 VITALS
HEART RATE: 67 BPM | SYSTOLIC BLOOD PRESSURE: 109 MMHG | HEIGHT: 70 IN | WEIGHT: 207.5 LBS | BODY MASS INDEX: 29.71 KG/M2 | DIASTOLIC BLOOD PRESSURE: 63 MMHG | TEMPERATURE: 95.5 F | OXYGEN SATURATION: 93 % | RESPIRATION RATE: 18 BRPM

## 2020-03-04 DIAGNOSIS — I63.50 RIGHT PONTINE STROKE (H): ICD-10-CM

## 2020-03-04 DIAGNOSIS — I69.354 HEMIPARESIS AFFECTING LEFT SIDE AS LATE EFFECT OF CEREBROVASCULAR ACCIDENT (CVA) (H): ICD-10-CM

## 2020-03-04 DIAGNOSIS — Z79.01 LONG TERM CURRENT USE OF ANTICOAGULANT THERAPY: ICD-10-CM

## 2020-03-04 DIAGNOSIS — I10 ESSENTIAL HYPERTENSION: ICD-10-CM

## 2020-03-04 DIAGNOSIS — Z51.81 ENCOUNTER FOR THERAPEUTIC DRUG MONITORING: ICD-10-CM

## 2020-03-04 DIAGNOSIS — I48.11 LONGSTANDING PERSISTENT ATRIAL FIBRILLATION (H): Primary | ICD-10-CM

## 2020-03-04 LAB — INR PPP: 1.91 (ref 0.86–1.14)

## 2020-03-04 PROCEDURE — 99309 SBSQ NF CARE MODERATE MDM 30: CPT | Performed by: NURSE PRACTITIONER

## 2020-03-04 ASSESSMENT — MIFFLIN-ST. JEOR: SCORE: 1602.46

## 2020-03-04 NOTE — PROGRESS NOTES
[place] [facility]  [place] Medical Record Number:  [id number]  Place of Service where encounter took place:  [facility] ([facility]) [[id number]]  Chief Complaint   Patient presents with     Nursing Home Acute     INR RESULTS       HPI:    Melvin Abad  is a 89 year old (4/24/1928), who is being seen today for an episodic care visit.  HPI information obtained from: facility chart records, patient report and [facility] chart review. Today's concern is:    1. Longstanding persistent atrial fibrillation    2. Long term current use of anticoagulant therapy    3. Encounter for therapeutic drug monitoring    4. Essential hypertension    5. Hemiparesis affecting left side as late effect of cerebrovascular accident (CVA) (H)    6. Hx of right pontine stroke (H)      Came to see Ed today due to his INR being done but also to see how he was doing in general.    Today he was up in his wheelchair propelling around the unit.  Has his left arm resting in a hand cradle on w/c.  Seems to be in a good mood.  Do see him ambulating in the hallways with nursing, walker and T belt.      Past Medical and Surgical History reviewed in Epic today.    MEDICATIONS:    Current Outpatient Medications   Medication Sig Dispense Refill     acetaminophen (TYLENOL) 325 MG tablet Take 325-650 mg by mouth every 4 hours as needed for mild pain       Carboxymethylcellulose Sodium (THERATEARS) 0.25 % SOLN Apply 1 drop to eye 2 times daily       cholecalciferol (VITAMIN D3) 1000 units (25 mcg) capsule Take 1 capsule by mouth daily       gabapentin (NEURONTIN) 100 MG capsule Take 200 mg by mouth 2 times daily        hydrocortisone 1 % EX external cream Apply topically 2 times daily as needed       lisinopril (PRINIVIL/ZESTRIL) 10 MG tablet Take 1 tablet (10 mg) by mouth daily 90 tablet 3     melatonin 3 MG tablet Take 3 mg by mouth At Bedtime        metoprolol succinate ER (TOPROL-XL) 50 MG 24 hr tablet Take 1 tablet (50  "mg) by mouth daily 90 tablet 3     nystatin (MYCOSTATIN) 158404 UNIT/GM external powder Apply topically 2 times daily       polyethylene glycol (MIRALAX) powder Take 17 g by mouth daily 510 g 1     senna-docusate (SENOKOT-S/PERICOLACE) 8.6-50 MG tablet Take 2 tablets by mouth daily       simvastatin (ZOCOR) 40 MG tablet TAKE 1 TABLET BY MOUTH EVERY NIGHT AT BEDTIME 90 tablet 3     warfarin (COUMADIN) 5 MG tablet TAKE ONE-HALF TABLET ON MONDAYS AND FRIDAYS AND ONE TABLET ON ALL THE OTHER DAYS OF THE WEEK OR AS DIRECTED BY COUMADIN CLINIC 80 tablet 3     blood glucose (NO BRAND SPECIFIED) test strip Test daily 100 each 1     order for DME Equipment being ordered: bedside commode 1 Device 0     order for DME Equipment being ordered: 4 legged walker 1 Device 0       REVIEW OF SYSTEMS:  4 point ROS including Respiratory, CV, GI and , other than that noted in the HPI,  is negative    Objective:  /63   Pulse 67   Temp 95.5  F (35.3  C)   Resp 18   Ht 1.778 m (5' 10\")   Wt 94.1 kg (207 lb 8 oz)   SpO2 93%   BMI 29.77 kg/m    Exam:  GENERAL APPEARANCE:  Alert, in no distress, appears healthy, cooperative  RESP:  respiratory effort and palpation of chest normal, lungs clear to auscultation , no respiratory distress  CV:  Palpation and auscultation of heart done , no edema, heart rate regular/irregular.  ABDOMEN:  normal bowel sounds, soft, nontender, no hepatosplenomegaly or other masses, no guarding or rebound  M/S:   Gait and station abnormal using walker with staff holding a T belt.  able to propel self around the unit  SKIN:  Inspection of skin and subcutaneous tissue baseline, Palpation of skin and subcutaneous tissue baseline  PSYCH:  oriented X 3, normal insight, judgement and memory, affect and mood normal, does have his times of frustration    Labs:   Component      Latest Ref Rng & Units 2/24/2020 3/4/2020   INR      0.86 - 1.14 2.11 (H) 1.91 (H)       ASSESSMENT/PLAN:  Longstanding persistent atrial " fibrillation  Long term current use of anticoagulant therapy  Encounter for therapeutic drug monitoring  No signs of bleeding.  No complaints of chest palpitations.  - coumadin was at 4mg daily.  Will increase to 4.5mg daily.  Feel this will get him into 2-3 range consistently  INR in one week.    Essential hypertension  Blood pressures 110-140's/60-70's.    Lisinopril 10mg daily and Metoprolol 50mg daily.  Vitals daily.    Hemiparesis affecting left side as late effect of cerebrovascular accident (CVA) (H)  Hx of right pontine stroke (H)  Continues with therapies as he is gaining strength and balance since admission with his left side.  Probably not as fast as he would like but does not present depressed behaviors.  No signs of trouble swallowing and speech is clear.      Orders written by provider at facility and transcribed by : Alessandra Jaime MA  1.  INR 1.91 - Coumadin 4.5 mg po every day.  Recheck INR in 1 week.  Dx: A fib      Electronically signed by:  DINH Silva CNP

## 2020-03-04 NOTE — LETTER
3/4/2020        RE: Melvin Abad  [address]  [place] [facility] [address]        [place] [facility]  [place] Medical Record Number:  [id number]  Place of Service where encounter took place:  [facility] (Counts include 234 beds at the [facility]) [[id number]]  Chief Complaint   Patient presents with     Nursing Home Acute     INR RESULTS       HPI:    Melvin Abad  is a 89 year old (4/24/1928), who is being seen today for an episodic care visit.  HPI information obtained from: facility chart records, patient report and [facility] chart review. Today's concern is:    1. Longstanding persistent atrial fibrillation    2. Long term current use of anticoagulant therapy    3. Encounter for therapeutic drug monitoring    4. Essential hypertension    5. Hemiparesis affecting left side as late effect of cerebrovascular accident (CVA) (H)    6. Hx of right pontine stroke (H)      Came to see Ed today due to his INR being done but also to see how he was doing in general.    Today he was up in his wheelchair propelling around the unit.  Has his left arm resting in a hand cradle on w/c.  Seems to be in a good mood.  Do see him ambulating in the hallways with nursing, walker and T belt.      Past Medical and Surgical History reviewed in Epic today.    MEDICATIONS:    Current Outpatient Medications   Medication Sig Dispense Refill     acetaminophen (TYLENOL) 325 MG tablet Take 325-650 mg by mouth every 4 hours as needed for mild pain       Carboxymethylcellulose Sodium (THERATEARS) 0.25 % SOLN Apply 1 drop to eye 2 times daily       cholecalciferol (VITAMIN D3) 1000 units (25 mcg) capsule Take 1 capsule by mouth daily       gabapentin (NEURONTIN) 100 MG capsule Take 200 mg by mouth 2 times daily        hydrocortisone 1 % EX external cream Apply topically 2 times daily as needed       lisinopril (PRINIVIL/ZESTRIL) 10 MG tablet Take 1 tablet (10 mg) by mouth daily 90 tablet 3     melatonin 3 MG tablet Take 3 mg by mouth At  "Bedtime        metoprolol succinate ER (TOPROL-XL) 50 MG 24 hr tablet Take 1 tablet (50 mg) by mouth daily 90 tablet 3     nystatin (MYCOSTATIN) 719309 UNIT/GM external powder Apply topically 2 times daily       polyethylene glycol (MIRALAX) powder Take 17 g by mouth daily 510 g 1     senna-docusate (SENOKOT-S/PERICOLACE) 8.6-50 MG tablet Take 2 tablets by mouth daily       simvastatin (ZOCOR) 40 MG tablet TAKE 1 TABLET BY MOUTH EVERY NIGHT AT BEDTIME 90 tablet 3     warfarin (COUMADIN) 5 MG tablet TAKE ONE-HALF TABLET ON MONDAYS AND FRIDAYS AND ONE TABLET ON ALL THE OTHER DAYS OF THE WEEK OR AS DIRECTED BY COUMADIN CLINIC 80 tablet 3     blood glucose (NO BRAND SPECIFIED) test strip Test daily 100 each 1     order for DME Equipment being ordered: bedside commode 1 Device 0     order for DME Equipment being ordered: 4 legged walker 1 Device 0       REVIEW OF SYSTEMS:  4 point ROS including Respiratory, CV, GI and , other than that noted in the HPI,  is negative    Objective:  /63   Pulse 67   Temp 95.5  F (35.3  C)   Resp 18   Ht 1.778 m (5' 10\")   Wt 94.1 kg (207 lb 8 oz)   SpO2 93%   BMI 29.77 kg/m    Exam:  GENERAL APPEARANCE:  Alert, in no distress, appears healthy, cooperative  RESP:  respiratory effort and palpation of chest normal, lungs clear to auscultation , no respiratory distress  CV:  Palpation and auscultation of heart done , no edema, heart rate regular/irregular.  ABDOMEN:  normal bowel sounds, soft, nontender, no hepatosplenomegaly or other masses, no guarding or rebound  M/S:   Gait and station abnormal using walker with staff holding a T belt.  able to propel self around the unit  SKIN:  Inspection of skin and subcutaneous tissue baseline, Palpation of skin and subcutaneous tissue baseline  PSYCH:  oriented X 3, normal insight, judgement and memory, affect and mood normal, does have his times of frustration    Labs:   Component      Latest Ref Rng & Units 2/24/2020 3/4/2020   INR    "   0.86 - 1.14 2.11 (H) 1.91 (H)       ASSESSMENT/PLAN:  Longstanding persistent atrial fibrillation  Long term current use of anticoagulant therapy  Encounter for therapeutic drug monitoring  No signs of bleeding.  No complaints of chest palpitations.  - coumadin was at 4mg daily.  Will increase to 4.5mg daily.  Feel this will get him into 2-3 range consistently  INR in one week.    Essential hypertension  Blood pressures 110-140's/60-70's.    Lisinopril 10mg daily and Metoprolol 50mg daily.  Vitals daily.    Hemiparesis affecting left side as late effect of cerebrovascular accident (CVA) (H)  Hx of right pontine stroke (H)  Continues with therapies as he is gaining strength and balance since admission with his left side.  Probably not as fast as he would like but does not present depressed behaviors.  No signs of trouble swallowing and speech is clear.      Orders written by provider at facility and transcribed by : Alessandra Jaime MA  1.  INR 1.91 - Coumadin 4.5 mg po every day.  Recheck INR in 1 week.  Dx: A fib      Electronically signed by:  DINH Silva CNP               Sincerely,        DINH Silva CNP

## 2020-03-11 ENCOUNTER — NURSING HOME VISIT (OUTPATIENT)
Dept: GERIATRICS | Facility: CLINIC | Age: 85
End: 2020-03-11
Payer: COMMERCIAL

## 2020-03-11 ENCOUNTER — HOSPITAL LABORATORY (OUTPATIENT)
Dept: NURSING HOME | Facility: OTHER | Age: 85
End: 2020-03-11

## 2020-03-11 VITALS
BODY MASS INDEX: 29.49 KG/M2 | DIASTOLIC BLOOD PRESSURE: 67 MMHG | HEIGHT: 70 IN | TEMPERATURE: 95.2 F | WEIGHT: 206 LBS | RESPIRATION RATE: 16 BRPM | OXYGEN SATURATION: 93 % | HEART RATE: 72 BPM | SYSTOLIC BLOOD PRESSURE: 125 MMHG

## 2020-03-11 DIAGNOSIS — Z51.81 ANTICOAGULATION GOAL OF INR 2 TO 3: ICD-10-CM

## 2020-03-11 DIAGNOSIS — Z79.01 ANTICOAGULATION GOAL OF INR 2 TO 3: ICD-10-CM

## 2020-03-11 DIAGNOSIS — G47.9 SLEEP DISTURBANCES: ICD-10-CM

## 2020-03-11 DIAGNOSIS — I69.354 HEMIPARESIS AFFECTING LEFT SIDE AS LATE EFFECT OF CEREBROVASCULAR ACCIDENT (CVA) (H): ICD-10-CM

## 2020-03-11 DIAGNOSIS — Z79.01 LONG TERM CURRENT USE OF ANTICOAGULANT THERAPY: ICD-10-CM

## 2020-03-11 DIAGNOSIS — I48.11 LONGSTANDING PERSISTENT ATRIAL FIBRILLATION (H): Primary | ICD-10-CM

## 2020-03-11 DIAGNOSIS — K59.01 SLOW TRANSIT CONSTIPATION: ICD-10-CM

## 2020-03-11 DIAGNOSIS — I10 ESSENTIAL HYPERTENSION: ICD-10-CM

## 2020-03-11 DIAGNOSIS — I63.50 RIGHT PONTINE STROKE (H): ICD-10-CM

## 2020-03-11 LAB — INR PPP: 2.08 (ref 0.86–1.14)

## 2020-03-11 PROCEDURE — 99309 SBSQ NF CARE MODERATE MDM 30: CPT | Performed by: NURSE PRACTITIONER

## 2020-03-11 ASSESSMENT — MIFFLIN-ST. JEOR: SCORE: 1595.66

## 2020-03-11 NOTE — PROGRESS NOTES
[place] [facility]  Chief Complaint   Patient presents with     skilled nursing Regulatory     [place] Medical Record Number:  [id number]  [facility] Service where encounter took place[facility] [[id number]]    HPI:    Melvin Abad  is 89 year old (4/24/1928), who is being seen today for a federally mandated E/M visit.  HPI information obtained from: facility chart records, facility staff, patient report and [facility] chart review. Today's concerns are:    1. Longstanding persistent atrial fibrillation    2. Anticoagulation goal of INR 2 to 3    3. Long term current use of anticoagulant therapy    4. Essential hypertension    5. Hemiparesis affecting left side as late effect of cerebrovascular accident (CVA) (H)    6. Slow transit constipation    7. Sleep disturbances      Seeing Ed today as he had a INR done but also due for a 60 day visit.  He is in his room today sitting up in his wheelchair.  Alert and pleasant.  Had no real complaints.  Still attending therapies and ambulating with staff with a walker multiple times during the day.    ALLERGIES:Patient has no known allergies.  PAST MEDICAL HISTORY:   has a past medical history of Actinic keratosis, Atrial fibrillation (H), Basal cell carcinoma nos (06/10), Cardiac dysrhythmia, unspecified, Cough, Diabetic eye exam (H) (8/29/14), Generalized osteoarthrosis, unspecified site, Other diseases of lung, not elsewhere classified, Pure hypercholesterolemia, and Unspecified essential hypertension.  PAST SURGICAL HISTORY:   has a past surgical history that includes TOTAL KNEE ARTHROPLASTY (1997); Colonoscopy w/wo Thurman **Performed** (10/19/2001); Colonoscopy w/wo Brush **Performed** (11/02/2004); colonoscopy (05/02/2007); HEMORRHOIDOPEXY BY STAPLING (09/26/08); and Colonoscopy (11/10/2010).  FAMILY HISTORY: family history includes Cancer in his mother; Prostate Cancer in his father.  SOCIAL HISTORY:  reports that he has quit  smoking. His smoking use included cigarettes. He has never used smokeless tobacco. He reports that he does not drink alcohol or use drugs.    MEDICATIONS:  Current Outpatient Medications   Medication Sig Dispense Refill     acetaminophen (TYLENOL) 325 MG tablet Take 325-650 mg by mouth every 4 hours as needed for mild pain       Carboxymethylcellulose Sodium (THERATEARS) 0.25 % SOLN Apply 1 drop to eye 2 times daily       cholecalciferol (VITAMIN D3) 1000 units (25 mcg) capsule Take 1 capsule by mouth daily       gabapentin (NEURONTIN) 100 MG capsule Take 200 mg by mouth 2 times daily        hydrocortisone 1 % EX external cream Apply topically 2 times daily as needed       lisinopril (PRINIVIL/ZESTRIL) 10 MG tablet Take 1 tablet (10 mg) by mouth daily 90 tablet 3     melatonin 3 MG tablet Take 3 mg by mouth At Bedtime        metoprolol succinate ER (TOPROL-XL) 50 MG 24 hr tablet Take 1 tablet (50 mg) by mouth daily 90 tablet 3     nystatin (MYCOSTATIN) 764276 UNIT/GM external powder Apply topically 2 times daily       polyethylene glycol (MIRALAX) powder Take 17 g by mouth daily 510 g 1     senna-docusate (SENOKOT-S/PERICOLACE) 8.6-50 MG tablet Take 2 tablets by mouth daily       simvastatin (ZOCOR) 40 MG tablet TAKE 1 TABLET BY MOUTH EVERY NIGHT AT BEDTIME 90 tablet 3     warfarin (COUMADIN) 5 MG tablet TAKE ONE-HALF TABLET ON MONDAYS AND FRIDAYS AND ONE TABLET ON ALL THE OTHER DAYS OF THE WEEK OR AS DIRECTED BY COUMADIN CLINIC 80 tablet 3     blood glucose (NO BRAND SPECIFIED) test strip Test daily 100 each 1     order for DME Equipment being ordered: bedside commode 1 Device 0     order for DME Equipment being ordered: 4 legged walker 1 Device 0         Case Management:  I have reviewed the care plan and MDS and do agree with the plan. Patient's desire to return to the community is present, but is not able due to care needs . Information reviewed:  Medications, vital signs, orders, and nursing notes.    ROS:  4  "point ROS including Respiratory, CV, GI and , other than that noted in the HPI,  is negative    Vitals:  /67   Pulse 72   Temp 95.2  F (35.1  C)   Resp 16   Ht 1.778 m (5' 10\")   Wt 93.4 kg (206 lb)   SpO2 93%   BMI 29.56 kg/m    Body mass index is 29.56 kg/m .  Exam:  GENERAL APPEARANCE:  Alert, in no distress, appears healthy, oriented, cooperative  EYES:  EOM, conjunctivae, lids, pupils and irises normal, wears corrective lenses  RESP:  respiratory effort and palpation of chest normal, lungs clear to auscultation , no respiratory distress  CV:  Palpation and auscultation of heart done , no edema, heart rate regular/irregular  ABDOMEN:  normal bowel sounds, soft, nontender, no hepatosplenomegaly or other masses, no guarding or rebound  M/S:   Gait and station abnormal ambulates with a walker, staff pulling w/c, and a T belt, assist of one with transfer  SKIN:  Inspection of skin and subcutaneous tissue baseline, Palpation of skin and subcutaneous tissue baseline  PSYCH:  oriented X 3, normal insight, judgement and memory, affect and mood normal    Lab/Diagnostic data:     Lab Results   Component Value Date    INR 2.08 03/11/2020    INR 1.91 03/04/2020     Lab Results   Component Value Date    HGB 13.6 02/21/2020    HGB 10.6 01/24/2020       ASSESSMENT/PLAN  Longstanding persistent atrial fibrillation  Anticoagulation goal of INR 2 to 3  Long term current use of anticoagulant therapy  - stable with the use of coumadin.  Currently taking 4.5mg daily.    1.  Continue with Coumadin 4.5mg daily  2.  INR in one week for Atrial Fib    Essential hypertension  Blood pressures range from 100-140's/60-70's.  Remains on lisinopril 10mg daily and Toprol XL 50mg daily.  No side effects seen.    Hemiparesis affecting left side as late effect of cerebrovascular accident (CVA) (H)  Right pontine stroke (H)  Continues with therapies as his goal is to return home safely.  Has a w/c with an arm cradle on left " side.  Does well walking with the aides.    Continue to monitor progression.    Slow transit constipation  No complaints of discomfort.  Receives Miralax 17gm daily and Senna plus 2 tab daily.    Continue with regimen.    Sleep disturbances  Receives melatonin 3mg at HS to assist with calming down the body and allow sleep.  Not his regular bed or environment and so expect that he may have trouble sleeping.  Does not appear tired during the daytime nor does he nap too often.      Orders written by provider at facility and transcribed by : Alessandra Jaime MA  1.  INR 2.08 - Coumadin 4.5 mg po every day.  INR 1 week.  Dx: A fib        Electronically signed by:  DINH Silva CNP

## 2020-03-11 NOTE — LETTER
3/11/2020        RE: Melvin Abad  [address]  [facility] [address]        [facility]  Chief Complaint   Patient presents with     detention Regulatory     [place] Medical Record Number:  [id number]  Place of Service where encounter took place:  [facility] [[id number]]    HPI:    Melvin Abad  is 89 year old (4/24/1928), who is being seen today for a federally mandated E/M visit.  HPI information obtained from: facility chart records, facility staff, patient report and [facility] chart review. Today's concerns are:    1. Longstanding persistent atrial fibrillation    2. Anticoagulation goal of INR 2 to 3    3. Long term current use of anticoagulant therapy    4. Essential hypertension    5. Hemiparesis affecting left side as late effect of cerebrovascular accident (CVA) (H)    6. Slow transit constipation    7. Sleep disturbances      Seeing Ed today as he had a INR done but also due for a 60 day visit.  He is in his room today sitting up in his wheelchair.  Alert and pleasant.  Had no real complaints.  Still attending therapies and ambulating with staff with a walker multiple times during the day.    ALLERGIES:Patient has no known allergies.  PAST MEDICAL HISTORY:   has a past medical history of Actinic keratosis, Atrial fibrillation (H), Basal cell carcinoma nos (06/10), Cardiac dysrhythmia, unspecified, Cough, Diabetic eye exam (H) (8/29/14), Generalized osteoarthrosis, unspecified site, Other diseases of lung, not elsewhere classified, Pure hypercholesterolemia, and Unspecified essential hypertension.  PAST SURGICAL HISTORY:   has a past surgical history that includes TOTAL KNEE ARTHROPLASTY (1997); Colonoscopy w/wo Hydes **Performed** (10/19/2001); Colonoscopy w/wo Brush **Performed** (11/02/2004); colonoscopy (05/02/2007); HEMORRHOIDOPEXY BY STAPLING (09/26/08); and Colonoscopy (11/10/2010).  FAMILY HISTORY: family history includes Cancer  in his mother; Prostate Cancer in his father.  SOCIAL HISTORY:  reports that he has quit smoking. His smoking use included cigarettes. He has never used smokeless tobacco. He reports that he does not drink alcohol or use drugs.    MEDICATIONS:  Current Outpatient Medications   Medication Sig Dispense Refill     acetaminophen (TYLENOL) 325 MG tablet Take 325-650 mg by mouth every 4 hours as needed for mild pain       Carboxymethylcellulose Sodium (THERATEARS) 0.25 % SOLN Apply 1 drop to eye 2 times daily       cholecalciferol (VITAMIN D3) 1000 units (25 mcg) capsule Take 1 capsule by mouth daily       gabapentin (NEURONTIN) 100 MG capsule Take 200 mg by mouth 2 times daily        hydrocortisone 1 % EX external cream Apply topically 2 times daily as needed       lisinopril (PRINIVIL/ZESTRIL) 10 MG tablet Take 1 tablet (10 mg) by mouth daily 90 tablet 3     melatonin 3 MG tablet Take 3 mg by mouth At Bedtime        metoprolol succinate ER (TOPROL-XL) 50 MG 24 hr tablet Take 1 tablet (50 mg) by mouth daily 90 tablet 3     nystatin (MYCOSTATIN) 308825 UNIT/GM external powder Apply topically 2 times daily       polyethylene glycol (MIRALAX) powder Take 17 g by mouth daily 510 g 1     senna-docusate (SENOKOT-S/PERICOLACE) 8.6-50 MG tablet Take 2 tablets by mouth daily       simvastatin (ZOCOR) 40 MG tablet TAKE 1 TABLET BY MOUTH EVERY NIGHT AT BEDTIME 90 tablet 3     warfarin (COUMADIN) 5 MG tablet TAKE ONE-HALF TABLET ON MONDAYS AND FRIDAYS AND ONE TABLET ON ALL THE OTHER DAYS OF THE WEEK OR AS DIRECTED BY COUMADIN CLINIC 80 tablet 3     blood glucose (NO BRAND SPECIFIED) test strip Test daily 100 each 1     order for DME Equipment being ordered: bedside commode 1 Device 0     order for DME Equipment being ordered: 4 legged walker 1 Device 0         Case Management:  I have reviewed the care plan and MDS and do agree with the plan. Patient's desire to return to the community is present, but is not able due to care needs .  "Information reviewed:  Medications, vital signs, orders, and nursing notes.    ROS:  4 point ROS including Respiratory, CV, GI and , other than that noted in the HPI,  is negative    Vitals:  /67   Pulse 72   Temp 95.2  F (35.1  C)   Resp 16   Ht 1.778 m (5' 10\")   Wt 93.4 kg (206 lb)   SpO2 93%   BMI 29.56 kg/m    Body mass index is 29.56 kg/m .  Exam:  GENERAL APPEARANCE:  Alert, in no distress, appears healthy, oriented, cooperative  EYES:  EOM, conjunctivae, lids, pupils and irises normal, wears corrective lenses  RESP:  respiratory effort and palpation of chest normal, lungs clear to auscultation , no respiratory distress  CV:  Palpation and auscultation of heart done , no edema, heart rate regular/irregular  ABDOMEN:  normal bowel sounds, soft, nontender, no hepatosplenomegaly or other masses, no guarding or rebound  M/S:   Gait and station abnormal ambulates with a walker, staff pulling w/c, and a T belt, assist of one with transfer  SKIN:  Inspection of skin and subcutaneous tissue baseline, Palpation of skin and subcutaneous tissue baseline  PSYCH:  oriented X 3, normal insight, judgement and memory, affect and mood normal    Lab/Diagnostic data:     Lab Results   Component Value Date    INR 2.08 03/11/2020    INR 1.91 03/04/2020     Lab Results   Component Value Date    HGB 13.6 02/21/2020    HGB 10.6 01/24/2020       ASSESSMENT/PLAN  Longstanding persistent atrial fibrillation  Anticoagulation goal of INR 2 to 3  Long term current use of anticoagulant therapy  - stable with the use of coumadin.  Currently taking 4.5mg daily.    1.  Continue with Coumadin 4.5mg daily  2.  INR in one week for Atrial Fib    Essential hypertension  Blood pressures range from 100-140's/60-70's.  Remains on lisinopril 10mg daily and Toprol XL 50mg daily.  No side effects seen.    Hemiparesis affecting left side as late effect of cerebrovascular accident (CVA) (H)  Right pontine stroke (H)  Continues with " therapies as his goal is to return home safely.  Has a w/c with an arm cradle on left side.  Does well walking with the aides.    Continue to monitor progression.    Slow transit constipation  No complaints of discomfort.  Receives Miralax 17gm daily and Senna plus 2 tab daily.    Continue with regimen.    Sleep disturbances  Receives melatonin 3mg at HS to assist with calming down the body and allow sleep.  Not his regular bed or environment and so expect that he may have trouble sleeping.  Does not appear tired during the daytime nor does he nap too often.      Orders written by provider at facility and transcribed by : Alessandra Jaime MA  1.  INR 2.08 - Coumadin 4.5 mg po every day.  INR 1 week.  Dx: A fib        Electronically signed by:  DINH Silva CNP              Sincerely,        DINH Silva CNP

## 2020-03-24 ENCOUNTER — VIRTUAL VISIT (OUTPATIENT)
Dept: GERIATRICS | Facility: CLINIC | Age: 85
End: 2020-03-24
Payer: COMMERCIAL

## 2020-03-24 DIAGNOSIS — M62.81 GENERALIZED MUSCLE WEAKNESS: ICD-10-CM

## 2020-03-24 DIAGNOSIS — I63.50 RIGHT PONTINE STROKE (H): ICD-10-CM

## 2020-03-24 DIAGNOSIS — I69.354 HEMIPARESIS AFFECTING LEFT SIDE AS LATE EFFECT OF CEREBROVASCULAR ACCIDENT (CVA) (H): Primary | ICD-10-CM

## 2020-03-24 PROCEDURE — 99308 SBSQ NF CARE LOW MDM 20: CPT | Mod: GT | Performed by: NURSE PRACTITIONER

## 2020-03-24 ASSESSMENT — MIFFLIN-ST. JEOR: SCORE: 1591.12

## 2020-03-24 NOTE — LETTER
"    3/24/2020        RE: Melvin Abad  [address]  [facility] [address]        Melvin Abad is a 89 year old male who is being evaluated via a billable video and audio visit.      The patient has been notified of following:     \"This video visit will be conducted via a call between you and your physician/provider. We have found that certain health care needs can be provided without the need for an in-person physical exam.  This service lets us provide the care you need with a video conversation.  If a prescription is necessary we can send it directly to your pharmacy.  If lab work is needed we can place an order for that and you can then stop by our lab to have the test done at a later time.    If during the course of the call the physician/provider feels a video visit is not appropriate, you will not be charged for this service.\"     Physician has received verbal consent for a Video Visit from the patient? Yes    Patient would like the video invitation sent by: Text to cell phone: [phone]        Subjective     Melvin Abad is a 89 year old male who presents to clinic today for the following health issues:    Face to face needed for 18\" standard wheelchair for when he is discharge from [facility]      Video Start Time: 3:20pm    Right potine stroke with left side hemiparesis as a late effect of CVA.   Attending therapies for mobility, ADLs, exercise, strengthening.  Not on any blood thinners  No acute new symptoms.      Review of Systems   ROS:4 point ROS including Respiratory, CV, GI and , other than that noted in the HPI,  is negative      Objective    /60   Pulse 72   Temp 98.3  F (36.8  C)   Resp 16   Ht 1.778 m (5' 10\")   Wt 93 kg (205 lb)   BMI 29.41 kg/m    Estimated body mass index is 29.56 kg/m  as calculated from the following:    Height as of 3/11/20: 1.778 m (5' 10\").    Weight as of 3/11/20: 93.4 kg (206 lb).    EXAM  Vitals: /60   Pulse 72   Temp 98.3 " " F (36.8  C)   Resp 16   Ht 1.778 m (5' 10\")   Wt 93 kg (205 lb)   BMI 29.41 kg/m  ;BMI= There is no height or weight on file to calculate BMI.  Constitutional: healthy, alert and no distress   Cardiovascular: heart rate regular/irregular and strong.  No edema.    Respiratory: negative, Percussion normal. Good diaphragmatic excursion. Lungs clear  Psychiatric: mentation appears normal and affect normal/bright  Abdomen: Abdomen soft, non-tender. BS normal. No masses, organomegaly  NEURO: uses a walker with assist of one staff and w/c pulling behind, weakness in the upper left arm.  Trouble lifting at shoulder of left arm but hand grasp strong.  Right hand he states has hx of carpel tunnel. Reflexes normal and symmetric. Sensation grossly WNL.  No maki joint deformity.    Diagnostic Test Results:  Labs reviewed in Epic    Diagnoses       Codes Comments    Hemiparesis affecting left side as late effect of cerebrovascular accident (CVA) (H)    -  Primary I69.354     Right pontine stroke (H)     I63.50     Generalized muscle weakness     M62.81         Face to Face required due to obtaining a 18\" manual wheelchair due to hx of right pontine stroke with left side hemiparesis.  Ed continues with therapies for mobility and strengthening.  Family wants him to be in assisted living where he can have some oversite on his cares.    Please see face to face documentation for the wheelchair.    No new symptoms of a CVA.  Stable for now.       Video-Visit Details    Type of service:  Video Visit    Video End Time (time video stopped): 3:30pm    Originating Location (pt. Location): [facility]    Distant Location (provider location):  [facility]     Mode of Communication:  Video Conference via [employer]      Electronically signed by Kim Chong RN, CNP            Face to Face and Medical Necessity Statement for DME Provider visit    Demographic Information on Melvin Abad:  Gender: " male  : 1928  [address]  [facility] [address]  [phone] (home)     Medical Record: [id number]  Social Security Number: [id number]  Primary Care Provider: Lazaro Huston  Insurance: Payor: [facility] / Plan: [facility] MEDICARE NON [employer] / Product Type: HMO /     HPI:   Melvin Abad is a 89 year old  (1928), who is being seen today for a face to face provider visit at [facility] via video and audio chat; medical necessity statement for DME included. This patient requires the following:  DME Ordered and Medical Necessity Statement   Melvin will be moving to assisted living in near future.  Due to his endurance and balance, he is able to be independent with mobility longer distances and allows him to be in a less restrictive environment than a nursing home.      Wheelchair Documentation  Size: 18 x 16  Corresponding cushion: Yes: seat and back cushion  Standard foot rests: Yes  Elevating leg rests: No  Arm rests: Yes: standard  Lap tray: No  Dose the patient use oxygen? No   Is the patient able to propel wheelchair? Yes If no why not?  And is there someone who can? - yes staff or family can if needed to do so.  1. The patient has mobility limitations that impairs their ability to participate in one or more mobility related activities: Grooming and Bathing.  The wheelchair is suitable and necessary for use in the patient's home.  2. The patient's mobility limitations cannot be safely resolved by using a cane/walker:No - balance, endurance  Reason why a cane or walker will not meet the patient's needs. (ie: balance, tolerance, level of assistance) balance and endurance, staff assist with safety  3. The patients home has adequate access to use a manual wheelchair:Yes  4. The use of a manual wheelchair on a regular basis will improve the patients ability to participate in mobility related ADL's at home:Yes  5. The patient is willing to use a manual wheelchair at home:Yes  6. The patient  "has adequate upper body strength and the mental capability to safely use a manual wheelchair and/or has a caregiver that is able to assist: Yes  7. Does the patient have a lower extremity injury or edema? Strength, balance due to CVA  Reason for Type of Wheelchair  Patient weight: 205 lbs 0 oz  Light Weight Wheelchair: Patient is unable to self-propel a standard wheelchair in the home but can self propel a light weight wheelchair.      Pt needing above DME with expected length of need of 99 indefinitely due to medical necessity associated with following diagnosis:     Hemiparesis affecting left side as late effect of cerebrovascular accident (CVA) (H)  Right pontine stroke (H)  Generalized muscle weakness      PMH   has a past medical history of Actinic keratosis, Atrial fibrillation (H), Basal cell carcinoma nos (06/10), Cardiac dysrhythmia, unspecified, Cough, Diabetic eye exam (H) (8/29/14), Generalized osteoarthrosis, unspecified site, Other diseases of lung, not elsewhere classified, Pure hypercholesterolemia, and Unspecified essential hypertension.    ROS:4 point ROS including Respiratory, CV, GI and , other than that noted in the HPI,  is negative    EXAM  Vitals: /60   Pulse 72   Temp 98.3  F (36.8  C)   Resp 16   Ht 1.778 m (5' 10\")   Wt 93 kg (205 lb)   BMI 29.41 kg/m  ;BMI= There is no height or weight on file to calculate BMI.  Constitutional: healthy, alert and no distress   Cardiovascular: heart rate regular/irregular and strong.  No edema.    Respiratory: negative, Percussion normal. Good diaphragmatic excursion. Lungs clear  Psychiatric: mentation appears normal and affect normal/bright  Abdomen: Abdomen soft, non-tender. BS normal. No masses, organomegaly  NEURO: uses a walker with assist of one staff and w/c pulling behind, weakness in the upper left arm.  Trouble lifting at shoulder of left arm but hand grasp strong.  Right hand he states has hx of carpel tunnel. Reflexes normal and " symmetric. Sensation grossly WNL.  No maki joint deformity.       ASSESSMENT/PLAN:  1. Hemiparesis affecting left side as late effect of cerebrovascular accident (CVA) (H)    2. Right pontine stroke (H)    3. Generalized muscle weakness      Came to [facility] for therapies due to CVA.  Left arm continues to be weak but he is determined to continue to work with that arm.  Is able to ambulate but endurance and balance are an issue.  Having a w/c would give him the ability to be independent with moving outside of future living space.      Orders:  1. Facility staff/TC to contact DME company to get their order form for provider to fill out    ELECTRONICALLY SIGNED BY SHIVANI CERTIFIED PROVIDER:  DINH Silva CNP   NPI: [id number]  [facility]  [address] [place] [address], [address]  [place] [address]            Sincerely,        DINH Silva CNP

## 2020-03-24 NOTE — PROGRESS NOTES
"Melvin Abad is a 89 year old male who is being evaluated via a billable video and audio visit.      The patient has been notified of following:     \"This video visit will be conducted via a call between you and your physician/provider. We have found that certain health care needs can be provided without the need for an in-person physical exam.  This service lets us provide the care you need with a video conversation.  If a prescription is necessary we can send it directly to your pharmacy.  If lab work is needed we can place an order for that and you can then stop by our lab to have the test done at a later time.    If during the course of the call the physician/provider feels a video visit is not appropriate, you will not be charged for this service.\"     Physician has received verbal consent for a Video Visit from the patient? Yes    Patient would like the video invitation sent by: Text to cell phone: [phone]        Subjective     Melvin Abad is a 89 year old male who presents to clinic today for the following health issues:    Face to face needed for 18\" standard wheelchair for when he is discharge from [facility]      Video Start Time: 3:20pm    Right potine stroke with left side hemiparesis as a late effect of CVA.   Attending therapies for mobility, ADLs, exercise, strengthening.  Not on any blood thinners  No acute new symptoms.      Review of Systems   ROS:4 point ROS including Respiratory, CV, GI and , other than that noted in the HPI,  is negative      Objective    /60   Pulse 72   Temp 98.3  F (36.8  C)   Resp 16   Ht 1.778 m (5' 10\")   Wt 93 kg (205 lb)   BMI 29.41 kg/m    Estimated body mass index is 29.56 kg/m  as calculated from the following:    Height as of 3/11/20: 1.778 m (5' 10\").    Weight as of 3/11/20: 93.4 kg (206 lb).    EXAM  Vitals: /60   Pulse 72   Temp 98.3  F (36.8  C)   Resp 16   Ht 1.778 m (5' 10\")   Wt 93 kg (205 lb)   BMI 29.41 kg/m  " ";BMI= There is no height or weight on file to calculate BMI.  Constitutional: healthy, alert and no distress   Cardiovascular: heart rate regular/irregular and strong.  No edema.    Respiratory: negative, Percussion normal. Good diaphragmatic excursion. Lungs clear  Psychiatric: mentation appears normal and affect normal/bright  Abdomen: Abdomen soft, non-tender. BS normal. No masses, organomegaly  NEURO: uses a walker with assist of one staff and w/c pulling behind, weakness in the upper left arm.  Trouble lifting at shoulder of left arm but hand grasp strong.  Right hand he states has hx of carpel tunnel. Reflexes normal and symmetric. Sensation grossly WNL.  No maki joint deformity.    Diagnostic Test Results:  Labs reviewed in Epic    Diagnoses       Codes Comments    Hemiparesis affecting left side as late effect of cerebrovascular accident (CVA) (H)    -  Primary I69.354     Right pontine stroke (H)     I63.50     Generalized muscle weakness     M62.81         Face to Face required due to obtaining a 18\" manual wheelchair due to hx of right pontine stroke with left side hemiparesis.  Ed continues with therapies for mobility and strengthening.  Family wants him to be in assisted living where he can have some oversite on his cares.    Please see face to face documentation for the wheelchair.    No new symptoms of a CVA.  Stable for now.       Video-Visit Details    Type of service:  Video Visit    Video End Time (time video stopped): 3:30pm    Originating Location (pt. Location): [facility]    Distant Location (provider location):  [facility]     Mode of Communication:  Video Conference via [employer]      Electronically signed by Kim Chong RN, CNP        "

## 2020-03-25 VITALS
BODY MASS INDEX: 29.35 KG/M2 | HEART RATE: 72 BPM | RESPIRATION RATE: 16 BRPM | DIASTOLIC BLOOD PRESSURE: 60 MMHG | TEMPERATURE: 98.3 F | SYSTOLIC BLOOD PRESSURE: 116 MMHG | HEIGHT: 70 IN | WEIGHT: 205 LBS

## 2020-03-25 NOTE — PROGRESS NOTES
Face to Face and Medical Necessity Statement for DME Provider visit    Demographic Information on Melvin Abad:  Gender: male  : 1928  [address] [facility] [address] [phone] (home)     Medical Record: [id number]  Social Security Number: [id number]  Primary Care Provider: Lazaro Huston  Insurance: Payor: [facility] / Plan: [facility] MEDICARE NON [place] PARTNERS / Product Type: HMO /     HPI:   Melvin Abad is a 89 year old  (1928), who is being seen today for a face to face provider visit at [facility] via video and audio chat; medical necessity statement for DME included. This patient requires the following:  DME Ordered and Medical Necessity Statement   Melvin will be moving to assisted living in near future.  Due to his endurance and balance, he is able to be independent with mobility longer distances and allows him to be in a less restrictive environment than a nursing home.      Wheelchair Documentation  Size: 18 x 16  Corresponding cushion: Yes: seat and back cushion  Standard foot rests: Yes  Elevating leg rests: No  Arm rests: Yes: standard  Lap tray: No  Dose the patient use oxygen? No   Is the patient able to propel wheelchair? Yes If no why not?  And is there someone who can? - yes staff or family can if needed to do so.  1. The patient has mobility limitations that impairs their ability to participate in one or more mobility related activities: Grooming and Bathing.  The wheelchair is suitable and necessary for use in the patient's home.  2. The patient's mobility limitations cannot be safely resolved by using a cane/walker:No - balance, endurance  Reason why a cane or walker will not meet the patient's needs. (ie: balance, tolerance, level of assistance) balance and endurance, staff assist with safety  3. The patients home has adequate access to use a manual wheelchair:Yes  4. The use of a manual wheelchair on a regular basis will improve the patients ability to  "participate in mobility related ADL's at home:Yes  5. The patient is willing to use a manual wheelchair at home:Yes  6. The patient has adequate upper body strength and the mental capability to safely use a manual wheelchair and/or has a caregiver that is able to assist: Yes  7. Does the patient have a lower extremity injury or edema? Strength, balance due to CVA  Reason for Type of Wheelchair  Patient weight: 205 lbs 0 oz  Light Weight Wheelchair: Patient is unable to self-propel a standard wheelchair in the home but can self propel a light weight wheelchair.      Pt needing above DME with expected length of need of 99 indefinitely due to medical necessity associated with following diagnosis:     Hemiparesis affecting left side as late effect of cerebrovascular accident (CVA) (H)  Right pontine stroke (H)  Generalized muscle weakness      PMH   has a past medical history of Actinic keratosis, Atrial fibrillation (H), Basal cell carcinoma nos (06/10), Cardiac dysrhythmia, unspecified, Cough, Diabetic eye exam (H) (8/29/14), Generalized osteoarthrosis, unspecified site, Other diseases of lung, not elsewhere classified, Pure hypercholesterolemia, and Unspecified essential hypertension.    ROS:4 point ROS including Respiratory, CV, GI and , other than that noted in the HPI,  is negative    EXAM  Vitals: /60   Pulse 72   Temp 98.3  F (36.8  C)   Resp 16   Ht 1.778 m (5' 10\")   Wt 93 kg (205 lb)   BMI 29.41 kg/m  ;BMI= There is no height or weight on file to calculate BMI.  Constitutional: healthy, alert and no distress   Cardiovascular: heart rate regular/irregular and strong.  No edema.    Respiratory: negative, Percussion normal. Good diaphragmatic excursion. Lungs clear  Psychiatric: mentation appears normal and affect normal/bright  Abdomen: Abdomen soft, non-tender. BS normal. No masses, organomegaly  NEURO: uses a walker with assist of one staff and w/c pulling behind, weakness in the upper left arm. "  Trouble lifting at shoulder of left arm but hand grasp strong.  Right hand he states has hx of carpel tunnel. Reflexes normal and symmetric. Sensation grossly WNL.  No maki joint deformity.       ASSESSMENT/PLAN:  1. Hemiparesis affecting left side as late effect of cerebrovascular accident (CVA) (H)    2. Right pontine stroke (H)    3. Generalized muscle weakness      Came to [facility] for therapies due to CVA.  Left arm continues to be weak but he is determined to continue to work with that arm.  Is able to ambulate but endurance and balance are an issue.  Having a w/c would give him the ability to be independent with moving outside of future living space.      Orders:  1. Facility staff/TC to contact DME company to get their order form for provider to fill out    ELECTRONICALLY SIGNED BY SHIVANI CERTIFIED PROVIDER:  DINH Silva CNP   NPI: [id number]  [facility]  [address] [place] [address], [address]  [place] [address]

## 2020-03-27 ENCOUNTER — VIRTUAL VISIT (OUTPATIENT)
Dept: GERIATRICS | Facility: CLINIC | Age: 85
End: 2020-03-27
Payer: COMMERCIAL

## 2020-03-27 ENCOUNTER — HOSPITAL LABORATORY (OUTPATIENT)
Dept: NURSING HOME | Facility: OTHER | Age: 85
End: 2020-03-27

## 2020-03-27 VITALS
SYSTOLIC BLOOD PRESSURE: 105 MMHG | OXYGEN SATURATION: 95 % | HEIGHT: 70 IN | HEART RATE: 64 BPM | RESPIRATION RATE: 20 BRPM | WEIGHT: 206 LBS | TEMPERATURE: 97.7 F | BODY MASS INDEX: 29.49 KG/M2 | DIASTOLIC BLOOD PRESSURE: 61 MMHG

## 2020-03-27 DIAGNOSIS — I48.11 LONGSTANDING PERSISTENT ATRIAL FIBRILLATION (H): Primary | ICD-10-CM

## 2020-03-27 DIAGNOSIS — Z79.01 LONG TERM CURRENT USE OF ANTICOAGULANT THERAPY: ICD-10-CM

## 2020-03-27 DIAGNOSIS — Z79.01 ANTICOAGULATION GOAL OF INR 2 TO 3: ICD-10-CM

## 2020-03-27 DIAGNOSIS — Z51.81 ANTICOAGULATION GOAL OF INR 2 TO 3: ICD-10-CM

## 2020-03-27 LAB — INR PPP: 2.5 (ref 0.86–1.14)

## 2020-03-27 PROCEDURE — 99308 SBSQ NF CARE LOW MDM 20: CPT | Mod: GT | Performed by: NURSE PRACTITIONER

## 2020-03-27 ASSESSMENT — MIFFLIN-ST. JEOR: SCORE: 1595.66

## 2020-03-27 NOTE — LETTER
"    3/27/2020        RE: Melvin Abad  [address]  [facility] [address]        [facility]   Melvin Abad is being evaluated via a billable video visit due to the restrictions of the Covid-19 pandemic.   The patient has been notified of following:  \"This video visit will be conducted via a call between you and your provider. We have found that certain health care needs can be provided without the need for an in-person physical exam.  This service lets us provide the care you need with a video conversation. If during the course of the call the provider feels a video visit is not appropriate, you will not be charged for this service.\"   The provider has received verbal consent for a Video Visit from the patient or first contact? Yes  Patient  or facility staff would like the video invitation sent by: Text to cell phone: [phone]  Video Start Time: 12:15pm  [place] Medical Record Number:  [id number]  Place of Location at the time of visit: [facility]   No chief complaint on file.    HPI:  Melvin Abad  is a 89 year old (4/24/1928), who is being seen today for a visit.  HPI information obtained from: facility chart records, facility staff, patient report and [facility] chart review. Today's concern is:    1. Longstanding persistent atrial fibrillation    2. Long term current use of anticoagulant therapy    3. Anticoagulation goal of INR 2 to 3      INR done today and so with help of nursing, saw Ed with video visit.  He was up in his wheelchair up in his room.  Alert and pleasant.  Denied any chest palpitations as well as coughing/respiratory symptoms given the pandemic occurring in society.    He states he continues to get stronger.  Ambulating with nursing and a walker.      Past Medical and Surgical History reviewed in Epic today.  MEDICATIONS:    Current Outpatient Medications   Medication Sig Dispense Refill     warfarin ANTICOAGULANT (COUMADIN) 1 MG tablet " "Warfarin 4.5mg po daily       blood glucose (NO BRAND SPECIFIED) test strip Test daily 100 each 1     Carboxymethylcellulose Sodium (THERATEARS) 0.25 % SOLN Apply 1 drop to eye 2 times daily       cholecalciferol (VITAMIN D3) 1000 units (25 mcg) capsule Take 1 capsule by mouth daily       gabapentin (NEURONTIN) 100 MG capsule Take 200 mg by mouth 2 times daily        hydrocortisone 1 % EX external cream Apply topically 2 times daily as needed       lisinopril (PRINIVIL/ZESTRIL) 10 MG tablet Take 1 tablet (10 mg) by mouth daily 90 tablet 3     melatonin 3 MG tablet Take 3 mg by mouth At Bedtime        metoprolol succinate ER (TOPROL-XL) 50 MG 24 hr tablet Take 1 tablet (50 mg) by mouth daily 90 tablet 3     nystatin (MYCOSTATIN) 660410 UNIT/GM external powder Apply topically 2 times daily       order for DME Equipment being ordered: bedside commode 1 Device 0     order for DME Equipment being ordered: 4 legged walker 1 Device 0     polyethylene glycol (MIRALAX) powder Take 17 g by mouth daily 510 g 1     senna-docusate (SENOKOT-S/PERICOLACE) 8.6-50 MG tablet Take 2 tablets by mouth daily       simvastatin (ZOCOR) 40 MG tablet TAKE 1 TABLET BY MOUTH EVERY NIGHT AT BEDTIME 90 tablet 3     REVIEW OF SYSTEMS: 4 point ROS including Respiratory, CV, GI and , other than that noted in the HPI,  is negative  Objective: /61   Pulse 64   Temp 97.7  F (36.5  C)   Resp 20   Ht 1.778 m (5' 10\")   Wt 93.4 kg (206 lb)   SpO2 95%   BMI 29.56 kg/m    Limited visit exam done given COVID-19 precautions.     Heart rate regular/irregular and strong.  Lungs are clear.  No edema in lower legs.    Labs:   Lab Results   Component Value Date    INR 2.50 03/27/2020    INR 2.08 03/11/2020       ASSESSMENT/PLAN:  1. Longstanding persistent atrial fibrillation    2. Long term current use of anticoagulant therapy    3. Anticoagulation goal of INR 2 to 3      Currently receiving coumadin 4.5mg daily.  Tolerating well.    Has been " tolerating this dose within the 2-3 range    Orders give to the  to be written in his chart:  1.  Continue with coumadin 4.5mg daily  2.  INR in 3 weeks for Atrial Fib    Electronically signed by:  DINH Silva CNP     Video-Visit Details  Type of service:  Video Visit  Video End Time (time video stopped): 12:25pm  Distant Location (provider location):  [facility]           Sincerely,        DINH Silva CNP

## 2020-04-06 NOTE — PROGRESS NOTES
"[facility]   Melvin Abad is being evaluated via a billable video visit due to the restrictions of the Covid-19 pandemic.   The patient has been notified of following:  \"This video visit will be conducted via a call between you and your provider. We have found that certain health care needs can be provided without the need for an in-person physical exam.  This service lets us provide the care you need with a video conversation. If during the course of the call the provider feels a video visit is not appropriate, you will not be charged for this service.\"   The provider has received verbal consent for a Video Visit from the patient or first contact? Yes  Patient  or facility staff would like the video invitation sent by: Text to cell phone: [phone]  Video Start Time: 12:15pm  [facility] Medical Record Number:  [id number]  Place of Location at the time of visit: [facility]   No chief complaint on file.    HPI:  Melvin Abad  is a 89 year old (4/24/1928), who is being seen today for a visit.  HPI information obtained from: facility chart records, facility staff, patient report and [facility] chart review. Today's concern is:    1. Longstanding persistent atrial fibrillation    2. Long term current use of anticoagulant therapy    3. Anticoagulation goal of INR 2 to 3      INR done today and so with help of nursing, saw Ed with video visit.  He was up in his wheelchair up in his room.  Alert and pleasant.  Denied any chest palpitations as well as coughing/respiratory symptoms given the pandemic occurring in society.    He states he continues to get stronger.  Ambulating with nursing and a walker.      Past Medical and Surgical History reviewed in Epic today.  MEDICATIONS:    Current Outpatient Medications   Medication Sig Dispense Refill     warfarin ANTICOAGULANT (COUMADIN) 1 MG tablet Warfarin 4.5mg po daily       blood glucose (NO BRAND SPECIFIED) test strip Test daily 100 each " "1     Carboxymethylcellulose Sodium (THERATEARS) 0.25 % SOLN Apply 1 drop to eye 2 times daily       cholecalciferol (VITAMIN D3) 1000 units (25 mcg) capsule Take 1 capsule by mouth daily       gabapentin (NEURONTIN) 100 MG capsule Take 200 mg by mouth 2 times daily        hydrocortisone 1 % EX external cream Apply topically 2 times daily as needed       lisinopril (PRINIVIL/ZESTRIL) 10 MG tablet Take 1 tablet (10 mg) by mouth daily 90 tablet 3     melatonin 3 MG tablet Take 3 mg by mouth At Bedtime        metoprolol succinate ER (TOPROL-XL) 50 MG 24 hr tablet Take 1 tablet (50 mg) by mouth daily 90 tablet 3     nystatin (MYCOSTATIN) 195505 UNIT/GM external powder Apply topically 2 times daily       order for DME Equipment being ordered: bedside commode 1 Device 0     order for DME Equipment being ordered: 4 legged walker 1 Device 0     polyethylene glycol (MIRALAX) powder Take 17 g by mouth daily 510 g 1     senna-docusate (SENOKOT-S/PERICOLACE) 8.6-50 MG tablet Take 2 tablets by mouth daily       simvastatin (ZOCOR) 40 MG tablet TAKE 1 TABLET BY MOUTH EVERY NIGHT AT BEDTIME 90 tablet 3     REVIEW OF SYSTEMS: 4 point ROS including Respiratory, CV, GI and , other than that noted in the HPI,  is negative  Objective: /61   Pulse 64   Temp 97.7  F (36.5  C)   Resp 20   Ht 1.778 m (5' 10\")   Wt 93.4 kg (206 lb)   SpO2 95%   BMI 29.56 kg/m    Limited visit exam done given COVID-19 precautions.     Heart rate regular/irregular and strong.  Lungs are clear.  No edema in lower legs.    Labs:   Lab Results   Component Value Date    INR 2.50 03/27/2020    INR 2.08 03/11/2020       ASSESSMENT/PLAN:  1. Longstanding persistent atrial fibrillation    2. Long term current use of anticoagulant therapy    3. Anticoagulation goal of INR 2 to 3      Currently receiving coumadin 4.5mg daily.  Tolerating well.    Has been tolerating this dose within the 2-3 range    Orders give to the  to be written in his " chart:  1.  Continue with coumadin 4.5mg daily  2.  INR in 3 weeks for Atrial Fib    Electronically signed by:  DINH Silva CNP     Video-Visit Details  Type of service:  Video Visit  Video End Time (time video stopped): 12:25pm  Distant Location (provider location):  [facility]

## 2020-04-07 VITALS
WEIGHT: 206 LBS | TEMPERATURE: 97.1 F | HEART RATE: 64 BPM | SYSTOLIC BLOOD PRESSURE: 141 MMHG | BODY MASS INDEX: 29.49 KG/M2 | HEIGHT: 70 IN | OXYGEN SATURATION: 93 % | DIASTOLIC BLOOD PRESSURE: 88 MMHG | RESPIRATION RATE: 18 BRPM

## 2020-04-07 RX ORDER — WARFARIN SODIUM 1 MG/1
TABLET ORAL
Start: 2020-04-07 | End: 2020-05-11

## 2020-04-07 ASSESSMENT — MIFFLIN-ST. JEOR: SCORE: 1595.66

## 2020-04-07 NOTE — PROGRESS NOTES
"[facility]   Melvin Abad is being evaluated via a billable video visit due to the restrictions of the Covid-19 pandemic.   The patient has been notified of following:  \"This video visit will be conducted via a call between you and your provider. We have found that certain health care needs can be provided without the need for an in-person physical exam.  This service lets us provide the care you need with a video conversation. If during the course of the call the provider feels a video visit is not appropriate, you will not be charged for this service.\"   The provider has received verbal consent for a Video Visit from the patient or first contact? Yes  Patient  or facility staff would like the video invitation sent by: N/A   Video Start Time: 13:32  [facility] Medical Record Number:  [id number]  Place of Location at the time of visit: [facility]   Chief Complaint   Patient presents with     retirement Regulatory     HPI:  Melvin Abad  is a 89 year old  (4/24/1928), admitted to the above facility from  [facility]. Hospital stay 12/21/19 through 1/17/20..  Admitted to this facility for  rehab, medical management and nursing care.    Today:  -  - Resident seen and examined.   - Reports doing pretty good, has slight weakness. RN reports reached plateau with rehab. Can walk for 100 feet with one assistance.   - Pt reports has no concern with swallowing or speech.   - RN reports will be transferred to [facility] on the 14th of this month.   - reports ongoing CTS, does put the splint  -------------------------------------------------------------  - - Past Medical, social, family histories, medications, and allergies reviewed and updated  - Medications reviewed: in the chart and EHR.   - Case Management:   I have reviewed the care plan and MDS and do agree with the plan. Patient's desire to return to the community is not present.  Information reviewed:  " "Medications, vital signs, orders, and nursing notes.    MEDICATIONS:    Current Outpatient Medications   Medication Sig Dispense Refill     blood glucose (NO BRAND SPECIFIED) test strip Test daily 100 each 1     Carboxymethylcellulose Sodium (THERATEARS) 0.25 % SOLN Apply 1 drop to eye 2 times daily       cholecalciferol (VITAMIN D3) 1000 units (25 mcg) capsule Take 1 capsule by mouth daily       gabapentin (NEURONTIN) 100 MG capsule Take 200 mg by mouth 2 times daily        hydrocortisone 1 % EX external cream Apply topically 2 times daily as needed       lisinopril (PRINIVIL/ZESTRIL) 10 MG tablet Take 1 tablet (10 mg) by mouth daily 90 tablet 3     melatonin 3 MG tablet Take 3 mg by mouth At Bedtime        metoprolol succinate ER (TOPROL-XL) 50 MG 24 hr tablet Take 1 tablet (50 mg) by mouth daily 90 tablet 3     nystatin (MYCOSTATIN) 510100 UNIT/GM external powder Apply topically 2 times daily       order for DME Equipment being ordered: bedside commode 1 Device 0     order for DME Equipment being ordered: 4 legged walker 1 Device 0     polyethylene glycol (MIRALAX) powder Take 17 g by mouth daily 510 g 1     senna-docusate (SENOKOT-S/PERICOLACE) 8.6-50 MG tablet Take 2 tablets by mouth daily       simvastatin (ZOCOR) 40 MG tablet TAKE 1 TABLET BY MOUTH EVERY NIGHT AT BEDTIME 90 tablet 3     warfarin ANTICOAGULANT (COUMADIN) 1 MG tablet Warfarin 4.5mg po daily       REVIEW OF SYSTEMS: 4 point ROS including Respiratory, CV, GI and , other than that noted in the HPI,  is negative  Objective: BP (!) 141/88   Pulse 64   Temp 97.1  F (36.2  C)   Resp 18   Ht 1.778 m (5' 10\")   Wt 93.4 kg (206 lb)   SpO2 93%   BMI 29.56 kg/m    Limited visit exam done given COVID-19 precautions.   EXAM:  GENERAL APPEARANCE:  in no distress, cooperative  RESP: unlabored breathing  M/S:   Splint right hand. No joint deformity noted.   SKIN:  Deep copper discoloration b/l leg.   NEURO: moves limbs.   PSYCH:  normal insight, " judgement and memory, affect and mood normal    Labs: Reviewed in the chart and EHR.        ASSESSMENT/PLAN:  --------------------------------  Hemiparesis affecting left side as late effect of cerebrovascular accident (CVA) (H)  Hx of Right pontine stroke  Dysphagia, Oropharyngeal  Hyperlipidemia LDL goal <100  Essential HTN  General weakness  - with improvement. Will be transferred to [facility].     # Type 2 diabetes mellitus diet controlled. (H)  # Diabetes mellitus with peripheral vascular disease (H)  - no concern    # controlled atrial fibrillation: on coumadin with INR monitored closely.     # Carpal tunnel syndrome of right wrist: at baseline. Counseled, that he may benefit from steroid injection if does not improve. Can use hand for daily activities.     Restless Leg Syndrome: no concern.     Order: See above, otherwise, continue the rest of the current POC.     Electronically signed by:  Jacques Purcell MD     Video-Visit Details  Type of service:  Video Visit  Video End Time (time video stopped): 13:38  Distant Location (provider location):  [place]

## 2020-04-08 ENCOUNTER — VIRTUAL VISIT (OUTPATIENT)
Dept: GERIATRICS | Facility: CLINIC | Age: 85
End: 2020-04-08
Payer: COMMERCIAL

## 2020-04-08 DIAGNOSIS — I69.354 HEMIPARESIS AFFECTING LEFT SIDE AS LATE EFFECT OF CEREBROVASCULAR ACCIDENT (CVA) (H): Primary | ICD-10-CM

## 2020-04-08 DIAGNOSIS — R13.12 OROPHARYNGEAL DYSPHAGIA: ICD-10-CM

## 2020-04-08 DIAGNOSIS — G25.81 RESTLESS LEGS SYNDROME (RLS): ICD-10-CM

## 2020-04-08 DIAGNOSIS — E11.9 DIET-CONTROLLED DIABETES MELLITUS (H): ICD-10-CM

## 2020-04-08 DIAGNOSIS — I48.91 CONTROLLED ATRIAL FIBRILLATION (H): ICD-10-CM

## 2020-04-08 DIAGNOSIS — M62.81 GENERALIZED MUSCLE WEAKNESS: ICD-10-CM

## 2020-04-08 DIAGNOSIS — I10 ESSENTIAL HYPERTENSION: ICD-10-CM

## 2020-04-08 PROCEDURE — 99309 SBSQ NF CARE MODERATE MDM 30: CPT | Mod: GT | Performed by: FAMILY MEDICINE

## 2020-04-08 NOTE — LETTER
"    4/8/2020        RE: [facility]  [address]  [facility] [address]        [place] [facility]   Melvin Abad is being evaluated via a billable video visit due to the restrictions of the Covid-19 pandemic.   The patient has been notified of following:  \"This video visit will be conducted via a call between you and your provider. We have found that certain health care needs can be provided without the need for an in-person physical exam.  This service lets us provide the care you need with a video conversation. If during the course of the call the provider feels a video visit is not appropriate, you will not be charged for this service.\"   The provider has received verbal consent for a Video Visit from the patient or first contact? Yes  Patient  or facility staff would like the video invitation sent by: N/A   Video Start Time: 13:32  [place] Medical Record Number:  [id number]  Place of Location at the time of visit: [facility]   Chief Complaint   Patient presents with     retirement Regulatory     HPI:  Melvin Abad  is a 89 year old  (4/24/1928), admitted to the above facility from  [facility]. Hospital stay 12/21/19 through 1/17/20..  Admitted to this facility for  rehab, medical management and nursing care.    Today:  -  - Resident seen and examined.   - Reports doing pretty good, has slight weakness. RN reports reached plateau with rehab. Can walk for 100 feet with one assistance.   - Pt reports has no concern with swallowing or speech.   - RN reports will be transferred to [facility] on the 14th of this month.   - reports ongoing CTS, does put the splint  -------------------------------------------------------------  - - Past Medical, social, family histories, medications, and allergies reviewed and updated  - Medications reviewed: in the chart and EHR.   - Case Management:   I have reviewed the care plan and MDS and do agree " "with the plan. Patient's desire to return to the community is not present.  Information reviewed:  Medications, vital signs, orders, and nursing notes.    MEDICATIONS:    Current Outpatient Medications   Medication Sig Dispense Refill     blood glucose (NO BRAND SPECIFIED) test strip Test daily 100 each 1     Carboxymethylcellulose Sodium (THERATEARS) 0.25 % SOLN Apply 1 drop to eye 2 times daily       cholecalciferol (VITAMIN D3) 1000 units (25 mcg) capsule Take 1 capsule by mouth daily       gabapentin (NEURONTIN) 100 MG capsule Take 200 mg by mouth 2 times daily        hydrocortisone 1 % EX external cream Apply topically 2 times daily as needed       lisinopril (PRINIVIL/ZESTRIL) 10 MG tablet Take 1 tablet (10 mg) by mouth daily 90 tablet 3     melatonin 3 MG tablet Take 3 mg by mouth At Bedtime        metoprolol succinate ER (TOPROL-XL) 50 MG 24 hr tablet Take 1 tablet (50 mg) by mouth daily 90 tablet 3     nystatin (MYCOSTATIN) 404041 UNIT/GM external powder Apply topically 2 times daily       order for DME Equipment being ordered: bedside commode 1 Device 0     order for DME Equipment being ordered: 4 legged walker 1 Device 0     polyethylene glycol (MIRALAX) powder Take 17 g by mouth daily 510 g 1     senna-docusate (SENOKOT-S/PERICOLACE) 8.6-50 MG tablet Take 2 tablets by mouth daily       simvastatin (ZOCOR) 40 MG tablet TAKE 1 TABLET BY MOUTH EVERY NIGHT AT BEDTIME 90 tablet 3     warfarin ANTICOAGULANT (COUMADIN) 1 MG tablet Warfarin 4.5mg po daily       REVIEW OF SYSTEMS: 4 point ROS including Respiratory, CV, GI and , other than that noted in the HPI,  is negative  Objective: BP (!) 141/88   Pulse 64   Temp 97.1  F (36.2  C)   Resp 18   Ht 1.778 m (5' 10\")   Wt 93.4 kg (206 lb)   SpO2 93%   BMI 29.56 kg/m    Limited visit exam done given COVID-19 precautions.   EXAM:  GENERAL APPEARANCE:  in no distress, cooperative  RESP: unlabored breathing  M/S:   Splint right hand. No joint deformity noted. "   SKIN:  Deep copper discoloration b/l leg.   NEURO: moves limbs.   PSYCH:  normal insight, judgement and memory, affect and mood normal    Labs: Reviewed in the chart and EHR.        ASSESSMENT/PLAN:  --------------------------------  Hemiparesis affecting left side as late effect of cerebrovascular accident (CVA) (H)  Hx of Right pontine stroke  Dysphagia, Oropharyngeal  Hyperlipidemia LDL goal <100  Essential HTN  General weakness  - with improvement. Will be transferred to [facility].     # Type 2 diabetes mellitus diet controlled. (H)  # Diabetes mellitus with peripheral vascular disease (H)  - no concern    # controlled atrial fibrillation: on coumadin with INR monitored closely.     # Carpal tunnel syndrome of right wrist: at baseline. Counseled, that he may benefit from steroid injection if does not improve. Can use hand for daily activities.     Restless Leg Syndrome: no concern.     Order: See above, otherwise, continue the rest of the current POC.     Electronically signed by:  Jacques Purcell MD     Video-Visit Details  Type of service:  Video Visit  Video End Time (time video stopped): 13:38  Distant Location (provider location):  [place] [facility]           Sincerely,        Jacques Purcell MD

## 2020-04-10 ENCOUNTER — VIRTUAL VISIT (OUTPATIENT)
Dept: GERIATRICS | Facility: CLINIC | Age: 85
End: 2020-04-10
Payer: COMMERCIAL

## 2020-04-10 VITALS
SYSTOLIC BLOOD PRESSURE: 124 MMHG | DIASTOLIC BLOOD PRESSURE: 84 MMHG | WEIGHT: 208.5 LBS | HEART RATE: 70 BPM | HEIGHT: 70 IN | BODY MASS INDEX: 29.85 KG/M2 | OXYGEN SATURATION: 93 % | RESPIRATION RATE: 18 BRPM | TEMPERATURE: 97.3 F

## 2020-04-10 DIAGNOSIS — I69.354 HEMIPARESIS AFFECTING LEFT SIDE AS LATE EFFECT OF CEREBROVASCULAR ACCIDENT (CVA) (H): ICD-10-CM

## 2020-04-10 DIAGNOSIS — K59.01 SLOW TRANSIT CONSTIPATION: ICD-10-CM

## 2020-04-10 DIAGNOSIS — E11.9 TYPE 2 DIABETES MELLITUS WITHOUT COMPLICATION, WITHOUT LONG-TERM CURRENT USE OF INSULIN (H): ICD-10-CM

## 2020-04-10 DIAGNOSIS — M62.81 GENERALIZED MUSCLE WEAKNESS: ICD-10-CM

## 2020-04-10 DIAGNOSIS — I10 ESSENTIAL HYPERTENSION: ICD-10-CM

## 2020-04-10 DIAGNOSIS — E78.5 HYPERLIPIDEMIA LDL GOAL <100: ICD-10-CM

## 2020-04-10 DIAGNOSIS — I48.11 LONGSTANDING PERSISTENT ATRIAL FIBRILLATION (H): ICD-10-CM

## 2020-04-10 DIAGNOSIS — I63.50 RIGHT PONTINE STROKE (H): Primary | ICD-10-CM

## 2020-04-10 DIAGNOSIS — G47.9 SLEEP DISTURBANCES: ICD-10-CM

## 2020-04-10 PROCEDURE — 99316 NF DSCHRG MGMT 30 MIN+: CPT | Mod: 95 | Performed by: NURSE PRACTITIONER

## 2020-04-10 ASSESSMENT — MIFFLIN-ST. JEOR: SCORE: 1607

## 2020-04-10 NOTE — LETTER
"    4/10/2020        RE: [facility]  [address]  [facility] [address]        [facility] DISCHARGE SUMMARY  Melvin Abad is being evaluated via a billable video visit due to the restrictions of the Covid-19 pandemic.   The patient has been notified of following:  \"This video visit will be conducted via a call between you and a [facility] provider. We have found that certain health care needs can be provided without the need for an in-person physical exam.  This service lets us provide the care you need with a video conversation. If during the course of the call the provider feels a video visit is not appropriate, you will not be charged for this service.\"   The provider has received verbal consent for a Video Visit from the patient or family/first contact? Yes  Patient or /facility staff would like the video invitation sent by: Text to cell phone: [phone]  Video Start Time: 110pm    PATIENT'S NAME: Melvin Abad  YOB: 1928  MEDICAL RECORD NUMBER:  [id number]  Place of Location at the time of visit: [facility]   PRIMARY CARE PROVIDER AND CLINIC RESPONSIBLE AFTER TRANSFER:   DINH Washington CNP, [address] [place] [address] 400 / [facility] [id number] ;  [facility] Provider   Transferring providers: DINH Silva CNP, Jacques Purcell MD  Recent Hospitalization/ED:  [facility] stay 12/21/19 through 1/17/20  Date of SNF Admission: January / 17 / 2020  Date of SNF (anticipated) Discharge: April / 14 / 2020  Discharged to: new assisted living for patient [facility]  Cognitive Scores: BIMS: 13/15  Physical Function: can propel own wheelchair, assist with walker and ambulating for balance  DME: Wheelchair  CODE STATUS/ADVANCE DIRECTIVES DISCUSSION:  DNR / DNI   ALLERGIES: Patient has no known allergies.    DISCHARGE DIAGNOSIS/NURSING FACILITY COURSE:   Right pontine stroke (H)  Hemiparesis affecting left side " as late effect of cerebrovascular accident (CVA) (H)  - Melvin came to [facility] after suffering a right pontine stroke with left side weakness.  Has been attending therapies and able to ambulate with a walker but someone with him typically due to balance.  Able to propel own w/c and face to face has been done for a new one for him as he will be discharging to [facility].  Has movement in the left side but not at full ROM.    Generalized muscle weakness  Will continue with outpatient Rehab with [facility] PT/OT after he moves to the [place].    He will have supervision from staff for safety.  Assured him his family has worked out details with the AL facility.    Longstanding persistent atrial fibrillation  Does take Coumadin.  Last check was 3/27/2020.      Type 2 diabetes mellitus without complication, without long-term current use of insulin (H)  Lab Results   Component Value Date    A1C 6.1 04/10/2019     No medications.  A1c will need to be updated    Essential hypertension  Remains on lisinopril 10mg daily and metoprolol XL 50mg daily.      Hyperlipidemia LDL goal <100  Received Simvastatin 40mg at HS.  No changes.    Feel with his CVA, this helps build up of plague.      Slow transit constipation  Will leave with order for Miralax 17gm daily and Senokot - S 2 tab daily.      Sleep disturbances  Will remain on melatonin 3mg at HS.  Helps with calming his thoughts so he can rest.        Past Medical History:  has a past medical history of Actinic keratosis, Atrial fibrillation (H), Basal cell carcinoma nos (06/10), Cardiac dysrhythmia, unspecified, Cough, Diabetic eye exam (H) (8/29/14), Generalized osteoarthrosis, unspecified site, Other diseases of lung, not elsewhere classified, Pure hypercholesterolemia, and Unspecified essential hypertension.  Discharge Medications:    Current Outpatient Medications   Medication Sig Dispense Refill     blood glucose (NO BRAND SPECIFIED) test strip Test daily 100 each 1      Carboxymethylcellulose Sodium (THERATEARS) 0.25 % SOLN Apply 1 drop to eye 2 times daily       cholecalciferol (VITAMIN D3) 1000 units (25 mcg) capsule Take 1 capsule by mouth daily       gabapentin (NEURONTIN) 100 MG capsule Take 200 mg by mouth 2 times daily        hydrocortisone 1 % EX external cream Apply topically 2 times daily as needed       lisinopril (PRINIVIL/ZESTRIL) 10 MG tablet Take 1 tablet (10 mg) by mouth daily 90 tablet 3     melatonin 3 MG tablet Take 3 mg by mouth At Bedtime        metoprolol succinate ER (TOPROL-XL) 50 MG 24 hr tablet Take 1 tablet (50 mg) by mouth daily 90 tablet 3     nystatin (MYCOSTATIN) 187267 UNIT/GM external powder Apply topically 2 times daily       order for DME Equipment being ordered: bedside commode 1 Device 0     order for DME Equipment being ordered: 4 legged walker 1 Device 0     polyethylene glycol (MIRALAX) powder Take 17 g by mouth daily 510 g 1     senna-docusate (SENOKOT-S/PERICOLACE) 8.6-50 MG tablet Take 2 tablets by mouth daily       simvastatin (ZOCOR) 40 MG tablet TAKE 1 TABLET BY MOUTH EVERY NIGHT AT BEDTIME 90 tablet 3     warfarin ANTICOAGULANT (COUMADIN) 1 MG tablet Warfarin 4.5mg po daily        Medication Changes/Rationale:   1/17/2020  PT eval and treat for mobility, exercises, strengthening, neuro re-ed  1/20/2020  OT eval and treat for ADLs, functional mobility.    1/20/2020 INR = 1.84  Increase Coumadin to 3mg daily.  INR on Friday.  Increase Gabapentin to 200mg twice a day.  Discontinue Baclofen and Bisacodyl.  Thera tears 1 gtt each eye twice a day.  Discontinue Proventil.  CBC and BMP on Friday. Change melatonin to 3mg at HS for sleep disturbances.    2/12/2020  Neurology visit - continue with PT, make an appointment with Dr. Noel for shoulder evaluation.  RTC in 6 weeks.    1/29/2020  Nystatin powder to groin and penis twice a day til clear    2/21/2020  HC 1% cream to back twice a day prn for itching    2/24/2020  Dr. Noel visit for  "left shoulder - Activity as tolerated    2/24/2020  INR = 2.11   Coumadin 4mg daily  INR on 3/4/2020    3/4/2020  INR = 1.91 coumadin 4.5mg daily.  INR in one week for Atrial Fib    3/4/2020  Change eye drops to 1 gtt each eye every 4 hours prn.    3/23/2020  Change INR to 3/27 and continue with current coumadin dose.    3/24/2020  18\" standard w/c with corresponding seat and back cushion and standard foot pedals and arm rest to use indefinitely due to CVA with left hemiparesis.    4/10/2020  OK to discharge to [facility] with medications.  Outpatient Rehab with [facility] PT and OT eval and treat.      Controlled medications sent with patient:   not applicable/none     ROS: 4 point ROS including Respiratory, CV, GI and , other than that noted in the HPI,  is negative    Physical Exam: Vitals: /84   Pulse 70   Temp 97.3  F (36.3  C)   Resp 18   Ht 1.778 m (5' 10\")   Wt 94.6 kg (208 lb 8 oz)   SpO2 93%   BMI 29.92 kg/m   BMI= Body mass index is 29.92 kg/m .  Limited Visit exam done for COVID-19 precautions  GENERAL APPEARANCE:  Alert, in no distress, oriented, cooperative  EYES:  Conjunctiva and lids normal, wears corrective lenses  RESP:  respiratory effort and palpation of chest normal, lungs clear to auscultation , no respiratory distress  CV:  Palpation and auscultation of heart done , no edema, heart rate regular/irregular  ABDOMEN:  normal bowel sounds, soft, nontender, no hepatosplenomegaly or other masses, no guarding or rebound  M/S:   Gait and station abnormal ambulates with walker and someone besides him.  can propel own w/c  SKIN:  pink, dry and warm to touch  PSYCH:  oriented X 3, affect and mood normal, minor forgetfulness     SNF labs:    Component      Latest Ref Rng & Units 3/11/2020 3/27/2020   INR      0.86 - 1.14 2.08 (H) 2.50 (H)     DISCHARGE PLAN:    Follow up labs: INR due around 4/17/2020    Medical Follow Up:      Follow up with primary care provider when able to have a video " visit or a face to face when able as going through a pandemic currently.    Current [place] scheduled appointments:       Discharge Services: [facility] outpatient PT/OT    Discharge Instructions Verbalized to Patient at Discharge:     Weight bearing restrictions:  Weight bearing as tolerated.     TOTAL DISCHARGE TIME:   Greater than 30 minutes  Electronically signed by:  DINH Silva CNP     Video-Visit Details  Type of service:  Video Visit  Video End Time (time video stopped): 1:40pm  Distant Location (provider location):  [facility]     Face to face had been done prior for wheelchair                     Sincerely,        DINH Silva CNP

## 2020-04-10 NOTE — PROGRESS NOTES
"[place] DISCHARGE SUMMARY  Melvin Abda is being evaluated via a billable video visit due to the restrictions of the Covid-19 pandemic.   The patient has been notified of following:  \"This video visit will be conducted via a call between you and a [facility] provider. We have found that certain health care needs can be provided without the need for an in-person physical exam.  This service lets us provide the care you need with a video conversation. If during the course of the call the provider feels a video visit is not appropriate, you will not be charged for this service.\"   The provider has received verbal consent for a Video Visit from the patient or family/first contact? Yes  Patient or /facility staff would like the video invitation sent by: Text to cell phone: [phone]  Video Start Time: 110pm    PATIENT'S NAME: Melvin Abad  YOB: 1928  MEDICAL RECORD NUMBER:  [id number]  Place of Location at the time of visit: [facility]   PRIMARY CARE PROVIDER AND CLINIC RESPONSIBLE AFTER TRANSFER:   DINH Washington CNP, [address] [place] [address] 400 / [facility] [address] ;  [facility] [place]   Transferring providers: DINH Silva CNP, Jacques Purcell MD  Recent Hospitalization/ED:  [facility] stay 12/21/19 through 1/17/20  Date of SNF Admission: January / 17 / 2020  Date of SNF (anticipated) Discharge: April / 14 / 2020  Discharged to: new assisted living for patient [facility]  Cognitive Scores: BIMS: 13/15  Physical Function: can propel own wheelchair, assist with walker and ambulating for balance  DME: Wheelchair  CODE STATUS/ADVANCE DIRECTIVES DISCUSSION:  DNR / DNI   ALLERGIES: Patient has no known allergies.    DISCHARGE DIAGNOSIS/NURSING FACILITY COURSE:   Right pontine stroke (H)  Hemiparesis affecting left side as late effect of cerebrovascular accident (CVA) (H)  - Melvin came to [facility] after suffering a right " pontine stroke with left side weakness.  Has been attending therapies and able to ambulate with a walker but someone with him typically due to balance.  Able to propel own w/c and face to face has been done for a new one for him as he will be discharging to [facility].  Has movement in the left side but not at full ROM.    Generalized muscle weakness  Will continue with outpatient Rehab with [facility] PT/OT after he moves to the [place].    He will have supervision from staff for safety.  Assured him his family has worked out details with the AL facility.    Longstanding persistent atrial fibrillation  Does take Coumadin.  Last check was 3/27/2020.      Type 2 diabetes mellitus without complication, without long-term current use of insulin (H)  Lab Results   Component Value Date    A1C 6.1 04/10/2019     No medications.  A1c will need to be updated    Essential hypertension  Remains on lisinopril 10mg daily and metoprolol XL 50mg daily.      Hyperlipidemia LDL goal <100  Received Simvastatin 40mg at HS.  No changes.    Feel with his CVA, this helps build up of plague.      Slow transit constipation  Will leave with order for Miralax 17gm daily and Senokot - S 2 tab daily.      Sleep disturbances  Will remain on melatonin 3mg at HS.  Helps with calming his thoughts so he can rest.        Past Medical History:  has a past medical history of Actinic keratosis, Atrial fibrillation (H), Basal cell carcinoma nos (06/10), Cardiac dysrhythmia, unspecified, Cough, Diabetic eye exam (H) (8/29/14), Generalized osteoarthrosis, unspecified site, Other diseases of lung, not elsewhere classified, Pure hypercholesterolemia, and Unspecified essential hypertension.  Discharge Medications:    Current Outpatient Medications   Medication Sig Dispense Refill     blood glucose (NO BRAND SPECIFIED) test strip Test daily 100 each 1     Carboxymethylcellulose Sodium (THERATEARS) 0.25 % SOLN Apply 1 drop to eye 2 times daily        cholecalciferol (VITAMIN D3) 1000 units (25 mcg) capsule Take 1 capsule by mouth daily       gabapentin (NEURONTIN) 100 MG capsule Take 200 mg by mouth 2 times daily        hydrocortisone 1 % EX external cream Apply topically 2 times daily as needed       lisinopril (PRINIVIL/ZESTRIL) 10 MG tablet Take 1 tablet (10 mg) by mouth daily 90 tablet 3     melatonin 3 MG tablet Take 3 mg by mouth At Bedtime        metoprolol succinate ER (TOPROL-XL) 50 MG 24 hr tablet Take 1 tablet (50 mg) by mouth daily 90 tablet 3     nystatin (MYCOSTATIN) 110142 UNIT/GM external powder Apply topically 2 times daily       order for DME Equipment being ordered: bedside commode 1 Device 0     order for DME Equipment being ordered: 4 legged walker 1 Device 0     polyethylene glycol (MIRALAX) powder Take 17 g by mouth daily 510 g 1     senna-docusate (SENOKOT-S/PERICOLACE) 8.6-50 MG tablet Take 2 tablets by mouth daily       simvastatin (ZOCOR) 40 MG tablet TAKE 1 TABLET BY MOUTH EVERY NIGHT AT BEDTIME 90 tablet 3     warfarin ANTICOAGULANT (COUMADIN) 1 MG tablet Warfarin 4.5mg po daily        Medication Changes/Rationale:   1/17/2020  PT eval and treat for mobility, exercises, strengthening, neuro re-ed  1/20/2020  OT eval and treat for ADLs, functional mobility.    1/20/2020 INR = 1.84  Increase Coumadin to 3mg daily.  INR on Friday.  Increase Gabapentin to 200mg twice a day.  Discontinue Baclofen and Bisacodyl.  Thera tears 1 gtt each eye twice a day.  Discontinue Proventil.  CBC and BMP on Friday. Change melatonin to 3mg at HS for sleep disturbances.    2/12/2020  Neurology visit - continue with PT, make an appointment with Dr. Noel for shoulder evaluation.  RTC in 6 weeks.    1/29/2020  Nystatin powder to groin and penis twice a day til clear    2/21/2020  HC 1% cream to back twice a day prn for itching    2/24/2020  Dr. Noel visit for left shoulder - Activity as tolerated    2/24/2020  INR = 2.11   Coumadin 4mg daily  INR on  "3/4/2020    3/4/2020  INR = 1.91 coumadin 4.5mg daily.  INR in one week for Atrial Fib    3/4/2020  Change eye drops to 1 gtt each eye every 4 hours prn.    3/23/2020  Change INR to 3/27 and continue with current coumadin dose.    3/24/2020  18\" standard w/c with corresponding seat and back cushion and standard foot pedals and arm rest to use indefinitely due to CVA with left hemiparesis.    4/10/2020  OK to discharge to [facility] with medications.  Outpatient Rehab with [facility] PT and OT eval and treat.      Controlled medications sent with patient:   not applicable/none     ROS: 4 point ROS including Respiratory, CV, GI and , other than that noted in the HPI,  is negative    Physical Exam: Vitals: /84   Pulse 70   Temp 97.3  F (36.3  C)   Resp 18   Ht 1.778 m (5' 10\")   Wt 94.6 kg (208 lb 8 oz)   SpO2 93%   BMI 29.92 kg/m   BMI= Body mass index is 29.92 kg/m .  Limited Visit exam done for COVID-19 precautions  GENERAL APPEARANCE:  Alert, in no distress, oriented, cooperative  EYES:  Conjunctiva and lids normal, wears corrective lenses  RESP:  respiratory effort and palpation of chest normal, lungs clear to auscultation , no respiratory distress  CV:  Palpation and auscultation of heart done , no edema, heart rate regular/irregular  ABDOMEN:  normal bowel sounds, soft, nontender, no hepatosplenomegaly or other masses, no guarding or rebound  M/S:   Gait and station abnormal ambulates with walker and someone besides him.  can propel own w/c  SKIN:  pink, dry and warm to touch  PSYCH:  oriented X 3, affect and mood normal, minor forgetfulness     SNF labs:    Component      Latest Ref Rng & Units 3/11/2020 3/27/2020   INR      0.86 - 1.14 2.08 (H) 2.50 (H)     DISCHARGE PLAN:    Follow up labs: INR due around 4/17/2020    Medical Follow Up:      Follow up with primary care provider when able to have a video visit or a face to face when able as going through a pandemic currently.    Current [place] " scheduled appointments:       Discharge Services: [facility] outpatient PT/OT    Discharge Instructions Verbalized to Patient at Discharge:     Weight bearing restrictions:  Weight bearing as tolerated.     TOTAL DISCHARGE TIME:   Greater than 30 minutes  Electronically signed by:  DINH Silva CNP     Video-Visit Details  Type of service:  Video Visit  Video End Time (time video stopped): 1:40pm  Distant Location (provider location):  [facility]     Face to face had been done prior for wheelchair

## 2020-04-28 ENCOUNTER — TELEPHONE (OUTPATIENT)
Dept: GERIATRICS | Facility: CLINIC | Age: 85
End: 2020-04-28

## 2020-04-29 ENCOUNTER — TELEPHONE (OUTPATIENT)
Dept: INTERNAL MEDICINE | Facility: CLINIC | Age: 85
End: 2020-04-29

## 2020-04-29 DIAGNOSIS — M62.81 GENERALIZED MUSCLE WEAKNESS: Primary | ICD-10-CM

## 2020-04-29 NOTE — TELEPHONE ENCOUNTER
Ingrid valdez faxed the clinic stating this resident would benefit from side rails/grab bars. Need orders placed and faxed to care facility at [phone]. DME order pending for Dr. Alejandro to sign in Dr. Huston absence.

## 2020-05-01 ENCOUNTER — TELEPHONE (OUTPATIENT)
Dept: ANTICOAGULATION | Facility: CLINIC | Age: 85
End: 2020-05-01

## 2020-05-01 ENCOUNTER — ANTICOAGULATION THERAPY VISIT (OUTPATIENT)
Dept: ANTICOAGULATION | Facility: CLINIC | Age: 85
End: 2020-05-01
Payer: COMMERCIAL

## 2020-05-01 ENCOUNTER — HOSPITAL LABORATORY (OUTPATIENT)
Facility: OTHER | Age: 85
End: 2020-05-01

## 2020-05-01 DIAGNOSIS — Z79.01 LONG TERM CURRENT USE OF ANTICOAGULANT THERAPY: Primary | ICD-10-CM

## 2020-05-01 DIAGNOSIS — Z79.01 LONG TERM CURRENT USE OF ANTICOAGULANT THERAPY: ICD-10-CM

## 2020-05-01 DIAGNOSIS — I48.20 CHRONIC ATRIAL FIBRILLATION (H): ICD-10-CM

## 2020-05-01 LAB — INR PPP: 2.7 (ref 0.86–1.14)

## 2020-05-01 PROCEDURE — 99207 ZZC NO CHARGE NURSE ONLY: CPT | Performed by: INTERNAL MEDICINE

## 2020-05-01 NOTE — PROGRESS NOTES
ANTICOAGULATION FOLLOW-UP CLINIC VISIT    Patient Name:  Melvin Abad  Date:  2020  Contact Type:  Telephone/ Emily, [phone] - [facility] nurse    SUBJECTIVE:  Patient Findings     Comments:   Orders given to nurse at [facility], [phone]  Shani Ross RN          Clinical Outcomes     Negatives:   Major bleeding event, Thromboembolic event, Anticoagulation-related hospital admission, Anticoagulation-related ED visit, Anticoagulation-related fatality    Comments:   Or[facility], [phone]  Shani Ross RN             OBJECTIVE    INR   Date Value Ref Range Status   2020 2.70 (H) 0.86 - 1.14 Final       ASSESSMENT / PLAN  INR assessment THER    Recheck INR In: 4 WEEKS    INR Location Outside lab      Anticoagulation Summary  As of 2020    INR goal:   2.0-3.0   TTR:   91.6 % (1 y)   INR used for dosin.70 (2020)   Warfarin maintenance plan:   4.5 mg (3 mg x 1.5) every day   Full warfarin instructions:   4.5 mg every day   Weekly warfarin total:   31.5 mg   Plan last modified:   Shani Ross RN (2020)   Next INR check:   2020   Target end date:       Indications    Atrial fibrillation (Resolved) [I48.91]  Long-term (current) use of anticoagulants [Z79.01] [Z79.01]             Anticoagulation Episode Summary     INR check location:       Preferred lab:       Send INR reminders to:   [facility] [place]    Comments:   5 mg tablets, AM taker, likes book, BP       Anticoagulation Care Providers     Provider Role Specialty Phone number    Lazaro Huston MD [facility] Internal Medicine [phone]            See the Encounter Report to view Anticoagulation Flowsheet and Dosing Calendar (Go to Encounters tab in chart review, and find the Anticoagulation Therapy Visit)    Dosage adjustment made based on physician directed care plan.      Shani Ross RN

## 2020-05-11 DIAGNOSIS — E78.5 HYPERLIPIDEMIA LDL GOAL <100: ICD-10-CM

## 2020-05-11 DIAGNOSIS — I10 ESSENTIAL HYPERTENSION, BENIGN: ICD-10-CM

## 2020-05-11 DIAGNOSIS — I48.11 LONGSTANDING PERSISTENT ATRIAL FIBRILLATION (H): ICD-10-CM

## 2020-05-11 DIAGNOSIS — E11.9 TYPE 2 DIABETES MELLITUS WITHOUT COMPLICATION, WITHOUT LONG-TERM CURRENT USE OF INSULIN (H): Primary | ICD-10-CM

## 2020-05-11 RX ORDER — WARFARIN SODIUM 1 MG/1
TABLET ORAL
Qty: 100 TABLET | Refills: 0 | Status: SHIPPED | OUTPATIENT
Start: 2020-05-11 | End: 2020-06-03

## 2020-05-11 RX ORDER — GABAPENTIN 100 MG/1
200 CAPSULE ORAL 2 TIMES DAILY
Qty: 540 CAPSULE | Refills: 3 | Status: SHIPPED | OUTPATIENT
Start: 2020-05-11 | End: 2021-07-26

## 2020-05-11 RX ORDER — WARFARIN SODIUM 1 MG/1
TABLET ORAL
Qty: 30 TABLET | Refills: 0 | Status: SHIPPED | OUTPATIENT
Start: 2020-05-11 | End: 2020-05-11

## 2020-05-11 RX ORDER — SIMVASTATIN 40 MG
TABLET ORAL
Qty: 90 TABLET | Refills: 3 | Status: SHIPPED | OUTPATIENT
Start: 2020-05-11 | End: 2021-04-16

## 2020-05-11 RX ORDER — METOPROLOL SUCCINATE 50 MG/1
50 TABLET, EXTENDED RELEASE ORAL DAILY
Qty: 90 TABLET | Refills: 3 | Status: SHIPPED | OUTPATIENT
Start: 2020-05-11 | End: 2021-04-16

## 2020-05-11 RX ORDER — WARFARIN SODIUM 1 MG/1
TABLET ORAL
Qty: 100 TABLET | Refills: 0 | OUTPATIENT
Start: 2020-05-11

## 2020-05-11 RX ORDER — LISINOPRIL 10 MG/1
10 TABLET ORAL DAILY
Qty: 90 TABLET | Refills: 3 | Status: SHIPPED | OUTPATIENT
Start: 2020-05-11 | End: 2021-04-16

## 2020-05-11 NOTE — TELEPHONE ENCOUNTER
"Lisiniopril   Last Written Prescription Date:  4/10/19  Last Fill Quantity: 90,  # refills: 3   Last office visit: 7/24/2019 with prescribing provider:  Dr. Huston   Future Office Visit:  NONE    Neurontin  Last Written Prescription Date:  Unknown, Listed  As Historically   Last office visit: 7/24/2019 with prescribing provider:  Dr. Huston   Future Office Visit:      Metoprolol  Last Written Prescription Date:  4/10/19  Last Fill Quantity: 90,  # refills: 3   Last office visit: 7/24/2019 with prescribing provider:  Dr. Huston.   Future Office Visit: NONE    Zocor  Last Written Prescription Date:  3/18/19  Last Fill Quantity: 90,  # refills: 3   Last office visit: 7/24/2019 with prescribing provider:   Dr. Huston   Future Office Visit:  NONE    Pt is out of medication.  Requested Prescriptions   Pending Prescriptions Disp Refills     lisinopril (ZESTRIL) 10 MG tablet 90 tablet 3     Sig: Take 1 tablet (10 mg) by mouth daily       ACE Inhibitors (Including Combos) Protocol Passed - 5/11/2020  3:18 PM        Passed - Blood pressure under 140/90 in past 12 months     BP Readings from Last 3 Encounters:   04/10/20 124/84   04/07/20 (!) 141/88   03/27/20 105/61           Passed - Recent (12 mo) or future (30 days) visit within the authorizing provider's specialty     Patient has had an office visit with the authorizing provider or a provider within the authorizing providers department within the previous 12 mos or has a future within next 30 days. See \"Patient Info\" tab in inbasket, or \"Choose Columns\" in Meds & Orders section of the refill encounter.          Passed - Medication is active on med list        Passed - Patient is age 18 or older        Passed - Normal serum creatinine on file in past 12 months     Recent Labs   Lab Test 01/24/20  0730   CR 0.74   Ok to refill medication if creatinine is low          Passed - Normal serum potassium on file in past 12 months     Recent Labs   Lab Test 01/24/20  0730 " "  POTASSIUM 3.7              gabapentin (NEURONTIN) 100 MG capsule 540 capsule 3     Sig: Take 2 capsules (200 mg) by mouth 2 times daily       There is no refill protocol information for this order        metoprolol succinate ER (TOPROL-XL) 50 MG 24 hr tablet 90 tablet 3     Sig: Take 1 tablet (50 mg) by mouth daily       Beta-Blockers Protocol Passed - 5/11/2020  3:18 PM        Passed - Blood pressure under 140/90 in past 12 months     BP Readings from Last 3 Encounters:   04/10/20 124/84   04/07/20 (!) 141/88   03/27/20 105/61           Passed - Patient is age 6 or older        Passed - Recent (12 mo) or future (30 days) visit within the authorizing provider's specialty     Patient has had an office visit with the authorizing provider or a provider within the authorizing providers department within the previous 12 mos or has a future within next 30 days. See \"Patient Info\" tab in inbasket, or \"Choose Columns\" in Meds & Orders section of the refill encounter.            Passed - Medication is active on med list           simvastatin (ZOCOR) 40 MG tablet 90 tablet 3     Sig: TAKE 1 TABLET BY MOUTH EVERY NIGHT AT BEDTIME       Statins Protocol Failed - 5/11/2020  3:18 PM        Failed - LDL on file in past 12 months     Recent Labs   Lab Test 04/10/19  1008   LDL 55           Passed - No abnormal creatine kinase in past 12 months     No lab results found.         Passed - Recent (12 mo) or future (30 days) visit within the authorizing provider's specialty     Patient has had an office visit with the authorizing provider or a provider within the authorizing providers department within the previous 12 mos or has a future within next 30 days. See \"Patient Info\" tab in inbasket, or \"Choose Columns\" in Meds & Orders section of the refill encounter.          Passed - Medication is active on med list        Passed - Patient is age 18 or older         Routing refill request to provider for review/approval because:  Labs not " current:  LDL  Dr. Huston approved the Warfarin.  ZULEIMA Russell

## 2020-05-11 NOTE — TELEPHONE ENCOUNTER
Daughter in law, [facility] calls stating he is out of his medication, and is needing to get this today.   Also is on Warfarin 4.5 mg daily, and is needing that along with the others.   IS asking for 90 days supply with extra refills.   was discharged from [facility] on the 14th and is now at [facility] in [place].  Gets medications through [facility] [place].

## 2020-05-29 ENCOUNTER — HOSPITAL LABORATORY (OUTPATIENT)
Facility: OTHER | Age: 85
End: 2020-05-29

## 2020-05-29 ENCOUNTER — ANTICOAGULATION THERAPY VISIT (OUTPATIENT)
Dept: ANTICOAGULATION | Facility: CLINIC | Age: 85
End: 2020-05-29
Payer: COMMERCIAL

## 2020-05-29 DIAGNOSIS — I48.20 CHRONIC ATRIAL FIBRILLATION (H): ICD-10-CM

## 2020-05-29 DIAGNOSIS — Z79.01 LONG TERM CURRENT USE OF ANTICOAGULANT THERAPY: ICD-10-CM

## 2020-05-29 LAB — INR PPP: 2.45 (ref 0.86–1.14)

## 2020-05-29 PROCEDURE — 99207 ZZC NO CHARGE NURSE ONLY: CPT | Performed by: INTERNAL MEDICINE

## 2020-05-29 NOTE — PROGRESS NOTES
ANTICOAGULATION FOLLOW-UP CLINIC VISIT    Patient Name:  Melvin Abad  Date:  2020  Contact Type:  Telephone    SUBJECTIVE:  Patient Findings     Comments:   I left a detailed voicemail for Cheyenne Jackson nurse, [phone], with the orders reflected in flowsheet. I have also requested a call back if there have been any missed doses, concerns, illness, fever, or if there have been any changes in medications, activity level, or diet  Shani Ross RN          Clinical Outcomes     Comments:   I left a detailed voicemail for Cheyenne Jackson nurse, [phone], with the orders reflected in flowsheet. I have also requested a call back if there have been any missed doses, concerns, illness, fever, or if there have been any changes in medications, activity level, or diet  Shani Ross RN             OBJECTIVE    Recent labs: (last 7 days)     20  0720   INR 2.45*       ASSESSMENT / PLAN  INR assessment THER    Recheck INR In: 4 WEEKS    INR Location [facility]      Anticoagulation Summary  As of 2020    INR goal:   2.0-3.0   TTR:   91.6 % (1 y)   Prior goal:   2.0-3.0   INR used for dosin.45 (2020)   Warfarin maintenance plan:   4.5 mg (3 mg x 1.5) every day   Full warfarin instructions:   4.5 mg every day   Weekly warfarin total:   31.5 mg   No change documented:   Shani Ross, RN   Plan last modified:   Shani Ross RN (2020)   Next INR check:   2020   Target end date:   Indefinite    Indications    Atrial fibrillation (Resolved) [I48.91]  Long-term (current) use of anticoagulants [Z79.01] [Z79.01]  Chronic atrial fibrillation [I48.20]             Anticoagulation Episode Summary     INR check location:       Preferred lab:       Send INR reminders to:   [facility] [place]    Comments:   5 mg tablets, AM taker, likes book, BP       Anticoagulation Care Providers     Provider Role Specialty Phone number    Fransisco Cordon MD Referring [facility] [phone]    Robel  Lazaro BROWN MD [facility] Internal Medicine [phone]            See the Encounter Report to view Anticoagulation Flowsheet and Dosing Calendar (Go to Encounters tab in chart review, and find the Anticoagulation Therapy Visit)    Dosage adjustment made based on physician directed care plan.      Shani Ross RN

## 2020-06-01 ENCOUNTER — DOCUMENTATION ONLY (OUTPATIENT)
Dept: OTHER | Facility: CLINIC | Age: 85
End: 2020-06-01

## 2020-06-02 DIAGNOSIS — I48.11 LONGSTANDING PERSISTENT ATRIAL FIBRILLATION (H): ICD-10-CM

## 2020-06-03 ENCOUNTER — MYC MEDICAL ADVICE (OUTPATIENT)
Dept: INTERNAL MEDICINE | Facility: CLINIC | Age: 85
End: 2020-06-03

## 2020-06-03 RX ORDER — WARFARIN SODIUM 1 MG/1
TABLET ORAL
Qty: 100 TABLET | Refills: 0 | Status: SHIPPED
Start: 2020-06-03 | End: 2020-06-03

## 2020-06-03 RX ORDER — WARFARIN SODIUM 5 MG/1
TABLET ORAL
Qty: 90 TABLET | Refills: 1 | Status: SHIPPED | OUTPATIENT
Start: 2020-06-03 | End: 2020-11-18

## 2020-06-03 NOTE — TELEPHONE ENCOUNTER
Verified with [facility] that they did get the refill. Apparently Toña had called them and was confused about the dosing, but she may not have seen my EPSt message yet.     Eli Diaz RN- [facility] RN

## 2020-06-03 NOTE — TELEPHONE ENCOUNTER
Tried to call gabriella felton to let them know of the rx change and dose, could not get thru (fast busy signal) will try again. Sent in 5 mg tablets, take 2.5 mg Mon, Fri and 5 mg all other days. Sent [employer] message to daughter in law.    Eli Diaz, RN- [facility] RN

## 2020-06-26 ENCOUNTER — HOSPITAL LABORATORY (OUTPATIENT)
Facility: OTHER | Age: 85
End: 2020-06-26

## 2020-06-26 ENCOUNTER — ANTICOAGULATION THERAPY VISIT (OUTPATIENT)
Dept: ANTICOAGULATION | Facility: CLINIC | Age: 85
End: 2020-06-26
Payer: COMMERCIAL

## 2020-06-26 DIAGNOSIS — I48.20 CHRONIC ATRIAL FIBRILLATION (H): ICD-10-CM

## 2020-06-26 DIAGNOSIS — Z79.01 LONG TERM CURRENT USE OF ANTICOAGULANT THERAPY: ICD-10-CM

## 2020-06-26 LAB — INR PPP: 2.41 (ref 0.86–1.14)

## 2020-06-26 PROCEDURE — 99207 ZZC NO CHARGE NURSE ONLY: CPT | Performed by: INTERNAL MEDICINE

## 2020-06-26 NOTE — PROGRESS NOTES
ANTICOAGULATION FOLLOW-UP CLINIC VISIT    Patient Name:  Melvin Abad  Date:  2020a  Contact Type:  Telephone/  left     SUBJECTIVE:  Patient Findings     Comments:   Orders given via  - [facility] nurse [phone]  .    I left a detailed voicemail with the orders reflected in flowsheet. I have also requested a call back if there have been any missed doses, concerns, illness, fever, or if there have been any changes in medications, activity level, or diet  Shani Ross RN          Clinical Outcomes     Comments:   Orders given via  - [facility] nurse [phone]  .    I left a detailed voicemail with the orders reflected in flowsheet. I have also requested a call back if there have been any missed doses, concerns, illness, fever, or if there have been any changes in medications, activity level, or diet  Shani Ross RN             OBJECTIVE    Recent labs: (last 7 days)     20  0713   INR 2.41*       ASSESSMENT / PLAN  INR assessment THER    Recheck INR In: 4 WEEKS    INR Location [facility]      Anticoagulation Summary  As of 2020    INR goal:   2.0-3.0   TTR:   91.6 % (1 y)   INR used for dosin.41 (2020)   Warfarin maintenance plan:   4.5 mg (3 mg x 1.5) every day   Full warfarin instructions:   4.5 mg every day   Weekly warfarin total:   31.5 mg   No change documented:   Shani Ross, RN   Plan last modified:   Shani Ross RN (2020)   Next INR check:   2020   Target end date:   Indefinite    Indications    Atrial fibrillation (Resolved) [I48.91]  Long-term (current) use of anticoagulants [Z79.01] [Z79.01]  Chronic atrial fibrillation (H) [I48.20]             Anticoagulation Episode Summary     INR check location:       Preferred lab:       Send INR reminders to:   [facility] [place]    Comments:   5 mg tablets, AM taker, likes book, BP       Anticoagulation Care Providers     Provider Role Specialty Phone number    Fransisco Cordon MD Referring  [facility] [phone]    Lazaro Huston MD [facility] Internal Medicine [phone]            See the Encounter Report to view Anticoagulation Flowsheet and Dosing Calendar (Go to Encounters tab in chart review, and find the Anticoagulation Therapy Visit)    Dosage adjustment made based on physician directed care plan.      Shani Ross RN

## 2020-07-27 ENCOUNTER — HOSPITAL LABORATORY (OUTPATIENT)
Facility: OTHER | Age: 85
End: 2020-07-27

## 2020-07-27 ENCOUNTER — ANTICOAGULATION THERAPY VISIT (OUTPATIENT)
Dept: ANTICOAGULATION | Facility: CLINIC | Age: 85
End: 2020-07-27
Payer: COMMERCIAL

## 2020-07-27 DIAGNOSIS — I48.20 CHRONIC ATRIAL FIBRILLATION (H): ICD-10-CM

## 2020-07-27 DIAGNOSIS — Z79.01 LONG TERM CURRENT USE OF ANTICOAGULANT THERAPY: ICD-10-CM

## 2020-07-27 LAB — INR PPP: 2.43 (ref 0.86–1.14)

## 2020-07-27 PROCEDURE — 99207 ZZC NO CHARGE NURSE ONLY: CPT | Performed by: INTERNAL MEDICINE

## 2020-07-27 NOTE — PROGRESS NOTES
ANTICOAGULATION FOLLOW-UP CLINIC VISIT    Patient Name:  Melvin Abad  Date:  2020  Contact Type:  [facility]il [facility] [phone]        SUBJECTIVE:  Patient Findings     Comments:   Orders given to Sahil nurse at [facility], [phone]  The patient was assessed for diet, medication, and activity level changes, missed or extra doses, bruising or bleeding, with no problem findings.  Shani Ross RN          Clinical Outcomes     Comments:   Orders given to Sahil nurse at [facility], [phone]  The patient was assessed for diet, medication, and activity level changes, missed or extra doses, bruising or bleeding, with no problem findings.  Shani Ross RN             OBJECTIVE    Recent labs: (last 7 days)     20  0700   INR 2.43*       ASSESSMENT / PLAN  INR assessment THER    Recheck INR In: 4 WEEKS    INR Location [facility]      Anticoagulation Summary  As of 2020    INR goal:   2.0-3.0   TTR:   91.6 % (1 y)   INR used for dosin.43 (2020)   Warfarin maintenance plan:   4.5 mg (3 mg x 1.5) every day   Full warfarin instructions:   4.5 mg every day   Weekly warfarin total:   31.5 mg   No change documented:   Shani Ross RN   Plan last modified:   Shani Ross RN (2020)   Next INR check:   2020   Target end date:   Indefinite    Indications    Atrial fibrillation (Resolved) [I48.91]  Long-term (current) use of anticoagulants [Z79.01] [Z79.01]  Chronic atrial fibrillation (H) [I48.20]             Anticoagulation Episode Summary     INR check location:       Preferred lab:       Send INR reminders to:   [facility] [place]    Comments:   5 mg tablets, AM taker, likes book, BP       Anticoagulation Care Providers     Provider Role Specialty Phone number    Fransisco Cordon MD Referring Family Practice [phone]    Lazaro Huston MD Responsible Internal Medicine [phone]            See the Encounter Report to view Anticoagulation Flowsheet and Dosing Calendar  (Go to Encounters tab in chart review, and find the Anticoagulation Therapy Visit)    Dosage adjustment made based on physician directed care plan.      Shani Ross RN

## 2020-08-04 ENCOUNTER — TELEPHONE (OUTPATIENT)
Dept: INTERNAL MEDICINE | Facility: CLINIC | Age: 85
End: 2020-08-04

## 2020-08-04 NOTE — TELEPHONE ENCOUNTER
Phoned Toña to clarify if she wanted to schedule an office visit with Dr. Huston for patient at this time when Dr. Robel SHERIFF returns to the clinic.  Toña stated and clarified that she can wait to hear from Dr. Robel MD regarding these concerns when her returns on 8/10/2020.  Informed Toña this RN would place this message in his in basket for his review upon his return.  Toña stated understanding.  Advised Toña to phone with any further questions or concerns.    Forwarded to Dr. Robel Agustin for review on 8/4/2020 message.    Tatianna Kirkland, ZULEIMA

## 2020-08-04 NOTE — TELEPHONE ENCOUNTER
Reason for Call:  Same Day Appointment, Requested Provider:  Lazaro Huston MD    PCP: Lazaro Huston    Reason for visit: POA is calling stating he has been having a lot of different symptoms. He has been having dizzy spells, itchy back, stiff back and arm, weak eye. Cheyenne felton is telling family that he should come into the clinic for a face to face visit to discuss these symptoms once PCP returns back to the clinic.     Duration of symptoms: 1 month or so    Have you been treated for this in the past? No    Additional comments: Patient is aware that PCP is not in the office and is ok with waiting for their return before hearing anything back.     Can we leave a detailed message on this number? YES    Phone number patient can be reached at: Other phone number:  [phone] Toña    Dwayne Time: any    Call taken on 8/4/2020 at 4:20 PM by Carolina Ramirez CNA

## 2020-08-06 NOTE — TELEPHONE ENCOUNTER
I have scheduled the pt for Monday, 8/10/2020 at 3:00 pm. Hien Hartley CMA ([facility])

## 2020-08-10 ENCOUNTER — OFFICE VISIT (OUTPATIENT)
Dept: INTERNAL MEDICINE | Facility: CLINIC | Age: 85
End: 2020-08-10
Payer: COMMERCIAL

## 2020-08-10 VITALS
HEART RATE: 62 BPM | RESPIRATION RATE: 16 BRPM | OXYGEN SATURATION: 96 % | WEIGHT: 212 LBS | TEMPERATURE: 98 F | DIASTOLIC BLOOD PRESSURE: 76 MMHG | SYSTOLIC BLOOD PRESSURE: 144 MMHG | BODY MASS INDEX: 30.42 KG/M2

## 2020-08-10 DIAGNOSIS — I48.11 LONGSTANDING PERSISTENT ATRIAL FIBRILLATION (H): ICD-10-CM

## 2020-08-10 DIAGNOSIS — H81.13 BENIGN PAROXYSMAL POSITIONAL VERTIGO, BILATERAL: Primary | ICD-10-CM

## 2020-08-10 DIAGNOSIS — I63.9 CEREBROVASCULAR ACCIDENT (CVA), UNSPECIFIED MECHANISM (H): ICD-10-CM

## 2020-08-10 DIAGNOSIS — Z79.01 LONG TERM CURRENT USE OF ANTICOAGULANT THERAPY: ICD-10-CM

## 2020-08-10 PROCEDURE — 99214 OFFICE O/P EST MOD 30 MIN: CPT | Performed by: INTERNAL MEDICINE

## 2020-08-10 RX ORDER — MECLIZINE HYDROCHLORIDE 25 MG/1
25 TABLET ORAL 3 TIMES DAILY PRN
Qty: 30 TABLET | Refills: 0 | Status: SHIPPED | OUTPATIENT
Start: 2020-08-10 | End: 2022-02-23

## 2020-08-10 ASSESSMENT — PAIN SCALES - GENERAL: PAINLEVEL: NO PAIN (0)

## 2020-08-10 NOTE — PROGRESS NOTES
Subjective     Melvin Abad is a 89 year old male who presents to clinic today for the following health issues:    HPI       Chief Complaint   Patient presents with     Dizziness     discuss some dizziness pt has been experiencing, started about two weeks ago     Stroke     follow up from stroke in December     Derm Problem     itchiness around the belt line, going on for a few months     Here with his daughter-in-law    Stroke in December and left hemiparesis, was at rehab and now at [facility].     Had an episode of near syncope, fell back in bed.  Needed help to sit up in bed.  Notices when turns his head.          Patient Active Problem List   Diagnosis     Long term current use of anticoagulant therapy     Hyperlipidemia LDL goal <100     Type 2 diabetes mellitus without complication (H)     Diverticulosis     Health Care Home     Essential hypertension     History of skin cancer     AK (actinic keratosis)     Gilbert syndrome     Carpal tunnel syndrome of right wrist     Cellulitis     Anemia due to blood loss, acute     DVT prophylaxis     Obesity (BMI 30-39.9)     Long-term (current) use of anticoagulants [Z79.01]     Pain of right lower leg     Contusion of right lower extremity     Leg pain     Type 2 diabetes mellitus without complication, without long-term current use of insulin (H)     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)     Diabetes mellitus with peripheral vascular disease (H)     Longstanding persistent atrial fibrillation (H)     Anticoagulation goal of INR 2 to 3     Slow transit constipation     Sleep disturbances     Right pontine stroke (H)     Hemiparesis affecting left side as late effect of cerebrovascular accident (CVA) (H)     Dry eyes     Restless legs syndrome (RLS)     Osteoarthritis of glenohumeral joint, left     Chronic atrial fibrillation (H)     Past Surgical History:   Procedure Laterality Date     C TOTAL KNEE ARTHROPLASTY  1997    Knee Replacement, Total     COLONOSCOPY   05/02/2007    Multiple bxs of diminutive polyps of ascending colon.     COLONOSCOPY  11/10/2010    COLONOSCOPY performed by MARISELA GRIMM at  GI      COLONOSCOPY W/WO BRUSH/WASH  10/19/2001     HC COLONOSCOPY W/WO BRUSH/WASH  11/02/2004      HEMORRHOIDOPEXY BY STAPLING  09/26/08       Social History     Tobacco Use     Smoking status: Former Smoker     Types: Cigarettes     Smokeless tobacco: Never Used     Tobacco comment: 5 years   Substance Use Topics     Alcohol use: No     Comment: denies     Family History   Problem Relation Age of Onset     Cancer Mother      Prostate Cancer Father          Current Outpatient Medications   Medication Sig Dispense Refill     blood glucose (NO BRAND SPECIFIED) test strip Test daily 100 each 1     Carboxymethylcellulose Sodium (THERATEARS) 0.25 % SOLN Apply 1 drop to eye 2 times daily       cholecalciferol (VITAMIN D3) 1000 units (25 mcg) capsule Take 1 capsule by mouth daily       gabapentin (NEURONTIN) 100 MG capsule Take 2 capsules (200 mg) by mouth 2 times daily 540 capsule 3     lisinopril (ZESTRIL) 10 MG tablet Take 1 tablet (10 mg) by mouth daily 90 tablet 3     meclizine (ANTIVERT) 25 MG tablet Take 1 tablet (25 mg) by mouth 3 times daily as needed for dizziness 30 tablet 0     metoprolol succinate ER (TOPROL-XL) 50 MG 24 hr tablet Take 1 tablet (50 mg) by mouth daily 90 tablet 3     order for DME Equipment being ordered: side rails/grab bars 1 each 0     order for DME Equipment being ordered: bedside commode 1 Device 0     order for DME Equipment being ordered: 4 legged walker 1 Device 0     polyethylene glycol (MIRALAX) powder Take 17 g by mouth daily 510 g 1     senna-docusate (SENOKOT-S/PERICOLACE) 8.6-50 MG tablet Take 2 tablets by mouth daily       simvastatin (ZOCOR) 40 MG tablet TAKE 1 TABLET BY MOUTH EVERY NIGHT AT BEDTIME 90 tablet 3     warfarin ANTICOAGULANT (COUMADIN) 5 MG tablet Take 2.5 mg (1/2 tab) on Mon, Fri and 5 mg (1 tab) all other days or as  directed by coumadin clinic 90 tablet 1     hydrocortisone 1 % EX external cream Apply topically 2 times daily as needed       No Known Allergies    Reviewed and updated as needed this visit by Provider         Review of Systems         Objective    BP (!) 144/76   Pulse 62   Temp 98  F (36.7  C) (Temporal)   Resp 16   Wt 96.2 kg (212 lb)   SpO2 96%   BMI 30.42 kg/m    Body mass index is 30.42 kg/m .  Physical Exam   GENERAL: healthy, alert and no distress  HENT: ear canals and TM's normal, nose and mouth without ulcers or lesions  NECK: no adenopathy, no asymmetry, masses, or scars and thyroid normal to palpation  RESP: lungs clear to auscultation - no rales, rhonchi or wheezes  CV: regular rate and rhythm, normal S1 S2, no S3 or S4, no murmur, click or rub, no peripheral edema and peripheral pulses strong  SKIN: no suspicious lesions or rashes  NEURO: Left arm is weak but can lift up to 90 degrees, left leg has weakness compared to the right.  Balance is poor needing a walker or two-person support  PSYCH: mentation appears normal, affect normal/bright    Diagnostic Test Results:  Labs reviewed in Epic        Assessment & Plan       ICD-10-CM    1. Benign paroxysmal positional vertigo, bilateral  H81.13 meclizine (ANTIVERT) 25 MG tablet   2. Longstanding persistent atrial fibrillation (H)  I48.11    3. Long term current use of anticoagulant therapy  Z79.01    4. Cerebrovascular accident (CVA), unspecified mechanism (H)  I63.9      This is a 89-year-old gentleman who had a right pontine stroke in December 2019.  He has been in rehab and has been living at the [facility].  Unfortunately has had some dizziness more recently.  Worse when he moves his head difficult to tell if it is orthostatic or positional.  He gives an impression more positional with turning his head left and right.  His ears are clear, due to his feel it is hard to get him in position for intact Hallpike maneuver.  We will slow down his  "movement.  I did prescribe meclizine to try as needed.  Recommended he drink fluids to maintain hydration and keep his blood pressure up.  If he has more problems they will let me know.  We may have to consider a repeat MRI to make sure he does not have another area of infarct.            BMI:   Estimated body mass index is 30.42 kg/m  as calculated from the following:    Height as of 4/10/20: 1.778 m (5' 10\").    Weight as of this encounter: 96.2 kg (212 lb).               No follow-ups on file.    Lazaro Huston MD  [facility]  "

## 2020-08-24 ENCOUNTER — ANTICOAGULATION THERAPY VISIT (OUTPATIENT)
Dept: ANTICOAGULATION | Facility: CLINIC | Age: 85
End: 2020-08-24
Payer: COMMERCIAL

## 2020-08-24 ENCOUNTER — HOSPITAL LABORATORY (OUTPATIENT)
Facility: OTHER | Age: 85
End: 2020-08-24

## 2020-08-24 DIAGNOSIS — I48.20 CHRONIC ATRIAL FIBRILLATION (H): ICD-10-CM

## 2020-08-24 DIAGNOSIS — Z79.01 LONG TERM CURRENT USE OF ANTICOAGULANT THERAPY: ICD-10-CM

## 2020-08-24 LAB — INR PPP: 2.07 (ref 0.86–1.14)

## 2020-08-24 PROCEDURE — 99207 ZZC NO CHARGE NURSE ONLY: CPT | Performed by: INTERNAL MEDICINE

## 2020-08-24 NOTE — PROGRESS NOTES
ANTICOAGULATION FOLLOW-UP CLINIC VISIT    Patient Name:  Melvin Abad  Date:  2020  Contact Type:  Telephone    SUBJECTIVE:  Patient Findings     Comments:   Left message on confidential VM at [facility] [phone]        Clinical Outcomes     Comments:   Left message on confidential VM at [facility] [phone]           OBJECTIVE    Recent labs: (last 7 days)     20  0650   INR 2.07*       ASSESSMENT / PLAN  INR assessment THER    Recheck INR In: 4 WEEKS    INR Location [facility]      Anticoagulation Summary  As of 2020    INR goal:   2.0-3.0   TTR:   91.6 % (1 y)   INR used for dosin.07 (2020)   Warfarin maintenance plan:   4.5 mg (3 mg x 1.5) every day   Full warfarin instructions:   4.5 mg every day   Weekly warfarin total:   31.5 mg   No change documented:   Eli Diaz RN   Plan last modified:   Shani Ross RN (2020)   Next INR check:   2020   Target end date:   Indefinite    Indications    Atrial fibrillation (Resolved) [I48.91]  Long-term (current) use of anticoagulants [Z79.01] [Z79.01]  Chronic atrial fibrillation (H) [I48.20]             Anticoagulation Episode Summary     INR check location:       Preferred lab:       Send INR reminders to:   [facility] [place]    Comments:   5 mg tablets, AM taker, likes book, BP       Anticoagulation Care Providers     Provider Role Specialty Phone number    Fransisco Cordon MD Referring Family Practice [phone]    Lazaro Huston MD Responsible Internal Medicine [phone]            See the Encounter Report to view Anticoagulation Flowsheet and Dosing Calendar (Go to Encounters tab in chart review, and find the Anticoagulation Therapy Visit)    Dosage adjustment made based on physician directed care plan.    Eli Diaz, RN

## 2020-09-21 ENCOUNTER — HOSPITAL LABORATORY (OUTPATIENT)
Facility: OTHER | Age: 85
End: 2020-09-21

## 2020-09-21 ENCOUNTER — ANTICOAGULATION THERAPY VISIT (OUTPATIENT)
Dept: ANTICOAGULATION | Facility: CLINIC | Age: 85
End: 2020-09-21
Payer: COMMERCIAL

## 2020-09-21 DIAGNOSIS — Z79.01 LONG TERM CURRENT USE OF ANTICOAGULANT THERAPY: ICD-10-CM

## 2020-09-21 DIAGNOSIS — I48.20 CHRONIC ATRIAL FIBRILLATION (H): ICD-10-CM

## 2020-09-21 LAB — INR PPP: 2.57 (ref 0.86–1.14)

## 2020-09-21 PROCEDURE — 99207 ZZC NO CHARGE NURSE ONLY: CPT | Performed by: INTERNAL MEDICINE

## 2020-09-21 NOTE — PROGRESS NOTES
ANTICOAGULATION FOLLOW-UP CLINIC VISIT    Patient Name:  Melvin Abad  Date:  2020  Contact Type:  Telephone    SUBJECTIVE:  Patient Findings     Comments:   I left a detailed voicemail for the [facility] nurse [phone] with the orders reflected in flowsheet. I have also requested a call back if there have been any missed doses, concerns, illness, fever, or if there have been any changes in medications, activity level, or diet  Shani Ross RN          Clinical Outcomes     Comments:   I left a detailed voicemail for the [facility] nurse [phone] with the orders reflected in flowsheet. I have also requested a call back if there have been any missed doses, concerns, illness, fever, or if there have been any changes in medications, activity level, or diet  Shani Ross RN             OBJECTIVE    Recent labs: (last 7 days)     20  0708   INR 2.57*       ASSESSMENT / PLAN  INR assessment THER    Recheck INR In: 4 WEEKS    INR Location [facility]      Anticoagulation Summary  As of 2020    INR goal:   2.0-3.0   TTR:   91.6 % (1 y)   INR used for dosin.57 (2020)   Warfarin maintenance plan:   4.5 mg (3 mg x 1.5) every day   Full warfarin instructions:   4.5 mg every day   Weekly warfarin total:   31.5 mg   No change documented:   Shani Ross RN   Plan last modified:   Shani Ross RN (2020)   Next INR check:   10/19/2020   Priority:   Maintenance   Target end date:   Indefinite    Indications    Atrial fibrillation (Resolved) [I48.91]  Long-term (current) use of anticoagulants [Z79.01] [Z79.01]  Chronic atrial fibrillation (H) [I48.20]             Anticoagulation Episode Summary     INR check location:       Preferred lab:       Send INR reminders to:   [facility] [place]    Comments:   5 mg tablets, AM taker, likes book, BP       Anticoagulation Care Providers     Provider Role Specialty Phone number    Fransisco Cordon MD [facility] [phone]     Lazaro Huston MD [facility] Internal Medicine [phone]            See the Encounter Report to view Anticoagulation Flowsheet and Dosing Calendar (Go to Encounters tab in chart review, and find the Anticoagulation Therapy Visit)    Dosage adjustment made based on physician directed care plan.      Shani Ross RN

## 2020-09-28 ENCOUNTER — HOSPITAL ENCOUNTER (EMERGENCY)
Facility: CLINIC | Age: 85
Discharge: HOME | End: 2020-09-28
Attending: EMERGENCY MEDICINE | Admitting: EMERGENCY MEDICINE
Payer: COMMERCIAL

## 2020-09-28 ENCOUNTER — TELEPHONE (OUTPATIENT)
Dept: INTERNAL MEDICINE | Facility: CLINIC | Age: 85
End: 2020-09-28

## 2020-09-28 ENCOUNTER — APPOINTMENT (OUTPATIENT)
Dept: GENERAL RADIOLOGY | Facility: CLINIC | Age: 85
End: 2020-09-28
Attending: EMERGENCY MEDICINE
Payer: COMMERCIAL

## 2020-09-28 ENCOUNTER — APPOINTMENT (OUTPATIENT)
Dept: ULTRASOUND IMAGING | Facility: CLINIC | Age: 85
End: 2020-09-28
Attending: EMERGENCY MEDICINE
Payer: COMMERCIAL

## 2020-09-28 VITALS
BODY MASS INDEX: 30.55 KG/M2 | DIASTOLIC BLOOD PRESSURE: 85 MMHG | SYSTOLIC BLOOD PRESSURE: 149 MMHG | HEART RATE: 66 BPM | TEMPERATURE: 96.8 F | WEIGHT: 212.9 LBS | RESPIRATION RATE: 6 BRPM | OXYGEN SATURATION: 97 %

## 2020-09-28 DIAGNOSIS — R60.0 PERIPHERAL EDEMA: ICD-10-CM

## 2020-09-28 DIAGNOSIS — R23.8 BLISTERS OF MULTIPLE SITES: ICD-10-CM

## 2020-09-28 LAB
ALBUMIN SERPL-MCNC: 3.4 G/DL (ref 3.4–5)
ALP SERPL-CCNC: 74 U/L (ref 40–150)
ALT SERPL W P-5'-P-CCNC: 16 U/L (ref 0–70)
ANION GAP SERPL CALCULATED.3IONS-SCNC: 5 MMOL/L (ref 3–14)
AST SERPL W P-5'-P-CCNC: 18 U/L (ref 0–45)
BASOPHILS # BLD AUTO: 0 10E9/L (ref 0–0.2)
BASOPHILS NFR BLD AUTO: 0.3 %
BILIRUB SERPL-MCNC: 0.9 MG/DL (ref 0.2–1.3)
BUN SERPL-MCNC: 19 MG/DL (ref 7–30)
CALCIUM SERPL-MCNC: 8.7 MG/DL (ref 8.5–10.1)
CHLORIDE SERPL-SCNC: 108 MMOL/L (ref 94–109)
CO2 SERPL-SCNC: 28 MMOL/L (ref 20–32)
CREAT SERPL-MCNC: 0.95 MG/DL (ref 0.66–1.25)
DIFFERENTIAL METHOD BLD: NORMAL
EOSINOPHIL NFR BLD AUTO: 1.9 %
ERYTHROCYTE [DISTWIDTH] IN BLOOD BY AUTOMATED COUNT: 13.1 % (ref 10–15)
GFR SERPL CREATININE-BSD FRML MDRD: 69 ML/MIN/{1.73_M2}
GLUCOSE SERPL-MCNC: 103 MG/DL (ref 70–99)
HCT VFR BLD AUTO: 44.2 % (ref 40–53)
HGB BLD-MCNC: 14.7 G/DL (ref 13.3–17.7)
IMM GRANULOCYTES # BLD: 0 10E9/L (ref 0–0.4)
IMM GRANULOCYTES NFR BLD: 0.3 %
INR PPP: 2.06 (ref 0.86–1.14)
LYMPHOCYTES # BLD AUTO: 1.9 10E9/L (ref 0.8–5.3)
LYMPHOCYTES NFR BLD AUTO: 31.6 %
MCH RBC QN AUTO: 32.5 PG (ref 26.5–33)
MCHC RBC AUTO-ENTMCNC: 33.3 G/DL (ref 31.5–36.5)
MCV RBC AUTO: 98 FL (ref 78–100)
MONOCYTES # BLD AUTO: 0.6 10E9/L (ref 0–1.3)
MONOCYTES NFR BLD AUTO: 9.6 %
NEUTROPHILS # BLD AUTO: 3.3 10E9/L (ref 1.6–8.3)
NEUTROPHILS NFR BLD AUTO: 56.3 %
NRBC # BLD AUTO: 0 10*3/UL
NRBC BLD AUTO-RTO: 0 /100
NT-PROBNP SERPL-MCNC: 1622 PG/ML (ref 0–1800)
PLATELET # BLD AUTO: 170 10E9/L (ref 150–450)
POTASSIUM SERPL-SCNC: 4.1 MMOL/L (ref 3.4–5.3)
PROT SERPL-MCNC: 7 G/DL (ref 6.8–8.8)
RBC # BLD AUTO: 4.52 10E12/L (ref 4.4–5.9)
SODIUM SERPL-SCNC: 141 MMOL/L (ref 133–144)
TROPONIN I SERPL-MCNC: 0.03 UG/L (ref 0–0.04)
WBC # BLD AUTO: 5.9 10E9/L (ref 4–11)

## 2020-09-28 PROCEDURE — 85025 COMPLETE CBC W/AUTO DIFF WBC: CPT | Performed by: EMERGENCY MEDICINE

## 2020-09-28 PROCEDURE — 80053 COMPREHEN METABOLIC PANEL: CPT | Performed by: EMERGENCY MEDICINE

## 2020-09-28 PROCEDURE — 93970 EXTREMITY STUDY: CPT

## 2020-09-28 PROCEDURE — 99285 EMERGENCY DEPT VISIT HI MDM: CPT | Mod: 25 | Performed by: EMERGENCY MEDICINE

## 2020-09-28 PROCEDURE — 85610 PROTHROMBIN TIME: CPT | Performed by: EMERGENCY MEDICINE

## 2020-09-28 PROCEDURE — 93005 ELECTROCARDIOGRAM TRACING: CPT | Performed by: EMERGENCY MEDICINE

## 2020-09-28 PROCEDURE — 71045 X-RAY EXAM CHEST 1 VIEW: CPT | Mod: TC

## 2020-09-28 PROCEDURE — 84484 ASSAY OF TROPONIN QUANT: CPT | Performed by: EMERGENCY MEDICINE

## 2020-09-28 PROCEDURE — 93010 ELECTROCARDIOGRAM REPORT: CPT | Mod: Z6 | Performed by: EMERGENCY MEDICINE

## 2020-09-28 PROCEDURE — 83880 ASSAY OF NATRIURETIC PEPTIDE: CPT | Mod: GZ | Performed by: EMERGENCY MEDICINE

## 2020-09-28 NOTE — DISCHARGE INSTRUCTIONS
Elevate the legs when possible.  You can do Ace wrap or compressive stockings to help prove the fluid.  Follow-up for a cardiac echo to see how well your heart is working.  See your doctor thereafter to discuss results to see if further treatment is necessary.

## 2020-09-28 NOTE — TELEPHONE ENCOUNTER
Reason for Call: Work in asap Appointment, Requested Provider:  Lazaro Huston MD    PCP: Lazaro Huston    Reason for visit: Blisters on both legs x 5 days    Duration of symptoms: 5 days    Have you been treated for this in the past? No    Additional comments: Please advise if pt can be seen asap this week.    Can we leave a detailed message on this number? YES    Phone number patient can be reached at:     Best Time:     Call taken on 9/28/2020 at 8:24 AM by Gerri Beyer

## 2020-09-28 NOTE — ED PROVIDER NOTES
History     Chief Complaint   Patient presents with     Skin Check     HPI  Melvin Abad is a 89 year old male who presents with concerns for blisters on his right leg and thinks he has an infection there.  No specific injury.  Has a history of stasis changes on both legs which are longstanding.  History of A. fib on rate control and blood thinning meds.  Denies lightheadedness or generalized weakness.  Denies any headache, visual change, difficulty with speech or swallowing.  Denies neck pain.  No chest pain or shortness of breath.  No cough.  No back pain.  Denies any abdominal pain, nausea or vomiting.  He said no diarrhea or dysuria.  No fever or chills denies significant weight gain.  Previous stroke that affected his left side.  Denies history of DVT or PE.  His ankles have been swollen.  He denies history of heart attack or congestive heart failure.  1 of the blister was unroofed when they took a Band-Aid off it.  The other remains intact.  He is now developed small blisters on his left anterior ankle.  No exposure to contact dermatitis.  No other skin rashes or bruising.    Allergies:  No Known Allergies    Problem List:    Patient Active Problem List    Diagnosis Date Noted     Chronic atrial fibrillation (H) 05/01/2020     Priority: Medium     Osteoarthritis of glenohumeral joint, left 02/24/2020     Priority: Medium     Diabetes mellitus with peripheral vascular disease (H) 01/23/2020     Priority: Medium     Longstanding persistent atrial fibrillation (H) 01/23/2020     Priority: Medium     Anticoagulation goal of INR 2 to 3 01/23/2020     Priority: Medium     Slow transit constipation 01/23/2020     Priority: Medium     Sleep disturbances 01/23/2020     Priority: Medium     Right pontine stroke (H) 01/23/2020     Priority: Medium     Hemiparesis affecting left side as late effect of cerebrovascular accident (CVA) (H) 01/23/2020     Priority: Medium     Dry eyes 01/23/2020     Priority: Medium      Restless legs syndrome (RLS) 01/23/2020     Priority: Medium     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) 11/06/2018     Priority: Medium     Type 2 diabetes mellitus without complication, without long-term current use of insulin (H) 05/10/2017     Priority: Medium     Pain of right lower leg 03/31/2017     Priority: Medium     Contusion of right lower extremity 03/31/2017     Priority: Medium     Leg pain 03/31/2017     Priority: Medium     Long-term (current) use of anticoagulants [Z79.01] 04/05/2016     Priority: Medium     Obesity (BMI 30-39.9) 10/28/2015     Priority: Medium     Cellulitis 11/20/2013     Priority: Medium     Anemia due to blood loss, acute 11/20/2013     Priority: Medium     DVT prophylaxis 11/20/2013     Priority: Medium     Carpal tunnel syndrome of right wrist 08/16/2013     Priority: Medium     Gilbert syndrome 01/31/2012     Priority: Medium     History of skin cancer 01/03/2012     Priority: Medium     AK (actinic keratosis) 01/03/2012     Priority: Medium     Essential hypertension 04/18/2011     Priority: Medium     Health Care Home 04/08/2011     Priority: Medium     Natalie Ferraro RN-PHN  FPA / [facility]   [phone]        DX V65.8 REPLACED WITH [id number] [facility]ALTH CARE HOME (04/08/2013)       Diverticulosis 03/25/2011     Priority: Medium     Type 2 diabetes mellitus without complication (H) 03/10/2011     Priority: Medium     Hyperlipidemia LDL goal <100 10/31/2010     Priority: Medium     Long term current use of anticoagulant therapy 01/31/2001     Priority: Medium     Problem list name updated by automated process. Provider to review          Past Medical History:    Past Medical History:   Diagnosis Date     Actinic keratosis      Atrial fibrillation (H)      Basal cell carcinoma nos 06/10     Cardiac dysrhythmia, unspecified      Cough      Diabetic eye exam (H) 8/29/14     Generalized osteoarthrosis, unspecified site      Other diseases of lung, not  elsewhere classified      Pure hypercholesterolemia      Unspecified essential hypertension        Past Surgical History:    Past Surgical History:   Procedure Laterality Date     C TOTAL KNEE ARTHROPLASTY  1997    Knee Replacement, Total     COLONOSCOPY  05/02/2007    Multiple bxs of diminutive polyps of ascending colon.     COLONOSCOPY  11/10/2010    COLONOSCOPY performed by MARISELA GRIMM at  GI      COLONOSCOPY W/WO BRUSH/WASH  10/19/2001     HC COLONOSCOPY W/WO BRUSH/WASH  11/02/2004      HEMORRHOIDOPEXY BY STAPLING  09/26/08       Family History:    Family History   Problem Relation Age of Onset     Cancer Mother      Prostate Cancer Father        Social History:  Marital Status:   [5]  Social History     Tobacco Use     Smoking status: Former Smoker     Types: Cigarettes     Smokeless tobacco: Never Used     Tobacco comment: 5 years   Substance Use Topics     Alcohol use: No     Comment: denies     Drug use: No        Medications:    blood glucose (NO BRAND SPECIFIED) test strip  Carboxymethylcellulose Sodium (THERATEARS) 0.25 % SOLN  cholecalciferol (VITAMIN D3) 1000 units (25 mcg) capsule  gabapentin (NEURONTIN) 100 MG capsule  hydrocortisone 1 % EX external cream  lisinopril (ZESTRIL) 10 MG tablet  meclizine (ANTIVERT) 25 MG tablet  metoprolol succinate ER (TOPROL-XL) 50 MG 24 hr tablet  order for DME  order for DME  order for DME  polyethylene glycol (MIRALAX) powder  senna-docusate (SENOKOT-S/PERICOLACE) 8.6-50 MG tablet  simvastatin (ZOCOR) 40 MG tablet  warfarin ANTICOAGULANT (COUMADIN) 5 MG tablet          Review of Systems all other systems are reviewed and are negative.    Physical Exam   BP: (!) 171/95  Pulse: 77  Temp: 96.8  F (36  C)  Resp: 16  Weight: 96.6 kg (212 lb 14.4 oz)  SpO2: 96 %      Physical Exam general alert cooperative male who does not look toxic or ill.  HEENT shows no facial droop or asymmetry.  Speech is clear and concise.  Her mucosa is moist.  Neck is supple.   Lungs were clear without adventitious sounds.  Cardiac auscultation seems regular without murmur.  He has no CVA tenderness.  Abdomen is obese with active bowel sounds.  No localizing tenderness, masses, or organomegaly.  His extremities show stasis changes in the shins bilaterally.  He has trace pitting edema in bilateral ankles.  On the right ankle he has a intact blister with serous fluid.  A circumferential skin loss posteriorly where the other blister was removed.  Does not look secondary infected.  On his left ankle he has 2 superficial very small blisters that are intact.  No other skin rashes or bruising are noted.    ED Course        Procedures               EKG Interpretation:      Interpreted by Fransisco Ledbetter MD  Time reviewed:10:25  Symptoms at time of EKG: Leg swelling  Rhythm: atrial fibrillation - controlled  Rate: Normal  Axis: Normal  Ectopy: none  Conduction: right bundle branch block (complete) and bifasicular  ST Segments/ T Waves: Non-specific ST-T wave changes  Q Waves: none  Comparison to prior: Unchanged from 12/1819    Clinical Impression: atrial fibrillation (chronic)                Critical Care time:  none               Results for orders placed or performed during the hospital encounter of 09/28/20 (from the past 24 hour(s))   XR Chest Port 1 View    Narrative    CHEST ONE VIEW PORTABLE  9/28/2020 10:20 AM     HISTORY: Peripheral edema.    COMPARISON: 12/20/2018.      Impression    IMPRESSION: Scattered bibasilar atelectatic changes, slightly  increased since the prior exam.  Calcified granuloma left lung base is  again noted. The lungs are otherwise clear. There is cardiomegaly, and  the heart size appears larger than on the prior exam, although this  may be related to technique alone.   CBC with platelets differential   Result Value Ref Range    WBC 5.9 4.0 - 11.0 10e9/L    RBC Count 4.52 4.4 - 5.9 10e12/L    Hemoglobin 14.7 13.3 - 17.7 g/dL    Hematocrit 44.2 40.0 - 53.0 %    MCV 98  78 - 100 fl    MCH 32.5 26.5 - 33.0 pg    MCHC 33.3 31.5 - 36.5 g/dL    RDW 13.1 10.0 - 15.0 %    Platelet Count 170 150 - 450 10e9/L    Diff Method Automated Method     % Neutrophils 56.3 %    % Lymphocytes 31.6 %    % Monocytes 9.6 %    % Eosinophils 1.9 %    % Basophils 0.3 %    % Immature Granulocytes 0.3 %    Nucleated RBCs 0 0 /100    Absolute Neutrophil 3.3 1.6 - 8.3 10e9/L    Absolute Lymphocytes 1.9 0.8 - 5.3 10e9/L    Absolute Monocytes 0.6 0.0 - 1.3 10e9/L    Absolute Basophils 0.0 0.0 - 0.2 10e9/L    Abs Immature Granulocytes 0.0 0 - 0.4 10e9/L    Absolute Nucleated RBC 0.0    INR   Result Value Ref Range    INR 2.06 (H) 0.86 - 1.14   Comprehensive metabolic panel   Result Value Ref Range    Sodium 141 133 - 144 mmol/L    Potassium 4.1 3.4 - 5.3 mmol/L    Chloride 108 94 - 109 mmol/L    Carbon Dioxide 28 20 - 32 mmol/L    Anion Gap 5 3 - 14 mmol/L    Glucose 103 (H) 70 - 99 mg/dL    Urea Nitrogen 19 7 - 30 mg/dL    Creatinine 0.95 0.66 - 1.25 mg/dL    GFR Estimate 69 >60 mL/min/[1.73_m2]    GFR Estimate If Black 80 >60 mL/min/[1.73_m2]    Calcium 8.7 8.5 - 10.1 mg/dL    Bilirubin Total 0.9 0.2 - 1.3 mg/dL    Albumin 3.4 3.4 - 5.0 g/dL    Protein Total 7.0 6.8 - 8.8 g/dL    Alkaline Phosphatase 74 40 - 150 U/L    ALT 16 0 - 70 U/L    AST 18 0 - 45 U/L   Troponin I   Result Value Ref Range    Troponin I ES 0.031 0.000 - 0.045 ug/L   Nt probnp inpatient (BNP)   Result Value Ref Range    N-Terminal Pro BNP Inpatient 1,622 0 - 1,800 pg/mL   US Lower Extremity Venous Bilateral Port    Narrative    US LOWER EXTREMITY VENOUS DUPLEX BILATERAL PORT 9/28/2020 12:18 PM    CLINICAL HISTORY: Bilateral leg swelling  TECHNIQUE: Venous Duplex ultrasound of bilateral lower extremities  with and without compression, augmentation and duplex. Color flow and  spectral Doppler with waveform analysis performed.    COMPARISON: Lower extremity venous Doppler ultrasound dictated  12/20/2013.    FINDINGS: Exam includes the common  femoral, femoral, popliteal veins  as well as segmentally visualized deep calf veins and greater  saphenous vein.     RIGHT: No deep vein thrombosis. No superficial thrombophlebitis. No  popliteal cyst. Shadowing calcification within the proximal calf soft  tissues.    LEFT: No deep vein thrombosis. No superficial thrombophlebitis. No  popliteal cyst.      Impression    IMPRESSION:  1.  No deep venous thrombosis in the bilateral lower extremities.    ANNELISE BECERRA MD       Medications - No data to display    Assessments & Plan (with Medical Decision Making)   Melvin Abad is a 89 year old male who presents with concerns for blisters on his right leg and thinks he has an infection there.  No specific injury.  Has a history of stasis changes on both legs which are longstanding.  History of A. fib on rate control and blood thinning meds.  Denies lightheadedness or generalized weakness.  Denies any headache, visual change, difficulty with speech or swallowing.  Denies neck pain.  No chest pain or shortness of breath.  No cough.  No back pain.  Denies any abdominal pain, nausea or vomiting.  He said no diarrhea or dysuria.  No fever or chills denies significant weight gain.  Previous stroke that affected his left side.  Denies history of DVT or PE.  His ankles have been swollen.  He denies history of heart attack or congestive heart failure.  1 of the blister was unroofed when they took a Band-Aid off it.  The other remains intact.  He is now developed small blisters on his left anterior ankle.  No exposure to contact dermatitis.  No other skin rashes or bruising.  On exam patient had some bibasilar crackles but no active wheezing.  He has peripheral edema with some blisters as noted above.  Looks like chronic stasis changes of both legs.  His blood work was unremarkable including his proBNP and troponin.  His EKG is unchanged from his chronic atrial fibrillation.  He is currently controlled rate.  His INR is in  the therapeutic range which they wanted.  Chest x-ray did not show overt failure.  Ultrasounds of his legs did not show any DVT.  He has normal renal function.  His weight has not significantly changed.  At this point we will set him up for an outpatient cardiac echo to further assess his ejection fraction.  In the meantime these blisters were sterilely dressed and compressive wraps were applied.  They can consider compressive and elevation of legs when possible.  I have asked him to follow-up with primary care after completion of the echo to go over the results and see if med changes are necessary.  No watch for secondary infection as the one blister had been unroofed.  Is to return to emergency room were discussed.  I have reviewed the nursing notes.    I have reviewed the findings, diagnosis, plan and need for follow up with the patient.       New Prescriptions    No medications on file       Final diagnoses:   Peripheral edema   Blisters of multiple sites       9/28/2020   [facility] EMERGENCY DEPARTMENT     Fransisco Ledbetter MD  09/28/20 1004       Fransisco Ledbetter MD  09/28/20 [id number]

## 2020-09-28 NOTE — ED AVS SNAPSHOT
[facility] Emergency Department  [address] [place] [address]  Phone:  [phone]  Fax:  [phone]                                    Melvin Abad   MRN: [id number]    Department:  [facility] Emergency Department   Date of Visit:  9/28/2020           After Visit Summary Signature Page    I have received my discharge instructions, and my questions have been answered. I have discussed any challenges I see with this plan with the nurse or doctor.    ..........................................................................................................................................  Patient/Patient Representative Signature      ..........................................................................................................................................  Patient Representative Print Name and Relationship to Patient    ..................................................               ................................................  Date                                   Time    ..........................................................................................................................................  Reviewed by Signature/Title    ...................................................              ..............................................  Date                                               Time          22EPIC Rev 08/18

## 2020-09-29 NOTE — TELEPHONE ENCOUNTER
Patient's daughter is calling and requesting the fax that was sent from the [facility] be signed by PCP and sent back as soon as possible.  This RN found fax on machine, spoke with Dr. Robel MD, and after PCP added comments regarding no compression socks on open wounds, this RN faxed to [facility] at [phone].    Tatianna Kirkland RN

## 2020-10-01 ENCOUNTER — HOSPITAL ENCOUNTER (OUTPATIENT)
Dept: CARDIOLOGY | Facility: CLINIC | Age: 85
Discharge: HOME | End: 2020-10-01
Attending: EMERGENCY MEDICINE | Admitting: EMERGENCY MEDICINE
Payer: COMMERCIAL

## 2020-10-01 DIAGNOSIS — R60.0 PERIPHERAL EDEMA: ICD-10-CM

## 2020-10-01 PROCEDURE — 93306 TTE W/DOPPLER COMPLETE: CPT | Mod: 26 | Performed by: INTERNAL MEDICINE

## 2020-10-01 PROCEDURE — 93306 TTE W/DOPPLER COMPLETE: CPT

## 2020-10-02 ENCOUNTER — OFFICE VISIT (OUTPATIENT)
Dept: INTERNAL MEDICINE | Facility: CLINIC | Age: 85
End: 2020-10-02
Payer: COMMERCIAL

## 2020-10-02 VITALS
DIASTOLIC BLOOD PRESSURE: 86 MMHG | HEART RATE: 68 BPM | TEMPERATURE: 97.6 F | OXYGEN SATURATION: 97 % | RESPIRATION RATE: 20 BRPM | WEIGHT: 210.6 LBS | BODY MASS INDEX: 30.22 KG/M2 | SYSTOLIC BLOOD PRESSURE: 146 MMHG

## 2020-10-02 DIAGNOSIS — S90.521D: ICD-10-CM

## 2020-10-02 DIAGNOSIS — I50.813 ACUTE ON CHRONIC RIGHT-SIDED HEART FAILURE (H): Primary | ICD-10-CM

## 2020-10-02 PROCEDURE — 99214 OFFICE O/P EST MOD 30 MIN: CPT | Performed by: INTERNAL MEDICINE

## 2020-10-02 RX ORDER — FUROSEMIDE 20 MG
20 TABLET ORAL DAILY
Qty: 30 TABLET | Refills: 1 | Status: SHIPPED | OUTPATIENT
Start: 2020-10-02 | End: 2020-11-18

## 2020-10-02 ASSESSMENT — PAIN SCALES - GENERAL: PAINLEVEL: NO PAIN (0)

## 2020-10-02 NOTE — PROGRESS NOTES
Subjective     Melvin Abad is a 89 year old male who presents to clinic today for the following health issues:    HPI         Chief Complaint   Patient presents with     Results     He is doing pretty well.  Left leg weakened from his stroke, balance issues.  Walks with walker at [facility] [employer].      Not sob with walking, getting a little weaker.      ER for blisters on legs, ER checked BNP was high. Echo was ordered and ok except elevate right pressure.       Past Medical History:   Diagnosis Date     Actinic keratosis      Atrial fibrillation (H)      Basal cell carcinoma nos 06/10    Mohs excision, rt upper lip & rt temple     Cardiac dysrhythmia, unspecified      Cough     chronic cough     Diabetic eye exam (H) 8/29/14     Generalized osteoarthrosis, unspecified site     djd of the knees, hands, and neck     Other diseases of lung, not elsewhere classified     pulmonary nodule, benign     Pure hypercholesterolemia      Unspecified essential hypertension      Current Outpatient Medications   Medication     blood glucose (NO BRAND SPECIFIED) test strip     cholecalciferol (VITAMIN D3) 1000 units (25 mcg) capsule     furosemide (LASIX) 20 MG tablet     gabapentin (NEURONTIN) 100 MG capsule     hydrocortisone 1 % EX external cream     lisinopril (ZESTRIL) 10 MG tablet     metoprolol succinate ER (TOPROL-XL) 50 MG 24 hr tablet     order for DME     order for DME     senna-docusate (SENOKOT-S/PERICOLACE) 8.6-50 MG tablet     simvastatin (ZOCOR) 40 MG tablet     warfarin ANTICOAGULANT (COUMADIN) 5 MG tablet     Carboxymethylcellulose Sodium (THERATEARS) 0.25 % SOLN     meclizine (ANTIVERT) 25 MG tablet     order for DME     polyethylene glycol (MIRALAX) powder     No current facility-administered medications for this visit.      Social History     Tobacco Use     Smoking status: Former Smoker     Types: Cigarettes     Smokeless tobacco: Never Used     Tobacco comment: 5 years   Substance Use Topics     Alcohol use:  No     Comment: denies     Drug use: No           Review of Systems   Constitutional, HEENT, cardiovascular, pulmonary, gi and gu systems are negative, except as otherwise noted. Just leg blisters.        Objective    BP (!) 146/86   Pulse 68   Temp 97.6  F (36.4  C) (Temporal)   Resp 20   Wt 95.5 kg (210 lb 9.6 oz)   SpO2 97%   BMI 30.22 kg/m    Body mass index is 30.22 kg/m .  Physical Exam   GENERAL: healthy, alert and no distress  RESP: lungs clear to auscultation - no rales, rhonchi or wheezes  CV: Irregularly irregular  MS: Lower extremities have trace edema in both ankles to the knee, he is got some blisters which have popped on the right leg one small on the lateral side 1 larger on the medial no sign of secondary infection  SKIN: no suspicious lesions or rashes  PSYCH: mentation appears normal, affect normal/bright            Assessment & Plan     Edward was seen today for results.    Diagnoses and all orders for this visit:    Acute on chronic right-sided heart failure (H)  -     furosemide (LASIX) 20 MG tablet; Take 1 tablet (20 mg) by mouth daily Use as needed for weight being up.    Blister of right ankle, subsequent encounter          Patient with some right heart failure.  He had blistering on the right leg, has had a previous stroke on the left.  The blisters are getting better he will continue bandaging daily.  His echocardiogram showed a normal left-sided function with A. fib but does have some right elevated pulmonary pressures were going to give him a diuretic to try to get his weight down 3 pounds to lower the pressure and fluid in the system.          Return in about 2 weeks (around 10/16/2020) for If not better with this diagnosis.    Lazaro Huston MD  [facility]

## 2020-10-19 ENCOUNTER — ANTICOAGULATION THERAPY VISIT (OUTPATIENT)
Dept: ANTICOAGULATION | Facility: CLINIC | Age: 85
End: 2020-10-19
Payer: COMMERCIAL

## 2020-10-19 ENCOUNTER — HOSPITAL LABORATORY (OUTPATIENT)
Facility: OTHER | Age: 85
End: 2020-10-19

## 2020-10-19 DIAGNOSIS — I48.20 CHRONIC ATRIAL FIBRILLATION (H): ICD-10-CM

## 2020-10-19 DIAGNOSIS — Z79.01 LONG TERM CURRENT USE OF ANTICOAGULANT THERAPY: ICD-10-CM

## 2020-10-19 LAB — INR PPP: 2.18 (ref 0.86–1.14)

## 2020-10-19 PROCEDURE — 99207 PR NO CHARGE NURSE ONLY: CPT | Performed by: INTERNAL MEDICINE

## 2020-10-19 NOTE — PROGRESS NOTES
ANTICOAGULATION FOLLOW-UP CLINIC VISIT    Patient Name:  Melvin Abad  Date:  10/19/2020  Contact Type:  Telephone    SUBJECTIVE:  Patient Findings     Comments:  Left a detailed message on [employer] with this information, pt is advised to call back if pt has any questions, missed doses or concerns such as symptoms of bleeding, clotting, infection, change in diet or other.            Clinical Outcomes     Comments:  Left a detailed message on [employer] with this information, pt is advised to call back if pt has any questions, missed doses or concerns such as symptoms of bleeding, clotting, infection, change in diet or other.               OBJECTIVE    Recent labs: (last 7 days)     10/19/20  0705   INR 2.18*       ASSESSMENT / PLAN  INR assessment THER    Recheck INR In: 4 WEEKS    INR Location [facility]      Anticoagulation Summary  As of 10/19/2020    INR goal:  2.0-3.0   TTR:  91.6 % (1 y)   INR used for dosin.18 (10/19/2020)   Warfarin maintenance plan:  4.5 mg (3 mg x 1.5) every day   Full warfarin instructions:  4.5 mg every day   Weekly warfarin total:  31.5 mg   No change documented:  Eli Diaz, RN   Plan last modified:  Shani Ross, RN (2020)   Next INR check:  2020   Priority:  Maintenance   Target end date:  Indefinite    Indications    Atrial fibrillation (Resolved) [I48.91]  Long-term (current) use of anticoagulants [Z79.01] [Z79.01]  Chronic atrial fibrillation (H) [I48.20]             Anticoagulation Episode Summary     INR check location:      Preferred lab:      Send INR reminders to:  [facility] [place]    Comments:  5 mg tablets, AM taker, likes book, BP       Anticoagulation Care Providers     Provider Role Specialty Phone number    Fransisco Cordon MD Referring Family Practice [phone]    Lazaro Huston MD Responsible Internal Medicine [phone]            See the Encounter Report to view Anticoagulation Flowsheet and Dosing Calendar (Go to  Encounters tab in chart review, and find the Anticoagulation Therapy Visit)    Dosage adjustment made based on physician directed care plan.    Eli Diaz RN

## 2020-10-28 ENCOUNTER — TELEPHONE (OUTPATIENT)
Dept: INTERNAL MEDICINE | Facility: CLINIC | Age: 85
End: 2020-10-28

## 2020-10-28 DIAGNOSIS — I50.813 ACUTE ON CHRONIC RIGHT-SIDED HEART FAILURE (H): Primary | ICD-10-CM

## 2020-10-28 DIAGNOSIS — S90.521D: ICD-10-CM

## 2020-10-28 NOTE — TELEPHONE ENCOUNTER
Reason for Call:  Other     Detailed comments: He is in the [facility].  He has been taking care of wounds on his legs since September.  He is on Lasix and has not lost any weight.  [facility] recommends that a wound nurse start taking care of him.  They need a home care order for them to do wound treatment.  [facility].  She states the sooner the better.    Phone Number Patient can be reached at: Home number on file [phone]    Best Time: any    Can we leave a detailed message on this number? YES    Call taken on 10/28/2020 at 9:24 AM by Chaitanya Espinal

## 2020-10-30 ENCOUNTER — DOCUMENTATION ONLY (OUTPATIENT)
Dept: CARE COORDINATION | Facility: CLINIC | Age: 85
End: 2020-10-30

## 2020-10-31 ENCOUNTER — TELEPHONE (OUTPATIENT)
Dept: CARE COORDINATION | Facility: CLINIC | Age: 85
End: 2020-10-31

## 2020-10-31 NOTE — PROGRESS NOTES
Dear Dr. Huston,   Medicare Home Health regulations requires [facility] to provide an initial assessment visit either within 48 hours of the patient's return home, or on the physician ordered Start of Care date.    There will be a delay in the Initial Assessment for Melvin Abad; MRN [id number]  His admission to home care has been scheduled and confirmed for 10/31/20.      Sincerely [facility]  Shreya Velasco RN  [phone]

## 2020-10-31 NOTE — TELEPHONE ENCOUNTER
..[facility] now requests orders and shares plan of care/discharge summaries for some patients through [employer].  Please REPLY TO THIS MESSAGE OR ROUTE BACK TO THE AUTHOR in order to give authorization for orders when needed.  This is considered a verbal order, you will still receive a faxed copy of orders for signature.  Thank you for your assistance in improving collaboration for our patients.    ORDER  Check INR on 11/16/2020  SN 2w2, 1w2, 2 prn  OT to eval and treat  PT to eval and treat  Lymphedema tx to eval and treat    MD SUMMARY/PLAN OF CARE  SITUATION Met with patient for home care admission to assess and educate regarding current wounds. Reports itching on mid back and left hip. He thinks it is shingles, said he's looked into. Upon assessment, no blisters or rash. There are 3 very small red circlular sores on his left hip, but they are not blisters. Writer applied lotion and sees few small yellow scaly areas, might be hypergranulation tissue or possible small fungal growth. Recommending patient see dermatology as he has a hx of skin cancer. Bilat legs are very dry with scaly skin on the skins. He has a hx of skin coming off and then a sore develops. Large sore on the right calf. He says it used to be draining. It is now a very thick scab. 2 smaller thick scabs on the outer aspect of his lower leg. The right leg is painful and has limited his walking. Reports he feels sore in the morning, but it gets better after a while, once he moves around. Hands are sometimes sore and he feels he has carpel tunnel but MD says nothing can be done about it; not a candidate for surgery. Patient is in his w/c on arrival. When he stood up to show writer his back/hip, he lost his balance and suddenly sit back down into the w/c. He says that happens sometimes. Has had 2 falls recently and says he needs to work on his balance. He says he had been walking with his walker, but since his right leg has been  bothering him it's too hard. He prefers to use his w/c right now. He showers on his own, but needs more assistance for safety. TIANA Ding states that she was under the impression that he is being supervised in the shower. Educated family that he does need assistance for showering and ADLs to prevent falls. Denies any issue with bowel or bladder. Reports he has a good appetite. Eyes can get watery and cloudy, some itching. Says it started just a couple days ago. He uses liquid tear and these help but only temporarily. May benefit from an antihistamine oral or eye drop. Normal HR and breathing. Denies any SOB with exertion. Has been taking warfarin for close to 17 or 18 years. Patient has a pleasant mood and good sense of humor. He expressed some loneliness r/t isoation but has a lot of family members who visit how and when they can. Says he takes a pill around 6 at night, and he's asleep by 7.  He says he gets plenty of sleep.       BACKGROUND 89 yo male who lives in a nursing facility, [facility]. He has chronic bilateral leg wounds and has been caring for them on his own. There has not been enough improvement however. So he was referred to home care by his clinic and caregivers, who are hoping that home care will be able to make additional progress with wound healing. He had a stroke this past December. Has had more difficulties with cognition, writing, walkering and general ADls since then. Additional medical hx includes AFIB, OA, DM2, CONSTIPATION, DRY EYES, RLS, RIGHT GREAT TOE AMPUTATION, LEG PAIN, OBESITY, HTN, HX OF SKIN CANCER, long term use of anticoagulants.      ANALYSIS Elderly patient is high risk for complications r/t complex medical hx and long hx of heart conditions/circulartory disorder. He has an excellent support system but is somewhat isolated r/t current pandemic.       RECOMMENDATION SN for wound care and education with wocn. Lymphedema for chronic LE edema. PT to eval and treat for  strengthening and endurance. OT to eval and treat for gait training, ADL safety, balance and fall prevention.  Get into dermatology to assess back. Caregivers to reach out to facility and let them know patient needs more help with bathing/dressing.   Thank you for this referral.     Tresa Mendoza RN  [phone]

## 2020-11-04 ENCOUNTER — TELEPHONE (OUTPATIENT)
Dept: INTERNAL MEDICINE | Facility: CLINIC | Age: 85
End: 2020-11-04

## 2020-11-04 NOTE — TELEPHONE ENCOUNTER
"[facility] now requests orders and shares plan of care/discharge summaries for some patients through [employer].  Please REPLY TO THIS MESSAGE OR ROUTE BACK TO THE AUTHOR in order to give authorization for orders when needed.  This is considered a verbal order, you will still receive a faxed copy of orders for signature.  Thank you for your assistance in improving collaboration for our patients.    ORDER    1.Patient has been taking Colace 100 mg BID for constipation, but he reports it not helping. Please advise if you have alternate recommendations.   2. Patient reports itchy eyes, which he believes to be from an allergy to \"harsh soaps\". Please advise if patient can take daily antihistamine.     Deloris Booker RN, BSN  [phone]    "

## 2020-11-16 ENCOUNTER — HOSPITAL LABORATORY (OUTPATIENT)
Facility: OTHER | Age: 85
End: 2020-11-16

## 2020-11-16 ENCOUNTER — ANTICOAGULATION THERAPY VISIT (OUTPATIENT)
Dept: ANTICOAGULATION | Facility: CLINIC | Age: 85
End: 2020-11-16
Payer: COMMERCIAL

## 2020-11-16 DIAGNOSIS — I48.20 CHRONIC ATRIAL FIBRILLATION (H): ICD-10-CM

## 2020-11-16 DIAGNOSIS — Z79.01 LONG TERM CURRENT USE OF ANTICOAGULANT THERAPY: ICD-10-CM

## 2020-11-16 LAB — INR PPP: 1.79 (ref 0.86–1.14)

## 2020-11-16 PROCEDURE — 99207 PR NO CHARGE NURSE ONLY: CPT | Performed by: INTERNAL MEDICINE

## 2020-11-16 NOTE — PROGRESS NOTES
ANTICOAGULATION FOLLOW-UP CLINIC VISIT    Patient Name:  eMlvin Abad  Date:  2020  Contact Type:  Telephone    SUBJECTIVE:  Patient Findings     Comments:   I left a detailed voicemail for the [facility] nurse [phone] with the orders reflected in flowsheet. I have also requested a call back if there have been any missed doses, concerns, illness, fever, or if there have been any changes in medications, activity level, or diet  Shani Ross RN            Clinical Outcomes     Comments:   I left a detailed voicemail for the [facility] nurse [phone] with the orders reflected in flowsheet. I have also requested a call back if there have been any missed doses, concerns, illness, fever, or if there have been any changes in medications, activity level, or diet  Shani Ross RN               OBJECTIVE    Recent labs: (last 7 days)     20  0654   INR 1.79*       ASSESSMENT / PLAN  INR assessment SUB    Recheck INR In: 2 WEEKS    INR Location [facility]      Anticoagulation Summary  As of 2020    INR goal:  2.0-3.0   TTR:  87.5 % (1 y)   INR used for dosin.79 (2020)   Warfarin maintenance plan:  4.5 mg (3 mg x 1.5) every day   Full warfarin instructions:  4.5 mg every day   Weekly warfarin total:  31.5 mg   No change documented:  Shani Ross RN   Plan last modified:  Shani Ross RN (2020)   Next INR check:  2020   Priority:  Maintenance   Target end date:  Indefinite    Indications    Atrial fibrillation (Resolved) [I48.91]  Long-term (current) use of anticoagulants [Z79.01] [Z79.01]  Chronic atrial fibrillation (H) [I48.20]             Anticoagulation Episode Summary     INR check location:      Preferred lab:      Send INR reminders to:  [facility]    Comments:  5 mg tablets, AM taker, likes book, BP       Anticoagulation Care Providers     Provider Role Specialty Phone number    Fransisco Cordon MD Referring Family Medicine [phone]     Lazaro Huston MD [facility] Internal Medicine [phone]            See the Encounter Report to view Anticoagulation Flowsheet and Dosing Calendar (Go to Encounters tab in chart review, and find the Anticoagulation Therapy Visit)    Dosage adjustment made based on physician directed care plan.      Shani Ross RN

## 2020-11-17 DIAGNOSIS — I50.813 ACUTE ON CHRONIC RIGHT-SIDED HEART FAILURE (H): ICD-10-CM

## 2020-11-17 DIAGNOSIS — I48.11 LONGSTANDING PERSISTENT ATRIAL FIBRILLATION (H): ICD-10-CM

## 2020-11-18 ENCOUNTER — TELEPHONE (OUTPATIENT)
Dept: INTERNAL MEDICINE | Facility: CLINIC | Age: 85
End: 2020-11-18

## 2020-11-18 RX ORDER — FUROSEMIDE 20 MG
TABLET ORAL
Qty: 30 TABLET | Refills: 1 | Status: SHIPPED | OUTPATIENT
Start: 2020-11-18 | End: 2021-01-19

## 2020-11-18 RX ORDER — WARFARIN SODIUM 5 MG/1
TABLET ORAL
Qty: 90 TABLET | Refills: 1 | Status: SHIPPED | OUTPATIENT
Start: 2020-11-18 | End: 2021-04-17

## 2020-11-18 NOTE — TELEPHONE ENCOUNTER
Medication reconciliation completed by RN. Form and chart forwarded to PCP for signatures.    Tatianna Kirkland RN

## 2020-11-18 NOTE — TELEPHONE ENCOUNTER
Routing refill request to provider for review/approval because:  Blood pressures elevated above goal.     Alina Quiroz RN

## 2020-11-18 NOTE — TELEPHONE ENCOUNTER
Reason for Call:  Form, our goal is to have forms completed with 72 hours, however, some forms may require a visit or additional information.    Type of letter, form or note:  Home Health Certification    Who is the form from?: Home care    Where did the form come from: form was faxed in    What clinic location was the form placed at?: [facility]    Where the form was placed: Given to MA/RN [facility]    What number is listed as a contact on the form?: [phone]       Additional comments:     Call taken on 11/18/2020 at 3:16 PM by Anni Burleson

## 2020-11-20 ENCOUNTER — TELEPHONE (OUTPATIENT)
Dept: INTERNAL MEDICINE | Facility: CLINIC | Age: 85
End: 2020-11-20

## 2020-11-20 DIAGNOSIS — Z53.9 DIAGNOSIS NOT YET DEFINED: Primary | ICD-10-CM

## 2020-11-20 DIAGNOSIS — I50.813 ACUTE ON CHRONIC RIGHT-SIDED HEART FAILURE (H): Primary | ICD-10-CM

## 2020-11-20 PROCEDURE — G0180 MD CERTIFICATION HHA PATIENT: HCPCS | Performed by: INTERNAL MEDICINE

## 2020-11-20 NOTE — TELEPHONE ENCOUNTER
[facility] now requests orders and shares plan of care/discharge summaries for some patients through [employer].  Please REPLY TO THIS MESSAGE OR ROUTE BACK TO THE AUTHOR in order to give authorization for orders when needed.  This is considered a verbal order, you will still receive a faxed copy of orders for signature.  Thank you for your assistance in improving collaboration for our patients.    ORDER: Pt would benefit from B LE velcro compression garments for wound and edema management. Pt's insurance requires an MD order for coverage. If you agree, please fax order to the following. Thank you!    [facility]  ATTN: Layla  Fax number: [phone]

## 2020-11-30 ENCOUNTER — ANTICOAGULATION THERAPY VISIT (OUTPATIENT)
Dept: ANTICOAGULATION | Facility: CLINIC | Age: 85
End: 2020-11-30
Payer: COMMERCIAL

## 2020-11-30 DIAGNOSIS — I48.20 CHRONIC ATRIAL FIBRILLATION (H): ICD-10-CM

## 2020-11-30 DIAGNOSIS — Z79.01 LONG TERM CURRENT USE OF ANTICOAGULANT THERAPY: ICD-10-CM

## 2020-11-30 LAB — INR PPP: 2.4 (ref 0.9–1.1)

## 2020-11-30 PROCEDURE — 99207 PR NO CHARGE NURSE ONLY: CPT | Performed by: INTERNAL MEDICINE

## 2020-11-30 NOTE — PROGRESS NOTES
ANTICOAGULATION FOLLOW-UP CLINIC VISIT    Patient Name:  Melvin Abad  Date:  2020  Contact Type:  Telephone    SUBJECTIVE:  Patient Findings     Comments:  Deloris- [facility] reports INR of 2.4- no concerns or missed doses        Clinical Outcomes     Comments:  Renee [facility] reports INR of 2.4- no concerns or missed doses           OBJECTIVE    Recent labs: (last 7 days)     20   INR 2.4*       ASSESSMENT / PLAN  INR assessment THER    Recheck INR In: 2 WEEKS    INR Location Homecare INR      Anticoagulation Summary  As of 2020    INR goal:  2.0-3.0   TTR:  86.2 % (1 y)   INR used for dosin.4 (2020)   Warfarin maintenance plan:  4.5 mg (3 mg x 1.5) every day   Full warfarin instructions:  4.5 mg every day   Weekly warfarin total:  31.5 mg   No change documented:  Eli Diaz RN   Plan last modified:  Shani Ross RN (2020)   Next INR check:  2020   Priority:  Maintenance   Target end date:  Indefinite    Indications    Atrial fibrillation (Resolved) [I48.91]  Long-term (current) use of anticoagulants [Z79.01] [Z79.01]  Chronic atrial fibrillation (H) [I48.20]             Anticoagulation Episode Summary     INR check location:      Preferred lab:      Send INR reminders to:  LINDSEY JUMANANORMAN BARNES    Comments:  5 mg tablets, AM taker, likes book, BP       Anticoagulation Care Providers     Provider Role Specialty Phone number    Fransisco Cordon MD Referring Family Medicine [phone]    Lazaro Huston MD Responsible Internal Medicine [phone]            See the Encounter Report to view Anticoagulation Flowsheet and Dosing Calendar (Go to Encounters tab in chart review, and find the Anticoagulation Therapy Visit)    Dosage adjustment made based on physician directed care plan.    Eli Diaz, RN

## 2021-01-04 ENCOUNTER — TELEPHONE (OUTPATIENT)
Dept: INTERNAL MEDICINE | Facility: CLINIC | Age: 86
End: 2021-01-04

## 2021-01-04 NOTE — TELEPHONE ENCOUNTER
Reason for Call:  Other order    Detailed comments: [place] is calling to ask for orders for assisted living to help patient put on socks,     Patient had a fall on 01/01/2021 no injury    Patient has been having difficulty seeing out of left eye     Best Time: any    Can we leave a detailed message on this number? YES    Call taken on 1/4/2021 at 9:17 AM by Zulay Layton

## 2021-01-06 ENCOUNTER — TELEPHONE (OUTPATIENT)
Dept: INTERNAL MEDICINE | Facility: CLINIC | Age: 86
End: 2021-01-06

## 2021-01-09 ENCOUNTER — HEALTH MAINTENANCE LETTER (OUTPATIENT)
Age: 86
End: 2021-01-09

## 2021-01-13 ENCOUNTER — TELEPHONE (OUTPATIENT)
Dept: INTERNAL MEDICINE | Facility: CLINIC | Age: 86
End: 2021-01-13

## 2021-01-13 DIAGNOSIS — I50.813 ACUTE ON CHRONIC RIGHT-SIDED HEART FAILURE (H): Primary | ICD-10-CM

## 2021-01-13 NOTE — TELEPHONE ENCOUNTER
gabriella needs to have a new order with complete instructions which are added to the new order. Please review and marilou.   Micaela HINOJOSA

## 2021-01-13 NOTE — TELEPHONE ENCOUNTER
Looking for orders for compression stocking to be faxed to [facility]. [phone]   Patient need 20-30 Mil of mercury,  Just need to have this DME sent to [facility] for the compression.  They have order for [facility].  I have placed the DME order please review sign and fax to [facility]

## 2021-01-14 NOTE — TELEPHONE ENCOUNTER
Please add to the orders:    Patient to wear in the morning and remove at night when sleeping.     There is a note on the order stating he has an open wound. Patient does not have, please remove this from the order.     Refax with clarified notes:  [phone]

## 2021-01-17 DIAGNOSIS — I50.813 ACUTE ON CHRONIC RIGHT-SIDED HEART FAILURE (H): ICD-10-CM

## 2021-01-18 ENCOUNTER — TELEPHONE (OUTPATIENT)
Dept: FAMILY MEDICINE | Facility: CLINIC | Age: 86
End: 2021-01-18

## 2021-01-18 NOTE — TELEPHONE ENCOUNTER
Reason for Call:  Form, our goal is to have forms completed with 72 hours, however, some forms may require a visit or additional information.    Type of letter, form or note:  Home Health Certification    Who is the form from?: Home care    Where did the form come from: form was faxed in    What clinic location was the form placed at?: [facility]    Where the form was placed: Given to MA/ZULEIMA jackson    What number is listed as a contact on the form?: [phone]       Additional comments:     Call taken on 1/18/2021 at 5:43 PM by Anni Burleson

## 2021-01-19 DIAGNOSIS — Z53.9 DIAGNOSIS NOT YET DEFINED: Primary | ICD-10-CM

## 2021-01-19 PROCEDURE — G0179 MD RECERTIFICATION HHA PT: HCPCS | Performed by: INTERNAL MEDICINE

## 2021-01-19 RX ORDER — LORATADINE 10 MG/1
10 TABLET ORAL DAILY
Status: CANCELLED | COMMUNITY
Start: 2021-01-19

## 2021-01-19 RX ORDER — FUROSEMIDE 20 MG
TABLET ORAL
Qty: 30 TABLET | Refills: 1 | Status: SHIPPED | OUTPATIENT
Start: 2021-01-19 | End: 2021-03-08

## 2021-01-19 NOTE — TELEPHONE ENCOUNTER
Routing refill request to provider for review/approval because:  Labs out of range:  BP  BP Readings from Last 3 Encounters:   10/02/20 (!) 146/86   09/28/20 (!) 149/85   08/10/20 (!) 144/76     Last Written Prescription Date:  11/18/2020  Last Fill Quantity: 30,  # refills: 1   Last office visit: 10/2/2020 with prescribing provider:     Future Office Visit:      PENELOPE RiveraN, RN               Authored by Resident/PA/NP

## 2021-01-19 NOTE — TELEPHONE ENCOUNTER
Medications reconciled on [facility] form, changes noted on form.  Form to PCP for signature.    Demi Vee RN  [facility]

## 2021-01-29 ENCOUNTER — TELEPHONE (OUTPATIENT)
Dept: ANTICOAGULATION | Facility: CLINIC | Age: 86
End: 2021-01-29

## 2021-01-29 DIAGNOSIS — I48.20 CHRONIC ATRIAL FIBRILLATION (H): Primary | ICD-10-CM

## 2021-01-29 DIAGNOSIS — Z79.01 LONG TERM CURRENT USE OF ANTICOAGULANT THERAPY: ICD-10-CM

## 2021-01-29 NOTE — TELEPHONE ENCOUNTER
Pt's INR overdue - last done 11/30 by [facility]. I called and spoke with Lis from [facility] ([phone]) who said that pt was discharged from  on 1/25.   Pt has been at the [facility], so I called and left a VM on the nurses telephone ([phone]) to call back to see if they are able to take over INR checks now. Awaiting a callback from there.   Shani Ross, RN

## 2021-02-01 NOTE — TELEPHONE ENCOUNTER
"Apolonia, from [facility] [phone], who confirmed that pt was discharged from  on 1/25 and that the last skilled nursing visit was on 12/11 (pt's INR was due on 12/14). Apolonia transferred met to Ariana in the Intake Department who confirmed discharge and said that  is not able to check his INR as he is not part of  anymore, and it would make more sense for the facility to do it at this point, as it would take a few days for them to go out there if there were orders for it. She said that it would make sense for pt PCP to decide where he \"falls\" -  or AL.  Shani Ross RN    "

## 2021-02-01 NOTE — TELEPHONE ENCOUNTER
I spoke with Beatriz from [facility], [phone], who said that they are not aware that Ed was discharged from  and that he needs more assistance than they are allowed to give him at this time  Shani Ross RN

## 2021-02-01 NOTE — TELEPHONE ENCOUNTER
Toña Ferrera, nurse from [facility], said that they will be reaching out to pt's family to see what they want to do with pts INR - bring him in for INRs or have [facility] do it.   Shani Ross RN

## 2021-02-01 NOTE — TELEPHONE ENCOUNTER
Blair, [facility] nurse, said that pt's family will start bringing him to the clinic for INRs. I have signed a standing order for this.   I have updated episode that [facility] will need to call pt's family with INR results. [facility] has been in contact with pt's daughter in law, Toña, regarding this change  Shani Ross RN

## 2021-02-01 NOTE — TELEPHONE ENCOUNTER
VM left for [facility] nurse ([phone]) letting them know that they need to check his INR this week, on their next lab day, and that they need to figure out who pt needs to be under, HC or AL, and that might mean making him an appt to see PCP.     Will keep TE open for now to be sure INR is done this week    Shani Ross RN

## 2021-02-11 ENCOUNTER — ANTICOAGULATION THERAPY VISIT (OUTPATIENT)
Dept: ANTICOAGULATION | Facility: CLINIC | Age: 86
End: 2021-02-11

## 2021-02-11 DIAGNOSIS — I48.20 CHRONIC ATRIAL FIBRILLATION (H): ICD-10-CM

## 2021-02-11 DIAGNOSIS — Z79.01 LONG TERM CURRENT USE OF ANTICOAGULANT THERAPY: ICD-10-CM

## 2021-02-11 LAB
CAPILLARY BLOOD COLLECTION: NORMAL
INR BLD: 2.1 (ref 0.86–1.14)

## 2021-02-11 PROCEDURE — 36416 COLLJ CAPILLARY BLOOD SPEC: CPT | Performed by: INTERNAL MEDICINE

## 2021-02-11 PROCEDURE — 85610 PROTHROMBIN TIME: CPT | Performed by: INTERNAL MEDICINE

## 2021-02-11 NOTE — PROGRESS NOTES
ANTICOAGULATION MANAGEMENT     Patient Name:  Melvin Abad  Date:  2021    ASSESSMENT /SUBJECTIVE:    Today's INR result of 2.1 is therapeutic. Goal INR of 2.0-3.0      Warfarin dose taken: Warfarin taken as instructed    Diet: No new diet changes affecting INR    Medication changes/ interactions: No new medications/supplements affecting INR    Previous INR: Therapeutic     S/S of bleeding or thromboembolism: No    New injury or illness: No    Upcoming surgery, procedure or cardioversion: No    Additional findings: None      PLAN:    Detailed message left for  Daughter Toña regarding INR result and instructed:     Warfarin Dosing Instructions: Continue your current warfarin dose 4.5 mg daily    Instructed patient to follow up no later than: 6 weeks  Left detailed message with recommended recheck date    Education provided: None required      I left a detailed voicemail with the orders below. I have also requested a call back if there have been any missed doses, concerns, illness, fever, or if there have been any changes in medications, activity level, or diet      Instructed to call the Anticoagulation Clinic for any changes, questions or concerns. (#[phone])        Ruben Siddiqui RN      OBJECTIVE:  Recent labs: (last 7 days)     21  1004   INR 2.1*         No question data found.  Anticoagulation Summary  As of 2021    INR goal:  2.0-3.0   TTR:  92.5 % (1 y)   INR used for dosin.1 (2021)   Warfarin maintenance plan:  4.5 mg (3 mg x 1.5) every day   Full warfarin instructions:  4.5 mg every day   Weekly warfarin total:  31.5 mg   No change documented:  Ruben Siddiqui RN   Plan last modified:  Shani Ross RN (2020)   Next INR check:  3/25/2021   Priority:  Maintenance   Target end date:  Indefinite    Indications    Atrial fibrillation (Resolved) [I48.91]  Long-term (current) use of anticoagulants [Z79.01] [Z79.01]  Chronic atrial fibrillation (H) [I48.20]              Anticoagulation Episode Summary     INR check location:      Preferred lab:      Send INR reminders to:  [facility]    Comments:  5 mg tablets, please call pt's family with INR and orders - Toña daughter in law [phone]      Anticoagulation Care Providers     Provider Role Specialty Phone number    Fransisco Cordon MD Referring Family Medicine [phone]    Lazaro Huston MD Responsible Internal Medicine [phone]

## 2021-02-17 ENCOUNTER — TELEPHONE (OUTPATIENT)
Dept: INTERNAL MEDICINE | Facility: CLINIC | Age: 86
End: 2021-02-17

## 2021-02-17 NOTE — TELEPHONE ENCOUNTER
See Anticoagulation Encounter. This was gone over with granddaughter Merline today. Merline verbalized understanding and agrees with plan of care. She had no further questions or concerns at this time.     Ruben Siddiqui, RN, BSN

## 2021-02-17 NOTE — TELEPHONE ENCOUNTER
Reason for Call:  Other     Detailed comments: Granddaughter Merline calling to clarify instructions for patient following his INR appointment on 02/11. She states that patient got a phone call but he was unclear on instructions.     Phone Number Patient can be reached at: [phone]    Best Time: any    Can we leave a detailed message on this number? YES    Call taken on 2/17/2021 at 12:41 PM by Yumiko Benton

## 2021-02-24 ENCOUNTER — TELEPHONE (OUTPATIENT)
Dept: INTERNAL MEDICINE | Facility: CLINIC | Age: 86
End: 2021-02-24

## 2021-02-24 DIAGNOSIS — I50.813 ACUTE ON CHRONIC RIGHT-SIDED HEART FAILURE (H): Primary | ICD-10-CM

## 2021-02-24 NOTE — TELEPHONE ENCOUNTER
Needs referral to orthotics to be measured for velcro compression wrap. Manny socks are not working, he has blisters on his calf.

## 2021-03-02 ENCOUNTER — MYC MEDICAL ADVICE (OUTPATIENT)
Dept: INTERNAL MEDICINE | Facility: CLINIC | Age: 86
End: 2021-03-02

## 2021-03-02 ENCOUNTER — TELEPHONE (OUTPATIENT)
Dept: INTERNAL MEDICINE | Facility: CLINIC | Age: 86
End: 2021-03-02

## 2021-03-02 DIAGNOSIS — I89.0 LYMPHEDEMA: ICD-10-CM

## 2021-03-02 DIAGNOSIS — I50.813 ACUTE ON CHRONIC RIGHT-SIDED HEART FAILURE (H): Primary | ICD-10-CM

## 2021-03-02 DIAGNOSIS — J30.2 SEASONAL ALLERGIC RHINITIS: Primary | ICD-10-CM

## 2021-03-02 RX ORDER — LORATADINE 10 MG/1
10 TABLET ORAL DAILY
Qty: 30 TABLET | Refills: 0 | COMMUNITY
Start: 2021-03-02 | End: 2024-01-26

## 2021-03-02 NOTE — TELEPHONE ENCOUNTER
Home Health Certification and Plan of Care forms received.     Certification Period from 12/30/2020 to 2/27/2021.    Forms placed in [facility] folder.    PENELOPE CazaresN, RN  [facility]

## 2021-03-02 NOTE — TELEPHONE ENCOUNTER
They received a note that patient needs to have therapy for lymphadema unfortunately patient was discharged from home care.  They will need to have a new order to start services again and she stated that you will need to place a nursing start of care also for the wounds.  Thank you   Micaela HINOJOSA

## 2021-03-03 DIAGNOSIS — Z53.9 DIAGNOSIS NOT YET DEFINED: Primary | ICD-10-CM

## 2021-03-03 PROCEDURE — G0179 MD RECERTIFICATION HHA PT: HCPCS | Performed by: INTERNAL MEDICINE

## 2021-03-04 ENCOUNTER — TELEPHONE (OUTPATIENT)
Dept: CARE COORDINATION | Facility: CLINIC | Age: 86
End: 2021-03-04

## 2021-03-04 NOTE — TELEPHONE ENCOUNTER
[facility] now requests orders and shares plan of care/discharge summaries for some patients through [employer].  Please REPLY TO THIS MESSAGE OR ROUTE BACK TO THE AUTHOR in order to give authorization for orders when needed.  This is considered a verbal order, you will still receive a faxed copy of orders for signature.  Thank you for your assistance in improving collaboration for our patients.        Orders:           SN 2w1, 1w2, and 2 PRN  OT eval and tx for appropriate equipment needs  OT lymph eval/tx for increase edema.     Wound Care 2x/wk and PRN: Clean with gentle soap/water, pat dry. Cover with non-stick dressing. Once areas are scabbed over, okay to wash/pat dry and leave FLOR.  Okay for Caregiver to complete on non-nursing days.       FYI: 2 wounds to R Lateral shin. The more medial wound is flat with serous fluid filled. Lateral wound is pink/red dermis layer, Minimal serous fluid. Will request wound tx orders. Medial wound measures 2 x 1 x 0.1 cm and lateral wound: 1 x 0. 3 x 0 cm.  Blue/Maroon staining of lower extremities, per baseline. Note able scaring from previous wounds.      Thank you!  Sandie Alberto RN  [email or web address]  [phone]

## 2021-03-08 ENCOUNTER — VIRTUAL VISIT (OUTPATIENT)
Dept: INTERNAL MEDICINE | Facility: CLINIC | Age: 86
End: 2021-03-08
Payer: COMMERCIAL

## 2021-03-08 DIAGNOSIS — S90.521D: ICD-10-CM

## 2021-03-08 DIAGNOSIS — L03.115 CELLULITIS OF RIGHT LEG: Primary | ICD-10-CM

## 2021-03-08 DIAGNOSIS — I50.813 ACUTE ON CHRONIC RIGHT-SIDED HEART FAILURE (H): ICD-10-CM

## 2021-03-08 PROCEDURE — 99214 OFFICE O/P EST MOD 30 MIN: CPT | Mod: 95 | Performed by: INTERNAL MEDICINE

## 2021-03-08 RX ORDER — CEPHALEXIN 500 MG/1
500 CAPSULE ORAL 2 TIMES DAILY
Qty: 20 CAPSULE | Refills: 0 | Status: SHIPPED | OUTPATIENT
Start: 2021-03-08 | End: 2021-04-28

## 2021-03-08 RX ORDER — ACETAMINOPHEN 325 MG/1
TABLET ORAL PRN
COMMUNITY
End: 2023-02-21

## 2021-03-08 RX ORDER — FUROSEMIDE 20 MG
TABLET ORAL
Qty: 45 TABLET | Refills: 1 | Status: SHIPPED | OUTPATIENT
Start: 2021-03-08 | End: 2021-04-26

## 2021-03-08 RX ORDER — SILVER SULFADIAZINE 10 MG/G
CREAM TOPICAL DAILY
Qty: 20 G | Refills: 0 | Status: SHIPPED | OUTPATIENT
Start: 2021-03-08 | End: 2021-04-26

## 2021-03-08 NOTE — PATIENT INSTRUCTIONS
Do not wear right side compression stocking for 5 days  Cephalexin 500mg twice a day for 10 days for right leg infection  Increase furosemide from 20 to 30 mg, 1.5 tablets daily.   Ok for light amount of silvadene to the wounds.

## 2021-03-08 NOTE — PROGRESS NOTES
Melvin is a 89 year old who is being evaluated via a billable video visit.      How would you like to obtain your AVS? MyChart  If the video visit is dropped, the invitation should be resent by: Text to cell phone: [phone]  Will anyone else be joining your video visit? nasir      Video Start Time: 9:42 AM    Assessment & Plan     Cellulitis of right leg  - cephALEXin (KEFLEX) 500 MG capsule; Take 1 capsule (500 mg) by mouth 2 times daily    Blister of right ankle, subsequent encounter  - silver sulfADIAZINE (SILVADENE) 1 % external cream; Apply topically daily    Acute on chronic right-sided heart failure (H)  - furosemide (LASIX) 20 MG tablet; TAKE ONE and a half tablet daily    89-year-old patient who is chronic right-sided heart failure is got chronic swelling and skin breakdown.  He got a stroke over a year ago his balance is also is mostly sitting in the chair.  He had more issues with swelling and skin blisters.  He did well with lymphedema therapist at his home situation at the [facility].  We will try to get him home care set up again which I think is going.    For his swelling I would increase his Lasix from 20 to 30 mg, I reviewed his past creatinine it was stable.  New prescription is sent hopefully this will bring down the swelling.    For the right leg wounds he can use Silvadene cream on it as he likes that.  Has worked in the past.  Concerned about infection so Shefali put him on Keflex 500 mg twice a day and have him not wear his compression stocking for 5 days.    Of course elevate his legs as possible.  He can continue the compression stocking on the left during the day and take it off at night and hopefully in 5 days can do that on the right leg as well.      More difficult with video visit but with his assisted living and covid difficult to do in person visit.    His visit is limited by his hearing but granddaughter assists in relaying words to him.              BMI:  "  Estimated body mass index is 30.22 kg/m  as calculated from the following:    Height as of 4/10/20: 1.778 m (5' 10\").    Weight as of 10/2/20: 95.5 kg (210 lb 9.6 oz).           Return in about 1 week (around 3/15/2021), or homecare.    Lazaro Huston MD  [facility] GRACE Charles is a 89 year old who presents for the following health issues  accompanied by his Merline grand daughter:    HPI       Chief Complaint   Patient presents with     Video Visit     check blisters on legs and some swelling     Right leg blister, side and anterior and back has healed.  Wears compression stockings.  Right lateral side is sore, chronic skin changes.      More left side swelling, less breakdown.      He is at [facility] and needs wound nurse and lymphedema if possible.      Review of Systems         Objective           Vitals:  No vitals were obtained today due to virtual visit.    Physical Exam   GENERAL: Healthy, alert and no distress  EYES: Eyes grossly normal to inspection.  No discharge or erythema, or obvious scleral/conjunctival abnormalities.  RESP: No audible wheeze, cough, or visible cyanosis.  No visible retractions or increased work of breathing.    SKIN: Visible skin clear. No significant rash, abnormal pigmentation or lesions.  NEURO: Cranial nerves grossly intact.  Mentation and speech appropriate for age.  PSYCH: Mentation appears normal, affect normal/bright, judgement and insight intact, normal speech and appearance well-groomed.  Right leg has 1+ swelling, indentations from his compression stocking.  He has chronic skin changes but some breakdown on the lateral side with erythema and pain  Left leg has no breakdown of the skin chronic skin changes, 2+ pitting edema.                Video-Visit Details    Type of service:  Video Visit    Video End Time:9:53 AM    Originating Location (pt. Location): Assisted Living    Distant Location (provider location):  [facility] " [place]     Platform used for Video Visit: James

## 2021-03-09 ENCOUNTER — TELEPHONE (OUTPATIENT)
Dept: INTERNAL MEDICINE | Facility: CLINIC | Age: 86
End: 2021-03-09

## 2021-03-09 ENCOUNTER — APPOINTMENT (OUTPATIENT)
Dept: GENERAL RADIOLOGY | Facility: CLINIC | Age: 86
End: 2021-03-09
Attending: EMERGENCY MEDICINE
Payer: COMMERCIAL

## 2021-03-09 ENCOUNTER — HOSPITAL ENCOUNTER (EMERGENCY)
Facility: CLINIC | Age: 86
Discharge: HOME | End: 2021-03-09
Attending: EMERGENCY MEDICINE | Admitting: EMERGENCY MEDICINE
Payer: COMMERCIAL

## 2021-03-09 VITALS
DIASTOLIC BLOOD PRESSURE: 79 MMHG | HEART RATE: 71 BPM | SYSTOLIC BLOOD PRESSURE: 156 MMHG | RESPIRATION RATE: 18 BRPM | TEMPERATURE: 97.7 F

## 2021-03-09 DIAGNOSIS — K59.00 CONSTIPATION, UNSPECIFIED CONSTIPATION TYPE: ICD-10-CM

## 2021-03-09 DIAGNOSIS — R33.9 URINARY RETENTION: ICD-10-CM

## 2021-03-09 LAB
ALBUMIN UR-MCNC: NEGATIVE MG/DL
APPEARANCE UR: CLEAR
BILIRUB UR QL STRIP: NEGATIVE
COLOR UR AUTO: YELLOW
GLUCOSE UR STRIP-MCNC: NEGATIVE MG/DL
HGB UR QL STRIP: NEGATIVE
KETONES UR STRIP-MCNC: NEGATIVE MG/DL
LEUKOCYTE ESTERASE UR QL STRIP: NEGATIVE
NITRATE UR QL: NEGATIVE
PH UR STRIP: 6 PH (ref 5–7)
SOURCE: NORMAL
SP GR UR STRIP: 1.01 (ref 1–1.03)
UROBILINOGEN UR STRIP-MCNC: 0 MG/DL (ref 0–2)

## 2021-03-09 PROCEDURE — 99284 EMERGENCY DEPT VISIT MOD MDM: CPT | Performed by: EMERGENCY MEDICINE

## 2021-03-09 PROCEDURE — 74018 RADEX ABDOMEN 1 VIEW: CPT

## 2021-03-09 PROCEDURE — 51702 INSERT TEMP BLADDER CATH: CPT | Performed by: EMERGENCY MEDICINE

## 2021-03-09 PROCEDURE — 51798 US URINE CAPACITY MEASURE: CPT | Performed by: EMERGENCY MEDICINE

## 2021-03-09 PROCEDURE — 81003 URINALYSIS AUTO W/O SCOPE: CPT | Performed by: EMERGENCY MEDICINE

## 2021-03-09 PROCEDURE — 99284 EMERGENCY DEPT VISIT MOD MDM: CPT | Mod: 25 | Performed by: EMERGENCY MEDICINE

## 2021-03-09 NOTE — ED NOTES
Reviewed discharge instructions with pt and his son.  Discussed navarrete catheter care and making a follow up appointment. Informed the son to call today to schedule for later this week, unsure of when appointments are available.  Navarrete continues to drain clear urine.

## 2021-03-09 NOTE — ED NOTES
Shortly after arrival pt stated that he had to have a bowel movement.  Pt up to the bedside commode with assist of one.  Pt had a small half dollar sized loose stool.  Bladder scanned pt for greater then 819 ml.  Spoke with provider- order for a catheter to be placed received.  Pt's son stated that about 7 years ago pt had a similar issue and needed a catheter.   Catheter placed, 800 ml drained and sample sent to lab.

## 2021-03-09 NOTE — TELEPHONE ENCOUNTER
The ER note is not done so I don't know what they did or gave him.. OK to have the catheter a week to relax the bladder.    Constipation if he has been taking miralax can try doing it twice a day.

## 2021-03-09 NOTE — ED NOTES
Present during provider's rectal exam.  Provider unable to disimpact pt due to it being higher up in the rectum.

## 2021-03-09 NOTE — DISCHARGE INSTRUCTIONS
Sometimes issues with constipation and urinary retention occur together.  It is hard to say if the buildup of stool and constipation led to urinary retention or if urinary retention and enlarged bladder made constipation and inability to pass stool worse.    A Ulloa catheter was inserted today in the emergency room.  You should leave it in place and follow-up with Dr. Silva who is a urologist.  He may remove it at a later date.    Since we were unable to perform a disimpaction, or manual removal of the stool in your colon, you should take steps to move your bowels at home.  You should be sure to drink plenty of water, may add fiber to your diet such as Benefiber or Metamucil, and may take over-the-counter medications to help with bowel movements.  You may take MiraLAX or try milk of magnesia    Follow-up closely with your primary provider.  You should mention your issues with chronic constipation to see if there are medication changes that may help.    Return to the emergency room if you have any new or worsening issues

## 2021-03-09 NOTE — TELEPHONE ENCOUNTER
Reason for Call:  Same Day Appointment, Requested Provider:  Lazaro Huston MD    PCP: Lazaro Huston    Reason for visit: catheter issues, constipation (assisted living nurses are concerned about infection)    Duration of symptoms: unknown      Additional comments: Patient was seen at the Hospital and sent back to his assisted living without relief from constipation or removing the catheter. Referred to Urology, unable to get in at [facility], grand daughter is calling  Urology to see if he can be seen there. Patient was also told he needs to be seen by his PCP within 3 days (by 3/12). Please call to discuss working in patient.     Can we leave a detailed message on this number? YES    Phone number patient can be reached at: Please call Merline at [phone]    Best Time: any    Call taken on 3/9/2021 at 10:02 AM by Royce Armas

## 2021-03-09 NOTE — ED TRIAGE NOTES
Pt presents with concerns of urinary retention and possible constipation.  Pt states that he has not voided since Monday morning.   Small stools.

## 2021-03-09 NOTE — ED PROVIDER NOTES
History     Chief Complaint   Patient presents with     Constipation     Urinary Retention     HPI  Melvin Abad is a 89 year old male who presents to the emergency room with constipation and urinary retention.  Started with constipation issues.  He normally has difficulty with complete evacuation of his bowels.  Frequently has to take Ex-Lax and Metamucil.  Feels as if his constipation became much worse after his stroke 1 year ago.  He feels that his last bowel movement was a little over 1-1/2 days ago.  He did have to strain and had a small amount of hard stool passed.  It was nonbloody, is not black or tarry.  He is also concerned about urinary retention.  Has not urinated for at least 24 hours.  Previously had this issue with constipation and subsequent urinary retention several years ago.  His belly feels very full but it is not painful.  Has not been running a fever.  Not feeling nauseated or vomiting.  Has not had any new medications    Allergies:  No Known Allergies    Problem List:    Patient Active Problem List    Diagnosis Date Noted     Chronic atrial fibrillation (H) 05/01/2020     Priority: Medium     Osteoarthritis of glenohumeral joint, left 02/24/2020     Priority: Medium     Diabetes mellitus with peripheral vascular disease (H) 01/23/2020     Priority: Medium     Longstanding persistent atrial fibrillation (H) 01/23/2020     Priority: Medium     Anticoagulation goal of INR 2 to 3 01/23/2020     Priority: Medium     Slow transit constipation 01/23/2020     Priority: Medium     Sleep disturbances 01/23/2020     Priority: Medium     Right pontine stroke (H) 01/23/2020     Priority: Medium     Hemiparesis affecting left side as late effect of cerebrovascular accident (CVA) (H) 01/23/2020     Priority: Medium     Dry eyes 01/23/2020     Priority: Medium     Restless legs syndrome (RLS) 01/23/2020     Priority: Medium     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) 11/06/2018     Priority: Medium      Type 2 diabetes mellitus without complication, without long-term current use of insulin (H) 05/10/2017     Priority: Medium     Pain of right lower leg 03/31/2017     Priority: Medium     Contusion of right lower extremity 03/31/2017     Priority: Medium     Leg pain 03/31/2017     Priority: Medium     Long-term (current) use of anticoagulants [Z79.01] 04/05/2016     Priority: Medium     Obesity (BMI 30-39.9) 10/28/2015     Priority: Medium     Cellulitis 11/20/2013     Priority: Medium     Anemia due to blood loss, acute 11/20/2013     Priority: Medium     DVT prophylaxis 11/20/2013     Priority: Medium     Carpal tunnel syndrome of right wrist 08/16/2013     Priority: Medium     Gilbert syndrome 01/31/2012     Priority: Medium     History of skin cancer 01/03/2012     Priority: Medium     AK (actinic keratosis) 01/03/2012     Priority: Medium     Essential hypertension 04/18/2011     Priority: Medium     Health Care Home 04/08/2011     Priority: Medium     Natalie Ferraro RN-PHN  FPA / [facility]   [phone]        DX V65.8 REPLACED WITH [facility] (04/08/2013)       Diverticulosis 03/25/2011     Priority: Medium     Type 2 diabetes mellitus without complication (H) 03/10/2011     Priority: Medium     Hyperlipidemia LDL goal <100 10/31/2010     Priority: Medium     Long term current use of anticoagulant therapy 01/31/2001     Priority: Medium     Problem list name updated by automated process. Provider to review          Past Medical History:    Past Medical History:   Diagnosis Date     Actinic keratosis      Atrial fibrillation (H)      Basal cell carcinoma nos 06/10     Cardiac dysrhythmia, unspecified      Cough      Diabetic eye exam (H) 8/29/14     Generalized osteoarthrosis, unspecified site      Other diseases of lung, not elsewhere classified      Pure hypercholesterolemia      Unspecified essential hypertension        Past Surgical History:    Past Surgical History:    Procedure Laterality Date     C TOTAL KNEE ARTHROPLASTY  1997    Knee Replacement, Total     COLONOSCOPY  05/02/2007    Multiple bxs of diminutive polyps of ascending colon.     COLONOSCOPY  11/10/2010    COLONOSCOPY performed by MARISELA GRIMM at  GI      COLONOSCOPY W/WO BRUSH/WASH  10/19/2001      COLONOSCOPY W/WO BRUSH/WASH  11/02/2004      HEMORRHOIDOPEXY BY STAPLING  09/26/08       Family History:    Family History   Problem Relation Age of Onset     Cancer Mother      Prostate Cancer Father        Social History:  Marital Status:   [5]  Social History     Tobacco Use     Smoking status: Former Smoker     Types: Cigarettes     Smokeless tobacco: Never Used     Tobacco comment: 5 years   Substance Use Topics     Alcohol use: No     Comment: denies     Drug use: No        Medications:    acetaminophen (TYLENOL) 325 MG tablet  blood glucose (NO BRAND SPECIFIED) test strip  Carboxymethylcellulose Sodium (THERATEARS) 0.25 % SOLN  cephALEXin (KEFLEX) 500 MG capsule  cholecalciferol (VITAMIN D3) 1000 units (25 mcg) capsule  furosemide (LASIX) 20 MG tablet  gabapentin (NEURONTIN) 100 MG capsule  hydrocortisone 1 % EX external cream  lisinopril (ZESTRIL) 10 MG tablet  loratadine (CLARITIN) 10 MG tablet  meclizine (ANTIVERT) 25 MG tablet  metoprolol succinate ER (TOPROL-XL) 50 MG 24 hr tablet  order for DME  order for DME  order for DME  polyethylene glycol (MIRALAX) powder  senna-docusate (SENOKOT-S/PERICOLACE) 8.6-50 MG tablet  silver sulfADIAZINE (SILVADENE) 1 % external cream  simvastatin (ZOCOR) 40 MG tablet  warfarin ANTICOAGULANT (COUMADIN) 5 MG tablet          Review of Systems   All other systems reviewed and are negative.      Physical Exam   BP: (!) 181/98  Pulse: 84  Temp: 97.7  F (36.5  C)  Resp: 18      Physical Exam  Vitals signs and nursing note reviewed.   Constitutional:       General: He is not in acute distress.     Appearance: He is not diaphoretic.   HENT:      Head: Atraumatic.    Eyes:      General: No scleral icterus.     Pupils: Pupils are equal, round, and reactive to light.   Cardiovascular:      Heart sounds: Normal heart sounds.   Pulmonary:      Effort: No respiratory distress.      Breath sounds: Normal breath sounds.   Abdominal:      General: Bowel sounds are normal. There is no distension.      Palpations: Abdomen is soft.      Tenderness: There is no abdominal tenderness. There is no guarding or rebound.   Musculoskeletal:         General: No tenderness.   Skin:     General: Skin is warm.      Findings: No rash.   Neurological:      Mental Status: He is alert.   Psychiatric:         Mood and Affect: Mood normal.         Behavior: Behavior normal.         ED Course        Procedures               Critical Care time:  none               Results for orders placed or performed during the hospital encounter of 03/09/21 (from the past 24 hour(s))   UA reflex to Microscopic and Culture    Specimen: Urine catheter; Catheterized Urine   Result Value Ref Range    Color Urine Yellow     Appearance Urine Clear     Glucose Urine Negative NEG^Negative mg/dL    Bilirubin Urine Negative NEG^Negative    Ketones Urine Negative NEG^Negative mg/dL    Specific Gravity Urine 1.011 1.003 - 1.035    Blood Urine Negative NEG^Negative    pH Urine 6.0 5.0 - 7.0 pH    Protein Albumin Urine Negative NEG^Negative mg/dL    Urobilinogen mg/dL 0.0 0.0 - 2.0 mg/dL    Nitrite Urine Negative NEG^Negative    Leukocyte Esterase Urine Negative NEG^Negative    Source Catheterized Urine    XR Abdomen Port 1 View    Narrative    EXAM: XR ABDOMEN PORT 1 VW  LOCATION: [facility]  DATE/TIME: 3/9/2021 4:53 AM    INDICATION: Constipation  COMPARISON: None.      Impression    IMPRESSION: Bowel gas pattern is nonobstructive, with gas scattered in small and large bowel loops. Heart is enlarged. Moderate volume of fecal material in the rectosigmoid. Generalized osteopenia.        Medications - No data to  display    Assessments & Plan (with Medical Decision Making)  Melvin is a 89-year-old male who presents to the emergency room with constipation and urinary retention. He has a longstanding issue of constipation, and feels like he has incomplete evacuation of his bowels frequently. He tries taking Ex-Lax and Metamucil at home without much relief. His last bowel movement was over 1 day ago. He is also coming in because he has not urinated for the last 24 hours. Some discomfort due to being unable to urinate.  When patient arrived, RN bladder scanned and noted over 800 mL, given verbal confirmation to place Ulloa catheter and collect a urine sample to send for UA. Patient had significant relief after draining the bladder. He was agreeable to proceeding with an x-ray to evaluate for signs of fecal impaction. X-ray shows nonobstructive bowel gas pattern and moderate volume of fecal material in the rectosigmoid colon.  Discussed the results with Melvin and his son who is at his bedside. I discussed my recommendation of attempting manual disimpaction for further relief of his symptoms. Patient was agreeable to this plan. With RN assistance, attempted manual disimpaction but the stool was far up in the rectosigmoid colon and could not be reached for disimpaction. After this, discussed with both the patient and his son that we will leave the Ulloa catheter in place and made a referral for him to follow-up with urology regarding this urinary retention. He should be sure to add fiber to his diet, as his son states that he does not like to take the Metamucil. Should try to take MiraLAX daily to help with his constipation and follow-up with his primary provider to discuss other options if it continues to be an issue. Finally, discussed reasons to return to the emergency room. They understand and agree and are discharged in no acute distress     I have reviewed the nursing notes.    I have reviewed the findings, diagnosis, plan  and need for follow up with the patient.       Discharge Medication List as of 3/9/2021  6:39 AM          Final diagnoses:   Urinary retention   Constipation, unspecified constipation type       3/9/2021   [facility] EMERGENCY DEPT     Yumiko Abad DO  03/09/21 [id number]

## 2021-03-09 NOTE — TELEPHONE ENCOUNTER
Merline calling again as she cannot get patient in anytime soon to take his catheter out in urology and would like this issue addressed also when you call her back.

## 2021-03-15 ENCOUNTER — OFFICE VISIT (OUTPATIENT)
Dept: INTERNAL MEDICINE | Facility: CLINIC | Age: 86
End: 2021-03-15
Payer: COMMERCIAL

## 2021-03-15 VITALS
DIASTOLIC BLOOD PRESSURE: 66 MMHG | HEART RATE: 78 BPM | SYSTOLIC BLOOD PRESSURE: 126 MMHG | TEMPERATURE: 96 F | OXYGEN SATURATION: 96 % | RESPIRATION RATE: 16 BRPM

## 2021-03-15 DIAGNOSIS — I50.813 ACUTE ON CHRONIC RIGHT-SIDED HEART FAILURE (H): ICD-10-CM

## 2021-03-15 DIAGNOSIS — R33.9 URINARY RETENTION: Primary | ICD-10-CM

## 2021-03-15 DIAGNOSIS — I89.0 LYMPHEDEMA: ICD-10-CM

## 2021-03-15 DIAGNOSIS — L03.115 CELLULITIS OF RIGHT LEG: ICD-10-CM

## 2021-03-15 PROCEDURE — 99214 OFFICE O/P EST MOD 30 MIN: CPT | Performed by: INTERNAL MEDICINE

## 2021-03-15 RX ORDER — TAMSULOSIN HYDROCHLORIDE 0.4 MG/1
0.4 CAPSULE ORAL DAILY
Qty: 30 CAPSULE | Refills: 0 | Status: SHIPPED | OUTPATIENT
Start: 2021-03-15 | End: 2021-03-30

## 2021-03-15 ASSESSMENT — PAIN SCALES - GENERAL: PAINLEVEL: NO PAIN (0)

## 2021-03-15 NOTE — NURSING NOTE
Catheter removal documentation on 3/15/2021:    Melvin Abad presents to the clinic for catheter removal.  Reason for removal: had it placed in ED. Dr. Huston wants it removed today to see if patient can urinate on his own now  Order has been verified. Yes, verbal per Dr. Huston  Catheter successfully removed at 3:05 PM without immediate complication.  300 cc's of urine present in the catheter bag.  Urethral meatus is free of secretions and encrustation.  The patient is afebrile.  The patient tolerated the procedure and was instructed to follow up with their PCP or consultant as planned, monitor for pain or discomfort and watch for signs of infection. Needs to urinate by 9 PM. If not, Dr. Huston wants patient seen in the ED.     Alma White RN

## 2021-03-17 ENCOUNTER — TELEPHONE (OUTPATIENT)
Dept: INTERNAL MEDICINE | Facility: CLINIC | Age: 86
End: 2021-03-17

## 2021-03-17 NOTE — TELEPHONE ENCOUNTER
Home Health Certification and Plan of Care forms received.     Certification Period from 3/4/2021 to 5/2/2021.    Forms placed in [facility] folder.     PENELOPE CazaresN, RN  [facility]

## 2021-03-18 ENCOUNTER — ANTICOAGULATION THERAPY VISIT (OUTPATIENT)
Dept: ANTICOAGULATION | Facility: CLINIC | Age: 86
End: 2021-03-18

## 2021-03-18 ENCOUNTER — TELEPHONE (OUTPATIENT)
Dept: ANTICOAGULATION | Facility: CLINIC | Age: 86
End: 2021-03-18

## 2021-03-18 DIAGNOSIS — I48.20 CHRONIC ATRIAL FIBRILLATION (H): ICD-10-CM

## 2021-03-18 DIAGNOSIS — Z79.01 LONG TERM CURRENT USE OF ANTICOAGULANT THERAPY: ICD-10-CM

## 2021-03-18 DIAGNOSIS — I48.20 CHRONIC ATRIAL FIBRILLATION (H): Primary | ICD-10-CM

## 2021-03-18 LAB
CAPILLARY BLOOD COLLECTION: NORMAL
INR BLD: 1.7 (ref 0.86–1.14)

## 2021-03-18 PROCEDURE — 36416 COLLJ CAPILLARY BLOOD SPEC: CPT | Performed by: INTERNAL MEDICINE

## 2021-03-18 PROCEDURE — 85610 PROTHROMBIN TIME: CPT | Performed by: INTERNAL MEDICINE

## 2021-03-18 RX ORDER — CARBOXYMETHYLCELLULOSE SODIUM 2.5 MG/ML
1 SOLUTION/ DROPS OPHTHALMIC EVERY 4 HOURS PRN
Status: CANCELLED | COMMUNITY
Start: 2021-03-18

## 2021-03-18 NOTE — PROGRESS NOTES
ANTICOAGULATION MANAGEMENT     Patient Name:  Melvin Abad  Date:  3/18/2021    ASSESSMENT /SUBJECTIVE:    Today's INR result of 1.7 is subtherapeutic. Goal INR of 2.0-3.0      Warfarin dose taken: Less warfarin taken than planned which may be affecting INR. I spoke with TIANA Ding today and she states that Ed has 5 mg tablets and has had these since his last refill from his PCP in Nov. She has been giving him 2.5 mg Mon/Fri and 5 mg ROW. I have updated the Anticoag Calendar to reflect this and we will recheck INR in 2 weeks.     Diet: No new diet changes affecting INR    Medication changes/ interactions: No interaction expected between Flomax and warfarin    Previous INR: Therapeutic     S/S of bleeding or thromboembolism: No    New injury or illness: No    Upcoming surgery, procedure or cardioversion: No    Additional findings: Did also have his Lasix increased so have noticed some fluid shifts      PLAN:    Telephone call with  TIANA Ding regarding INR result and instructed:     Warfarin Dosing Instructions: Change your warfarin dose to 2.5 mg Mon and 5 mg all other days  . (3.2 % change)    Instructed patient to follow up no later than: 2 weeks  Lab visit scheduled    Education provided: Please call back if any changes to your diet, medications or how you've been taking warfarin and Importance of taking warfarin as instructed      Toña verbalizes understanding and agrees to warfarin dosing plan.    Instructed to call the Anticoagulation Clinic for any changes, questions or concerns. (#[phone])        Ruben Siddiqui RN      OBJECTIVE:  Recent labs: (last 7 days)     21  1017   INR 1.7*         No question data found.  Anticoagulation Summary  As of 3/18/2021    INR goal:  2.0-3.0   TTR:  87.4 % (1 y)   INR used for dosin.7 (3/18/2021)   Warfarin maintenance plan:  2.5 mg (5 mg x 0.5) every Mon; 5 mg (5 mg x 1) all other days   Full warfarin instructions:  2.5 mg every Mon; 5 mg all other days    Weekly warfarin total:  32.5 mg   Plan last modified:  Ruben Siddiqui, RN (3/18/2021)   Next INR check:  4/1/2021   Priority:  Maintenance   Target end date:  Indefinite    Indications    Atrial fibrillation (Resolved) [I48.91]  Long-term (current) use of anticoagulants [Z79.01] [Z79.01]  Chronic atrial fibrillation (H) [I48.20]             Anticoagulation Episode Summary     INR check location:      Preferred lab:      Send INR reminders to:  [facility] [place]    Comments:  5 mg tablets, please call pt's family with INR and orders - Toña daughter in law [phone]      Anticoagulation Care Providers     Provider Role Specialty Phone number    Fransisco Cordon MD Referring Family Medicine [phone]    Lazaro Huston MD Responsible Internal Medicine [phone]

## 2021-03-18 NOTE — TELEPHONE ENCOUNTER
ANTICOAGULATION MANAGEMENT      Melvin Abad due for annual renewal of referral to anticoagulation monitoring. Order pended for your review and signature.      ANTICOAGULATION SUMMARY      Warfarin indication(s)     Atrial fibrillation    Heart valve present?  NO       Current goal range   INR: 2.0-3.0     Goal appropriate for indication? Yes, INR 2-3 appropriate for hx of DVT, PE, hypercoagulable state, Afib, LVAD, or bileaflet AVR without risk factors     Current duration of therapy Indefinite/long term therapy   Time in Therapeutic Range (TTR)  (Goal > 60%) 87.4 %       Office visit with referring provider's group within last year yes on 3/15/21       Ruben Siddiqui RN

## 2021-03-18 NOTE — TELEPHONE ENCOUNTER
Medications reconciled on [facility] form, changes noted on form.  Form to PCP for signature.    Demi Vee RN  [facility]

## 2021-03-19 ENCOUNTER — MEDICAL CORRESPONDENCE (OUTPATIENT)
Dept: HEALTH INFORMATION MANAGEMENT | Facility: CLINIC | Age: 86
End: 2021-03-19

## 2021-03-19 DIAGNOSIS — Z53.9 DIAGNOSIS NOT YET DEFINED: Primary | ICD-10-CM

## 2021-03-19 PROCEDURE — G0180 MD CERTIFICATION HHA PATIENT: HCPCS | Performed by: INTERNAL MEDICINE

## 2021-03-19 NOTE — TELEPHONE ENCOUNTER
Forms signed by provider. Faxed back to home health care and to STAT scanning.  Magali Beckford CMA on 3/19/2021 at 2:24 PM

## 2021-03-28 DIAGNOSIS — R33.9 URINARY RETENTION: ICD-10-CM

## 2021-03-30 RX ORDER — TAMSULOSIN HYDROCHLORIDE 0.4 MG/1
CAPSULE ORAL
Qty: 30 CAPSULE | Refills: 0 | Status: SHIPPED | OUTPATIENT
Start: 2021-03-30 | End: 2021-04-16

## 2021-04-01 ENCOUNTER — ANTICOAGULATION THERAPY VISIT (OUTPATIENT)
Dept: ANTICOAGULATION | Facility: CLINIC | Age: 86
End: 2021-04-01

## 2021-04-01 DIAGNOSIS — I48.20 CHRONIC ATRIAL FIBRILLATION (H): ICD-10-CM

## 2021-04-01 DIAGNOSIS — Z79.01 LONG TERM CURRENT USE OF ANTICOAGULANT THERAPY: ICD-10-CM

## 2021-04-01 LAB
CAPILLARY BLOOD COLLECTION: NORMAL
INR BLD: 2.4 (ref 0.86–1.14)

## 2021-04-01 PROCEDURE — 85610 PROTHROMBIN TIME: CPT | Performed by: INTERNAL MEDICINE

## 2021-04-01 PROCEDURE — 36416 COLLJ CAPILLARY BLOOD SPEC: CPT | Performed by: INTERNAL MEDICINE

## 2021-04-01 NOTE — PROGRESS NOTES
ANTICOAGULATION MANAGEMENT     Patient Name:  Melvin Abad  Date:  2021    ASSESSMENT /SUBJECTIVE:    Today's INR result of 2.4 is therapeutic. Goal INR of 2.0-3.0      Warfarin dose taken: Warfarin taken as instructed    Diet: No new diet changes affecting INR    Medication changes/ interactions: No new medications/supplements affecting INR    Previous INR: Subtherapeutic     S/S of bleeding or thromboembolism: No    New injury or illness: No    Upcoming surgery, procedure or cardioversion: No    Additional findings: None      PLAN:    Telephone call with  daughter in law Toña regarding INR result and instructed:     Warfarin Dosing Instructions: Continue your current warfarin dose 2.5 mg Mon and 5 mg all other days    Instructed patient to follow up no later than: 3 weeks  Patient offered & declined to schedule next visit    Education provided: None required      Toña verbalizes understanding and agrees to warfarin dosing plan.    Instructed to call the Anticoagulation Clinic for any changes, questions or concerns. (#[phone])        Eli Diaz RN      OBJECTIVE:  Recent labs: (last 7 days)     21  1034   INR 2.4*         INR assessment THER    Recheck INR In: 3 WEEKS    INR Location Outside lab      Anticoagulation Summary  As of 2021    INR goal:  2.0-3.0   TTR:  85.8 % (1 y)   INR used for dosin.4 (2021)   Warfarin maintenance plan:  2.5 mg (5 mg x 0.5) every Mon; 5 mg (5 mg x 1) all other days   Full warfarin instructions:  2.5 mg every Mon; 5 mg all other days   Weekly warfarin total:  32.5 mg   No change documented:  Eli Diaz RN   Plan last modified:  Ruben Siddiqui RN (3/18/2021)   Next INR check:  2021   Priority:  Maintenance   Target end date:  Indefinite    Indications    Atrial fibrillation (Resolved) [I48.91]  Long-term (current) use of anticoagulants [Z79.01] [Z79.01]  Chronic atrial fibrillation (H) [I48.20]             Anticoagulation Episode  Summary     INR check location:      Preferred lab:      Send INR reminders to:  [facility]    Comments:  5 mg tablets, please call pt's family with INR and orders - Toña daughter in law [phone]      Anticoagulation Care Providers     Provider Role Specialty Phone number    Lazaro Huston MD Referring Internal Medicine [phone]    Fransisco Cordon MD Referring Family Medicine [phone]

## 2021-04-12 DIAGNOSIS — I50.813 ACUTE ON CHRONIC RIGHT-SIDED HEART FAILURE (H): ICD-10-CM

## 2021-04-13 RX ORDER — FUROSEMIDE 20 MG
TABLET ORAL
Qty: 45 TABLET | Refills: 1 | OUTPATIENT
Start: 2021-04-13

## 2021-04-13 NOTE — TELEPHONE ENCOUNTER
Refused as duplicate. See script 3/8/2021 dispense 45 with 1 refill.          Demi Vee RN  [facility]

## 2021-04-14 DIAGNOSIS — I50.813 ACUTE ON CHRONIC RIGHT-SIDED HEART FAILURE (H): ICD-10-CM

## 2021-04-14 DIAGNOSIS — I10 ESSENTIAL HYPERTENSION, BENIGN: ICD-10-CM

## 2021-04-14 DIAGNOSIS — R33.9 URINARY RETENTION: ICD-10-CM

## 2021-04-14 DIAGNOSIS — E78.5 HYPERLIPIDEMIA LDL GOAL <100: ICD-10-CM

## 2021-04-14 DIAGNOSIS — I48.11 LONGSTANDING PERSISTENT ATRIAL FIBRILLATION (H): ICD-10-CM

## 2021-04-15 ENCOUNTER — ANTICOAGULATION THERAPY VISIT (OUTPATIENT)
Dept: ANTICOAGULATION | Facility: CLINIC | Age: 86
End: 2021-04-15

## 2021-04-15 DIAGNOSIS — Z79.01 LONG TERM CURRENT USE OF ANTICOAGULANT THERAPY: ICD-10-CM

## 2021-04-15 DIAGNOSIS — I48.20 CHRONIC ATRIAL FIBRILLATION (H): ICD-10-CM

## 2021-04-15 LAB
CAPILLARY BLOOD COLLECTION: NORMAL
INR BLD: 2.3 (ref 0.86–1.14)

## 2021-04-15 PROCEDURE — 36416 COLLJ CAPILLARY BLOOD SPEC: CPT | Performed by: INTERNAL MEDICINE

## 2021-04-15 PROCEDURE — 85610 PROTHROMBIN TIME: CPT | Performed by: INTERNAL MEDICINE

## 2021-04-15 NOTE — PROGRESS NOTES
ANTICOAGULATION MANAGEMENT     Patient Name:  Melvin Abad  Date:  4/15/2021    ASSESSMENT /SUBJECTIVE:    Today's INR result of 2.3 is therapeutic. Goal INR of 2.0-3.0      Warfarin dose taken: Warfarin taken as instructed    Diet: No new diet changes affecting INR    Medication changes/ interactions: No new medications/supplements affecting INR    Previous INR: Therapeutic     S/S of bleeding or thromboembolism: No    New injury or illness: No    Upcoming surgery, procedure or cardioversion: No    Additional findings: None      PLAN:    Telephone call with  Daughter in law Toña regarding INR result and instructed:     Warfarin Dosing Instructions: Continue your current warfarin dose 2.5 mg Mon and 5 mg all other days    Instructed patient to follow up no later than: 3 weeks  Patient offered & declined to schedule next visit    Education provided: None required          Instructed to call the Anticoagulation Clinic for any changes, questions or concerns. (#[phone])        Eli Diaz RN      OBJECTIVE:  Recent labs: (last 7 days)     04/15/21  0947   INR 2.3*         INR assessment THER      Anticoagulation Summary  As of 4/15/2021    INR goal:  2.0-3.0   TTR:  85.8 % (1 y)   INR used for dosin.3 (4/15/2021)   Warfarin maintenance plan:  2.5 mg (5 mg x 0.5) every Mon; 5 mg (5 mg x 1) all other days   Full warfarin instructions:  2.5 mg every Mon; 5 mg all other days   Weekly warfarin total:  32.5 mg   No change documented:  Eli Diaz RN   Plan last modified:  Ruben Siddiqui RN (3/18/2021)   Next INR check:  2021   Priority:  Maintenance   Target end date:  Indefinite    Indications    Atrial fibrillation (Resolved) [I48.91]  Long-term (current) use of anticoagulants [Z79.01] [Z79.01]  Chronic atrial fibrillation (H) [I48.20]             Anticoagulation Episode Summary     INR check location:      Preferred lab:      Send INR reminders to:  [facility]    Comments:  5 mg tablets,  please call pt's family with INR and orders - Toña daughter in law [phone]      Anticoagulation Care Providers     Provider Role Specialty Phone number    Lazaro Huston MD Referring Internal Medicine [phone]    Fransisco Cordon MD Referring Family Medicine [phone]

## 2021-04-16 RX ORDER — FUROSEMIDE 20 MG
TABLET ORAL
Qty: 45 TABLET | Refills: 1 | OUTPATIENT
Start: 2021-04-16

## 2021-04-16 RX ORDER — METOPROLOL SUCCINATE 50 MG/1
TABLET, EXTENDED RELEASE ORAL
Qty: 90 TABLET | Refills: 1 | Status: SHIPPED | OUTPATIENT
Start: 2021-04-16 | End: 2021-10-12

## 2021-04-16 RX ORDER — TAMSULOSIN HYDROCHLORIDE 0.4 MG/1
CAPSULE ORAL
Qty: 90 CAPSULE | Refills: 1 | Status: SHIPPED | OUTPATIENT
Start: 2021-04-16 | End: 2021-10-12

## 2021-04-16 RX ORDER — LISINOPRIL 10 MG/1
TABLET ORAL
Qty: 90 TABLET | Refills: 1 | Status: SHIPPED | OUTPATIENT
Start: 2021-04-16 | End: 2021-10-12

## 2021-04-16 NOTE — TELEPHONE ENCOUNTER
Prescription (Lasix) was sent 3/8/2021 for #45 with 1 refill.  Pharmacy notified via E-Prescribe refusal.     PENELOPE CazaresN, RN  [facility]

## 2021-04-16 NOTE — TELEPHONE ENCOUNTER
Routing refill request to provider for review/approval because:  Labs not current:  LDL    PENELOPE CazaresN, RN  [facility]

## 2021-04-16 NOTE — TELEPHONE ENCOUNTER
Prescription approved per [facility] Refill Protocol (Flomax, Lisinopril, Metoprolol).    PENELOPE CazaresN, RN  [facility]

## 2021-04-17 RX ORDER — WARFARIN SODIUM 5 MG/1
TABLET ORAL
Qty: 90 TABLET | Refills: 1 | Status: SHIPPED | OUTPATIENT
Start: 2021-04-17 | End: 2021-10-12

## 2021-04-17 RX ORDER — SIMVASTATIN 40 MG
TABLET ORAL
Qty: 90 TABLET | Refills: 1 | Status: SHIPPED | OUTPATIENT
Start: 2021-04-17 | End: 2021-10-20

## 2021-04-22 ENCOUNTER — MYC MEDICAL ADVICE (OUTPATIENT)
Dept: INTERNAL MEDICINE | Facility: CLINIC | Age: 86
End: 2021-04-22

## 2021-04-22 DIAGNOSIS — I50.813 ACUTE ON CHRONIC RIGHT-SIDED HEART FAILURE (H): Primary | ICD-10-CM

## 2021-04-22 DIAGNOSIS — S90.521D: ICD-10-CM

## 2021-04-24 ENCOUNTER — TELEPHONE (OUTPATIENT)
Dept: INTERNAL MEDICINE | Facility: CLINIC | Age: 86
End: 2021-04-24

## 2021-04-24 DIAGNOSIS — S90.521D: ICD-10-CM

## 2021-04-24 NOTE — TELEPHONE ENCOUNTER
[facility] utilizes an encounter to take the place of a direct phone call to your office. Please take a moment to review the below request. Please reply or route message to author of this encounter.  Message will act as a verbal OK of orders requested below. Thank you.    ORDER    SN 2 x a week for 1 week, 1 x a week for 1 week with 2 RN    OF NOTE: Wound is no longer a blister and presenting as a stasis ulcer.      Wound Care Order  SN to perform the following wound care to RLE stasis ulcer  1. Cleanse with saline or wound cleanser and gently pat dry  2. Apply silver cream to wound bed  3. Apply nonstick gauze pad over wound and secure with tape.    4. Change dressing daily and PRN  May leave open to air once wound healed   Client or CG to perform on days that nursing does not visit  OK to wear compression stockings to minimize edema.      PLEASE SEND NEW RX TO [facility] IN [place] FOR SILVADENE CREAM. CLIENT IS ALMOST OUT.  HE IS REQUESTING A LARGER QUANTITY THAN WAS SENT LAST TIME IF APPROPRIATE      MD SUMMARY AT [facility] on 4/24/21  Situation...Client referred to home care from clinic referral due to open wound to the right shin.  Upon assessment today, this appears to be a reoccurance of clients previous stasis ulcer.  Client resides at [facility].  He has supportive family as well. He is on coumadin and reports that he is having his INR checked monthly at the clinic.    Background...hx of stasis ulcers to LE, HX OF CVA, HEMIPARESIS LEFT SIDE, DIVERTICULOSIS, DM 2, PERIPHERAL VASCULAR DISEASE, HTN, A FIB, RLS, OSTEOARTHRITIS.   Assessment...BLE assessed during visit. Client has hx of stasis ulcers and wound presenting as such.  Today this measures 4 x 6 x 0.1cm.  Wound bed is greater than 25 percent slough covered and greater and less than 25 percent red non-granular tissue. Writer recommending BID treatment following same wound protocol as before.  Drainage is heavy brown serosanguinous drainage.   Client is able to complete wound care independently and nurse will be needed for wound assessment and education on s/s of infecetion to observe and report. Client reports wound pain to be less than a 2 out of 10.  No other skin concerns observed/reported at visit today.  Client is currently following a HEP and this is going well. He denies recent falls, but does have a fall hx.  Denies dizziness, but reports occasional loss of balance since his CVA.  He has a manual WC that he uses around the [facility] and is able to walk short distances as well.  He does requires assist/supervision with ADL/IADLs.  Appetite is reported to be adequate.  Bowel movements have been greatly improved with miralax and client is taking a half capful QD.  VS are WNL at visit today.  Does have some exertional SOB.  Client is alert ad oriented x 5.   Recommendation...SN for wound cares/assessment, edu on stasis ulcer prevention/treatments. Pain assessment, falls assessment, bowel assesssment/edu.     Gerri Taylor RN Case Manager  [phone]  [email or web address]

## 2021-04-25 DIAGNOSIS — I50.813 ACUTE ON CHRONIC RIGHT-SIDED HEART FAILURE (H): ICD-10-CM

## 2021-04-26 RX ORDER — SILVER SULFADIAZINE 10 MG/G
CREAM TOPICAL DAILY
Qty: 50 G | Refills: 1 | Status: SHIPPED | OUTPATIENT
Start: 2021-04-26 | End: 2021-12-29

## 2021-04-26 RX ORDER — FUROSEMIDE 20 MG
TABLET ORAL
Qty: 45 TABLET | Refills: 1 | Status: SHIPPED | OUTPATIENT
Start: 2021-04-26 | End: 2021-07-01

## 2021-04-26 NOTE — TELEPHONE ENCOUNTER
Prescription approved per [facility] Refill Protocol.    PENELOPE CazaresN, RN  [facility]

## 2021-04-28 ENCOUNTER — VIRTUAL VISIT (OUTPATIENT)
Dept: INTERNAL MEDICINE | Facility: CLINIC | Age: 86
End: 2021-04-28
Payer: COMMERCIAL

## 2021-04-28 DIAGNOSIS — L03.115 CELLULITIS OF RIGHT LEG: ICD-10-CM

## 2021-04-28 DIAGNOSIS — S90.521D: ICD-10-CM

## 2021-04-28 DIAGNOSIS — I50.813 ACUTE ON CHRONIC RIGHT-SIDED HEART FAILURE (H): Primary | ICD-10-CM

## 2021-04-28 PROCEDURE — 99213 OFFICE O/P EST LOW 20 MIN: CPT | Mod: 95 | Performed by: INTERNAL MEDICINE

## 2021-04-28 RX ORDER — CEPHALEXIN 500 MG/1
500 CAPSULE ORAL 2 TIMES DAILY
Qty: 20 CAPSULE | Refills: 0 | Status: SHIPPED | OUTPATIENT
Start: 2021-04-28 | End: 2022-02-23

## 2021-04-28 NOTE — PROGRESS NOTES
Melvin is a 89 year old who is being evaluated via a billable video visit.      How would you like to obtain your AVS? MyChart  If the video visit is dropped, the invitation should be resent by: Text to cell phone: [phone]  Will anyone else be joining your video visit? Yes: granddaughter-Merline. How would they like to receive their invitation? Text to cell phone: [phone]    Video Start Time: 4:14 PM    Assessment & Plan   Problem List Items Addressed This Visit     None      Visit Diagnoses     Acute on chronic right-sided heart failure (H)    -  Primary    Blister of right ankle, subsequent encounter        Relevant Medications    cephALEXin (KEFLEX) 500 MG capsule    Cellulitis of right leg        Relevant Medications    cephALEXin (KEFLEX) 500 MG capsule         Recurrent heart failure and swelling draining and open wounds on the right leg.  This may be coming from his compression stockings as they are too tight.  For the heart failure and swelling were can increase his Lasix from 30 mg a day to 40 mg for the next week then go back to 30.  For compression he can just use regular socks that have compression not ANDREA hose or anything.  For the infection he will do Keflex 500 mg twice a day for 10 days.  He will do bandaging with Silvadene twice a day.  He will be followed by home care as he cannot leave the home and they will help with his bandaging and managing the wound.                   No follow-ups on file.    Lazaro Huston MD  [facility]   Melvin is a 89 year old who presents for the following health issues  accompanied by his granddaughter:    HPI     Chief Complaint   Patient presents with     Video Visit     check sore right lower leg, needs note for home health care     He is accompanied by his granddaughter.     Right anterior shin sore is sore not painful, oozing out. Using silvadene cream.    Been there a week or so.  Was wearing compression stockings  and they are so tight.      Needs homecare and woundcare to help get this bandage changed and improved.  He is changing the bandages twice a week, homecare coming twice a week. Will continue silvadene.     He is homebound.        Past Medical History:   Diagnosis Date     Actinic keratosis      Atrial fibrillation (H)      Basal cell carcinoma nos 06/10    Mohs excision, rt upper lip & rt temple     Cardiac dysrhythmia, unspecified      Cough     chronic cough     Diabetic eye exam (H) 8/29/14     Generalized osteoarthrosis, unspecified site     djd of the knees, hands, and neck     Other diseases of lung, not elsewhere classified     pulmonary nodule, benign     Pure hypercholesterolemia      Unspecified essential hypertension      Current Outpatient Medications   Medication     acetaminophen (TYLENOL) 325 MG tablet     blood glucose (NO BRAND SPECIFIED) test strip     Carboxymethylcellulose Sodium (THERATEARS) 0.25 % SOLN     cholecalciferol (VITAMIN D3) 1000 units (25 mcg) capsule     furosemide (LASIX) 20 MG tablet     gabapentin (NEURONTIN) 100 MG capsule     hydrocortisone 1 % EX external cream     lisinopril (ZESTRIL) 10 MG tablet     loratadine (CLARITIN) 10 MG tablet     metoprolol succinate ER (TOPROL-XL) 50 MG 24 hr tablet     order for DME     order for DME     order for DME     polyethylene glycol (MIRALAX) powder     senna-docusate (SENOKOT-S/PERICOLACE) 8.6-50 MG tablet     silver sulfADIAZINE (SILVADENE) 1 % external cream     simvastatin (ZOCOR) 40 MG tablet     tamsulosin (FLOMAX) 0.4 MG capsule     warfarin ANTICOAGULANT (COUMADIN) 5 MG tablet     meclizine (ANTIVERT) 25 MG tablet     No current facility-administered medications for this visit.      Social History     Tobacco Use     Smoking status: Former Smoker     Types: Cigarettes     Smokeless tobacco: Never Used     Tobacco comment: 5 years   Substance Use Topics     Alcohol use: No     Comment: denies     Drug use: No         Review of  Systems         Objective           Vitals:  No vitals were obtained today due to virtual visit.    Physical Exam   GENERAL: Healthy, alert and no distress  EYES: Eyes grossly normal to inspection.  No discharge or erythema, or obvious scleral/conjunctival abnormalities.  RESP: No audible wheeze, cough, or visible cyanosis.  No visible retractions or increased work of breathing.    MS: Right leg has 1+ pitting edema, anterior shin has a large area where the skin has rubbed off and is open, mild erythema and tenderness around it.  SKIN:   NEURO: Cranial nerves grossly intact.  Mentation and speech appropriate for age.  PSYCH: Mentation appears normal, affect normal/bright, judgement and insight intact, normal speech and appearance well-groomed.                Video-Visit Details    Type of service:  Video Visit    Video End Time:4:23 PM    Originating Location (pt. Location): Assisted Living    Distant Location (provider location):  [facility]     Platform used for Video Visit: James

## 2021-04-29 ENCOUNTER — TELEPHONE (OUTPATIENT)
Dept: INTERNAL MEDICINE | Facility: CLINIC | Age: 86
End: 2021-04-29

## 2021-04-29 NOTE — TELEPHONE ENCOUNTER
Daughter calling and stating they picked up the prescription for the antibiotic but there was no new prescription for his lasix. Daughter stating lasix was increased to 2 tablets, 40 mg daily. Noted in the visit notes he is to increase to the 40 mg daily for 1 week and then go back to the 30 mg daily. Informed that new prescription was probably not sent due to recent fill on the medication. Informed if any issues with refilling to contact the clinic. Ashley Kebede LPN

## 2021-05-04 ENCOUNTER — TELEPHONE (OUTPATIENT)
Dept: INTERNAL MEDICINE | Facility: CLINIC | Age: 86
End: 2021-05-04

## 2021-05-04 NOTE — TELEPHONE ENCOUNTER
Home Health Certification and Plan of Care forms received.     Certification Period from 4/24/2021 to 6/22/2021.    Forms placed in [facility] folder.     PENELOPE CazaresN, RN  [facility]

## 2021-05-05 ENCOUNTER — MYC MEDICAL ADVICE (OUTPATIENT)
Dept: INTERNAL MEDICINE | Facility: CLINIC | Age: 86
End: 2021-05-05

## 2021-05-05 DIAGNOSIS — Z53.9 DIAGNOSIS NOT YET DEFINED: Primary | ICD-10-CM

## 2021-05-05 PROCEDURE — G0180 MD CERTIFICATION HHA PATIENT: HCPCS | Performed by: INTERNAL MEDICINE

## 2021-05-06 ENCOUNTER — ANTICOAGULATION THERAPY VISIT (OUTPATIENT)
Dept: ANTICOAGULATION | Facility: CLINIC | Age: 86
End: 2021-05-06

## 2021-05-06 DIAGNOSIS — Z79.01 LONG TERM CURRENT USE OF ANTICOAGULANT THERAPY: ICD-10-CM

## 2021-05-06 DIAGNOSIS — I48.20 CHRONIC ATRIAL FIBRILLATION (H): ICD-10-CM

## 2021-05-06 LAB
CAPILLARY BLOOD COLLECTION: NORMAL
INR BLD: 2.9 (ref 0.86–1.14)

## 2021-05-06 PROCEDURE — 36416 COLLJ CAPILLARY BLOOD SPEC: CPT | Performed by: INTERNAL MEDICINE

## 2021-05-06 PROCEDURE — 85610 PROTHROMBIN TIME: CPT | Performed by: INTERNAL MEDICINE

## 2021-05-06 NOTE — PROGRESS NOTES
ANTICOAGULATION MANAGEMENT     Patient Name:  Melvin Abad  Date:  2021    ASSESSMENT /SUBJECTIVE:    Today's INR result of 2.9 is therapeutic. Goal INR of 2.0-3.0      Warfarin dose taken: Warfarin taken as instructed    Diet: No new diet changes affecting INR    Medication changes/ interactions: No new medications/supplements affecting INR    Previous INR: Therapeutic     S/S of bleeding or thromboembolism: No    New injury or illness: No    Upcoming surgery, procedure or cardioversion: No    Additional findings: None      PLAN:    Telephone call with  TIANA Ding regarding INR result and instructed:     Warfarin Dosing Instructions: Continue your current warfarin dose 2.5 mg Mon and  mg all other days    Instructed patient to follow up no later than: 4 weeks  Patient offered & declined to schedule next visit    Education provided: None required      Toña (TIANA) verbalizes understanding and agrees to warfarin dosing plan.    Instructed to call the Anticoagulation Clinic for any changes, questions or concerns. (#[phone])        Ruben Siddiqui RN      OBJECTIVE:  Recent labs: (last 7 days)     21  1422   INR 2.9*         No question data found.  Anticoagulation Summary  As of 2021    INR goal:  2.0-3.0   TTR:  85.8 % (1 y)   INR used for dosin.9 (2021)   Warfarin maintenance plan:  2.5 mg (5 mg x 0.5) every Mon; 5 mg (5 mg x 1) all other days   Full warfarin instructions:  2.5 mg every Mon; 5 mg all other days   Weekly warfarin total:  32.5 mg   No change documented:  Ruben Siddiqui RN   Plan last modified:  Ruben Siddiqui RN (3/18/2021)   Next INR check:  6/3/2021   Priority:  Maintenance   Target end date:  Indefinite    Indications    Atrial fibrillation (Resolved) [I48.91]  Long-term (current) use of anticoagulants [Z79.01] [Z79.01]  Chronic atrial fibrillation (H) [I48.20]             Anticoagulation Episode Summary     INR check location:      Preferred lab:      Send INR  reminders to:  [facility]    Comments:  5 mg tablets, please call pt's family with INR and orders - Toña daughter in law [phone]      Anticoagulation Care Providers     Provider Role Specialty Phone number    Lazaro Huston MD Referring Internal Medicine [phone]    Fransisco Cordon MD Referring Family Medicine [phone]

## 2021-05-14 ENCOUNTER — TELEPHONE (OUTPATIENT)
Dept: INTERNAL MEDICINE | Facility: CLINIC | Age: 86
End: 2021-05-14

## 2021-05-14 NOTE — TELEPHONE ENCOUNTER
Reason for Call:  Form, our goal is to have forms completed with 72 hours, however, some forms may require a visit or additional information.    Type of letter, form or note:  other    Who is the form from?: Patient    Where did the form come from: Patient or family brought in       What clinic location was the form placed at?: [facility]    Where the form was placed: doctors box Box/Folder    What number is listed as a contact on the form?: [phone] patients number       Additional comments:     Call taken on 5/14/2021 at 1:25 PM by Kizzy Epperson

## 2021-05-27 ENCOUNTER — MYC MEDICAL ADVICE (OUTPATIENT)
Dept: INTERNAL MEDICINE | Facility: CLINIC | Age: 86
End: 2021-05-27

## 2021-05-27 NOTE — TELEPHONE ENCOUNTER
Sent info from last Virtual visit.  Instructions were to use compression socks.  They are informed to get compression socks.  Will leave open in case they have further questions.  PENELOPE RiveraN, RN

## 2021-05-28 NOTE — TELEPHONE ENCOUNTER
Will route to Dr. Huston as FYI in case he has further recommendations. See [employer] messages below.     PENELOPE CazaresN, RN  [facility]

## 2021-06-03 ENCOUNTER — ANTICOAGULATION THERAPY VISIT (OUTPATIENT)
Dept: ANTICOAGULATION | Facility: CLINIC | Age: 86
End: 2021-06-03

## 2021-06-03 DIAGNOSIS — Z79.01 LONG TERM CURRENT USE OF ANTICOAGULANT THERAPY: ICD-10-CM

## 2021-06-03 DIAGNOSIS — I48.20 CHRONIC ATRIAL FIBRILLATION (H): ICD-10-CM

## 2021-06-03 LAB
CAPILLARY BLOOD COLLECTION: NORMAL
INR BLD: 2.6 (ref 0.86–1.14)

## 2021-06-03 PROCEDURE — 85610 PROTHROMBIN TIME: CPT | Performed by: INTERNAL MEDICINE

## 2021-06-03 PROCEDURE — 36416 COLLJ CAPILLARY BLOOD SPEC: CPT | Performed by: INTERNAL MEDICINE

## 2021-06-03 NOTE — PROGRESS NOTES
ANTICOAGULATION MANAGEMENT     Patient Name:  Melvin Abad  Date:  6/3/2021    ASSESSMENT /SUBJECTIVE:    Today's INR result of 2.6 is therapeutic. Goal INR of 2.0-3.0      Warfarin dose taken: Warfarin taken as instructed    Diet: No new diet changes affecting INR    Medication changes/ interactions: No new medications/supplements affecting INR    Previous INR: Therapeutic     S/S of bleeding or thromboembolism: No    New injury or illness: No    Upcoming surgery, procedure or cardioversion: No    Additional findings: None      PLAN:    Warfarin Dosing Instructions: Continue your current warfarin dose 2.5 mg Mon and 5 mg all other days    Instructed patient to follow up no later than: 4 weeks  Left detailed message with recommended recheck date    Education provided: Please call back if any changes to your diet, medications or how you've been taking warfarin    Detailed voice message left for  DIL Toña with dosing instructions and follow up date.     Instructed to call the Anticoagulation Clinic for any changes, questions or concerns. (#[phone])        Ruben Siddiqui RN      OBJECTIVE:  Recent labs: (last 7 days)     21  0849   INR 2.6*         INR assessment THER    Recheck INR In: 4 WEEKS    INR Location Outside lab      Anticoagulation Summary  As of 6/3/2021    INR goal:  2.0-3.0   TTR:  85.8 % (1 y)   INR used for dosin.6 (6/3/2021)   Warfarin maintenance plan:  2.5 mg (5 mg x 0.5) every Mon; 5 mg (5 mg x 1) all other days   Full warfarin instructions:  2.5 mg every Mon; 5 mg all other days   Weekly warfarin total:  32.5 mg   No change documented:  Ruben Siddiqui RN   Plan last modified:  Ruben Siddiqui RN (3/18/2021)   Next INR check:  2021   Priority:  Maintenance   Target end date:  Indefinite    Indications    Atrial fibrillation (Resolved) [I48.91]  Long-term (current) use of anticoagulants [Z79.01] [Z79.01]  Chronic atrial fibrillation (H) [I48.20]              Anticoagulation Episode Summary     INR check location:      Preferred lab:      Send INR reminders to:  [facility]    Comments:  5 mg tablets, please call pt's family with INR and orders - Toña daughter in law [phone]      Anticoagulation Care Providers     Provider Role Specialty Phone number    Lazaro Huston MD Referring Internal Medicine [phone]    Fransisco Cordon MD Referring Family Medicine [phone]

## 2021-06-04 ENCOUNTER — TRANSFERRED RECORDS (OUTPATIENT)
Dept: HEALTH INFORMATION MANAGEMENT | Facility: CLINIC | Age: 86
End: 2021-06-04

## 2021-06-04 LAB — RETINOPATHY: NEGATIVE

## 2021-06-22 ENCOUNTER — TELEPHONE (OUTPATIENT)
Dept: INTERNAL MEDICINE | Facility: CLINIC | Age: 86
End: 2021-06-22

## 2021-06-22 NOTE — LETTER
[address] [address]  Phone: [phone]    June 22, 2021        Melvin Abad  [address] [place] [id number]          To whom it may concern:    RE: Melvin Abad    Please discontinue his compression stockings.      Please contact me for questions or concerns.      Sincerely,        Lazaro Huston MD

## 2021-07-01 ENCOUNTER — TELEPHONE (OUTPATIENT)
Dept: INTERNAL MEDICINE | Facility: CLINIC | Age: 86
End: 2021-07-01

## 2021-07-01 DIAGNOSIS — I50.813 ACUTE ON CHRONIC RIGHT-SIDED HEART FAILURE (H): ICD-10-CM

## 2021-07-01 DIAGNOSIS — I63.9 CEREBROVASCULAR ACCIDENT (CVA), UNSPECIFIED MECHANISM (H): Primary | ICD-10-CM

## 2021-07-01 RX ORDER — FUROSEMIDE 20 MG
TABLET ORAL
Qty: 45 TABLET | Refills: 2 | Status: SHIPPED | OUTPATIENT
Start: 2021-07-01 | End: 2021-10-01

## 2021-07-01 NOTE — TELEPHONE ENCOUNTER
Had a stroke 2 years ago, has been using a scooter they bought from a friend. This scooter has now broke, they are not sure the process of getting it fixed since they bought it used.     Would he be able to get a new one?    You can mychart pt as he and his family will be watching for an answer/plan

## 2021-07-02 NOTE — TELEPHONE ENCOUNTER
Simalaya Message sent to patient.    Theresa Angulo, [facility]

## 2021-07-02 NOTE — TELEPHONE ENCOUNTER
Ordered occupational therapy evaluation for a scooter assessment.  Please let them know they should be contacted by OT.

## 2021-07-15 ENCOUNTER — LAB (OUTPATIENT)
Dept: LAB | Facility: CLINIC | Age: 86
End: 2021-07-15
Payer: COMMERCIAL

## 2021-07-15 ENCOUNTER — ANTICOAGULATION THERAPY VISIT (OUTPATIENT)
Dept: ANTICOAGULATION | Facility: CLINIC | Age: 86
End: 2021-07-15

## 2021-07-15 DIAGNOSIS — I48.20 CHRONIC ATRIAL FIBRILLATION (H): ICD-10-CM

## 2021-07-15 DIAGNOSIS — Z79.01 LONG TERM CURRENT USE OF ANTICOAGULANT THERAPY: Primary | ICD-10-CM

## 2021-07-15 DIAGNOSIS — Z79.01 LONG TERM CURRENT USE OF ANTICOAGULANT THERAPY: ICD-10-CM

## 2021-07-15 LAB — INR BLD: 2.6 (ref 0.9–1.1)

## 2021-07-15 PROCEDURE — 85610 PROTHROMBIN TIME: CPT

## 2021-07-15 PROCEDURE — 36416 COLLJ CAPILLARY BLOOD SPEC: CPT

## 2021-07-15 NOTE — PROGRESS NOTES
ANTICOAGULATION MANAGEMENT     Melvin Abad 89 year old male is on warfarin with therapeutic INR result. (Goal INR 2.0-3.0)    Recent labs: (last 7 days)     07/15/21  0844   INR 2.6*       ASSESSMENT     Source(s): Chart Review       Warfarin doses taken: Warfarin taken as instructed    Diet: No new diet changes identified    New illness, injury, or hospitalization: No    Medication/supplement changes: None noted    Signs or symptoms of bleeding or clotting: No    Previous INR: Therapeutic last 2(+) visits    Additional findings: None     PLAN     Recommended plan for no diet, medication or health factor changes affecting INR     Dosing Instructions: Continue your current warfarin dose with next INR in 5 weeks       Summary  As of 7/15/2021    Full warfarin instructions:  2.5 mg every Mon; 5 mg all other days   Next INR check:  8/19/2021             Detailed voice message left for  Toña MONTIEL with dosing instructions and follow up date.     Contact [phone]  to schedule and with any changes, questions or concerns.     Education provided: Please call back if any changes to your diet, medications or how you've been taking warfarin    Plan made per [facility] anticoagulation protocol    Ruben Siddiqui, RN  Anticoagulation Clinic  7/15/2021    _______________________________________________________________________     Anticoagulation Episode Summary     Current INR goal:  2.0-3.0   TTR:  85.8 % (1 y)   Target end date:  Indefinite   Send INR reminders to:  [facility]    Indications    Atrial fibrillation (Resolved) [I48.91]  Long-term (current) use of anticoagulants [Z79.01] [Z79.01]  Chronic atrial fibrillation (H) [I48.20]           Comments:  5 mg tablets, please call pt's family with INR and orders - Toña daughter in law [phone]         Anticoagulation Care Providers     Provider Role Specialty Phone number    Lazaro Huston MD Referring Internal Medicine [phone]    Fransisco Cordon MD  [facility] [phone]

## 2021-07-24 DIAGNOSIS — E11.9 TYPE 2 DIABETES MELLITUS WITHOUT COMPLICATION, WITHOUT LONG-TERM CURRENT USE OF INSULIN (H): ICD-10-CM

## 2021-07-24 DIAGNOSIS — R33.9 URINARY RETENTION: ICD-10-CM

## 2021-07-26 RX ORDER — GABAPENTIN 100 MG/1
CAPSULE ORAL
Qty: 540 CAPSULE | Refills: 3 | Status: SHIPPED | OUTPATIENT
Start: 2021-07-26 | End: 2022-11-03

## 2021-07-26 RX ORDER — TAMSULOSIN HYDROCHLORIDE 0.4 MG/1
CAPSULE ORAL
Qty: 90 CAPSULE | Refills: 0 | OUTPATIENT
Start: 2021-07-26

## 2021-07-26 NOTE — TELEPHONE ENCOUNTER
"Requested Prescriptions   Pending Prescriptions Disp Refills    gabapentin (NEURONTIN) 100 MG capsule [Pharmacy Med Name: GABAPENTIN 100MG CAPS] 540 capsule 3     Sig: TAKE TWO CAPSULES BY MOUTH TWICE A DAY        There is no refill protocol information for this order       Refused Prescriptions Disp Refills    tamsulosin (FLOMAX) 0.4 MG capsule [Pharmacy Med Name: TAMSULOSIN HCL 0.4MG CAPS] 90 capsule 0     Sig: TAKE ONE CAPSULE BY MOUTH ONCE DAILY        Alpha Blockers Passed - 7/24/2021  2:52 PM        Passed - Blood pressure under 140/90 in past 12 months       BP Readings from Last 3 Encounters:   03/15/21 126/66   03/09/21 (!) 156/79   10/02/20 (!) 146/86                 Passed - Recent (12 mo) or future (30 days) visit within the authorizing provider's specialty     Patient has had an office visit with the authorizing provider or a provider within the authorizing providers department within the previous 12 mos or has a future within next 30 days. See \"Patient Info\" tab in inbasket, or \"Choose Columns\" in Meds & Orders section of the refill encounter.              Passed - Patient does not have Tadalafil, Vardenafil, or Sildenafil on their medication list        Passed - Medication is active on med list        Passed - Patient is 18 years of age or older            Last Written Prescription Date:  Gabapentin 5/11/2020  Last Fill Quantity: 540,  # refills: 3   Last office visit: Visit date not found with prescribing provider:  Virtual visit [facility] 4/28/2021   Future Office Visit:          Routing refill request to provider for review/approval because:  Drug not on the [facility] [place]       Demi Vee RN  [facility]                 "

## 2021-07-26 NOTE — TELEPHONE ENCOUNTER
Refused too soon to fill, see script 4/16/2021 dispense 90 tabs with 1 refill       Demi Vee RN  [facility]

## 2021-08-09 ENCOUNTER — MYC MEDICAL ADVICE (OUTPATIENT)
Dept: INTERNAL MEDICINE | Facility: CLINIC | Age: 86
End: 2021-08-09

## 2021-08-19 ENCOUNTER — LAB (OUTPATIENT)
Dept: LAB | Facility: CLINIC | Age: 86
End: 2021-08-19
Payer: COMMERCIAL

## 2021-08-19 ENCOUNTER — ANTICOAGULATION THERAPY VISIT (OUTPATIENT)
Dept: ANTICOAGULATION | Facility: CLINIC | Age: 86
End: 2021-08-19

## 2021-08-19 DIAGNOSIS — Z79.01 LONG TERM CURRENT USE OF ANTICOAGULANT THERAPY: Primary | ICD-10-CM

## 2021-08-19 DIAGNOSIS — I48.20 CHRONIC ATRIAL FIBRILLATION (H): ICD-10-CM

## 2021-08-19 DIAGNOSIS — Z79.01 LONG TERM CURRENT USE OF ANTICOAGULANT THERAPY: ICD-10-CM

## 2021-08-19 LAB — INR BLD: 2.7 (ref 0.9–1.1)

## 2021-08-19 PROCEDURE — 85610 PROTHROMBIN TIME: CPT

## 2021-08-19 PROCEDURE — 36416 COLLJ CAPILLARY BLOOD SPEC: CPT

## 2021-08-19 NOTE — PROGRESS NOTES
ANTICOAGULATION MANAGEMENT     Melvin Abad 89 year old male is on warfarin with therapeutic INR result. (Goal INR 2.0-3.0)    Recent labs: (last 7 days)     08/19/21  0852   INR 2.7*       ASSESSMENT     Source(s): Chart Review and Patient/Caregiver Call       Warfarin doses taken: Warfarin taken as instructed    Diet: No new diet changes identified    New illness, injury, or hospitalization: No    Medication/supplement changes: None noted    Signs or symptoms of bleeding or clotting: No    Previous INR: Therapeutic last 2(+) visits    Additional findings: None     PLAN     Recommended plan for no diet, medication or health factor changes affecting INR     Dosing Instructions: Continue your current warfarin dose with next INR in 5 weeks       Summary  As of 8/19/2021    Full warfarin instructions:  2.5 mg every Mon; 5 mg all other days   Next INR check:  9/23/2021             Telephone call with  Toña - Daughter in Law who verbalizes understanding and agrees to plan and who agrees to plan and repeated back plan correctly    Lab visit scheduled    Education provided: None required    Plan made per [facility] anticoagulation protocol    Ruben Siddiqui RN  Anticoagulation Clinic  8/19/2021    _______________________________________________________________________     Anticoagulation Episode Summary     Current INR goal:  2.0-3.0   TTR:  85.8 % (1 y)   Target end date:  Indefinite   Send INR reminders to:  [facility]    Indications    Atrial fibrillation (Resolved) [I48.91]  Long-term (current) use of anticoagulants [Z79.01] [Z79.01]  Chronic atrial fibrillation (H) [I48.20]           Comments:  5 mg tablets, please call pt's family with INR and orders - Toña daughter in law [phone]         Anticoagulation Care Providers     Provider Role Specialty Phone number    Lazaro Huston MD Referring Internal Medicine [phone]    Fransisco Cordon MD Referring Family Medicine [phone]

## 2021-08-28 ENCOUNTER — HEALTH MAINTENANCE LETTER (OUTPATIENT)
Age: 86
End: 2021-08-28

## 2021-09-23 ENCOUNTER — LAB (OUTPATIENT)
Dept: LAB | Facility: CLINIC | Age: 86
End: 2021-09-23
Payer: COMMERCIAL

## 2021-09-23 ENCOUNTER — ANTICOAGULATION THERAPY VISIT (OUTPATIENT)
Dept: ANTICOAGULATION | Facility: CLINIC | Age: 86
End: 2021-09-23

## 2021-09-23 DIAGNOSIS — I48.20 CHRONIC ATRIAL FIBRILLATION (H): ICD-10-CM

## 2021-09-23 DIAGNOSIS — Z79.01 LONG TERM CURRENT USE OF ANTICOAGULANT THERAPY: ICD-10-CM

## 2021-09-23 DIAGNOSIS — Z79.01 LONG TERM CURRENT USE OF ANTICOAGULANT THERAPY: Primary | ICD-10-CM

## 2021-09-23 LAB — INR BLD: 3.7 (ref 0.9–1.1)

## 2021-09-23 PROCEDURE — 85610 PROTHROMBIN TIME: CPT

## 2021-09-23 PROCEDURE — 36416 COLLJ CAPILLARY BLOOD SPEC: CPT

## 2021-09-23 NOTE — PROGRESS NOTES
I have attempted to contact daughter in law Toña regarding their INR of 3.7. I've left a message to return a call to the coumadin clinic, and will try again later.    [facility] RN's can be reached for return calls at [phone] (internal staff only). Please do not give this number out to patients or cold transfer. Thank you!

## 2021-09-23 NOTE — PROGRESS NOTES
ANTICOAGULATION MANAGEMENT     Melvin Abad 89 year old male is on warfarin with supratherapeutic INR result. (Goal INR 2.0-3.0)    Recent labs: (last 7 days)     09/23/21  0905   INR 3.7*       ASSESSMENT     Source(s): Chart Review and Patient/Caregiver Call       Warfarin doses taken: Warfarin taken as instructed    Diet: No new diet changes identified    New illness, injury, or hospitalization: No    Medication/supplement changes: None noted    Signs or symptoms of bleeding or clotting: No    Previous INR: Therapeutic last 2(+) visits    Additional findings: None     PLAN     Recommended plan for no diet, medication or health factor changes affecting INR     Dosing Instructions: Hold dose then continue your current warfarin dose with next INR in 2 weeks       Summary  As of 9/23/2021    Full warfarin instructions:  9/23: Hold; Otherwise 2.5 mg every Mon; 5 mg all other days   Next INR check:  10/7/2021             Telephone call with  Toña MONTIEL who verbalizes understanding and agrees to plan and who agrees to plan and repeated back plan correctly    Patient offered & declined to schedule next visit    Education provided: Please call back if any changes to your diet, medications or how you've been taking warfarin    Plan made per [facility] anticoagulation protocol    Ruben Siddiqui RN  Anticoagulation Clinic  9/23/2021    _______________________________________________________________________     Anticoagulation Episode Summary     Current INR goal:  2.0-3.0   TTR:  79.0 % (1 y)   Target end date:  Indefinite   Send INR reminders to:  [facility]    Indications    Atrial fibrillation (Resolved) [I48.91]  Long-term (current) use of anticoagulants [Z79.01] [Z79.01]  Chronic atrial fibrillation (H) [I48.20]           Comments:  5 mg tablets, please call pt's family with INR and orders - Toña daughter in law [phone]         Anticoagulation Care Providers     Provider Role Specialty Phone number     Lazaro Huston MD Referring Internal Medicine [phone]    Fransisco Cordon MD Referring Family Medicine [phone]

## 2021-09-29 DIAGNOSIS — I50.813 ACUTE ON CHRONIC RIGHT-SIDED HEART FAILURE (H): ICD-10-CM

## 2021-10-01 RX ORDER — FUROSEMIDE 20 MG
TABLET ORAL
Qty: 45 TABLET | Refills: 2 | Status: SHIPPED | OUTPATIENT
Start: 2021-10-01 | End: 2021-12-23

## 2021-10-01 NOTE — TELEPHONE ENCOUNTER
Pending Prescriptions:                       Disp   Refills    furosemide (LASIX) 20 MG tablet [Pharmacy *45 tab*2        Sig: TAKE 1 AND 1/2 TABLETS BY MOUTH ONCE DAILY      Routing refill request to provider for review/approval because:  Labs out of date:    Creatinine   Date Value Ref Range Status   09/28/2020 0.95 0.66 - 1.25 mg/dL Final       Potassium   Date Value Ref Range Status   09/28/2020 4.1 3.4 - 5.3 mmol/L Final         Gina Hunt RN

## 2021-10-10 DIAGNOSIS — I48.11 LONGSTANDING PERSISTENT ATRIAL FIBRILLATION (H): ICD-10-CM

## 2021-10-10 DIAGNOSIS — R33.9 URINARY RETENTION: ICD-10-CM

## 2021-10-10 DIAGNOSIS — I10 ESSENTIAL HYPERTENSION, BENIGN: ICD-10-CM

## 2021-10-12 RX ORDER — TAMSULOSIN HYDROCHLORIDE 0.4 MG/1
CAPSULE ORAL
Qty: 90 CAPSULE | Refills: 1 | Status: SHIPPED | OUTPATIENT
Start: 2021-10-12 | End: 2022-05-04

## 2021-10-12 RX ORDER — LISINOPRIL 10 MG/1
TABLET ORAL
Qty: 90 TABLET | Refills: 1 | Status: SHIPPED | OUTPATIENT
Start: 2021-10-12 | End: 2022-04-13

## 2021-10-12 RX ORDER — WARFARIN SODIUM 5 MG/1
TABLET ORAL
Qty: 90 TABLET | Refills: 1 | Status: SHIPPED | OUTPATIENT
Start: 2021-10-12 | End: 2021-10-18

## 2021-10-12 RX ORDER — METOPROLOL SUCCINATE 50 MG/1
TABLET, EXTENDED RELEASE ORAL
Qty: 90 TABLET | Refills: 1 | Status: SHIPPED | OUTPATIENT
Start: 2021-10-12 | End: 2022-05-04

## 2021-10-12 NOTE — TELEPHONE ENCOUNTER
Warfarin  Routing refill request to anticoagulant teamfor review/approval because:  Recent Labs   Lab Test 09/23/21  0905   INR 3.7*         Lisinopril  Routing refill request to provider for review/approval because:  Labs not current:  CR, Potassium        Tatianna Kirkland RN

## 2021-10-14 ENCOUNTER — TELEPHONE (OUTPATIENT)
Dept: ANTICOAGULATION | Facility: CLINIC | Age: 86
End: 2021-10-14

## 2021-10-14 NOTE — TELEPHONE ENCOUNTER
ANTICOAGULATION     Melvin Abad is overdue for INR check.      spoke with Toña MONTIEL who declined to schedule a lab appointment at this time. If calling back please schedule as soon as possible. Needs to talk to her daughter regarding ride.    Ruben Siddiqui RN

## 2021-10-16 DIAGNOSIS — E78.5 HYPERLIPIDEMIA LDL GOAL <100: ICD-10-CM

## 2021-10-18 ENCOUNTER — TELEPHONE (OUTPATIENT)
Dept: INTERNAL MEDICINE | Facility: CLINIC | Age: 86
End: 2021-10-18

## 2021-10-18 ENCOUNTER — ANTICOAGULATION THERAPY VISIT (OUTPATIENT)
Dept: INTERNAL MEDICINE | Facility: CLINIC | Age: 86
End: 2021-10-18

## 2021-10-18 ENCOUNTER — LAB (OUTPATIENT)
Dept: LAB | Facility: CLINIC | Age: 86
End: 2021-10-18
Payer: COMMERCIAL

## 2021-10-18 DIAGNOSIS — Z79.01 LONG TERM CURRENT USE OF ANTICOAGULANT THERAPY: Primary | ICD-10-CM

## 2021-10-18 DIAGNOSIS — Z79.01 LONG TERM CURRENT USE OF ANTICOAGULANT THERAPY: ICD-10-CM

## 2021-10-18 DIAGNOSIS — I48.11 LONGSTANDING PERSISTENT ATRIAL FIBRILLATION (H): ICD-10-CM

## 2021-10-18 DIAGNOSIS — I48.20 CHRONIC ATRIAL FIBRILLATION (H): ICD-10-CM

## 2021-10-18 LAB — INR BLD: 3.9 (ref 0.9–1.1)

## 2021-10-18 PROCEDURE — 85610 PROTHROMBIN TIME: CPT

## 2021-10-18 PROCEDURE — 36416 COLLJ CAPILLARY BLOOD SPEC: CPT

## 2021-10-18 RX ORDER — WARFARIN SODIUM 5 MG/1
TABLET ORAL
Qty: 90 TABLET | Refills: 1 | COMMUNITY
Start: 2021-10-18 | End: 2022-03-22

## 2021-10-18 NOTE — TELEPHONE ENCOUNTER
Writer spoke with Toña. INR result managed by Anticoagulation Clinic. See Anticoagulation Therapy Visit Encounter from 10/18/21 for details.     Diamond Eli RN 10/18/21 at 4:23 PM

## 2021-10-18 NOTE — TELEPHONE ENCOUNTER
Reason for Call:   call back    Detailed comments: Toña is returning call from today. Please call again. Thank you.     Phone Number Patient can be reached at: Other phone number:  [phone]    Best Time: any    Can we leave a detailed message on this number? YES    Call taken on 10/18/2021 at 4:09 PM by Joanna Munoz

## 2021-10-18 NOTE — PROGRESS NOTES
Anticoagulation Management    Unable to reach Toña today.    Today's INR result of 3.9 is supratherapeutic (goal INR of 2.0-3.0).  Result received from: Clinic Lab    Follow up required to discuss out of range INR     Left message to hold warfarin tonight. Calendar updated with tentative plan: since INR has been elevated the last two times, plan to hold dose today, then decrease dose to 30 mg weekly (7.7%) and recheck INR again in 1-2 weeks per protocol.       Anticoagulation clinic to follow up    Diamond Eli RN

## 2021-10-18 NOTE — PROGRESS NOTES
ANTICOAGULATION MANAGEMENT     Melvin Abad 89 year old male is on warfarin with supratherapeutic INR result. (Goal INR 2.0-3.0)    Recent labs: (last 7 days)     10/18/21  1347   INR 3.9*       ASSESSMENT     Source(s): Chart Review and Patient/Caregiver Call       Warfarin doses taken: Warfarin taken as instructed    Diet: No new diet changes identified    New illness, injury, or hospitalization: No    Medication/supplement changes: None noted    Signs or symptoms of bleeding or clotting: No    Previous INR: Supratherapeutic    Additional findings: None     PLAN     Recommended plan for no diet, medication or health factor changes affecting INR     Dosing Instructions: Continue your current warfarin dose with next INR in 2 weeks       Summary  As of 10/18/2021    Full warfarin instructions:  10/18: Hold; Otherwise 2.5 mg every Mon, Fri; 5 mg all other days   Next INR check:  11/4/2021             Telephone call with  Toña who verbalizes understanding and agrees to plan    Lab visit scheduled    Education provided: Monitoring for bleeding signs and symptoms, When to seek medical attention/emergency care and Contact [phone]  with any changes, questions or concerns.     Plan made per [facility] anticoagulation protocol    Diamond Eli, RN  Anticoagulation Clinic  10/18/2021    _______________________________________________________________________     Anticoagulation Episode Summary     Current INR goal:  2.0-3.0   TTR:  72.2 % (1 y)   Target end date:  Indefinite   Send INR reminders to:  [facility]    Indications    Atrial fibrillation (Resolved) [I48.91]  Long-term (current) use of anticoagulants [Z79.01] [Z79.01]  Chronic atrial fibrillation (H) [I48.20]           Comments:  5 mg tablets, please call pt's family with INR and orders - Toña daughter in law [phone]         Anticoagulation Care Providers     Provider Role Specialty Phone number    Lazaro Huston MD Referring Internal Medicine  [phone]    Fransisco Cordon MD [facility] [phone]

## 2021-10-20 RX ORDER — SIMVASTATIN 40 MG
TABLET ORAL
Qty: 90 TABLET | Refills: 1 | Status: SHIPPED | OUTPATIENT
Start: 2021-10-20 | End: 2022-05-04

## 2021-11-03 ENCOUNTER — TELEPHONE (OUTPATIENT)
Dept: INTERNAL MEDICINE | Facility: CLINIC | Age: 86
End: 2021-11-03

## 2021-11-03 NOTE — TELEPHONE ENCOUNTER
Rescheduled appt to Friday due to transportation issues.     Eli Diaz, PENELOPEN, RN- Coumadin Clinic RN

## 2021-11-03 NOTE — TELEPHONE ENCOUNTER
Reason for Call:  Other     Detailed comments: Pt has an INR lab appt tomorrow. Does he have to come in tomorrow to get it done or can he come a different day? Asking because he wont have anyone to take him tomorrow.     Phone Number Patient can be reached at: [phone]    Best Time: anytime    Can we leave a detailed message on this number? YES    Call taken on 11/3/2021 at 11:56 AM by Ananya Soto

## 2021-11-05 ENCOUNTER — ANTICOAGULATION THERAPY VISIT (OUTPATIENT)
Dept: ANTICOAGULATION | Facility: CLINIC | Age: 86
End: 2021-11-05

## 2021-11-05 ENCOUNTER — LAB (OUTPATIENT)
Dept: LAB | Facility: CLINIC | Age: 86
End: 2021-11-05
Payer: COMMERCIAL

## 2021-11-05 DIAGNOSIS — I48.20 CHRONIC ATRIAL FIBRILLATION (H): ICD-10-CM

## 2021-11-05 DIAGNOSIS — Z79.01 LONG TERM CURRENT USE OF ANTICOAGULANT THERAPY: Primary | ICD-10-CM

## 2021-11-05 DIAGNOSIS — Z79.01 LONG TERM CURRENT USE OF ANTICOAGULANT THERAPY: ICD-10-CM

## 2021-11-05 LAB — INR BLD: 2.9 (ref 0.9–1.1)

## 2021-11-05 PROCEDURE — 36416 COLLJ CAPILLARY BLOOD SPEC: CPT

## 2021-11-05 PROCEDURE — 85610 PROTHROMBIN TIME: CPT

## 2021-11-05 NOTE — PROGRESS NOTES
ANTICOAGULATION MANAGEMENT     Melvin Abad 89 year old male is on warfarin with therapeutic INR result. (Goal INR 2.0-3.0)    Recent labs: (last 7 days)     11/05/21  1318   INR 2.9*       ASSESSMENT     Source(s): Chart Review and Patient/Caregiver Call       Warfarin doses taken: Warfarin taken as instructed    Diet: No new diet changes identified    New illness, injury, or hospitalization: No    Medication/supplement changes: None noted    Signs or symptoms of bleeding or clotting: No    Previous INR: Supratherapeutic    Additional findings: None     PLAN     Recommended plan for no diet, medication or health factor changes affecting INR     Dosing Instructions: Continue your current warfarin dose with next INR in 4 weeks       Summary  As of 11/5/2021    Full warfarin instructions:  2.5 mg every Mon, Fri; 5 mg all other days   Next INR check:  12/3/2021             Telephone call with  TIANA Ding who verbalizes understanding and agrees to plan and who agrees to plan and repeated back plan correctly    Lab visit scheduled    Education provided: Please call back if any changes to your diet, medications or how you've been taking warfarin    Plan made per [facility] anticoagulation protocol    Ruben Siddiqui, RN  Anticoagulation Clinic  11/5/2021    _______________________________________________________________________     Anticoagulation Episode Summary     Current INR goal:  2.0-3.0   TTR:  68.8 % (1 y)   Target end date:  Indefinite   Send INR reminders to:  [facility]    Indications    Atrial fibrillation (Resolved) [I48.91]  Long-term (current) use of anticoagulants [Z79.01] [Z79.01]  Chronic atrial fibrillation (H) [I48.20]           Comments:  5 mg tablets, please call pt's family with INR and orders - Toña daughter in law [phone]         Anticoagulation Care Providers     Provider Role Specialty Phone number    Lazaro Huston MD Referring Internal Medicine [phone]    Fransisco Cordon MD  [facility] Family Medicine [phone]

## 2021-12-03 ENCOUNTER — LAB (OUTPATIENT)
Dept: LAB | Facility: CLINIC | Age: 86
End: 2021-12-03
Payer: COMMERCIAL

## 2021-12-03 ENCOUNTER — ANTICOAGULATION THERAPY VISIT (OUTPATIENT)
Dept: ANTICOAGULATION | Facility: CLINIC | Age: 86
End: 2021-12-03

## 2021-12-03 DIAGNOSIS — Z79.01 LONG TERM CURRENT USE OF ANTICOAGULANT THERAPY: ICD-10-CM

## 2021-12-03 DIAGNOSIS — I48.20 CHRONIC ATRIAL FIBRILLATION (H): ICD-10-CM

## 2021-12-03 DIAGNOSIS — Z79.01 LONG TERM CURRENT USE OF ANTICOAGULANT THERAPY: Primary | ICD-10-CM

## 2021-12-03 LAB — INR BLD: 2.9 (ref 0.9–1.1)

## 2021-12-03 PROCEDURE — 85610 PROTHROMBIN TIME: CPT

## 2021-12-03 PROCEDURE — 36416 COLLJ CAPILLARY BLOOD SPEC: CPT

## 2021-12-03 NOTE — PROGRESS NOTES
ANTICOAGULATION MANAGEMENT     Melvin Abad 89 year old male is on warfarin with therapeutic INR result. (Goal INR 2.0-3.0)    Recent labs: (last 7 days)     12/03/21  1318   INR 2.9*       ASSESSMENT     Source(s): Chart Review and Patient/Caregiver Call       Warfarin doses taken: Warfarin taken as instructed    Diet: No new diet changes identified    New illness, injury, or hospitalization: No    Medication/supplement changes: None noted    Signs or symptoms of bleeding or clotting: No    Previous INR: Therapeutic last visit; previously outside of goal range    Additional findings: None     PLAN     Recommended plan for no diet, medication or health factor changes affecting INR     Dosing Instructions: Continue your current warfarin dose with next INR in 4 weeks       Summary  As of 12/3/2021    Full warfarin instructions:  2.5 mg every Mon, Fri; 5 mg all other days   Next INR check:  12/31/2021             Telephone call with  TIANA Ding who verbalizes understanding and agrees to plan and who agrees to plan and repeated back plan correctly    Lab visit scheduled    Education provided: Please call back if any changes to your diet, medications or how you've been taking warfarin    Plan made per [facility] anticoagulation protocol    Ruben Siddiqui, RN  Anticoagulation Clinic  12/3/2021    _______________________________________________________________________     Anticoagulation Episode Summary     Current INR goal:  2.0-3.0   TTR:  73.2 % (1 y)   Target end date:  Indefinite   Send INR reminders to:  [facility]    Indications    Atrial fibrillation (Resolved) [I48.91]  Long-term (current) use of anticoagulants [Z79.01] [Z79.01]  Chronic atrial fibrillation (H) [I48.20]           Comments:  5 mg tablets, please call pt's family with INR and orders - Toña daughter in law [phone]         Anticoagulation Care Providers     Provider Role Specialty Phone number    Lazaro Huston MD Referring Internal  Medicine [phone]    Fransisco Cordon MD [facility] [phone]

## 2021-12-22 DIAGNOSIS — I50.813 ACUTE ON CHRONIC RIGHT-SIDED HEART FAILURE (H): ICD-10-CM

## 2021-12-23 RX ORDER — FUROSEMIDE 20 MG
TABLET ORAL
Qty: 45 TABLET | Refills: 2 | Status: SHIPPED | OUTPATIENT
Start: 2021-12-23 | End: 2022-03-29

## 2021-12-23 NOTE — TELEPHONE ENCOUNTER
Pending Prescriptions:                       Disp   Refills    furosemide (LASIX) 20 MG tablet [Pharmacy *45 tab*2        Sig: TAKE 1 AND 1/2 TABLETS BY MOUTH ONCE DAILY    Routing refill request to provider for review/approval because:  Labs not current:    Creatinine   Date Value Ref Range Status   09/28/2020 0.95 0.66 - 1.25 mg/dL Final     Potassium   Date Value Ref Range Status   09/28/2020 4.1 3.4 - 5.3 mmol/L Final     Sodium   Date Value Ref Range Status   09/28/2020 141 133 - 144 mmol/L Final

## 2021-12-27 DIAGNOSIS — S90.521D: ICD-10-CM

## 2021-12-28 NOTE — TELEPHONE ENCOUNTER
SSD 1% cream      Last Written Prescription Date:  4/26/2021  Last Fill Quantity: 50 g,   # refills: 1  Last Office Visit: 4/28/2021  Future Office visit:       Routing refill request to provider for review/approval because:  Drug not on the [facility], [facility] or [facility] refill protocol or controlled substance

## 2021-12-29 RX ORDER — SILVER SULFADIAZINE 1 %
CREAM (GRAM) TOPICAL
Qty: 50 G | Refills: 1 | Status: SHIPPED | OUTPATIENT
Start: 2021-12-29 | End: 2024-01-26

## 2022-01-07 ENCOUNTER — APPOINTMENT (OUTPATIENT)
Dept: LAB | Facility: CLINIC | Age: 87
End: 2022-01-07
Payer: COMMERCIAL

## 2022-01-07 ENCOUNTER — ANTICOAGULATION THERAPY VISIT (OUTPATIENT)
Dept: ANTICOAGULATION | Facility: CLINIC | Age: 87
End: 2022-01-07

## 2022-01-07 DIAGNOSIS — I48.20 CHRONIC ATRIAL FIBRILLATION (H): ICD-10-CM

## 2022-01-07 DIAGNOSIS — Z79.01 LONG TERM CURRENT USE OF ANTICOAGULANT THERAPY: ICD-10-CM

## 2022-01-07 DIAGNOSIS — Z79.01 LONG TERM CURRENT USE OF ANTICOAGULANT THERAPY: Primary | ICD-10-CM

## 2022-01-07 LAB — INR BLD: 2.7 (ref 0.9–1.1)

## 2022-01-07 PROCEDURE — 36416 COLLJ CAPILLARY BLOOD SPEC: CPT

## 2022-01-07 PROCEDURE — 85610 PROTHROMBIN TIME: CPT

## 2022-01-07 NOTE — PROGRESS NOTES
ANTICOAGULATION MANAGEMENT     Melvin Abad 89 year old male is on warfarin with therapeutic INR result. (Goal INR 2.0-3.0)    Recent labs: (last 7 days)     01/07/22  1025   INR 2.7*       ASSESSMENT     Source(s): Chart Review and Patient/Caregiver Call       Warfarin doses taken: Warfarin taken as instructed    Diet: No new diet changes identified    New illness, injury, or hospitalization: No    Medication/supplement changes: None noted    Signs or symptoms of bleeding or clotting: No    Previous INR: Therapeutic last 2(+) visits    Additional findings: None     PLAN     Recommended plan for no diet, medication or health factor changes affecting INR     Dosing Instructions: Continue your current warfarin dose with next INR in 4 weeks       Summary  As of 1/7/2022    Full warfarin instructions:  2.5 mg every Mon, Fri; 5 mg all other days   Next INR check:  2/4/2022             Telephone call with  Toña who verbalizes understanding and agrees to plan    Lab visit scheduled    Education provided: Goal range and significance of current result    Plan made per [facility] anticoagulation protocol    Aidee Valdes RN  Anticoagulation Clinic  1/7/2022    _______________________________________________________________________     Anticoagulation Episode Summary     Current INR goal:  2.0-3.0   TTR:  73.1 % (1 y)   Target end date:  Indefinite   Send INR reminders to:  [facility]    Indications    Atrial fibrillation (Resolved) [I48.91]  Long-term (current) use of anticoagulants [Z79.01] [Z79.01]  Chronic atrial fibrillation (H) [I48.20]           Comments:  5 mg tablets, please call pt's family with INR and orders - Toña daughter in law [phone]         Anticoagulation Care Providers     Provider Role Specialty Phone number    Lazaro Huston MD Referring Internal Medicine [phone]    Fransisco Cordon MD Referring Family Medicine [phone]

## 2022-02-04 ENCOUNTER — TELEPHONE (OUTPATIENT)
Dept: ANTICOAGULATION | Facility: CLINIC | Age: 87
End: 2022-02-04

## 2022-02-04 ENCOUNTER — ANTICOAGULATION THERAPY VISIT (OUTPATIENT)
Dept: ANTICOAGULATION | Facility: CLINIC | Age: 87
End: 2022-02-04

## 2022-02-04 ENCOUNTER — LAB (OUTPATIENT)
Dept: LAB | Facility: CLINIC | Age: 87
End: 2022-02-04
Payer: COMMERCIAL

## 2022-02-04 DIAGNOSIS — Z79.01 LONG TERM CURRENT USE OF ANTICOAGULANT THERAPY: Primary | ICD-10-CM

## 2022-02-04 DIAGNOSIS — I48.20 CHRONIC ATRIAL FIBRILLATION (H): Primary | ICD-10-CM

## 2022-02-04 DIAGNOSIS — I48.20 CHRONIC ATRIAL FIBRILLATION (H): ICD-10-CM

## 2022-02-04 DIAGNOSIS — Z79.01 LONG TERM CURRENT USE OF ANTICOAGULANT THERAPY: ICD-10-CM

## 2022-02-04 LAB — INR BLD: 3.1 (ref 0.9–1.1)

## 2022-02-04 PROCEDURE — 85610 PROTHROMBIN TIME: CPT

## 2022-02-04 PROCEDURE — 36416 COLLJ CAPILLARY BLOOD SPEC: CPT

## 2022-02-04 NOTE — PROGRESS NOTES
Reason for Call:  Other returning call    Detailed comments: Daughter was returning call from MARVA Barba nurse. Tried to warm transfer and no answer at the time. Please call back.     Phone Number Patient can be reached at: Other phone number:  [phone]    Best Time: anytime     Can we leave a detailed message on this number? YES    Call taken on 2/4/2022 at 12:50 PM by Jael Willis

## 2022-02-04 NOTE — TELEPHONE ENCOUNTER
ANTICOAGULATION MANAGEMENT      Melvin Abad due for annual renewal of referral to anticoagulation monitoring. Order pended for your review and signature.      ANTICOAGULATION SUMMARY      Warfarin indication(s)     Atrial fibrillation    Heart valve present?  NO       Current goal range   INR: 2.0-3.0     Goal appropriate for indication? Yes, INR 2-3 appropriate for hx of DVT, PE, hypercoagulable state, Afib, LVAD, or bileaflet AVR without risk factors     Current duration of therapy Indefinite/long term therapy   Time in Therapeutic Range (TTR)  (Goal > 60%) 73.1%       Office visit with referring provider's group within last year yes on 4/28/21       Salinas Jhaveri RN

## 2022-02-04 NOTE — PROGRESS NOTES
ANTICOAGULATION MANAGEMENT     Melvin Abad 89 year old male is on warfarin with supratherapeutic INR result. (Goal INR 2.0-3.0)    Recent labs: (last 7 days)     02/04/22  0952   INR 3.1*       ASSESSMENT     Source(s): Patient/Caregiver Call       Warfarin doses taken: Warfarin taken as instructed    Diet: No new diet changes identified    New illness, injury, or hospitalization: No    Medication/supplement changes: None noted    Signs or symptoms of bleeding or clotting: No    Previous INR: Therapeutic last 2(+) visits    Additional findings: None     PLAN     Recommended plan for no diet, medication or health factor changes affecting INR     Dosing Instructions: Continue your current warfarin dose with next INR in 2 weeks       Summary  As of 2/4/2022    Full warfarin instructions:  2.5 mg every Mon, Fri; 5 mg all other days   Next INR check:  2/18/2022             Telephone call with  Toña who verbalizes understanding and agrees to plan    Lab visit scheduled    Education provided: Please call back if any changes to your diet, medications or how you've been taking warfarin, Monitoring for bleeding signs and symptoms, When to seek medical attention/emergency care and Contact [phone]  with any changes, questions or concerns.     Plan made per [facility] anticoagulation protocol    Salinas Jhaveri RN  Anticoagulation Clinic  2/4/2022    _______________________________________________________________________     Anticoagulation Episode Summary     Current INR goal:  2.0-3.0   TTR:  71.2 % (1 y)   Target end date:  Indefinite   Send INR reminders to:  [facility]    Indications    Atrial fibrillation (Resolved) [I48.91]  Long-term (current) use of anticoagulants [Z79.01] [Z79.01]  Chronic atrial fibrillation (H) [I48.20]           Comments:  5 mg tablets, please call pt's family with INR and orders - Toña daughter in law [phone]         Anticoagulation Care Providers     Provider Role Specialty  Phone number    Lazaro Huston MD Referring Internal Medicine [phone]    Fransisco Cordon MD Referring Family Medicine [phone]

## 2022-02-04 NOTE — PROGRESS NOTES
1:10 PM    Writer will address. Please refer to [facility] encounter for today.    Salinas Jhaveri RN, BSN, PHN  Anticoagulation Clinic   [place] # [id number]  [phone]

## 2022-02-07 ENCOUNTER — MYC MEDICAL ADVICE (OUTPATIENT)
Dept: INTERNAL MEDICINE | Facility: CLINIC | Age: 87
End: 2022-02-07
Payer: COMMERCIAL

## 2022-02-09 ENCOUNTER — MYC MEDICAL ADVICE (OUTPATIENT)
Dept: INTERNAL MEDICINE | Facility: CLINIC | Age: 87
End: 2022-02-09
Payer: COMMERCIAL

## 2022-02-09 DIAGNOSIS — L03.115 CELLULITIS OF RIGHT LEG: Primary | ICD-10-CM

## 2022-02-10 ENCOUNTER — TELEPHONE (OUTPATIENT)
Dept: INTERNAL MEDICINE | Facility: CLINIC | Age: 87
End: 2022-02-10
Payer: COMMERCIAL

## 2022-02-10 RX ORDER — CEPHALEXIN 500 MG/1
500 CAPSULE ORAL 2 TIMES DAILY
Qty: 20 CAPSULE | Refills: 0 | Status: SHIPPED | OUTPATIENT
Start: 2022-02-10 | End: 2022-02-23

## 2022-02-10 NOTE — TELEPHONE ENCOUNTER
Batsheva with long term care group calling in reporting a Large wound on left shin.. red/warm/painful. Is limping.   Refusing to go to ER. Thinks might be cellulitis.       Also thinks he will Need referral for home care wound care. He is unable to take care of.     Call batsheva with any questions [phone]

## 2022-02-12 ENCOUNTER — HEALTH MAINTENANCE LETTER (OUTPATIENT)
Age: 87
End: 2022-02-12

## 2022-02-15 ENCOUNTER — MYC MEDICAL ADVICE (OUTPATIENT)
Dept: INTERNAL MEDICINE | Facility: CLINIC | Age: 87
End: 2022-02-15
Payer: COMMERCIAL

## 2022-02-15 DIAGNOSIS — S91.009D OPEN WOUND OF KNEE, LEG, AND ANKLE, UNSPECIFIED LATERALITY, SUBSEQUENT ENCOUNTER: Primary | ICD-10-CM

## 2022-02-15 DIAGNOSIS — S81.809D OPEN WOUND OF KNEE, LEG, AND ANKLE, UNSPECIFIED LATERALITY, SUBSEQUENT ENCOUNTER: Primary | ICD-10-CM

## 2022-02-15 DIAGNOSIS — S81.009D OPEN WOUND OF KNEE, LEG, AND ANKLE, UNSPECIFIED LATERALITY, SUBSEQUENT ENCOUNTER: Primary | ICD-10-CM

## 2022-02-18 ENCOUNTER — ANTICOAGULATION THERAPY VISIT (OUTPATIENT)
Dept: ANTICOAGULATION | Facility: CLINIC | Age: 87
End: 2022-02-18
Payer: COMMERCIAL

## 2022-02-18 ENCOUNTER — TELEPHONE (OUTPATIENT)
Dept: INTERNAL MEDICINE | Facility: CLINIC | Age: 87
End: 2022-02-18
Payer: COMMERCIAL

## 2022-02-18 ENCOUNTER — TELEPHONE (OUTPATIENT)
Dept: ANTICOAGULATION | Facility: CLINIC | Age: 87
End: 2022-02-18
Payer: COMMERCIAL

## 2022-02-18 DIAGNOSIS — Z79.01 LONG TERM CURRENT USE OF ANTICOAGULANT THERAPY: Primary | ICD-10-CM

## 2022-02-18 DIAGNOSIS — I48.20 CHRONIC ATRIAL FIBRILLATION (H): ICD-10-CM

## 2022-02-18 LAB — INR (EXTERNAL): 3.8 (ref 0.9–1.1)

## 2022-02-18 NOTE — PROGRESS NOTES
ANTICOAGULATION MANAGEMENT     Melvin Abad 89 year old male is on warfarin with supratherapeutic INR result. (Goal INR 2.0-3.0)    Recent labs: (last 7 days)     02/18/22  0000   INR 3.8*       ASSESSMENT     Source(s): Chart Review and Home Care/Facility Nurse       Warfarin doses taken: Warfarin taken as instructed    Diet: No new diet changes identified    New illness, injury, or hospitalization: Yes: Patient has stasis ulcer.     Medication/supplement changes: Started keflex 2/10-2/20. Per [employer], Keflex noted to have high risk for increased INR and risk for bleeding      Signs or symptoms of bleeding or clotting: No     Previous INR: Supratherapeutic    Additional findings: None     PLAN     Recommended plan for temporary change(s) affecting INR     Dosing Instructions: Hold todays dose of 2.5 mgs, then resume current maintenance dose with next INR in 1 week.     Summary  As of 2/18/2022    Full warfarin instructions:  2/18: Hold; Otherwise 2.5 mg every Mon, Fri; 5 mg all other days   Next INR check:  2/25/2022             Telephone call with home care nurse Joi who agrees to plan and repeated back plan correctly    Orders given to  Homecare nurse/facility to recheck    Education provided: Please call back if any changes to your diet, medications or how you've been taking warfarin and Importance of notifying clinic for changes in medications; a sooner lab recheck maybe needed.    Plan made per [facility] anticoagulation protocol    Melonie Gavin RN  Anticoagulation Clinic  2/18/2022    _______________________________________________________________________     Anticoagulation Episode Summary     Current INR goal:  2.0-3.0   TTR:  67.5 % (1 y)   Target end date:  Indefinite   Send INR reminders to:  LINDSEY EDWARDS    Indications    Atrial fibrillation (Resolved) [I48.91]  Long-term (current) use of anticoagulants [Z79.01] [Z79.01]  Chronic atrial fibrillation (H) [I48.20]           Comments:  5 mg  tablets, please call pt's family with INR and orders - Toña daughter in law [phone]         Anticoagulation Care Providers     Provider Role Specialty Phone number    Lazaro Huston MD Referring Internal Medicine [phone]    Fransisco Cordon MD Referring Family Medicine [phone]

## 2022-02-18 NOTE — TELEPHONE ENCOUNTER
Skilled Nursing;    2x per week for 3 weeks  1x per week for 6 weeks  3 PRN visits    Wound Care:  Wound location LLE appears to be a stasis ulcer, using vaseline gauze on wound bed, covering with ABD pad, secured with rolled gauze followed by a compression/ace wrap  Frequency of wound care:  3x per week and as needed.      Joi DEWEY - [facility]  Phone: [phone] - ok to leave message, secure voice mail      Monserrat BONNER/

## 2022-02-23 ENCOUNTER — TELEPHONE (OUTPATIENT)
Dept: INTERNAL MEDICINE | Facility: CLINIC | Age: 87
End: 2022-02-23

## 2022-02-23 ENCOUNTER — OFFICE VISIT (OUTPATIENT)
Dept: INTERNAL MEDICINE | Facility: CLINIC | Age: 87
End: 2022-02-23
Payer: COMMERCIAL

## 2022-02-23 ENCOUNTER — ANTICOAGULATION THERAPY VISIT (OUTPATIENT)
Dept: ANTICOAGULATION | Facility: CLINIC | Age: 87
End: 2022-02-23

## 2022-02-23 VITALS
SYSTOLIC BLOOD PRESSURE: 128 MMHG | DIASTOLIC BLOOD PRESSURE: 70 MMHG | RESPIRATION RATE: 18 BRPM | TEMPERATURE: 97.3 F | HEART RATE: 66 BPM | OXYGEN SATURATION: 94 %

## 2022-02-23 DIAGNOSIS — S91.009D OPEN WOUND OF KNEE, LEG, AND ANKLE, UNSPECIFIED LATERALITY, SUBSEQUENT ENCOUNTER: Primary | ICD-10-CM

## 2022-02-23 DIAGNOSIS — S81.009D OPEN WOUND OF KNEE, LEG, AND ANKLE, UNSPECIFIED LATERALITY, SUBSEQUENT ENCOUNTER: Primary | ICD-10-CM

## 2022-02-23 DIAGNOSIS — I50.812 CHRONIC RIGHT-SIDED HEART FAILURE (H): ICD-10-CM

## 2022-02-23 DIAGNOSIS — Z79.01 LONG TERM CURRENT USE OF ANTICOAGULANT THERAPY: ICD-10-CM

## 2022-02-23 DIAGNOSIS — I63.9 CEREBROVASCULAR ACCIDENT (CVA), UNSPECIFIED MECHANISM (H): ICD-10-CM

## 2022-02-23 DIAGNOSIS — I48.20 CHRONIC ATRIAL FIBRILLATION (H): ICD-10-CM

## 2022-02-23 DIAGNOSIS — Z79.01 LONG TERM CURRENT USE OF ANTICOAGULANT THERAPY: Primary | ICD-10-CM

## 2022-02-23 DIAGNOSIS — S81.809D OPEN WOUND OF KNEE, LEG, AND ANKLE, UNSPECIFIED LATERALITY, SUBSEQUENT ENCOUNTER: Primary | ICD-10-CM

## 2022-02-23 PROBLEM — Z89.411 ACQUIRED ABSENCE OF RIGHT GREAT TOE (H): Status: RESOLVED | Noted: 2018-11-06 | Resolved: 2022-02-23

## 2022-02-23 PROBLEM — I69.354 HEMIPARESIS AFFECTING LEFT SIDE AS LATE EFFECT OF CEREBROVASCULAR ACCIDENT (CVA) (H): Status: RESOLVED | Noted: 2020-01-23 | Resolved: 2022-02-23

## 2022-02-23 PROBLEM — E11.51 DIABETES MELLITUS WITH PERIPHERAL VASCULAR DISEASE (H): Status: RESOLVED | Noted: 2020-01-23 | Resolved: 2022-02-23

## 2022-02-23 LAB
ALBUMIN SERPL-MCNC: 3.4 G/DL (ref 3.4–5)
ALP SERPL-CCNC: 69 U/L (ref 40–150)
ALT SERPL W P-5'-P-CCNC: 16 U/L (ref 0–70)
ANION GAP SERPL CALCULATED.3IONS-SCNC: 2 MMOL/L (ref 3–14)
AST SERPL W P-5'-P-CCNC: 14 U/L (ref 0–45)
BILIRUB SERPL-MCNC: 0.9 MG/DL (ref 0.2–1.3)
BUN SERPL-MCNC: 22 MG/DL (ref 7–30)
CALCIUM SERPL-MCNC: 9.4 MG/DL (ref 8.5–10.1)
CHLORIDE BLD-SCNC: 106 MMOL/L (ref 94–109)
CO2 SERPL-SCNC: 31 MMOL/L (ref 20–32)
CREAT SERPL-MCNC: 1.07 MG/DL (ref 0.66–1.25)
ERYTHROCYTE [DISTWIDTH] IN BLOOD BY AUTOMATED COUNT: 13.1 % (ref 10–15)
GFR SERPL CREATININE-BSD FRML MDRD: 65 ML/MIN/1.73M2
GLUCOSE BLD-MCNC: 88 MG/DL (ref 70–99)
HCT VFR BLD AUTO: 41.8 % (ref 40–53)
HGB BLD-MCNC: 13.4 G/DL (ref 13.3–17.7)
INR PPP: 2.18 (ref 0.85–1.15)
MCH RBC QN AUTO: 31.8 PG (ref 26.5–33)
MCHC RBC AUTO-ENTMCNC: 32.1 G/DL (ref 31.5–36.5)
MCV RBC AUTO: 99 FL (ref 78–100)
PLATELET # BLD AUTO: 196 10E3/UL (ref 150–450)
POTASSIUM BLD-SCNC: 4.2 MMOL/L (ref 3.4–5.3)
PROT SERPL-MCNC: 7.2 G/DL (ref 6.8–8.8)
RBC # BLD AUTO: 4.21 10E6/UL (ref 4.4–5.9)
SODIUM SERPL-SCNC: 139 MMOL/L (ref 133–144)
WBC # BLD AUTO: 6.4 10E3/UL (ref 4–11)

## 2022-02-23 PROCEDURE — 85027 COMPLETE CBC AUTOMATED: CPT | Performed by: INTERNAL MEDICINE

## 2022-02-23 PROCEDURE — 36415 COLL VENOUS BLD VENIPUNCTURE: CPT | Performed by: INTERNAL MEDICINE

## 2022-02-23 PROCEDURE — 99214 OFFICE O/P EST MOD 30 MIN: CPT | Performed by: INTERNAL MEDICINE

## 2022-02-23 PROCEDURE — 85610 PROTHROMBIN TIME: CPT | Performed by: INTERNAL MEDICINE

## 2022-02-23 PROCEDURE — 80053 COMPREHEN METABOLIC PANEL: CPT | Performed by: INTERNAL MEDICINE

## 2022-02-23 ASSESSMENT — PAIN SCALES - GENERAL: PAINLEVEL: NO PAIN (0)

## 2022-02-23 NOTE — PROGRESS NOTES
ANTICOAGULATION MANAGEMENT     Melvin Abad 89 year old male is on warfarin with therapeutic INR result. (Goal INR 2.0-3.0)    Recent labs: (last 7 days)     02/23/22  1013   INR 2.18*       ASSESSMENT     Source(s): Chart Review, Patient/Caregiver Call and Home Care/Facility Nurse       Warfarin doses taken: Warfarin taken as instructed    Diet: No new diet changes identified    New illness, injury, or hospitalization: Per visit note 2/23 wound is near healed. Keflex completed.    Medication/supplement changes: None noted    Signs or symptoms of bleeding or clotting: No    Previous INR: Supratherapeutic    Additional findings: Patient will be having upper teeth extracted within the month. No set date. has dental consult this week. Toña will call and update [facility] with plan when finalized.      PLAN     Recommended plan for ongoing change(s) affecting INR     Dosing Instructions: Continue your current warfarin dose with next INR in 1 week       Summary  As of 2/23/2022    Full warfarin instructions:  2.5 mg every Mon, Fri; 5 mg all other days   Next INR check:  3/2/2022             Telephone call with  Toña, Daughter In Law who agrees to plan and repeated back plan correctly    Left [facility] with dosing, recheck date and requested call back for additional questions or concerns.     Education provided: Please call back if any changes to your diet, medications or how you've been taking warfarin, Goal range and significance of current result and Importance of notifying clinic of upcoming surgeries and procedures 2 weeks in advance    Plan made per [facility] anticoagulation protocol    Melonie Gavin RN  Anticoagulation Clinic  2/23/2022    _______________________________________________________________________     Anticoagulation Episode Summary     Current INR goal:  2.0-3.0   TTR:  67.6 % (1 y)   Target end date:  Indefinite   Send INR reminders to:  LINDSEY Sullivan    Atrial  fibrillation (Resolved) [I48.91]  Long-term (current) use of anticoagulants [Z79.01] [Z79.01]  Chronic atrial fibrillation (H) [I48.20]           Comments:  5 mg tablets, please call pt's family with INR and orders - Toña daughter in law [phone]         Anticoagulation Care Providers     Provider Role Specialty Phone number    Lazaro Huston MD Referring Internal Medicine [phone]    Fransisco Cordon MD Referring Family Medicine [phone]

## 2022-02-23 NOTE — TELEPHONE ENCOUNTER
Reason for Call:  Form, our goal is to have forms completed with 72 hours, however, some forms may require a visit or additional information.    Type of letter, form or note:  Home Health Certification    Who is the form from?: Home care    Where did the form come from: form was faxed in    What clinic location was the form placed at?: [facility]     Where the form was placed: Given to MA/ZULEIMA wirght    What number is listed as a contact on the form?: [phone]       Additional comments:     Call taken on 2/23/2022 at 10:35 AM by Alba Jarrell

## 2022-02-23 NOTE — PROGRESS NOTES
Assessment & Plan   Problem List Items Addressed This Visit     Long term current use of anticoagulant therapy    Relevant Orders    INR    Chronic atrial fibrillation (H)      Other Visit Diagnoses     Open wound of knee, leg, and ankle, unspecified laterality, subsequent encounter    -  Primary    Relevant Orders    CBC with platelets    Comprehensive metabolic panel (BMP + Alb, Alk Phos, ALT, AST, Total. Bili, TP)    Cerebrovascular accident (CVA), unspecified mechanism (H)        Chronic right-sided heart failure (H)             Is a 89-year-old patient that I know well he has a history of A. 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  He lives at the [facility].  Has had a long history of peripheral edema and skin breakdown.  Chronically has more breakdown on the left anterior shin related to limited use of the left leg.  He does get up and walk at the [facility] and tries to elevate his leg.    Please review the pictures from February 9 same his wound was at the worst.  He was treated with Keflex wound care is much improved.  Today I rebandaged his wound and is almost gone.  We will continue with home care and wound care there.  Should be able to stop the dressings in another 1 to 2 weeks.  Continue to work on his edema taking his Lasix elevating his leg.  We will check labs today for CBC INR comprehensive panel.    30 minutes spent on the date of the encounter doing chart review, history and exam, documentation and further activities per the note           No follow-ups on file.    Lazaro Huston MD  [facility]    Vika Charles is a 89 year old who presents for the following health issues     Wound Check    History of Present Illness       Hypertension: He presents for follow up of hypertension.  He does check blood pressure  regularly outside of the clinic. Outpatient blood pressures have not been over 140/90. He follows a low  salt diet.     Vascular Disease:  He presents for follow up of vascular disease.  He never takes nitroglycerin. He is not taking daily aspirin.    He eats 2-3 servings of fruits and vegetables daily.He consumes 1 sweetened beverage(s) daily.He exercises with enough effort to increase his heart rate 9 or less minutes per day.  He exercises with enough effort to increase his heart rate 4 days per week.   He is taking medications regularly.     He is feeling good physically at home at [facility].  Eating well, breathing well.     Left leg with chronic wound, sore to walk on but it getting better.     Needs homecare to help do wound cares.  Homecare has helped wrap it up yesterday.  He can't reach to do the bandaging and wrap.      He had keflex for 10 days and now are done with them 3 days ago.      Past Medical History:   Diagnosis Date     Actinic keratosis      Atrial fibrillation (H)      Basal cell carcinoma nos 06/10    Mohs excision, rt upper lip & rt temple     Cardiac dysrhythmia, unspecified      Cough     chronic cough     Diabetic eye exam (H) 8/29/14     Generalized osteoarthrosis, unspecified site     djd of the knees, hands, and neck     Other diseases of lung, not elsewhere classified     pulmonary nodule, benign     Pure hypercholesterolemia      Unspecified essential hypertension      Current Outpatient Medications   Medication     acetaminophen (TYLENOL) 325 MG tablet     Carboxymethylcellulose Sodium (THERATEARS) 0.25 % SOLN     cholecalciferol (VITAMIN D3) 1000 units (25 mcg) capsule     furosemide (LASIX) 20 MG tablet     gabapentin (NEURONTIN) 100 MG capsule     hydrocortisone 1 % EX external cream     lisinopril (ZESTRIL) 10 MG tablet     metoprolol succinate ER (TOPROL-XL) 50 MG 24 hr tablet     polyethylene glycol (MIRALAX) powder     senna-docusate (SENOKOT-S/PERICOLACE) 8.6-50 MG tablet     simvastatin (ZOCOR) 40 MG tablet     SSD 1 % external cream     tamsulosin (FLOMAX) 0.4 MG capsule      warfarin ANTICOAGULANT (COUMADIN) 5 MG tablet     blood glucose (NO BRAND SPECIFIED) test strip     loratadine (CLARITIN) 10 MG tablet     order for DME     order for DME     order for DME     No current facility-administered medications for this visit.     Social History     Tobacco Use     Smoking status: Former Smoker     Types: Cigarettes     Smokeless tobacco: Never Used     Tobacco comment: 5 years   Vaping Use     Vaping Use: Never used   Substance Use Topics     Alcohol use: No     Comment: denies     Drug use: No         Review of Systems         Objective    /70 (BP Location: Right arm, Patient Position: Sitting, Cuff Size: Adult Large)   Pulse 66   Temp 97.3  F (36.3  C) (Temporal)   Resp 18   SpO2 94%   There is no height or weight on file to calculate BMI.  Physical Exam   No acute distress, sitting in wheelchair  Heart is regular  Lungs are clear  Left leg has trace edema but is healing nicely.  Bandages removed and the anterior shin has no signs of cellulitis a little bit of open wound but much improved from February 9 picture

## 2022-02-25 ENCOUNTER — ANTICOAGULATION THERAPY VISIT (OUTPATIENT)
Dept: ANTICOAGULATION | Facility: CLINIC | Age: 87
End: 2022-02-25
Payer: COMMERCIAL

## 2022-02-25 DIAGNOSIS — Z79.01 LONG TERM CURRENT USE OF ANTICOAGULANT THERAPY: Primary | ICD-10-CM

## 2022-02-25 DIAGNOSIS — I48.20 CHRONIC ATRIAL FIBRILLATION (H): ICD-10-CM

## 2022-02-25 LAB — INR (EXTERNAL): 2.9 (ref 0.9–1.1)

## 2022-02-25 RX ORDER — MECLIZINE HYDROCHLORIDE 25 MG/1
25 TABLET ORAL 3 TIMES DAILY PRN
COMMUNITY
End: 2022-04-13

## 2022-02-25 RX ORDER — CEPHALEXIN 500 MG/1
500 CAPSULE ORAL 2 TIMES DAILY
COMMUNITY
Start: 2022-02-20 | End: 2022-03-01

## 2022-02-25 NOTE — PROGRESS NOTES
ANTICOAGULATION MANAGEMENT     Melvin Abad 89 year old male is on warfarin with therapeutic INR result. (Goal INR 2.0-3.0)    Recent labs: (last 7 days)     02/25/22  1028   INR 2.9*       ASSESSMENT     Source(s): Chart Review and Home Care/Facility Nurse       Warfarin doses taken: Warfarin taken as instructed    Diet: No new diet changes identified    New illness, injury, or hospitalization: No    Medication/supplement changes: None noted    Signs or symptoms of bleeding or clotting: No    Previous INR: Therapeutic last visit; previously outside of goal range    Additional findings: None     PLAN     Recommended plan for no diet, medication or health factor changes affecting INR     Dosing Instructions: Continue your current warfarin dose with next INR in 5 days       Summary  As of 2/25/2022    Full warfarin instructions:  2.5 mg every Mon, Fri; 5 mg all other days   Next INR check:  3/2/2022             Telephone call with home care nurse Tracy seen the orders from 2/23/2022 and will continue with those and recheck 3/2    Orders given to  Homecare nurse/facility to recheck    Education provided: None required    Plan made per [facility] anticoagulation protocol    Denae Burciaga, RN  Anticoagulation Clinic  2/25/2022    _______________________________________________________________________     Anticoagulation Episode Summary     Current INR goal:  2.0-3.0   TTR:  68.1 % (1 y)   Target end date:  Indefinite   Send INR reminders to:  [facility]    Indications    Atrial fibrillation (Resolved) [I48.91]  Long-term (current) use of anticoagulants [Z79.01] [Z79.01]  Chronic atrial fibrillation (H) [I48.20]           Comments:  5 mg tablets, please call pt's family with INR and orders - Toña daughter in law [phone]         Anticoagulation Care Providers     Provider Role Specialty Phone number    Lazaro Huston MD Referring Internal Medicine [phone]    Fransisco Cordon MD Referring Family  Medicine [phone]

## 2022-02-28 DIAGNOSIS — Z53.9 DIAGNOSIS NOT YET DEFINED: Primary | ICD-10-CM

## 2022-02-28 PROCEDURE — G0180 MD CERTIFICATION HHA PATIENT: HCPCS | Performed by: INTERNAL MEDICINE

## 2022-03-01 ENCOUNTER — ANTICOAGULATION THERAPY VISIT (OUTPATIENT)
Dept: INTERNAL MEDICINE | Facility: CLINIC | Age: 87
End: 2022-03-01
Payer: COMMERCIAL

## 2022-03-01 ENCOUNTER — MYC MEDICAL ADVICE (OUTPATIENT)
Dept: INTERNAL MEDICINE | Facility: CLINIC | Age: 87
End: 2022-03-01
Payer: COMMERCIAL

## 2022-03-01 DIAGNOSIS — I48.20 CHRONIC ATRIAL FIBRILLATION (H): ICD-10-CM

## 2022-03-01 DIAGNOSIS — Z79.01 LONG TERM CURRENT USE OF ANTICOAGULANT THERAPY: Primary | ICD-10-CM

## 2022-03-01 LAB — INR (EXTERNAL): 2.7 (ref 2–3)

## 2022-03-01 NOTE — PROGRESS NOTES
ANTICOAGULATION MANAGEMENT     Melvin Abad 89 year old male is on warfarin with therapeutic INR result. (Goal INR 2.0-3.0)    Recent labs: (last 7 days)     03/01/22  1007   INR 2.7       ASSESSMENT       Source(s): Chart Review and Home Care/Facility Nurse       Warfarin doses taken: Warfarin taken as instructed    Diet: No new diet changes identified    New illness, injury, or hospitalization: No    Medication/supplement changes: Keflex completed 2/23    Signs or symptoms of bleeding or clotting: No    Previous INR: Therapeutic last 2(+) visits    Additional findings: None       PLAN     Recommended plan for no diet, medication or health factor changes affecting INR     Dosing Instructions: Continue your current warfarin dose with next INR in 2 weeks.       Summary  As of 3/1/2022    Full warfarin instructions:  2.5 mg every Mon, Fri; 5 mg all other days   Next INR check:  3/15/2022             Telephone call with Cristal  nurse [phone] who verbalizes understanding and agrees to plan and who agrees to plan and repeated back plan correctly    Orders given to  Homecare nurse/facility to recheck    Education provided: Please call back if any changes to your diet, medications or how you've been taking warfarin and Contact [phone]  with any changes, questions or concerns.     Plan made per [facility] anticoagulation protocol    Sandie Nunez, RN  Anticoagulation Clinic  3/1/2022    _______________________________________________________________________     Anticoagulation Episode Summary     Current INR goal:  2.0-3.0   TTR:  69.2 % (1 y)   Target end date:  Indefinite   Send INR reminders to:  [facility]    Indications    Atrial fibrillation (Resolved) [I48.91]  Long-term (current) use of anticoagulants [Z79.01] [Z79.01]  Chronic atrial fibrillation (H) [I48.20]           Comments:  5 mg tablets, please call pt's family with INR and orders - Toña daughter in law [phone]         Anticoagulation  Care Providers     Provider Role Specialty Phone number    Lazaro Huston MD Referring Internal Medicine [phone]    Fransisco Cordon MD Referring Family Medicine [phone]

## 2022-03-03 ENCOUNTER — TELEPHONE (OUTPATIENT)
Dept: INTERNAL MEDICINE | Facility: CLINIC | Age: 87
End: 2022-03-03
Payer: COMMERCIAL

## 2022-03-03 DIAGNOSIS — I48.11 LONGSTANDING PERSISTENT ATRIAL FIBRILLATION (H): ICD-10-CM

## 2022-03-03 DIAGNOSIS — Z79.01 LONG TERM CURRENT USE OF ANTICOAGULANT THERAPY: Primary | ICD-10-CM

## 2022-03-03 NOTE — TELEPHONE ENCOUNTER
Pt is having 10 teeth extracted on 3/14.     Please review chart - should pt bridge with Lovenox? Hold Coumadin x5 days? Please advise, then Toña, daughter in law, can be called back.       Thanks!  Shani Ross RN

## 2022-03-03 NOTE — TELEPHONE ENCOUNTER
NITISH-PROCEDURAL ANTICOAGULATION  MANAGEMENT    ASSESSMENT     Warfarin interruption plan for dental extraction 10 teeth on 2022.    Indication for Anticoagulation: Atrial Fibrillation, stroke      [id number] = 5 (Hypertension, Age >= 75 and Stroke)    Stroke 2019, INR therapeutic at time, per hospital notes, could not definitively say was cardiac source, or related to arterial changes in brain vasculature       Nitish-Procedure Risk stratification for thromboembolism: moderate (2017 [facility] periprocedure pathway for NVAF Expert Consensus)    NVAF: 2017 [facility] periprocedure pathway for NVAF advises likely bridge for moderate risk stratification with a hx of stroke, TIA or systemic embolism balanced against bleeding risk.    This is a low VTE risk procedure     RECOMMENDATION       Pre-Procedure:  o Hold warfarin for 5 days, until after procedure startin2022   o Enoxaparin (Lovenox) 70 mg subq Q 12 hrs (0.75 mg/kg Q 12 hrs for CrCl 30-60 ml/min per [facility] P&T approved dose adjustment protocol)   - Start enoxaparin: 2022  - Last dose of enoxaparin prior to procedure: 2022 AM  (~24 hours prior to procedure)      Post-Procedure:  o Resume warfarin dose if okay with provider doing procedure on night of procedure, 2022 PM: 7.5mg  o Resume  enoxaparin (Lovenox) ~ 24-48 hrs post procedure when okay with provider doing procedure. Continue until INR >= 2.3 or INR >= 2.0 x 2 ([facility] protocol goal INR 2-3)  o Recheck INR ~5 days after resuming warfarin   ?         Plan routed to referring provider for approval  ?   Molly Warren [facility]    SUBJECTIVE/OBJECTIVE     Melvin Abad, a 89 year old male    Goal INR Range: 2.0-3.0     Patient bridged in past: Yes:     Pertinent History: has HX hematoma    Wt Readings from Last 3 Encounters:   10/02/20 95.5 kg (210 lb 9.6 oz)   20 96.6 kg (212 lb 14.4 oz)   08/10/20 96.2 kg (212 lb)      Patient weight not recorded     Estimated body  "mass index is 30.22 kg/m  as calculated from the following:    Height as of 4/10/20: 1.778 m (5' 10\").    Weight as of 10/2/20: 95.5 kg (210 lb 9.6 oz).    Lab Results   Component Value Date    INR 2.7 03/01/2022    INR 2.9 (A) 02/25/2022    INR 2.18 (H) 02/23/2022     Lab Results   Component Value Date    HGB 13.4 02/23/2022    HGB 14.7 09/28/2020    HCT 41.8 02/23/2022    HCT 44.2 09/28/2020     02/23/2022     09/28/2020     Lab Results   Component Value Date    CR 1.07 02/23/2022    CR 0.95 09/28/2020    CR 0.74 01/24/2020     CrCl cannot be calculated (Unknown ideal weight.).  "

## 2022-03-03 NOTE — TELEPHONE ENCOUNTER
Reason for Call:  Other anti-coag     Detailed comments: Patient is having oral surgery on 3/14, please advise.    Phone Number Patient can be reached at: Cell number on file:    Telephone Information:   Mobile [phone]       Best Time: any    Can we leave a detailed message on this number? YES    Call taken on 3/3/2022 at 1:51 PM by Celestina Juárez

## 2022-03-04 ENCOUNTER — TELEPHONE (OUTPATIENT)
Dept: INTERNAL MEDICINE | Facility: CLINIC | Age: 87
End: 2022-03-04
Payer: COMMERCIAL

## 2022-03-04 ENCOUNTER — MYC MEDICAL ADVICE (OUTPATIENT)
Dept: ANTICOAGULATION | Facility: CLINIC | Age: 87
End: 2022-03-04
Payer: COMMERCIAL

## 2022-03-04 NOTE — TELEPHONE ENCOUNTER
VM left for Toña to call [facility] back.   I informed her that Ed will need to be bridged with Lovenox and we will need to go over those instructions      Tentatively, I have sent the Lovenox instructions below via [employer]   Shani Ross RN

## 2022-03-04 NOTE — TELEPHONE ENCOUNTER
I spoke with Toña and informed her of the need for Lovenox. New instructions sent via [employer]. Appt made for 3/18    Shani Ross RN

## 2022-03-04 NOTE — TELEPHONE ENCOUNTER
I spoke with Toña and informed her of the need for Lovenox. New instructions sent via [employer]. Appt made for 3/18    Shani Ross RN

## 2022-03-04 NOTE — TELEPHONE ENCOUNTER
Last warfarin dose: 03/08/2022 03/09/2022, NO warfarin  03/10/2022, NO warfarin  03/11/2022, NO warfarin, begin enoxaparin injections into the abdomen every 12 hours (AM and PM)  03/12/2022, NO warfarin, enoxaparin injection into the abdomen every 12 hours (AM and PM)  03/13/2022, NO warfarin, enoxaparin injection into the abdomen AM only (no enoxaparin 24 hours prior to surgery)  03/14/2022, DAY OF PROCEDURE, NO enoxaparin. Restart warfarin 7.5mg (1.5 tabs) in the evening, unless instructed otherwise by the physician.  3/15/2022, Restart enoxaparin injections into the abdomen every 12 hours (AM and PM), unless instructed otherwise by the physician, and 5mg (1 tabs) warfarin in the evening.   03/16/2022, Enoxaparin injection into the abdomen every 12 hours (AM and PM) and 5mg (1 tabs) warfarin in the evening.  03/17/2022, Enoxaparin injection into the abdomen every 12 hours (AM and PM) and 5mg (1 tabs) warfarin in the evening.  03/18/2022, Enoxaparin injection into the abdomen in the AM. Recheck INR at the  Lab for further dosing instructions.   If you have any questions please call the Anticoagulation Clinic at [phone].

## 2022-03-04 NOTE — TELEPHONE ENCOUNTER
Reason for Call:  Other FYI    Detailed comments: Joi from [facility] calling to inform you of home care discharge today wound is healed.     Phone Number Patient can be reached at: Other phone number:  [phone]    Best Time: Any    Can we leave a detailed message on this number? YES    Call taken on 3/4/2022 at 9:35 AM by Arlet Charles

## 2022-03-04 NOTE — TELEPHONE ENCOUNTER
Lovenox sent to preferred pharmacy.    Molly Warren, PharmD BCACP  Anticoagulation Clinical Pharmacist

## 2022-03-10 ENCOUNTER — MYC MEDICAL ADVICE (OUTPATIENT)
Dept: INTERNAL MEDICINE | Facility: CLINIC | Age: 87
End: 2022-03-10
Payer: COMMERCIAL

## 2022-03-10 ENCOUNTER — TELEPHONE (OUTPATIENT)
Dept: INTERNAL MEDICINE | Facility: CLINIC | Age: 87
End: 2022-03-10
Payer: COMMERCIAL

## 2022-03-10 DIAGNOSIS — Z79.01 LONG TERM CURRENT USE OF ANTICOAGULANT THERAPY: ICD-10-CM

## 2022-03-10 DIAGNOSIS — I48.11 LONGSTANDING PERSISTENT ATRIAL FIBRILLATION (H): ICD-10-CM

## 2022-03-10 NOTE — TELEPHONE ENCOUNTER
Prior Authorization Approval    Authorization Effective Date: 2/8/2022  Authorization Expiration Date: 3/10/2023  Medication: enoxaparin ANTICOAGULANT (LOVENOX) 80 MG/0.8ML syringe- APPROVED   Approved Dose/Quantity:   Reference #:     Insurance Company: [employer] - Phone [phone] Fax [phone]  Expected CoPay:        CoPay Card Available:      Foundation Assistance Needed:    Which Pharmacy is filling the prescription (Not needed for infusion/clinic administered): [facility] - [place], [place] - [address]  Pharmacy Notified: Yes  Patient Notified:  **Instructed pharmacy to notify patient when script is ready to /ship.**

## 2022-03-10 NOTE — TELEPHONE ENCOUNTER
Prior Authorization Retail Medication Request    Medication/Dose: enoxaparin ANTICOAGULANT (LOVENOX) 80 MG/0.8ML syringe  ICD code (if different than what is on RX):    Long term current use of anticoagulant therapy [Z79.01]  - Primary       Longstanding persistent atrial fibrillation (H) [I48.11]           Previously Tried and Failed:  na  Rationale:  na    Insurance Name:  UCARE - [employer] MEDICARE ([facility])  Insurance ID:  [id number]      Pharmacy Information (if different than what is on RX)  Name:  [facility]  Phone:  [phone]

## 2022-03-10 NOTE — TELEPHONE ENCOUNTER
Central Prior Authorization Team  Phone: [phone]    PA Initiation    Medication: enoxaparin ANTICOAGULANT (LOVENOX) 80 MG/0.8ML syringe  Insurance Company: [employer] - Phone [phone] Fax [phone]  Pharmacy Filling the Rx: [facility] - [place], [place] - [address]  Filling Pharmacy Phone: [phone]  Filling Pharmacy Fax:    Start Date: 3/10/2022

## 2022-03-18 ENCOUNTER — ANTICOAGULATION THERAPY VISIT (OUTPATIENT)
Dept: ANTICOAGULATION | Facility: CLINIC | Age: 87
End: 2022-03-18

## 2022-03-18 ENCOUNTER — LAB (OUTPATIENT)
Dept: LAB | Facility: CLINIC | Age: 87
End: 2022-03-18
Payer: COMMERCIAL

## 2022-03-18 DIAGNOSIS — Z79.01 LONG TERM CURRENT USE OF ANTICOAGULANT THERAPY: Primary | ICD-10-CM

## 2022-03-18 DIAGNOSIS — I48.20 CHRONIC ATRIAL FIBRILLATION (H): ICD-10-CM

## 2022-03-18 DIAGNOSIS — Z79.01 LONG TERM CURRENT USE OF ANTICOAGULANT THERAPY: ICD-10-CM

## 2022-03-18 LAB — INR BLD: 1.1 (ref 0.9–1.1)

## 2022-03-18 PROCEDURE — 36415 COLL VENOUS BLD VENIPUNCTURE: CPT

## 2022-03-18 PROCEDURE — 85610 PROTHROMBIN TIME: CPT

## 2022-03-18 NOTE — PROGRESS NOTES
ANTICOAGULATION MANAGEMENT     Melvin Abad 89 year old male is on warfarin with subtherapeutic INR result. (Goal INR 2.0-3.0)    Recent labs: (last 7 days)     03/18/22  0956   INR 1.1       ASSESSMENT       Source(s): Chart Review and Patient/Caregiver Call       Warfarin doses taken: Warfarin recently held for procedure which may be affecting INR    Diet: No new diet changes identified    New illness, injury, or hospitalization: No    Medication/supplement changes: None noted    Signs or symptoms of bleeding or clotting: No    Previous INR: Therapeutic last 2(+) visits    Additional findings: Bridging with Enoxaparin until INR >= 2.3 or INR >= 2.0 x 2 ([facility] protocol goal INR 2-3)       PLAN     Recommended plan for temporary change(s) affecting INR     Dosing Instructions: Booster dose then continue your current warfarin dose with next INR in 3 days       Summary  As of 3/18/2022    Full warfarin instructions:  3/18: 10 mg; Otherwise 2.5 mg every Mon, Fri; 5 mg all other days   Next INR check:  3/21/2022             Telephone call with  Toña who verbalizes understanding and agrees to plan and who agrees to plan and repeated back plan correctly    Lab visit scheduled    Education provided: None required    Plan made with [facility] Pharmacist Molly Diaz, RN  Anticoagulation Clinic  3/18/2022    _______________________________________________________________________     Anticoagulation Episode Summary     Current INR goal:  2.0-3.0   TTR:  71.3 % (1 y)   Target end date:  Indefinite   Send INR reminders to:  [facility]    Indications    Atrial fibrillation (Resolved) [I48.91]  Long-term (current) use of anticoagulants [Z79.01] [Z79.01]  Chronic atrial fibrillation (H) [I48.20]           Comments:  5 mg tablets, please call pt's family with INR and orders - Toña daughter in law [phone]         Anticoagulation Care Providers     Provider Role Specialty Phone number    Lazaro Huston,  MD Referring Internal Medicine [phone]    Fransisco Cordon MD Referring Family Medicine [phone]

## 2022-03-21 ENCOUNTER — ANTICOAGULATION THERAPY VISIT (OUTPATIENT)
Dept: ANTICOAGULATION | Facility: CLINIC | Age: 87
End: 2022-03-21

## 2022-03-21 ENCOUNTER — LAB (OUTPATIENT)
Dept: LAB | Facility: CLINIC | Age: 87
End: 2022-03-21
Payer: COMMERCIAL

## 2022-03-21 DIAGNOSIS — I48.11 LONGSTANDING PERSISTENT ATRIAL FIBRILLATION (H): ICD-10-CM

## 2022-03-21 DIAGNOSIS — Z79.01 LONG TERM CURRENT USE OF ANTICOAGULANT THERAPY: Primary | ICD-10-CM

## 2022-03-21 DIAGNOSIS — I48.20 CHRONIC ATRIAL FIBRILLATION (H): ICD-10-CM

## 2022-03-21 LAB — INR BLD: 1.1 (ref 0.9–1.1)

## 2022-03-21 PROCEDURE — 36416 COLLJ CAPILLARY BLOOD SPEC: CPT

## 2022-03-21 PROCEDURE — 85610 PROTHROMBIN TIME: CPT

## 2022-03-21 NOTE — PROGRESS NOTES
ANTICOAGULATION MANAGEMENT     Melvin Abad 89 year old male is on warfarin with subtherapeutic INR result. (Goal INR 2.0-3.0)    Recent labs: (last 7 days)     03/21/22  0931   INR 1.1       ASSESSMENT       Source(s): Chart Review and Patient/Caregiver Call       Warfarin doses taken: Warfarin taken as instructed    Diet: No new diet changes identified    New illness, injury, or hospitalization: No    Medication/supplement changes: None noted    Signs or symptoms of bleeding or clotting: No    Previous INR: Subtherapeutic    Additional findings: Bridging with Enoxaparin until INR >= 2.3 or INR >= 2.0 x 2 ([facility] protocol goal INR 2-3)       PLAN     Recommended plan for no diet, medication or health factor changes affecting INR     Dosing Instructions: Booster dose then continue your current warfarin dose with next INR in 2 days       Summary  As of 3/21/2022    Full warfarin instructions:  3/21: 10 mg; Otherwise 2.5 mg every Mon, Fri; 5 mg all other days   Next INR check:  3/23/2022             Telephone call with  TIANA Ding who verbalizes understanding and agrees to plan and who agrees to plan and repeated back plan correctly    Lab visit scheduled    Education provided: Please call back if any changes to your diet, medications or how you've been taking warfarin, Goal range and significance of current result, Importance of therapeutic range, Importance of following up at instructed interval, Importance of taking warfarin as instructed, Monitoring for bleeding signs and symptoms, Monitoring for clotting signs and symptoms and When to seek medical attention/emergency care    Plan made with [facility] Pharmacist Molly Siddiqui, RN  Anticoagulation Clinic  3/21/2022    _______________________________________________________________________     Anticoagulation Episode Summary     Current INR goal:  2.0-3.0   TTR:  71.2 % (1 y)   Target end date:  Indefinite   Send INR reminders to:  [facility]     Indications    Atrial fibrillation (Resolved) [I48.91]  Long-term (current) use of anticoagulants [Z79.01] [Z79.01]  Chronic atrial fibrillation (H) [I48.20]           Comments:  5 mg tablets, please call pt's family with INR and orders - Toña daughter in law [phone]         Anticoagulation Care Providers     Provider Role Specialty Phone number    Lazaro Huston MD Referring Internal Medicine [phone]    Fransisco Cordon MD Referring Family Medicine [phone]

## 2022-03-22 ENCOUNTER — TELEPHONE (OUTPATIENT)
Dept: INTERNAL MEDICINE | Facility: CLINIC | Age: 87
End: 2022-03-22
Payer: COMMERCIAL

## 2022-03-22 RX ORDER — WARFARIN SODIUM 5 MG/1
TABLET ORAL
Qty: 80 TABLET | Refills: 1 | Status: SHIPPED | OUTPATIENT
Start: 2022-03-22 | End: 2022-09-14

## 2022-03-22 NOTE — TELEPHONE ENCOUNTER
Reason for Call:  INR    Who is calling?  Toña Abad    Phone number:  [phone]    Fax number:      Name of caller: Toña Abad regarding Melvin Abad     INR Value:      Are there any other concerns:  Yes: Would like a call back to discuss INR     Can we leave a detailed message on this number? YES    Phone number patient can be reached at: Cell number on file:    Telephone Information:   Mobile [phone]         Call taken on 3/22/2022 at 9:30 AM by Molly Terrazas

## 2022-03-22 NOTE — PROGRESS NOTES
TIANA Ding called back today 3/22, reports that she realized the warfarin she got from [facility] dated 3/6 is 1 mg tablets (not 5 mg) which he's been using for over a year. She was giving him the 1 tablet= 5 mg which was actually 1 mg, which explains why he's low. Kelsi sent in 5 mg tablets and pt will continue with lovenox, INR changed to Friday with revised dosing.     Eli Diaz, PENELOPEN, RN- Coumadin Clinic RN

## 2022-03-22 NOTE — PROGRESS NOTES
TIANA Ding called back today 3/22, reports that she realized the warfarin she got from [facility] pharmacy dated 3/6 is 1 mg tablets (not 5 mg) which he's been using for over a year. She was giving him the 1 tablet= 5 mg which was actually 1 mg, which explains why he's low. Kelsi sent in 5 mg tablets and pt will continue with lovenox, INR changed to Friday with revised dosing.     Eli Diaz, PENELOPEN, RN- Coumadin Clinic RN

## 2022-03-22 NOTE — TELEPHONE ENCOUNTER
TIANA Ding called back today 3/22, reports that she realized the warfarin she got from [facility] pharmacy dated 3/6 is 1 mg tablets (not 5 mg) which he's been using for over a year. She was giving him the 1 tablet= 5 mg which was actually 1 mg, which explains why he's low. Kelsi sent in 5 mg tablets and pt will continue with lovenox, INR changed to Friday with revised dosing.     Eli Diaz, PENELOPEN, RN- Coumadin Clinic RN

## 2022-03-24 ENCOUNTER — TELEPHONE (OUTPATIENT)
Dept: ANTICOAGULATION | Facility: CLINIC | Age: 87
End: 2022-03-24
Payer: COMMERCIAL

## 2022-03-24 NOTE — TELEPHONE ENCOUNTER
Attempted to contact Daniel PARKS, pt is having his INR done tomorrow, will discuss with her at that time, or she can call back if needed.     Eli Diaz, BSN, RN- Coumadin Clinic RN

## 2022-03-24 NOTE — TELEPHONE ENCOUNTER
Received VM from TIANA Ding stating that Ed has had bleeding following his tooth extraction 1.5 weeks ago. Bleeding has been intermittent. She wanted to make sure [facility] was aware.

## 2022-03-25 ENCOUNTER — ANTICOAGULATION THERAPY VISIT (OUTPATIENT)
Dept: ANTICOAGULATION | Facility: CLINIC | Age: 87
End: 2022-03-25

## 2022-03-25 ENCOUNTER — LAB (OUTPATIENT)
Dept: LAB | Facility: CLINIC | Age: 87
End: 2022-03-25
Payer: COMMERCIAL

## 2022-03-25 DIAGNOSIS — I48.20 CHRONIC ATRIAL FIBRILLATION (H): ICD-10-CM

## 2022-03-25 DIAGNOSIS — I48.11 LONGSTANDING PERSISTENT ATRIAL FIBRILLATION (H): ICD-10-CM

## 2022-03-25 DIAGNOSIS — Z79.01 LONG TERM CURRENT USE OF ANTICOAGULANT THERAPY: Primary | ICD-10-CM

## 2022-03-25 LAB — INR BLD: 1.8 (ref 0.9–1.1)

## 2022-03-25 PROCEDURE — 36416 COLLJ CAPILLARY BLOOD SPEC: CPT

## 2022-03-25 PROCEDURE — 85610 PROTHROMBIN TIME: CPT

## 2022-03-25 NOTE — PROGRESS NOTES
ANTICOAGULATION MANAGEMENT     Melvin Abad 89 year old male is on warfarin with subtherapeutic INR result. (Goal INR 2.0-3.0)    Recent labs: (last 7 days)     03/25/22  0955   INR 1.8*       ASSESSMENT       Source(s): Chart Review and Patient/Caregiver Call       Warfarin doses taken: Warfarin taken as instructed - but has had less warfarin in system due to med error See last episode    Diet: No new diet changes identified    New illness, injury, or hospitalization: No    Medication/supplement changes: None noted    Signs or symptoms of bleeding or clotting: No    Previous INR: Subtherapeutic    Additional findings: Bridging with Enoxaparin until Sat AM (ordered 2 more syringes)       PLAN     Recommended plan for no diet, medication or health factor changes affecting INR     Dosing Instructions: Booster dose then continue your current warfarin dose with next INR in 3 days       Summary  As of 3/25/2022    Full warfarin instructions:  3/25: 5 mg; Otherwise 2.5 mg every Mon, Fri; 5 mg all other days   Next INR check:  3/28/2022             Telephone call with  TIANA Ding who verbalizes understanding and agrees to plan and who agrees to plan and repeated back plan correctly    Lab visit scheduled    Education provided: Please call back if any changes to your diet, medications or how you've been taking warfarin, Goal range and significance of current result, Importance of therapeutic range, Monitoring for bleeding signs and symptoms, Monitoring for clotting signs and symptoms and When to seek medical attention/emergency care    Plan made with [facility] Pharmacist Molly Siddiqui, RN  Anticoagulation Clinic  3/25/2022    _______________________________________________________________________     Anticoagulation Episode Summary     Current INR goal:  2.0-3.0   TTR:  71.1 % (1 y)   Target end date:  Indefinite   Send INR reminders to:  [facility]    Indications    Atrial fibrillation (Resolved)  [I48.91]  Long-term (current) use of anticoagulants [Z79.01] [Z79.01]  Chronic atrial fibrillation (H) [I48.20]           Comments:  5 mg tablets, please call pt's family with INR and orders - Toña daughter in law [phone]         Anticoagulation Care Providers     Provider Role Specialty Phone number    Lazaro Huston MD Referring Internal Medicine [phone]    Fransisco Cordon MD Referring Family Medicine [phone]

## 2022-03-28 ENCOUNTER — TELEPHONE (OUTPATIENT)
Dept: INTERNAL MEDICINE | Facility: CLINIC | Age: 87
End: 2022-03-28
Payer: COMMERCIAL

## 2022-03-28 ENCOUNTER — MYC MEDICAL ADVICE (OUTPATIENT)
Dept: INTERNAL MEDICINE | Facility: CLINIC | Age: 87
End: 2022-03-28
Payer: COMMERCIAL

## 2022-03-28 DIAGNOSIS — I50.813 ACUTE ON CHRONIC RIGHT-SIDED HEART FAILURE (H): Primary | ICD-10-CM

## 2022-03-28 DIAGNOSIS — S81.809D OPEN WOUND OF KNEE, LEG, AND ANKLE, UNSPECIFIED LATERALITY, SUBSEQUENT ENCOUNTER: ICD-10-CM

## 2022-03-28 DIAGNOSIS — S81.009D OPEN WOUND OF KNEE, LEG, AND ANKLE, UNSPECIFIED LATERALITY, SUBSEQUENT ENCOUNTER: ICD-10-CM

## 2022-03-28 DIAGNOSIS — I50.813 ACUTE ON CHRONIC RIGHT-SIDED HEART FAILURE (H): ICD-10-CM

## 2022-03-28 DIAGNOSIS — S91.009D OPEN WOUND OF KNEE, LEG, AND ANKLE, UNSPECIFIED LATERALITY, SUBSEQUENT ENCOUNTER: ICD-10-CM

## 2022-03-28 NOTE — TELEPHONE ENCOUNTER
Patients daughter-patricia, Toña said patient missed his INR appt so he needs directions until he can come in again

## 2022-03-28 NOTE — TELEPHONE ENCOUNTER
Appt has been made for 3/29/22. LM for Toña to confirm only if she has any more questions. Ruben Siddiqui RN, BSN  [facility] Anticoagulation Team

## 2022-03-29 ENCOUNTER — LAB (OUTPATIENT)
Dept: LAB | Facility: CLINIC | Age: 87
End: 2022-03-29
Payer: COMMERCIAL

## 2022-03-29 ENCOUNTER — ANTICOAGULATION THERAPY VISIT (OUTPATIENT)
Dept: ANTICOAGULATION | Facility: CLINIC | Age: 87
End: 2022-03-29

## 2022-03-29 DIAGNOSIS — I48.20 CHRONIC ATRIAL FIBRILLATION (H): ICD-10-CM

## 2022-03-29 DIAGNOSIS — Z79.01 LONG TERM CURRENT USE OF ANTICOAGULANT THERAPY: Primary | ICD-10-CM

## 2022-03-29 LAB — INR BLD: 2.2 (ref 0.9–1.1)

## 2022-03-29 PROCEDURE — 36416 COLLJ CAPILLARY BLOOD SPEC: CPT

## 2022-03-29 PROCEDURE — 85610 PROTHROMBIN TIME: CPT

## 2022-03-29 RX ORDER — FUROSEMIDE 20 MG
TABLET ORAL
Qty: 45 TABLET | Refills: 2 | Status: SHIPPED | OUTPATIENT
Start: 2022-03-29 | End: 2022-06-29

## 2022-03-29 NOTE — TELEPHONE ENCOUNTER
Prescription approved per [facility] Refill Protocol.    PENELOPE CazaresN, RN  [facility]

## 2022-03-29 NOTE — PROGRESS NOTES
ANTICOAGULATION MANAGEMENT     Melvin Abad 89 year old male is on warfarin with therapeutic INR result. (Goal INR 2.0-3.0)    Recent labs: (last 7 days)     03/29/22  1536   INR 2.2*       ASSESSMENT       Source(s): Chart Review and Patient/Caregiver Call       Warfarin doses taken: Warfarin taken as instructed    Diet: No new diet changes identified    New illness, injury, or hospitalization: No    Medication/supplement changes: None noted    Signs or symptoms of bleeding or clotting: No    Previous INR: Subtherapeutic    Additional findings: None       PLAN     Recommended plan for no diet, medication or health factor changes affecting INR     Dosing Instructions: Continue your current warfarin dose with next INR in 1 week       Summary  As of 3/29/2022    Full warfarin instructions:  2.5 mg every Mon, Fri; 5 mg all other days   Next INR check:  4/6/2022             Telephone call with  Toña who verbalizes understanding and agrees to plan    Lab visit scheduled    Education provided: Goal range and significance of current result, Importance of therapeutic range and Contact [phone]  with any changes, questions or concerns.     Plan made per [facility] anticoagulation protocol    Kashif Sánchez RN  Anticoagulation Clinic  3/29/2022    _______________________________________________________________________     Anticoagulation Episode Summary     Current INR goal:  2.0-3.0   TTR:  70.5 % (1 y)   Target end date:  Indefinite   Send INR reminders to:  [facility]    Indications    Atrial fibrillation (Resolved) [I48.91]  Long-term (current) use of anticoagulants [Z79.01] [Z79.01]  Chronic atrial fibrillation (H) [I48.20]           Comments:  5 mg tablets, please call pt's family with INR and orders - Toña daughter in law [phone]         Anticoagulation Care Providers     Provider Role Specialty Phone number    Lazaro Huston MD Referring Internal Medicine [phone]    Fransisco Cordon MD Referring  Family Medicine [phone]

## 2022-03-30 ENCOUNTER — DOCUMENTATION ONLY (OUTPATIENT)
Dept: CARE COORDINATION | Facility: CLINIC | Age: 87
End: 2022-03-30
Payer: COMMERCIAL

## 2022-03-30 NOTE — PROGRESS NOTES
[facility] now requests orders and shares plan of care/discharge summaries for some patients through scrible.  Please REPLY TO THIS MESSAGE OR ROUTE BACK TO THE AUTHOR in order to give authorization for orders when needed.  This is considered a verbal order, you will still receive a faxed copy of orders for signature.  Thank you for your assistance in improving collaboration for our patients.    Order for home care clarification: I don't see anything in the chart that indicates the reason for home care services. Requesting what exactly the home care order is for as based on the chart review, there is no skilled need.  Please advise.    Yoselin Scott RN  [phone]

## 2022-04-05 ENCOUNTER — TELEPHONE (OUTPATIENT)
Dept: INTERNAL MEDICINE | Facility: CLINIC | Age: 87
End: 2022-04-05
Payer: COMMERCIAL

## 2022-04-05 NOTE — TELEPHONE ENCOUNTER
Reason for Call:  Other call back    Detailed comments: Tracy from [facility] called needing: Start of care orders for Skilled RN three times a week for 3 weeks and twice a wk for 5 wks, and 3 PRNS also Home care can draw INR after tomorrow you can leave detailed message on phone  [phone] confidential line    Phone Number Patient can be reached at: Other phone number:  [phone]    Best Time: any    Can we leave a detailed message on this number? YES    Call taken on 4/5/2022 at 4:12 PM by Arlet Charles

## 2022-04-06 ENCOUNTER — LAB (OUTPATIENT)
Dept: LAB | Facility: CLINIC | Age: 87
End: 2022-04-06
Payer: COMMERCIAL

## 2022-04-06 ENCOUNTER — ANTICOAGULATION THERAPY VISIT (OUTPATIENT)
Dept: ANTICOAGULATION | Facility: CLINIC | Age: 87
End: 2022-04-06

## 2022-04-06 DIAGNOSIS — I48.20 CHRONIC ATRIAL FIBRILLATION (H): ICD-10-CM

## 2022-04-06 DIAGNOSIS — Z79.01 LONG TERM CURRENT USE OF ANTICOAGULANT THERAPY: Primary | ICD-10-CM

## 2022-04-06 LAB — INR BLD: 2.8 (ref 0.9–1.1)

## 2022-04-06 PROCEDURE — 85610 PROTHROMBIN TIME: CPT

## 2022-04-06 PROCEDURE — 36416 COLLJ CAPILLARY BLOOD SPEC: CPT

## 2022-04-06 NOTE — PROGRESS NOTES
ANTICOAGULATION MANAGEMENT     Melvin Abad 89 year old male is on warfarin with therapeutic INR result. (Goal INR 2.0-3.0)    Recent labs: (last 7 days)     04/06/22  0938   INR 2.8*       ASSESSMENT       Source(s): Chart Review and Patient/Caregiver Call       Warfarin doses taken: Warfarin taken as instructed    Diet: No new diet changes identified    New illness, injury, or hospitalization: No    Medication/supplement changes: None noted    Signs or symptoms of bleeding or clotting: No    Previous INR: Therapeutic last visit; previously outside of goal range    Additional findings: None       PLAN     Recommended plan for no diet, medication or health factor changes affecting INR     Dosing Instructions: continue your current warfarin dose with next INR in 2 weeks       Summary  As of 4/6/2022    Full warfarin instructions:  2.5 mg every Mon, Fri; 5 mg all other days   Next INR check:  4/20/2022             Telephone call with TIANA Ding who verbalizes understanding and agrees to plan    Lab visit scheduled    Education provided: None required    Plan made per [facility] anticoagulation protocol    Eli Diaz RN  Anticoagulation Clinic  4/6/2022    _______________________________________________________________________     Anticoagulation Episode Summary     Current INR goal:  2.0-3.0   TTR:  70.5 % (1 y)   Target end date:  Indefinite   Send INR reminders to:  [facility]    Indications    Atrial fibrillation (Resolved) [I48.91]  Long-term (current) use of anticoagulants [Z79.01] [Z79.01]  Chronic atrial fibrillation (H) [I48.20]           Comments:  5 mg tablets, please call pt's family with INR and orders - Toña daughter in law [phone]         Anticoagulation Care Providers     Provider Role Specialty Phone number    Lazaro Huston MD Referring Internal Medicine [phone]    Fransisco Cordon MD Referring Family Medicine [phone]

## 2022-04-09 ENCOUNTER — HEALTH MAINTENANCE LETTER (OUTPATIENT)
Age: 87
End: 2022-04-09

## 2022-04-11 DIAGNOSIS — I10 ESSENTIAL HYPERTENSION, BENIGN: ICD-10-CM

## 2022-04-12 ENCOUNTER — TELEPHONE (OUTPATIENT)
Dept: INTERNAL MEDICINE | Facility: CLINIC | Age: 87
End: 2022-04-12
Payer: COMMERCIAL

## 2022-04-12 NOTE — TELEPHONE ENCOUNTER
Reason for Call:  Form, our goal is to have forms completed with 72 hours, however, some forms may require a visit or additional information.    Type of letter, form or note:  Home Health Certification    Who is the form from?: Home care    Where did the form come from: form was faxed in    What clinic location was the form placed at?: [facility]    Where the form was placed: Given to MA/RN    What number is listed as a contact on the form?: [phone]       Additional comments: [facility]    Call taken on 4/12/2022 at 3:34 PM by Monserrat Ellis

## 2022-04-12 NOTE — TELEPHONE ENCOUNTER
CHARLY. I can be reached for return calls today at [phone] (internal staff only), [facility]. Please do not give this number out to patients or cold transfer. Thank you!

## 2022-04-12 NOTE — TELEPHONE ENCOUNTER
Reason for Call:  Other prescription    Detailed comments: Toña called for clarification on a new bottle of Coumadin for the pt. She states it now says to take .5 dose MWF instead of the usual MF. Please contact to discuss and advise    Phone Number Patient can be reached at: Cell number on file:    Telephone Information:   Mobile [phone]       Best Time: Any    Can we leave a detailed message on this number? YES    Call taken on 4/12/2022 at 8:45 AM by Carine Chowdhury

## 2022-04-12 NOTE — TELEPHONE ENCOUNTER
Spoke with Toña. The rx that was sent in 3/22/22 was for his current dose: 2.5 mg MF and 5 mg row. I do not see the 2.5 mg MWF and 5 mg row dosing since about 2010 so unsure where they got that prescription. Toña is going to call [facility] and bring this to their attention.     Eli Diaz, PENELOPEN, RN- Coumadin Clinic RN

## 2022-04-13 RX ORDER — LISINOPRIL 10 MG/1
TABLET ORAL
Qty: 90 TABLET | Refills: 1 | Status: SHIPPED | OUTPATIENT
Start: 2022-04-13 | End: 2022-10-10

## 2022-04-20 ENCOUNTER — ANTICOAGULATION THERAPY VISIT (OUTPATIENT)
Dept: ANTICOAGULATION | Facility: CLINIC | Age: 87
End: 2022-04-20
Payer: COMMERCIAL

## 2022-04-20 DIAGNOSIS — Z79.01 LONG TERM CURRENT USE OF ANTICOAGULANT THERAPY: Primary | ICD-10-CM

## 2022-04-20 DIAGNOSIS — I48.20 CHRONIC ATRIAL FIBRILLATION (H): ICD-10-CM

## 2022-04-20 LAB — INR (EXTERNAL): 2.2 (ref 0.9–1.1)

## 2022-04-20 NOTE — PROGRESS NOTES
ANTICOAGULATION MANAGEMENT     Melvin Abad 89 year old male is on warfarin with therapeutic INR result. (Goal INR 2.0-3.0)    Recent labs: (last 7 days)     04/20/22  0841   INR 2.2*       ASSESSMENT       Source(s): Chart Review and Home Care/Facility Nurse       Warfarin doses taken: Warfarin taken as instructed    Diet: No new diet changes identified    New illness, injury, or hospitalization: No    Medication/supplement changes: None noted    Signs or symptoms of bleeding or clotting: No    Previous INR: Therapeutic last 2(+) visits    Additional findings: None       PLAN     Recommended plan for no diet, medication or health factor changes affecting INR     Dosing Instructions: continue your current warfarin dose with next INR in 2 weeks       Summary  As of 4/20/2022    Full warfarin instructions:  2.5 mg every Mon, Fri; 5 mg all other days   Next INR check:  5/4/2022             Telephone call with [facility] home care/facility nurse who verbalizes understanding and agrees to plan    Orders given to  Homecare nurse/facility to recheck    Education provided: Please call back if any changes to your diet, medications or how you've been taking warfarin    Plan made per [facility] anticoagulation protocol    Neeta Cheung, RN  Anticoagulation Clinic  4/20/2022    _______________________________________________________________________     Anticoagulation Episode Summary     Current INR goal:  2.0-3.0   TTR:  70.5 % (1 y)   Target end date:  Indefinite   Send INR reminders to:  [facility]    Indications    Atrial fibrillation (Resolved) [I48.91]  Long-term (current) use of anticoagulants [Z79.01] [Z79.01]  Chronic atrial fibrillation (H) [I48.20]           Comments:  5 mg tablets, please call pt's family with INR and orders - Toña daughter in law [phone]]    Change back to [facility] when Home care discharges         Anticoagulation Care Providers     Provider Role Specialty Phone number    Lazaro Huston,  MD Referring Internal Medicine [phone]    Fransisco Cordon MD Referring Family Medicine [phone]

## 2022-04-20 NOTE — TELEPHONE ENCOUNTER
Esperanza calling from [facility] on status of [facility]. Please refax as they have not received forms, fax to [phone]. Ashley Kebede LPN

## 2022-04-25 ENCOUNTER — TELEPHONE (OUTPATIENT)
Dept: INTERNAL MEDICINE | Facility: CLINIC | Age: 87
End: 2022-04-25
Payer: COMMERCIAL

## 2022-04-25 NOTE — TELEPHONE ENCOUNTER
Reason for Call: Request for an order or referral:    Order or referral being requested: Wound Care. Left shin, new tear. Requesting adaptic and gauze    Date needed: as soon as possible    Has the patient been seen by the PCP for this problem? YES    Additional comments: verbal only     Phone number Patient can be reached at:  Other phone number:  Tracy DEWEY [facility] [phone]    Best Time:  anytime    Can we leave a detailed message on this number?  YES    Call taken on 4/25/2022 at 9:08 AM by Alba Jarrell

## 2022-05-01 DIAGNOSIS — I10 ESSENTIAL HYPERTENSION, BENIGN: ICD-10-CM

## 2022-05-01 DIAGNOSIS — E78.5 HYPERLIPIDEMIA LDL GOAL <100: ICD-10-CM

## 2022-05-01 DIAGNOSIS — R33.9 URINARY RETENTION: ICD-10-CM

## 2022-05-04 ENCOUNTER — ANTICOAGULATION THERAPY VISIT (OUTPATIENT)
Dept: ANTICOAGULATION | Facility: CLINIC | Age: 87
End: 2022-05-04
Payer: COMMERCIAL

## 2022-05-04 DIAGNOSIS — Z79.01 LONG TERM CURRENT USE OF ANTICOAGULANT THERAPY: Primary | ICD-10-CM

## 2022-05-04 DIAGNOSIS — I48.20 CHRONIC ATRIAL FIBRILLATION (H): ICD-10-CM

## 2022-05-04 LAB — INR (EXTERNAL): 2.2 (ref 0.9–1.1)

## 2022-05-04 RX ORDER — SIMVASTATIN 40 MG
TABLET ORAL
Qty: 90 TABLET | Refills: 1 | Status: SHIPPED | OUTPATIENT
Start: 2022-05-04 | End: 2022-11-23

## 2022-05-04 RX ORDER — TAMSULOSIN HYDROCHLORIDE 0.4 MG/1
CAPSULE ORAL
Qty: 90 CAPSULE | Refills: 2 | Status: SHIPPED | OUTPATIENT
Start: 2022-05-04 | End: 2023-01-11

## 2022-05-04 RX ORDER — METOPROLOL SUCCINATE 50 MG/1
TABLET, EXTENDED RELEASE ORAL
Qty: 90 TABLET | Refills: 2 | Status: SHIPPED | OUTPATIENT
Start: 2022-05-04 | End: 2023-01-25

## 2022-05-04 NOTE — PROGRESS NOTES
ANTICOAGULATION MANAGEMENT     Melvin Abad 89 year old male is on warfarin with therapeutic INR result. (Goal INR 2.0-3.0)    Recent labs: (last 7 days)     05/04/22  0858   INR 2.2*       ASSESSMENT       Source(s): Chart Review and Home Care/Facility Nurse       Warfarin doses taken: Warfarin taken as instructed    Diet: No new diet changes identified    New illness, injury, or hospitalization: No    Medication/supplement changes: None noted    Signs or symptoms of bleeding or clotting: No    Previous INR: Therapeutic last 2(+) visits    Additional findings: skin tear on leg       PLAN     Recommended plan for no diet, medication or health factor changes affecting INR     Dosing Instructions: continue your current warfarin dose with next INR in 2 weeks       Summary  As of 5/4/2022    Full warfarin instructions:  2.5 mg every Mon, Fri; 5 mg all other days   Next INR check:  5/16/2022             Telephone call with [facility] home care/facility nurse who agrees to plan and repeated back plan correctly    Orders given to  Homecare nurse/facility to recheck    Education provided: Contact [phone]  with any changes, questions or concerns.     Plan made per [facility] anticoagulation protocol    Cheryle Cramer, RN  Anticoagulation Clinic  5/4/2022    _______________________________________________________________________     Anticoagulation Episode Summary     Current INR goal:  2.0-3.0   TTR:  70.4 % (1 y)   Target end date:  Indefinite   Send INR reminders to:  [facility]    Indications    Atrial fibrillation (Resolved) [I48.91]  Long-term (current) use of anticoagulants [Z79.01] [Z79.01]  Chronic atrial fibrillation (H) [I48.20]           Comments:  5 mg tablets, please call pt's family with INR and orders - Toña daughter in law [phone]]    Change back to [facility] when Home care discharges         Anticoagulation Care Providers     Provider Role Specialty Phone number    Lazaro Huston MD Referring  Internal Medicine [phone]    Neris Fransiscorodri Sinha MD Referring Family Medicine [phone]          Cheryle Sebastian RN, BSN, PHN  Anticoagulation Nurse  [phone]

## 2022-05-04 NOTE — TELEPHONE ENCOUNTER
Pending Prescriptions:                       Disp   Refills    simvastatin (ZOCOR) 40 MG tablet [Pharmacy*90 tab*1        Sig: TAKE ONE TABLET BY MOUTH AT BEDTIME    Routing refill request to provider for review/approval because:  Labs not current:  LDL    Tatianna Kirkland RN

## 2022-05-16 ENCOUNTER — ANTICOAGULATION THERAPY VISIT (OUTPATIENT)
Dept: ANTICOAGULATION | Facility: CLINIC | Age: 87
End: 2022-05-16
Payer: COMMERCIAL

## 2022-05-16 DIAGNOSIS — Z79.01 LONG TERM CURRENT USE OF ANTICOAGULANT THERAPY: Primary | ICD-10-CM

## 2022-05-16 DIAGNOSIS — I48.20 CHRONIC ATRIAL FIBRILLATION (H): ICD-10-CM

## 2022-05-16 LAB — INR (EXTERNAL): 3 (ref 0.9–1.1)

## 2022-05-16 NOTE — PROGRESS NOTES
ANTICOAGULATION MANAGEMENT     Melvin Abad 89 year old male is on warfarin with therapeutic INR result. (Goal INR 2.0-3.0)    Recent labs: (last 7 days)     05/16/22  0950   INR 3.0*       ASSESSMENT       Source(s): Chart Review and Home Care/Facility Nurse       Warfarin doses taken: Warfarin taken as instructed    Diet: No new diet changes identified    New illness, injury, or hospitalization: No    Medication/supplement changes: None noted    Signs or symptoms of bleeding or clotting: No    Previous INR: Therapeutic last 2(+) visits    Additional findings: None       PLAN     Recommended plan for no diet, medication or health factor changes affecting INR     Dosing Instructions: continue your current warfarin dose with next INR in 2 weeks when home care returns      Summary  As of 5/16/2022    Full warfarin instructions:  2.5 mg every Mon, Fri; 5 mg all other days   Next INR check:  6/1/2022             Telephone call with [facility] home care/facility nurse who verbalizes understanding and agrees to plan    Orders given to  Homecare nurse/facility to recheck    Education provided: Please call back if any changes to your diet, medications or how you've been taking warfarin    Plan made per [facility] anticoagulation protocol    Toña De Luna RN  Anticoagulation Clinic  5/16/2022    _______________________________________________________________________     Anticoagulation Episode Summary     Current INR goal:  2.0-3.0   TTR:  70.4 % (1 y)   Target end date:  Indefinite   Send INR reminders to:  [facility]    Indications    Atrial fibrillation (Resolved) [I48.91]  Long-term (current) use of anticoagulants [Z79.01] [Z79.01]  Chronic atrial fibrillation (H) [I48.20]           Comments:  5 mg tablets, please call pt's family with INR and orders - Toña daughter in law [phone]]    Change back to [facility] when Home care discharges         Anticoagulation Care Providers     Provider Role Specialty Phone number     Lazaro Huston MD Referring Internal Medicine [phone]    Fransisco Cordon MD Referring Family Medicine [phone]

## 2022-06-01 ENCOUNTER — TELEPHONE (OUTPATIENT)
Dept: INTERNAL MEDICINE | Facility: CLINIC | Age: 87
End: 2022-06-01
Payer: COMMERCIAL

## 2022-06-01 ENCOUNTER — ANTICOAGULATION THERAPY VISIT (OUTPATIENT)
Dept: ANTICOAGULATION | Facility: CLINIC | Age: 87
End: 2022-06-01
Payer: COMMERCIAL

## 2022-06-01 DIAGNOSIS — I48.20 CHRONIC ATRIAL FIBRILLATION (H): ICD-10-CM

## 2022-06-01 DIAGNOSIS — Z79.01 LONG TERM CURRENT USE OF ANTICOAGULANT THERAPY: Primary | ICD-10-CM

## 2022-06-01 LAB — INR (EXTERNAL): 2.3 (ref 0.9–1.1)

## 2022-06-01 NOTE — TELEPHONE ENCOUNTER
Reason for Call: Request for an order or referral:    Order or referral being requested: skilled nursing 1x every other for 6 weeks. 3 PRN visits    Date needed: as soon as possible    Has the patient been seen by the PCP for this problem? YES    Additional comments:   Verbal ok    Phone number Patient can be reached at:  Other phone number: [facility] [phone]    Best Time:  anytime    Can we leave a detailed message on this number?  YES    Call taken on 6/1/2022 at 1:00 PM by Alba Jarrell

## 2022-06-01 NOTE — PROGRESS NOTES
ANTICOAGULATION MANAGEMENT     Melvin Abad 89 year old male is on warfarin with therapeutic INR result. (Goal INR 2.0-3.0)    Recent labs: (last 7 days)     06/01/22  0919   INR 2.3*       ASSESSMENT       Source(s): Chart Review and Home Care/Facility Nurse       Warfarin doses taken: Warfarin taken as instructed    Diet: No new diet changes identified    New illness, injury, or hospitalization: No    Medication/supplement changes: None noted    Signs or symptoms of bleeding or clotting: No    Previous INR: Therapeutic last 2(+) visits    Additional findings: None       PLAN     Recommended plan for no diet, medication or health factor changes affecting INR     Dosing Instructions: continue your current warfarin dose with next INR in 2 weeks       Summary  As of 6/1/2022    Full warfarin instructions:  2.5 mg every Mon, Fri; 5 mg all other days   Next INR check:  6/15/2022             Telephone call with [facility] home care/facility nurse who verbalizes understanding and agrees to plan    Orders given to  Homecare nurse/facility to recheck    Education provided: None required    Plan made per [facility] anticoagulation protocol    Denae Burciaga, RN  Anticoagulation Clinic  6/1/2022    _______________________________________________________________________     Anticoagulation Episode Summary     Current INR goal:  2.0-3.0   TTR:  70.4 % (1 y)   Target end date:  Indefinite   Send INR reminders to:  [facility]    Indications    Atrial fibrillation (Resolved) [I48.91]  Long-term (current) use of anticoagulants [Z79.01] [Z79.01]  Chronic atrial fibrillation (H) [I48.20]           Comments:  5 mg tablets, please call pt's family with INR and orders - Toña daughter in law [phone]]    Change back to [facility] when Home care discharges         Anticoagulation Care Providers     Provider Role Specialty Phone number    Lazaro Huston MD Referring Internal Medicine [phone]    Fransisco Cordon MD  [facility] Family Medicine [phone]

## 2022-06-10 ENCOUNTER — TELEPHONE (OUTPATIENT)
Dept: INTERNAL MEDICINE | Facility: CLINIC | Age: 87
End: 2022-06-10
Payer: COMMERCIAL

## 2022-06-10 NOTE — TELEPHONE ENCOUNTER
Reason for Call:  Form, our goal is to have forms completed with 72 hours, however, some forms may require a visit or additional information.    Type of letter, form or note:  Home Health Certification    Who is the form from?: Home care    Where did the form come from: form was faxed in    What clinic location was the form placed at?: [facility]     Where the form was placed: Given to KEMAL, NATY RN    What number is listed as a contact on the form?: [phone]       Additional comments: Cert period from 6/4/22-8/2/22    Call taken on 6/10/2022 at 11:41 AM by Ashley Kebede LPN

## 2022-06-15 ENCOUNTER — ANTICOAGULATION THERAPY VISIT (OUTPATIENT)
Dept: ANTICOAGULATION | Facility: CLINIC | Age: 87
End: 2022-06-15
Payer: COMMERCIAL

## 2022-06-15 DIAGNOSIS — I48.20 CHRONIC ATRIAL FIBRILLATION (H): ICD-10-CM

## 2022-06-15 DIAGNOSIS — Z79.01 LONG TERM CURRENT USE OF ANTICOAGULANT THERAPY: Primary | ICD-10-CM

## 2022-06-15 LAB — INR (EXTERNAL): 1.6 (ref 0.9–1.1)

## 2022-06-15 RX ORDER — POLYETHYLENE GLYCOL 3350 17 G/17G
1 POWDER, FOR SOLUTION ORAL DAILY PRN
COMMUNITY
End: 2023-02-21

## 2022-06-15 RX ORDER — SENNOSIDES 8.6 MG
2 TABLET ORAL DAILY
COMMUNITY
End: 2022-11-04

## 2022-06-15 RX ORDER — CARBOXYMETHYLCELLULOSE SODIUM 5 MG/ML
1 SOLUTION/ DROPS OPHTHALMIC 4 TIMES DAILY PRN
COMMUNITY
End: 2024-01-26

## 2022-06-15 RX ORDER — MECLIZINE HYDROCHLORIDE 25 MG/1
25 TABLET ORAL 3 TIMES DAILY PRN
COMMUNITY
End: 2022-11-04

## 2022-06-15 NOTE — PROGRESS NOTES
ANTICOAGULATION MANAGEMENT     Melvin Abad 89 year old male is on warfarin with subtherapeutic INR result. (Goal INR 2.0-3.0)    Recent labs: (last 7 days)     06/15/22  0000   INR 1.6*       ASSESSMENT       Source(s): Chart Review and Home Care/Facility Nurse       Warfarin doses taken: Warfarin taken as instructed    Diet: No new diet changes identified    New illness, injury, or hospitalization: No    Medication/supplement changes: None noted    Signs or symptoms of bleeding or clotting: No    Previous INR: Therapeutic last 2(+) visits    Additional findings: None       PLAN     Recommended plan for no diet, medication or health factor changes affecting INR     Dosing Instructions: booster dose then continue your current warfarin dose with next INR in 2 weeks       Summary  As of 6/15/2022    Full warfarin instructions:  6/15: 7.5 mg; Otherwise 2.5 mg every Mon, Fri; 5 mg all other days   Next INR check:  6/29/2022             Telephone call with [facility] home care/facility nurse who verbalizes understanding and agrees to plan    Orders given to  Homecare nurse/facility to recheck    Education provided: None required    Plan made per [facility] anticoagulation protocol    Kashif Sánchez, RN  Anticoagulation Clinic  6/15/2022    _______________________________________________________________________     Anticoagulation Episode Summary     Current INR goal:  2.0-3.0   TTR:  68.2 % (1 y)   Target end date:  Indefinite   Send INR reminders to:  [facility]    Indications    Atrial fibrillation (Resolved) [I48.91]  Long-term (current) use of anticoagulants [Z79.01] [Z79.01]  Chronic atrial fibrillation (H) [I48.20]           Comments:  5 mg tablets, please call pt's family with INR and orders - Toña daughter in law [phone]]    Change back to [facility] when Home care discharges         Anticoagulation Care Providers     Provider Role Specialty Phone number    Lazaro Huston MD Referring Internal Medicine  [phone]    Fransisco Cordon MD [facility] [phone]

## 2022-06-29 ENCOUNTER — ANTICOAGULATION THERAPY VISIT (OUTPATIENT)
Dept: ANTICOAGULATION | Facility: CLINIC | Age: 87
End: 2022-06-29

## 2022-06-29 DIAGNOSIS — I50.813 ACUTE ON CHRONIC RIGHT-SIDED HEART FAILURE (H): ICD-10-CM

## 2022-06-29 DIAGNOSIS — I48.20 CHRONIC ATRIAL FIBRILLATION (H): ICD-10-CM

## 2022-06-29 DIAGNOSIS — Z79.01 LONG TERM CURRENT USE OF ANTICOAGULANT THERAPY: Primary | ICD-10-CM

## 2022-06-29 LAB — INR (EXTERNAL): 1.3 (ref 0.9–1.1)

## 2022-06-29 NOTE — PROGRESS NOTES
ANTICOAGULATION MANAGEMENT     Melvin Abad 89 year old male is on warfarin with subtherapeutic INR result. (Goal INR 2.0-3.0)    Recent labs: (last 7 days)     06/29/22  0853   INR 1.3*       ASSESSMENT       Source(s): Chart Review and Home Care/Facility Nurse       Warfarin doses taken: Warfarin taken as instructed, verified color of tablets, peach colored 5 mg tablets.    Diet: No new diet changes identified    New illness, injury, or hospitalization: Yes: swelling in lower extremities has remained the same    Medication/supplement changes: None noted    Signs or symptoms of bleeding or clotting: No    Previous INR: Subtherapeutic    Additional findings: None, no reason found for low INR       PLAN     Recommended plan for no diet, medication or health factor changes affecting INR     Dosing Instructions: booster dose then continue your current warfarin dose with next INR in 1 week       Summary  As of 6/29/2022    Full warfarin instructions:  6/29: 7.5 mg; Otherwise 5 mg every day   Next INR check:  7/6/2022             Telephone call with [facility] home care/facility nurse who verbalizes understanding and agrees to plan    Orders given to  Homecare nurse/facility to recheck    Education provided: Please call back if any changes to your diet, medications or how you've been taking warfarin and Monitoring for clotting signs and symptoms    Plan made per [facility] anticoagulation protocol    Neeta Cheung RN  Anticoagulation Clinic  6/29/2022    _______________________________________________________________________     Anticoagulation Episode Summary     Current INR goal:  2.0-3.0   TTR:  64.4 % (1 y)   Target end date:  Indefinite   Send INR reminders to:  [facility]    Indications    Atrial fibrillation (Resolved) [I48.91]  Long-term (current) use of anticoagulants [Z79.01] [Z79.01]  Chronic atrial fibrillation (H) [I48.20]           Comments:  Change back to [facility] when Home care discharges          Anticoagulation Care Providers     Provider Role Specialty Phone number    Lazaro Huston MD Referring Internal Medicine [phone]    Fransisco Cordon MD Referring Family Medicine [phone]

## 2022-07-01 RX ORDER — FUROSEMIDE 20 MG
TABLET ORAL
Qty: 45 TABLET | Refills: 2 | Status: SHIPPED | OUTPATIENT
Start: 2022-07-01 | End: 2022-09-14

## 2022-07-06 ENCOUNTER — ANTICOAGULATION THERAPY VISIT (OUTPATIENT)
Dept: ANTICOAGULATION | Facility: CLINIC | Age: 87
End: 2022-07-06

## 2022-07-06 DIAGNOSIS — Z79.01 LONG TERM CURRENT USE OF ANTICOAGULANT THERAPY: Primary | ICD-10-CM

## 2022-07-06 DIAGNOSIS — I48.20 CHRONIC ATRIAL FIBRILLATION (H): ICD-10-CM

## 2022-07-06 LAB — INR (EXTERNAL): 2.3 (ref 0.9–1.1)

## 2022-07-06 NOTE — PROGRESS NOTES
ANTICOAGULATION MANAGEMENT     Melvin Abad 89 year old male is on warfarin with therapeutic INR result. (Goal INR 2.0-3.0)    Recent labs: (last 7 days)     07/06/22  0829   INR 2.3*       ASSESSMENT       Source(s): Chart Review and Home Care/Facility Nurse       Warfarin doses taken: Warfarin taken as instructed    Diet: No new diet changes identified    New illness, injury, or hospitalization: No    Medication/supplement changes: None noted    Signs or symptoms of bleeding or clotting: No    Previous INR: Subtherapeutic    Additional findings: None       PLAN     Recommended plan for no diet, medication or health factor changes affecting INR     Dosing Instructions: continue your current warfarin dose with next INR in 1 week       Summary  As of 7/6/2022    Full warfarin instructions:  5 mg every day   Next INR check:  7/13/2022             Telephone call with [facility] home care/facility nurse who verbalizes understanding and agrees to plan    Orders given to  Homecare nurse/facility to recheck    Education provided: Please call back if any changes to your diet, medications or how you've been taking warfarin    Plan made per [facility] anticoagulation protocol    Neeta Cheung RN  Anticoagulation Clinic  7/6/2022    _______________________________________________________________________     Anticoagulation Episode Summary     Current INR goal:  2.0-3.0   TTR:  63.0 % (1 y)   Target end date:  Indefinite   Send INR reminders to:  [facility]    Indications    Atrial fibrillation (Resolved) [I48.91]  Long-term (current) use of anticoagulants [Z79.01] [Z79.01]  Chronic atrial fibrillation (H) [I48.20]           Comments:  Change back to [facility] when Home care discharges         Anticoagulation Care Providers     Provider Role Specialty Phone number    Lazaor Huston MD Referring Internal Medicine [phone]    Fransisco Cordon MD Referring Family Medicine [phone]

## 2022-07-13 ENCOUNTER — ANTICOAGULATION THERAPY VISIT (OUTPATIENT)
Dept: ANTICOAGULATION | Facility: CLINIC | Age: 87
End: 2022-07-13

## 2022-07-13 DIAGNOSIS — I48.20 CHRONIC ATRIAL FIBRILLATION (H): ICD-10-CM

## 2022-07-13 DIAGNOSIS — Z79.01 LONG TERM CURRENT USE OF ANTICOAGULANT THERAPY: Primary | ICD-10-CM

## 2022-07-13 LAB — INR (EXTERNAL): 3.4 (ref 0.9–1.1)

## 2022-07-13 NOTE — PROGRESS NOTES
ANTICOAGULATION MANAGEMENT     Melvin Abad 89 year old male is on warfarin with supratherapeutic INR result. (Goal INR 2.0-3.0)    No results for input(s): INR in the last 168 hours.    ASSESSMENT       Source(s): Chart Review and Home Care/Facility Nurse       Warfarin doses taken: Warfarin taken as instructed    Diet: No new diet changes identified    New illness, injury, or hospitalization: No    Medication/supplement changes: None noted    Signs or symptoms of bleeding or clotting: No    Previous INR: Therapeutic last visit; previously outside of goal range    Additional findings: None       PLAN     Recommended plan for no diet, medication or health factor changes affecting INR     Dosing Instructions: decrease your warfarin dose (7.1% change) with next INR in 1 week       Summary  As of 7/13/2022    Full warfarin instructions:  5 mg every day   Next INR check:  7/20/2022             Telephone call with [facility] home care/facility nurse who verbalizes understanding and agrees to plan    Orders given to  Homecare nurse/facility to recheck    Education provided: Please call back if any changes to your diet, medications or how you've been taking warfarin    Plan made per [facility] anticoagulation protocol    Neeta Cheung RN  Anticoagulation Clinic  7/13/2022    _______________________________________________________________________     Anticoagulation Episode Summary     Current INR goal:  2.0-3.0   TTR:  62.4 % (1 y)   Target end date:  Indefinite   Send INR reminders to:  [facility]    Indications    Atrial fibrillation (Resolved) [I48.91]  Long-term (current) use of anticoagulants [Z79.01] [Z79.01]  Chronic atrial fibrillation (H) [I48.20]           Comments:  Change back to [facility] when Home care discharges         Anticoagulation Care Providers     Provider Role Specialty Phone number    Lazaro Huston MD Referring Internal Medicine [phone]    Fransisco Cordon MD Referring Family Medicine  [phone]

## 2022-07-20 ENCOUNTER — ANTICOAGULATION THERAPY VISIT (OUTPATIENT)
Dept: ANTICOAGULATION | Facility: CLINIC | Age: 87
End: 2022-07-20

## 2022-07-20 DIAGNOSIS — Z79.01 LONG TERM CURRENT USE OF ANTICOAGULANT THERAPY: Primary | ICD-10-CM

## 2022-07-20 DIAGNOSIS — I48.20 CHRONIC ATRIAL FIBRILLATION (H): ICD-10-CM

## 2022-07-20 LAB — INR (EXTERNAL): 2.8 (ref 0.9–1.1)

## 2022-07-20 NOTE — PROGRESS NOTES
ANTICOAGULATION MANAGEMENT     Melvin Abad 89 year old male is on warfarin with therapeutic INR result. (Goal INR 2.0-3.0)    Recent labs: (last 7 days)     07/20/22  0959   INR 2.8*       ASSESSMENT       Source(s): Chart Review, Patient/Caregiver Call and Home Care/Facility Nurse       Warfarin doses taken: Warfarin taken as instructed    Diet: No new diet changes identified    New illness, injury, or hospitalization: No    Medication/supplement changes: None noted    Signs or symptoms of bleeding or clotting: No    Previous INR: Supratherapeutic    Additional findings: Home care discharging today.       PLAN     Recommended plan for no diet, medication or health factor changes affecting INR     Dosing Instructions: continue your current warfarin dose with next INR in 2 weeks       Summary  As of 7/20/2022    Full warfarin instructions:  2.5 mg every Wed; 5 mg all other days   Next INR check:  8/3/2022             Telephone call with [facility] home care/facility nurse who verbalizes understanding and agrees to plan     Also spoke with daughter in law Toña.    Lab visit scheduled    Education provided: Goal range and significance of current result    Plan made per [facility] anticoagulation protocol    Dillon Martinez RN  Anticoagulation Clinic  7/20/2022    _______________________________________________________________________     Anticoagulation Episode Summary     Current INR goal:  2.0-3.0   TTR:  61.1 % (1 y)   Target end date:  Indefinite   Send INR reminders to:  [facility]    Indications    Atrial fibrillation (Resolved) [I48.91]  Long-term (current) use of anticoagulants [Z79.01] [Z79.01]  Chronic atrial fibrillation (H) [I48.20]           Comments:           Anticoagulation Care Providers     Provider Role Specialty Phone number    Lazaro Huston MD Referring Internal Medicine [phone]    Fransisco Cordon MD Referring Family Medicine [phone]

## 2022-08-01 ENCOUNTER — ANTICOAGULATION THERAPY VISIT (OUTPATIENT)
Dept: ANTICOAGULATION | Facility: CLINIC | Age: 87
End: 2022-08-01

## 2022-08-01 ENCOUNTER — LAB (OUTPATIENT)
Dept: LAB | Facility: CLINIC | Age: 87
End: 2022-08-01
Payer: COMMERCIAL

## 2022-08-01 DIAGNOSIS — Z79.01 LONG TERM CURRENT USE OF ANTICOAGULANT THERAPY: Primary | ICD-10-CM

## 2022-08-01 DIAGNOSIS — I48.20 CHRONIC ATRIAL FIBRILLATION (H): ICD-10-CM

## 2022-08-01 LAB — INR BLD: 3.4 (ref 0.9–1.1)

## 2022-08-01 PROCEDURE — 85610 PROTHROMBIN TIME: CPT

## 2022-08-01 PROCEDURE — 36416 COLLJ CAPILLARY BLOOD SPEC: CPT

## 2022-08-01 NOTE — PROGRESS NOTES
ANTICOAGULATION MANAGEMENT     Melvin Abad 89 year old male is on warfarin with supratherapeutic INR result. (Goal INR 2.0-3.0)    Recent labs: (last 7 days)     08/01/22  1317   INR 3.4*       ASSESSMENT       Source(s): Chart Review and Patient/Caregiver Call       Warfarin doses taken: More warfarin taken than planned which may be affecting INR    Diet: No new diet changes identified    New illness, injury, or hospitalization: No    Medication/supplement changes: None noted    Signs or symptoms of bleeding or clotting: No    Previous INR: Therapeutic last visit; previously outside of goal range    Additional findings: None       PLAN     Recommended plan for temporary change(s) affecting INR     Dosing Instructions: partial hold then continue your current warfarin dose with next INR in 2 weeks       Summary  As of 8/1/2022    Full warfarin instructions:  8/1: 2.5 mg; Otherwise 2.5 mg every Wed; 5 mg all other days   Next INR check:  8/15/2022             Telephone call with  TIANA Ding who verbalizes understanding and agrees to plan and who agrees to plan and repeated back plan correctly    Lab visit scheduled    Education provided: Please call back if any changes to your diet, medications or how you've been taking warfarin, Goal range and significance of current result, Importance of therapeutic range, Importance of following up at instructed interval and Importance of taking warfarin as instructed    Plan made per [facility] anticoagulation protocol    Ruben Siddiqui RN  Anticoagulation Clinic  8/1/2022    _______________________________________________________________________     Anticoagulation Episode Summary     Current INR goal:  2.0-3.0   TTR:  58.9 % (1 y)   Target end date:  Indefinite   [facility]    Indications    Atrial fibrillation (Resolved) [I48.91]  Long-term (current) use of anticoagulants [Z79.01] [Z79.01]  Chronic atrial fibrillation (H) [I48.20]            Comments:           Anticoagulation Care Providers     Provider Role Specialty Phone number    Lazaro Huston MD Referring Internal Medicine [phone]    Fransisco Crodon MD Referring Family Medicine [phone]

## 2022-08-15 ENCOUNTER — LAB (OUTPATIENT)
Dept: LAB | Facility: CLINIC | Age: 87
End: 2022-08-15
Payer: COMMERCIAL

## 2022-08-15 ENCOUNTER — ANTICOAGULATION THERAPY VISIT (OUTPATIENT)
Dept: ANTICOAGULATION | Facility: CLINIC | Age: 87
End: 2022-08-15

## 2022-08-15 DIAGNOSIS — Z79.01 LONG TERM CURRENT USE OF ANTICOAGULANT THERAPY: Primary | ICD-10-CM

## 2022-08-15 DIAGNOSIS — I48.20 CHRONIC ATRIAL FIBRILLATION (H): ICD-10-CM

## 2022-08-15 LAB — INR BLD: 2.5 (ref 0.9–1.1)

## 2022-08-15 PROCEDURE — 36416 COLLJ CAPILLARY BLOOD SPEC: CPT

## 2022-08-15 PROCEDURE — 85610 PROTHROMBIN TIME: CPT

## 2022-08-15 NOTE — PROGRESS NOTES
ANTICOAGULATION MANAGEMENT     Melvin Abad 89 year old male is on warfarin with therapeutic INR result. (Goal INR 2.0-3.0)    Recent labs: (last 7 days)     08/15/22  1320   INR 2.5*       ASSESSMENT       Source(s): Chart Review and Patient/Caregiver Call       Warfarin doses taken: Warfarin taken as instructed    Diet: No new diet changes identified    New illness, injury, or hospitalization: No    Medication/supplement changes: None noted    Signs or symptoms of bleeding or clotting: No    Previous INR: Supratherapeutic    Additional findings: None       PLAN     Recommended plan for no diet, medication or health factor changes affecting INR     Dosing Instructions: Continue your current warfarin dose with next INR in 4 weeks       Summary  As of 8/15/2022    Full warfarin instructions:  2.5 mg every Wed; 5 mg all other days   Next INR check:  9/12/2022             Telephone call with  TIANA Ding who verbalizes understanding and agrees to plan and who agrees to plan and repeated back plan correctly    Lab visit scheduled    Education provided: Please call back if any changes to your diet, medications or how you've been taking warfarin    Plan made per [facility] anticoagulation protocol    Ruben Siddiqui RN  Anticoagulation Clinic  8/15/2022    _______________________________________________________________________     Anticoagulation Episode Summary     Current INR goal:  2.0-3.0   TTR:  57.2 % (1 y)   Target end date:  Indefinite   Send INR reminders to:  [facility]    Indications    Atrial fibrillation (Resolved) [I48.91]  Long-term (current) use of anticoagulants [Z79.01] [Z79.01]  Chronic atrial fibrillation (H) [I48.20]           Comments:           Anticoagulation Care Providers     Provider Role Specialty Phone number    Lazaro Huston MD Referring Internal Medicine [phone]    Fransisco Cordon MD Referring Family Medicine [phone]

## 2022-08-19 ENCOUNTER — MYC MEDICAL ADVICE (OUTPATIENT)
Dept: INTERNAL MEDICINE | Facility: CLINIC | Age: 87
End: 2022-08-19

## 2022-08-19 DIAGNOSIS — S81.009D OPEN WOUND OF KNEE, LEG, AND ANKLE, UNSPECIFIED LATERALITY, SUBSEQUENT ENCOUNTER: Primary | ICD-10-CM

## 2022-08-19 DIAGNOSIS — S81.809D OPEN WOUND OF KNEE, LEG, AND ANKLE, UNSPECIFIED LATERALITY, SUBSEQUENT ENCOUNTER: Primary | ICD-10-CM

## 2022-08-19 DIAGNOSIS — S91.009D OPEN WOUND OF KNEE, LEG, AND ANKLE, UNSPECIFIED LATERALITY, SUBSEQUENT ENCOUNTER: Primary | ICD-10-CM

## 2022-08-22 ENCOUNTER — MYC MEDICAL ADVICE (OUTPATIENT)
Dept: INTERNAL MEDICINE | Facility: CLINIC | Age: 87
End: 2022-08-22

## 2022-08-22 ENCOUNTER — TELEPHONE (OUTPATIENT)
Dept: INTERNAL MEDICINE | Facility: CLINIC | Age: 87
End: 2022-08-22

## 2022-08-22 DIAGNOSIS — S91.009D OPEN WOUND OF KNEE, LEG, AND ANKLE, UNSPECIFIED LATERALITY, SUBSEQUENT ENCOUNTER: Primary | ICD-10-CM

## 2022-08-22 DIAGNOSIS — S81.809D OPEN WOUND OF KNEE, LEG, AND ANKLE, UNSPECIFIED LATERALITY, SUBSEQUENT ENCOUNTER: Primary | ICD-10-CM

## 2022-08-22 DIAGNOSIS — S81.009D OPEN WOUND OF KNEE, LEG, AND ANKLE, UNSPECIFIED LATERALITY, SUBSEQUENT ENCOUNTER: Primary | ICD-10-CM

## 2022-08-22 DIAGNOSIS — I48.20 CHRONIC ATRIAL FIBRILLATION (H): ICD-10-CM

## 2022-08-22 NOTE — TELEPHONE ENCOUNTER
FYI - Status Update    Who is Calling: Veronica nurse form [facility]    Update: not able to accept patient, just discharged the patient for the same wounds in July. [facility] does not manage chronic wounds      Does caller want a call/response back: No      
Female

## 2022-08-24 NOTE — TELEPHONE ENCOUNTER
Home care states they do not forward the information to other home cares.. Please place Care Coordination order and they will find him a home care.

## 2022-08-26 ENCOUNTER — TELEPHONE (OUTPATIENT)
Dept: INTERNAL MEDICINE | Facility: CLINIC | Age: 87
End: 2022-08-26

## 2022-08-26 ENCOUNTER — TELEPHONE (OUTPATIENT)
Dept: CARE COORDINATION | Facility: CLINIC | Age: 87
End: 2022-08-26

## 2022-08-26 NOTE — TELEPHONE ENCOUNTER
I tried sending this patient to the [facility] home care spreadsheet. They declined at this time as the patient has not had a face to face in the last 90 days.      Shan Samuel MSN, RN, PHN, [facility]   Primary Care Clinical RN Care Coordinator  [facility]  8/26/2022   8:14 AM  [email or web address]  Office: [phone]

## 2022-08-26 NOTE — TELEPHONE ENCOUNTER
Reason for Call:  Appointment Request    Patient requesting this type of appt:  Face to Face     Requested provider: Lazaro Huston    Reason patient unable to be scheduled: Not within requested timeframe    When does patient want to be seen/preferred time: asap    Comments: insurance is requiring a face to face visit before they will cover home health services     Could we send this information to you in [facility] or would you prefer to receive a phone call?:   No preference   Okay to leave a detailed message?: Yes at Cell number on file:    Telephone Information:   Mobile [phone]       Call taken on 8/26/2022 at 9:21 AM by Kizzy Epperson

## 2022-08-26 NOTE — TELEPHONE ENCOUNTER
The family was contacted regarding the need for a face to face visit.  Homecare discharged the patient 1 month ago.  The RN CC also explained the homebound status as well.  The daughter in law stated understanding.  She was going to call immediately for an appointment.      Shan Samuel MSN, RN, PHN, CCM   Primary Care Clinical RN Care Coordinator  [facility]  8/26/2022   9:20 AM  [email or web address]  Office: [phone]

## 2022-08-26 NOTE — TELEPHONE ENCOUNTER
Can you contact family and explain homecare rules.  It is hard to add it back for his leg wound care when I haven't seen him much.

## 2022-08-26 NOTE — TELEPHONE ENCOUNTER
This message had not been addressed and is now past the appointment time you offered. Next option?   Leann Fernandez MA on 8/26/2022 at 2:32 PM

## 2022-08-29 ENCOUNTER — PATIENT OUTREACH (OUTPATIENT)
Dept: CARE COORDINATION | Facility: CLINIC | Age: 87
End: 2022-08-29

## 2022-08-29 ENCOUNTER — OFFICE VISIT (OUTPATIENT)
Dept: INTERNAL MEDICINE | Facility: CLINIC | Age: 87
End: 2022-08-29
Payer: COMMERCIAL

## 2022-08-29 VITALS
HEART RATE: 58 BPM | BODY MASS INDEX: 29.49 KG/M2 | SYSTOLIC BLOOD PRESSURE: 118 MMHG | WEIGHT: 206 LBS | OXYGEN SATURATION: 97 % | DIASTOLIC BLOOD PRESSURE: 74 MMHG | HEIGHT: 70 IN | TEMPERATURE: 97.6 F | RESPIRATION RATE: 16 BRPM

## 2022-08-29 DIAGNOSIS — I63.9 CEREBROVASCULAR ACCIDENT (CVA), UNSPECIFIED MECHANISM (H): ICD-10-CM

## 2022-08-29 DIAGNOSIS — I50.812 CHRONIC RIGHT-SIDED HEART FAILURE (H): ICD-10-CM

## 2022-08-29 DIAGNOSIS — S91.009D OPEN WOUND OF KNEE, LEG, AND ANKLE, UNSPECIFIED LATERALITY, SUBSEQUENT ENCOUNTER: Primary | ICD-10-CM

## 2022-08-29 DIAGNOSIS — S81.009D OPEN WOUND OF KNEE, LEG, AND ANKLE, UNSPECIFIED LATERALITY, SUBSEQUENT ENCOUNTER: Primary | ICD-10-CM

## 2022-08-29 DIAGNOSIS — I48.20 CHRONIC ATRIAL FIBRILLATION (H): ICD-10-CM

## 2022-08-29 DIAGNOSIS — S81.809D OPEN WOUND OF KNEE, LEG, AND ANKLE, UNSPECIFIED LATERALITY, SUBSEQUENT ENCOUNTER: Primary | ICD-10-CM

## 2022-08-29 PROBLEM — E11.9 TYPE 2 DIABETES MELLITUS WITHOUT COMPLICATION, WITHOUT LONG-TERM CURRENT USE OF INSULIN (H): Status: RESOLVED | Noted: 2017-05-10 | Resolved: 2022-08-29

## 2022-08-29 PROCEDURE — 99214 OFFICE O/P EST MOD 30 MIN: CPT | Performed by: INTERNAL MEDICINE

## 2022-08-29 NOTE — PROGRESS NOTES
Clinic Care Coordination Contact    Situation: Patient chart reviewed by care coordinator.    Background: Care Coordination referral received. Patient needs help getting home care. Patient needed a face to face visit for home care to enroll.     Assessment: Patient had face to face visit today with Primary Care Provider. [facility] Referral was placed.     Plan/Recommendations: RN CC will follow up Thursday this week to see if [facility] could take patient on. If they are unable, RN CC will attempt to help patient get home care.     Alma White RN Care Coordination   [facility]  Email: [email or web address]  Phone: [phone]

## 2022-08-29 NOTE — PROGRESS NOTES
"  Assessment & Plan   Problem List Items Addressed This Visit     Chronic atrial fibrillation (H)    Relevant Orders    Home Care Referral      Other Visit Diagnoses     Open wound of knee, leg, and ankle, unspecified laterality, subsequent encounter    -  Primary    Relevant Orders    Home Care Referral    Cerebrovascular accident (CVA), unspecified mechanism (H)        Relevant Orders    Home Care Referral    Chronic right-sided heart failure (H)        Relevant Orders    Home Care Referral         Patient is well-known to me.  He is a 89-year-old gentleman living in assisted living.  He has had multiple leg wounds and sores.  He now has a wound on the left leg from scraping it.  Its an anterior shin wound no signs of infection.  He has difficulty healing his wounds due to his chronic heart disease and poor circulation.  Due to numbness in his hands he cannot perform bandage changes on his own.  He is definitely homebound as he can only walk 10 to 20 feet with a walker.  Today he is seen with his son in a wheelchair.  We evaluated his wounds which are not infected but healing they need to have bandage changes daily with a nonadhesive dressing and minimal tape.    Patient's heart failure, A. fib and history of stroke all limit him and the reasons for him to be on home care.    For his heart failure and some swelling in the left leg I will increase his Lasix from 30 mg to 40 mg for the next 2 days and discussed elevating his leg is much as possible.           BMI:   Estimated body mass index is 29.98 kg/m  as calculated from the following:    Height as of this encounter: 1.765 m (5' 9.5\").    Weight as of this encounter: 93.4 kg (206 lb).           No follow-ups on file.    Lazaro Huston MD  [facility] 89 [facility], presenting for the following health issues:  No chief complaint on file.      History of Present Illness       Vascular Disease:  He presents for " follow up of vascular disease.  He never takes nitroglycerin. He is not taking daily aspirin.    He eats 2-3 servings of fruits and vegetables daily.He consumes 1 sweetened beverage(s) daily.He exercises with enough effort to increase his heart rate 20 to 29 minutes per day.  He exercises with enough effort to increase his heart rate 7 days per week.   He is taking medications regularly.     Chronic problems with his legs and sores. Has had homecare previously. Living at [facility].    Was healed up and then scraped his shin a week ago. Bandaging it now but needs homecare.     Using non stick gauze, improving some now      Still swelling in the leg.      Can't do bandaging himself with numb hands.     Review of Systems         Objective      Body mass index is 29.98 kg/m .  Physical Exam       Left leg with anterior shin wound, not infected, 3 inches longer, shallow, extra lateral spot dime size.   1-2+ pitting edema                    .  ..

## 2022-08-31 ENCOUNTER — TELEPHONE (OUTPATIENT)
Dept: INTERNAL MEDICINE | Facility: CLINIC | Age: 87
End: 2022-08-31

## 2022-08-31 NOTE — TELEPHONE ENCOUNTER
Yancy RN from [facility] calling to get verbal order for start of care:    Skilled Nursing  2 x week for 2 weeks  1 x week for 3 weeks  1 x every other week for 2 weeks  3 PRNs    Ok to give verbal ok for orders?     no

## 2022-09-01 ENCOUNTER — PATIENT OUTREACH (OUTPATIENT)
Dept: CARE COORDINATION | Facility: CLINIC | Age: 87
End: 2022-09-01

## 2022-09-01 NOTE — PROGRESS NOTES
Clinic Care Coordination Contact    Situation: Patient chart reviewed by care coordinator.    Background: Patient was referred to care coordination to help find home care.     Assessment: RN CC can see that [facility] enrolled patient and he now has a nurse coming out for wound care.     Plan/Recommendations: RN CC will close referral as patient's only identified need was finding home care.     RN CC will do no further out reaches at this time.     Alma White RN Care Coordination   [facility]  Email: [email or web address]  Phone: [phone]

## 2022-09-12 ENCOUNTER — ANTICOAGULATION THERAPY VISIT (OUTPATIENT)
Dept: ANTICOAGULATION | Facility: CLINIC | Age: 87
End: 2022-09-12

## 2022-09-12 ENCOUNTER — LAB (OUTPATIENT)
Dept: LAB | Facility: CLINIC | Age: 87
End: 2022-09-12
Payer: COMMERCIAL

## 2022-09-12 DIAGNOSIS — I48.20 CHRONIC ATRIAL FIBRILLATION (H): ICD-10-CM

## 2022-09-12 DIAGNOSIS — Z79.01 LONG TERM CURRENT USE OF ANTICOAGULANT THERAPY: Primary | ICD-10-CM

## 2022-09-12 DIAGNOSIS — I48.11 LONGSTANDING PERSISTENT ATRIAL FIBRILLATION (H): ICD-10-CM

## 2022-09-12 DIAGNOSIS — I50.813 ACUTE ON CHRONIC RIGHT-SIDED HEART FAILURE (H): ICD-10-CM

## 2022-09-12 DIAGNOSIS — Z79.01 LONG TERM CURRENT USE OF ANTICOAGULANT THERAPY: ICD-10-CM

## 2022-09-12 LAB — INR BLD: 3 (ref 0.9–1.1)

## 2022-09-12 PROCEDURE — 36416 COLLJ CAPILLARY BLOOD SPEC: CPT

## 2022-09-12 PROCEDURE — 85610 PROTHROMBIN TIME: CPT

## 2022-09-12 NOTE — PROGRESS NOTES
ANTICOAGULATION MANAGEMENT     Melvin Abad 89 year old male is on warfarin with therapeutic INR result. (Goal INR 2.0-3.0)    Recent labs: (last 7 days)     09/12/22  1314   INR 3.0*       ASSESSMENT       Source(s): Chart Review and Patient/Caregiver Call       Warfarin doses taken: Warfarin taken as instructed    Diet: No new diet changes identified    New illness, injury, or hospitalization: No    Medication/supplement changes: None noted    Signs or symptoms of bleeding or clotting: No    Previous INR: Therapeutic last visit; previously outside of goal range    Additional findings: None       PLAN     Recommended plan for no diet, medication or health factor changes affecting INR     Dosing Instructions: Continue your current warfarin dose with next INR in 4 weeks       Summary  As of 9/12/2022    Full warfarin instructions:  2.5 mg every Wed; 5 mg all other days   Next INR check:  10/10/2022             Telephone call with  TIANA Ding who verbalizes understanding and agrees to plan and who agrees to plan and repeated back plan correctly    Lab visit scheduled    Education provided: Please call back if any changes to your diet, medications or how you've been taking warfarin    Plan made per [facility] anticoagulation protocol    Ruben Siddiqui RN  Anticoagulation Clinic  9/12/2022    _______________________________________________________________________     Anticoagulation Episode Summary     Current INR goal:  2.0-3.0   TTR:  60.8 % (1 y)   Target end date:  Indefinite   Send INR reminders to:  [facility]    Indications    Atrial fibrillation (Resolved) [I48.91]  Long-term (current) use of anticoagulants [Z79.01] [Z79.01]  Chronic atrial fibrillation (H) [I48.20]           Comments:           Anticoagulation Care Providers     Provider Role Specialty Phone number    Lazaro Huston MD Referring Internal Medicine [phone]    Fransisco Cordon MD Referring Family Medicine [phone]

## 2022-09-14 ENCOUNTER — TELEPHONE (OUTPATIENT)
Dept: INTERNAL MEDICINE | Facility: CLINIC | Age: 87
End: 2022-09-14

## 2022-09-14 RX ORDER — FUROSEMIDE 20 MG
TABLET ORAL
Qty: 135 TABLET | Refills: 1 | Status: SHIPPED | OUTPATIENT
Start: 2022-09-14 | End: 2024-01-26

## 2022-09-14 RX ORDER — WARFARIN SODIUM 5 MG/1
TABLET ORAL
Qty: 80 TABLET | Refills: 1 | Status: SHIPPED | OUTPATIENT
Start: 2022-09-14 | End: 2023-03-13

## 2022-09-14 NOTE — TELEPHONE ENCOUNTER
Forms/Letter Request    Type of form/letter: [facility] period 8/31/22-10/29/22    Have you been seen for this request: N/A    Do we have the form/letter: Yes: placed in [facility] CERT FOLDER    When is form/letter needed by: asap    How would you like the form/letter returned: Fax  [phone] phone: [phone]    Order number: [id number]

## 2022-09-14 NOTE — TELEPHONE ENCOUNTER
ANTICOAGULATION MANAGEMENT:  Medication Refill    Anticoagulation Summary  As of 9/12/2022    Warfarin maintenance plan:  2.5 mg (5 mg x 0.5) every Wed; 5 mg (5 mg x 1) all other days   Next INR check:  10/10/2022   Target end date:  Indefinite    Indications    Atrial fibrillation (Resolved) [I48.91]  Long-term (current) use of anticoagulants [Z79.01] [Z79.01]  Chronic atrial fibrillation (H) [I48.20]             Anticoagulation Care Providers     Provider Role Specialty Phone number    Lazaro Huston MD Referring Internal Medicine [phone]    Fransisco Cordon MD Referring Family Medicine [phone]          Visit with referring provider/group within last year: Yes    [facility] referral signed within last year: Yes    Edward meets all criteria for refill (current [facility] referral, office visit with referring provider/group in last year, lab monitoring up to date or not exceeding 2 weeks overdue). Rx instructions and quantity supplied updated to match patient's current dosing plan. Warfarin 90 day supply with 1 refill granted per [facility] protocol     Ruben Siddiqui RN  Anticoagulation Clinic

## 2022-09-16 RX ORDER — WARFARIN SODIUM 5 MG/1
5 TABLET ORAL DAILY
COMMUNITY
End: 2023-07-24

## 2022-09-16 RX ORDER — FUROSEMIDE 20 MG
20 TABLET ORAL DAILY
COMMUNITY
End: 2022-11-04

## 2022-09-19 DIAGNOSIS — Z53.9 DIAGNOSIS NOT YET DEFINED: Primary | ICD-10-CM

## 2022-09-19 PROCEDURE — G0180 MD CERTIFICATION HHA PATIENT: HCPCS | Performed by: INTERNAL MEDICINE

## 2022-10-03 DIAGNOSIS — I50.813 ACUTE ON CHRONIC RIGHT-SIDED HEART FAILURE (H): ICD-10-CM

## 2022-10-04 RX ORDER — FUROSEMIDE 20 MG
TABLET ORAL
Qty: 45 TABLET | Refills: 2 | OUTPATIENT
Start: 2022-10-04

## 2022-10-04 NOTE — TELEPHONE ENCOUNTER
Sent 9/14/22, with 6 month supply. Should not need refills at this time    Gina Hunt RN on 10/4/2022 at 10:13 AM

## 2022-10-05 ENCOUNTER — TELEPHONE (OUTPATIENT)
Dept: INTERNAL MEDICINE | Facility: CLINIC | Age: 87
End: 2022-10-05

## 2022-10-05 NOTE — TELEPHONE ENCOUNTER
The Home Care/Assisted Living/Nursing Facility is calling regarding an established patient.  Has the patient seen Home Care in the past or is currently residing in Assisted Living or Nursing Facility? Yes.     ZULEIMA Molina calling from [facility] requesting the following orders that are within the Home Care, Assisted Living or Nursing Home Eval and Treatment standing order and can be signed as standing order signature required by RN.    Preferred Call Back Number: [phone]    Wound care supplies per Wound Care Specialist's recommendation    Any additional Orders:  Are there any orders requested, not stated above, that are outside of the standing order and must be routed to a licensed practitioner for approval?    Yes: Patient has an open wound on R calf 1x1cm that reopened. Looking ok to apply adaptic non adhering dressing and guaze.     Writer has verified [employer] will send fax to have orders signed.    GLEN So, RN

## 2022-10-07 DIAGNOSIS — I10 ESSENTIAL HYPERTENSION, BENIGN: ICD-10-CM

## 2022-10-10 ENCOUNTER — ANTICOAGULATION THERAPY VISIT (OUTPATIENT)
Dept: ANTICOAGULATION | Facility: CLINIC | Age: 87
End: 2022-10-10

## 2022-10-10 ENCOUNTER — LAB (OUTPATIENT)
Dept: LAB | Facility: CLINIC | Age: 87
End: 2022-10-10
Payer: COMMERCIAL

## 2022-10-10 DIAGNOSIS — I48.20 CHRONIC ATRIAL FIBRILLATION (H): ICD-10-CM

## 2022-10-10 DIAGNOSIS — Z79.01 LONG TERM CURRENT USE OF ANTICOAGULANT THERAPY: Primary | ICD-10-CM

## 2022-10-10 LAB — INR BLD: 3.6 (ref 0.9–1.1)

## 2022-10-10 PROCEDURE — 36415 COLL VENOUS BLD VENIPUNCTURE: CPT

## 2022-10-10 PROCEDURE — 85610 PROTHROMBIN TIME: CPT

## 2022-10-10 RX ORDER — LISINOPRIL 10 MG/1
TABLET ORAL
Qty: 90 TABLET | Refills: 1 | Status: SHIPPED | OUTPATIENT
Start: 2022-10-10 | End: 2023-01-25

## 2022-10-10 NOTE — PROGRESS NOTES
ANTICOAGULATION MANAGEMENT     Melvin Abad 89 year old male is on warfarin with supratherapeutic INR result. (Goal INR 2.0-3.0)    Recent labs: (last 7 days)     10/10/22  1017   INR 3.6*       ASSESSMENT       Source(s): Chart Review and Patient/Caregiver Call       Warfarin doses taken: Warfarin taken as instructed    Diet: No new diet changes identified    New illness, injury, or hospitalization: No    Medication/supplement changes: None noted    Signs or symptoms of bleeding or clotting: No    Previous INR: Therapeutic last 2(+) visits    Additional findings: None       PLAN     Recommended plan for no diet, medication or health factor changes affecting INR     Dosing Instructions: partial hold then continue your current warfarin dose with next INR in 2 weeks       Summary  As of 10/10/2022    Full warfarin instructions:  10/10: 2.5 mg; Otherwise 2.5 mg every Wed; 5 mg all other days   Next INR check:  10/24/2022             Telephone call with  TIANA Ding who verbalizes understanding and agrees to plan and who agrees to plan and repeated back plan correctly    Lab visit scheduled    Education provided: Please call back if any changes to your diet, medications or how you've been taking warfarin, Goal range and significance of current result, Importance of therapeutic range and Importance of following up at instructed interval    Plan made per [facility] anticoagulation protocol    Ruben Siddiqui RN  Anticoagulation Clinic  10/10/2022    _______________________________________________________________________     Anticoagulation Episode Summary     Current INR goal:  2.0-3.0   TTR:  60.9 % (1 y)   Target end date:  Indefinite   Send INR reminders to:  [facility]WAR[facility]    Indications    Atrial fibrillation (Resolved) [I48.91]  Long-term (current) use of anticoagulants [Z79.01] [Z79.01]  Chronic atrial fibrillation (H) [I48.20]           Comments:           Anticoagulation Care Providers     Provider Role Specialty  Phone number    Lazaro Huston MD Referring Internal Medicine [phone]    Fransisco Cordon MD Referring Family Medicine [phone]

## 2022-10-17 DIAGNOSIS — E78.5 HYPERLIPIDEMIA LDL GOAL <100: ICD-10-CM

## 2022-10-17 DIAGNOSIS — R33.9 URINARY RETENTION: ICD-10-CM

## 2022-10-21 RX ORDER — SIMVASTATIN 40 MG
TABLET ORAL
Qty: 90 TABLET | Refills: 1 | OUTPATIENT
Start: 2022-10-21

## 2022-10-21 RX ORDER — TAMSULOSIN HYDROCHLORIDE 0.4 MG/1
CAPSULE ORAL
Qty: 90 CAPSULE | Refills: 2 | OUTPATIENT
Start: 2022-10-21

## 2022-10-21 NOTE — TELEPHONE ENCOUNTER
Flomax  Zocor  Denied, refill request too soon.  Sent 5/4/2022 for 90 capsules and 2 refills.    Tatianna Kirkland RN

## 2022-10-24 ENCOUNTER — LAB (OUTPATIENT)
Dept: LAB | Facility: CLINIC | Age: 87
End: 2022-10-24
Payer: COMMERCIAL

## 2022-10-24 ENCOUNTER — ANTICOAGULATION THERAPY VISIT (OUTPATIENT)
Dept: ANTICOAGULATION | Facility: CLINIC | Age: 87
End: 2022-10-24

## 2022-10-24 DIAGNOSIS — I48.20 CHRONIC ATRIAL FIBRILLATION (H): ICD-10-CM

## 2022-10-24 DIAGNOSIS — Z79.01 LONG TERM CURRENT USE OF ANTICOAGULANT THERAPY: Primary | ICD-10-CM

## 2022-10-24 LAB — INR BLD: 4 (ref 0.9–1.1)

## 2022-10-24 PROCEDURE — 85610 PROTHROMBIN TIME: CPT

## 2022-10-24 PROCEDURE — 36415 COLL VENOUS BLD VENIPUNCTURE: CPT

## 2022-10-24 NOTE — PROGRESS NOTES
ANTICOAGULATION MANAGEMENT     Melvin Abad 89 year old male is on warfarin with supratherapeutic INR result. (Goal INR 2.0-3.0)    Recent labs: (last 7 days)     10/24/22  1033   INR 4.0*       ASSESSMENT       Source(s): Chart Review and Patient/Caregiver Call       Warfarin doses taken: Warfarin taken as instructed    Diet: No new diet changes identified    New illness, injury, or hospitalization: Yes: Pt has some open wounds on his lower extremities (for a couple months), no signs of infection.    Medication/supplement changes: None noted    Signs or symptoms of bleeding or clotting: No    Previous INR: Supratherapeutic    Additional findings: Pt's INR will be done by HC starting this week       PLAN     Recommended plan for ongoing change(s) affecting INR     Dosing Instructions: hold dose then decrease your warfarin dose (7.7% change) with next INR in 2-3 days, when HC nurse returns.        Summary  As of 10/24/2022    Full warfarin instructions:  10/24: Hold; Otherwise 2.5 mg every Wed, Sat; 5 mg all other days; Starting 10/24/2022   Next INR check:  10/26/2022             Telephone call with  TIANA Ding who verbalizes understanding and agrees to plan and who agrees to plan and repeated back plan correctly    HC to resume INR checks. Toña is aware that she should call us if HC is delayed, but they've been coming twice a week.    Education provided:     Please call back if any changes to your diet, medications or how you've been taking warfarin    Goal range and lab monitoring: goal range and significance of current result    Symptom monitoring: monitoring for bleeding signs and symptoms and if you hit your head or have a bad fall seek emergency care    Plan made per [facility] anticoagulation protocol    Eli Diaz, ZULEIMA  Anticoagulation Clinic  10/24/2022    _______________________________________________________________________     Anticoagulation Episode Summary     Current INR goal:  2.0-3.0   TTR:  60.8 %  (1 y)   Target end date:  Indefinite   Send INR reminders to:  [facility]    Indications    Atrial fibrillation (Resolved) [I48.91]  Long-term (current) use of anticoagulants [Z79.01] [Z79.01]  Chronic atrial fibrillation (H) [I48.20]           Comments:           Anticoagulation Care Providers     Provider Role Specialty Phone number    Lazaro Huston MD Referring Internal Medicine [phone]    Fransisco Cordon MD Referring Family Medicine [phone]

## 2022-10-26 ENCOUNTER — TELEPHONE (OUTPATIENT)
Dept: INTERNAL MEDICINE | Facility: CLINIC | Age: 87
End: 2022-10-26

## 2022-10-26 ENCOUNTER — ANTICOAGULATION THERAPY VISIT (OUTPATIENT)
Dept: ANTICOAGULATION | Facility: CLINIC | Age: 87
End: 2022-10-26

## 2022-10-26 DIAGNOSIS — I48.20 CHRONIC ATRIAL FIBRILLATION (H): ICD-10-CM

## 2022-10-26 DIAGNOSIS — Z79.01 LONG TERM CURRENT USE OF ANTICOAGULANT THERAPY: Primary | ICD-10-CM

## 2022-10-26 LAB — INR (EXTERNAL): 2.6 (ref 0.9–1.1)

## 2022-10-26 NOTE — PROGRESS NOTES
ANTICOAGULATION MANAGEMENT     Melvin Abad 89 year old male is on warfarin with therapeutic INR result. (Goal INR 2.0-3.0)    Recent labs: (last 7 days)     10/26/22  1348   INR 2.6*       ASSESSMENT       Source(s): Chart Review and Home Care/Facility Nurse       Warfarin doses taken: Warfarin was held as instructed on Monday.    Diet: No new diet changes identified    New illness, injury, or hospitalization: No    Medication/supplement changes: None noted    Signs or symptoms of bleeding or clotting: No    Previous INR: Supratherapeutic    Additional findings: None       PLAN     Recommended plan for temporary change(s) affecting INR     Dosing Instructions: Continue your current warfarin dose with next INR in 1 week       Summary  As of 10/26/2022    Full warfarin instructions:  2.5 mg every Wed, Sat; 5 mg all other days; Starting 10/26/2022   Next INR check:  11/2/2022             Telephone call with Joi home care nurse who verbalizes understanding and agrees to plan    Orders given to  Homecare nurse/facility to recheck    Education provided:     Goal range and lab monitoring: goal range and significance of current result    Plan made per [facility] anticoagulation protocol    Dillon Martinez RN  Anticoagulation Clinic  10/26/2022    _______________________________________________________________________     Anticoagulation Episode Summary     Current INR goal:  2.0-3.0   TTR:  60.9 % (1 y)   Target end date:  Indefinite   Send INR reminders to:  [facility]    Indications    Atrial fibrillation (Resolved) [I48.91]  Long-term (current) use of anticoagulants [Z79.01] [Z79.01]  Chronic atrial fibrillation (H) [I48.20]           Comments:           Anticoagulation Care Providers     Provider Role Specialty Phone number    Lazaro Huston MD Referring Internal Medicine [phone]    Fransisco Cordon MD Referring Family Medicine

## 2022-10-26 NOTE — TELEPHONE ENCOUNTER
The Home Care/Assisted Living/Nursing Facility is calling regarding an established patient.  Has the patient seen Home Care in the past or is currently residing in Assisted Living or Nursing Facility? Yes.     ZULEIMA Tamez calling from [facility] requesting the following orders that are within the Home Care, Assisted Living or Nursing Home Eval and Treatment standing order and can be signed as standing order signature required by RN.    Preferred Call Back Number: [phone]    Home Care Visits Continuation    Any additional Orders:  Are there any orders requested, not stated above, that are outside of the standing order and must be routed to a licensed practitioner for approval?    No; Skilled nursing 1x/week for 9 weeks for wound assement    Writer has verified Requestor will send fax to have orders signed.    PENELOPE SoN, RN

## 2022-11-02 ENCOUNTER — ANTICOAGULATION THERAPY VISIT (OUTPATIENT)
Dept: ANTICOAGULATION | Facility: CLINIC | Age: 87
End: 2022-11-02

## 2022-11-02 DIAGNOSIS — I48.20 CHRONIC ATRIAL FIBRILLATION (H): ICD-10-CM

## 2022-11-02 DIAGNOSIS — Z79.01 LONG TERM CURRENT USE OF ANTICOAGULANT THERAPY: Primary | ICD-10-CM

## 2022-11-02 DIAGNOSIS — E11.9 TYPE 2 DIABETES MELLITUS WITHOUT COMPLICATION, WITHOUT LONG-TERM CURRENT USE OF INSULIN (H): ICD-10-CM

## 2022-11-02 LAB — INR (EXTERNAL): 2.6 (ref 2–3)

## 2022-11-02 NOTE — PROGRESS NOTES
ANTICOAGULATION MANAGEMENT     Melvin Abad 89 year old male is on warfarin with therapeutic INR result. (Goal INR 2.0-3.0)    Recent labs: (last 7 days)     11/02/22  1125   INR 2.6       ASSESSMENT       Source(s): Chart Review and Home Care/Facility Nurse       Warfarin doses taken: Warfarin taken as instructed    Diet: No new diet changes identified    New illness, injury, or hospitalization: No    Medication/supplement changes: None noted    Signs or symptoms of bleeding or clotting: No    Previous INR: Therapeutic last visit; previously outside of goal range    Additional findings: None       PLAN     Recommended plan for no diet, medication or health factor changes affecting INR     Dosing Instructions: Continue your current warfarin dose with next INR in 1 week       Summary  As of 11/2/2022    Full warfarin instructions:  2.5 mg every Wed, Sat; 5 mg all other days; Starting 11/2/2022   Next INR check:  11/9/2022             Detailed voice message left for [facility] home care nurse with dosing instructions and follow up date.     Orders given to  Homecare nurse/facility to recheck    Education provided:     Please call back if any changes to your diet, medications or how you've been taking warfarin    Plan made per [facility] anticoagulation protocol    Sandie Nunez, RN  Anticoagulation Clinic  11/2/2022    _______________________________________________________________________     Anticoagulation Episode Summary     Current INR goal:  2.0-3.0   TTR:  62.8 % (1 y)   Target end date:  Indefinite   Send INR reminders to:  [facility]    Indications    Atrial fibrillation (Resolved) [I48.91]  Long-term (current) use of anticoagulants [Z79.01] [Z79.01]  Chronic atrial fibrillation (H) [I48.20]           Comments:           Anticoagulation Care Providers     Provider Role Specialty Phone number    Lazaro Huston MD Referring Internal Medicine [phone]    Fransisco Cordon MD Referring Family  Medicine

## 2022-11-03 ENCOUNTER — TELEPHONE (OUTPATIENT)
Dept: INTERNAL MEDICINE | Facility: CLINIC | Age: 87
End: 2022-11-03

## 2022-11-03 RX ORDER — GABAPENTIN 100 MG/1
CAPSULE ORAL
Qty: 540 CAPSULE | Refills: 3 | Status: SHIPPED | OUTPATIENT
Start: 2022-11-03 | End: 2024-01-26

## 2022-11-03 NOTE — TELEPHONE ENCOUNTER
Routing refill request to provider for review/approval because:    Requested Prescriptions   Pending Prescriptions Disp Refills    gabapentin (NEURONTIN) 100 MG capsule [Pharmacy Med Name: GABAPENTIN 100MG CAPS] 540 capsule 3     Sig: TAKE TWO CAPSULES BY MOUTH TWICE A DAY       There is no refill protocol information for this order

## 2022-11-03 NOTE — TELEPHONE ENCOUNTER
Reason for Call:  Form, our goal is to have forms completed with 72 hours, however, some forms may require a visit or additional information.    Type of letter, form or note:  Home Health Certification    Who is the form from?: Home care    Where did the form come from: form was faxed in    What clinic location was the form placed at?: [facility]    Where the form was placed: Given to KEMAL Rosado    What number is listed as a contact on the form?: [phone]       Additional comments: [facility]    Call taken on 11/3/2022 at 8:07 AM by Monserrat Ellis

## 2022-11-04 DIAGNOSIS — Z53.9 DIAGNOSIS NOT YET DEFINED: Primary | ICD-10-CM

## 2022-11-04 PROCEDURE — G0179 MD RECERTIFICATION HHA PT: HCPCS | Performed by: INTERNAL MEDICINE

## 2022-11-09 ENCOUNTER — ANTICOAGULATION THERAPY VISIT (OUTPATIENT)
Dept: ANTICOAGULATION | Facility: CLINIC | Age: 87
End: 2022-11-09

## 2022-11-09 DIAGNOSIS — I48.20 CHRONIC ATRIAL FIBRILLATION (H): ICD-10-CM

## 2022-11-09 DIAGNOSIS — Z79.01 LONG TERM CURRENT USE OF ANTICOAGULANT THERAPY: Primary | ICD-10-CM

## 2022-11-09 LAB — INR (EXTERNAL): 3.1 (ref 0.9–1.1)

## 2022-11-09 NOTE — PROGRESS NOTES
ANTICOAGULATION MANAGEMENT     Melvin Abad 89 year old male is on warfarin with supratherapeutic INR result. (Goal INR 2.0-3.0)    Recent labs: (last 7 days)     11/09/22  0911   INR 3.1*       ASSESSMENT       Source(s): Chart Review and Home Care/Facility Nurse       Warfarin doses taken: Warfarin taken as instructed    Diet: No new diet changes identified    New illness, injury, or hospitalization: No    Medication/supplement changes: None noted    Signs or symptoms of bleeding or clotting: No    Previous INR: Therapeutic last 2(+) visits    Additional findings: last month has had higher INR readings with no known cause - will decrease dose and recheck in 1 week       PLAN     Recommended plan for no diet, medication or health factor changes affecting INR     Dosing Instructions: decrease your warfarin dose (8% change) with next INR in 1 week       Summary  As of 11/9/2022    Full warfarin instructions:  2.5 mg every Mon, Wed, Fri; 5 mg all other days; Starting 11/9/2022   Next INR check:  11/16/2022             Telephone call with [facility] care home care nurse who verbalizes understanding and agrees to plan and who agrees to plan and repeated back plan correctly    Orders given to  Homecare nurse/facility to recheck    Education provided:     Dietary considerations: impact of vitamin K foods on INR, vitamin K content of foods and Impact of protein intake on INR     Contact [phone]  with any changes, questions or concerns.     Plan made per [facility] anticoagulation protocol    Cheryle Cramer RN  Anticoagulation Clinic  11/9/2022    _______________________________________________________________________     Anticoagulation Episode Summary     Current INR goal:  2.0-3.0   TTR:  62.9 % (1 y)   Target end date:  Indefinite   Send INR reminders to:  [facility]    Indications    Atrial fibrillation (Resolved) [I48.91]  Long-term (current) use of anticoagulants [Z79.01] [Z79.01]  Chronic atrial fibrillation (H)  [I48.20]           Comments:           Anticoagulation Care Providers     Provider Role Specialty Phone number    Lazaro Huston MD Referring Internal Medicine [phone]    Fransisco Cordon MD Referring Family Medicine

## 2022-11-16 ENCOUNTER — ANTICOAGULATION THERAPY VISIT (OUTPATIENT)
Dept: ANTICOAGULATION | Facility: CLINIC | Age: 87
End: 2022-11-16

## 2022-11-16 LAB — INR (EXTERNAL): 2.7 (ref 0.9–1.1)

## 2022-11-16 NOTE — PROGRESS NOTES
ANTICOAGULATION MANAGEMENT     Melvin Abad 89 year old male is on warfarin with therapeutic INR result. (Goal INR 2.0-3.0)    Recent labs: (last 7 days)     11/16/22  0000   INR 2.7*       ASSESSMENT       Source(s): Chart Review and Home Care/Facility Nurse       Warfarin doses taken: Warfarin taken as instructed    Diet: No new diet changes identified    New illness, injury, or hospitalization: No    Medication/supplement changes: None noted    Signs or symptoms of bleeding or clotting: No    Previous INR: Supratherapeutic    Additional findings: Home Care is planning on discharge next week.       PLAN     Dosing Instructions: Continue your current warfarin dose with next INR in 1 week       Summary  As of 11/16/2022    Full warfarin instructions:  2.5 mg every Mon, Wed, Fri; 5 mg all other days; Starting 11/16/2022   Next INR check:  11/23/2022             Telephone call with Joi [phone] home care nurse who verbalizes understanding and agrees to plan    Orders given to  Homecare nurse/facility to recheck    Education provided:     Contact [phone]  with any changes, questions or concerns.     Plan made per [facility] anticoagulation protocol    Molly Watts RN  Anticoagulation Clinic  11/16/2022    _______________________________________________________________________     Anticoagulation Episode Summary     Current INR goal:  2.0-3.0   TTR:  62.4 % (1 y)   Target end date:  Indefinite   Send INR reminders to:  [facility]    Indications    Atrial fibrillation (Resolved) [I48.91]  Long-term (current) use of anticoagulants [Z79.01] [Z79.01]  Chronic atrial fibrillation (H) [I48.20]           Comments:           Anticoagulation Care Providers     Provider Role Specialty Phone number    Lazaro Huston MD Referring Internal Medicine [phone]    Fransisco Cordon MD Referring Family Medicine           
Home

## 2022-11-22 DIAGNOSIS — E78.5 HYPERLIPIDEMIA LDL GOAL <100: ICD-10-CM

## 2022-11-23 ENCOUNTER — ANTICOAGULATION THERAPY VISIT (OUTPATIENT)
Dept: ANTICOAGULATION | Facility: CLINIC | Age: 87
End: 2022-11-23

## 2022-11-23 DIAGNOSIS — Z79.01 LONG TERM CURRENT USE OF ANTICOAGULANT THERAPY: Primary | ICD-10-CM

## 2022-11-23 DIAGNOSIS — I48.20 CHRONIC ATRIAL FIBRILLATION (H): ICD-10-CM

## 2022-11-23 LAB — INR (EXTERNAL): 2.5 (ref 0.9–1.1)

## 2022-11-23 RX ORDER — SIMVASTATIN 40 MG
TABLET ORAL
Qty: 90 TABLET | Refills: 1 | Status: SHIPPED | OUTPATIENT
Start: 2022-11-23 | End: 2024-01-26

## 2022-11-23 NOTE — TELEPHONE ENCOUNTER
"Requested Prescriptions   Pending Prescriptions Disp Refills    simvastatin (ZOCOR) 40 MG tablet [Pharmacy Med Name: SIMVASTATIN 40MG TABS] 90 tablet 1     Sig: TAKE ONE TABLET BY MOUTH AT BEDTIME       Statins Protocol Failed - 11/22/2022  9:30 AM        Failed - LDL on file in past 12 months     Recent Labs   Lab Test 04/10/19  1008   LDL 55             Passed - No abnormal creatine kinase in past 12 months     No lab results found.             Passed - Recent (12 mo) or future (30 days) visit within the authorizing provider's specialty     Patient has had an office visit with the authorizing provider or a provider within the authorizing providers department within the previous 12 mos or has a future within next 30 days. See \"Patient Info\" tab in inbasket, or \"Choose Columns\" in Meds & Orders section of the refill encounter.              Passed - Medication is active on med list        Passed - Patient is age 18 or older               "

## 2022-11-23 NOTE — PROGRESS NOTES
ANTICOAGULATION MANAGEMENT     Melvin Abad 89 year old male is on warfarin with therapeutic INR result. (Goal INR 2.0-3.0)    Recent labs: (last 7 days)     11/23/22  0828   INR 2.5*       ASSESSMENT       Source(s): Chart Review and Home Care/Facility Nurse       Warfarin doses taken: Warfarin taken as instructed    Diet: No new diet changes identified    New illness, injury, or hospitalization: No    Medication/supplement changes: None noted    Signs or symptoms of bleeding or clotting: No    Previous INR: Therapeutic last visit; previously outside of goal range    Additional findings: None       PLAN     Recommended plan for no diet, medication or health factor changes affecting INR     Dosing Instructions: Continue your current warfarin dose with next INR in 1 week       Summary  As of 11/23/2022    Full warfarin instructions:  2.5 mg every Mon, Wed, Fri; 5 mg all other days; Starting 11/23/2022   Next INR check:  11/30/2022             Telephone call with Jaleel Rahman home care nurse who verbalizes understanding and agrees to plan    Orders given to  Homecare nurse/facility to recheck    Education provided:     None required    Plan made per [facility] anticoagulation protocol    Denae Burciaga RN  Anticoagulation Clinic  11/23/2022    _______________________________________________________________________     Anticoagulation Episode Summary     Current INR goal:  2.0-3.0   TTR:  62.5 % (1 y)   Target end date:  Indefinite   Send INR reminders to:  [facility]    Indications    Atrial fibrillation (Resolved) [I48.91]  Long-term (current) use of anticoagulants [Z79.01] [Z79.01]  Chronic atrial fibrillation (H) [I48.20]           Comments:           Anticoagulation Care Providers     Provider Role Specialty Phone number    Lazaro Huston MD Referring Internal Medicine [phone]    Fransisco Cordon MD Referring Family Medicine

## 2022-11-26 ENCOUNTER — HEALTH MAINTENANCE LETTER (OUTPATIENT)
Age: 87
End: 2022-11-26

## 2022-11-30 ENCOUNTER — ANTICOAGULATION THERAPY VISIT (OUTPATIENT)
Dept: ANTICOAGULATION | Facility: CLINIC | Age: 87
End: 2022-11-30

## 2022-11-30 DIAGNOSIS — I48.20 CHRONIC ATRIAL FIBRILLATION (H): ICD-10-CM

## 2022-11-30 DIAGNOSIS — Z79.01 LONG TERM CURRENT USE OF ANTICOAGULANT THERAPY: Primary | ICD-10-CM

## 2022-11-30 LAB — INR (EXTERNAL): 2.2 (ref 0.9–1.1)

## 2022-11-30 NOTE — PROGRESS NOTES
ANTICOAGULATION MANAGEMENT     Melvin Abad 89 year old male is on warfarin with therapeutic INR result. (Goal INR 2.0-3.0)    Recent labs: (last 7 days)     11/30/22  1031   INR 2.2*       ASSESSMENT       Source(s): Chart Review and Home Care/Facility Nurse       Warfarin doses taken: Warfarin taken as instructed    Diet: No new diet changes identified    New illness, injury, or hospitalization: No    Medication/supplement changes: None noted    Signs or symptoms of bleeding or clotting: No    Previous INR: Therapeutic last 2(+) visits    Additional findings: None       PLAN     Recommended plan for no diet, medication or health factor changes affecting INR     Dosing Instructions: Continue your current warfarin dose with next INR in 2 weeks       Summary  As of 11/30/2022    Full warfarin instructions:  2.5 mg every Mon, Wed, Fri; 5 mg all other days; Starting 11/30/2022   Next INR check:  12/14/2022             Telephone call with Tracy home care nurse who verbalizes understanding and agrees to plan    Orders given to  Homecare nurse/facility to recheck    Education provided:     Please call back if any changes to your diet, medications or how you've been taking warfarin    Plan made per [facility] anticoagulation protocol    Neeta Cheung, RN  Anticoagulation Clinic  11/30/2022    _______________________________________________________________________     Anticoagulation Episode Summary     Current INR goal:  2.0-3.0   TTR:  62.5 % (1 y)   Target end date:  Indefinite   Send INR reminders to:  [facility]    Indications    Atrial fibrillation (Resolved) [I48.91]  Long-term (current) use of anticoagulants [Z79.01] [Z79.01]  Chronic atrial fibrillation (H) [I48.20]           Comments:           Anticoagulation Care Providers     Provider Role Specialty Phone number    Lazaro Huston MD Referring Internal Medicine [phone]    Fransisco Cordon MD Referring Family Medicine

## 2022-12-07 ENCOUNTER — ANTICOAGULATION THERAPY VISIT (OUTPATIENT)
Dept: ANTICOAGULATION | Facility: CLINIC | Age: 87
End: 2022-12-07

## 2022-12-07 DIAGNOSIS — I48.20 CHRONIC ATRIAL FIBRILLATION (H): ICD-10-CM

## 2022-12-07 DIAGNOSIS — Z79.01 LONG TERM CURRENT USE OF ANTICOAGULANT THERAPY: Primary | ICD-10-CM

## 2022-12-07 LAB — INR (EXTERNAL): 2.5 (ref 0.9–1.1)

## 2022-12-07 NOTE — PROGRESS NOTES
ANTICOAGULATION MANAGEMENT     Melvin Abad 89 year old male is on warfarin with therapeutic INR result. (Goal INR 2.0-3.0)    Recent labs: (last 7 days)     12/07/22  0834   INR 2.5*       ASSESSMENT       Source(s): Chart Review and Home Care/Facility Nurse       Warfarin doses taken: Warfarin taken as instructed    Diet: No new diet changes identified    New illness, injury, or hospitalization: No    Medication/supplement changes: None noted    Signs or symptoms of bleeding or clotting: No    Previous INR: Therapeutic last 2(+) visits    Additional findings: None       PLAN     Recommended plan for no diet, medication or health factor changes affecting INR     Dosing Instructions: Continue your current warfarin dose with next INR in 2 weeks       Summary  As of 12/7/2022    Full warfarin instructions:  2.5 mg every Mon, Wed, Fri; 5 mg all other days; Starting 12/7/2022   Next INR check:  12/21/2022             Telephone call with Jaleel gu home care, home care nurse who verbalizes understanding and agrees to plan    Orders given to  Homecare nurse/facility to recheck    Education provided:     None required    Plan made per [facility] anticoagulation protocol    Denae Burciaga, RN  Anticoagulation Clinic  12/7/2022    _______________________________________________________________________     Anticoagulation Episode Summary     Current INR goal:  2.0-3.0   TTR:  62.5 % (1 y)   Target end date:  Indefinite   Send INR reminders to:  [facility]    Indications    Atrial fibrillation (Resolved) [I48.91]  Long-term (current) use of anticoagulants [Z79.01] [Z79.01]  Chronic atrial fibrillation (H) [I48.20]           Comments:           Anticoagulation Care Providers     Provider Role Specialty Phone number    Lazaro Huston MD Referring Internal Medicine [phone]    Fransisco Cordon MD Referring Family Medicine

## 2022-12-21 ENCOUNTER — ANTICOAGULATION THERAPY VISIT (OUTPATIENT)
Dept: ANTICOAGULATION | Facility: CLINIC | Age: 87
End: 2022-12-21

## 2022-12-21 DIAGNOSIS — I48.20 CHRONIC ATRIAL FIBRILLATION (H): ICD-10-CM

## 2022-12-21 DIAGNOSIS — Z79.01 LONG TERM CURRENT USE OF ANTICOAGULANT THERAPY: Primary | ICD-10-CM

## 2022-12-21 LAB — INR (EXTERNAL): 1.7 (ref 0.9–1.1)

## 2022-12-21 NOTE — PROGRESS NOTES
ANTICOAGULATION MANAGEMENT     Melvin Abad 89 year old male is on warfarin with subtherapeutic INR result. (Goal INR 2.0-3.0)    Recent labs: (last 7 days)     12/21/22  1010   INR 1.7*       ASSESSMENT       Source(s): Chart Review and Home Care/Facility Nurse       Warfarin doses taken: Warfarin taken as instructed    Diet: No new diet changes identified    New illness, injury, or hospitalization: No    Medication/supplement changes: None noted    Signs or symptoms of bleeding or clotting: No    Previous INR: Therapeutic last 2(+) visits    Additional findings: discharged from home care today.       PLAN     Recommended plan for no diet, medication or health factor changes affecting INR     Dosing Instructions: Continue your current warfarin dose with next INR in 2 weeks       Summary  As of 12/21/2022    Full warfarin instructions:  2.5 mg every Mon, Wed, Fri; 5 mg all other days   Next INR check:  1/4/2023             Telephone call with Jaleel Molina home care nurse who verbalizes understanding and agrees to plan. Toña called back in and the patient was scheduled for his next in clinic INR.    Lab visit scheduled    Education provided:     Toña informed that patient will be managed by clinic [facility] going forward.    Plan made per [facility] anticoagulation protocol    Denae Burciaga RN  Anticoagulation Clinic  12/21/2022    _______________________________________________________________________     Anticoagulation Episode Summary     Current INR goal:  2.0-3.0   TTR:  61.0 % (1 y)   Target end date:  Indefinite   Send INR reminders to:  [facility]    Indications    Atrial fibrillation (Resolved) [I48.91]  Long-term (current) use of anticoagulants [Z79.01] [Z79.01]  Chronic atrial fibrillation (H) [I48.20]           Comments:           Anticoagulation Care Providers     Provider Role Specialty Phone number    Lazaro Huston MD Referring Internal Medicine [phone]    Fransisco Cordon MD  Referring Family Medicine

## 2022-12-29 ENCOUNTER — TELEPHONE (OUTPATIENT)
Dept: INTERNAL MEDICINE | Facility: CLINIC | Age: 87
End: 2022-12-29

## 2022-12-29 NOTE — TELEPHONE ENCOUNTER
Spoke with Toña and Ed is scheduled to have a tooth extraction on 1/12 so she was wondering if he could just come in on 1/11 instead of 1/4 but due to his INR being subtheraputic and the fact that he was just discharged from home care I still advised 1/4. Toña  verbalized understanding and agrees with plan of care. Pt had no further questions or concerns at this time.     Ruben Siddiqui RN, BSN  [facility] Anticoagulation Team

## 2022-12-29 NOTE — TELEPHONE ENCOUNTER
Patient's daughter in law, Toña, would like to speak to an INR nurse.  She can be reached at [phone].  CTC on file for her

## 2023-01-04 ENCOUNTER — LAB (OUTPATIENT)
Dept: LAB | Facility: CLINIC | Age: 88
End: 2023-01-04
Payer: COMMERCIAL

## 2023-01-04 ENCOUNTER — ANTICOAGULATION THERAPY VISIT (OUTPATIENT)
Dept: ANTICOAGULATION | Facility: CLINIC | Age: 88
End: 2023-01-04

## 2023-01-04 DIAGNOSIS — I48.20 CHRONIC ATRIAL FIBRILLATION (H): ICD-10-CM

## 2023-01-04 DIAGNOSIS — Z79.01 LONG TERM CURRENT USE OF ANTICOAGULANT THERAPY: Primary | ICD-10-CM

## 2023-01-04 LAB — INR BLD: 2.2 (ref 0.9–1.1)

## 2023-01-04 PROCEDURE — 36416 COLLJ CAPILLARY BLOOD SPEC: CPT

## 2023-01-04 PROCEDURE — 85610 PROTHROMBIN TIME: CPT

## 2023-01-04 NOTE — PROGRESS NOTES
ANTICOAGULATION MANAGEMENT     Melvin Abad 89 year old male is on warfarin with therapeutic INR result. (Goal INR 2.0-3.0)    Recent labs: (last 7 days)     01/04/23  0942   INR 2.2*       ASSESSMENT       Source(s): Chart Review and Patient/Caregiver Call TIANA Ding      Warfarin doses taken: Warfarin taken as instructed    Diet: No new diet changes identified    New illness, injury, or hospitalization: No    Medication/supplement changes: None noted    Signs or symptoms of bleeding or clotting: No    Previous INR: Subtherapeutic    Additional findings: Upcoming surgery/procedure pt has tooth extraction on 1/12 and INR needs to be <2.5 - lab appt sscheduled        PLAN     Recommended plan for no diet, medication or health factor changes affecting INR     Dosing Instructions: Continue your current warfarin dose with next INR in 1 week       Summary  As of 1/4/2023    Full warfarin instructions:  2.5 mg every Mon, Wed, Fri; 5 mg all other days   Next INR check:  2/1/2023             Telephone call with Toña who verbalizes understanding and agrees to plan    Lab visit scheduled    Education provided:     Please call back if any changes to your diet, medications or how you've been taking warfarin    Plan made per [facility] anticoagulation protocol    Shani Ross, RN  Anticoagulation Clinic  1/4/2023    _______________________________________________________________________     Anticoagulation Episode Summary     Current INR goal:  2.0-3.0   TTR:  58.7 % (1 y)   Target end date:  Indefinite   Send INR reminders to:  [facility]    Indications    Atrial fibrillation (Resolved) [I48.91]  Long-term (current) use of anticoagulants [Z79.01] [Z79.01]  Chronic atrial fibrillation (H) [I48.20]           Comments:           Anticoagulation Care Providers     Provider Role Specialty Phone number    Lazaro Huston MD Referring Internal Medicine [phone]    Fransisco Cordon MD Referring Family Medicine

## 2023-01-11 ENCOUNTER — LAB (OUTPATIENT)
Dept: LAB | Facility: CLINIC | Age: 88
End: 2023-01-11
Payer: COMMERCIAL

## 2023-01-11 ENCOUNTER — ANTICOAGULATION THERAPY VISIT (OUTPATIENT)
Dept: ANTICOAGULATION | Facility: CLINIC | Age: 88
End: 2023-01-11

## 2023-01-11 DIAGNOSIS — I48.20 CHRONIC ATRIAL FIBRILLATION (H): ICD-10-CM

## 2023-01-11 DIAGNOSIS — R33.9 URINARY RETENTION: ICD-10-CM

## 2023-01-11 DIAGNOSIS — Z79.01 LONG TERM CURRENT USE OF ANTICOAGULANT THERAPY: Primary | ICD-10-CM

## 2023-01-11 LAB — INR BLD: 2.5 (ref 0.9–1.1)

## 2023-01-11 PROCEDURE — 85610 PROTHROMBIN TIME: CPT

## 2023-01-11 PROCEDURE — 36415 COLL VENOUS BLD VENIPUNCTURE: CPT

## 2023-01-11 RX ORDER — TAMSULOSIN HYDROCHLORIDE 0.4 MG/1
CAPSULE ORAL
Qty: 90 CAPSULE | Refills: 1 | Status: SHIPPED | OUTPATIENT
Start: 2023-01-11 | End: 2024-01-26

## 2023-01-11 NOTE — PROGRESS NOTES
ANTICOAGULATION MANAGEMENT     Melvin Abad 89 year old male is on warfarin with therapeutic INR result. (Goal INR 2.0-3.0)    Recent labs: (last 7 days)     01/11/23  1104   INR 2.5*       ASSESSMENT       Source(s): Chart Review and Patient/Caregiver Call       Warfarin doses taken: Warfarin taken as instructed    Diet: No new diet changes identified    New illness, injury, or hospitalization: No    Medication/supplement changes: None noted    Signs or symptoms of bleeding or clotting: No    Previous INR: Therapeutic last 2(+) visits    Additional findings: Having 1 tooth extracted today       PLAN     Recommended plan for no diet, medication or health factor changes affecting INR     Dosing Instructions: Continue your current warfarin dose with next INR in 4 weeks       Summary  As of 1/11/2023    Full warfarin instructions:  2.5 mg every Mon, Wed, Fri; 5 mg all other days   Next INR check:  2/8/2023             Telephone call with  EULA Ding who verbalizes understanding and agrees to plan and who agrees to plan and repeated back plan correctly    Lab visit scheduled    Education provided:     None required    Plan made per [facility] anticoagulation protocol    Eli Diaz RN  Anticoagulation Clinic  1/11/2023    _______________________________________________________________________     Anticoagulation Episode Summary     Current INR goal:  2.0-3.0   TTR:  58.7 % (1 y)   Target end date:  Indefinite   Send INR reminders to:  [facility]    Indications    Atrial fibrillation (Resolved) [I48.91]  Long-term (current) use of anticoagulants [Z79.01] [Z79.01]  Chronic atrial fibrillation (H) [I48.20]           Comments:           Anticoagulation Care Providers     Provider Role Specialty Phone number    Lazaro Huston MD Referring Internal Medicine [phone]    Fransisco Cordon MD Referring Family Medicine

## 2023-01-11 NOTE — TELEPHONE ENCOUNTER
Prescription approved per [facility] Refill Protocol.  Tracy Fuller RN on 1/11/2023 at 4:02 PM

## 2023-01-20 ENCOUNTER — OFFICE VISIT (OUTPATIENT)
Dept: INTERNAL MEDICINE | Facility: CLINIC | Age: 88
End: 2023-01-20
Payer: COMMERCIAL

## 2023-01-20 VITALS
DIASTOLIC BLOOD PRESSURE: 70 MMHG | RESPIRATION RATE: 20 BRPM | OXYGEN SATURATION: 96 % | SYSTOLIC BLOOD PRESSURE: 122 MMHG | HEART RATE: 68 BPM | TEMPERATURE: 97.2 F

## 2023-01-20 DIAGNOSIS — Z00.00 MEDICARE ANNUAL WELLNESS VISIT, SUBSEQUENT: Primary | ICD-10-CM

## 2023-01-20 DIAGNOSIS — E78.5 HYPERLIPIDEMIA LDL GOAL <100: ICD-10-CM

## 2023-01-20 DIAGNOSIS — I48.20 CHRONIC ATRIAL FIBRILLATION (H): ICD-10-CM

## 2023-01-20 DIAGNOSIS — E66.9 OBESITY (BMI 30-39.9): ICD-10-CM

## 2023-01-20 DIAGNOSIS — L57.0 ACTINIC KERATOSIS: ICD-10-CM

## 2023-01-20 DIAGNOSIS — I50.813 ACUTE ON CHRONIC RIGHT-SIDED HEART FAILURE (H): ICD-10-CM

## 2023-01-20 DIAGNOSIS — Z79.01 LONG TERM CURRENT USE OF ANTICOAGULANT THERAPY: ICD-10-CM

## 2023-01-20 DIAGNOSIS — E11.9 TYPE 2 DIABETES MELLITUS WITHOUT COMPLICATION, WITHOUT LONG-TERM CURRENT USE OF INSULIN (H): ICD-10-CM

## 2023-01-20 LAB
ALBUMIN SERPL BCG-MCNC: 3.9 G/DL (ref 3.5–5.2)
ALP SERPL-CCNC: 85 U/L (ref 40–129)
ALT SERPL W P-5'-P-CCNC: 14 U/L (ref 10–50)
ANION GAP SERPL CALCULATED.3IONS-SCNC: 8 MMOL/L (ref 7–15)
AST SERPL W P-5'-P-CCNC: 21 U/L (ref 10–50)
BILIRUB SERPL-MCNC: 0.9 MG/DL
BUN SERPL-MCNC: 17.4 MG/DL (ref 8–23)
CALCIUM SERPL-MCNC: 9.2 MG/DL (ref 8.2–9.6)
CHLORIDE SERPL-SCNC: 103 MMOL/L (ref 98–107)
CHOLEST SERPL-MCNC: 125 MG/DL
CREAT SERPL-MCNC: 1.1 MG/DL (ref 0.67–1.17)
DEPRECATED HCO3 PLAS-SCNC: 28 MMOL/L (ref 22–29)
ERYTHROCYTE [DISTWIDTH] IN BLOOD BY AUTOMATED COUNT: 13.2 % (ref 10–15)
GFR SERPL CREATININE-BSD FRML MDRD: 62 ML/MIN/1.73M2
GLUCOSE SERPL-MCNC: 123 MG/DL (ref 70–99)
HBA1C MFR BLD: 6.2 %
HCT VFR BLD AUTO: 42.1 % (ref 40–53)
HDLC SERPL-MCNC: 44 MG/DL
HGB BLD-MCNC: 13.3 G/DL (ref 13.3–17.7)
LDLC SERPL CALC-MCNC: 65 MG/DL
MCH RBC QN AUTO: 30.7 PG (ref 26.5–33)
MCHC RBC AUTO-ENTMCNC: 31.6 G/DL (ref 31.5–36.5)
MCV RBC AUTO: 97 FL (ref 78–100)
NONHDLC SERPL-MCNC: 81 MG/DL
PLATELET # BLD AUTO: 179 10E3/UL (ref 150–450)
POTASSIUM SERPL-SCNC: 4.4 MMOL/L (ref 3.4–5.3)
PROT SERPL-MCNC: 6.9 G/DL (ref 6.4–8.3)
RBC # BLD AUTO: 4.33 10E6/UL (ref 4.4–5.9)
SODIUM SERPL-SCNC: 139 MMOL/L (ref 136–145)
TRIGL SERPL-MCNC: 81 MG/DL
WBC # BLD AUTO: 7.1 10E3/UL (ref 4–11)

## 2023-01-20 PROCEDURE — 17000 DESTRUCT PREMALG LESION: CPT | Performed by: INTERNAL MEDICINE

## 2023-01-20 PROCEDURE — 80053 COMPREHEN METABOLIC PANEL: CPT | Performed by: INTERNAL MEDICINE

## 2023-01-20 PROCEDURE — 80061 LIPID PANEL: CPT | Performed by: INTERNAL MEDICINE

## 2023-01-20 PROCEDURE — G0439 PPPS, SUBSEQ VISIT: HCPCS | Performed by: INTERNAL MEDICINE

## 2023-01-20 PROCEDURE — 85027 COMPLETE CBC AUTOMATED: CPT | Performed by: INTERNAL MEDICINE

## 2023-01-20 PROCEDURE — 36415 COLL VENOUS BLD VENIPUNCTURE: CPT | Performed by: INTERNAL MEDICINE

## 2023-01-20 PROCEDURE — 99214 OFFICE O/P EST MOD 30 MIN: CPT | Mod: 25 | Performed by: INTERNAL MEDICINE

## 2023-01-20 PROCEDURE — 83036 HEMOGLOBIN GLYCOSYLATED A1C: CPT | Performed by: INTERNAL MEDICINE

## 2023-01-20 ASSESSMENT — ENCOUNTER SYMPTOMS
HEMATURIA: 0
NERVOUS/ANXIOUS: 0
MYALGIAS: 0
PALPITATIONS: 0
SHORTNESS OF BREATH: 0
FEVER: 0
PARESTHESIAS: 0
HEARTBURN: 0
HEMATOCHEZIA: 0
EYE PAIN: 0
CONSTIPATION: 1
SORE THROAT: 0
DYSURIA: 0
CHILLS: 1
COUGH: 0
ARTHRALGIAS: 1
NAUSEA: 0
JOINT SWELLING: 0
ABDOMINAL PAIN: 0
DIZZINESS: 0
HEADACHES: 0
WEAKNESS: 1
DIARRHEA: 0
FREQUENCY: 0

## 2023-01-20 ASSESSMENT — ACTIVITIES OF DAILY LIVING (ADL)
CURRENT_FUNCTION: TRANSPORTATION REQUIRES ASSISTANCE
CURRENT_FUNCTION: LAUNDRY REQUIRES ASSISTANCE

## 2023-01-20 ASSESSMENT — PAIN SCALES - GENERAL: PAINLEVEL: NO PAIN (0)

## 2023-01-24 DIAGNOSIS — I10 ESSENTIAL HYPERTENSION, BENIGN: ICD-10-CM

## 2023-01-25 RX ORDER — METOPROLOL SUCCINATE 50 MG/1
TABLET, EXTENDED RELEASE ORAL
Qty: 90 TABLET | Refills: 3 | Status: SHIPPED | OUTPATIENT
Start: 2023-01-25 | End: 2024-01-26

## 2023-01-25 RX ORDER — LISINOPRIL 10 MG/1
TABLET ORAL
Qty: 90 TABLET | Refills: 3 | Status: SHIPPED | OUTPATIENT
Start: 2023-01-25 | End: 2024-01-26

## 2023-01-25 NOTE — TELEPHONE ENCOUNTER
Prescription approved per [facility] Refill Protocol.  Tracy Fuller RN on 1/25/2023 at 2:45 PM

## 2023-02-08 ENCOUNTER — DOCUMENTATION ONLY (OUTPATIENT)
Dept: ANTICOAGULATION | Facility: CLINIC | Age: 88
End: 2023-02-08

## 2023-02-08 ENCOUNTER — LAB (OUTPATIENT)
Dept: LAB | Facility: CLINIC | Age: 88
End: 2023-02-08
Payer: COMMERCIAL

## 2023-02-08 ENCOUNTER — ANTICOAGULATION THERAPY VISIT (OUTPATIENT)
Dept: ANTICOAGULATION | Facility: CLINIC | Age: 88
End: 2023-02-08

## 2023-02-08 DIAGNOSIS — Z79.01 LONG TERM CURRENT USE OF ANTICOAGULANT THERAPY: ICD-10-CM

## 2023-02-08 DIAGNOSIS — I48.20 CHRONIC ATRIAL FIBRILLATION (H): Primary | ICD-10-CM

## 2023-02-08 DIAGNOSIS — I48.20 CHRONIC ATRIAL FIBRILLATION (H): ICD-10-CM

## 2023-02-08 DIAGNOSIS — Z79.01 LONG TERM CURRENT USE OF ANTICOAGULANT THERAPY: Primary | ICD-10-CM

## 2023-02-08 LAB
CREAT UR-MCNC: 44.3 MG/DL
INR BLD: 2.7 (ref 0.9–1.1)
MICROALBUMIN UR-MCNC: <12 MG/L
MICROALBUMIN/CREAT UR: NORMAL MG/G{CREAT}

## 2023-02-08 PROCEDURE — 36415 COLL VENOUS BLD VENIPUNCTURE: CPT

## 2023-02-08 PROCEDURE — 82043 UR ALBUMIN QUANTITATIVE: CPT

## 2023-02-08 PROCEDURE — 85610 PROTHROMBIN TIME: CPT

## 2023-02-08 PROCEDURE — 82570 ASSAY OF URINE CREATININE: CPT

## 2023-02-08 NOTE — PROGRESS NOTES
ANTICOAGULATION CLINIC REFERRAL RENEWAL REQUEST       An annual renewal order is required for all patients referred to [facility] Anticoagulation Clinic.?  Please review and sign the pended referral order for Melvin Abad.       ANTICOAGULATION SUMMARY      Warfarin indication(s)   Atrial Fibrillation    Mechanical heart valve present?  NO       Current goal range   INR: 2.0-3.0     Goal appropriate for indication? Goal INR 2-3, standard for indication(s) above     Time in Therapeutic Range (TTR)  (Goal > 60%) 61%       Office visit with referring provider's group within last year yes on 1/20/23       Cee Brewer RN  [facility] Anticoagulation Clinic

## 2023-02-08 NOTE — PROGRESS NOTES
ANTICOAGULATION MANAGEMENT     Melvin Abad 89 year old male is on warfarin with therapeutic INR result. (Goal INR 2.0-3.0)    Recent labs: (last 7 days)     02/08/23  0947   INR 2.7*       ASSESSMENT       Source(s): Chart Review    Previous INR was Therapeutic last 2(+) visits    Medication, diet, health changes since last INR chart reviewed; none identified           PLAN     Recommended plan for no diet, medication or health factor changes affecting INR     Dosing Instructions: Continue your current warfarin dose with next INR in 5 weeks       Summary  As of 2/8/2023    Full warfarin instructions:  2.5 mg every Mon, Wed, Fri; 5 mg all other days   Next INR check:  3/15/2023             Detailed voice message left for  DIL Toña  with dosing instructions and follow up date.  My chart message also sent.     Contact [phone]  to schedule and with any changes, questions or concerns.     Education provided:     Contact [phone]  with any changes, questions or concerns.     Plan made per [facility] anticoagulation protocol    Cee Brewer RN  Anticoagulation Clinic  2/8/2023    _______________________________________________________________________     Anticoagulation Episode Summary     Current INR goal:  2.0-3.0   TTR:  61.6 % (1 y)   Target end date:  Indefinite   Send INR reminders to:  [facility]    Indications    Atrial fibrillation (Resolved) [I48.91]  Long-term (current) use of anticoagulants [Z79.01] [Z79.01]  Chronic atrial fibrillation (H) [I48.20]           Comments:           Anticoagulation Care Providers     Provider Role Specialty Phone number    Lazaro Huston MD Referring Internal Medicine [phone]    Fransisco Cordon MD Referring Family Medicine

## 2023-02-21 ENCOUNTER — TELEPHONE (OUTPATIENT)
Dept: INTERNAL MEDICINE | Facility: CLINIC | Age: 88
End: 2023-02-21
Payer: COMMERCIAL

## 2023-02-21 DIAGNOSIS — K59.00 CONSTIPATION, UNSPECIFIED CONSTIPATION TYPE: Primary | ICD-10-CM

## 2023-02-21 RX ORDER — POLYETHYLENE GLYCOL 3350 17 G/17G
17 POWDER, FOR SOLUTION ORAL DAILY
Qty: 510 G | Refills: 1 | Status: SHIPPED | OUTPATIENT
Start: 2023-02-21 | End: 2023-03-28

## 2023-02-21 NOTE — TELEPHONE ENCOUNTER
I did send the MiraLAX to any pharmacy to use as needed for constipation.  I do not know anything about his TheraTears, cough drops, nasal spray.  He would need a visit to have those written for as I do not know what he is taking them for, the doses or what they are exactly.

## 2023-02-21 NOTE — TELEPHONE ENCOUNTER
Ingrid sent a fax over on the 17th. They are taking over medication and INR management. They will be coordinating with the INR nurse also.      They need dosing and directions for his:    Miralax  Thera tears  Cough Drops  Nasal spray.    Please fax orders for the medication to the [facility] pharmacy and to Molly.  Fax number For Molly is [phone]    Tracy Fuller RN on 2/21/2023 at 11:17 AM

## 2023-02-28 ENCOUNTER — ANTICOAGULATION THERAPY VISIT (OUTPATIENT)
Dept: ANTICOAGULATION | Facility: CLINIC | Age: 88
End: 2023-02-28
Payer: COMMERCIAL

## 2023-02-28 DIAGNOSIS — I48.20 CHRONIC ATRIAL FIBRILLATION (H): ICD-10-CM

## 2023-02-28 DIAGNOSIS — Z79.01 LONG TERM CURRENT USE OF ANTICOAGULANT THERAPY: Primary | ICD-10-CM

## 2023-02-28 NOTE — PROGRESS NOTES
Anticoagulation Summary  As of 2023       INR goal:  2.0-3.0       TTR:  61.6 % (1 y)       INR used for dosin.7 (2023)       Full warfarin instructions:  2.5 mg every Mon, Wed, Fri; 5 mg all other days       Next INR check:  3/15/2023          Received a VM message from Molly Director of Nursing for [facility]. They will be taking over medication management including INR/Coumadin for Patient starting 3/1/23.  Molly calling to confirm last INR and current coumadin dosing.    Information faxed to Molly per her request.    Idania Thao, RN  Anticoagulation Nurse - [facility]

## 2023-03-08 ENCOUNTER — TELEPHONE (OUTPATIENT)
Dept: ANTICOAGULATION | Facility: CLINIC | Age: 88
End: 2023-03-08
Payer: COMMERCIAL

## 2023-03-08 DIAGNOSIS — I48.20 CHRONIC ATRIAL FIBRILLATION (H): ICD-10-CM

## 2023-03-08 DIAGNOSIS — Z79.01 LONG TERM CURRENT USE OF ANTICOAGULANT THERAPY: Primary | ICD-10-CM

## 2023-03-08 NOTE — TELEPHONE ENCOUNTER
Molly Aleman from [facility] called to inform that the patient now resides there and they will be doing his INR and calling into [facility] for dosing.    The AVS will need to faxed every time to [phone]. An Rx needs to be sent in every time to [facility] pharmacy ([phone]).     Updated the notes in episode to reflect this along with the adjustment of recheck date to 13 th as Monday is the lab day at the facility.    Denae Burciaga RN    [facility] Anticoagulation Clinic

## 2023-03-10 ENCOUNTER — LAB REQUISITION (OUTPATIENT)
Dept: LAB | Facility: CLINIC | Age: 88
End: 2023-03-10
Payer: COMMERCIAL

## 2023-03-10 DIAGNOSIS — I48.91 UNSPECIFIED ATRIAL FIBRILLATION (H): ICD-10-CM

## 2023-03-10 DIAGNOSIS — Z79.01 LONG TERM (CURRENT) USE OF ANTICOAGULANTS: ICD-10-CM

## 2023-03-10 DIAGNOSIS — I48.20 CHRONIC ATRIAL FIBRILLATION, UNSPECIFIED (H): ICD-10-CM

## 2023-03-13 ENCOUNTER — ANTICOAGULATION THERAPY VISIT (OUTPATIENT)
Dept: ANTICOAGULATION | Facility: CLINIC | Age: 88
End: 2023-03-13

## 2023-03-13 DIAGNOSIS — I48.20 CHRONIC ATRIAL FIBRILLATION (H): ICD-10-CM

## 2023-03-13 DIAGNOSIS — I48.11 LONGSTANDING PERSISTENT ATRIAL FIBRILLATION (H): ICD-10-CM

## 2023-03-13 DIAGNOSIS — Z79.01 LONG TERM CURRENT USE OF ANTICOAGULANT THERAPY: Primary | ICD-10-CM

## 2023-03-13 LAB — INR PPP: 2.33 (ref 0.85–1.15)

## 2023-03-13 PROCEDURE — 85610 PROTHROMBIN TIME: CPT | Mod: ORL | Performed by: INTERNAL MEDICINE

## 2023-03-13 PROCEDURE — 36415 COLL VENOUS BLD VENIPUNCTURE: CPT | Mod: ORL | Performed by: INTERNAL MEDICINE

## 2023-03-13 PROCEDURE — P9604 ONE-WAY ALLOW PRORATED TRIP: HCPCS | Mod: ORL | Performed by: INTERNAL MEDICINE

## 2023-03-13 RX ORDER — WARFARIN SODIUM 5 MG/1
TABLET ORAL
Qty: 17 TABLET | Refills: 0 | Status: SHIPPED | OUTPATIENT
Start: 2023-03-13 | End: 2023-04-03

## 2023-03-13 NOTE — PROGRESS NOTES
ANTICOAGULATION MANAGEMENT     Melvin Abad 89 year old male is on warfarin with therapeutic INR result. (Goal INR 2.0-3.0)    Recent labs: (last 7 days)     03/13/23  0755   INR 2.33*       ASSESSMENT       Source(s): Chart Review and Home Care/Facility Nurse       Warfarin doses taken: Warfarin taken as instructed    Diet: No new diet changes identified    New illness, injury, or hospitalization: No    Medication/supplement changes: None noted    Signs or symptoms of bleeding or clotting: No    Previous INR: Therapeutic last 2(+) visits    Additional findings: None         PLAN     Recommended plan for no diet, medication or health factor changes affecting INR     Dosing Instructions: Continue your current warfarin dose with next INR in 3 weeks       Summary  As of 3/13/2023    Full warfarin instructions:  2.5 mg every Mon, Wed, Fri; 5 mg all other days   Next INR check:  4/3/2023             Faxed dosing and follow up instructions to [facility] and sent refill to [facility]    Orders given to  Homecare nurse/facility to recheck    Education provided:     Please call back if any changes to your diet, medications or how you've been taking warfarin    Plan made per [facility] anticoagulation protocol    Idania Thao, RN  Anticoagulation Clinic  3/13/2023    _______________________________________________________________________     Anticoagulation Episode Summary     Current INR goal:  2.0-3.0   TTR:  66.2 % (1 y)   Target end date:  Indefinite   Send INR reminders to:  [facility]    Indications    Atrial fibrillation (Resolved) [I48.91]  Long-term (current) use of anticoagulants [Z79.01] [Z79.01]  Chronic atrial fibrillation (H) [I48.20]           Comments:  [facility]         Anticoagulation Care Providers     Provider Role Specialty Phone number    Lazaro Huston MD Referring Internal Medicine [phone]    Fransisco Cordon MD Referring Family Medicine

## 2023-03-22 ENCOUNTER — OFFICE VISIT (OUTPATIENT)
Dept: INTERNAL MEDICINE | Facility: CLINIC | Age: 88
End: 2023-03-22
Payer: COMMERCIAL

## 2023-03-22 VITALS
SYSTOLIC BLOOD PRESSURE: 118 MMHG | HEART RATE: 76 BPM | TEMPERATURE: 97.1 F | DIASTOLIC BLOOD PRESSURE: 70 MMHG | OXYGEN SATURATION: 97 % | RESPIRATION RATE: 18 BRPM

## 2023-03-22 DIAGNOSIS — H57.89 EYE IRRITATION: ICD-10-CM

## 2023-03-22 DIAGNOSIS — E11.9 TYPE 2 DIABETES MELLITUS WITHOUT COMPLICATION, WITHOUT LONG-TERM CURRENT USE OF INSULIN (H): Primary | ICD-10-CM

## 2023-03-22 DIAGNOSIS — J34.89 NASAL DRYNESS: ICD-10-CM

## 2023-03-22 DIAGNOSIS — K21.00 GASTROESOPHAGEAL REFLUX DISEASE WITH ESOPHAGITIS WITHOUT HEMORRHAGE: ICD-10-CM

## 2023-03-22 DIAGNOSIS — M20.61 DEFORMITY OF TOE OF RIGHT FOOT: ICD-10-CM

## 2023-03-22 DIAGNOSIS — I10 ESSENTIAL HYPERTENSION, BENIGN: ICD-10-CM

## 2023-03-22 PROCEDURE — 99214 OFFICE O/P EST MOD 30 MIN: CPT | Performed by: INTERNAL MEDICINE

## 2023-03-22 RX ORDER — ECHINACEA PURPUREA EXTRACT 125 MG
2 TABLET ORAL DAILY PRN
Qty: 30 ML | Refills: 1 | Status: SHIPPED | OUTPATIENT
Start: 2023-03-22 | End: 2024-01-26

## 2023-03-22 RX ORDER — CALCIUM CARBONATE 500 MG/1
1 TABLET, CHEWABLE ORAL 3 TIMES DAILY PRN
Qty: 100 TABLET | Refills: 3 | Status: SHIPPED | OUTPATIENT
Start: 2023-03-22 | End: 2024-01-26

## 2023-03-22 ASSESSMENT — PAIN SCALES - GENERAL: PAINLEVEL: MILD PAIN (3)

## 2023-03-22 NOTE — PATIENT INSTRUCTIONS
Assisted living daily orders for right foot care.  Needs bandaid applied daily to the second toe and cotton ball between 1st and 2nd toe   Place a sock over the toes and compression to protect the toes  Monitor for sore on the second toe.

## 2023-03-22 NOTE — PROGRESS NOTES
Assessment & Plan Patient here from [facility].  He is getting more care there medication set up and nursing care.  Needs orders written today.    Type 2 diabetes mellitus without complication, without long-term current use of insulin (H)  Diabetes has been stable we will continue to get his care and medications.    Essential hypertension, benign  Blood pressures controlled continue his medications lisinopril metoprolol    Eye irritation  Eye irritation he can use Thera drops or TheraTears over-the-counter is prescribed for him.  - ketotifen (ZADITOR) 0.025 % ophthalmic solution; Place 1 drop into both eyes 2 times daily as needed for dry eyes    Gastroesophageal reflux disease with esophagitis without hemorrhage  Reflux disease he can use Tums as needed and an order is prescribed, some constipation also given Metamucil order.  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  We will have nursing apply a Band-Aid over this area to protect it daily and a cotton ball between the stump of the first toe and the second toe.                   Lazaro Huston MD  [facility] is a 89 year old, presenting for the following health issues:  Recheck Medication (Orders for Metamucil, tums, jonnie tears, nasal spray)  No flowsheet data found.  History of Present Illness       Reason for visit:  Need orders  for Metamucil, tums, jonnie tears, nasal spray    He eats 2-3 servings of fruits and vegetables daily.He consumes 0 sweetened beverage(s) daily.He exercises with enough effort to increase his heart rate 20 to 29 minutes per day.  He exercises with enough effort to increase his heart rate 5 days per week.   He is taking medications regularly.       He is living at [facility] assisted living.  They are setting up and giving his medications.      He is doing well, skin lesions are healed.  No major pains.  Walks with a walker at [facility] then gets tired after 100 feet. Uses walker in his apartment.      Needs otc medications.         Review of Systems         Objective    /70 (BP Location: Right arm, Patient Position: Chair)   Pulse 76   Temp 97.1  F (36.2  C) (Temporal)   Resp 18   SpO2 97%   There is no height or weight on file to calculate BMI.  Physical Exam   No acute distress  Right second toe is slightly reddened, there is a little bit of a scab on the posterior knuckle.  It is deformed.

## 2023-03-28 ENCOUNTER — TELEPHONE (OUTPATIENT)
Dept: INTERNAL MEDICINE | Facility: CLINIC | Age: 88
End: 2023-03-28
Payer: COMMERCIAL

## 2023-03-28 DIAGNOSIS — K59.00 CONSTIPATION, UNSPECIFIED CONSTIPATION TYPE: ICD-10-CM

## 2023-03-28 RX ORDER — POLYETHYLENE GLYCOL 3350 17 G/17G
8.5 POWDER, FOR SOLUTION ORAL DAILY PRN
Qty: 510 G | Refills: 1 | COMMUNITY
Start: 2023-03-28 | End: 2024-01-26

## 2023-03-28 NOTE — TELEPHONE ENCOUNTER
Please discontinue daily Miralax as patient is only using PRN. Please okay for patient to use medication daily as needed for constipation.    VORB can be given to ZULEIMA Wyatt at [phone] ext [phone]    PENELOPE VelascoN, RN

## 2023-03-31 ENCOUNTER — LAB REQUISITION (OUTPATIENT)
Dept: LAB | Facility: CLINIC | Age: 88
End: 2023-03-31
Payer: COMMERCIAL

## 2023-03-31 DIAGNOSIS — I48.20 CHRONIC ATRIAL FIBRILLATION, UNSPECIFIED (H): ICD-10-CM

## 2023-03-31 DIAGNOSIS — Z79.01 LONG TERM (CURRENT) USE OF ANTICOAGULANTS: ICD-10-CM

## 2023-04-03 ENCOUNTER — ANTICOAGULATION THERAPY VISIT (OUTPATIENT)
Dept: ANTICOAGULATION | Facility: CLINIC | Age: 88
End: 2023-04-03

## 2023-04-03 DIAGNOSIS — I48.20 CHRONIC ATRIAL FIBRILLATION (H): ICD-10-CM

## 2023-04-03 DIAGNOSIS — I48.11 LONGSTANDING PERSISTENT ATRIAL FIBRILLATION (H): ICD-10-CM

## 2023-04-03 DIAGNOSIS — Z79.01 LONG TERM CURRENT USE OF ANTICOAGULANT THERAPY: Primary | ICD-10-CM

## 2023-04-03 LAB — INR PPP: 2.19 (ref 0.85–1.15)

## 2023-04-03 PROCEDURE — 85610 PROTHROMBIN TIME: CPT | Mod: ORL | Performed by: INTERNAL MEDICINE

## 2023-04-03 PROCEDURE — 36415 COLL VENOUS BLD VENIPUNCTURE: CPT | Mod: ORL | Performed by: INTERNAL MEDICINE

## 2023-04-03 PROCEDURE — P9604 ONE-WAY ALLOW PRORATED TRIP: HCPCS | Mod: ORL | Performed by: INTERNAL MEDICINE

## 2023-04-03 RX ORDER — WARFARIN SODIUM 5 MG/1
TABLET ORAL
Qty: 23 TABLET | Refills: 0 | Status: SHIPPED | OUTPATIENT
Start: 2023-04-03 | End: 2023-05-01

## 2023-04-03 NOTE — PROGRESS NOTES
ANTICOAGULATION MANAGEMENT     Melvin Abad 89 year old male is on warfarin with therapeutic INR result. (Goal INR 2.0-3.0)    Recent labs: (last 7 days)     04/03/23  0717   INR 2.19*       ASSESSMENT       Source(s): Chart Review and Home Care/Facility Nurse       Warfarin doses taken: Warfarin taken as instructed    Diet: No new diet changes identified    New illness, injury, or hospitalization: No    Medication/supplement changes: None noted    Signs or symptoms of bleeding or clotting: No    Previous INR: Therapeutic last 2(+) visits    Additional findings: None         PLAN     Recommended plan for no diet, medication or health factor changes affecting INR     Dosing Instructions: Continue your current warfarin dose with next INR in 4 weeks       Summary  As of 4/3/2023    Full warfarin instructions:  2.5 mg every Mon, Wed, Fri; 5 mg all other days   Next INR check:  5/1/2023             Telephone call with [facility] facility nurse who verbalizes understanding and agrees to plan and who agrees to plan and repeated back plan correctly  Faxed dosing and follow up instructions to Ingrid HINKLE    Orders given to  Homecare nurse/facility to recheck    Education provided:     Please call back if any changes to your diet, medications or how you've been taking warfarin    Plan made per [facility] anticoagulation protocol    Yancy Avendano, RN  Anticoagulation Clinic  4/3/2023    _______________________________________________________________________     Anticoagulation Episode Summary     Current INR goal:  2.0-3.0   TTR:  70.2 % (1 y)   Target end date:  Indefinite   Send INR reminders to:  LINDSEY COLON    Indications    Atrial fibrillation (Resolved) [I48.91]  Long-term (current) use of anticoagulants [Z79.01] [Z79.01]  Chronic atrial fibrillation (H) [I48.20]           Comments:  [facility]         Anticoagulation Care Providers     Provider Role Specialty Phone number    Lazaro Huston MD Referring  Internal Medicine [phone]    Fransisco Cordon MD Referring Family Medicine

## 2023-04-17 ENCOUNTER — HOSPITAL ENCOUNTER (OUTPATIENT)
Dept: CARDIOLOGY | Facility: CLINIC | Age: 88
Discharge: HOME | End: 2023-04-17
Attending: INTERNAL MEDICINE | Admitting: INTERNAL MEDICINE
Payer: COMMERCIAL

## 2023-04-17 DIAGNOSIS — I50.813 ACUTE ON CHRONIC RIGHT-SIDED HEART FAILURE (H): ICD-10-CM

## 2023-04-17 DIAGNOSIS — I48.20 CHRONIC ATRIAL FIBRILLATION (H): ICD-10-CM

## 2023-04-17 LAB — LVEF ECHO: NORMAL

## 2023-04-17 PROCEDURE — 93306 TTE W/DOPPLER COMPLETE: CPT

## 2023-04-17 PROCEDURE — 93306 TTE W/DOPPLER COMPLETE: CPT | Mod: 26 | Performed by: INTERNAL MEDICINE

## 2023-04-27 ENCOUNTER — TELEPHONE (OUTPATIENT)
Dept: INTERNAL MEDICINE | Facility: CLINIC | Age: 88
End: 2023-04-27
Payer: COMMERCIAL

## 2023-04-27 DIAGNOSIS — R35.0 URINE FREQUENCY: Primary | ICD-10-CM

## 2023-04-27 NOTE — TELEPHONE ENCOUNTER
Patient is having c/o bilateral flank pain, increased frequency. No blood in urine or fever. [facility] is asking for an order to collect a UA/UC.      Order pended if you agree.  Order can be faxed to [phone]'  Phone Number : [phone]      GLEN Velasco, RN

## 2023-04-28 ENCOUNTER — LAB REQUISITION (OUTPATIENT)
Dept: LAB | Facility: CLINIC | Age: 88
End: 2023-04-28
Payer: COMMERCIAL

## 2023-04-28 DIAGNOSIS — R35.0 FREQUENCY OF MICTURITION: ICD-10-CM

## 2023-04-28 DIAGNOSIS — I48.91 UNSPECIFIED ATRIAL FIBRILLATION (H): ICD-10-CM

## 2023-04-28 DIAGNOSIS — I48.20 CHRONIC ATRIAL FIBRILLATION, UNSPECIFIED (H): ICD-10-CM

## 2023-04-28 DIAGNOSIS — Z79.01 LONG TERM (CURRENT) USE OF ANTICOAGULANTS: ICD-10-CM

## 2023-04-28 LAB
ALBUMIN UR-MCNC: NEGATIVE MG/DL
APPEARANCE UR: CLEAR
BILIRUB UR QL STRIP: NEGATIVE
COLOR UR AUTO: YELLOW
GLUCOSE UR STRIP-MCNC: NEGATIVE MG/DL
HGB UR QL STRIP: NEGATIVE
HYALINE CASTS: 6 /LPF
KETONES UR STRIP-MCNC: NEGATIVE MG/DL
LEUKOCYTE ESTERASE UR QL STRIP: NEGATIVE
MUCOUS THREADS #/AREA URNS LPF: PRESENT /LPF
NITRATE UR QL: NEGATIVE
PH UR STRIP: 6 [PH] (ref 5–7)
RBC URINE: 1 /HPF
SP GR UR STRIP: 1.01 (ref 1–1.03)
UROBILINOGEN UR STRIP-MCNC: NORMAL MG/DL
WBC URINE: 1 /HPF

## 2023-04-28 PROCEDURE — 81001 URINALYSIS AUTO W/SCOPE: CPT | Mod: ORL | Performed by: INTERNAL MEDICINE

## 2023-05-01 ENCOUNTER — ANTICOAGULATION THERAPY VISIT (OUTPATIENT)
Dept: ANTICOAGULATION | Facility: CLINIC | Age: 88
End: 2023-05-01

## 2023-05-01 DIAGNOSIS — I48.11 LONGSTANDING PERSISTENT ATRIAL FIBRILLATION (H): ICD-10-CM

## 2023-05-01 DIAGNOSIS — I48.20 CHRONIC ATRIAL FIBRILLATION (H): ICD-10-CM

## 2023-05-01 DIAGNOSIS — Z79.01 LONG TERM CURRENT USE OF ANTICOAGULANT THERAPY: Primary | ICD-10-CM

## 2023-05-01 LAB — INR PPP: 2.27 (ref 0.85–1.15)

## 2023-05-01 PROCEDURE — 85610 PROTHROMBIN TIME: CPT | Mod: ORL | Performed by: INTERNAL MEDICINE

## 2023-05-01 PROCEDURE — P9604 ONE-WAY ALLOW PRORATED TRIP: HCPCS | Mod: ORL | Performed by: INTERNAL MEDICINE

## 2023-05-01 PROCEDURE — 36415 COLL VENOUS BLD VENIPUNCTURE: CPT | Mod: ORL | Performed by: INTERNAL MEDICINE

## 2023-05-01 RX ORDER — WARFARIN SODIUM 5 MG/1
TABLET ORAL
Qty: 28 TABLET | Refills: 0 | Status: SHIPPED | OUTPATIENT
Start: 2023-05-01 | End: 2023-06-05

## 2023-05-01 NOTE — PROGRESS NOTES
ANTICOAGULATION MANAGEMENT     Melvin Abad 89 year old male is on warfarin with therapeutic INR result. (Goal INR 2.0-3.0)    Recent labs: (last 7 days)     05/01/23  0755   INR 2.27*       ASSESSMENT       Source(s): Chart Review and Home Care/Facility Nurse       Warfarin doses taken: Warfarin taken as instructed    Diet: No new diet changes identified    Medication/supplement changes: None noted    New illness, injury, or hospitalization: Last week had flank pain and seemed to have urinary frequency, UA negative, resolved within one day after increasing fluids    Signs or symptoms of bleeding or clotting: No    Previous result: Therapeutic last 2(+) visits    Additional findings: None         PLAN     Recommended plan for no diet, medication or health factor changes affecting INR     Dosing Instructions: Continue your current warfarin dose with next INR in 5 weeks       Summary  As of 5/1/2023    Full warfarin instructions:  2.5 mg every Mon, Wed, Fri; 5 mg all other days   Next INR check:  6/5/2023             Telephone call with [facility] facility nurse who verbalizes understanding and agrees to plan and who agrees to plan and repeated back plan correctly  Faxed dosing and follow up instructions to Ingrid PIZANO    Orders given to  Homecare nurse/facility to recheck    Education provided:     Please call back if any changes to your diet, medications or how you've been taking warfarin    Plan made per [facility] anticoagulation protocol    Yancy Avendano, RN  Anticoagulation Clinic  5/1/2023    _______________________________________________________________________     Anticoagulation Episode Summary     Current INR goal:  2.0-3.0   TTR:  70.2 % (1 y)   Target end date:  Indefinite   Send INR reminders to:  LINDSEY COLON    Indications    Atrial fibrillation (Resolved) [I48.91]  Long-term (current) use of anticoagulants [Z79.01] [Z79.01]  Chronic atrial fibrillation (H) [I48.20]           Comments:   [facility]         Anticoagulation Care Providers     Provider Role Specialty Phone number    Lazaro Huston MD Referring Internal Medicine [phone]    Fransisco Cordon MD Referring Family Medicine

## 2023-06-04 ENCOUNTER — LAB REQUISITION (OUTPATIENT)
Dept: LAB | Facility: CLINIC | Age: 88
End: 2023-06-04
Payer: COMMERCIAL

## 2023-06-04 DIAGNOSIS — I48.20 CHRONIC ATRIAL FIBRILLATION, UNSPECIFIED (H): ICD-10-CM

## 2023-06-05 ENCOUNTER — ANTICOAGULATION THERAPY VISIT (OUTPATIENT)
Dept: ANTICOAGULATION | Facility: CLINIC | Age: 88
End: 2023-06-05

## 2023-06-05 DIAGNOSIS — I48.11 LONGSTANDING PERSISTENT ATRIAL FIBRILLATION (H): ICD-10-CM

## 2023-06-05 DIAGNOSIS — I48.20 CHRONIC ATRIAL FIBRILLATION (H): ICD-10-CM

## 2023-06-05 DIAGNOSIS — Z79.01 LONG TERM CURRENT USE OF ANTICOAGULANT THERAPY: Primary | ICD-10-CM

## 2023-06-05 LAB — INR PPP: 2.46 (ref 0.85–1.15)

## 2023-06-05 PROCEDURE — P9604 ONE-WAY ALLOW PRORATED TRIP: HCPCS | Mod: ORL | Performed by: INTERNAL MEDICINE

## 2023-06-05 PROCEDURE — 36415 COLL VENOUS BLD VENIPUNCTURE: CPT | Mod: ORL | Performed by: INTERNAL MEDICINE

## 2023-06-05 PROCEDURE — 85610 PROTHROMBIN TIME: CPT | Mod: ORL | Performed by: INTERNAL MEDICINE

## 2023-06-05 RX ORDER — WARFARIN SODIUM 5 MG/1
TABLET ORAL
Qty: 28 TABLET | Refills: 0 | Status: SHIPPED | OUTPATIENT
Start: 2023-06-05 | End: 2023-06-26

## 2023-06-05 NOTE — PROGRESS NOTES
ANTICOAGULATION MANAGEMENT     Melvin Abad 89 year old male is on warfarin with therapeutic INR result. (Goal INR 2.0-3.0)    Recent labs: (last 7 days)     06/05/23  0703   INR 2.46*       ASSESSMENT       Source(s): Chart Review and Home Care/Facility Nurse       Warfarin doses taken: Warfarin taken as instructed    Diet: No new diet changes identified    Medication/supplement changes: None noted    New illness, injury, or hospitalization: No    Signs or symptoms of bleeding or clotting: No    Previous result: Therapeutic last 2(+) visits    Additional findings: None         PLAN     Recommended plan for no diet, medication or health factor changes affecting INR     Dosing Instructions: Continue your current warfarin dose with next INR in 3 weeks       Summary  As of 6/5/2023    Full warfarin instructions:  2.5 mg every Mon, Wed, Fri; 5 mg all other days   Next INR check:  6/26/2023             Telephone call with Cristal DEWEY facility nurse who verbalizes understanding and agrees to plan     Rx sent into the pharmacy with update sig and recheck date.    AVS faxed to facility and fax number verified.    Orders given to  Homecare nurse/facility to recheck    Education provided:     None required    Plan made per [facility] anticoagulation protocol    Denae Burciaga, RN  Anticoagulation Clinic  6/5/2023    _______________________________________________________________________     Anticoagulation Episode Summary     Current INR goal:  2.0-3.0   TTR:  70.2 % (1 y)   Target end date:  Indefinite   Send INR reminders to:  [facility]    Indications    Atrial fibrillation (Resolved) [I48.91]  Long-term (current) use of anticoagulants [Z79.01] [Z79.01]  Chronic atrial fibrillation (H) [I48.20]           Comments:  [facility]         Anticoagulation Care Providers     Provider Role Specialty Phone number    Lazaro Huston MD Referring Internal Medicine [phone]    Fransisco Cordon MD Referring Family Medicine

## 2023-06-23 ENCOUNTER — LAB REQUISITION (OUTPATIENT)
Dept: LAB | Facility: CLINIC | Age: 88
End: 2023-06-23
Payer: COMMERCIAL

## 2023-06-23 DIAGNOSIS — I48.20 CHRONIC ATRIAL FIBRILLATION, UNSPECIFIED (H): ICD-10-CM

## 2023-06-23 DIAGNOSIS — I48.91 UNSPECIFIED ATRIAL FIBRILLATION (H): ICD-10-CM

## 2023-06-23 DIAGNOSIS — Z79.01 LONG TERM (CURRENT) USE OF ANTICOAGULANTS: ICD-10-CM

## 2023-06-26 ENCOUNTER — ANTICOAGULATION THERAPY VISIT (OUTPATIENT)
Dept: ANTICOAGULATION | Facility: CLINIC | Age: 88
End: 2023-06-26

## 2023-06-26 DIAGNOSIS — I48.11 LONGSTANDING PERSISTENT ATRIAL FIBRILLATION (H): ICD-10-CM

## 2023-06-26 DIAGNOSIS — I48.20 CHRONIC ATRIAL FIBRILLATION (H): ICD-10-CM

## 2023-06-26 DIAGNOSIS — Z79.01 LONG TERM CURRENT USE OF ANTICOAGULANT THERAPY: Primary | ICD-10-CM

## 2023-06-26 LAB — INR PPP: 2.55 (ref 0.85–1.15)

## 2023-06-26 PROCEDURE — P9604 ONE-WAY ALLOW PRORATED TRIP: HCPCS | Mod: ORL | Performed by: INTERNAL MEDICINE

## 2023-06-26 PROCEDURE — 85610 PROTHROMBIN TIME: CPT | Mod: ORL | Performed by: INTERNAL MEDICINE

## 2023-06-26 PROCEDURE — 36415 COLL VENOUS BLD VENIPUNCTURE: CPT | Mod: ORL | Performed by: INTERNAL MEDICINE

## 2023-06-26 RX ORDER — WARFARIN SODIUM 5 MG/1
TABLET ORAL
Qty: 22 TABLET | Refills: 0 | Status: SHIPPED | OUTPATIENT
Start: 2023-06-26 | End: 2023-10-13

## 2023-06-26 NOTE — PROGRESS NOTES
ANTICOAGULATION MANAGEMENT     Melvin Abad 89 year old male is on warfarin with therapeutic INR result. (Goal INR 2.0-3.0)    Recent labs: (last 7 days)     06/26/23  0740   INR 2.55*       ASSESSMENT       Source(s): Chart Review and Home Care/Facility Nurse       Warfarin doses taken: Warfarin taken as instructed    Diet: No new diet changes identified    Medication/supplement changes: None noted    New illness, injury, or hospitalization: No    Signs or symptoms of bleeding or clotting: No    Previous result: Therapeutic last 2(+) visits    Additional findings: None         PLAN     Recommended plan for no diet, medication or health factor changes affecting INR     Dosing Instructions: Continue your current warfarin dose with next INR in 4 weeks       Summary  As of 6/26/2023    Full warfarin instructions:  2.5 mg every Mon, Wed, Fri; 5 mg all other days   Next INR check:  7/24/2023             Telephone call with Molly facility nurse who verbalizes understanding and agrees to plan    Orders given to  Homecare nurse/facility to recheck    Education provided:     Goal range and lab monitoring: goal range and significance of current result    Plan made per [facility] anticoagulation protocol    Dillon Martinez RN  Anticoagulation Clinic  6/26/2023    _______________________________________________________________________     Anticoagulation Episode Summary     Current INR goal:  2.0-3.0   TTR:  75.4 % (1 y)   Target end date:  Indefinite   Send INR reminders to:  [facility]    Indications    Atrial fibrillation (Resolved) [I48.91]  Long-term (current) use of anticoagulants [Z79.01] [Z79.01]  Chronic atrial fibrillation (H) [I48.20]           Comments:  [facility]         Anticoagulation Care Providers     Provider Role Specialty Phone number    Lazaro Huston MD Referring Internal Medicine [phone]    Fransisco Cordon MD Referring Family Medicine

## 2023-06-26 NOTE — PROGRESS NOTES
Completed by another RN.    Denae Burciaga RN    [facility] Anticoagulation [facility]

## 2023-07-21 ENCOUNTER — LAB REQUISITION (OUTPATIENT)
Dept: LAB | Facility: CLINIC | Age: 88
End: 2023-07-21
Payer: COMMERCIAL

## 2023-07-21 DIAGNOSIS — I48.20 CHRONIC ATRIAL FIBRILLATION, UNSPECIFIED (H): ICD-10-CM

## 2023-07-21 DIAGNOSIS — I48.91 UNSPECIFIED ATRIAL FIBRILLATION (H): ICD-10-CM

## 2023-07-21 DIAGNOSIS — Z79.01 LONG TERM (CURRENT) USE OF ANTICOAGULANTS: ICD-10-CM

## 2023-07-24 ENCOUNTER — ANTICOAGULATION THERAPY VISIT (OUTPATIENT)
Dept: ANTICOAGULATION | Facility: CLINIC | Age: 88
End: 2023-07-24

## 2023-07-24 ENCOUNTER — TELEPHONE (OUTPATIENT)
Dept: INTERNAL MEDICINE | Facility: CLINIC | Age: 88
End: 2023-07-24

## 2023-07-24 DIAGNOSIS — Z79.01 LONG TERM CURRENT USE OF ANTICOAGULANT THERAPY: ICD-10-CM

## 2023-07-24 DIAGNOSIS — I48.20 CHRONIC ATRIAL FIBRILLATION (H): ICD-10-CM

## 2023-07-24 DIAGNOSIS — Z79.01 LONG TERM CURRENT USE OF ANTICOAGULANT THERAPY: Primary | ICD-10-CM

## 2023-07-24 LAB
INR (EXTERNAL): 2.58 (ref 0.9–1.1)
INR PPP: 2.58 (ref 0.85–1.15)

## 2023-07-24 PROCEDURE — 85610 PROTHROMBIN TIME: CPT | Mod: ORL | Performed by: INTERNAL MEDICINE

## 2023-07-24 PROCEDURE — P9604 ONE-WAY ALLOW PRORATED TRIP: HCPCS | Mod: ORL | Performed by: INTERNAL MEDICINE

## 2023-07-24 PROCEDURE — 36415 COLL VENOUS BLD VENIPUNCTURE: CPT | Mod: ORL | Performed by: INTERNAL MEDICINE

## 2023-07-24 RX ORDER — WARFARIN SODIUM 5 MG/1
5 TABLET ORAL DAILY
Qty: 30 TABLET | Refills: 0 | Status: SHIPPED | OUTPATIENT
Start: 2023-07-24 | End: 2023-07-24

## 2023-07-24 RX ORDER — WARFARIN SODIUM 5 MG/1
TABLET ORAL
Qty: 30 TABLET | Refills: 0 | Status: SHIPPED | OUTPATIENT
Start: 2023-07-24 | End: 2023-08-28

## 2023-07-24 NOTE — PROGRESS NOTES
ANTICOAGULATION MANAGEMENT     Melvin Abad 89 year old male is on warfarin with therapeutic INR result. (Goal INR 2.0-3.0)    Recent labs: (last 7 days)     07/24/23  1044   INR 2.58*       ASSESSMENT     Source(s): Chart Review and Home Care/Facility Nurse     Warfarin doses taken: Warfarin taken as instructed  Diet: No new diet changes identified  Medication/supplement changes: None noted  New illness, injury, or hospitalization: No  Signs or symptoms of bleeding or clotting: No  Previous result: Therapeutic last 2(+) visits  Additional findings: None       PLAN     Recommended plan for no diet, medication or health factor changes affecting INR     Dosing Instructions: Continue your current warfarin dose with next INR in 5 weeks       Summary  As of 7/24/2023      Full warfarin instructions:  2.5 mg every Mon, Wed, Fri; 5 mg all other days   Next INR check:  8/28/2023               Telephone call with [facility] facility nurse who verbalizes understanding and agrees to plan  Faxed dosing and follow up instructions to Ingrid PIZANO and Refill sent to [facility]    Orders given to  Homecare nurse/facility to recheck    Education provided:   Please call back if any changes to your diet, medications or how you've been taking warfarin    Plan made per [facility] anticoagulation protocol    Idania Thao, RN  Anticoagulation Clinic  7/24/2023    _______________________________________________________________________     Anticoagulation Episode Summary       Current INR goal:  2.0-3.0   TTR:  79.6 % (1 y)   Target end date:  Indefinite   Send INR reminders to:  [facility]    Indications    Atrial fibrillation (Resolved) [I48.91]  Long-term (current) use of anticoagulants [Z79.01] [Z79.01]  Chronic atrial fibrillation (H) [I48.20]             Comments:  [place] ISNGH/Ingrid Sanchez [phone] fax: [phone] & send to [facility]             Anticoagulation Care Providers       Provider Role  Specialty Phone number    Lazaro Huston MD Referring Internal Medicine [phone]    Fransisco Cordon MD Referring Family Medicine

## 2023-07-24 NOTE — PROGRESS NOTES
ANTICOAGULATION MANAGEMENT     Melvin Abad 89 year old male is on warfarin with therapeutic INR result. (Goal INR 2.0-3.0)    Recent labs: (last 7 days)     07/24/23  0648   INR 2.58*       ASSESSMENT     Source(s): Chart Review and Home Care/Facility Nurse Ingrid Barnes [phone]    Warfarin doses taken: Warfarin taken as instructed  Diet: No new diet changes identified  Medication/supplement changes: None noted  New illness, injury, or hospitalization: No  Signs or symptoms of bleeding or clotting: No  Previous result: Therapeutic last 2(+) visits  Additional findings: NoneAVS faxed to [phone], EX faxed to [facility]     PLAN     Recommended plan for no diet, medication or health factor changes affecting INR     Dosing Instructions: Continue your current warfarin dose with next INR in 5 weeks       Summary  As of 7/24/2023      Full warfarin instructions:  2.5 mg every Mon, Wed, Fri; 5 mg all other days   Next INR check:  8/28/2023               Telephone call with Molly facility nurse who verbalizes understanding and agrees to plan    Orders given to  Homecare nurse/facility to recheck    Education provided:   Please call back if any changes to your diet, medications or how you've been taking warfarin    Plan made per [facility] anticoagulation protocol    Shani Ross, RN  Anticoagulation Clinic  7/24/2023    _______________________________________________________________________     Anticoagulation Episode Summary       Current INR goal:  2.0-3.0   TTR:  79.6 % (1 y)   Target end date:  Indefinite   Send INR reminders to:  [facility]    Indications    Atrial fibrillation (Resolved) [I48.91]  Long-term (current) use of anticoagulants [Z79.01] [Z79.01]  Chronic atrial fibrillation (H) [I48.20]             Comments:  [facility]/Ingrid Sanchez [phone] fax: [phone] & [facility] to [facility]             Anticoagulation Care Providers       Provider Role Specialty Phone  number    Lazaro Huston MD Referring Internal Medicine [phone]    Fransisco Cordon MD Referring Family Medicine

## 2023-07-24 NOTE — TELEPHONE ENCOUNTER
General Call    Contacts         Type Contact Phone/Fax    07/24/2023 12:51 PM CDT Phone (Incoming) Molly Aleman (Assisted Living) [phone]          Reason for Call:  Molly Aleman/Director of Health services at [facility] needs to have a return call/per Molly she is needing to get the Anticog nurse to contact [facility] for the next INR date for Patient.  [phone]    What are your questions or concerns:  Contact [facility] for details    Date of last appointment with provider: 7/24/23    Could we send this information to you in [facility] or would you prefer to receive a phone call?:   No preference   Okay to leave a detailed message?: No at Other phone number:  [phone] [facility]

## 2023-08-19 ENCOUNTER — HEALTH MAINTENANCE LETTER (OUTPATIENT)
Age: 88
End: 2023-08-19

## 2023-08-25 ENCOUNTER — LAB REQUISITION (OUTPATIENT)
Dept: LAB | Facility: CLINIC | Age: 88
End: 2023-08-25
Payer: COMMERCIAL

## 2023-08-25 DIAGNOSIS — I48.91 UNSPECIFIED ATRIAL FIBRILLATION (H): ICD-10-CM

## 2023-08-25 DIAGNOSIS — Z79.01 LONG TERM (CURRENT) USE OF ANTICOAGULANTS: ICD-10-CM

## 2023-08-28 ENCOUNTER — ANTICOAGULATION THERAPY VISIT (OUTPATIENT)
Dept: ANTICOAGULATION | Facility: CLINIC | Age: 88
End: 2023-08-28

## 2023-08-28 DIAGNOSIS — I48.20 CHRONIC ATRIAL FIBRILLATION (H): ICD-10-CM

## 2023-08-28 DIAGNOSIS — Z79.01 LONG TERM CURRENT USE OF ANTICOAGULANT THERAPY: Primary | ICD-10-CM

## 2023-08-28 DIAGNOSIS — I48.11 LONGSTANDING PERSISTENT ATRIAL FIBRILLATION (H): ICD-10-CM

## 2023-08-28 LAB — INR PPP: 2.47 (ref 0.85–1.15)

## 2023-08-28 PROCEDURE — 85610 PROTHROMBIN TIME: CPT | Mod: ORL | Performed by: INTERNAL MEDICINE

## 2023-08-28 PROCEDURE — P9604 ONE-WAY ALLOW PRORATED TRIP: HCPCS | Mod: ORL | Performed by: INTERNAL MEDICINE

## 2023-08-28 PROCEDURE — 36415 COLL VENOUS BLD VENIPUNCTURE: CPT | Mod: ORL | Performed by: INTERNAL MEDICINE

## 2023-08-28 RX ORDER — WARFARIN SODIUM 5 MG/1
TABLET ORAL
Qty: 30 TABLET | Refills: 0 | Status: SHIPPED | OUTPATIENT
Start: 2023-08-28 | End: 2023-09-08

## 2023-08-28 NOTE — PROGRESS NOTES
ANTICOAGULATION MANAGEMENT     Melvin Abad 89 year old male is on warfarin with therapeutic INR result. (Goal INR 2.0-3.0)    Recent labs: (last 7 days)     08/28/23  0615   INR 2.47*       ASSESSMENT     Source(s): Chart Review and Patient/Caregiver Call     Warfarin doses taken: Warfarin taken as instructed  Diet: No new diet changes identified  Medication/supplement changes: None noted  New illness, injury, or hospitalization: No  Signs or symptoms of bleeding or clotting: No  Previous result: Therapeutic last 2(+) visits  Additional findings: None       PLAN     Recommended plan for no diet, medication or health factor changes affecting INR     Dosing Instructions: Continue your current warfarin dose with next INR in 2 weeks       Summary  As of 8/28/2023      Full warfarin instructions:  2.5 mg every Mon, Wed, Fri; 5 mg all other days   Next INR check:  9/8/2023               Telephone call with Molly facility nurse who agrees to plan and repeated back plan correctly    Orders given to  Homecare nurse/facility to recheck  Rx to Pharmacy  AVS to facility    Education provided:   Please call back if any changes to your diet, medications or how you've been taking warfarin  Goal range and lab monitoring: goal range and significance of current result    Plan made per [facility] anticoagulation protocol    Melonie Gavin RN  Anticoagulation Clinic  8/28/2023    _______________________________________________________________________     Anticoagulation Episode Summary       Current INR goal:  2.0-3.0   TTR:  83.5 % (1 y)   Target end date:  Indefinite   Send INR reminders to:  [facility]    Indications    Atrial fibrillation (Resolved) [I48.91]  Long-term (current) use of anticoagulants [Z79.01] [Z79.01]  Chronic atrial fibrillation (H) [I48.20]             Comments:  [facility]/Ingrid Sanchez [phone] fax: [phone] & send to [facility]             Anticoagulation Care Providers        Provider Role Specialty Phone number    Lazaro Huston MD Referring Internal Medicine [phone]    Fransisco Cordon MD Referring Family Medicine

## 2023-08-28 NOTE — TELEPHONE ENCOUNTER
Joi from [facility] home care calling to request order for INR. Patient has intake appt today for home care and RN can complete INR so patient does not have to go into clinic. Verbal order given to check INR, Clinic Lab appointment cancelled.   [FreeTextEntry2] : WC DOI 2/08/19 Follow Up appointment for the Right Knee, and right shoulder and lumbar spine

## 2023-09-07 ENCOUNTER — LAB REQUISITION (OUTPATIENT)
Dept: LAB | Facility: CLINIC | Age: 88
End: 2023-09-07
Payer: COMMERCIAL

## 2023-09-07 DIAGNOSIS — I48.20 CHRONIC ATRIAL FIBRILLATION, UNSPECIFIED (H): ICD-10-CM

## 2023-09-07 DIAGNOSIS — I48.91 UNSPECIFIED ATRIAL FIBRILLATION (H): ICD-10-CM

## 2023-09-07 DIAGNOSIS — Z79.01 LONG TERM (CURRENT) USE OF ANTICOAGULANTS: ICD-10-CM

## 2023-09-08 ENCOUNTER — ANTICOAGULATION THERAPY VISIT (OUTPATIENT)
Dept: ANTICOAGULATION | Facility: CLINIC | Age: 88
End: 2023-09-08

## 2023-09-08 DIAGNOSIS — I48.20 CHRONIC ATRIAL FIBRILLATION (H): ICD-10-CM

## 2023-09-08 DIAGNOSIS — Z79.01 LONG TERM CURRENT USE OF ANTICOAGULANT THERAPY: Primary | ICD-10-CM

## 2023-09-08 LAB — INR PPP: 2.73 (ref 0.85–1.15)

## 2023-09-08 PROCEDURE — P9604 ONE-WAY ALLOW PRORATED TRIP: HCPCS | Mod: ORL | Performed by: INTERNAL MEDICINE

## 2023-09-08 PROCEDURE — 85610 PROTHROMBIN TIME: CPT | Mod: ORL | Performed by: INTERNAL MEDICINE

## 2023-09-08 PROCEDURE — 36415 COLL VENOUS BLD VENIPUNCTURE: CPT | Mod: ORL | Performed by: INTERNAL MEDICINE

## 2023-09-08 RX ORDER — WARFARIN SODIUM 5 MG/1
TABLET ORAL
Qty: 30 TABLET | Refills: 0 | Status: SHIPPED | OUTPATIENT
Start: 2023-09-08 | End: 2023-09-22

## 2023-09-08 NOTE — PROGRESS NOTES
ANTICOAGULATION MANAGEMENT     Melvin Abad 89 year old male is on warfarin with therapeutic INR result. (Goal INR 2.0-3.0)    Recent labs: (last 7 days)     09/08/23  0807   INR 2.73*       ASSESSMENT     Source(s): Chart Review and Home Care/Facility Nurse     Warfarin doses taken: Warfarin taken as instructed  Diet: No new diet changes identified  Medication/supplement changes: None noted  New illness, injury, or hospitalization: No  Signs or symptoms of bleeding or clotting: Yes: scratched ear and bleed on 9/5, 9/6  Previous result: Therapeutic last 2(+) visits  Additional findings: Refill needed today. Melvin meets all criteria for refill (current [facility] referral, office visit with referring provider/group in last 1 year unless directed to return in 2 years in last referring provider visit note, lab monitoring up to date or not exceeding 2 weeks overdue). Rx instructions and quantity match patient's current dosing plan.  30 day supply with 0 refills granted per [facility] protocol        PLAN     Recommended plan for no diet, medication or health factor changes affecting INR     Dosing Instructions: Continue your current warfarin dose with next INR in 2 weeks       Summary  As of 9/8/2023      Full warfarin instructions:  2.5 mg every Mon, Wed, Fri; 5 mg all other days   Next INR check:  9/22/2023               Telephone call with [facility] facility nurse who agrees to plan and repeated back plan correctly    Rx sent into the pharmacy with update sig and recheck date.    AVS faxed to facility and fax number verified.      Orders given to  Homecare nurse/facility to recheck    Education provided:   None required    Plan made per [facility] anticoagulation protocol    Denae Burciaga RN  Anticoagulation Clinic  9/8/2023    _______________________________________________________________________     Anticoagulation Episode Summary       Current INR goal:  2.0-3.0   TTR:  83.5 % (1 y)   Target end date:  Indefinite   Send  INR reminders to:  [facility]    Indications    Atrial fibrillation (Resolved) [I48.91]  Long-term (current) use of anticoagulants [Z79.01] [Z79.01]  Chronic atrial fibrillation (H) [I48.20]             Comments:  [facility]/Ingrid Sanchez [phone] fax: [phone] & [facility] to [facility]             Anticoagulation Care Providers       Provider Role Specialty Phone number    Lazaro Huston MD Referring Internal Medicine [phone]    Fransisco Cordon MD Referring Family Medicine

## 2023-09-21 ENCOUNTER — LAB REQUISITION (OUTPATIENT)
Dept: LAB | Facility: CLINIC | Age: 88
End: 2023-09-21
Payer: COMMERCIAL

## 2023-09-21 DIAGNOSIS — I48.91 UNSPECIFIED ATRIAL FIBRILLATION (H): ICD-10-CM

## 2023-09-22 ENCOUNTER — ANTICOAGULATION THERAPY VISIT (OUTPATIENT)
Dept: ANTICOAGULATION | Facility: CLINIC | Age: 88
End: 2023-09-22

## 2023-09-22 DIAGNOSIS — Z79.01 LONG TERM CURRENT USE OF ANTICOAGULANT THERAPY: Primary | ICD-10-CM

## 2023-09-22 DIAGNOSIS — I48.20 CHRONIC ATRIAL FIBRILLATION (H): ICD-10-CM

## 2023-09-22 LAB — INR PPP: 2.83 (ref 0.85–1.15)

## 2023-09-22 PROCEDURE — 85610 PROTHROMBIN TIME: CPT | Mod: ORL | Performed by: INTERNAL MEDICINE

## 2023-09-22 PROCEDURE — P9604 ONE-WAY ALLOW PRORATED TRIP: HCPCS | Mod: ORL | Performed by: INTERNAL MEDICINE

## 2023-09-22 PROCEDURE — 36415 COLL VENOUS BLD VENIPUNCTURE: CPT | Mod: ORL | Performed by: INTERNAL MEDICINE

## 2023-09-22 RX ORDER — WARFARIN SODIUM 5 MG/1
TABLET ORAL
Qty: 25 TABLET | Refills: 0 | Status: SHIPPED | OUTPATIENT
Start: 2023-09-22 | End: 2024-01-19

## 2023-09-22 NOTE — PROGRESS NOTES
ANTICOAGULATION MANAGEMENT     Melvin Abad 89 year old male is on warfarin with therapeutic INR result. (Goal INR 2.0-3.0)    Recent labs: (last 7 days)     09/22/23  0658   INR 2.83*       ASSESSMENT     Source(s): Chart Review and Home Care/Facility Nurse nurse Cristal - Ingrid [phone]    Warfarin doses taken: Warfarin taken as instructed  Diet: No new diet changes identified  Medication/supplement changes: None noted  New illness, injury, or hospitalization: No  Signs or symptoms of bleeding or clotting: No  Previous result: Therapeutic last 2(+) visits  Additional findings: Orders faxed to [phone], RX faxed to [facility]       PLAN     Recommended plan for no diet, medication or health factor changes affecting INR     Dosing Instructions: Continue your current warfarin dose with next INR in 3 weeks       Summary  As of 9/22/2023      Full warfarin instructions:  2.5 mg every Mon, Wed, Fri; 5 mg all other days   Next INR check:  10/13/2023               Telephone call with Cristal facility nurse who verbalizes understanding and agrees to plan    Orders given to  Homecare nurse/facility to recheck    Education provided:   Please call back if any changes to your diet, medications or how you've been taking warfarin    Plan made per [facility] anticoagulation protocol    Shani Ross, RN  Anticoagulation Clinic  9/22/2023    _______________________________________________________________________     Anticoagulation Episode Summary       Current INR goal:  2.0-3.0   TTR:  86.1 % (1 y)   Target end date:  Indefinite   Send INR reminders to:  [facility]    Indications    Atrial fibrillation (Resolved) [I48.91]  Long-term (current) use of anticoagulants [Z79.01] [Z79.01]  Chronic atrial fibrillation (H) [I48.20]             Comments:  [facility]H/Ingrid Sanchez [phone] fax: [phone] & [facility] to [facility]             Anticoagulation Care Providers       Provider Role Specialty Phone number     Lazaro Huston MD Referring Internal Medicine [phone]    Fransisco Cordon MD Referring Family Medicine

## 2023-10-12 ENCOUNTER — LAB REQUISITION (OUTPATIENT)
Dept: LAB | Facility: CLINIC | Age: 88
End: 2023-10-12
Payer: COMMERCIAL

## 2023-10-12 DIAGNOSIS — Z79.01 LONG TERM (CURRENT) USE OF ANTICOAGULANTS: ICD-10-CM

## 2023-10-12 DIAGNOSIS — I48.20 CHRONIC ATRIAL FIBRILLATION, UNSPECIFIED (H): ICD-10-CM

## 2023-10-12 DIAGNOSIS — I48.91 UNSPECIFIED ATRIAL FIBRILLATION (H): ICD-10-CM

## 2023-10-13 ENCOUNTER — ANTICOAGULATION THERAPY VISIT (OUTPATIENT)
Dept: ANTICOAGULATION | Facility: CLINIC | Age: 88
End: 2023-10-13

## 2023-10-13 DIAGNOSIS — Z79.01 LONG TERM CURRENT USE OF ANTICOAGULANT THERAPY: Primary | ICD-10-CM

## 2023-10-13 DIAGNOSIS — I48.11 LONGSTANDING PERSISTENT ATRIAL FIBRILLATION (H): ICD-10-CM

## 2023-10-13 DIAGNOSIS — I48.20 CHRONIC ATRIAL FIBRILLATION (H): ICD-10-CM

## 2023-10-13 LAB — INR PPP: 2.52 (ref 0.85–1.15)

## 2023-10-13 PROCEDURE — 85610 PROTHROMBIN TIME: CPT | Mod: ORL | Performed by: INTERNAL MEDICINE

## 2023-10-13 PROCEDURE — 36415 COLL VENOUS BLD VENIPUNCTURE: CPT | Mod: ORL | Performed by: INTERNAL MEDICINE

## 2023-10-13 PROCEDURE — P9604 ONE-WAY ALLOW PRORATED TRIP: HCPCS | Mod: ORL | Performed by: INTERNAL MEDICINE

## 2023-10-13 RX ORDER — WARFARIN SODIUM 5 MG/1
TABLET ORAL
Qty: 40 TABLET | Refills: 0 | Status: SHIPPED | OUTPATIENT
Start: 2023-10-13 | End: 2023-11-10

## 2023-11-09 ENCOUNTER — LAB REQUISITION (OUTPATIENT)
Dept: LAB | Facility: CLINIC | Age: 88
End: 2023-11-09
Payer: COMMERCIAL

## 2023-11-09 DIAGNOSIS — I48.20 CHRONIC ATRIAL FIBRILLATION, UNSPECIFIED (H): ICD-10-CM

## 2023-11-10 ENCOUNTER — ANTICOAGULATION THERAPY VISIT (OUTPATIENT)
Dept: ANTICOAGULATION | Facility: CLINIC | Age: 88
End: 2023-11-10

## 2023-11-10 DIAGNOSIS — I48.11 LONGSTANDING PERSISTENT ATRIAL FIBRILLATION (H): ICD-10-CM

## 2023-11-10 DIAGNOSIS — I48.20 CHRONIC ATRIAL FIBRILLATION (H): ICD-10-CM

## 2023-11-10 DIAGNOSIS — Z79.01 LONG TERM CURRENT USE OF ANTICOAGULANT THERAPY: Primary | ICD-10-CM

## 2023-11-10 LAB — INR PPP: 2.46 (ref 0.85–1.15)

## 2023-11-10 PROCEDURE — 36415 COLL VENOUS BLD VENIPUNCTURE: CPT | Mod: ORL | Performed by: INTERNAL MEDICINE

## 2023-11-10 PROCEDURE — 85610 PROTHROMBIN TIME: CPT | Mod: ORL | Performed by: INTERNAL MEDICINE

## 2023-11-10 PROCEDURE — P9604 ONE-WAY ALLOW PRORATED TRIP: HCPCS | Mod: ORL | Performed by: INTERNAL MEDICINE

## 2023-11-10 RX ORDER — WARFARIN SODIUM 5 MG/1
TABLET ORAL
Qty: 50 TABLET | Refills: 0 | Status: SHIPPED | OUTPATIENT
Start: 2023-11-10 | End: 2023-12-15

## 2023-11-10 NOTE — PROGRESS NOTES
ANTICOAGULATION MANAGEMENT     Melvin Abad 89 year old male is on warfarin with therapeutic INR result. (Goal INR 2.0-3.0)    Recent labs: (last 7 days)     11/10/23  0716   INR 2.46*       ASSESSMENT     Source(s): Chart Review and Home Care/Facility Nurse     Warfarin doses taken: Warfarin taken as instructed  Diet: No new diet changes identified  Medication/supplement changes: None noted  New illness, injury, or hospitalization: No  Signs or symptoms of bleeding or clotting: No  Previous result: Therapeutic last 2(+) visits  Additional findings: None       PLAN     Recommended plan for no diet, medication or health factor changes affecting INR     Dosing Instructions: Continue your current warfarin dose with next INR in 5 weeks       Summary  As of 11/10/2023      Full warfarin instructions:  2.5 mg every Mon, Wed, Fri; 5 mg all other days   Next INR check:  12/15/2023               Telephone call with [facility] facility nurse who verbalizes understanding and agrees to plan and who agrees to plan and repeated back plan correctly    Orders given to  Homecare nurse/facility to recheck    Education provided:   Contact [phone]  with any changes, questions or concerns.     Plan made per [facility] anticoagulation protocol    Cheryle Cramer RN  Anticoagulation Clinic  11/10/2023    _______________________________________________________________________     Anticoagulation Episode Summary       Current INR goal:  2.0-3.0   TTR:  96.0% (1 y)   Target end date:  Indefinite   Send INR reminders to:  [facility]    Indications    Atrial fibrillation (Resolved) [I48.91]  Long-term (current) use of anticoagulants [Z79.01] [Z79.01]  Chronic atrial fibrillation (H) [I48.20]             Comments:  [facility] SINGH/Ingrid Sanchez [phone] fax: [phone] & [facility] to [facility]             Anticoagulation Care Providers       Provider Role Specialty Phone number    Lazaro Hutson MD Referring Internal Medicine  [phone]    Neris Fransisco MD Bharath Referring Family Medicine

## 2023-11-20 ENCOUNTER — MYC MEDICAL ADVICE (OUTPATIENT)
Dept: INTERNAL MEDICINE | Facility: CLINIC | Age: 88
End: 2023-11-20

## 2023-11-22 ENCOUNTER — VIRTUAL VISIT (OUTPATIENT)
Dept: INTERNAL MEDICINE | Facility: CLINIC | Age: 88
End: 2023-11-22
Payer: COMMERCIAL

## 2023-11-22 DIAGNOSIS — E11.9 TYPE 2 DIABETES MELLITUS WITHOUT COMPLICATION, WITHOUT LONG-TERM CURRENT USE OF INSULIN (H): Primary | ICD-10-CM

## 2023-11-22 DIAGNOSIS — S90.822D BLISTER OF LEFT FOOT, SUBSEQUENT ENCOUNTER: ICD-10-CM

## 2023-11-22 DIAGNOSIS — I48.20 CHRONIC ATRIAL FIBRILLATION (H): ICD-10-CM

## 2023-11-22 DIAGNOSIS — I50.812 CHRONIC RIGHT-SIDED HEART FAILURE (H): ICD-10-CM

## 2023-11-22 DIAGNOSIS — M79.89 LEFT LEG SWELLING: ICD-10-CM

## 2023-11-22 PROCEDURE — 99213 OFFICE O/P EST LOW 20 MIN: CPT | Mod: VID | Performed by: INTERNAL MEDICINE

## 2023-11-22 NOTE — PROGRESS NOTES
"    Instructions Relayed to Patient by Virtual Roomer:     Patient is active on [employer]:   Relayed following to patient: \"It looks like you are active on [employer], are you able to join the visit this way? If not, do you need us to send you a link now or would you like your provider to send a link via text or email when they are ready to initiate the visit?\"    Reminded patient to ensure they were logged on to virtual visit by arrival time listed. Documented in appointment notes if patient had flexibility to initiate visit sooner than arrival time. If pediatric virtual visit, ensured pediatric patient along with parent/guardian will be present for video visit.     Patient offered the website [email or web address] and/or phone number to [employer] Help line: [phone]    Melvin is a 89 year old who is being evaluated via a billable video visit.      How would you like to obtain your AVS? [employer]  If the video visit is dropped, the invitation should be resent by: Text to cell phone: [phone]  Will anyone else be joining your video visit? No          Assessment & Plan   Problem List Items Addressed This Visit       Chronic atrial fibrillation (H)     Other Visit Diagnoses       Type 2 diabetes mellitus without complication, without long-term current use of insulin (H)    -  Primary    Chronic right-sided heart failure (H)        Relevant Orders    Home Care Referral    Left leg swelling        Relevant Orders    Home Care Referral    Blister of left foot, subsequent encounter        Relevant Orders    Home Care Referral             Patient was a history of diabetes, right-sided heart failure he is got chronic swelling of his legs usually the left worse than the right.  Unfortunately his left leg is swollen up more he had blisters on his toes which have broken open.  We do not believe they are infected they do not hurt but he does need to have home care help get some bandaging on these daily.  " Recommended he elevate his leg.  Limit his salt intake especially through the holiday.    He is definitely homebound and assisted living difficult to get more than 100 feet.  He had been trying to do some physical therapy to increase his walking and it may be caused these blisters.  With his heart failure and current blisters he is definitely homebound.  We will have home care go out and do dressing changes with him, watch his weight.          Lazaro Huston MD  [facility]    Vika Charles is a 89 year old, presenting for the following health issues:  No chief complaint on file.        11/22/2023     1:46 PM   Additional Questions   Roomed by judd   Accompanied by daughter     History of Present Illness       Reason for visit:  OPen sores on foot  Symptom onset:  3-7 days ago  Symptoms include:  Open blisters,  Symptom intensity:  Moderate  Symptom progression:  Staying the same  Had these symptoms before:  Yes  Has tried/received treatment for these symptoms:  Edmond consumes 0 sweetened beverage(s) daily. He exercises with enough effort to increase his heart rate 3 or less days per week.   He is taking medications regularly.       Rash - blisters  Onset/Duration: a couple of weeks  Description  Location: foot  Character: burning, draining, pt foot is swelled up and can barely move it. The blisters have opened and there is clear, yellow water  Itching: no  Intensity:  moderate  Progression of Symptoms:  worsening  Accompanying signs and symptoms:   Fever: No  Body aches or joint pain: No  Sore throat symptoms: No  Recent cold symptoms: No  History:           Previous episodes of similar rash: yes but never on his feet always on his calves  New exposures:  None  Recent travel: No  Exposure to similar rash: No  Precipitating or alleviating factors: none  Therapies tried and outcome: antibiotic ointment with bandages - not helpful     Visit with his daughter in law Toña.     Blisters on his  toe and needs to be bandaging them, another one today that broke.  Clear fluid.     Left foot has the blister, just left is swollen.  Still taking lasix at 20mg one and half a day. Weight is the same at 206.      Review of Systems         Objective         Vitals:  No vitals were obtained today due to virtual visit.    Physical Exam   GENERAL: Healthy, alert and no distress  EYES: Eyes grossly normal to inspection.  No discharge or erythema, or obvious scleral/conjunctival abnormalities.  RESP: No audible wheeze, cough, or visible cyanosis.  No visible retractions or increased work of breathing.    SKIN: blisters on the left toes.   NEURO: Cranial nerves grossly intact.  Mentation and speech appropriate for age.  PSYCH: Mentation appears normal, affect normal/bright, judgement and insight intact, normal speech and appearance well-groomed.          Video-Visit Details    Type of service:  Video Visit   Video Start Time:  1:59  Video End Time: 2:08    Originating Location (pt. Location): Assisted Living    Distant Location (provider location):  On-site  Platform used for Video Visit: Elodia

## 2023-11-22 NOTE — PATIENT INSTRUCTIONS
Patient should increase his Lasix to 20 mg twice a day for the 23rd 24th and 25 November.  Then return back to his 20 mg and half a tablet or 30 mg total daily.    Home care is ordered to do wound assessment and bandaging    Patient should be on low-salt diet    He should delay or put physical therapy on hold    He should elevate the left leg.

## 2023-11-27 ENCOUNTER — TELEPHONE (OUTPATIENT)
Dept: INTERNAL MEDICINE | Facility: CLINIC | Age: 88
End: 2023-11-27
Payer: COMMERCIAL

## 2023-11-27 NOTE — TELEPHONE ENCOUNTER
Molly RN with [facility] requesting the following updates with patient's wound care:    3 x week dressing changes vs daily  Clean blister site and wrap with foam pad  No bacitracin to let area dry due to less drainage  2 open areas that will wipe skin protectant on    Ok to give verbal orders above?    ZULEIMA Barnes - T: [phone]  (Confidential VM - Ok to leave detailed message)

## 2023-11-28 NOTE — TELEPHONE ENCOUNTER
Phoned Molly and left a message on her secure voicemail informing of approved home care orders per Dr. Robel MD as documented below.  Left message to call back with any further questions or concerns.    Tatianna Kirkland RN

## 2023-12-05 ENCOUNTER — TELEPHONE (OUTPATIENT)
Dept: INTERNAL MEDICINE | Facility: CLINIC | Age: 88
End: 2023-12-05
Payer: COMMERCIAL

## 2023-12-05 NOTE — TELEPHONE ENCOUNTER
Reason for Call:  Form, our goal is to have forms completed with 72 hours, however, some forms may require a visit or additional information.    Type of letter, form or note:  Home Health Certification    Who is the form from?: Home care    Where did the form come from: form was faxed in    What clinic location was the form placed at?: [facility]    Where the form was placed: Given to KEMAL Rosado     What number is listed as a contact on the form?: [phone]       Additional comments: Triniti    Call taken on 12/5/2023 at 6:00 PM by Monserrat Ellis

## 2023-12-11 ENCOUNTER — TELEPHONE (OUTPATIENT)
Dept: INTERNAL MEDICINE | Facility: CLINIC | Age: 88
End: 2023-12-11
Payer: COMMERCIAL

## 2023-12-11 DIAGNOSIS — Z53.9 DIAGNOSIS NOT YET DEFINED: Primary | ICD-10-CM

## 2023-12-11 PROCEDURE — G0180 MD CERTIFICATION HHA PATIENT: HCPCS | Performed by: INTERNAL MEDICINE

## 2023-12-14 ENCOUNTER — LAB REQUISITION (OUTPATIENT)
Dept: LAB | Facility: CLINIC | Age: 88
End: 2023-12-14
Payer: COMMERCIAL

## 2023-12-14 DIAGNOSIS — I48.20 CHRONIC ATRIAL FIBRILLATION, UNSPECIFIED (H): ICD-10-CM

## 2023-12-15 ENCOUNTER — ANTICOAGULATION THERAPY VISIT (OUTPATIENT)
Dept: ANTICOAGULATION | Facility: CLINIC | Age: 88
End: 2023-12-15

## 2023-12-15 DIAGNOSIS — Z79.01 LONG TERM CURRENT USE OF ANTICOAGULANT THERAPY: Primary | ICD-10-CM

## 2023-12-15 DIAGNOSIS — I48.11 LONGSTANDING PERSISTENT ATRIAL FIBRILLATION (H): ICD-10-CM

## 2023-12-15 DIAGNOSIS — I48.20 CHRONIC ATRIAL FIBRILLATION (H): ICD-10-CM

## 2023-12-15 LAB — INR PPP: 2.91 (ref 0.85–1.15)

## 2023-12-15 PROCEDURE — P9604 ONE-WAY ALLOW PRORATED TRIP: HCPCS | Mod: ORL | Performed by: INTERNAL MEDICINE

## 2023-12-15 PROCEDURE — 36415 COLL VENOUS BLD VENIPUNCTURE: CPT | Mod: ORL | Performed by: INTERNAL MEDICINE

## 2023-12-15 PROCEDURE — 85610 PROTHROMBIN TIME: CPT | Mod: ORL | Performed by: INTERNAL MEDICINE

## 2023-12-15 RX ORDER — WARFARIN SODIUM 5 MG/1
TABLET ORAL
Qty: 50 TABLET | Refills: 0 | Status: SHIPPED | OUTPATIENT
Start: 2023-12-15 | End: 2024-01-22

## 2023-12-15 NOTE — PROGRESS NOTES
ANTICOAGULATION MANAGEMENT     Melvin Abad 89 year old male is on warfarin with therapeutic INR result. (Goal INR 2.0-3.0)    Recent labs: (last 7 days)     12/15/23  0735   INR 2.91*       ASSESSMENT     Source(s): Chart Review and Home Care/Facility Nurse     Warfarin doses taken: Warfarin taken as instructed  Diet: No new diet changes identified  Medication/supplement changes: None noted  New illness, injury, or hospitalization: No  Signs or symptoms of bleeding or clotting: No  Previous result: Therapeutic last 2(+) visits  Additional findings: None       PLAN     Recommended plan for no diet, medication or health factor changes affecting INR     Dosing Instructions: Continue your current warfarin dose with next INR in 5 weeks       Summary  As of 12/15/2023      Full warfarin instructions:  2.5 mg every Mon, Wed, Fri; 5 mg all other days   Next INR check:  1/19/2024               Telephone call with Molly facility nurse who agrees to plan and repeated back plan correctly    Rx sent into the pharmacy with update sig and recheck date.    AVS faxed to facility and fax number verified.      Orders given to  Homecare nurse/facility to recheck    Education provided:   None required    Plan made per [facility] anticoagulation protocol    Denae Burciaga, RN  Anticoagulation Clinic  12/15/2023    _______________________________________________________________________     Anticoagulation Episode Summary       Current INR goal:  2.0-3.0   TTR:  96.3% (1 y)   Target end date:  Indefinite   Send INR reminders to:  [facility]    Indications    Atrial fibrillation (Resolved) [I48.91]  Long-term (current) use of anticoagulants [Z79.01] [Z79.01]  Chronic atrial fibrillation (H) [I48.20]             Comments:  Cheyenne SINGH/Ingrid Sanchez [phone] fax: [phone] & send to [facility]             Anticoagulation Care Providers       Provider Role Specialty Phone number    Lazaro Huston MD Referring  Internal Medicine [phone]    Fransisco Cordon MD Referring Family Medicine

## 2023-12-20 ENCOUNTER — TELEPHONE (OUTPATIENT)
Dept: INTERNAL MEDICINE | Facility: CLINIC | Age: 88
End: 2023-12-20
Payer: COMMERCIAL

## 2023-12-20 NOTE — TELEPHONE ENCOUNTER
Home Care is calling regarding an established patient with [facility].       Requesting orders from: Lazaro Huston  Provider is following patient: Yes  Is this a 60-day recertification request?  No    Orders Requested    Skilled Nursing  Request for continuation of care with increase in frequency  Frequency:  1x/wk for 3 wks      Wound care for blisters on left 3 toes- healed  New blisters (closed) on left shin noticed yesterday - foam dressings   Confirmed ok to leave a detailed message with call back.  Contact information confirmed and updated as needed.    Tracy Fuller RN

## 2024-01-03 ENCOUNTER — TELEPHONE (OUTPATIENT)
Dept: INTERNAL MEDICINE | Facility: CLINIC | Age: 89
End: 2024-01-03
Payer: COMMERCIAL

## 2024-01-03 NOTE — TELEPHONE ENCOUNTER
Home Care is calling regarding an established patient with [facility].       Requesting orders from: Lazaro Huston  Provider is following patient: Yes  Is this a 60-day recertification request?  No    Orders Requested    Skilled Nursing  Request for continuation of care with no increase or decrease in frequency  Frequency:  1x/wk for 3 wks          Confirmed ok to leave a detailed message with call back.  Contact information confirmed and updated as needed.    Tracy Fuller RN

## 2024-01-18 ENCOUNTER — LAB REQUISITION (OUTPATIENT)
Dept: LAB | Facility: CLINIC | Age: 89
End: 2024-01-18
Payer: COMMERCIAL

## 2024-01-18 ENCOUNTER — PATIENT OUTREACH (OUTPATIENT)
Dept: CARE COORDINATION | Facility: CLINIC | Age: 89
End: 2024-01-18
Payer: COMMERCIAL

## 2024-01-18 DIAGNOSIS — I48.91 UNSPECIFIED ATRIAL FIBRILLATION (H): ICD-10-CM

## 2024-01-18 DIAGNOSIS — Z79.01 LONG TERM (CURRENT) USE OF ANTICOAGULANTS: ICD-10-CM

## 2024-01-18 DIAGNOSIS — I48.20 CHRONIC ATRIAL FIBRILLATION, UNSPECIFIED (H): ICD-10-CM

## 2024-01-19 ENCOUNTER — ANTICOAGULATION THERAPY VISIT (OUTPATIENT)
Dept: ANTICOAGULATION | Facility: CLINIC | Age: 89
End: 2024-01-19

## 2024-01-19 ENCOUNTER — DOCUMENTATION ONLY (OUTPATIENT)
Dept: ANTICOAGULATION | Facility: CLINIC | Age: 89
End: 2024-01-19

## 2024-01-19 DIAGNOSIS — I48.20 CHRONIC ATRIAL FIBRILLATION (H): ICD-10-CM

## 2024-01-19 DIAGNOSIS — I48.20 CHRONIC ATRIAL FIBRILLATION (H): Primary | ICD-10-CM

## 2024-01-19 DIAGNOSIS — Z79.01 LONG TERM CURRENT USE OF ANTICOAGULANT THERAPY: Primary | ICD-10-CM

## 2024-01-19 LAB — INR PPP: 2.51 (ref 0.85–1.15)

## 2024-01-19 PROCEDURE — 85610 PROTHROMBIN TIME: CPT | Mod: ORL | Performed by: INTERNAL MEDICINE

## 2024-01-19 PROCEDURE — P9604 ONE-WAY ALLOW PRORATED TRIP: HCPCS | Mod: ORL | Performed by: INTERNAL MEDICINE

## 2024-01-19 PROCEDURE — 36415 COLL VENOUS BLD VENIPUNCTURE: CPT | Mod: ORL | Performed by: INTERNAL MEDICINE

## 2024-01-19 RX ORDER — WARFARIN SODIUM 5 MG/1
TABLET ORAL
Qty: 70 TABLET | Refills: 0 | Status: SHIPPED | OUTPATIENT
Start: 2024-01-19 | End: 2024-01-26

## 2024-01-19 NOTE — PROGRESS NOTES
ANTICOAGULATION CLINIC REFERRAL RENEWAL REQUEST       An annual renewal order is required for all patients referred to [facility].?  Please review and sign the pended referral order for Melvin Abad.       ANTICOAGULATION SUMMARY      Warfarin indication(s)   Atrial Fibrillation    Mechanical heart valve present?  NO       Current goal range   INR: 2.0-3.0     Goal appropriate for indication? Goal INR 2-3, standard for indication(s) above     Time in Therapeutic Range (TTR)  (Goal > 60%) 100%       Office visit with referring provider's group within last year yes on 3/22/23       Toña Gomez RN  [facility]

## 2024-01-19 NOTE — PROGRESS NOTES
ANTICOAGULATION MANAGEMENT     Melvin Abad 89 year old male is on warfarin with therapeutic INR result. (Goal INR 2.0-3.0)    Recent labs: (last 7 days)     01/19/24  0730   INR 2.51*       ASSESSMENT     Source(s): Chart Review and Home Care/Facility Nurse     Warfarin doses taken: Warfarin taken as instructed  Diet: No new diet changes identified  Medication/supplement changes:   New illness, injury, or hospitalization: No  Signs or symptoms of bleeding or clotting: No  Previous result: Therapeutic last 2(+) visits  Additional findings: None       PLAN     Recommended plan for no diet, medication or health factor changes affecting INR     Dosing Instructions: Continue your current warfarin dose with next INR in 6 weeks       Summary  As of 1/19/2024      Full warfarin instructions:  2.5 mg every Mon, Wed, Fri; 5 mg all other days   Next INR check:  3/1/2024               Telephone call with Molly facility nurse who verbalizes understanding and agrees to plan    Orders given to  Homecare nurse/facility to recheck    Education provided:   Please call back if any changes to your diet, medications or how you've been taking warfarin    Plan made per [facility] anticoagulation protocol    Neeta Cheung, RN  Anticoagulation Clinic  1/19/2024    _______________________________________________________________________     Anticoagulation Episode Summary       Current INR goal:  2.0-3.0   TTR:  100.0% (1 y)   Target end date:  Indefinite   Send INR reminders to:  [facility]    Indications    Atrial fibrillation (Resolved) [I48.91]  Long-term (current) use of anticoagulants [Z79.01] [Z79.01]  Chronic atrial fibrillation (H) [I48.20]             Comments:  Cheyenne SINGH/Ingrid Sanchez [phone] fax: [phone] & send to [facility]             Anticoagulation Care Providers       Provider Role Specialty Phone number    Lazaro Huston MD Referring Internal Medicine [phone]    Fransisco Cordon MD  Referring Family Medicine

## 2024-01-22 ENCOUNTER — APPOINTMENT (OUTPATIENT)
Dept: GENERAL RADIOLOGY | Facility: CLINIC | Age: 89
End: 2024-01-22
Attending: STUDENT IN AN ORGANIZED HEALTH CARE EDUCATION/TRAINING PROGRAM
Payer: COMMERCIAL

## 2024-01-22 ENCOUNTER — HOSPITAL ENCOUNTER (EMERGENCY)
Facility: CLINIC | Age: 89
Discharge: NURSING, REHAB OR LONG-TERM CARE | End: 2024-01-22
Attending: STUDENT IN AN ORGANIZED HEALTH CARE EDUCATION/TRAINING PROGRAM | Admitting: STUDENT IN AN ORGANIZED HEALTH CARE EDUCATION/TRAINING PROGRAM
Payer: COMMERCIAL

## 2024-01-22 ENCOUNTER — APPOINTMENT (OUTPATIENT)
Dept: ULTRASOUND IMAGING | Facility: CLINIC | Age: 89
End: 2024-01-22
Attending: STUDENT IN AN ORGANIZED HEALTH CARE EDUCATION/TRAINING PROGRAM
Payer: COMMERCIAL

## 2024-01-22 ENCOUNTER — DOCUMENTATION ONLY (OUTPATIENT)
Dept: ANTICOAGULATION | Facility: CLINIC | Age: 89
End: 2024-01-22

## 2024-01-22 VITALS
SYSTOLIC BLOOD PRESSURE: 165 MMHG | WEIGHT: 207 LBS | HEIGHT: 70 IN | TEMPERATURE: 97.9 F | BODY MASS INDEX: 29.63 KG/M2 | HEART RATE: 59 BPM | DIASTOLIC BLOOD PRESSURE: 84 MMHG | OXYGEN SATURATION: 95 % | RESPIRATION RATE: 20 BRPM

## 2024-01-22 DIAGNOSIS — M79.652 PAIN OF LEFT THIGH: ICD-10-CM

## 2024-01-22 DIAGNOSIS — I48.11 LONGSTANDING PERSISTENT ATRIAL FIBRILLATION (H): ICD-10-CM

## 2024-01-22 DIAGNOSIS — I48.20 CHRONIC ATRIAL FIBRILLATION (H): ICD-10-CM

## 2024-01-22 DIAGNOSIS — Z79.01 LONG TERM CURRENT USE OF ANTICOAGULANT THERAPY: Primary | ICD-10-CM

## 2024-01-22 LAB
ANION GAP SERPL CALCULATED.3IONS-SCNC: 7 MMOL/L (ref 7–15)
BASOPHILS # BLD AUTO: 0 10E3/UL (ref 0–0.2)
BASOPHILS NFR BLD AUTO: 0 %
BUN SERPL-MCNC: 20.5 MG/DL (ref 8–23)
CALCIUM SERPL-MCNC: 9.4 MG/DL (ref 8.2–9.6)
CHLORIDE SERPL-SCNC: 103 MMOL/L (ref 98–107)
CREAT SERPL-MCNC: 1.22 MG/DL (ref 0.67–1.17)
DEPRECATED HCO3 PLAS-SCNC: 29 MMOL/L (ref 22–29)
EGFRCR SERPLBLD CKD-EPI 2021: 55 ML/MIN/1.73M2
EOSINOPHIL # BLD AUTO: 0.1 10E3/UL (ref 0–0.7)
EOSINOPHIL NFR BLD AUTO: 2 %
ERYTHROCYTE [DISTWIDTH] IN BLOOD BY AUTOMATED COUNT: 13.4 % (ref 10–15)
GLUCOSE SERPL-MCNC: 118 MG/DL (ref 70–99)
HCT VFR BLD AUTO: 39.9 % (ref 40–53)
HGB BLD-MCNC: 12.9 G/DL (ref 13.3–17.7)
IMM GRANULOCYTES # BLD: 0 10E3/UL
IMM GRANULOCYTES NFR BLD: 0 %
LYMPHOCYTES # BLD AUTO: 1.9 10E3/UL (ref 0.8–5.3)
LYMPHOCYTES NFR BLD AUTO: 31 %
MCH RBC QN AUTO: 31.2 PG (ref 26.5–33)
MCHC RBC AUTO-ENTMCNC: 32.3 G/DL (ref 31.5–36.5)
MCV RBC AUTO: 96 FL (ref 78–100)
MONOCYTES # BLD AUTO: 0.5 10E3/UL (ref 0–1.3)
MONOCYTES NFR BLD AUTO: 8 %
NEUTROPHILS # BLD AUTO: 3.6 10E3/UL (ref 1.6–8.3)
NEUTROPHILS NFR BLD AUTO: 59 %
NRBC # BLD AUTO: 0 10E3/UL
NRBC BLD AUTO-RTO: 0 /100
PLATELET # BLD AUTO: 157 10E3/UL (ref 150–450)
POTASSIUM SERPL-SCNC: 4.6 MMOL/L (ref 3.4–5.3)
RBC # BLD AUTO: 4.14 10E6/UL (ref 4.4–5.9)
SODIUM SERPL-SCNC: 139 MMOL/L (ref 135–145)
WBC # BLD AUTO: 6.1 10E3/UL (ref 4–11)

## 2024-01-22 PROCEDURE — 85025 COMPLETE CBC W/AUTO DIFF WBC: CPT | Performed by: STUDENT IN AN ORGANIZED HEALTH CARE EDUCATION/TRAINING PROGRAM

## 2024-01-22 PROCEDURE — 73552 X-RAY EXAM OF FEMUR 2/>: CPT | Mod: LT

## 2024-01-22 PROCEDURE — 93971 EXTREMITY STUDY: CPT | Mod: LT

## 2024-01-22 PROCEDURE — 99284 EMERGENCY DEPT VISIT MOD MDM: CPT | Mod: 25

## 2024-01-22 PROCEDURE — 36415 COLL VENOUS BLD VENIPUNCTURE: CPT | Performed by: STUDENT IN AN ORGANIZED HEALTH CARE EDUCATION/TRAINING PROGRAM

## 2024-01-22 PROCEDURE — 72170 X-RAY EXAM OF PELVIS: CPT

## 2024-01-22 PROCEDURE — 80048 BASIC METABOLIC PNL TOTAL CA: CPT | Performed by: STUDENT IN AN ORGANIZED HEALTH CARE EDUCATION/TRAINING PROGRAM

## 2024-01-22 PROCEDURE — 99284 EMERGENCY DEPT VISIT MOD MDM: CPT | Performed by: STUDENT IN AN ORGANIZED HEALTH CARE EDUCATION/TRAINING PROGRAM

## 2024-01-22 PROCEDURE — 250N000013 HC RX MED GY IP 250 OP 250 PS 637: Performed by: STUDENT IN AN ORGANIZED HEALTH CARE EDUCATION/TRAINING PROGRAM

## 2024-01-22 RX ORDER — OXYCODONE AND ACETAMINOPHEN 5; 325 MG/1; MG/1
1 TABLET ORAL ONCE
Status: COMPLETED | OUTPATIENT
Start: 2024-01-22 | End: 2024-01-22

## 2024-01-22 RX ORDER — WARFARIN SODIUM 5 MG/1
TABLET ORAL
Qty: 50 TABLET | Refills: 0 | Status: SHIPPED | OUTPATIENT
Start: 2024-01-22 | End: 2024-01-26

## 2024-01-22 RX ORDER — METHOCARBAMOL 500 MG/1
500 TABLET, FILM COATED ORAL 4 TIMES DAILY PRN
Qty: 20 TABLET | Refills: 0 | Status: SHIPPED | OUTPATIENT
Start: 2024-01-22 | End: 2024-01-26

## 2024-01-22 RX ORDER — OXYCODONE AND ACETAMINOPHEN 5; 325 MG/1; MG/1
1 TABLET ORAL EVERY 6 HOURS PRN
Qty: 12 TABLET | Refills: 0 | Status: SHIPPED | OUTPATIENT
Start: 2024-01-22 | End: 2024-01-26

## 2024-01-22 RX ADMIN — OXYCODONE HYDROCHLORIDE AND ACETAMINOPHEN 1 TABLET: 5; 325 TABLET ORAL at 11:30

## 2024-01-22 ASSESSMENT — ACTIVITIES OF DAILY LIVING (ADL)
ADLS_ACUITY_SCORE: 35
ADLS_ACUITY_SCORE: 37

## 2024-01-22 NOTE — PROGRESS NOTES
ANTICOAGULATION  MANAGEMENT: Discharge Review    Melvin Abad chart reviewed for anticoagulation continuity of care    Emergency room visit on 1/22/24 for leg pain.    Discharge disposition:  Assisted Living    Results:    Recent labs: (last 7 days)     01/19/24  0730   INR 2.51*     Anticoagulation inpatient management:     not applicable     Anticoagulation discharge instructions:     Warfarin dosing: home regimen continued   Bridging: No   INR goal change: No      Medication changes affecting anticoagulation: No  START taking:  methocarbamol (ROBAXIN)  oxyCODONE-acetaminophen (PERCOCET)  Additional factors affecting anticoagulation: Yes: patient is suffering from muscle spasm, no DVT found     PLAN     Recommend to check INR on 1/26/24    Left a detailed message for Molly at [place] and faxed AVS    Anticoagulation Calendar updated    Neeta Cheung RN

## 2024-01-22 NOTE — ED TRIAGE NOTES
Patient c/o left anterior mid thigh pain x 2 days. Pain is intermittent and sharp.     Triage Assessment (Adult)       Row Name 01/22/24 0837          Triage Assessment    Airway WDL WDL        Respiratory WDL    Respiratory WDL WDL        Skin Circulation/Temperature WDL    Skin Circulation/Temperature WDL WDL

## 2024-01-22 NOTE — DISCHARGE INSTRUCTIONS
The exact cause of your pain is still a bit unclear.  X-rays and ultrasound did not show any obvious abnormalities.  Your blood work was also very reassuring.  I think it is most likely you are dealing with a musculoskeletal strain/spasm.  Please continue taking Tylenol at home.  I will prescribe a muscle relaxer which could help with discomfort.  Prescription for stronger pain medication will also be provided for breakthrough discomfort, use this only as needed.  Follow-up with your primary care doctor for recheck.  Return to the emergency department sooner for any new or acutely worsening symptoms.

## 2024-01-22 NOTE — ED PROVIDER NOTES
History     Chief Complaint   Patient presents with    Leg Pain     HPI  Melvin Abad is a 89 year old male with history of hypertension, A-fib on Coumadin, prior pontine CVA who presents for evaluation of left leg/thigh pain.  Symptoms started 2 days ago without obvious injury or strain.  Discomfort is mostly localized to the anterior/medial thigh and it radiates up towards the groin.  This is worsened with flexion at the hip and discomfort has made ambulating somewhat difficult.  Patient otherwise feels well, denies any associated fever, chills, chest pain or shortness of breath, abdominal pain, signs of bruising/bleeding, lower extremity edema or calf tenderness, claudication, other complaints today.    Allergies:  No Known Allergies    Problem List:    Patient Active Problem List    Diagnosis Date Noted    Chronic atrial fibrillation (H) 05/01/2020     Priority: Medium    Osteoarthritis of glenohumeral joint, left 02/24/2020     Priority: Medium    Longstanding persistent atrial fibrillation (H) 01/23/2020     Priority: Medium    Anticoagulation goal of INR 2 to 3 01/23/2020     Priority: Medium    Slow transit constipation 01/23/2020     Priority: Medium    Sleep disturbances 01/23/2020     Priority: Medium    Right pontine stroke (H) 01/23/2020     Priority: Medium    Dry eyes 01/23/2020     Priority: Medium    Restless legs syndrome (RLS) 01/23/2020     Priority: Medium    Pain of right lower leg 03/31/2017     Priority: Medium    Contusion of right lower extremity 03/31/2017     Priority: Medium    Leg pain 03/31/2017     Priority: Medium    Long-term (current) use of anticoagulants [Z79.01] 04/05/2016     Priority: Medium    Obesity (BMI 30-39.9) 10/28/2015     Priority: Medium    Cellulitis 11/20/2013     Priority: Medium    Anemia due to blood loss, acute 11/20/2013     Priority: Medium    DVT prophylaxis 11/20/2013     Priority: Medium    Carpal tunnel syndrome of right wrist 08/16/2013      Priority: Medium    Gilbert syndrome 01/31/2012     Priority: Medium    History of skin cancer 01/03/2012     Priority: Medium    AK (actinic keratosis) 01/03/2012     Priority: Medium    Essential hypertension 04/18/2011     Priority: Medium    Health Care Home 04/08/2011     Priority: Medium     Natalie Ferraro RN-PHN  FPA / [facility]   [phone]        DX V65.8 REPLACED WITH 02017 [facility] CARE [facility] (04/08/2013)      Diverticulosis 03/25/2011     Priority: Medium    Hyperlipidemia LDL goal <100 10/31/2010     Priority: Medium    Long term current use of anticoagulant therapy 01/31/2001     Priority: Medium     Problem list name updated by automated process. Provider to review          Past Medical History:    Past Medical History:   Diagnosis Date    Actinic keratosis     Atrial fibrillation (H)     Basal cell carcinoma nos 06/10    Cardiac dysrhythmia, unspecified     Cough     Diabetic eye exam (H) 8/29/14    Generalized osteoarthrosis, unspecified site     Other diseases of lung, not elsewhere classified     Pure hypercholesterolemia     Unspecified essential hypertension        Past Surgical History:    Past Surgical History:   Procedure Laterality Date    COLONOSCOPY  05/02/2007    Multiple bxs of diminutive polyps of ascending colon.    COLONOSCOPY  11/10/2010    COLONOSCOPY performed by MARISELA GRIMM at [facility] HEMORRHOIDOPEXY BY STAPLING  09/26/08    [facility] TOTAL KNEE ARTHROPLASTY  1997    Knee Replacement, Total    [facility] COLONOSCOPY W/WO BRUSH/WASH  10/19/2001    [facility] COLONOSCOPY W/WO BRUSH/WASH  11/02/2004       Family History:    Family History   Problem Relation Age of Onset    Cancer Mother     Prostate Cancer Father        Social History:  Marital Status:   [5]  Social History     Tobacco Use    Smoking status: Former     Types: Cigarettes    Smokeless tobacco: Never    Tobacco comments:     5 years   Vaping Use    Vaping Use: Never used   Substance Use Topics     "Alcohol use: No     Comment: denies    Drug use: No        Medications:    methocarbamol (ROBAXIN) 500 MG tablet  oxyCODONE-acetaminophen (PERCOCET) 5-325 MG tablet  blood glucose (NO BRAND SPECIFIED) test strip  calcium carbonate (TUMS) 500 MG chewable tablet  carboxymethylcellulose PF (REFRESH PLUS) 0.5 % ophthalmic solution  cholecalciferol (VITAMIN D3) 25 mcg (1000 units) capsule  furosemide (LASIX) 20 MG tablet  gabapentin (NEURONTIN) 100 MG capsule  ketotifen (ZADITOR) 0.025 % ophthalmic solution  lisinopril (ZESTRIL) 10 MG tablet  loratadine (CLARITIN) 10 MG tablet  metoprolol succinate ER (TOPROL XL) 50 MG 24 hr tablet  order for DME  order for DME  order for DME  polyethylene glycol (MIRALAX) 17 GM/Dose powder  psyllium (METAMUCIL/KONSYL) 58.6 % powder  simvastatin (ZOCOR) 40 MG tablet  sodium chloride (OCEAN) 0.65 % nasal spray  SSD 1 % external cream  tamsulosin (FLOMAX) 0.4 MG capsule  warfarin ANTICOAGULANT (COUMADIN) 5 MG tablet  warfarin ANTICOAGULANT (COUMADIN) 5 MG tablet      Review of Systems   All other systems reviewed and are negative.  See HPI.    Physical Exam   BP: (!) 117/91  Pulse: 62  Temp: 97.9  F (36.6  C)  Resp: 20  Height: 176.5 cm (5' 9.5\")  Weight: 93.9 kg (207 lb)  SpO2: 98 %      Physical Exam  Vitals and nursing note reviewed.   Constitutional:       General: He is not in acute distress.     Appearance: Normal appearance. He is not ill-appearing or toxic-appearing.      Comments: Sitting comfortably on exam bed.   HENT:      Head: Normocephalic and atraumatic.      Mouth/Throat:      Mouth: Mucous membranes are moist.      Pharynx: Oropharynx is clear.   Eyes:      General: No scleral icterus.     Conjunctiva/sclera: Conjunctivae normal.   Cardiovascular:      Rate and Rhythm: Normal rate and regular rhythm.      Pulses: Normal pulses.      Heart sounds: Normal heart sounds.   Pulmonary:      Effort: Pulmonary effort is normal. No respiratory distress.      Breath sounds: Normal " breath sounds. No wheezing.   Abdominal:      Palpations: Abdomen is soft.      Tenderness: There is no abdominal tenderness. There is no guarding or rebound.   Musculoskeletal:         General: Tenderness present. No deformity.      Cervical back: Normal range of motion and neck supple.      Right lower leg: No edema.      Left lower leg: No edema.      Comments: There is mild tenderness to the left anterior/medial thigh.  No palpable areas of induration or fluctuance.  No overlying skin changes that would indicate infection.  The leg does not appear significantly swollen compared to the right.  Compartments are compressible.  Range of motion of the knee is normal.  DP/PT pulses are 2+, capillary refill is brisk.  Discomfort is mostly present with flexion at the hip.  No direct hip tenderness to palpation.   Skin:     General: Skin is warm.      Capillary Refill: Capillary refill takes less than 2 seconds.   Neurological:      General: No focal deficit present.      Mental Status: He is alert and oriented to person, place, and time.   Psychiatric:         Mood and Affect: Mood normal.         ED Course             Procedures              Results for orders placed or performed during the hospital encounter of 01/22/24 (from the past 24 hour(s))   CBC with platelets differential    Narrative    The following orders were created for panel order CBC with platelets differential.  Procedure                               Abnormality         Status                     ---------                               -----------         ------                     CBC with platelets and d...[[id number]]  Abnormal            Final result                 Please view results for these tests on the individual orders.   Basic metabolic panel   Result Value Ref Range    Sodium 139 135 - 145 mmol/L    Potassium 4.6 3.4 - 5.3 mmol/L    Chloride 103 98 - 107 mmol/L    Carbon Dioxide (CO2) 29 22 - 29 mmol/L    Anion Gap 7 7 - 15 mmol/L    Urea  Nitrogen 20.5 8.0 - 23.0 mg/dL    Creatinine 1.22 (H) 0.67 - 1.17 mg/dL    GFR Estimate 55 (L) >60 mL/min/1.73m2    Calcium 9.4 8.2 - 9.6 mg/dL    Glucose 118 (H) 70 - 99 mg/dL   CBC with platelets and differential   Result Value Ref Range    WBC Count 6.1 4.0 - 11.0 10e3/uL    RBC Count 4.14 (L) 4.40 - 5.90 10e6/uL    Hemoglobin 12.9 (L) 13.3 - 17.7 g/dL    Hematocrit 39.9 (L) 40.0 - 53.0 %    MCV 96 78 - 100 fL    MCH 31.2 26.5 - 33.0 pg    MCHC 32.3 31.5 - 36.5 g/dL    RDW 13.4 10.0 - 15.0 %    Platelet Count 157 150 - 450 10e3/uL    % Neutrophils 59 %    % Lymphocytes 31 %    % Monocytes 8 %    % Eosinophils 2 %    % Basophils 0 %    % Immature Granulocytes 0 %    NRBCs per 100 WBC 0 <1 /100    Absolute Neutrophils 3.6 1.6 - 8.3 10e3/uL    Absolute Lymphocytes 1.9 0.8 - 5.3 10e3/uL    Absolute Monocytes 0.5 0.0 - 1.3 10e3/uL    Absolute Eosinophils 0.1 0.0 - 0.7 10e3/uL    Absolute Basophils 0.0 0.0 - 0.2 10e3/uL    Absolute Immature Granulocytes 0.0 <=0.4 10e3/uL    Absolute NRBCs 0.0 10e3/uL   US Lower Extremity Venous Duplex Left    Narrative    VENOUS ULTRASOUND LEFT LEG  1/22/2024 10:03 AM     HISTORY: Acute on chronic lower extremity edema, worse on the left,  new thigh pain/tenderness    COMPARISON: None.    FINDINGS:  Examination of the deep veins with graded compression and  color flow Doppler with spectral wave form analysis was performed.      Impression    IMPRESSION: No evidence of deep venous thrombosis.    CHANDA LOERA MD         SYSTEM ID:  [id number]   XR Femur Left 2 Views    Narrative    XR FEMUR LEFT 2 VIEWS, XR PELVIS 1/2 VIEWS 1/22/2024 10:24 AM     HISTORY: Pain in left medial, proximal thigh, into pelvis    COMPARISON: None.         Impression    IMPRESSION: Both hips negative for fracture or dislocation. Slight  degenerative changes both joints. Pelvis negative for fracture. The  left femur is negative for fracture. Postoperative changes total knee  arthroplasty.    WANDER FLORENCE MD          SYSTEM ID:  [id number]   XR Pelvis 1/2 Views    Narrative    XR FEMUR LEFT 2 VIEWS, XR PELVIS 1/2 VIEWS 1/22/2024 10:24 AM     HISTORY: Pain in left medial, proximal thigh, into pelvis    COMPARISON: None.         Impression    IMPRESSION: Both hips negative for fracture or dislocation. Slight  degenerative changes both joints. Pelvis negative for fracture. The  left femur is negative for fracture. Postoperative changes total knee  arthroplasty.    WANDER FLORENCE MD         SYSTEM ID:  [id number]     *Note: Due to a large number of results and/or encounters for the requested time period, some results have not been displayed. A complete set of results can be found in Results Review.       Medications   oxyCODONE-acetaminophen (PERCOCET) 5-325 MG per tablet 1 tablet (1 tablet Oral $Given 1/22/24 1130)       Assessments & Plan (with Medical Decision Making)     I have reviewed the nursing notes.    I have reviewed the findings, diagnosis, plan and need for follow up with the patient.    Medical Decision Making  Melvin Abad is a 89 year old male with history of hypertension, A-fib on Coumadin, prior pontine CVA who presents for evaluation of left leg/thigh pain.  Normal vitals on arrival.  Exam was reassuring overall.  Patient did have mild reproducible tenderness to the anterior/medial thigh, but no overlying skin changes or any evidence of fluctuance or hematoma.  He had some pain with flexion of the hip, but no direct areas of tenderness.  There is no swelling of the leg compared to the right.  No appreciable bony tenderness.  Compartments are compressible.  Distal neurovascular exam, including pulses and capillary refill, is normal.  Etiology of discomfort is somewhat unclear.  Could represent musculoskeletal spasm, electrolyte abnormalities, DVT, or less likely an occult fracture.    Workup was very reassuring.  He has minimal anemia but no leukocytosis.  Mild DELPHINE is present but electrolytes were  otherwise normal.  Plain films of the hip/pelvis and left femur showed no acute fracture.  DVT study was negative.  I still think symptoms are most likely due to a musculoskeletal spasm.  No indication of hematoma or severe vascular disease/occlusion on exam.  Will discharge the patient home with new prescription for Robaxin.  Since the patient resides at an assisted living facility and has assistance with medications, monitoring, will also provide a short course of Percocet for breakthrough discomfort.  Advised that he follow-up with his PCP as soon as possible for recheck and return to the emergency department sooner for any new or acutely worsening symptoms.    Discharge Medication List as of 1/22/2024 11:33 AM        START taking these medications    Details   methocarbamol (ROBAXIN) 500 MG tablet Take 1 tablet (500 mg) by mouth 4 times daily as needed for muscle spasms, Disp-20 tablet, R-0, E-Prescribe      oxyCODONE-acetaminophen (PERCOCET) 5-325 MG tablet Take 1 tablet by mouth every 6 hours as needed for pain, Disp-12 tablet, R-0, E-Prescribe             Final diagnoses:   Pain of left thigh       1/22/2024   [facility] EMERGENCY DEPT       Colten Montoya MD  01/23/24 [id number]

## 2024-01-24 ENCOUNTER — TELEPHONE (OUTPATIENT)
Dept: INTERNAL MEDICINE | Facility: CLINIC | Age: 89
End: 2024-01-24
Payer: COMMERCIAL

## 2024-01-24 NOTE — TELEPHONE ENCOUNTER
Home Care is calling regarding an established patient with [facility].       Requesting orders from: Lazaro Huston  Provider is following patient: Yes  Is this a 60-day recertification request?  No    Orders Requested    Skilled Nursing   Request for resumption in care.     Looking to restart home care after ED visit due to leg pain, weakness, difficulty getting out of bed, and unable to stand without assistance.     Physical Therapy  Request for initial evaluation and treatment (one time)       Information was gathered and will be sent to provider for review.  RN will contact Home Care with information after provider review.  Confirmed ok to leave a detailed message with call back.  Contact information confirmed and updated as needed.    Yancy Peacock RN

## 2024-01-24 NOTE — TELEPHONE ENCOUNTER
Called and notified [facility] of the approved orders per PCP documentation below.     Deloris Cummings, BSN, RN

## 2024-01-25 ENCOUNTER — LAB REQUISITION (OUTPATIENT)
Dept: LAB | Facility: CLINIC | Age: 89
End: 2024-01-25
Payer: COMMERCIAL

## 2024-01-25 DIAGNOSIS — I48.91 UNSPECIFIED ATRIAL FIBRILLATION (H): ICD-10-CM

## 2024-01-25 DIAGNOSIS — I48.20 CHRONIC ATRIAL FIBRILLATION, UNSPECIFIED (H): ICD-10-CM

## 2024-01-25 DIAGNOSIS — Z79.01 LONG TERM (CURRENT) USE OF ANTICOAGULANTS: ICD-10-CM

## 2024-01-26 ENCOUNTER — APPOINTMENT (OUTPATIENT)
Dept: CT IMAGING | Facility: CLINIC | Age: 89
End: 2024-01-26
Attending: EMERGENCY MEDICINE
Payer: COMMERCIAL

## 2024-01-26 ENCOUNTER — HOSPITAL ENCOUNTER (OUTPATIENT)
Facility: CLINIC | Age: 89
Setting detail: OBSERVATION
Discharge: NURSING, REHAB OR LONG-TERM CARE | End: 2024-01-26
Attending: EMERGENCY MEDICINE | Admitting: INTERNAL MEDICINE
Payer: COMMERCIAL

## 2024-01-26 ENCOUNTER — DOCUMENTATION ONLY (OUTPATIENT)
Dept: ANTICOAGULATION | Facility: CLINIC | Age: 89
End: 2024-01-26

## 2024-01-26 ENCOUNTER — ANTICOAGULATION THERAPY VISIT (OUTPATIENT)
Dept: ANTICOAGULATION | Facility: CLINIC | Age: 89
End: 2024-01-26

## 2024-01-26 ENCOUNTER — APPOINTMENT (OUTPATIENT)
Dept: PHYSICAL THERAPY | Facility: CLINIC | Age: 89
End: 2024-01-26
Attending: EMERGENCY MEDICINE
Payer: COMMERCIAL

## 2024-01-26 ENCOUNTER — LAB REQUISITION (OUTPATIENT)
Dept: LAB | Facility: CLINIC | Age: 89
End: 2024-01-26
Payer: COMMERCIAL

## 2024-01-26 VITALS
DIASTOLIC BLOOD PRESSURE: 78 MMHG | WEIGHT: 207 LBS | OXYGEN SATURATION: 94 % | TEMPERATURE: 98.2 F | RESPIRATION RATE: 18 BRPM | BODY MASS INDEX: 30.13 KG/M2 | SYSTOLIC BLOOD PRESSURE: 119 MMHG | HEART RATE: 61 BPM

## 2024-01-26 DIAGNOSIS — S90.521D: ICD-10-CM

## 2024-01-26 DIAGNOSIS — J30.2 SEASONAL ALLERGIC RHINITIS, UNSPECIFIED TRIGGER: ICD-10-CM

## 2024-01-26 DIAGNOSIS — I48.91 UNSPECIFIED ATRIAL FIBRILLATION (H): ICD-10-CM

## 2024-01-26 DIAGNOSIS — S70.12XA ILIOPSOAS MUSCLE HEMATOMA, LEFT, INITIAL ENCOUNTER: ICD-10-CM

## 2024-01-26 DIAGNOSIS — M79.605 PAIN OF LEFT LOWER EXTREMITY: ICD-10-CM

## 2024-01-26 DIAGNOSIS — I48.11 LONGSTANDING PERSISTENT ATRIAL FIBRILLATION (H): ICD-10-CM

## 2024-01-26 DIAGNOSIS — H57.89 EYE IRRITATION: ICD-10-CM

## 2024-01-26 DIAGNOSIS — I50.813 ACUTE ON CHRONIC RIGHT-SIDED HEART FAILURE (H): ICD-10-CM

## 2024-01-26 DIAGNOSIS — J34.89 NASAL DRYNESS: ICD-10-CM

## 2024-01-26 DIAGNOSIS — E55.9 VITAMIN D DEFICIENCY: ICD-10-CM

## 2024-01-26 DIAGNOSIS — H04.123 DRY EYES: ICD-10-CM

## 2024-01-26 DIAGNOSIS — Z79.01 LONG TERM CURRENT USE OF ANTICOAGULANT THERAPY: Primary | ICD-10-CM

## 2024-01-26 DIAGNOSIS — K21.00 GASTROESOPHAGEAL REFLUX DISEASE WITH ESOPHAGITIS WITHOUT HEMORRHAGE: ICD-10-CM

## 2024-01-26 DIAGNOSIS — J30.2 SEASONAL ALLERGIC RHINITIS: ICD-10-CM

## 2024-01-26 DIAGNOSIS — E78.5 HYPERLIPIDEMIA LDL GOAL <100: ICD-10-CM

## 2024-01-26 DIAGNOSIS — I48.20 CHRONIC ATRIAL FIBRILLATION (H): ICD-10-CM

## 2024-01-26 DIAGNOSIS — M79.652 PAIN OF LEFT THIGH: ICD-10-CM

## 2024-01-26 DIAGNOSIS — Z79.01 LONG TERM (CURRENT) USE OF ANTICOAGULANTS: ICD-10-CM

## 2024-01-26 DIAGNOSIS — Z79.01 LONG TERM CURRENT USE OF ANTICOAGULANT THERAPY: ICD-10-CM

## 2024-01-26 DIAGNOSIS — R79.1 SUPRATHERAPEUTIC INR: ICD-10-CM

## 2024-01-26 DIAGNOSIS — R53.1 WEAKNESS: ICD-10-CM

## 2024-01-26 DIAGNOSIS — I10 ESSENTIAL HYPERTENSION, BENIGN: ICD-10-CM

## 2024-01-26 DIAGNOSIS — I48.20 CHRONIC ATRIAL FIBRILLATION, UNSPECIFIED (H): ICD-10-CM

## 2024-01-26 DIAGNOSIS — K59.00 CONSTIPATION, UNSPECIFIED CONSTIPATION TYPE: ICD-10-CM

## 2024-01-26 DIAGNOSIS — E11.9 TYPE 2 DIABETES MELLITUS WITHOUT COMPLICATION, WITHOUT LONG-TERM CURRENT USE OF INSULIN (H): ICD-10-CM

## 2024-01-26 DIAGNOSIS — S70.12XD HEMATOMA OF LEFT ILIOPSOAS MUSCLE, SUBSEQUENT ENCOUNTER: Primary | ICD-10-CM

## 2024-01-26 DIAGNOSIS — R33.9 URINARY RETENTION: ICD-10-CM

## 2024-01-26 LAB
ALBUMIN UR-MCNC: NEGATIVE MG/DL
APPEARANCE UR: CLEAR
BILIRUB UR QL STRIP: NEGATIVE
COLOR UR AUTO: ABNORMAL
GLUCOSE UR STRIP-MCNC: NEGATIVE MG/DL
HGB UR QL STRIP: NEGATIVE
HOLD SPECIMEN: NORMAL
HOLD SPECIMEN: NORMAL
HYALINE CASTS: 1 /LPF
INR PPP: 4.09 (ref 0.85–1.15)
KETONES UR STRIP-MCNC: NEGATIVE MG/DL
LEUKOCYTE ESTERASE UR QL STRIP: NEGATIVE
MUCOUS THREADS #/AREA URNS LPF: PRESENT /LPF
NITRATE UR QL: NEGATIVE
PH UR STRIP: 5 [PH] (ref 5–7)
RBC URINE: <1 /HPF
SP GR UR STRIP: 1.01 (ref 1–1.03)
UROBILINOGEN UR STRIP-MCNC: NORMAL MG/DL
WBC URINE: <1 /HPF

## 2024-01-26 PROCEDURE — 250N000011 HC RX IP 250 OP 636: Performed by: EMERGENCY MEDICINE

## 2024-01-26 PROCEDURE — 99285 EMERGENCY DEPT VISIT HI MDM: CPT | Performed by: EMERGENCY MEDICINE

## 2024-01-26 PROCEDURE — G0378 HOSPITAL OBSERVATION PER HR: HCPCS

## 2024-01-26 PROCEDURE — 73700 CT LOWER EXTREMITY W/O DYE: CPT | Mod: LT

## 2024-01-26 PROCEDURE — 85610 PROTHROMBIN TIME: CPT | Mod: ORL | Performed by: INTERNAL MEDICINE

## 2024-01-26 PROCEDURE — P9604 ONE-WAY ALLOW PRORATED TRIP: HCPCS | Mod: ORL | Performed by: INTERNAL MEDICINE

## 2024-01-26 PROCEDURE — 96374 THER/PROPH/DIAG INJ IV PUSH: CPT | Performed by: EMERGENCY MEDICINE

## 2024-01-26 PROCEDURE — 72192 CT PELVIS W/O DYE: CPT

## 2024-01-26 PROCEDURE — 72131 CT LUMBAR SPINE W/O DYE: CPT

## 2024-01-26 PROCEDURE — 36415 COLL VENOUS BLD VENIPUNCTURE: CPT | Mod: ORL | Performed by: INTERNAL MEDICINE

## 2024-01-26 PROCEDURE — G0378 HOSPITAL OBSERVATION PER HR: HCPCS | Performed by: PHYSICAL THERAPIST

## 2024-01-26 PROCEDURE — 250N000013 HC RX MED GY IP 250 OP 250 PS 637: Performed by: EMERGENCY MEDICINE

## 2024-01-26 PROCEDURE — 97161 PT EVAL LOW COMPLEX 20 MIN: CPT | Mod: GP | Performed by: PHYSICAL THERAPIST

## 2024-01-26 PROCEDURE — 81001 URINALYSIS AUTO W/SCOPE: CPT | Performed by: EMERGENCY MEDICINE

## 2024-01-26 PROCEDURE — 99285 EMERGENCY DEPT VISIT HI MDM: CPT | Mod: 25 | Performed by: EMERGENCY MEDICINE

## 2024-01-26 RX ORDER — CARBOXYMETHYLCELLULOSE SODIUM 5 MG/ML
1 SOLUTION/ DROPS OPHTHALMIC 4 TIMES DAILY PRN
Qty: 30 EACH | Refills: 0 | Status: SHIPPED | OUTPATIENT
Start: 2024-01-26 | End: 2024-05-19

## 2024-01-26 RX ORDER — WARFARIN SODIUM 5 MG/1
TABLET ORAL
Qty: 3 TABLET | Refills: 0 | Status: SHIPPED | OUTPATIENT
Start: 2024-01-26 | End: 2024-01-26

## 2024-01-26 RX ORDER — GABAPENTIN 100 MG/1
200 CAPSULE ORAL 2 TIMES DAILY
Qty: 120 CAPSULE | Refills: 0 | Status: SHIPPED | OUTPATIENT
Start: 2024-01-26 | End: 2024-02-28

## 2024-01-26 RX ORDER — LORATADINE 10 MG/1
10 TABLET ORAL DAILY
Qty: 30 TABLET | Refills: 0 | Status: SHIPPED | OUTPATIENT
Start: 2024-01-26

## 2024-01-26 RX ORDER — FENTANYL CITRATE 50 UG/ML
50 INJECTION, SOLUTION INTRAMUSCULAR; INTRAVENOUS ONCE
Status: COMPLETED | OUTPATIENT
Start: 2024-01-26 | End: 2024-01-26

## 2024-01-26 RX ORDER — METOPROLOL SUCCINATE 50 MG/1
50 TABLET, EXTENDED RELEASE ORAL DAILY
Qty: 30 TABLET | Refills: 0 | Status: SHIPPED | OUTPATIENT
Start: 2024-01-26

## 2024-01-26 RX ORDER — POLYETHYLENE GLYCOL 3350 17 G/17G
8.5 POWDER, FOR SOLUTION ORAL DAILY PRN
Qty: 510 G | Refills: 0 | Status: SHIPPED | OUTPATIENT
Start: 2024-01-26

## 2024-01-26 RX ORDER — WARFARIN SODIUM 2.5 MG/1
2.5 TABLET ORAL
COMMUNITY
Start: 2024-01-19 | End: 2024-01-26

## 2024-01-26 RX ORDER — OXYCODONE HYDROCHLORIDE 10 MG/1
TABLET ORAL
Qty: 12 TABLET | Refills: 0 | Status: SHIPPED | OUTPATIENT
Start: 2024-01-26 | End: 2024-01-30

## 2024-01-26 RX ORDER — ACETAMINOPHEN 500 MG
500 TABLET ORAL EVERY 6 HOURS PRN
Qty: 100 TABLET | Refills: 0 | Status: SHIPPED | OUTPATIENT
Start: 2024-01-26 | End: 2024-02-13

## 2024-01-26 RX ORDER — METHOCARBAMOL 500 MG/1
500 TABLET, FILM COATED ORAL 4 TIMES DAILY
Status: DISCONTINUED | OUTPATIENT
Start: 2024-01-26 | End: 2024-01-26 | Stop reason: HOSPADM

## 2024-01-26 RX ORDER — WARFARIN SODIUM 5 MG/1
TABLET ORAL
Qty: 30 TABLET | Refills: 0 | Status: SHIPPED | OUTPATIENT
Start: 2024-01-26 | End: 2024-01-29

## 2024-01-26 RX ORDER — HYDROCODONE BITARTRATE AND ACETAMINOPHEN 5; 325 MG/1; MG/1
1 TABLET ORAL ONCE
Status: COMPLETED | OUTPATIENT
Start: 2024-01-26 | End: 2024-01-26

## 2024-01-26 RX ORDER — LISINOPRIL 10 MG/1
10 TABLET ORAL DAILY
Qty: 30 TABLET | Refills: 0 | Status: SHIPPED | OUTPATIENT
Start: 2024-01-26

## 2024-01-26 RX ORDER — ACETAMINOPHEN 500 MG
500 TABLET ORAL EVERY 6 HOURS PRN
COMMUNITY
End: 2024-01-26

## 2024-01-26 RX ORDER — FUROSEMIDE 20 MG
30 TABLET ORAL DAILY
Qty: 45 TABLET | Refills: 0 | Status: SHIPPED | OUTPATIENT
Start: 2024-01-26 | End: 2024-05-01

## 2024-01-26 RX ORDER — METHOCARBAMOL 500 MG/1
500 TABLET, FILM COATED ORAL 4 TIMES DAILY PRN
Qty: 15 TABLET | Refills: 0 | Status: SHIPPED | OUTPATIENT
Start: 2024-01-26 | End: 2024-02-13

## 2024-01-26 RX ORDER — TAMSULOSIN HYDROCHLORIDE 0.4 MG/1
0.4 CAPSULE ORAL DAILY
Qty: 30 CAPSULE | Refills: 0 | Status: SHIPPED | OUTPATIENT
Start: 2024-01-26

## 2024-01-26 RX ORDER — SIMVASTATIN 40 MG
40 TABLET ORAL AT BEDTIME
Qty: 30 TABLET | Refills: 0 | Status: SHIPPED | OUTPATIENT
Start: 2024-01-26

## 2024-01-26 RX ORDER — ECHINACEA PURPUREA EXTRACT 125 MG
2 TABLET ORAL DAILY PRN
Qty: 30 ML | Refills: 0 | Status: SHIPPED | OUTPATIENT
Start: 2024-01-26

## 2024-01-26 RX ORDER — CALCIUM CARBONATE 500 MG/1
1 TABLET, CHEWABLE ORAL 3 TIMES DAILY PRN
Qty: 100 TABLET | Refills: 0 | Status: SHIPPED | OUTPATIENT
Start: 2024-01-26 | End: 2024-05-19

## 2024-01-26 RX ADMIN — HYDROCODONE BITARTRATE AND ACETAMINOPHEN 1 TABLET: 5; 325 TABLET ORAL at 11:19

## 2024-01-26 RX ADMIN — METHOCARBAMOL TABLETS 500 MG: 500 TABLET, COATED ORAL at 12:07

## 2024-01-26 RX ADMIN — FENTANYL CITRATE 50 MCG: 50 INJECTION, SOLUTION INTRAMUSCULAR; INTRAVENOUS at 12:58

## 2024-01-26 ASSESSMENT — ACTIVITIES OF DAILY LIVING (ADL)
ADLS_ACUITY_SCORE: 37

## 2024-01-26 NOTE — PROGRESS NOTES
ANTICOAGULATION MANAGEMENT     Melvin Abad 89 year old male is on warfarin with supratherapeutic INR result. (Goal INR 2.0-3.0)    Recent labs: (last 7 days)     01/26/24  0725   INR 4.09*       ASSESSMENT     Source(s): Chart Review and Home Care/Facility Nurse     Warfarin doses taken: Warfarin taken as instructed  Diet: No new diet changes identified  Medication/supplement changes: START taking:  methocarbamol (ROBAXIN)  oxyCODONE-acetaminophen (PERCOCET)  New illness, injury, or hospitalization: Yes: ER today, non weight baring on the left leg.  Signs or symptoms of bleeding or clotting: No  Previous result: Therapeutic last 2(+) visits  Additional findings:  Molly is noting that they are looking into a TCU or long term care since he needs assistance with transfers.  She feels that he will be admitted.       PLAN     Recommended plan for temporary change(s) affecting INR     Dosing Instructions: hold 1.5 doses then continue your current warfarin dose with next INR in 3 days       Summary  As of 1/26/2024      Full warfarin instructions:  1/26: Hold; 1/27: 2.5 mg; Otherwise 2.5 mg every Mon, Wed, Fri; 5 mg all other days   Next INR check:  1/29/2024               Telephone call with Molly facility nurse who agrees to plan and repeated back plan correctly    Rx sent into the pharmacy with update sig and recheck date.    AVS faxed to facility and fax number verified.      Orders given to  Homecare nurse/facility to recheck    Education provided:   Directions as noted or per ER discharge note.    Plan made with [facility] Pharmacist Molly Burciaga, RN  Anticoagulation Clinic  1/26/2024    _______________________________________________________________________     Anticoagulation Episode Summary       Current INR goal:  2.0-3.0   TTR:  98.7% (1 y)   Target end date:  Indefinite   Send INR reminders to:  LINDSEY COLON    Indications    Atrial fibrillation (Resolved) [I48.91]  Long-term  (current) use of anticoagulants [Z79.01] [Z79.01]  Chronic atrial fibrillation (H) [I48.20]             Comments:  [place] SINGH/nIgrid Sanchez [phone] fax: [phone] & send to [facility] pharmacy             Anticoagulation Care Providers       Provider Role Specialty Phone number    Lazaro Huston MD Referring Internal Medicine [phone]    Fransisco Cordon MD Referring Family Medicine

## 2024-01-26 NOTE — PROGRESS NOTES
Patient is being discharged to [facility] TCU following ED visit and iliopsoas hematoma evaluation.  Dr. Yumiko Abad, due to issues with Epic access to needed ordersets, was unable to complete the needed orders for TCU and  electronically signed discharge order, therefore I assisted Dr. Yumiko Abda by ordering the orderset and discharge order with her direct oversight.  Medical decisions regarding medications needed at discharge as well as orders for TCU were performed by Dr. Yumiko Abad.  I did not personally see this patient and was not directly involved in his care.        Electronically Signed:  Staci Abad MD

## 2024-01-26 NOTE — DISCHARGE INSTRUCTIONS
You may take 1 tablet of the Robaxin up to 4 times daily as needed to help with muscle spasm    You may take 1 to 2 tablets of the oxycodone every 6 hours as needed to help with acute severe pain.  Use caution as this medicine can make you drowsy, so do not drive or drink alcohol if taking this medicine    May start physical therapy immediately    HOLD your warfarin today and tomorrow.  May take 2.5 mg by mouth on Sunday 1/28/24.  Then resume your regular dosing and have your next INR checked on 1/29/2024    Continue all your other medicines as previously prescribed

## 2024-01-26 NOTE — MEDICATION SCRIBE - ADMISSION MEDICATION HISTORY
Medication Scribe Admission Medication History    Admission medication history is complete. The information provided in this note is only as accurate as the sources available at the time of the update.    Information Source(s): Patient, Caregiver, and CareEverywhere/SureScripts via in-person and phone    Pertinent Information: patient's INR was quite high, next one scheduled for 01/29/24. Patient takes warfarin at 5pm, was ordered to HOLD today (01/26/24) and take 2.5mg tomorrow and Sunday. INR Monday. Patient wears a wrap on one leg and a compression sock on the other.     Changes made to PTA medication list:  Added: tylenol  Deleted: BG testing strips  Changed: warfarin regimen    Medication Affordability:       Allergies reviewed with patient and updates made in EHR: yes    Medication History Completed By: ROHIT ODOM 1/26/2024 2:10 PM    PTA Med List   Medication Sig Last Dose    acetaminophen (TYLENOL) 500 MG tablet Take 500 mg by mouth every 6 hours as needed for pain 1/25/2024 at hs    carboxymethylcellulose PF (REFRESH PLUS) 0.5 % ophthalmic solution Place 1 drop into both eyes 4 times daily as needed for dry eyes Patient self-administers 1/25/2024 at hs    cholecalciferol (VITAMIN D3) 25 mcg (1000 units) capsule Take 1 capsule by mouth daily 1/26/2024 at 0624    furosemide (LASIX) 20 MG tablet TAKE ONE AND ONE-HALF TABLET BY MOUTH ONCE DAILY (Patient taking differently: Take 30 mg by mouth daily) 1/26/2024 at 0624    gabapentin (NEURONTIN) 100 MG capsule TAKE TWO CAPSULES BY MOUTH TWICE A DAY (Patient taking differently: Take 200 mg by mouth 2 times daily) 1/26/2024 at 0624    ketotifen (ZADITOR) 0.025 % ophthalmic solution Place 1 drop into both eyes 2 times daily as needed for dry eyes (Patient taking differently: Place 1 drop into both eyes 2 times daily as needed for dry eyes Patient self-administers) 1/25/2024 at hs    lisinopril (ZESTRIL) 10 MG tablet TAKE ONE TABLET BY MOUTH ONCE DAILY (Patient  taking differently: Take 10 mg by mouth daily) 1/26/2024 at 0624    loratadine (CLARITIN) 10 MG tablet Take 1 tablet (10 mg) by mouth daily 1/26/2024 at 0624    methocarbamol (ROBAXIN) 500 MG tablet Take 1 tablet (500 mg) by mouth 4 times daily as needed for muscle spasms 1/25/2024 at 2352    metoprolol succinate ER (TOPROL XL) 50 MG 24 hr tablet TAKE ONE TABLET BY MOUTH ONCE DAILY (Patient taking differently: Take 50 mg by mouth daily) 1/26/2024 at 0624    order for DME Equipment being ordered: side rails/grab bars Unknown at unkn    order for DME Equipment being ordered: bedside commode Unknown at unkn    order for DME Equipment being ordered: 4 legged walker Unknown at unkn    oxyCODONE-acetaminophen (PERCOCET) 5-325 MG tablet Take 1 tablet by mouth every 6 hours as needed for pain 1/25/2024 at 2352    polyethylene glycol (MIRALAX) 17 GM/Dose powder Take 8.5 g by mouth daily as needed for constipation Mix and hand to patient, Patient self-administers (he knows how much he needs to keep regular) 1/26/2024 at 0624    psyllium (METAMUCIL/KONSYL) 58.6 % powder Take 18 g (1 Tablespoonful) by mouth daily (Patient taking differently: Take 1 Tablespoonful by mouth daily as needed for constipation Mix and hand to patient, Patient self-administers (he knows how much he needs to keep regular)) 1/26/2024 at 0624    simvastatin (ZOCOR) 40 MG tablet TAKE ONE TABLET BY MOUTH AT BEDTIME (Patient taking differently: Take 40 mg by mouth at bedtime) 1/25/2024 at 1820    sodium chloride (OCEAN) 0.65 % nasal spray Spray 2 sprays in nostril daily as needed for congestion (Patient taking differently: Spray 2 sprays in nostril daily as needed for congestion Patient self-administers) 1/25/2024 at hs    SSD 1 % external cream APPLY TO AFFECTED AREA(S) TOPICALLY ONCE DAILY Unknown at unkn    tamsulosin (FLOMAX) 0.4 MG capsule TAKE ONE CAPSULE BY MOUTH ONCE DAILY (Patient taking differently: Take 0.4 mg by mouth daily) 1/26/2024 at 0624     warfarin ANTICOAGULANT (COUMADIN) 2.5 MG tablet Take 2.5 mg by mouth HOLD 01/26/24, at 5pm on 01/27/2024 & 01/28/2024 Take 2.5mg 01/27/2024 & 01/28/2024 Take 2.5mg ,  next INR 01/29/2024 1/24/2024 at 1636    warfarin ANTICOAGULANT (COUMADIN) 5 MG tablet Take 1/26: Hold; 1/27: 2.5 mg; Otherwise 2.5 mg every Mon, Wed, Fri; 5 mg all other days: Next INR check 01/29/24 (Patient taking differently: Take 5 mg by mouth HOLD 01/26/24, at 5pm on 01/27/2024 & 01/28/2024 Take 2.5mg 01/27/2024 & 01/28/2024 Take 2.5mg ,  next INR 01/29/2024) 1/25/2024 at 1628

## 2024-01-26 NOTE — ED TRIAGE NOTES
Comes in with left groin pain.  Was seen in ED earlier this week and states pain has not improved.  At assisted living and normally can bear weight and pivot to wheelchair but was now unable to bear weight on left leg due to increase in pain with movement.  Staff at assisted living is requesting order for physical therapy. Here with son

## 2024-01-26 NOTE — PROGRESS NOTES
ANTICOAGULATION  MANAGEMENT: Discharge Review    Melvin Abad chart reviewed for anticoagulation continuity of care    Emergency room visit on 1/26/24 for Pain on thigh.    Discharge disposition: Home    Results:    Recent labs: (last 7 days)     01/26/24  0725   INR 4.09*     Anticoagulation inpatient management:     not applicable     Anticoagulation discharge instructions:     Warfarin dosing:  INR result reviewed and dose by anticoagulation department   Bridging: No   INR goal change: No      Medication changes affecting anticoagulation: Yes: Robaxin, oxycodone    Additional factors affecting anticoagulation: No     PLAN     Dosing instruction was faxed to facility earlier today.     Patient not contacted    No adjustment to Anticoagulation Calendar was required    Chanel Baldwin RN

## 2024-01-26 NOTE — ED NOTES
Bed: ED11  Expected date:   Expected time:   Means of arrival:   Comments:  [place]   89 y/o M  Left groin and leg pain

## 2024-01-26 NOTE — PROGRESS NOTES
Care Management Discharge Note    Discharge Date: 01/27/2024     Discharge Disposition:  [facility] (Main Phone: [phone] Admissions Phone: [phone] Fax: [phone])     Discharge Services:  TCU    Discharge DME:  None    Discharge Transportation: health plan transportation, agency    Private pay costs discussed: Not applicable    Does the patient's insurance plan have a 3 day qualifying hospital stay waiver?  No    PAS Confirmation Code: [id number]  Patient/family educated on Medicare website which has current facility and service quality ratings:      Education Provided on the Discharge Plan:    Persons Notified of Discharge Plans: sonHarshad  Patient/Family in Agreement with the Plan:      Handoff Referral Completed: Yes    Additional Information:  Patient accepted at [facility] (Main Phone: [phone] Admissions Phone: [phone] Fax: [phone]).    Patric, at [facility], will transport today at 1530 with [facility] bus.     Patient's son, Harshad, updated by phone and in agreement with discharge plan.    MILES Olivarez  [facility] [phone]/ [facility] [phone]  Care Management

## 2024-01-26 NOTE — CONSULTS
Care Management Initial Consult    General Information  Assessment completed with: Caregiver, sonHarshad  Type of CM/SW Visit: Offer D/C Planning    Primary Care Provider verified and updated as needed:     Readmission within the last 30 days:        Reason for Consult: discharge planning  Advance Care Planning:            Communication Assessment  Patient's communication style: spoken language (English or Bilingual)           Cognitive  Cognitive/Neuro/Behavioral: WDL                      Living Environment:   People in home:       Current living Arrangements:        Able to return to prior arrangements:         Family/Social Support:  Care provided by:    Provides care for:                  Description of Support System:           Current Resources:   Patient receiving home care services:       Community Resources:    Equipment currently used at home: walker, rolling, wheelchair, manual  Supplies currently used at home:      Employment/Financial:  Employment Status:          Financial Concerns:             Does the patient's insurance plan have a 3 day qualifying hospital stay waiver?  No    Lifestyle & Psychosocial Needs:  Social Determinants of Health     Food Insecurity: Low Risk  (11/22/2023)    Food Insecurity     Within the past 12 months, did you worry that your food would run out before you got money to buy more?: No     Within the past 12 months, did the food you bought just not last and you didn t have money to get more?: No   Depression: Not at risk (1/20/2023)    PHQ-2     PHQ-2 Score: 0   Housing Stability: Low Risk  (11/22/2023)    Housing Stability     Do you have housing? : Yes     Are you worried about losing your housing?: No   Tobacco Use: Medium Risk (1/22/2024)    Patient History     Smoking Tobacco Use: Former     Smokeless Tobacco Use: Never     Passive Exposure: Not on file   Financial Resource Strain: Low Risk  (11/22/2023)    Financial Resource Strain     Within the past 12 months, have  you or your family members you live with been unable to get utilities (heat, electricity) when it was really needed?: No   Alcohol Use: Not on file   Transportation Needs: Low Risk  (11/22/2023)    Transportation Needs     Within the past 12 months, has lack of transportation kept you from medical appointments, getting your medicines, non-medical meetings or appointments, work, or from getting things that you need?: No   Physical Activity: Not on file   Interpersonal Safety: Not on file   Stress: Not on file   Social Connections: Not on file       Functional Status:  Prior to admission patient needed assistance:         Mental Health Status:          Chemical Dependency Status:              Values/Beliefs:  Spiritual, Cultural Beliefs, Gnosticist Practices, Values that affect care:                 Additional Information:  Consult from ED for discharge planning. Patient lives at [facility]. Was in the ED a couple days ago with (L) Thigh/Groin pain. Was discharged back to [facility].  [facility].    Patient back in the ED with pain in (L) LE. Patient is not able to return to [facility] in current condition.    Referral sent to [facility] (Main Phone: [phone] Admissions Phone: [phone] Fax: [phone])  for TCU placement.    MILES Olivarez  [facility] [phone]/ [place] [phone]  Care Management

## 2024-01-26 NOTE — PROGRESS NOTES
"   01/26/24 1400   Appointment Info   Signing Clinician's Name / Credentials (PT) Mar Gonzalez, PT, DPT       Present no   Living Environment   People in Home facility resident   Current Living Arrangements apartment;residential facility  ([facility])   Home Accessibility no concerns   Transportation Anticipated health plan transportation;agency   Self-Care   Usual Activity Tolerance fair   Current Activity Tolerance fair   Regular Exercise No   Equipment Currently Used at Home walker, rolling;wheelchair, manual   Fall history within last six months yes   Number of times patient has fallen within last six months 1   General Information   Onset of Illness/Injury or Date of Surgery 01/26/24   Referring Physician Dr. Yumiko Abad   Patient/Family Therapy Goals Statement (PT) Get some rehab   Pertinent History of Current Problem (include personal factors and/or comorbidities that impact the POC) Per chart \"Comes in with left groin pain.  Was seen in ED earlier this week and states pain has not improved.  At assisted living and normally can bear weight and pivot to wheelchair but was now unable to bear weight on left leg due to increase in pain with movement.  Staff at assisted living is requesting order for physical therapy.\"  Pt states he can't doing much with the L LE due to the pain.   Weight-Bearing Status - LUE full weight-bearing   Weight-Bearing Status - RUE full weight-bearing   Weight-Bearing Status - LLE full weight-bearing   Weight-Bearing Status - RLE full weight-bearing   General Observations Resting in the bed in ED.  2 sons present   Cognition   Affect/Mental Status (Cognition) WFL   Orientation Status (Cognition) oriented x 4   Follows Commands (Cognition) WFL   Pain Assessment   Patient Currently in Pain   (Yes, L LE 5/10)   Integumentary/Edema   Integumentary/Edema no deficits were identifed   Posture    Posture Forward head position;Protracted shoulders;Kyphosis "   Range of Motion (ROM)   ROM Comment R LE is WNL, B UEs WNL.  L LE hip motion 90 degs, knee extension is lacking about 10 degs.   Strength (Manual Muscle Testing)   Strength Comments Able to perform all UE motions just fine and good strength.  R LE has 4/5 MMT with hip flexion, knee flexion, and knee extension.  L LE 2/5 for hip flexion, 3/5 knee flexion, 2/5 knee extension.   Bed Mobility   Bed Mobility supine-sit;sit-supine   Supine-Sit [place] (Bed Mobility) moderate assist (50% patient effort)   Sit-Supine [place] (Bed Mobility) moderate assist (50% patient effort)   Bed Mobility Limitations decreased ability to use legs for bridging/pushing   Impairments Contributing to Impaired Bed Mobility pain;decreased strength   Assistive Device (Bed Mobility)   (Assistance by PT)   Comment, (Bed Mobility) Needed help scooting to EOB.   Transfers   Transfers sit-stand transfer   Transfer Safety Concerns Noted losing balance backward   Impairments Contributing to Impaired Transfers pain;decreased strength   Sit-Stand Transfer   Sit-Stand [place] (Transfers) minimum assist (75% patient effort)   Assistive Device (Sit-Stand Transfers) walker, front-wheeled   Comment, (Sit-Stand Transfer) PT had to support the front of walker as pt was leaning backwards and pulling walker with him.  Pt had to stabilize pt on the back side due to leaning back.  Not shifting much weight into L LE and does not fully extend the L knee.   Gait/Stairs (Locomotion)   Comment, (Gait/Stairs) Unable to attempt due to balance and weakness.  Pt was not able to perform any marching in place.   Balance   Balance Comments Losing balance backwards without support.   Sensory Examination   Sensory Perception WNL   Coordination   Coordination no deficits were identified   Muscle Tone   Muscle Tone Comments Increased tone to L HS, pt reports this is normal from stroke.   Clinical Impression   Criteria for Skilled Therapeutic Intervention  Evaluation only   PT Diagnosis (PT) Weakness, decreased functional mobility   Influenced by the following impairments Pain, weakness   Functional limitations due to impairments Transfers, bed mobility   Clinical Presentation (PT Evaluation Complexity) stable   Clinical Presentation Rationale Clinical judgement, assessment   Clinical Decision Making (Complexity) low complexity   Risk & Benefits of therapy have been explained evaluation/treatment results reviewed;risks/benefits reviewed;participants included;patient;son   Clinical Impression Comments Pt is a 89 year old male with complaints of L LE pain.  Onset started earlier this week.  Started losing strength and requiring more assistance at [facility] than what they could safely provide for him.  Pt typically tranfsers to and from a wheelchair using a FWW IND and then propells himself with his R LE around the facility.  Pt currently is requiring modA x 1 for bed mobility and sit/stand transfers.  Pt would benefit from skilled PT services to focus on strength and balance in order to improve functional mobility in a TCU setting.   PT Total Evaluation Time   PT Eval, Low Complexity Minutes (53980) 20   PT Discharge Planning   PT Plan Eval only   PT Discharge Recommendation (DC Rec) Transitional Care Facility   PT Rationale for DC Rec Pt is below baseline level of function.  Unable to perform bed mobility and transfers without modA x 1.  Pt is demonstrating loss of balance backwards when standing due to not being able to place full weight on L LE.  Pt is not safe to return to [facility] apartment and recommend he have therapy in a TCU to improve strength and balance prior to returning to [facility]  Pt's family states they were not able to get him in the vehicle themselves this morning to get him to the ED, so pt will need medical transportation.   PT Brief overview of current status ModA x 1 bed mobility and transfers with FWW.  Loss of balance backwards.   Total Session Time   Total  Session Time (sum of timed and untimed services) 20   Thank you for your referral.    Mar Gonzalez, PT, DPT  [facility]  [phone]

## 2024-01-26 NOTE — ED PROVIDER NOTES
History     Chief Complaint   Patient presents with    Groin Pain     HPI  Melvin Abad is a 89 year old male who presents with son over concern of groin and leg pain.  Was seen earlier this week for similar complaint.  He lives at an assisted living facility, and is having so much pain that they are unable to assist him with his ADLs.  Normally is able to bear weight so that he can pivot or transfer.  Has not had any reported fall or new injury.  Staff at the assisted living facility have sent him back to the ED as they need an order for physical therapy, and suspect the patient may need to go to a transitional care facility.  His son states that Melvin has not had any new injuries but he is having so much pain that he cannot bear weight to assist with his transfers.  Has not been running a fever, no chest pain or shortness of breath, no swelling in his leg, no pain or burning with urination, no urinary retention.        Chief Complaint   Patient presents with    Leg Pain      HPI  Melvin Abad is a 89 year old male with history of hypertension, A-fib on Coumadin, prior pontine CVA who presents for evaluation of left leg/thigh pain.  Symptoms started 2 days ago without obvious injury or strain.  Discomfort is mostly localized to the anterior/medial thigh and it radiates up towards the groin.  This is worsened with flexion at the hip and discomfort has made ambulating somewhat difficult.  Patient otherwise feels well, denies any associated fever, chills, chest pain or shortness of breath, abdominal pain, signs of bruising/bleeding, lower extremity edema or calf tenderness, claudication, other complaints today.     Allergies:  No Known Allergies     Problem List:          Patient Active Problem List     Diagnosis Date Noted    Chronic atrial fibrillation (H) 05/01/2020       Priority: Medium    Osteoarthritis of glenohumeral joint, left 02/24/2020       Priority: Medium    Longstanding persistent atrial  fibrillation (H) 01/23/2020       Priority: Medium    Anticoagulation goal of INR 2 to 3 01/23/2020       Priority: Medium    Slow transit constipation 01/23/2020       Priority: Medium    Sleep disturbances 01/23/2020       Priority: Medium    Right pontine stroke (H) 01/23/2020       Priority: Medium    Dry eyes 01/23/2020       Priority: Medium    Restless legs syndrome (RLS) 01/23/2020       Priority: Medium    Pain of right lower leg 03/31/2017       Priority: Medium    Contusion of right lower extremity 03/31/2017       Priority: Medium    Leg pain 03/31/2017       Priority: Medium    Long-term (current) use of anticoagulants [Z79.01] 04/05/2016       Priority: Medium    Obesity (BMI 30-39.9) 10/28/2015       Priority: Medium    Cellulitis 11/20/2013       Priority: Medium    Anemia due to blood loss, acute 11/20/2013       Priority: Medium    DVT prophylaxis 11/20/2013       Priority: Medium    Carpal tunnel syndrome of right wrist 08/16/2013       Priority: Medium    Gilbert syndrome 01/31/2012       Priority: Medium    History of skin cancer 01/03/2012       Priority: Medium    AK (actinic keratosis) 01/03/2012       Priority: Medium    Essential hypertension 04/18/2011       Priority: Medium    Health Care Home 04/08/2011       Priority: Medium       Natalie Ferraro RN-PHN  FPA / [facility]   [phone]           DX V65.8 REPLACED WITH [id number] HEALTH CARE HOME (04/08/2013)       Diverticulosis 03/25/2011       Priority: Medium    Hyperlipidemia LDL goal <100 10/31/2010       Priority: Medium    Long term current use of anticoagulant therapy 01/31/2001       Priority: Medium       Problem list name updated by automated process. Provider to review            Past Medical History:         Past Medical History:   Diagnosis Date    Actinic keratosis      Atrial fibrillation (H)      Basal cell carcinoma nos 06/10    Cardiac dysrhythmia, unspecified      Cough      Diabetic eye exam (H)  8/29/14    Generalized osteoarthrosis, unspecified site      Other diseases of lung, not elsewhere classified      Pure hypercholesterolemia      Unspecified essential hypertension           Past Surgical History:    Past Surgical History         Past Surgical History:   Procedure Laterality Date    COLONOSCOPY   05/02/2007     Multiple bxs of diminutive polyps of ascending colon.    COLONOSCOPY   11/10/2010     COLONOSCOPY performed by MARISELA GRIMM at  GI     HEMORRHOIDOPEXY BY STAPLING   09/26/08    [facility] TOTAL KNEE ARTHROPLASTY   1997     Knee Replacement, Total    [facility] COLONOSCOPY W/WO BRUSH/WASH   10/19/2001    [facility] COLONOSCOPY W/WO BRUSH/WASH   11/02/2004            Family History:    Family History         Family History   Problem Relation Age of Onset    Cancer Mother      Prostate Cancer Father              Social History:  Marital Status:   [5]  Social History            Tobacco Use    Smoking status: Former       Types: Cigarettes    Smokeless tobacco: Never    Tobacco comments:       5 years   Vaping Use    Vaping Use: Never used   Substance Use Topics    Alcohol use: No       Comment: denies    Drug use: No         Medications:    methocarbamol (ROBAXIN) 500 MG tablet  oxyCODONE-acetaminophen (PERCOCET) 5-325 MG tablet  blood glucose (NO BRAND SPECIFIED) test strip  calcium carbonate (TUMS) 500 MG chewable tablet  carboxymethylcellulose PF (REFRESH PLUS) 0.5 % ophthalmic solution  cholecalciferol (VITAMIN D3) 25 mcg (1000 units) capsule  furosemide (LASIX) 20 MG tablet  gabapentin (NEURONTIN) 100 MG capsule  ketotifen (ZADITOR) 0.025 % ophthalmic solution  lisinopril (ZESTRIL) 10 MG tablet  loratadine (CLARITIN) 10 MG tablet  metoprolol succinate ER (TOPROL XL) 50 MG 24 hr tablet  order for DME  order for DME  order for DME  polyethylene glycol (MIRALAX) 17 GM/Dose powder  psyllium (METAMUCIL/KONSYL) 58.6 % powder  simvastatin (ZOCOR) 40 MG tablet  sodium chloride (OCEAN) 0.65 % nasal  "spray  SSD 1 % external cream  tamsulosin (FLOMAX) 0.4 MG capsule  warfarin ANTICOAGULANT (COUMADIN) 5 MG tablet  warfarin ANTICOAGULANT (COUMADIN) 5 MG tablet        Review of Systems   All other systems reviewed and are negative.  See HPI.     Physical Exam   BP: (!) 117/91  Pulse: 62  Temp: 97.9  F (36.6  C)  Resp: 20  Height: 176.5 cm (5' 9.5\")  Weight: 93.9 kg (207 lb)  SpO2: 98 %        Physical Exam  Vitals and nursing note reviewed.   Constitutional:       General: He is not in acute distress.     Appearance: Normal appearance. He is not ill-appearing or toxic-appearing.      Comments: Sitting comfortably on exam bed.   HENT:      Head: Normocephalic and atraumatic.      Mouth/Throat:      Mouth: Mucous membranes are moist.      Pharynx: Oropharynx is clear.   Eyes:      General: No scleral icterus.     Conjunctiva/sclera: Conjunctivae normal.   Cardiovascular:      Rate and Rhythm: Normal rate and regular rhythm.      Pulses: Normal pulses.      Heart sounds: Normal heart sounds.   Pulmonary:      Effort: Pulmonary effort is normal. No respiratory distress.      Breath sounds: Normal breath sounds. No wheezing.   Abdominal:      Palpations: Abdomen is soft.      Tenderness: There is no abdominal tenderness. There is no guarding or rebound.   Musculoskeletal:         General: Tenderness present. No deformity.      Cervical back: Normal range of motion and neck supple.      Right lower leg: No edema.      Left lower leg: No edema.      Comments: There is mild tenderness to the left anterior/medial thigh.  No palpable areas of induration or fluctuance.  No overlying skin changes that would indicate infection.  The leg does not appear significantly swollen compared to the right.  Compartments are compressible.  Range of motion of the knee is normal.  DP/PT pulses are 2+, capillary refill is brisk.  Discomfort is mostly present with flexion at the hip.  No direct hip tenderness to palpation.   Skin:     General: " Skin is warm.      Capillary Refill: Capillary refill takes less than 2 seconds.   Neurological:      General: No focal deficit present.      Mental Status: He is alert and oriented to person, place, and time.   Psychiatric:         Mood and Affect: Mood normal.            ED Course         Procedures                   Results for orders placed or performed during the hospital encounter of 01/22/24 (from the past 24 hour(s))   CBC with platelets differential     Narrative     The following orders were created for panel order CBC with platelets differential.  Procedure                               Abnormality         Status                     ---------                               -----------         ------                     CBC with platelets and d...[[id number]]  Abnormal            Final result                  Please view results for these tests on the individual orders.   Basic metabolic panel   Result Value Ref Range     Sodium 139 135 - 145 mmol/L     Potassium 4.6 3.4 - 5.3 mmol/L     Chloride 103 98 - 107 mmol/L     Carbon Dioxide (CO2) 29 22 - 29 mmol/L     Anion Gap 7 7 - 15 mmol/L     Urea Nitrogen 20.5 8.0 - 23.0 mg/dL     Creatinine 1.22 (H) 0.67 - 1.17 mg/dL     GFR Estimate 55 (L) >60 mL/min/1.73m2     Calcium 9.4 8.2 - 9.6 mg/dL     Glucose 118 (H) 70 - 99 mg/dL   CBC with platelets and differential   Result Value Ref Range     WBC Count 6.1 4.0 - 11.0 10e3/uL     RBC Count 4.14 (L) 4.40 - 5.90 10e6/uL     Hemoglobin 12.9 (L) 13.3 - 17.7 g/dL     Hematocrit 39.9 (L) 40.0 - 53.0 %     MCV 96 78 - 100 fL     MCH 31.2 26.5 - 33.0 pg     MCHC 32.3 31.5 - 36.5 g/dL     RDW 13.4 10.0 - 15.0 %     Platelet Count 157 150 - 450 10e3/uL     % Neutrophils 59 %     % Lymphocytes 31 %     % Monocytes 8 %     % Eosinophils 2 %     % Basophils 0 %     % Immature Granulocytes 0 %     NRBCs per 100 WBC 0 <1 /100     Absolute Neutrophils 3.6 1.6 - 8.3 10e3/uL     Absolute Lymphocytes 1.9 0.8 - 5.3 10e3/uL      Absolute Monocytes 0.5 0.0 - 1.3 10e3/uL     Absolute Eosinophils 0.1 0.0 - 0.7 10e3/uL     Absolute Basophils 0.0 0.0 - 0.2 10e3/uL     Absolute Immature Granulocytes 0.0 <=0.4 10e3/uL     Absolute NRBCs 0.0 10e3/uL   US Lower Extremity Venous Duplex Left     Narrative     VENOUS ULTRASOUND LEFT LEG  1/22/2024 10:03 AM      HISTORY: Acute on chronic lower extremity edema, worse on the left,  new thigh pain/tenderness     COMPARISON: None.     FINDINGS:  Examination of the deep veins with graded compression and  color flow Doppler with spectral wave form analysis was performed.        Impression     IMPRESSION: No evidence of deep venous thrombosis.     CHANDA LOERA MD         SYSTEM ID:  [id number]   XR Femur Left 2 Views     Narrative     XR FEMUR LEFT 2 VIEWS, XR PELVIS 1/2 VIEWS 1/22/2024 10:24 AM      HISTORY: Pain in left medial, proximal thigh, into pelvis     COMPARISON: None.            Impression     IMPRESSION: Both hips negative for fracture or dislocation. Slight  degenerative changes both joints. Pelvis negative for fracture. The  left femur is negative for fracture. Postoperative changes total knee  arthroplasty.     WANDER FLORENCE MD         SYSTEM ID:  [id number]   XR Pelvis 1/2 Views     Narrative     XR FEMUR LEFT 2 VIEWS, XR PELVIS 1/2 VIEWS 1/22/2024 10:24 AM      HISTORY: Pain in left medial, proximal thigh, into pelvis     COMPARISON: None.            Impression     IMPRESSION: Both hips negative for fracture or dislocation. Slight  degenerative changes both joints. Pelvis negative for fracture. The  left femur is negative for fracture. Postoperative changes total knee  arthroplasty.     WANDER FLORENCE MD         SYSTEM ID:  [id number]      *Note: Due to a large number of results and/or encounters for the requested time period, some results have not been displayed. A complete set of results can be found in Results Review.         Medications   oxyCODONE-acetaminophen (PERCOCET) 5-325 MG per  tablet 1 tablet (1 tablet Oral $Given 1/22/24 1130)         Assessments & Plan (with Medical Decision Making)      I have reviewed the nursing notes.     I have reviewed the findings, diagnosis, plan and need for follow up with the patient.     Medical Decision Making  Melvin Abad is a 89 year old male with history of hypertension, A-fib on Coumadin, prior pontine CVA who presents for evaluation of left leg/thigh pain.  Normal vitals on arrival.  Exam was reassuring overall.  Patient did have mild reproducible tenderness to the anterior/medial thigh, but no overlying skin changes or any evidence of fluctuance or hematoma.  He had some pain with flexion of the hip, but no direct areas of tenderness.  There is no swelling of the leg compared to the right.  No appreciable bony tenderness.  Compartments are compressible.  Distal neurovascular exam, including pulses and capillary refill, is normal.  Etiology of discomfort is somewhat unclear.  Could represent musculoskeletal spasm, electrolyte abnormalities, DVT, or less likely an occult fracture.     Workup was very reassuring.  He has minimal anemia but no leukocytosis.  Mild DELPHINE is present but electrolytes were otherwise normal.  Plain films of the hip/pelvis and left femur showed no acute fracture.  DVT study was negative.  I still think symptoms are most likely due to a musculoskeletal spasm.  No indication of hematoma or severe vascular disease/occlusion on exam.  Will discharge the patient home with new prescription for Robaxin.  Since the patient resides at an assisted living facility and has assistance with medications, monitoring, will also provide a short course of Percocet for breakthrough discomfort.  Advised that he follow-up with his PCP as soon as possible for recheck and return to the emergency department sooner for any new or acutely worsening symptoms.     Discharge Medication List as of 1/22/2024 11:33 AM              START taking these  medications     Details   methocarbamol (ROBAXIN) 500 MG tablet Take 1 tablet (500 mg) by mouth 4 times daily as needed for muscle spasms, Disp-20 tablet, R-0, E-Prescribe       oxyCODONE-acetaminophen (PERCOCET) 5-325 MG tablet Take 1 tablet by mouth every 6 hours as needed for pain, Disp-12 tablet, R-0, E-Prescribe                 Final diagnoses:   Pain of left thigh         1/22/2024   [facility] EMERGENCY DEPT        Colten Montoya MD  01/23/24 0722          Allergies:  No Known Allergies    Problem List:    Patient Active Problem List    Diagnosis Date Noted    Weakness 01/26/2024     Priority: Medium    Pain of left thigh 01/26/2024     Priority: Medium    Chronic atrial fibrillation (H) 05/01/2020     Priority: Medium    Osteoarthritis of glenohumeral joint, left 02/24/2020     Priority: Medium    Longstanding persistent atrial fibrillation (H) 01/23/2020     Priority: Medium    Anticoagulation goal of INR 2 to 3 01/23/2020     Priority: Medium    Slow transit constipation 01/23/2020     Priority: Medium    Sleep disturbances 01/23/2020     Priority: Medium    Right pontine stroke (H) 01/23/2020     Priority: Medium    Dry eyes 01/23/2020     Priority: Medium    Restless legs syndrome (RLS) 01/23/2020     Priority: Medium    Pain of right lower leg 03/31/2017     Priority: Medium    Contusion of right lower extremity 03/31/2017     Priority: Medium    Leg pain 03/31/2017     Priority: Medium    Long-term (current) use of anticoagulants [Z79.01] 04/05/2016     Priority: Medium    Obesity (BMI 30-39.9) 10/28/2015     Priority: Medium    Cellulitis 11/20/2013     Priority: Medium    Anemia due to blood loss, acute 11/20/2013     Priority: Medium    DVT prophylaxis 11/20/2013     Priority: Medium    Carpal tunnel syndrome of right wrist 08/16/2013     Priority: Medium    Gilbert syndrome 01/31/2012     Priority: Medium    History of skin cancer 01/03/2012     Priority: Medium    AK  (actinic keratosis) 01/03/2012     Priority: Medium    Essential hypertension 04/18/2011     Priority: Medium    Health Care Home 04/08/2011     Priority: Medium     Natalie Jasmine RN-PHN  FPA / [facility]   [phone]        DX V65.8 REPLACED WITH [id number] [facility]ALTH CARE HOME (04/08/2013)      Diverticulosis 03/25/2011     Priority: Medium    Hyperlipidemia LDL goal <100 10/31/2010     Priority: Medium    Long term current use of anticoagulant therapy 01/31/2001     Priority: Medium     Problem list name updated by automated process. Provider to review          Past Medical History:    Past Medical History:   Diagnosis Date    Actinic keratosis     Atrial fibrillation (H)     Basal cell carcinoma nos 06/10    Cardiac dysrhythmia, unspecified     Cough     Diabetic eye exam (H) 8/29/14    Generalized osteoarthrosis, unspecified site     Other diseases of lung, not elsewhere classified     Pure hypercholesterolemia     Unspecified essential hypertension        Past Surgical History:    Past Surgical History:   Procedure Laterality Date    COLONOSCOPY  05/02/2007    Multiple bxs of diminutive polyps of ascending colon.    COLONOSCOPY  11/10/2010    COLONOSCOPY performed by MARISELA GRIMM at [facility] HEMORRHOIDOPEXY BY STAPLING  09/26/08    [facility] TOTAL KNEE ARTHROPLASTY  1997    Knee Replacement, Total    [facility] COLONOSCOPY W/WO BRUSH/WASH  10/19/2001    [facility] COLONOSCOPY W/WO BRUSH/WASH  11/02/2004       Family History:    Family History   Problem Relation Age of Onset    Cancer Mother     Prostate Cancer Father        Social History:  Marital Status:   [5]  Social History     Tobacco Use    Smoking status: Former     Types: Cigarettes    Smokeless tobacco: Never    Tobacco comments:     5 years   Vaping Use    Vaping Use: Never used   Substance Use Topics    Alcohol use: No     Comment: denies    Drug use: No        Medications:    acetaminophen (TYLENOL) 500 MG tablet  calcium carbonate  (TUMS) 500 MG chewable tablet  carboxymethylcellulose PF (REFRESH PLUS) 0.5 % ophthalmic solution  cholecalciferol (VITAMIN D3) 25 mcg (1000 units) capsule  furosemide (LASIX) 20 MG tablet  gabapentin (NEURONTIN) 100 MG capsule  ketotifen fumarate 0.035%, ketotifen 0.025%, (ZADITOR) 0.025 % ophthalmic solution  lisinopril (ZESTRIL) 10 MG tablet  loratadine (CLARITIN) 10 MG tablet  methocarbamol (ROBAXIN) 500 MG tablet  metoprolol succinate ER (TOPROL XL) 50 MG 24 hr tablet  order for DME  order for DME  order for DME  oxyCODONE (ROXICODONE) 10 MG tablet  polyethylene glycol (MIRALAX) 17 GM/Dose powder  psyllium (METAMUCIL/KONSYL) 58.6 % powder  simvastatin (ZOCOR) 40 MG tablet  sodium chloride (OCEAN) 0.65 % nasal spray  tamsulosin (FLOMAX) 0.4 MG capsule  warfarin ANTICOAGULANT (COUMADIN) 5 MG tablet          Review of Systems   All other systems reviewed and are negative.      Physical Exam   BP: 108/68  Pulse: 76  Temp: 98.2  F (36.8  C)  Resp: 19  Weight: 93.9 kg (207 lb)  SpO2: 95 %      Physical Exam  Vitals and nursing note reviewed.   Constitutional:       General: He is not in acute distress.  HENT:      Head: Normocephalic and atraumatic.   Cardiovascular:      Pulses: Normal pulses.   Abdominal:      General: Bowel sounds are normal.      Palpations: Abdomen is soft.   Musculoskeletal:      Cervical back: Normal range of motion. No tenderness.        Legs:    Skin:     General: Skin is warm and dry.      Findings: No bruising, lesion or rash.   Neurological:      Mental Status: He is alert.         ED Course                 Procedures              Critical Care time:  none               Results for orders placed or performed during the hospital encounter of 01/26/24 (from the past 24 hour(s))   [place] [facility]    Narrative    The following orders were created for panel order [place] [facility].  Procedure                               Abnormality         Status                     ---------                                -----------         ------                     Extra Green Top (Lithium...[[id number]]                      Final result               Extra Purple Top Tube[[id number]]                            Final result                 Please view results for these tests on the individual orders.   Extra Green Top (Lithium Heparin) Tube   Result Value Ref Range    Hold Specimen JIC    Extra Purple Top Tube   Result Value Ref Range    Hold Specimen JIC    CT Pelvis Bone wo Contrast    Narrative    CT PELVIS BONE WO CONTRAST, CT FEMUR THIGH LEFT W/O CONTRAST 1/26/2024  1:31 PM    INDICATION: LEFT hip and leg pain,, normal xray  COMPARISON: Radiograph 1/22/2024    TECHNIQUE: Noncontrast. Axial, sagittal and coronal thin-section  reconstruction. Dose reduction techniques were used.   CONTRAST: None.    FINDINGS:   No acute fracture. 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 Bilateral L5 pars interarticularis defects. No  spondylolisthesis. Left TKA.    Heterogeneous intramuscular hematoma within the iliopsoas with  adjacent fat stranding (series 5 images 53 and 120). Diffuse fatty  atrophy of the pelvic girdle, left thigh, and visualized proximal leg  musculature.    Scattered arterial vascular calcifications. Moderate prostatomegaly.  Distal colonic diverticulosis. No evidence of diverticulitis.       Impression    IMPRESSION:    1.  Left iliopsoas intramuscular hematoma with adjacent fat stranding.  2.  No acute fracture.  3.  Mild degenerative arthrosis both hips and both SI joints.      IVANIA HOWARD DO         SYSTEM ID:  [id number]   CT Lumbar Spine w/o Contrast    Narrative    CT LUMBAR SPINE WITHOUT CONTRAST  1/26/2024 1:32 PM     HISTORY: Low back pain.     TECHNIQUE: Axial images of the lumbar spine were obtained without  intravenous contrast. Multiplanar reformations were performed.   Radiation dose for this scan was reduced using automated exposure  control, adjustment of the mA and/or kV  according to patient size, or  iterative reconstruction technique.     COMPARISON: 12/20/2019 radiographs     FINDINGS:  There are 5 lumbar-type vertebral bodies assumed for the  purposes of this dictation.     Mildly exaggerated lumbar lordosis. No spondylolisthesis. No loss of  vertebral body height. No evidence of fracture. No destructive bony  lesion appreciated.    Level by level as follows:     T11-T12: No loss of disc height. No significant herniation. Minimal  facet arthropathy. No significant neural foraminal narrowing or canal  stenosis.    T12-L1: No loss of disc height. No significant herniation. Minimal  facet arthropathy. No significant neural foraminal narrowing or canal  stenosis.     L1-L2: Mild loss of disc height and vacuum disc phenomenon. Diffuse  disc bulge. Mild facet arthropathy with ligamentum flavum thickening.  Moderate to severe bilateral neural foraminal narrowing, lateral  recess narrowing, and mild to moderate canal stenosis.     L2-L3: Mild loss of disc height. Diffuse disc bulge. Mild facet  arthropathy with ligamentum flavum thickening. Moderate bilateral  neural foraminal narrowing, lateral recess narrowing, and moderate  canal stenosis.     L3-L4: No loss of disc height. Diffuse disc bulge. Mild facet  arthropathy with ligamentum flavum thickening. Mild right and moderate  left neural foraminal narrowing, lateral recess narrowing, and  moderate canal stenosis.     L4-L5: No loss of disc height. Diffuse disc bulge. Mild to moderate  facet arthropathy with ligamentum flavum thickening. Mild right and  moderate left neural foraminal narrowing, lateral recess narrowing,  and mild to moderate canal stenosis.     L5-S1: No loss of disc height. Diffuse disc bulge. Mild facet  arthropathy. Bilateral L5 pars defects. No significant neural  foraminal narrowing or canal stenosis.     Visualized paraspinous tissues: Aortoiliac atherosclerotic  calcifications. Incompletely characterized  bilateral renal cysts.  2.6  x 2.3 cm hypodense collection within the left psoas muscle with  adjacent inflammatory fat stranding in the left pelvis, the full  extent of which is better visualized on pelvic CT.      Impression    IMPRESSION:    1. Hypodense fluid collection in the left psoas muscle, concerning for  a psoas abscess. Surrounding inflammatory changes in the left pelvis.  2. Multilevel lumbar spondylosis, as above.      ELBA LANDIS MD         SYSTEM ID:  [id number]   CT Femur Thigh Left w/o Contrast    Narrative    CT PELVIS BONE WO CONTRAST, CT FEMUR THIGH LEFT W/O CONTRAST 1/26/2024  1:31 PM    INDICATION: LEFT hip and leg pain,, normal xray  COMPARISON: Radiograph 1/22/2024    TECHNIQUE: Noncontrast. Axial, sagittal and coronal thin-section  reconstruction. Dose reduction techniques were used.   CONTRAST: None.    FINDINGS:   No acute fracture. 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 Bilateral L5 pars interarticularis defects. No  spondylolisthesis. Left TKA.    Heterogeneous intramuscular hematoma within the iliopsoas with  adjacent fat stranding (series 5 images 53 and 120). Diffuse fatty  atrophy of the pelvic girdle, left thigh, and visualized proximal leg  musculature.    Scattered arterial vascular calcifications. Moderate prostatomegaly.  Distal colonic diverticulosis. No evidence of diverticulitis.       Impression    IMPRESSION:    1.  Left iliopsoas intramuscular hematoma with adjacent fat stranding.  2.  No acute fracture.  3.  Mild degenerative arthrosis both hips and both SI joints.      IVANIA HOWARD DO         SYSTEM ID:  [id number]   UA with Microscopic reflex to Culture    Specimen: Urine, Midstream   Result Value Ref Range    Color Urine Straw Colorless, Straw, Light Yellow, Yellow    Appearance Urine Clear Clear    Glucose Urine Negative Negative mg/dL    Bilirubin Urine Negative Negative    Ketones Urine Negative Negative mg/dL    Specific Gravity Urine  1.006 1.003 - 1.035    Blood Urine Negative Negative    pH Urine 5.0 5.0 - 7.0    Protein Albumin Urine Negative Negative mg/dL    Urobilinogen Urine Normal Normal, 2.0 mg/dL    Nitrite Urine Negative Negative    Leukocyte Esterase Urine Negative Negative    Mucus Urine Present (A) None Seen /LPF    RBC Urine <1 <=2 /HPF    WBC Urine <1 <=5 /HPF    Hyaline Casts Urine 1 <=2 /LPF    Narrative    Urine Culture not indicated     *Note: Due to a large number of results and/or encounters for the requested time period, some results have not been displayed. A complete set of results can be found in Results Review.       Medications   methocarbamol (ROBAXIN) tablet 500 mg (500 mg Oral $Given 1/26/24 1207)   HYDROcodone-acetaminophen (NORCO) 5-325 MG per tablet 1 tablet (1 tablet Oral $Given 1/26/24 1119)   fentaNYL (PF) (SUBLIMAZE) injection 50 mcg (50 mcg Intravenous $Given 1/26/24 1258)       Assessments & Plan (with Medical Decision Making)  Zacarias is a 89-year-old male presenting from assisted living facility for pain in his leg and inability to pivot or bear weight, decrease functionality from his baseline.  See history and physical exam as above  Pleasant 89-year-old male in no acute distress, is vitally stable and afebrile.  He does not have any skin lesions, bruising, swelling, or obvious deformity of his hip or knee.  Does not have significant palpation in the area documented above, but this does cause him pain with flexion at his hip and makes it difficult for him to bear weight on this left leg.  Reviewed workup from his previous ED visit which was negative, did not have evidence of DVT or fracture on x-ray.  However, he was able to function at his baseline level at the time of discharge, so he was sent back to assisted living facility.  He has now had a decline and will likely need placement.  Unfortunately, at time of presentation to the ED, there are no hospital beds available.  Told son that he will likely  "need to board in the ED until we can get a physical therapy evaluation, will consult with , and will see if bed becomes available for admission as he will likely need that before he can transition to an outside facility.  States that the [facility] has a rehab side and they \"always have lots of beds\" so we will have RN call over and see what it would require to get him admitted or if he can get started on his list.  Patient was given Norco and Robaxin for his pain.  Continued to complain of pain.  PT was unable to come down and assessed the patient during the first 4 hours of the ED stay.  However, with repeated RN prompting, they were able to come eventually and assess the patient.  Also, since there was delay in reassessing the patient due to high volume of critical patients in the department and need for provider at bedside of patient's requiring resuscitation, unable to disposition the patient for several hours.  Due to diligent work of RN, able to secure placement at 11 with skilled nursing.  They do required discharge instructions to skilled nursing facility.  With assistance of Dr. Kaylan Abad, hospitalist, able to get orders placed and medication reconciled for the patient to be discharged to SNF.  Left the department in stable condition peer     I have reviewed the nursing notes.    I have reviewed the findings, diagnosis, plan and need for follow up with the patient.           Medical Decision Making  The patient's presentation was of moderate complexity (an acute complicated injury).    The patient's evaluation involved:  review of 3+ test result(s) ordered prior to this encounter (see separate area of note for details)  ordering and/or review of 3+ test(s) in this encounter (see separate area of note for details)    The patient's management necessitated moderate risk (prescription drug management including medications given in the ED).        Discharge Medication List as of 1/26/2024  " 3:56 PM        START taking these medications    Details   !! methocarbamol (ROBAXIN) 500 MG tablet Take 1 tablet (500 mg) by mouth 4 times daily as needed for muscle spasms, Disp-15 tablet, R-0, E-Prescribe      oxyCODONE (ROXICODONE) 10 MG tablet Take 1/2 tablet to 1 full tablet by mouth every 6 hours as needed for acute severe pain.  Do not exceed 3 full tablets in 24 hours, Disp-12 tablet, R-0, E-Prescribe      !! warfarin ANTICOAGULANT (COUMADIN) 5 MG tablet Take 1/26: Hold; 1/27: 2.5 mg; Otherwise 2.5 mg every Mon, Wed, Fri; 5 mg all other days: Next INR check 01/29/24, Disp-3 tablet, R-0, E-Prescribe       !! - Potential duplicate medications found. Please discuss with provider.          Final diagnoses:   Pain of left thigh   Weakness   Iliopsoas muscle hematoma, left, initial encounter   Supratherapeutic INR       1/26/2024   [facility] EMERGENCY DEPT       Yumiko Abad DO  01/28/24 0832

## 2024-01-27 ENCOUNTER — LAB REQUISITION (OUTPATIENT)
Dept: LAB | Facility: CLINIC | Age: 89
End: 2024-01-27
Payer: COMMERCIAL

## 2024-01-27 DIAGNOSIS — I48.20 CHRONIC ATRIAL FIBRILLATION, UNSPECIFIED (H): ICD-10-CM

## 2024-01-29 ENCOUNTER — ANTICOAGULATION THERAPY VISIT (OUTPATIENT)
Dept: ANTICOAGULATION | Facility: CLINIC | Age: 89
End: 2024-01-29
Payer: COMMERCIAL

## 2024-01-29 ENCOUNTER — TELEPHONE (OUTPATIENT)
Dept: INTERNAL MEDICINE | Facility: CLINIC | Age: 89
End: 2024-01-29
Payer: COMMERCIAL

## 2024-01-29 ENCOUNTER — DOCUMENTATION ONLY (OUTPATIENT)
Dept: GERIATRICS | Facility: CLINIC | Age: 89
End: 2024-01-29
Payer: COMMERCIAL

## 2024-01-29 ENCOUNTER — PATIENT OUTREACH (OUTPATIENT)
Dept: CARE COORDINATION | Facility: CLINIC | Age: 89
End: 2024-01-29
Payer: COMMERCIAL

## 2024-01-29 VITALS
DIASTOLIC BLOOD PRESSURE: 55 MMHG | BODY MASS INDEX: 29.56 KG/M2 | WEIGHT: 206.5 LBS | OXYGEN SATURATION: 94 % | HEART RATE: 79 BPM | HEIGHT: 70 IN | RESPIRATION RATE: 16 BRPM | TEMPERATURE: 96.7 F | SYSTOLIC BLOOD PRESSURE: 81 MMHG

## 2024-01-29 DIAGNOSIS — I48.20 CHRONIC ATRIAL FIBRILLATION (H): ICD-10-CM

## 2024-01-29 DIAGNOSIS — I48.11 LONGSTANDING PERSISTENT ATRIAL FIBRILLATION (H): ICD-10-CM

## 2024-01-29 DIAGNOSIS — Z79.01 LONG TERM CURRENT USE OF ANTICOAGULANT THERAPY: Primary | ICD-10-CM

## 2024-01-29 LAB — INR PPP: 2.8 (ref 0.85–1.15)

## 2024-01-29 PROCEDURE — 36415 COLL VENOUS BLD VENIPUNCTURE: CPT | Performed by: FAMILY MEDICINE

## 2024-01-29 PROCEDURE — P9604 ONE-WAY ALLOW PRORATED TRIP: HCPCS | Performed by: FAMILY MEDICINE

## 2024-01-29 PROCEDURE — 85610 PROTHROMBIN TIME: CPT | Performed by: FAMILY MEDICINE

## 2024-01-29 RX ORDER — WARFARIN SODIUM 5 MG/1
TABLET ORAL
Qty: 7 TABLET | Refills: 0 | Status: SHIPPED | OUTPATIENT
Start: 2024-01-29 | End: 2024-02-02

## 2024-01-29 NOTE — TELEPHONE ENCOUNTER
Reason for Call:  Form, our goal is to have forms completed with 72 hours, however, some forms may require a visit or additional information.    Type of letter, form or note:  Home Health Certification    Who is the form from?: Home care    Where did the form come from: form was faxed in    What clinic location was the form placed at?: [facility]    Where the form was placed: Given to KEMAL Rosado    What number is listed as a contact on the form?: [phone]       Additional comments: [facility]    Call taken on 1/29/2024 at 2:46 PM by Monserrat Ellis

## 2024-01-29 NOTE — PROGRESS NOTES
Clinic Care Coordination Contact  Care Coordination Transition Communication    Clinical Data: Patient was hospitalized at [facility] from 1/24/2024 to 1/26/2024 with diagnosis of Pain of left thigh.     Assessment: Patient has transitioned to TCU/ARU for short term rehabilitation:    Facility Name: [facility] (Main Phone: [phone] Admissions Phone: [phone] Fax: [phone])    Transition Communication:  Notified facility of Primary Care- Care Coordination support via Epic fax.    Plan: Care Coordinator will await notification from facility staff informing of patient's discharge plans/needs. Care Coordinator will review chart and outreach to facility staff every 4 weeks and as needed.     Alma White RN Care Coordination   [facility]-  [facility]  Email: [email or web address]  Phone: [phone]

## 2024-01-29 NOTE — LETTER
[facility]   To:             Please give to facility    From:   Alma White  RN  Care Coordinator   [facility]   P: [phone]  [email or web address]   Patient Name:  Melvin Abad YOB: 1928   Admit date: 1/26/2024      *Information Needed:  Please contact me when the patient will discharge (or if they will move to long term care)- include the discharge date, disposition, and main diagnosis   If the patient is discharged with home care services, please provide the name of the agency    Also- Please inform me if a care conference is being held.   Phone, Fax or Email with information                        Thank you

## 2024-01-29 NOTE — PROGRESS NOTES
ANTICOAGULATION MANAGEMENT     Melvin Abad 89 year old male is on warfarin with therapeutic INR result. (Goal INR 2.0-3.0)    Recent labs: (last 7 days)     01/29/24  0740   INR 2.80*       ASSESSMENT     Source(s): Chart Review, Patient/Caregiver Call, and Home Care/Facility Nurse     Warfarin doses taken: Warfarin taken as instructed  Diet: No new diet changes identified  Medication/supplement changes:  Robaxan and Oxycodone for leg pain/Hematoma  New illness, injury, or hospitalization: Yes: ER visit for leg/thigh pain  Signs or symptoms of bleeding or clotting: No  Previous result: Supratherapeutic  Additional findings: Left Leg Hematoma and pain. Supra at last INR.         PLAN     Recommended plan for ongoing change(s) affecting INR     Dosing Instructions: decrease your warfarin dose (9% change) with next INR in 4 days       Summary  As of 1/29/2024      Full warfarin instructions:  5 mg every Sun, Tue, Thu; 2.5 mg all other days   Next INR check:  2/2/2024               Telephone call with Nursing facility nurse who agrees to plan and repeated back plan correctly  Faxed dosing and follow up instructions to Ingrid PIZANO and also sent refill to [facility]    Orders given to  Homecare nurse/facility to recheck    Education provided:   Please call back if any changes to your diet, medications or how you've been taking warfarin    Plan made per [facility] anticoagulation protocol    Idania Thao, RN  Anticoagulation Clinic  1/29/2024    _______________________________________________________________________     Anticoagulation Episode Summary       Current INR goal:  2.0-3.0   TTR:  98.0% (1 y)   Target end date:  Indefinite   Send INR reminders to:  LINDSEY COLON    Indications    Atrial fibrillation (Resolved) [I48.91]  Long-term (current) use of anticoagulants [Z79.01] [Z79.01]  Chronic atrial fibrillation (H) [I48.20]             Comments:  [place] SINGH/Ingrid Sanchez [phone] fax:  [phone] & send to [facility]             Anticoagulation Care Providers       Provider Role Specialty Phone number    Lazaro Huston MD Referring Internal Medicine [phone]    Fransisco Cordon MD Referring Family Medicine

## 2024-01-30 ENCOUNTER — LAB REQUISITION (OUTPATIENT)
Dept: LAB | Facility: CLINIC | Age: 89
End: 2024-01-30
Payer: COMMERCIAL

## 2024-01-30 ENCOUNTER — TRANSITIONAL CARE UNIT VISIT (OUTPATIENT)
Dept: GERIATRICS | Facility: CLINIC | Age: 89
End: 2024-01-30
Payer: COMMERCIAL

## 2024-01-30 DIAGNOSIS — Z53.9 DIAGNOSIS NOT YET DEFINED: Primary | ICD-10-CM

## 2024-01-30 DIAGNOSIS — D62 ANEMIA DUE TO BLOOD LOSS, ACUTE: ICD-10-CM

## 2024-01-30 DIAGNOSIS — K59.01 SLOW TRANSIT CONSTIPATION: ICD-10-CM

## 2024-01-30 DIAGNOSIS — M79.652 PAIN OF LEFT THIGH: ICD-10-CM

## 2024-01-30 DIAGNOSIS — Z11.1 ENCOUNTER FOR SCREENING FOR RESPIRATORY TUBERCULOSIS: ICD-10-CM

## 2024-01-30 DIAGNOSIS — I63.50 RIGHT PONTINE STROKE (H): Primary | ICD-10-CM

## 2024-01-30 DIAGNOSIS — I48.20 CHRONIC ATRIAL FIBRILLATION (H): ICD-10-CM

## 2024-01-30 DIAGNOSIS — I10 ESSENTIAL HYPERTENSION: ICD-10-CM

## 2024-01-30 PROCEDURE — 99309 SBSQ NF CARE MODERATE MDM 30: CPT | Performed by: NURSE PRACTITIONER

## 2024-01-30 PROCEDURE — G0179 MD RECERTIFICATION HHA PT: HCPCS | Performed by: INTERNAL MEDICINE

## 2024-01-30 RX ORDER — OXYCODONE HYDROCHLORIDE 5 MG/1
2.5 TABLET ORAL EVERY 6 HOURS PRN
COMMUNITY
End: 2024-02-13

## 2024-01-30 NOTE — LETTER
1/30/2024        RE: Melvin Abad  [address] [facility] [address]        [facility] GERIATRICS    PRIMARY CARE PROVIDER AND CLINIC:  Lazaro Huston MD, [address] [facility] [address]  Chief Complaint   Patient presents with     Hospital F/U      [place] Medical Record Number:  [id number]  Place of Service where encounter took place:  [facility] ([facility]) [[id number]]    Melvin Abad  is a 89 year old  (4/24/1928), with ED visit at [facility] on 1/26/24.    HPI:    This is a 89 yo male with PMHx for A-fib on Coumadin, right pontine stroke, who presented with new left groin and leg pain.  Imaging negative, no DVT, supratherapeutic INR, given Percocet/Robaxin for pain control.  No other changes noted, discharged to [facility] for rehab.    CODE STATUS/ADVANCE DIRECTIVES DISCUSSION:  Prior  DNR / DNI  ALLERGIES: No Known Allergies   PAST MEDICAL HISTORY:   Past Medical History:   Diagnosis Date     Actinic keratosis      Atrial fibrillation (H)      Basal cell carcinoma nos 06/10    Mohs excision, rt upper lip & rt temple     Cardiac dysrhythmia, unspecified      Cough     chronic cough     Diabetic eye exam (H) 8/29/14     Generalized osteoarthrosis, unspecified site     djd of the knees, hands, and neck     Other diseases of lung, not elsewhere classified     pulmonary nodule, benign     Pure hypercholesterolemia      Unspecified essential hypertension       PAST SURGICAL HISTORY:   has a past surgical history that includes TOTAL KNEE ARTHROPLASTY (1997); Colonoscopy w/wo Gall[place]in **Performed** (10/19/2001); Colonoscopy w/wo Brush **Performed** (11/02/2004); colonoscopy (05/02/2007); HEMORRHOIDOPEXY BY STAPLING (09/26/08); and Colonoscopy (11/10/2010).  FAMILY HISTORY: family history includes Cancer in his mother; Prostate Cancer in his father.  SOCIAL HISTORY:   reports that he has quit smoking. His smoking use included cigarettes. He has never used smokeless  tobacco. He reports that he does not drink alcohol and does not use drugs.  Patient's living condition: lives alone    Post Discharge Medication Reconciliation Status:   MED REC REQUIRED  Post Medication Reconciliation Status:  Discharge medications reconciled and changed, see notes/orders       Current Outpatient Medications   Medication Sig     diclofenac (VOLTAREN) 1 % topical gel Apply 2 g topically 4 times daily Apply to L knee/thigh     oxyCODONE (ROXICODONE) 5 MG tablet Take 2.5 mg by mouth every 6 hours as needed for severe pain     acetaminophen (TYLENOL) 500 MG tablet Take 1 tablet (500 mg) by mouth every 6 hours as needed for pain (Patient taking differently: Take 1,000 mg by mouth 3 times daily)     calcium carbonate (TUMS) 500 MG chewable tablet Take 1 tablet (500 mg) by mouth 3 times daily as needed for heartburn     carboxymethylcellulose PF (REFRESH PLUS) 0.5 % ophthalmic solution Place 1 drop into both eyes 4 times daily as needed for dry eyes Patient self-administers     cholecalciferol (VITAMIN D3) 25 mcg (1000 units) capsule Take 1 capsule (25 mcg) by mouth daily     furosemide (LASIX) 20 MG tablet Take 1.5 tablets (30 mg) by mouth daily     gabapentin (NEURONTIN) 100 MG capsule Take 2 capsules (200 mg) by mouth 2 times daily     ketotifen fumarate 0.035%, ketotifen 0.025%, (ZADITOR) 0.025 % ophthalmic solution Place 1 drop into both eyes 2 times daily as needed for dry eyes Patient self-administers     lisinopril (ZESTRIL) 10 MG tablet Take 1 tablet (10 mg) by mouth daily     loratadine (CLARITIN) 10 MG tablet Take 1 tablet (10 mg) by mouth daily     methocarbamol (ROBAXIN) 500 MG tablet Take 1 tablet (500 mg) by mouth 4 times daily as needed for muscle spasms (Patient taking differently: Take 500 mg by mouth 3 times daily)     metoprolol succinate ER (TOPROL XL) 50 MG 24 hr tablet Take 1 tablet (50 mg) by mouth daily     order for DME Equipment being ordered: side rails/grab bars     order for  "DME Equipment being ordered: bedside commode     order for DME Equipment being ordered: 4 legged walker     polyethylene glycol (MIRALAX) 17 GM/Dose powder Take 9 g by mouth daily as needed for constipation Mix and hand to patient, Patient self-administers (he knows how much he needs to keep regular)     psyllium (METAMUCIL/KONSYL) 58.6 % powder Take 18 g (1 Tablespoonful) by mouth daily as needed for constipation Mix and hand to patient, Patient self-administers (he knows how much he needs to keep regular)     simvastatin (ZOCOR) 40 MG tablet Take 1 tablet (40 mg) by mouth at bedtime     sodium chloride (OCEAN) 0.65 % nasal spray Spray 2 sprays in nostril daily as needed for congestion Patient self-administers     tamsulosin (FLOMAX) 0.4 MG capsule Take 1 capsule (0.4 mg) by mouth daily     warfarin ANTICOAGULANT (COUMADIN) 5 MG tablet Take 1 tablet (5 mg) by mouth every Sun, Tue, Thu; and take 1/2 Tablet (2.5 mg) all other days of the week. Next INR is 2/2/24.     No current facility-administered medications for this visit.       ROS:  10 point ROS of systems including Constitutional, Eyes, Respiratory, Cardiovascular, Gastroenterology, Genitourinary, Integumentary, Musculoskeletal, Psychiatric were all negative except for pertinent positives noted in my HPI.    Vitals:  BP (!) 81/55   Pulse 79   Temp (!) 96.7  F (35.9  C)   Resp 16   Ht 1.765 m (5' 9.5\")   Wt 93.7 kg (206 lb 8 oz)   SpO2 94%   BMI 30.06 kg/m    Exam:  GENERAL APPEARANCE:  Alert, in no distress, cooperative, L thigh/knee pain  ENT:  Mouth and posterior oropharynx normal, moist mucous membranes, normal hearing acuity  NECK:  No adenopathy, masses or thyromegaly  RESP:  no respiratory distress, diminished bilaterally, RA  CV:  no edema, irregular rhythm per PAF  ABDOMEN:  normal bowel sounds, soft, nontender, no hepatosplenomegaly or other masses  M/S:   Able to move all extremities  SKIN:  Inspection of skin and subcutaneous tissue " baseline  NEURO:   No focal deficits  PSYCH:  oriented to 2/3    Lab/Diagnostic data:  Most Recent 3 CBC's:  Recent Labs   Lab Test 01/22/24  0937 01/20/23  1424 02/23/22  1013   WBC 6.1 7.1 6.4   HGB 12.9* 13.3 13.4   MCV 96 97 99    179 196     Most Recent 3 BMP's:  Recent Labs   Lab Test 01/22/24  0937 01/20/23  1424 02/23/22  1013    139 139   POTASSIUM 4.6 4.4 4.2   CHLORIDE 103 103 106   CO2 29 28 31   BUN 20.5 17.4 22   CR 1.22* 1.10 1.07   ANIONGAP 7 8 2*   DARIUS 9.4 9.2 9.4   * 123* 88       ASSESSMENT/PLAN:    (I63.50) Right pontine stroke (H)    (I48.20) Chronic atrial fibrillation (H)  Last INR 2.8, continue Coumadin 5 mg T/TH/SUN 2.5 mg AOD, recheck INR on 2/2    (M79.652) Pain of left thigh  Iliopsoas muscle hematoma per supratherapeutic INR  Increase Tylenol to 1000 mg 3 times daily, discontinue Percocet, increase methocarbamol to 5 mg 3 times daily, start Voltaren gel 1% 2 g 4 times daily with gabapentin 200 mg twice daily and oxycodone 2.5 mg every 6 hours as needed    (I10) Essential hypertension  Continue lisinopril 10 mg daily with metoprolol succinate 50 mg daily and Lasix 30 mg daily.  Monitor blood pressure twice daily    (K59.01) Slow transit constipation  Continue MiraLAX 9 g daily as needed and psyllium daily as needed    CKD stage 3a  Last Cr 1.22 with GFR 55 on 1/22    (D62) Anemia due to blood loss, acute  Last INR 12.9 on 1/22    BPH  Continue Flomax 0.4 mg daily    HLD  Continue statin    Orders:  Increase BP monitoring, increase Tylenol, discontinue Percocet, increase methocarbamol, start Voltaren gel    Electronically signed by:  Jean Marie Swenson NP                   Sincerely,        Jean Marie Swenson NP

## 2024-01-30 NOTE — TELEPHONE ENCOUNTER
Medication reconciliation completed by RN. Form and chart forwarded to PCP for signatures and review of changes and/or additions.    Tatianna Kirkland RN

## 2024-01-30 NOTE — PROGRESS NOTES
[facility] GERIATRICS    PRIMARY CARE PROVIDER AND CLINIC:  Lazaro Huston MD, [address] [facility] [id number]  Chief Complaint   Patient presents with    Hospital F/U      [place] Medical Record Number:  [id number]  Place of Service where encounter took place:  [facility] [[id number]]    Melvin Abad  is a 89 year old  (4/24/1928), with ED visit at [facility] on 1/26/24.    HPI:    This is a 89 yo male with PMHx for A-fib on Coumadin, right pontine stroke, who presented with new left groin and leg pain.  Imaging negative, no DVT, supratherapeutic INR, given Percocet/Robaxin for pain control.  No other changes noted, discharged to [facility] for rehab.    CODE STATUS/ADVANCE DIRECTIVES DISCUSSION:  Prior  DNR / DNI  ALLERGIES: No Known Allergies   PAST MEDICAL HISTORY:   Past Medical History:   Diagnosis Date    Actinic keratosis     Atrial fibrillation (H)     Basal cell carcinoma nos 06/10    Mohs excision, rt upper lip & rt temple    Cardiac dysrhythmia, unspecified     Cough     chronic cough    Diabetic eye exam (H) 8/29/14    Generalized osteoarthrosis, unspecified site     djd of the knees, hands, and neck    Other diseases of lung, not elsewhere classified     pulmonary nodule, benign    Pure hypercholesterolemia     Unspecified essential hypertension       PAST SURGICAL HISTORY:   has a past surgical history that includes TOTAL KNEE ARTHROPLASTY (1997); Colonoscopy w/wo [place] **Performed** (10/19/2001); Colonoscopy w/wo [place] **Performed** (11/02/2004); colonoscopy (05/02/2007); HEMORRHOIDOPEXY BY STAPLING (09/26/08); and Colonoscopy (11/10/2010).  FAMILY HISTORY: family history includes Cancer in his mother; Prostate Cancer in his father.  SOCIAL HISTORY:   reports that he has quit smoking. His smoking use included cigarettes. He has never used smokeless tobacco. He reports that he does not drink alcohol and does not use drugs.  Patient's living condition:  lives alone    Post Discharge Medication Reconciliation Status:   MED REC REQUIRED  Post Medication Reconciliation Status:  Discharge medications reconciled and changed, see notes/orders       Current Outpatient Medications   Medication Sig    diclofenac (VOLTAREN) 1 % topical gel Apply 2 g topically 4 times daily Apply to L knee/thigh    oxyCODONE (ROXICODONE) 5 MG tablet Take 2.5 mg by mouth every 6 hours as needed for severe pain    acetaminophen (TYLENOL) 500 MG tablet Take 1 tablet (500 mg) by mouth every 6 hours as needed for pain (Patient taking differently: Take 1,000 mg by mouth 3 times daily)    calcium carbonate (TUMS) 500 MG chewable tablet Take 1 tablet (500 mg) by mouth 3 times daily as needed for heartburn    carboxymethylcellulose PF (REFRESH PLUS) 0.5 % ophthalmic solution Place 1 drop into both eyes 4 times daily as needed for dry eyes Patient self-administers    cholecalciferol (VITAMIN D3) 25 mcg (1000 units) capsule Take 1 capsule (25 mcg) by mouth daily    furosemide (LASIX) 20 MG tablet Take 1.5 tablets (30 mg) by mouth daily    gabapentin (NEURONTIN) 100 MG capsule Take 2 capsules (200 mg) by mouth 2 times daily    ketotifen fumarate 0.035%, ketotifen 0.025%, (ZADITOR) 0.025 % ophthalmic solution Place 1 drop into both eyes 2 times daily as needed for dry eyes Patient self-administers    lisinopril (ZESTRIL) 10 MG tablet Take 1 tablet (10 mg) by mouth daily    loratadine (CLARITIN) 10 MG tablet Take 1 tablet (10 mg) by mouth daily    methocarbamol (ROBAXIN) 500 MG tablet Take 1 tablet (500 mg) by mouth 4 times daily as needed for muscle spasms (Patient taking differently: Take 500 mg by mouth 3 times daily)    metoprolol succinate ER (TOPROL XL) 50 MG 24 hr tablet Take 1 tablet (50 mg) by mouth daily    order for DME Equipment being ordered: side rails/grab bars    order for DME Equipment being ordered: bedside commode    order for DME Equipment being ordered: 4 legged walker    polyethylene  "glycol (MIRALAX) 17 GM/Dose powder Take 9 g by mouth daily as needed for constipation Mix and hand to patient, Patient self-administers (he knows how much he needs to keep regular)    psyllium (METAMUCIL/KONSYL) 58.6 % powder Take 18 g (1 Tablespoonful) by mouth daily as needed for constipation Mix and hand to patient, Patient self-administers (he knows how much he needs to keep regular)    simvastatin (ZOCOR) 40 MG tablet Take 1 tablet (40 mg) by mouth at bedtime    sodium chloride (OCEAN) 0.65 % nasal spray Spray 2 sprays in nostril daily as needed for congestion Patient self-administers    tamsulosin (FLOMAX) 0.4 MG capsule Take 1 capsule (0.4 mg) by mouth daily    warfarin ANTICOAGULANT (COUMADIN) 5 MG tablet Take 1 tablet (5 mg) by mouth every Sun, Tue, Thu; and take 1/2 Tablet (2.5 mg) all other days of the week. Next INR is 2/2/24.     No current facility-administered medications for this visit.       ROS:  10 point ROS of systems including Constitutional, Eyes, Respiratory, Cardiovascular, Gastroenterology, Genitourinary, Integumentary, Musculoskeletal, Psychiatric were all negative except for pertinent positives noted in my HPI.    Vitals:  BP (!) 81/55   Pulse 79   Temp (!) 96.7  F (35.9  C)   Resp 16   Ht 1.765 m (5' 9.5\")   Wt 93.7 kg (206 lb 8 oz)   SpO2 94%   BMI 30.06 kg/m    Exam:  GENERAL APPEARANCE:  Alert, in no distress, cooperative, L thigh/knee pain  ENT:  Mouth and posterior oropharynx normal, moist mucous membranes, normal hearing acuity  NECK:  No adenopathy, masses or thyromegaly  RESP:  no respiratory distress, diminished bilaterally, RA  CV:  no edema, irregular rhythm per PAF  ABDOMEN:  normal bowel sounds, soft, nontender, no hepatosplenomegaly or other masses  M/S:   Able to move all extremities  SKIN:  Inspection of skin and subcutaneous tissue baseline  NEURO:   No focal deficits  PSYCH:  oriented to 2/3    Lab/Diagnostic data:  Most Recent 3 CBC's:  Recent Labs   Lab Test " 01/22/24  0937 01/20/23  1424 02/23/22  1013   WBC 6.1 7.1 6.4   HGB 12.9* 13.3 13.4   MCV 96 97 99    179 196     Most Recent 3 BMP's:  Recent Labs   Lab Test 01/22/24  0937 01/20/23  1424 02/23/22  1013    139 139   POTASSIUM 4.6 4.4 4.2   CHLORIDE 103 103 106   CO2 29 28 31   BUN 20.5 17.4 22   CR 1.22* 1.10 1.07   ANIONGAP 7 8 2*   DARIUS 9.4 9.2 9.4   * 123* 88       ASSESSMENT/PLAN:    (I63.50) Right pontine stroke (H)    (I48.20) Chronic atrial fibrillation (H)  Last INR 2.8, continue Coumadin 5 mg T/TH/SUN 2.5 mg AOD, recheck INR on 2/2    (M79.652) Pain of left thigh  Iliopsoas muscle hematoma per supratherapeutic INR  Increase Tylenol to 1000 mg 3 times daily, discontinue Percocet, increase methocarbamol to 5 mg 3 times daily, start Voltaren gel 1% 2 g 4 times daily with gabapentin 200 mg twice daily and oxycodone 2.5 mg every 6 hours as needed    (I10) Essential hypertension  Continue lisinopril 10 mg daily with metoprolol succinate 50 mg daily and Lasix 30 mg daily.  Monitor blood pressure twice daily    (K59.01) Slow transit constipation  Continue MiraLAX 9 g daily as needed and psyllium daily as needed    CKD stage 3a  Last Cr 1.22 with GFR 55 on 1/22    (D62) Anemia due to blood loss, acute  Last INR 12.9 on 1/22    BPH  Continue Flomax 0.4 mg daily    HLD  Continue statin    Orders:  Increase BP monitoring, increase Tylenol, discontinue Percocet, increase methocarbamol, start Voltaren gel    Electronically signed by:  Jean Marie Swenson NP

## 2024-01-31 PROCEDURE — 36415 COLL VENOUS BLD VENIPUNCTURE: CPT | Performed by: FAMILY MEDICINE

## 2024-01-31 PROCEDURE — 86481 TB AG RESPONSE T-CELL SUSP: CPT | Performed by: FAMILY MEDICINE

## 2024-01-31 PROCEDURE — P9604 ONE-WAY ALLOW PRORATED TRIP: HCPCS | Performed by: FAMILY MEDICINE

## 2024-02-01 ENCOUNTER — PATIENT OUTREACH (OUTPATIENT)
Dept: CARE COORDINATION | Facility: CLINIC | Age: 89
End: 2024-02-01
Payer: COMMERCIAL

## 2024-02-01 LAB
QUANTIFERON MITOGEN: 6.2 IU/ML
QUANTIFERON NIL TUBE: 0.04 IU/ML
QUANTIFERON TB1 TUBE: 0.06 IU/ML
QUANTIFERON TB2 TUBE: 0.06

## 2024-02-02 ENCOUNTER — ANTICOAGULATION THERAPY VISIT (OUTPATIENT)
Dept: ANTICOAGULATION | Facility: CLINIC | Age: 89
End: 2024-02-02

## 2024-02-02 DIAGNOSIS — I48.11 LONGSTANDING PERSISTENT ATRIAL FIBRILLATION (H): ICD-10-CM

## 2024-02-02 DIAGNOSIS — Z79.01 LONG TERM CURRENT USE OF ANTICOAGULANT THERAPY: Primary | ICD-10-CM

## 2024-02-02 DIAGNOSIS — I48.20 CHRONIC ATRIAL FIBRILLATION (H): ICD-10-CM

## 2024-02-02 LAB
GAMMA INTERFERON BACKGROUND BLD IA-ACNC: 0.04 IU/ML
INR (EXTERNAL): 2.6 (ref 0.9–1.1)
M TB IFN-G BLD-IMP: NEGATIVE
M TB IFN-G CD4+ BCKGRND COR BLD-ACNC: 6.16 IU/ML
MITOGEN IGNF BCKGRD COR BLD-ACNC: 0.02 IU/ML
MITOGEN IGNF BCKGRD COR BLD-ACNC: 0.02 IU/ML

## 2024-02-02 RX ORDER — WARFARIN SODIUM 5 MG/1
TABLET ORAL
Qty: 10 TABLET | Refills: 0 | Status: SHIPPED | OUTPATIENT
Start: 2024-02-02 | End: 2024-02-06 | Stop reason: DRUGHIGH

## 2024-02-02 NOTE — PROGRESS NOTES
ANTICOAGULATION MANAGEMENT     Melvin Abad 89 year old male is on warfarin with therapeutic INR result. (Goal INR 2.0-3.0)    Recent labs: (last 7 days)     02/02/24  1151   INR 2.6*       ASSESSMENT     Source(s): Chart Review and Home Care/Facility Nurse     Warfarin doses taken: Warfarin taken as instructed  Diet: No new diet changes identified  Medication/supplement changes:  oxycodone dose decreased on 1/30/24 No interaction anticipated  Tylenol 1000mg [id number] can increase the INR  Robaxin TID  New illness, injury, or hospitalization: No  Signs or symptoms of bleeding or clotting: No, but patient does have significant bruising on RUE-per patient it has been like this for a while, TCU provider aware and it is marked.  Previous result: Therapeutic last visit; previously outside of goal range  Additional findings:  will monitor INR more closely since Tylenol is now scheduled.       PLAN     Recommended plan for no diet, medication or health factor changes affecting INR     Dosing Instructions: Continue your current warfarin dose with next INR in 4 days       Summary  As of 2/2/2024      Full warfarin instructions:  5 mg every Sun, Tue, Thu; 2.5 mg all other days   Next INR check:  2/6/2024               Telephone call with [facility] nurse who agrees to plan and repeated back plan correctly  Faxed dosing and follow up instructions to Ingrid Ellis    Orders given to  Homecare nurse/facility to recheck    Education provided:   Interaction IS anticipated between warfarin and Tylenol    Plan made per [facility] anticoagulation protocol    Yancy Avendano, RN  Anticoagulation Clinic  2/2/2024    _______________________________________________________________________     Anticoagulation Episode Summary       Current INR goal:  2.0-3.0   TTR:  98.0% (1 y)   Target end date:  Indefinite   Send INR reminders to:  LINDSEY COLON    Indications    Atrial fibrillation (Resolved)  [I48.91]  Long-term (current) use of anticoagulants [Z79.01] [Z79.01]  Chronic atrial fibrillation (H) [I48.20]             Comments:  [place] SINGH/Ingrid Sanchez [phone] fax: [phone] & send to [facility] pharmacy             Anticoagulation Care Providers       Provider Role Specialty Phone number    Lazaro Huston MD Referring Internal Medicine [phone]    Fransisco Cordon MD Referring Family Medicine

## 2024-02-05 VITALS
HEART RATE: 72 BPM | TEMPERATURE: 98 F | SYSTOLIC BLOOD PRESSURE: 141 MMHG | RESPIRATION RATE: 16 BRPM | OXYGEN SATURATION: 97 % | WEIGHT: 208.5 LBS | BODY MASS INDEX: 30.88 KG/M2 | DIASTOLIC BLOOD PRESSURE: 77 MMHG | HEIGHT: 69 IN

## 2024-02-06 ENCOUNTER — TELEPHONE (OUTPATIENT)
Dept: GERIATRICS | Facility: CLINIC | Age: 89
End: 2024-02-06

## 2024-02-06 ENCOUNTER — TELEPHONE (OUTPATIENT)
Dept: INTERNAL MEDICINE | Facility: CLINIC | Age: 89
End: 2024-02-06

## 2024-02-06 ENCOUNTER — TRANSITIONAL CARE UNIT VISIT (OUTPATIENT)
Dept: GERIATRICS | Facility: CLINIC | Age: 89
End: 2024-02-06
Payer: COMMERCIAL

## 2024-02-06 ENCOUNTER — ANTICOAGULATION THERAPY VISIT (OUTPATIENT)
Dept: ANTICOAGULATION | Facility: CLINIC | Age: 89
End: 2024-02-06

## 2024-02-06 DIAGNOSIS — Z79.01 LONG TERM CURRENT USE OF ANTICOAGULANT THERAPY: Primary | ICD-10-CM

## 2024-02-06 DIAGNOSIS — M79.652 PAIN OF LEFT THIGH: Primary | ICD-10-CM

## 2024-02-06 DIAGNOSIS — Z79.01 ANTICOAGULATION GOAL OF INR 2 TO 3: ICD-10-CM

## 2024-02-06 DIAGNOSIS — I48.20 CHRONIC ATRIAL FIBRILLATION (H): ICD-10-CM

## 2024-02-06 DIAGNOSIS — K59.01 SLOW TRANSIT CONSTIPATION: ICD-10-CM

## 2024-02-06 DIAGNOSIS — D62 ANEMIA DUE TO BLOOD LOSS, ACUTE: ICD-10-CM

## 2024-02-06 DIAGNOSIS — Z51.81 ANTICOAGULATION GOAL OF INR 2 TO 3: ICD-10-CM

## 2024-02-06 DIAGNOSIS — I10 ESSENTIAL HYPERTENSION: ICD-10-CM

## 2024-02-06 LAB — INR (EXTERNAL): 2.7 (ref 0.9–1.1)

## 2024-02-06 PROCEDURE — 99309 SBSQ NF CARE MODERATE MDM 30: CPT | Performed by: NURSE PRACTITIONER

## 2024-02-06 NOTE — LETTER
"    2/6/2024        RE: Melvin Abad  [address] [facility] [address]        [facility] GERIATRICS    Chief Complaint   Patient presents with     RECHECK     HPI:  Melvin Abad is a 89 year old  (4/24/1928), who is being seen today for an episodic care visit at: [facility] AND REH[facility] ([facility]) [[id number]].     This is a 89 yo male with PMHx for A-fib on Coumadin, right pontine stroke, who presented with new left groin and leg pain.  Imaging negative, no DVT, supratherapeutic INR, given Percocet/Robaxin for pain control.  No other changes noted, discharged to [facility] for rehab.     Today's concern is:   Pain, Htn    Allergies, and PMH/PSH reviewed in EPIC today.  REVIEW OF SYSTEMS:  4 point ROS including Respiratory, CV, GI and , other than that noted in the HPI,  is negative    Objective:   BP (!) 141/77   Pulse 72   Temp 98  F (36.7  C)   Resp 16   Ht 1.753 m (5' 9\")   Wt 94.6 kg (208 lb 8 oz)   SpO2 97%   BMI 30.79 kg/m    GENERAL APPEARANCE:  Alert, in no distress, cooperative, L thigh/knee pain much improved  RESP:  no respiratory distress, diminished bilaterally, RA  CV:  no edema, irregular rhythm per PAF  PSYCH:  oriented to 2/3    Most Recent 3 CBC's:  Recent Labs   Lab Test 01/22/24  0937 01/20/23  1424 02/23/22  1013   WBC 6.1 7.1 6.4   HGB 12.9* 13.3 13.4   MCV 96 97 99    179 196     Most Recent 3 BMP's:  Recent Labs   Lab Test 01/22/24  0937 01/20/23  1424 02/23/22  1013    139 139   POTASSIUM 4.6 4.4 4.2   CHLORIDE 103 103 106   CO2 29 28 31   BUN 20.5 17.4 22   CR 1.22* 1.10 1.07   ANIONGAP 7 8 2*   DARIUS 9.4 9.2 9.4   * 123* 88       Assessment/Plan:    (I63.50) Right pontine stroke (H)    (I48.20) Chronic atrial fibrillation (H)  Last INR 2.8, continue Coumadin 5 mg T/TH/SUN 2.5 mg AOD, recheck INR on 2/6, managed by [facility] clinic     (G17.806) Pain of left thigh  Iliopsoas muscle hematoma per supratherapeutic INR  Hemiparesis LLE s/p CVA " (H)  Using Tylenol 1000 mg 3 times daily, methocarbamol 500 mg 3 times daily, Voltaren gel 1% 2 g 4 times daily with gabapentin 200 mg twice daily. Today discontinue oxycodone. Pain controlled    (I10) Essential hypertension  Continue lisinopril 10 mg daily with metoprolol succinate 50 mg daily and Lasix 30 mg daily. -130s, HR <80s     (K59.01) Slow transit constipation  Continue MiraLAX 9 g daily as needed and psyllium daily as needed, adequate     CKD stage 3a  Last Cr 1.22 with GFR 55 on 1/22     (D62) Anemia due to blood loss, acute  Last Hgb 12.9 on 1/22     BPH  Continue Flomax 0.4 mg daily     HLD  Continue statin    Orders:  Discontinue oxycodone    Electronically signed by: Jean Marie Swenson NP         Sincerely,        Jean Marie Swenson NP

## 2024-02-06 NOTE — TELEPHONE ENCOUNTER
[facility] Triage Nurse INR     Provider: Jean Marie Swenson NP   Facility: [facility]  Facility Type:  TCU    Caller: Neeta  Call Back Number: [phone]  Reason for call: INR  Diagnosis/Goal: A. Fib    Todays INR: 2.7  Last INR 2/2 2.6(5mg Tues-Thurs-Sun and 2.5mg AOD---home dose)    Heparin/Lovenox:  No  Currently on ABX?: No  Other interacting medication:  None  Missed or refused doses: No    Verbal Order/Direction given by Provider: Continue Warfarin 5mg Tues-Thurs-Sun and 2.5mg all other days.  Next INR 2/13/24.  [facility] TCU team will manage INR's while on TCU and then the Anticoagulation clinic will resume Warfarin management upon patient's discharge from TCU.      Provider Giving Order:  Jean Marie Swenson NP     Verbal Order given to: Neeta Khanna RN

## 2024-02-06 NOTE — PROGRESS NOTES
Writer spoke with Feroz, nurse from [facility] triage, who confirms they will dose while patient is in TCU. Feroz will reach out to Neeta at [facility].

## 2024-02-06 NOTE — PROGRESS NOTES
"[facility] GERIATRICS    Chief Complaint   Patient presents with    RECHECK     HPI:  Melvin Abad is a 89 year old  (4/24/1928), who is being seen today for an episodic care visit at: [facility] REHAB [facility] ([facility]) [[id number]].     This is a 89 yo male with PMHx for A-fib on Coumadin, right pontine stroke, who presented with new left groin and leg pain.  Imaging negative, no DVT, supratherapeutic INR, given Percocet/Robaxin for pain control.  No other changes noted, discharged to [facility] for rehab.     Today's concern is:   Pain, Htn    Allergies, and PMH/PSH reviewed in EPIC today.  REVIEW OF SYSTEMS:  4 point ROS including Respiratory, CV, GI and , other than that noted in the HPI,  is negative    Objective:   BP (!) 141/77   Pulse 72   Temp 98  F (36.7  C)   Resp 16   Ht 1.753 m (5' 9\")   Wt 94.6 kg (208 lb 8 oz)   SpO2 97%   BMI 30.79 kg/m    GENERAL APPEARANCE:  Alert, in no distress, cooperative, L thigh/knee pain much improved  RESP:  no respiratory distress, diminished bilaterally, RA  CV:  no edema, irregular rhythm per PAF  PSYCH:  oriented to 2/3    Most Recent 3 CBC's:  Recent Labs   Lab Test 01/22/24  0937 01/20/23  1424 02/23/22  1013   WBC 6.1 7.1 6.4   HGB 12.9* 13.3 13.4   MCV 96 97 99    179 196     Most Recent 3 BMP's:  Recent Labs   Lab Test 01/22/24  0937 01/20/23  1424 02/23/22  1013    139 139   POTASSIUM 4.6 4.4 4.2   CHLORIDE 103 103 106   CO2 29 28 31   BUN 20.5 17.4 22   CR 1.22* 1.10 1.07   ANIONGAP 7 8 2*   DARIUS 9.4 9.2 9.4   * 123* 88       Assessment/Plan:    (I63.50) Right pontine stroke (H)    (I48.20) Chronic atrial fibrillation (H)  Last INR 2.8, continue Coumadin 5 mg T/TH/SUN 2.5 mg AOD, recheck INR on 2/6, managed by [facility] clinic     (M79.999) Pain of left thigh  Iliopsoas muscle hematoma per supratherapeutic INR  Hemiparesis LLE s/p CVA (H)  Using Tylenol 1000 mg 3 times daily, methocarbamol 500 mg 3 times daily, Voltaren gel 1% 2 " g 4 times daily with gabapentin 200 mg twice daily. Today discontinue oxycodone. Pain controlled    (I10) Essential hypertension  Continue lisinopril 10 mg daily with metoprolol succinate 50 mg daily and Lasix 30 mg daily. -130s, HR <80s     (K59.01) Slow transit constipation  Continue MiraLAX 9 g daily as needed and psyllium daily as needed, adequate     CKD stage 3a  Last Cr 1.22 with GFR 55 on 1/22     (D62) Anemia due to blood loss, acute  Last Hgb 12.9 on 1/22     BPH  Continue Flomax 0.4 mg daily     HLD  Continue statin    Orders:  Discontinue oxycodone    Electronically signed by: Jean Marie Swenson NP

## 2024-02-06 NOTE — TELEPHONE ENCOUNTER
Reason for Call:  Other call back    Detailed comments: Neeta from [facility] called for anti coag nurse.    Tried to call backline; no answer.    Please contact her back.  Thank you.    Phone Number Patient can be reached at: Other phone number:  [phone] *    Best Time: any    Can we leave a detailed message on this number? YES    Call taken on 2/6/2024 at 10:35 AM by Sharlene Crawford

## 2024-02-12 VITALS
RESPIRATION RATE: 17 BRPM | WEIGHT: 208 LBS | HEART RATE: 83 BPM | BODY MASS INDEX: 30.81 KG/M2 | TEMPERATURE: 97.8 F | SYSTOLIC BLOOD PRESSURE: 126 MMHG | OXYGEN SATURATION: 96 % | DIASTOLIC BLOOD PRESSURE: 71 MMHG | HEIGHT: 69 IN

## 2024-02-12 NOTE — PROGRESS NOTES
"[facility] GERIATRICS    Chief Complaint   Patient presents with    RECHECK     HPI:  Melvin Abad is a 89 year old  (4/24/1928), who is being seen today for an episodic care visit at: [facility] AND REHAB [facility] ([facility]) [[id number]].     This is a 89 yo male with PMHx for A-fib on Coumadin, right pontine stroke, who presented with new left groin and leg pain.  Imaging negative, no DVT, supratherapeutic INR, given Percocet/Robaxin for pain control.  No other changes noted, discharged to [facility] for rehab.     Today's concern is:   Pain    Allergies, and PMH/PSH reviewed in EPIC today.  REVIEW OF SYSTEMS:  4 point ROS including Respiratory, CV, GI and , other than that noted in the HPI,  is negative    Objective:   /71   Pulse 83   Temp 97.8  F (36.6  C)   Resp 17   Ht 1.753 m (5' 9\")   Wt 94.3 kg (208 lb)   SpO2 96%   BMI 30.72 kg/m    GENERAL APPEARANCE:  Alert, in no distress, cooperative, pain controlled  RESP:  no respiratory distress, CTA, RA  CV:  no edema, irregular rhythm per PAF  PSYCH:  oriented to 2/3    Most Recent 3 CBC's:  Recent Labs   Lab Test 01/22/24  0937 01/20/23  1424 02/23/22  1013   WBC 6.1 7.1 6.4   HGB 12.9* 13.3 13.4   MCV 96 97 99    179 196     Most Recent 3 BMP's:  Recent Labs   Lab Test 01/22/24  0937 01/20/23  1424 02/23/22  1013    139 139   POTASSIUM 4.6 4.4 4.2   CHLORIDE 103 103 106   CO2 29 28 31   BUN 20.5 17.4 22   CR 1.22* 1.10 1.07   ANIONGAP 7 8 2*   DARIUS 9.4 9.2 9.4   * 123* 88       Assessment/Plan:    (I63.50) Right pontine stroke (H)    (I48.20) Chronic atrial fibrillation (H)  Last INR 2.7, continue Coumadin 5 mg T/TH/SUN 2.5 mg AOD, recheck INR managed by [facility] clinic     (M79.562) Pain of left thigh  Iliopsoas muscle hematoma per supratherapeutic INR  Hemiparesis LLE s/p CVA (H)  Using Tylenol 1000 mg 3 times daily, Voltaren gel 1% 2 g 4 times daily with gabapentin 200 mg twice daily. Today discontinue methocarbamol. Pain " controlled     (I10) Essential hypertension  Continue lisinopril 10 mg daily with metoprolol succinate 50 mg daily and Lasix 30 mg daily. -130s, HR <80s. stable     (K59.01) Slow transit constipation  Continue MiraLAX 9 g daily as needed and psyllium daily as needed, adequate     CKD stage 3a  Last Cr 1.22 with GFR 55 on 1/22     (D62) Anemia due to blood loss, acute  Last Hgb 12.9 on 1/22     BPH  Continue Flomax 0.4 mg daily, no change     HLD  Continue statin    Orders:  Discontinue methocarbamol    Electronically signed by: Jean Marie Swenson NP

## 2024-02-13 ENCOUNTER — TRANSITIONAL CARE UNIT VISIT (OUTPATIENT)
Dept: GERIATRICS | Facility: CLINIC | Age: 89
End: 2024-02-13
Payer: COMMERCIAL

## 2024-02-13 VITALS
TEMPERATURE: 98.7 F | BODY MASS INDEX: 30.81 KG/M2 | OXYGEN SATURATION: 95 % | SYSTOLIC BLOOD PRESSURE: 118 MMHG | RESPIRATION RATE: 16 BRPM | HEIGHT: 69 IN | HEART RATE: 66 BPM | DIASTOLIC BLOOD PRESSURE: 71 MMHG | WEIGHT: 208 LBS

## 2024-02-13 DIAGNOSIS — K59.01 SLOW TRANSIT CONSTIPATION: ICD-10-CM

## 2024-02-13 DIAGNOSIS — M79.605 PAIN OF LEFT LOWER EXTREMITY: Primary | ICD-10-CM

## 2024-02-13 DIAGNOSIS — I48.20 CHRONIC ATRIAL FIBRILLATION (H): ICD-10-CM

## 2024-02-13 DIAGNOSIS — I10 ESSENTIAL HYPERTENSION: ICD-10-CM

## 2024-02-13 PROBLEM — I50.812 CHRONIC RIGHT-SIDED HEART FAILURE (H): Status: ACTIVE | Noted: 2024-02-13

## 2024-02-13 PROCEDURE — 99309 SBSQ NF CARE MODERATE MDM 30: CPT | Performed by: NURSE PRACTITIONER

## 2024-02-13 RX ORDER — ACETAMINOPHEN 500 MG
1000 TABLET ORAL 3 TIMES DAILY
COMMUNITY
Start: 2024-02-13 | End: 2024-02-28

## 2024-02-13 NOTE — PROGRESS NOTES
"[facility] GERIATRICS    PRIMARY CARE PROVIDER AND CLINIC:  Lazaro Huston MD, [address] [facility] [address]  Chief Complaint   Patient presents with    Hospital F/U      [place] Medical Record Number:  [id number]  Place of Service where encounter took place:  [facility] ([facility]) [[id number]]    Melvin Abad  is a 89 year old  (4/24/1928), admitted to the above facility from  [facility]. Hospital stay 1/26/24 through 1/26/24..     HPI:    As per DNP's note:\"This is a 89 yo male with PMHx for A-fib on Coumadin, right pontine stroke, who presented with new left groin and leg pain.  Imaging negative, no DVT, supratherapeutic INR, given Percocet/Robaxin for pain control.  No other changes noted, [facility].\"      TODAY:  -L iliopsoas hematoma: reports pain is getting better, can move his thigh. Reports the jerky movement in left leg is much better. Reports get tylenol and topical meds and seems helping. Reports he is off strong pain meds.   - rehab: reports going fine, can self-wheel and can stand up independently and use walker.   -Heart: denies chest pain   =======================================================  CODE STATUS/ADVANCE DIRECTIVES DISCUSSION:  Prior  DNR / DNI  ALLERGIES: No Known Allergies   PAST MEDICAL HISTORY:   Past Medical History:   Diagnosis Date    Actinic keratosis     Atrial fibrillation (H)     Basal cell carcinoma nos 06/10    Mohs excision, rt upper lip & rt temple    Cardiac dysrhythmia, unspecified     Cough     chronic cough    Diabetic eye exam (H) 8/29/14    Generalized osteoarthrosis, unspecified site     djd of the knees, hands, and neck    Other diseases of lung, not elsewhere classified     pulmonary nodule, benign    Pure hypercholesterolemia     Unspecified essential hypertension       PAST SURGICAL HISTORY:   has a past surgical history that includes TOTAL KNEE ARTHROPLASTY (1997); " Colonoscopy w/wo [place] **Performed** (10/19/2001); Colonoscopy w/wo Brush **Performed** (11/02/2004); colonoscopy (05/02/2007); HEMORRHOIDOPEXY BY STAPLING (09/26/08); and Colonoscopy (11/10/2010).  FAMILY HISTORY: family history includes Cancer in his mother; Prostate Cancer in his father.  SOCIAL HISTORY:   reports that he has quit smoking. His smoking use included cigarettes. He has never used smokeless tobacco. He reports that he does not drink alcohol and does not use drugs.  Patient's living condition: lives in an assisted living facility    Post Discharge Medication Reconciliation Status:   MED REC REQUIRED  Post Medication Reconciliation Status: discharge medications reconciled and changed, per note/orders       Current Outpatient Medications   Medication Sig    acetaminophen (TYLENOL) 500 MG tablet Take 1 tablet (500 mg) by mouth every 6 hours as needed for pain (Patient taking differently: Take 1,000 mg by mouth 3 times daily)    calcium carbonate (TUMS) 500 MG chewable tablet Take 1 tablet (500 mg) by mouth 3 times daily as needed for heartburn    carboxymethylcellulose PF (REFRESH PLUS) 0.5 % ophthalmic solution Place 1 drop into both eyes 4 times daily as needed for dry eyes Patient self-administers    cholecalciferol (VITAMIN D3) 25 mcg (1000 units) capsule Take 1 capsule (25 mcg) by mouth daily    diclofenac (VOLTAREN) 1 % topical gel Apply 2 g topically 4 times daily Apply to L knee/thigh    furosemide (LASIX) 20 MG tablet Take 1.5 tablets (30 mg) by mouth daily    gabapentin (NEURONTIN) 100 MG capsule Take 2 capsules (200 mg) by mouth 2 times daily    ketotifen fumarate 0.035%, ketotifen 0.025%, (ZADITOR) 0.025 % ophthalmic solution Place 1 drop into both eyes 2 times daily as needed for dry eyes Patient self-administers    lisinopril (ZESTRIL) 10 MG tablet Take 1 tablet (10 mg) by mouth daily    loratadine (CLARITIN) 10 MG tablet Take 1 tablet (10 mg) by mouth daily    metoprolol succinate ER  "(TOPROL XL) 50 MG 24 hr tablet Take 1 tablet (50 mg) by mouth daily    order for DME Equipment being ordered: side rails/grab bars    order for DME Equipment being ordered: bedside commode    order for DME Equipment being ordered: 4 legged walker    polyethylene glycol (MIRALAX) 17 GM/Dose powder Take 9 g by mouth daily as needed for constipation Mix and hand to patient, Patient self-administers (he knows how much he needs to keep regular)    psyllium (METAMUCIL/KONSYL) 58.6 % powder Take 18 g (1 Tablespoonful) by mouth daily as needed for constipation Mix and hand to patient, Patient self-administers (he knows how much he needs to keep regular)    simvastatin (ZOCOR) 40 MG tablet Take 1 tablet (40 mg) by mouth at bedtime    sodium chloride (OCEAN) 0.65 % nasal spray Spray 2 sprays in nostril daily as needed for congestion Patient self-administers    tamsulosin (FLOMAX) 0.4 MG capsule Take 1 capsule (0.4 mg) by mouth daily    WARFARIN SODIUM PO 2/6/24 INR 2.7  Cont 5mg Tue-Thurs-Sun and 2.5mg AOD.  Next INR 2/13/24.     No current facility-administered medications for this visit.       ROS:  10 point ROS of systems including Constitutional, Eyes, Respiratory, Cardiovascular, Gastroenterology, Genitourinary, Integumentary, Musculoskeletal, Psychiatric were all negative except for pertinent positives noted in my HPI.    Vitals:  /71   Pulse 66   Temp 98.7  F (37.1  C)   Resp 16   Ht 1.753 m (5' 9\")   Wt 94.3 kg (208 lb)   SpO2 95%   BMI 30.72 kg/m    Exam:  GENERAL APPEARANCE:  in no distress,   RESP:  Unlabored breathing. CTA b/l.   CV:  S1S2 audible, regular HR, no murmur appreciated.   ABDOMEN:  soft, NT/ND, BS audible.   M/S:   no joint deformity noted on observation.   SKIN:  No rash noted on observation  NEURO:   No NFD appreciated on observation.   PSYCH:  affect and mood normal    Lab/Diagnostic data: Reviewed in the chart and EHR.        ASSESSMENT/PLAN:  --------------------------------  Chronic " right-sided heart failure (H)  Chronic atrial fibrillation (H)  Long term current use of anticoagulant therapy  Essential hypertension  History of CVA (cerebrovascular accident)  Anticoagulation goal of INR 2 to 3  - cardiac wise compensated on the current regimen. Continue.   - cardiology routine follow up      Iliopsoas muscle hematoma, left, subsequent encounter  Pain of left lower extremity  - analgesia optimal with the current regimen. Off oxycodone and methocarbamol  -started rehab program, making a progress, continue until desired goal is achieved.   - likely will be discharge back to [facility] soon.       Benign prostatic hyperplasia, unspecified whether lower urinary tract symptoms present  - on flomax, no concern    Slow transit constipation:  - on meds. Monitor.     Orders:  NNO      Electronically signed by:  Jacques Purcell MD

## 2024-02-13 NOTE — LETTER
"    2/13/2024        RE: Melvin Abad  [address] [place] [facility] [address]        [facility] GERIATRICS    Chief Complaint   Patient presents with     RECHECK     HPI:  Melvin Abad is a 89 year old  (4/24/1928), who is being seen today for an episodic care visit at: [facility] ([facility]) [[id number]].     This is a 89 yo male with PMHx for A-fib on Coumadin, right pontine stroke, who presented with new left groin and leg pain.  Imaging negative, no DVT, supratherapeutic INR, given Percocet/Robaxin for pain control.  No other changes noted, discharged to [facility] for rehab.     Today's concern is:   Pain    Allergies, and PMH/PSH reviewed in [facility] today.  REVIEW OF SYSTEMS:  4 point ROS including Respiratory, CV, GI and , other than that noted in the HPI,  is negative    Objective:   /71   Pulse 83   Temp 97.8  F (36.6  C)   Resp 17   Ht 1.753 m (5' 9\")   Wt 94.3 kg (208 lb)   SpO2 96%   BMI 30.72 kg/m    GENERAL APPEARANCE:  Alert, in no distress, cooperative, pain controlled  RESP:  no respiratory distress, CTA, RA  CV:  no edema, irregular rhythm per PAF  PSYCH:  oriented to 2/3    Most Recent 3 CBC's:  Recent Labs   Lab Test 01/22/24  0937 01/20/23  1424 02/23/22  1013   WBC 6.1 7.1 6.4   HGB 12.9* 13.3 13.4   MCV 96 97 99    179 196     Most Recent 3 BMP's:  Recent Labs   Lab Test 01/22/24  0937 01/20/23  1424 02/23/22  1013    139 139   POTASSIUM 4.6 4.4 4.2   CHLORIDE 103 103 106   CO2 29 28 31   BUN 20.5 17.4 22   CR 1.22* 1.10 1.07   ANIONGAP 7 8 2*   DARIUS 9.4 9.2 9.4   * 123* 88       Assessment/Plan:    (I63.50) Right pontine stroke (H)    (I48.20) Chronic atrial fibrillation (H)  Last INR 2.7, continue Coumadin 5 mg T/TH/SUN 2.5 mg AOD, recheck INR managed by  clinic     (Z88.953) Pain of left thigh  Iliopsoas muscle hematoma per supratherapeutic INR  Hemiparesis LLE s/p CVA (H)  Using Tylenol 1000 mg 3 times daily, Voltaren gel 1% 2 g 4 " times daily with gabapentin 200 mg twice daily. Today discontinue methocarbamol. Pain controlled     (I10) Essential hypertension  Continue lisinopril 10 mg daily with metoprolol succinate 50 mg daily and Lasix 30 mg daily. -130s, HR <80s. stable     (K59.01) Slow transit constipation  Continue MiraLAX 9 g daily as needed and psyllium daily as needed, adequate     CKD stage 3a  Last Cr 1.22 with GFR 55 on 1/22     (D62) Anemia due to blood loss, acute  Last Hgb 12.9 on 1/22     BPH  Continue Flomax 0.4 mg daily, no change     HLD  Continue statin    Orders:  Discontinue methocarbamol    Electronically signed by: Jean Marie Swenson NP         Sincerely,        Jean Marie Swenson NP

## 2024-02-14 ENCOUNTER — TRANSITIONAL CARE UNIT VISIT (OUTPATIENT)
Dept: GERIATRICS | Facility: CLINIC | Age: 89
End: 2024-02-14
Payer: COMMERCIAL

## 2024-02-14 DIAGNOSIS — I10 ESSENTIAL HYPERTENSION: ICD-10-CM

## 2024-02-14 DIAGNOSIS — S70.12XA ILIOPSOAS MUSCLE HEMATOMA, LEFT, INITIAL ENCOUNTER: ICD-10-CM

## 2024-02-14 DIAGNOSIS — Z51.81 ANTICOAGULATION GOAL OF INR 2 TO 3: ICD-10-CM

## 2024-02-14 DIAGNOSIS — K59.01 SLOW TRANSIT CONSTIPATION: ICD-10-CM

## 2024-02-14 DIAGNOSIS — I50.812 CHRONIC RIGHT-SIDED HEART FAILURE (H): Primary | ICD-10-CM

## 2024-02-14 DIAGNOSIS — S70.12XD: ICD-10-CM

## 2024-02-14 DIAGNOSIS — N40.0 BENIGN PROSTATIC HYPERPLASIA, UNSPECIFIED WHETHER LOWER URINARY TRACT SYMPTOMS PRESENT: ICD-10-CM

## 2024-02-14 DIAGNOSIS — I48.20 CHRONIC ATRIAL FIBRILLATION (H): ICD-10-CM

## 2024-02-14 DIAGNOSIS — Z79.01 ANTICOAGULATION GOAL OF INR 2 TO 3: ICD-10-CM

## 2024-02-14 DIAGNOSIS — Z79.01 LONG TERM CURRENT USE OF ANTICOAGULANT THERAPY: ICD-10-CM

## 2024-02-14 DIAGNOSIS — M79.605 PAIN OF LEFT LOWER EXTREMITY: ICD-10-CM

## 2024-02-14 DIAGNOSIS — Z86.73 HISTORY OF CVA (CEREBROVASCULAR ACCIDENT): ICD-10-CM

## 2024-02-14 PROCEDURE — 99305 1ST NF CARE MODERATE MDM 35: CPT | Performed by: FAMILY MEDICINE

## 2024-02-14 NOTE — LETTER
"    2/14/2024        RE: Melvin Abad  [address]  [facility] [address]        [facility] GERIATRICS    PRIMARY CARE PROVIDER AND CLINIC:  Lazaro Huston MD, [address] [facility] [address]  Chief Complaint   Patient presents with     Hospital F/U      [place] Medical Record Number:  [id number]  Place of Service where encounter took place[facility] [[id number][facility]    Melvin Abad  is a 89 year old  (4/24/1928), admitted to the above facility from  [facility]. Hospital stay 1/26/24 through 1/26/24..     HPI:    As per DNP's note:\"This is a 89 yo male with PMHx for A-fib on Coumadin, right pontine stroke, who presented with new left groin and leg pain.  Imaging negative, no DVT, supratherapeutic INR, given Percocet/Robaxin for pain control.  No other changes noted, [facility].\"      TODAY:  -L iliopsoas hematoma: reports pain is getting better, can move his thigh. Reports the jerky movement in left leg is much better. Reports get tylenol and topical meds and seems helping. Reports he is off strong pain meds.   - rehab: reports going fine, can self-wheel and can stand up independently and use walker.   -Heart: denies chest pain   =======================================================  CODE STATUS/ADVANCE DIRECTIVES DISCUSSION:  Prior  DNR / DNI  ALLERGIES: No Known Allergies   PAST MEDICAL HISTORY:   Past Medical History:   Diagnosis Date     Actinic keratosis      Atrial fibrillation (H)      Basal cell carcinoma nos 06/10    Mohs excision, rt upper lip & rt temple     Cardiac dysrhythmia, unspecified      Cough     chronic cough     Diabetic eye exam (H) 8/29/14     Generalized osteoarthrosis, unspecified site     djd of the knees, hands, and neck     Other diseases of lung, not elsewhere classified     pulmonary nodule, benign     Pure hypercholesterolemia      Unspecified essential hypertension     "   PAST SURGICAL HISTORY:   has a past surgical history that includes TOTAL KNEE ARTHROPLASTY (1997); Colonoscopy w/wo [place] **Performed** (10/19/2001); Colonoscopy w/wo Brush **Performed** (11/02/2004); colonoscopy (05/02/2007); HEMORRHOIDOPEXY BY STAPLING (09/26/08); and Colonoscopy (11/10/2010).  FAMILY HISTORY: family history includes Cancer in his mother; Prostate Cancer in his father.  SOCIAL HISTORY:   reports that he has quit smoking. His smoking use included cigarettes. He has never used smokeless tobacco. He reports that he does not drink alcohol and does not use drugs.  Patient's living condition: lives in an assisted living facility    Post Discharge Medication Reconciliation Status:   MED REC REQUIRED  Post Medication Reconciliation Status: discharge medications reconciled and changed, per note/orders       Current Outpatient Medications   Medication Sig     acetaminophen (TYLENOL) 500 MG tablet Take 1 tablet (500 mg) by mouth every 6 hours as needed for pain (Patient taking differently: Take 1,000 mg by mouth 3 times daily)     calcium carbonate (TUMS) 500 MG chewable tablet Take 1 tablet (500 mg) by mouth 3 times daily as needed for heartburn     carboxymethylcellulose PF (REFRESH PLUS) 0.5 % ophthalmic solution Place 1 drop into both eyes 4 times daily as needed for dry eyes Patient self-administers     cholecalciferol (VITAMIN D3) 25 mcg (1000 units) capsule Take 1 capsule (25 mcg) by mouth daily     diclofenac (VOLTAREN) 1 % topical gel Apply 2 g topically 4 times daily Apply to L knee/thigh     furosemide (LASIX) 20 MG tablet Take 1.5 tablets (30 mg) by mouth daily     gabapentin (NEURONTIN) 100 MG capsule Take 2 capsules (200 mg) by mouth 2 times daily     ketotifen fumarate 0.035%, ketotifen 0.025%, (ZADITOR) 0.025 % ophthalmic solution Place 1 drop into both eyes 2 times daily as needed for dry eyes Patient self-administers     lisinopril (ZESTRIL) 10 MG tablet Take 1 tablet (10 mg) by mouth  "daily     loratadine (CLARITIN) 10 MG tablet Take 1 tablet (10 mg) by mouth daily     metoprolol succinate ER (TOPROL XL) 50 MG 24 hr tablet Take 1 tablet (50 mg) by mouth daily     order for DME Equipment being ordered: side rails/grab bars     order for DME Equipment being ordered: bedside commode     order for DME Equipment being ordered: 4 legged walker     polyethylene glycol (MIRALAX) 17 GM/Dose powder Take 9 g by mouth daily as needed for constipation Mix and hand to patient, Patient self-administers (he knows how much he needs to keep regular)     psyllium (METAMUCIL/KONSYL) 58.6 % powder Take 18 g (1 Tablespoonful) by mouth daily as needed for constipation Mix and hand to patient, Patient self-administers (he knows how much he needs to keep regular)     simvastatin (ZOCOR) 40 MG tablet Take 1 tablet (40 mg) by mouth at bedtime     sodium chloride (OCEAN) 0.65 % nasal spray Spray 2 sprays in nostril daily as needed for congestion Patient self-administers     tamsulosin (FLOMAX) 0.4 MG capsule Take 1 capsule (0.4 mg) by mouth daily     WARFARIN SODIUM PO 2/6/24 INR 2.7  Cont 5mg Tue-Thurs-Sun and 2.5mg AOD.  Next INR 2/13/24.     No current facility-administered medications for this visit.       ROS:  10 point ROS of systems including Constitutional, Eyes, Respiratory, Cardiovascular, Gastroenterology, Genitourinary, Integumentary, Musculoskeletal, Psychiatric were all negative except for pertinent positives noted in my HPI.    Vitals:  /71   Pulse 66   Temp 98.7  F (37.1  C)   Resp 16   Ht 1.753 m (5' 9\")   Wt 94.3 kg (208 lb)   SpO2 95%   BMI 30.72 kg/m    Exam:  GENERAL APPEARANCE:  in no distress,   RESP:  Unlabored breathing. CTA b/l.   CV:  S1S2 audible, regular HR, no murmur appreciated.   ABDOMEN:  soft, NT/ND, BS audible.   M/S:   no joint deformity noted on observation.   SKIN:  No rash noted on observation  NEURO:   No NFD appreciated on observation.   PSYCH:  affect and mood " normal    Lab/Diagnostic data: Reviewed in the chart and EHR.        ASSESSMENT/PLAN:  --------------------------------  Chronic right-sided heart failure (H)  Chronic atrial fibrillation (H)  Long term current use of anticoagulant therapy  Essential hypertension  History of CVA (cerebrovascular accident)  Anticoagulation goal of INR 2 to 3  - cardiac wise compensated on the current regimen. Continue.   - cardiology routine follow up      Iliopsoas muscle hematoma, left, subsequent encounter  Pain of left lower extremity  - analgesia optimal with the current regimen. Off oxycodone and methocarbamol  -started rehab program, making a progress, continue until desired goal is achieved.   - likely will be discharge back to [facility] soon.       Benign prostatic hyperplasia, unspecified whether lower urinary tract symptoms present  - on flomax, no concern    Slow transit constipation:  - on meds. Monitor.     Orders:  NNO      Electronically signed by:  Jacques Purcell MD                     Sincerely,        Jacques Purcell MD

## 2024-02-20 ENCOUNTER — TELEPHONE (OUTPATIENT)
Dept: GERIATRICS | Facility: CLINIC | Age: 89
End: 2024-02-20
Payer: COMMERCIAL

## 2024-02-20 NOTE — TELEPHONE ENCOUNTER
[facility] Geriatrics Triage Nurse INR     Provider: Jean Marie Swenson NP   Facility: [facility]  Facility Type:  TCU    Caller: Neeta  Call Back Number: [phone]  Reason for call: INR  Diagnosis/Goal: A. Fib    Todays INR: 2.2  Last INR 2/13  3.0--5mg T, Th and 2.5mg AOD  2/6  2.7  5mg T, Th, Sun and 2.5mg AOD.     Heparin/Lovenox:  No  Currently on ABX?: No  Other interacting medication:  None  Missed or refused doses: No    Verbal Order/Direction given by Provider: Cont 5mg T, Th and 2.5mg AOD. Recheck INR 2/27    Provider Giving Order:  Jean Marie Swenson NP     Verbal Order given to: Neeta Terrazas RN

## 2024-02-21 ENCOUNTER — TELEPHONE (OUTPATIENT)
Dept: ANTICOAGULATION | Facility: CLINIC | Age: 89
End: 2024-02-21
Payer: COMMERCIAL

## 2024-02-21 DIAGNOSIS — Z79.01 LONG TERM CURRENT USE OF ANTICOAGULANT THERAPY: Primary | ICD-10-CM

## 2024-02-21 DIAGNOSIS — I48.20 CHRONIC ATRIAL FIBRILLATION (H): ICD-10-CM

## 2024-02-21 NOTE — TELEPHONE ENCOUNTER
Molly, nurse from [facility] [phone], calling to let us know that pt was given orders to recheck INR on 2/27, however he is moving to the [place] side again and their lab days are on Mondays and Fridays.   Verbal given to Molly to recheck on Monday 2/26. Calendar updated.   Shani Ross RN

## 2024-02-23 ENCOUNTER — DOCUMENTATION ONLY (OUTPATIENT)
Dept: OTHER | Facility: CLINIC | Age: 89
End: 2024-02-23
Payer: COMMERCIAL

## 2024-02-23 ENCOUNTER — LAB REQUISITION (OUTPATIENT)
Dept: LAB | Facility: CLINIC | Age: 89
End: 2024-02-23
Payer: COMMERCIAL

## 2024-02-23 DIAGNOSIS — I48.91 UNSPECIFIED ATRIAL FIBRILLATION (H): ICD-10-CM

## 2024-02-23 DIAGNOSIS — Z79.01 LONG TERM (CURRENT) USE OF ANTICOAGULANTS: ICD-10-CM

## 2024-02-23 DIAGNOSIS — I48.20 CHRONIC ATRIAL FIBRILLATION, UNSPECIFIED (H): ICD-10-CM

## 2024-02-26 ENCOUNTER — ANTICOAGULATION THERAPY VISIT (OUTPATIENT)
Dept: ANTICOAGULATION | Facility: CLINIC | Age: 89
End: 2024-02-26
Payer: COMMERCIAL

## 2024-02-26 ENCOUNTER — TELEPHONE (OUTPATIENT)
Dept: INTERNAL MEDICINE | Facility: CLINIC | Age: 89
End: 2024-02-26
Payer: COMMERCIAL

## 2024-02-26 ENCOUNTER — PATIENT OUTREACH (OUTPATIENT)
Dept: CARE COORDINATION | Facility: CLINIC | Age: 89
End: 2024-02-26
Payer: COMMERCIAL

## 2024-02-26 DIAGNOSIS — Z79.01 LONG TERM CURRENT USE OF ANTICOAGULANT THERAPY: Primary | ICD-10-CM

## 2024-02-26 DIAGNOSIS — I48.20 CHRONIC ATRIAL FIBRILLATION (H): ICD-10-CM

## 2024-02-26 LAB — INR PPP: 3.03 (ref 0.85–1.15)

## 2024-02-26 PROCEDURE — P9604 ONE-WAY ALLOW PRORATED TRIP: HCPCS | Mod: ORL | Performed by: INTERNAL MEDICINE

## 2024-02-26 PROCEDURE — 36415 COLL VENOUS BLD VENIPUNCTURE: CPT | Mod: ORL | Performed by: INTERNAL MEDICINE

## 2024-02-26 PROCEDURE — 85610 PROTHROMBIN TIME: CPT | Mod: ORL | Performed by: INTERNAL MEDICINE

## 2024-02-26 RX ORDER — WARFARIN SODIUM 2.5 MG/1
TABLET ORAL
Qty: 9 TABLET | Refills: 0 | Status: SHIPPED | OUTPATIENT
Start: 2024-02-26 | End: 2024-03-04

## 2024-02-26 NOTE — PROGRESS NOTES
Clinic Care Coordination Contact    Situation: Patient chart reviewed by care coordinator.    Background: Patient in identified status. Patient was admitted to the TCU on 1/26/2024.    Assessment: Patient is still at the TCU.    Plan/Recommendations: Care Coordinator will await notification from facility staff informing of patient's discharge plans/needs. Care Coordinator will review chart and outreach to facility staff every 4 weeks and as needed.      Alma White RN Care Coordination   [facility] Ifeanyi Monterroso  Email: [email or web address]  Phone: [phone]

## 2024-02-26 NOTE — TELEPHONE ENCOUNTER
Reason for Call:  Appointment Request    Patient requesting this type of appt:  Hospital/ED Follow-Up     Requested provider: Lazaro Huston    Reason patient unable to be scheduled: Not with their preferred provider    When does patient want to be seen/preferred time: 3-7 days    Comments: A flollow up from hospital visit needs to be seen by 3/3/2024    Could we send this information to you in [facility] or would you prefer to receive a phone call?:   Patient would prefer a phone call   Okay to leave a detailed message?: Yes at Other phone number:  [place] [phone]    Call taken on 2/26/2024 at 7:35 AM by Carol Gilbert PTA

## 2024-02-26 NOTE — PROGRESS NOTES
ANTICOAGULATION MANAGEMENT     Melvin Abad 89 year old male is on warfarin with supratherapeutic INR result. (Goal INR 2.0-3.0)    Recent labs: (last 7 days)     02/26/24  0708   INR 3.03*       ASSESSMENT     Source(s): Chart Review and Home Care/Facility Nurse     Warfarin doses taken: Warfarin taken as instructed  Diet:  patient makes own breakfast  Medication/supplement changes:  Voltaren four times daily While is TCU which may be increasing INR today  Tylenol 500 mg BID  New illness, injury, or hospitalization: No  Signs or symptoms of bleeding or clotting: No  Previous result: Therapeutic last 2(+) visits  Additional findings: None       PLAN     Recommended plan for temporary change(s) affecting INR     Dosing Instructions: Continue your current warfarin dose with next INR in 1 week       Summary  As of 2/26/2024      Full warfarin instructions:  5 mg every Tue, Thu; 2.5 mg all other days   Next INR check:  3/4/2024               Telephone call with Molly [facility] nurse who agrees to plan and repeated back plan correctly  Faxed dosing and follow up instructions to Ingrid PIZANO    Orders given to  Homecare nurse/facility to recheck    Education provided:   Please call back if any changes to your diet, medications or how you've been taking warfarin    Plan made with [facility] Pharmacist Molly Avendano, RN  Anticoagulation Clinic  2/26/2024    _______________________________________________________________________     Anticoagulation Episode Summary       Current INR goal:  2.0-3.0   TTR:  97.5% (1 y)   Target end date:  Indefinite   Send INR reminders to:  [facility]    Indications    Atrial fibrillation (Resolved) [I48.91]  Long-term (current) use of anticoagulants [Z79.01] [Z79.01]  Chronic atrial fibrillation (H) [I48.20]             Comments:  [place] SINGH/Ingrid Sanchez [phone] fax: [phone] & send to [facility]             Anticoagulation Care  Providers       Provider Role Specialty Phone number    Lazaro Huston MD Referring Internal Medicine [phone]    Fransisco Cordon MD Referring Family Medicine

## 2024-02-28 ENCOUNTER — OFFICE VISIT (OUTPATIENT)
Dept: INTERNAL MEDICINE | Facility: CLINIC | Age: 89
End: 2024-02-28
Payer: COMMERCIAL

## 2024-02-28 ENCOUNTER — TELEPHONE (OUTPATIENT)
Dept: INTERNAL MEDICINE | Facility: CLINIC | Age: 89
End: 2024-02-28

## 2024-02-28 VITALS
RESPIRATION RATE: 20 BRPM | SYSTOLIC BLOOD PRESSURE: 130 MMHG | DIASTOLIC BLOOD PRESSURE: 70 MMHG | OXYGEN SATURATION: 97 % | TEMPERATURE: 97.9 F | HEART RATE: 64 BPM

## 2024-02-28 DIAGNOSIS — E11.9 TYPE 2 DIABETES MELLITUS WITHOUT COMPLICATION, WITHOUT LONG-TERM CURRENT USE OF INSULIN (H): ICD-10-CM

## 2024-02-28 DIAGNOSIS — Z79.01 LONG TERM CURRENT USE OF ANTICOAGULANT THERAPY: ICD-10-CM

## 2024-02-28 DIAGNOSIS — M79.605 PAIN OF LEFT LOWER EXTREMITY: ICD-10-CM

## 2024-02-28 DIAGNOSIS — E78.5 HYPERLIPIDEMIA LDL GOAL <100: ICD-10-CM

## 2024-02-28 DIAGNOSIS — E66.9 OBESITY (BMI 30-39.9): ICD-10-CM

## 2024-02-28 DIAGNOSIS — S70.12XA ILIOPSOAS MUSCLE HEMATOMA, LEFT, INITIAL ENCOUNTER: Primary | ICD-10-CM

## 2024-02-28 DIAGNOSIS — I48.20 CHRONIC ATRIAL FIBRILLATION (H): ICD-10-CM

## 2024-02-28 PROCEDURE — 99495 TRANSJ CARE MGMT MOD F2F 14D: CPT | Performed by: INTERNAL MEDICINE

## 2024-02-28 RX ORDER — ACETAMINOPHEN 500 MG
1000 TABLET ORAL EVERY 8 HOURS PRN
COMMUNITY
Start: 2024-02-28 | End: 2024-05-19

## 2024-02-28 ASSESSMENT — PAIN SCALES - GENERAL: PAINLEVEL: NO PAIN (0)

## 2024-02-28 NOTE — TELEPHONE ENCOUNTER
ZULEIMA Barnes calling back and said that she needs the order or AVS faxed to her regarding patient's Tylenol being 2 tablets (1,000 mg) by mouth every 8 hours as needed for pain. She does not have a copy of the patient's AVS that says and needs an order.     Fax: [phone]    Demetria Ellis RN on 2/28/2024 at 4:45 PM

## 2024-02-28 NOTE — PROGRESS NOTES
"  Assessment & Plan   Problem List Items Addressed This Visit       Long term current use of anticoagulant therapy    Hyperlipidemia LDL goal <100    Obesity (BMI 30-39.9)    Pain of left lower extremity    Relevant Medications    acetaminophen (TYLENOL) 500 MG tablet    Chronic atrial fibrillation (H)     Other Visit Diagnoses       Type 2 diabetes mellitus without complication, without long-term current use of insulin (H)    -  Primary    Iliopsoas muscle hematoma, left, initial encounter        Relevant Medications    acetaminophen (TYLENOL) 500 MG tablet           Patient is here after 2 urgency room visits for left upper leg pain.  He was found to have a psoas muscle hematoma.  Because of limited movement of the leg and the severity of the pain he was given Tylenol schedule III times a day and gabapentin.  He was sent to rehab center at [place].  He is now better is able to move his leg.  He did go back to his assisted living in the [facility].  He does not walk very much but can transfer and stand and walk a few feet.    He is not having any pain so we will stop his scheduled Tylenol and make it as needed.  We will stop his gabapentin and diclofenac's as they are not needed.    We did look at his POLST and agree he is DNR/DNI but will have selective treatment not comfort care as he still wants to get antibiotics and other treatments if he had an easily reversible treatment diagnosis.             MED REC REQUIRED  Post Medication Reconciliation Status: discharge medications reconciled and changed, per note/orders  BMI  Estimated body mass index is 30.72 kg/m  as calculated from the following:    Height as of 2/13/24: 1.753 m (5' 9\").    Weight as of 2/13/24: 94.3 kg (208 lb).             Vika Charles is a 89 year old, presenting for the following health issues:  Hospital F/U      2/28/2024    10:34 AM   Additional Questions   Roomed by Pippa AUSTIN   Accompanied by [facility]     Hospital Follow-up " Visit:    Hospital/Nursing Home/IP Rehab Facility:  [facility]  Date of Admission: 1/26/2024  Date of Discharge: 2/22/2024?   Reason(s) for Admission: Hematoma of left iliopsoas muscle     Was your hospitalization related to COVID-19? No   Problems taking medications regularly:  None  Medication changes since discharge: None  Problems adhering to non-medication therapy:  None    Summary of hospitalization:    Diagnostic Tests/Treatments reviewed.  Follow up needed: none  Other Healthcare Providers Involved in Patient s Care:         Homecare  Update since discharge: improved.       Plan of care communicated with patient           Left hip and back pain, finally found to have psoas muscle hematoma. Spent 3-4 weeks in rehab for the left leg pain, it is gone now.  Back to [facility] assisted living.      He can rub the upper left thigh and no pain.  Doesn't walk much even with walker.      No known falls or cause of hematoma    Continues on coumadin.         Objective    /70   Pulse 64   Temp 97.9  F (36.6  C) (Temporal)   Resp 20   SpO2 97%   There is no height or weight on file to calculate BMI.  Physical Exam   Acute distress accompanied by his son, sitting in a wheelchair  Heart is regular  Lungs are clear  Left leg has good movement, thigh is nontender to palpation lower extremity has trace edema but no skin sores.            Signed Electronically by: Lazaro Huston MD

## 2024-02-28 NOTE — TELEPHONE ENCOUNTER
They have the previous scheduled tylenol pills, doesn't need a new script.    Orders on the papers from seeing him today.

## 2024-02-28 NOTE — TELEPHONE ENCOUNTER
ZULEIMA Barnes needs an order faxed to her for a discontinuation of the scheduled tylenol. And she also needs clarification if order for PRN Tylenol is 1 or 2 tabs every 8 hours as what they received says that and staff can't make that decision and it needs to be specific 1 or 2 tabs every 8 hours for tylenol.     I see in our medication list as of today Tylenol is take 2 tablets (1000 mg) by mouth every 8 hours and RN wanted to clarified if that is what you want.     Fax: [phone]    Demetria Ellis RN on 2/28/2024 at 12:19 PM

## 2024-02-28 NOTE — TELEPHONE ENCOUNTER
Returned call to ZULEIMA Barnes at [facility] to relay the below message from PCP.     ZULEIMA Barnes is requesting a prescription for the PRN Tylenol be sent to [facility] in [place].     ZULEIMA Barnes is also requesting a written discontinue order for the previous scheduled Tylenol order.    PENELOPE MayN, RN

## 2024-02-28 NOTE — TELEPHONE ENCOUNTER
Left message for assisted living RN to call clinic back as writer was not sure if this was a secure voicemail.    Demetria Ellis RN on 2/28/2024 at 4:32 PM

## 2024-02-29 ENCOUNTER — TELEPHONE (OUTPATIENT)
Dept: INTERNAL MEDICINE | Facility: CLINIC | Age: 89
End: 2024-02-29
Payer: COMMERCIAL

## 2024-02-29 DIAGNOSIS — S70.12XA ILIOPSOAS MUSCLE HEMATOMA, LEFT, INITIAL ENCOUNTER: Primary | ICD-10-CM

## 2024-02-29 NOTE — TELEPHONE ENCOUNTER
Medication Question or Refill        What medication are you calling about (include dose and sig)?: Gabapentin 100mg- Take two capsules (200mg) twice daily for leg pain    Preferred Pharmacy:     [facility] - [place], [place] - [address]  [address]  [address]  [place] [address]  Phone: [phone] Fax: [phone]        Who prescribed the medication?: Robel    Do you need a refill? Yes, medication was discontinued at appointment yesterday and patient asked nurse at his [facility] if it can be restarted as he had very restless legs last night and did not sleep well.    When did you use the medication last? 2 days ago    Patient offered an appointment? No    Orders can be faxed to Ingrid Sanchez at [phone]    May call Molly with any new orders at [phone]

## 2024-03-01 ENCOUNTER — LAB REQUISITION (OUTPATIENT)
Dept: LAB | Facility: CLINIC | Age: 89
End: 2024-03-01
Payer: COMMERCIAL

## 2024-03-01 DIAGNOSIS — Z79.01 LONG TERM (CURRENT) USE OF ANTICOAGULANTS: ICD-10-CM

## 2024-03-01 DIAGNOSIS — I48.20 CHRONIC ATRIAL FIBRILLATION, UNSPECIFIED (H): ICD-10-CM

## 2024-03-01 DIAGNOSIS — I48.91 UNSPECIFIED ATRIAL FIBRILLATION (H): ICD-10-CM

## 2024-03-01 RX ORDER — GABAPENTIN 100 MG/1
200 CAPSULE ORAL 2 TIMES DAILY
Qty: 120 CAPSULE | Refills: 0 | Status: SHIPPED | OUTPATIENT
Start: 2024-03-01

## 2024-03-04 ENCOUNTER — ANTICOAGULATION THERAPY VISIT (OUTPATIENT)
Dept: ANTICOAGULATION | Facility: CLINIC | Age: 89
End: 2024-03-04
Payer: COMMERCIAL

## 2024-03-04 DIAGNOSIS — I48.20 CHRONIC ATRIAL FIBRILLATION (H): ICD-10-CM

## 2024-03-04 DIAGNOSIS — Z79.01 LONG TERM CURRENT USE OF ANTICOAGULANT THERAPY: Primary | ICD-10-CM

## 2024-03-04 LAB — INR PPP: 2.11 (ref 0.85–1.15)

## 2024-03-04 PROCEDURE — P9604 ONE-WAY ALLOW PRORATED TRIP: HCPCS | Mod: ORL | Performed by: INTERNAL MEDICINE

## 2024-03-04 PROCEDURE — 36415 COLL VENOUS BLD VENIPUNCTURE: CPT | Mod: ORL | Performed by: INTERNAL MEDICINE

## 2024-03-04 PROCEDURE — 85610 PROTHROMBIN TIME: CPT | Mod: ORL | Performed by: INTERNAL MEDICINE

## 2024-03-04 RX ORDER — WARFARIN SODIUM 2.5 MG/1
TABLET ORAL
Qty: 9 TABLET | Refills: 0 | Status: SHIPPED | OUTPATIENT
Start: 2024-03-04 | End: 2024-03-11

## 2024-03-04 NOTE — PROGRESS NOTES
ANTICOAGULATION MANAGEMENT     Melvin Abad 89 year old male is on warfarin with therapeutic INR result. (Goal INR 2.0-3.0)    Recent labs: (last 7 days)     03/04/24  0715   INR 2.11*       ASSESSMENT     Source(s): Chart Review and Home Care/Facility Nurse     Warfarin doses taken: Warfarin taken as instructed  Diet: No new diet changes identified  Medication/supplement changes:  Gabapentin 200 mg BID started on 3/1/2024 No interaction anticipated  New illness, injury, or hospitalization: No  Signs or symptoms of bleeding or clotting: No  Previous result: Supratherapeutic  Additional findings: None       PLAN     Recommended plan for no diet, medication or health factor changes affecting INR     Dosing Instructions: Continue your current warfarin dose with next INR in 1 week       Summary  As of 3/4/2024      Full warfarin instructions:  5 mg every Tue, Thu; 2.5 mg all other days   Next INR check:  3/11/2024               Telephone call with Molly facility nurse who agrees to plan and repeated back plan correctly  Faxed dosing and follow up instructions to Ingrid Carrasco    Orders given to  Homecare nurse/facility to recheck    Education provided:   Please call back if any changes to your diet, medications or how you've been taking warfarin    Plan made per [facility] anticoagulation protocol    Yancy Avendano, RN  Anticoagulation Clinic  3/4/2024    _______________________________________________________________________     Anticoagulation Episode Summary       Current INR goal:  2.0-3.0   TTR:  97.4% (1 y)   Target end date:  Indefinite   Send INR reminders to:  [facility]    Indications    Atrial fibrillation (Resolved) [I48.91]  Long-term (current) use of anticoagulants [Z79.01] [Z79.01]  Chronic atrial fibrillation (H) [I48.20]             Comments:  [facility] SINGH/Ingrid Sanchez [phone] fax: [phone] & send to [facility]             Anticoagulation Care Providers       Provider  Role Specialty Phone number    Lazaro Huston MD Referring Internal Medicine [phone]    Fransisco Cordon MD Referring Family Medicine

## 2024-03-08 ENCOUNTER — LAB REQUISITION (OUTPATIENT)
Dept: LAB | Facility: CLINIC | Age: 89
End: 2024-03-08
Payer: COMMERCIAL

## 2024-03-08 DIAGNOSIS — I48.20 CHRONIC ATRIAL FIBRILLATION, UNSPECIFIED (H): ICD-10-CM

## 2024-03-08 DIAGNOSIS — I48.91 UNSPECIFIED ATRIAL FIBRILLATION (H): ICD-10-CM

## 2024-03-08 DIAGNOSIS — Z79.01 LONG TERM (CURRENT) USE OF ANTICOAGULANTS: ICD-10-CM

## 2024-03-11 ENCOUNTER — ANTICOAGULATION THERAPY VISIT (OUTPATIENT)
Dept: ANTICOAGULATION | Facility: CLINIC | Age: 89
End: 2024-03-11

## 2024-03-11 DIAGNOSIS — Z79.01 LONG TERM CURRENT USE OF ANTICOAGULANT THERAPY: Primary | ICD-10-CM

## 2024-03-11 DIAGNOSIS — I48.20 CHRONIC ATRIAL FIBRILLATION (H): ICD-10-CM

## 2024-03-11 LAB — INR PPP: 2.46 (ref 0.85–1.15)

## 2024-03-11 PROCEDURE — 36415 COLL VENOUS BLD VENIPUNCTURE: CPT | Mod: ORL | Performed by: INTERNAL MEDICINE

## 2024-03-11 PROCEDURE — P9604 ONE-WAY ALLOW PRORATED TRIP: HCPCS | Mod: ORL | Performed by: INTERNAL MEDICINE

## 2024-03-11 PROCEDURE — 85610 PROTHROMBIN TIME: CPT | Mod: ORL | Performed by: INTERNAL MEDICINE

## 2024-03-11 RX ORDER — WARFARIN SODIUM 2.5 MG/1
TABLET ORAL
Qty: 21 TABLET | Refills: 0 | Status: SHIPPED | OUTPATIENT
Start: 2024-03-11 | End: 2024-03-25

## 2024-03-11 NOTE — PROGRESS NOTES
ANTICOAGULATION MANAGEMENT     Melvin Abad 89 year old male is on warfarin with therapeutic INR result. (Goal INR 2.0-3.0)    Recent labs: (last 7 days)     03/11/24  0712   INR 2.46*       ASSESSMENT     Source(s): Chart Review and Home Care/Facility Nurse     Warfarin doses taken: Warfarin taken as instructed  Diet: No new diet changes identified  Medication/supplement changes: None noted  New illness, injury, or hospitalization: No  Signs or symptoms of bleeding or clotting: No  Previous result: Therapeutic last visit; previously outside of goal range  Additional findings: None       PLAN     Recommended plan for no diet, medication or health factor changes affecting INR     Dosing Instructions: Continue your current warfarin dose with next INR in 2 weeks       Summary  As of 3/11/2024      Full warfarin instructions:  5 mg every Tue, Thu; 2.5 mg all other days   Next INR check:  3/25/2024               Telephone call with [facility] facility nurse who agrees to plan and repeated back plan correctly  Faxed dosing and follow up instructions to Ingrid Sanchez    Orders given to  Homecare nurse/facility to recheck    Education provided:   Please call back if any changes to your diet, medications or how you've been taking warfarin    Plan made per [facility] anticoagulation protocol    Yancy Avendano, RN  Anticoagulation Clinic  3/11/2024    _______________________________________________________________________     Anticoagulation Episode Summary       Current INR goal:  2.0-3.0   TTR:  97.4% (1 y)   Target end date:  Indefinite   Send INR reminders to:  [facility]    Indications    Atrial fibrillation (Resolved) [I48.91]  Long-term (current) use of anticoagulants [Z79.01] [Z79.01]  Chronic atrial fibrillation (H) [I48.20]             Comments:  [facility] SINGH/Ingrid Sanchez [phone] fax: [phone] & [facility] to [facility]             Anticoagulation Care Providers       Provider Role Specialty  Phone number    Lazaro Huston MD Referring Internal Medicine [phone]    Fransisco Cordon MD Referring Family Medicine

## 2024-03-16 ENCOUNTER — HEALTH MAINTENANCE LETTER (OUTPATIENT)
Age: 89
End: 2024-03-16

## 2024-03-22 ENCOUNTER — LAB REQUISITION (OUTPATIENT)
Dept: LAB | Facility: CLINIC | Age: 89
End: 2024-03-22
Payer: COMMERCIAL

## 2024-03-22 DIAGNOSIS — I48.91 UNSPECIFIED ATRIAL FIBRILLATION (H): ICD-10-CM

## 2024-03-22 DIAGNOSIS — Z79.01 LONG TERM (CURRENT) USE OF ANTICOAGULANTS: ICD-10-CM

## 2024-03-22 DIAGNOSIS — I48.20 CHRONIC ATRIAL FIBRILLATION, UNSPECIFIED (H): ICD-10-CM

## 2024-03-25 ENCOUNTER — ANTICOAGULATION THERAPY VISIT (OUTPATIENT)
Dept: ANTICOAGULATION | Facility: CLINIC | Age: 89
End: 2024-03-25

## 2024-03-25 ENCOUNTER — PATIENT OUTREACH (OUTPATIENT)
Dept: CARE COORDINATION | Facility: CLINIC | Age: 89
End: 2024-03-25

## 2024-03-25 DIAGNOSIS — I48.20 CHRONIC ATRIAL FIBRILLATION (H): ICD-10-CM

## 2024-03-25 DIAGNOSIS — Z79.01 LONG TERM CURRENT USE OF ANTICOAGULANT THERAPY: Primary | ICD-10-CM

## 2024-03-25 LAB — INR PPP: 2.07 (ref 0.85–1.15)

## 2024-03-25 PROCEDURE — 36415 COLL VENOUS BLD VENIPUNCTURE: CPT | Mod: ORL | Performed by: INTERNAL MEDICINE

## 2024-03-25 PROCEDURE — 85610 PROTHROMBIN TIME: CPT | Mod: ORL | Performed by: INTERNAL MEDICINE

## 2024-03-25 PROCEDURE — P9604 ONE-WAY ALLOW PRORATED TRIP: HCPCS | Mod: ORL | Performed by: INTERNAL MEDICINE

## 2024-03-25 RX ORDER — WARFARIN SODIUM 2.5 MG/1
TABLET ORAL
Qty: 21 TABLET | Refills: 0 | Status: SHIPPED | OUTPATIENT
Start: 2024-03-25 | End: 2024-04-08

## 2024-03-25 NOTE — PROGRESS NOTES
ANTICOAGULATION MANAGEMENT     Melvin Abad 89 year old male is on warfarin with therapeutic INR result. (Goal INR 2.0-3.0)    Recent labs: (last 7 days)     03/25/24  0655   INR 2.07*       ASSESSMENT     Source(s): Chart Review and Home Care/Facility Nurse     Warfarin doses taken: Warfarin taken as instructed  Diet: No new diet changes identified  Medication/supplement changes: None noted  New illness, injury, or hospitalization: No  Signs or symptoms of bleeding or clotting: No  Previous result: Therapeutic last 2(+) visits  Additional findings: None       PLAN     Recommended plan for no diet, medication or health factor changes affecting INR     Dosing Instructions: Continue your current warfarin dose with next INR in 2 weeks       Summary  As of 3/25/2024      Full warfarin instructions:  5 mg every Tue, Thu; 2.5 mg all other days   Next INR check:  4/8/2024               Faxed dosing and follow up instructions to [facility] and sent refill to [facility]    Orders given to  Homecare nurse/facility to recheck    Education provided:   Please call back if any changes to your diet, medications or how you've been taking warfarin    Plan made per [facility] anticoagulation protocol    Idania Thao, RN  Anticoagulation Clinic  3/25/2024    _______________________________________________________________________     Anticoagulation Episode Summary       Current INR goal:  2.0-3.0   TTR:  97.4% (1 y)   Target end date:  Indefinite   Send INR reminders to:  [facility]    Indications    Atrial fibrillation (Resolved) [I48.91]  Long-term (current) use of anticoagulants [Z79.01] [Z79.01]  Chronic atrial fibrillation (H) [I48.20]             Comments:  [facility] SINGH/Ingrid Sanchez [phone] fax: [phone] & [facility] to [facility]             Anticoagulation Care Providers       Provider Role Specialty Phone number    Lazaro Huston MD Referring Internal Medicine [phone]    Fransisco Cordon MD  Referring Family Medicine

## 2024-03-25 NOTE — PROGRESS NOTES
Clinic Care Coordination Contact    Situation: Patient chart reviewed by care coordinator.     Background: Patient in identified status. Patient was admitted to the TCU on 1/26/2024.     Assessment: Patient is still at the TCU.     Plan/Recommendations: Care Coordinator will await notification from facility staff informing of patient's discharge plans/needs. Care Coordinator will review chart and outreach to facility staff every 4 weeks and as needed.      Alma White RN Care Coordination   [facility]ro[facility]  Email: [email or web address]  Phone: [phone]

## 2024-04-05 ENCOUNTER — LAB REQUISITION (OUTPATIENT)
Dept: LAB | Facility: CLINIC | Age: 89
End: 2024-04-05
Payer: COMMERCIAL

## 2024-04-05 DIAGNOSIS — I48.20 CHRONIC ATRIAL FIBRILLATION, UNSPECIFIED (H): ICD-10-CM

## 2024-04-05 DIAGNOSIS — I48.91 UNSPECIFIED ATRIAL FIBRILLATION (H): ICD-10-CM

## 2024-04-05 DIAGNOSIS — Z79.01 LONG TERM (CURRENT) USE OF ANTICOAGULANTS: ICD-10-CM

## 2024-04-08 ENCOUNTER — ANTICOAGULATION THERAPY VISIT (OUTPATIENT)
Dept: ANTICOAGULATION | Facility: CLINIC | Age: 89
End: 2024-04-08

## 2024-04-08 DIAGNOSIS — Z79.01 LONG TERM CURRENT USE OF ANTICOAGULANT THERAPY: Primary | ICD-10-CM

## 2024-04-08 DIAGNOSIS — I48.20 CHRONIC ATRIAL FIBRILLATION (H): ICD-10-CM

## 2024-04-08 LAB — INR PPP: 1.97 (ref 0.85–1.15)

## 2024-04-08 PROCEDURE — P9604 ONE-WAY ALLOW PRORATED TRIP: HCPCS | Mod: ORL | Performed by: INTERNAL MEDICINE

## 2024-04-08 PROCEDURE — 36415 COLL VENOUS BLD VENIPUNCTURE: CPT | Mod: ORL | Performed by: INTERNAL MEDICINE

## 2024-04-08 PROCEDURE — 85610 PROTHROMBIN TIME: CPT | Mod: ORL | Performed by: INTERNAL MEDICINE

## 2024-04-08 RX ORDER — WARFARIN SODIUM 2.5 MG/1
TABLET ORAL
Qty: 24 TABLET | Refills: 0 | Status: SHIPPED | OUTPATIENT
Start: 2024-04-08 | End: 2024-04-22

## 2024-04-08 NOTE — PROGRESS NOTES
ANTICOAGULATION MANAGEMENT     Melvin Abad 89 year old male is on warfarin with subtherapeutic INR result. (Goal INR 2.0-3.0)    Recent labs: (last 7 days)     04/08/24  0645   INR 1.97*       ASSESSMENT     Source(s): Chart Review and Home Care/Facility Nurse     Warfarin doses taken: Warfarin taken as instructed  Diet: No new diet changes identified  Medication/supplement changes: None noted  New illness, injury, or hospitalization: No  Signs or symptoms of bleeding or clotting: No  Previous result: Therapeutic last 2(+) visits  Additional findings: None  INR has been decreasing the past few weeks.  This is the smallest dose adjustment that can be made with this size tablet. Upon further review, the tablet size that has been sent to the pharmacy was a 2.5 mg tablet not a 5 mg tablet as the calendar was showing.  Removed the 5 mg tablet.  However, patient was on this dose previously.       PLAN     Recommended plan for no diet, medication or health factor changes affecting INR     Dosing Instructions: Increase your warfarin dose (11.1% change) with next INR in 2 weeks       Summary  As of 4/8/2024      Full warfarin instructions:  5 mg every Mon, Wed, Fri; 2.5 mg all other days   Next INR check:  4/22/2024               Telephone call with [facility] facility nurse who agrees to plan and repeated back plan correctly  Faxed dosing and follow up instructions to Ingrid Sanchez    Orders given to  Homecare nurse/facility to recheck    Education provided:   Please call back if any changes to your diet, medications or how you've been taking warfarin    Plan made per [facility] anticoagulation protocol    Yancy Avendano, RN  Anticoagulation Clinic  4/8/2024    _______________________________________________________________________     Anticoagulation Episode Summary       Current INR goal:  2.0-3.0   TTR:  96.3% (1 y)   Target end date:  Indefinite   Send INR reminders to:  [facility]    Atrial  fibrillation (Resolved) [I48.91]  Long-term (current) use of anticoagulants [Z79.01] [Z79.01]  Chronic atrial fibrillation (H) [I48.20]             Comments:  [facility] SINGH/Ingrid Sanchez [phone] fax: [phone] & [facility] to [facility]             Anticoagulation Care Providers       Provider Role Specialty Phone number    Lazaro Huston MD Referring Internal Medicine [phone]    Fransisco Cordon MD Referring Family Medicine

## 2024-04-17 ENCOUNTER — PATIENT OUTREACH (OUTPATIENT)
Dept: CARE COORDINATION | Facility: CLINIC | Age: 89
End: 2024-04-17
Payer: COMMERCIAL

## 2024-04-17 ENCOUNTER — MYC MEDICAL ADVICE (OUTPATIENT)
Dept: INTERNAL MEDICINE | Facility: CLINIC | Age: 89
End: 2024-04-17
Payer: COMMERCIAL

## 2024-04-17 DIAGNOSIS — S91.009D OPEN WOUND OF KNEE, LEG, AND ANKLE, UNSPECIFIED LATERALITY, SUBSEQUENT ENCOUNTER: Primary | ICD-10-CM

## 2024-04-17 DIAGNOSIS — S81.009D OPEN WOUND OF KNEE, LEG, AND ANKLE, UNSPECIFIED LATERALITY, SUBSEQUENT ENCOUNTER: Primary | ICD-10-CM

## 2024-04-17 DIAGNOSIS — S81.809D OPEN WOUND OF KNEE, LEG, AND ANKLE, UNSPECIFIED LATERALITY, SUBSEQUENT ENCOUNTER: Primary | ICD-10-CM

## 2024-04-17 NOTE — TELEPHONE ENCOUNTER
RN CC called and spoke to patient's daughter in law Toña (consent to communicate on file). Toña said the patient is home from the TCU. She said he has been home almost 2 months. She said the patient left the TCU with some home care just for physical therapy. Toña said patient completed his PT and home care graduated him.     Toña said the patient now has 2 wounds on his legs. She said she would like home care to come back out and help with the wounds.     Patient's last OV with Primary Care Provider was 2/28/2024. This visit did not discuss these wounds. RN CC unsure if home care will accept that OV since it is over a month old and did not discuss the issue (wounds on leg). Toña is wondering if Dr. Huston can try to send in a home care order and see if they will accept that visit from 2/28/2024. She understands if they need to come back in, she was just hoping they wouldn't have to.     RN CC thinks patient will need to be seen for home care orders based on diagnosis and last OV but will let Primary Care Provider review and decide. RN CC did leave contact information with Toña duran they need to get ahold of ZULEIMA GUERRERO.     Will route to Primary Care Provider to review.     Alma White RN Care Coordination   [facility]  Email: [email or web address]  Phone: [phone]

## 2024-04-17 NOTE — PROGRESS NOTES
Clinic Care Coordination Contact    OUTREACH    Referral Information:  Referral Source: IP Handoff    Chief Complaint   Patient presents with    Clinic Care Coordination - Homecare/TCU     RN CC- TCU discharge        Universal Utilization: 78% Admission or ED Risk     Utilization      No Show Count (past year)  0             ED Visits  2             Hospital Admissions  0                    Current as of: 4/17/2024  1:54 PM              Clinical Concerns:  Current Medical Concerns:  RN CC received a message from clinic asking for some assistance regarding a MyChart message.     RN CC called and spoke to patient's daughter in law Toña (consent to communicate on file). Toña said the patient is home from the TCU. She said he has been home almost 2 months. She said the patient left the TCU with some home care just for physical therapy. Toña said patient completed his PT and home care graduated him.      Toña said the patient now has 2 wounds on his legs. She said she would like home care to come back out and help with the wounds.      Patient's last OV with Primary Care Provider was 2/28/2024. This visit did not discuss these wounds. RN CC unsure if home care will accept that OV since it is over a month old and did not discuss the issue (wounds on leg). Toña is wondering if Dr. Huston can try to send in a home care order and see if they will accept that visit from 2/28/2024. She understands if they need to come back in, she was just hoping they wouldn't have to.      RN CC thinks patient will need to be seen for home care orders based on diagnosis and last OV but will let Primary Care Provider review and decide. RN CC did leave contact information with Toña duran they need to get ahold of RN CC    Current Behavioral Concerns:  None noted.        Education Provided to patient:  RN CC advised patient and/or caregiver to call Primary Care Provider's office with any urgent questions or concerns. Advised patient and/or care  giver to call the pharmacy for any refill requests needed. Advised patient and/or care giver to call RN CC with any non urgent needs or concerns.      Health Maintenance Reviewed: Due/Overdue   Health Maintenance Due   Topic Date Due    HF ACTION PLAN  Never done    ZOSTER IMMUNIZATION (1 of 2) Never done    RSV VACCINE (Pregnancy & 60+) (1 - 1-dose 60+ series) Never done    DTAP/TDAP/TD IMMUNIZATION (1 - Tdap) 08/31/2007    DIABETIC FOOT EXAM  05/29/2015    EYE EXAM  06/04/2022    A1C  07/20/2023    COVID-19 Vaccine (5 - 2023-24 season) 09/01/2023    MEDICARE ANNUAL WELLNESS VISIT  01/20/2024    ALT  01/20/2024    LIPID  01/20/2024    MICROALBUMIN  02/08/2024         Clinical Pathway: None    Medication Management:  Medication review status: Medications reviewed and no changes reported per patient.           Functional Status:       Living Situation:       Lifestyle & Psychosocial Needs:    Social Determinants of Health     Food Insecurity: Low Risk  (11/22/2023)    Food Insecurity     Within the past 12 months, did you worry that your food would run out before you got money to buy more?: No     Within the past 12 months, did the food you bought just not last and you didn t have money to get more?: No   Depression: Not at risk (2/28/2024)    PHQ-2     PHQ-2 Score: 0   Housing Stability: Low Risk  (11/22/2023)    Housing Stability     Do you have housing? : Yes     Are you worried about losing your housing?: No   Tobacco Use: Medium Risk (2/28/2024)    Patient History     Smoking Tobacco Use: Former     Smokeless Tobacco Use: Never     Passive Exposure: Not on file   Financial Resource Strain: Low Risk  (11/22/2023)    Financial Resource Strain     Within the past 12 months, have you or your family members you live with been unable to get utilities (heat, electricity) when it was really needed?: No   Alcohol Use: Not on file   Transportation Needs: Low Risk  (11/22/2023)    Transportation Needs     Within the past  12 months, has lack of transportation kept you from medical appointments, getting your medicines, non-medical meetings or appointments, work, or from getting things that you need?: No   Physical Activity: Not on file   Interpersonal Safety: Not on file   Stress: Not on file   Social Connections: Not on file   Health Literacy: Not on file        Resources and Interventions:  Current Resources:      Community Resources: None     Equipment Currently Used at Home: walker, rolling, wheelchair, manual     Advance Care Plan/Directive  Advanced Care Plans/Directives on file:: Yes  Status of record:: On File and Validated  Type Advanced Care Plans/Directives: Advanced Directive - On File    Referrals Placed: None     Care Plan:      Patient/Caregiver understanding: Patient/caregiver verbalized understanding and denies any additional questions or concerns at this time. RNCC engaged in [facility] communications during encounter.     Plan: Patient enrolled in care coordination. Toña has RN CC's contact information in case she needs anything. RN CC did sent message to Primary Care Provider in MyChart encounter. RN CC will wait to see what Primary Care Provider says and go from there. Follow up call scheduled for next week.     Alma White RN Care Coordination   [facility]  Email: [email or web address]  Phone: [phone]

## 2024-04-19 ENCOUNTER — TELEPHONE (OUTPATIENT)
Dept: INTERNAL MEDICINE | Facility: CLINIC | Age: 89
End: 2024-04-19
Payer: COMMERCIAL

## 2024-04-19 ENCOUNTER — LAB REQUISITION (OUTPATIENT)
Dept: LAB | Facility: CLINIC | Age: 89
End: 2024-04-19
Payer: COMMERCIAL

## 2024-04-19 DIAGNOSIS — Z79.01 LONG TERM (CURRENT) USE OF ANTICOAGULANTS: ICD-10-CM

## 2024-04-19 DIAGNOSIS — I48.91 UNSPECIFIED ATRIAL FIBRILLATION (H): ICD-10-CM

## 2024-04-19 DIAGNOSIS — I48.20 CHRONIC ATRIAL FIBRILLATION, UNSPECIFIED (H): ICD-10-CM

## 2024-04-19 NOTE — TELEPHONE ENCOUNTER
Home Care is calling regarding an established patient with [facility].    Requesting orders from: Lazaro Huston  Provider is following patient: Yes  Is this a 60-day recertification request?  No    Orders Requested    Skilled Nursing  Request for initial evaluation and treatment (one time)   2x wk for 4 wks  1x wk for 5 wks    Bilateral Ankles; wound care with sanguineous drainage  Right medial ankle:  4.4 cm x 3.4 cm x 0.1 cm  Left medial ankle:  2 cm x 3 cm x 0.1 cm     Wound Care:   3x a week until [facility] nurse can assess  Nursing 2x a week and patient 1x a week  Cleansing wound care cleanser   Pat Dry  No sting skin spray   Foam dressing  Silicone boarder dressing    Wound Care Supplies        Verbal orders given.  Home Care will send orders for provider to sign.  Confirmed ok to leave a detailed message with call back.  Contact information confirmed and updated as needed.    Yazmin Jarvis RN on 4/19/2024 at 5:02 PM

## 2024-04-22 ENCOUNTER — ANTICOAGULATION THERAPY VISIT (OUTPATIENT)
Dept: ANTICOAGULATION | Facility: CLINIC | Age: 89
End: 2024-04-22

## 2024-04-22 ENCOUNTER — TELEPHONE (OUTPATIENT)
Dept: INTERNAL MEDICINE | Facility: CLINIC | Age: 89
End: 2024-04-22

## 2024-04-22 DIAGNOSIS — Z79.01 LONG TERM CURRENT USE OF ANTICOAGULANT THERAPY: Primary | ICD-10-CM

## 2024-04-22 DIAGNOSIS — I48.20 CHRONIC ATRIAL FIBRILLATION (H): ICD-10-CM

## 2024-04-22 LAB — INR PPP: 2.24 (ref 0.85–1.15)

## 2024-04-22 PROCEDURE — P9604 ONE-WAY ALLOW PRORATED TRIP: HCPCS | Mod: ORL | Performed by: INTERNAL MEDICINE

## 2024-04-22 PROCEDURE — 85610 PROTHROMBIN TIME: CPT | Mod: ORL | Performed by: INTERNAL MEDICINE

## 2024-04-22 PROCEDURE — 36415 COLL VENOUS BLD VENIPUNCTURE: CPT | Mod: ORL | Performed by: INTERNAL MEDICINE

## 2024-04-22 RX ORDER — WARFARIN SODIUM 2.5 MG/1
TABLET ORAL
Qty: 20 TABLET | Refills: 0 | Status: SHIPPED | OUTPATIENT
Start: 2024-04-22 | End: 2024-05-06

## 2024-04-22 NOTE — PROGRESS NOTES
ANTICOAGULATION MANAGEMENT     Melvin Abad 89 year old male is on warfarin with therapeutic INR result. (Goal INR 2.0-3.0)    Recent labs: (last 7 days)     04/22/24  0654   INR 2.24*       ASSESSMENT     Source(s): Chart Review and Home Care/Facility Nurse     Warfarin doses taken: Warfarin taken as instructed  Diet: No new diet changes identified  Medication/supplement changes: None noted  New illness, injury, or hospitalization: No  Signs or symptoms of bleeding or clotting: No  Previous result: Therapeutic last 2(+) visits  Additional findings:  patient has chronic blisters on legs. This is not new.       PLAN     Recommended plan for no diet, medication or health factor changes affecting INR     Dosing Instructions: Continue your current warfarin dose with next INR in 2 weeks       Summary  As of 4/22/2024      Full warfarin instructions:  5 mg every Mon, Wed, Fri; 2.5 mg all other days   Next INR check:  5/6/2024               Telephone call with [facility] nurse who verbalizes understanding and agrees to plan  Faxed dosing and follow up instructions to custodial    Orders given to  Homecare nurse/facility to recheck    Education provided:   Please call back if any changes to your diet, medications or how you've been taking warfarin  Contact [phone]  with any changes, questions or concerns.     Plan made per [facility] anticoagulation protocol    Denae Conroy RN  Anticoagulation Clinic  4/22/2024    _______________________________________________________________________     Anticoagulation Episode Summary       Current INR goal:  2.0-3.0   TTR:  95.9% (1 y)   Target end date:  Indefinite   Send INR reminders to:  [facility]    Indications    Atrial fibrillation (Resolved) [I48.91]  Long-term (current) use of anticoagulants [Z79.01] [Z79.01]  Chronic atrial fibrillation (H) [I48.20]             Comments:  Cheyenne SINGH/Ingrid Sanchez [phone] fax: [phone] & send to [facility]              Anticoagulation Care Providers       Provider Role Specialty Phone number    Lazaro Huston MD Referring Internal Medicine [phone]    Fransisco Cordon MD Referring Family Medicine

## 2024-04-22 NOTE — TELEPHONE ENCOUNTER
Lisandro from [facility] pharmacy is calling to confirm that the prescription they received today was not the same day as patient's next INR.  Confirmed through anticoagulant encounter dated today 4/22/2024 as follows:  Dosing Instructions: Continue your current warfarin dose with next INR in 2 weeks        Summary  As of 4/22/2024        Full warfarin instructions:  5 mg every Mon, Wed, Fri; 2.5 mg all other days   Next INR check:  5/6/2024          Lisandro stated understanding.  Tatianna Kirkland RN

## 2024-04-22 NOTE — TELEPHONE ENCOUNTER
Called and left message with approval of orders as requested.    Leann Araiza RN on 4/22/2024 at 8:55 AM

## 2024-04-24 ENCOUNTER — PATIENT OUTREACH (OUTPATIENT)
Dept: CARE COORDINATION | Facility: CLINIC | Age: 89
End: 2024-04-24
Payer: COMMERCIAL

## 2024-04-24 NOTE — LETTER
[facility]  Patient Centered Plan of Care  About Me:        Patient Name:  Melvin Cohen    YOB: 1928  Age:         89 year old   [place] MRN:    [id number] Telephone Information:  Home Phone [phone]   Mobile [phone]       Address:  [address] [place] [facility] [address] Email address:  [email or web address]      Emergency Contact(s)    Name Relationship Lgl Grd Work Phone Home Phone Mobile Phone   1. WOLFGANG COHEN Son No  [phone]   2. SANTIAGO COHEN Son No  [phone]   3. RYAN LUGO Son No   [phone]   4. SUZE COHEN Relative No  [phone]   5. SHAHRZAD PHAN Grandchild No  [phone]           Primary language:  English     needed? No   [facility]:  [phone] op. 1  Other communication barriers:No data recorded  Preferred Method of Communication:  Mail  Current living arrangement: No data recorded  Mobility Status/ Medical Equipment: No data recorded      Health Maintenance  Health Maintenance Reviewed: Due/Overdue   Health Maintenance Due   Topic Date Due    HF ACTION PLAN  Never done    ZOSTER IMMUNIZATION (1 of 2) Never done    RSV VACCINE (Pregnancy & 60+) (1 - 1-dose 60+ series) Never done    DTAP/TDAP/TD IMMUNIZATION (1 - Tdap) 08/31/2007    DIABETIC FOOT EXAM  05/29/2015    EYE EXAM  06/04/2022    A1C  07/20/2023    COVID-19 Vaccine (5 - 2023-24 season) 09/01/2023    MEDICARE ANNUAL WELLNESS VISIT  01/20/2024    ALT  01/20/2024    LIPID  01/20/2024    MICROALBUMIN  02/08/2024           My Access Plan  Medical Emergency 911   Primary Clinic Line [facility] [phone]   24 Hour Appointment Line [phone] or  [phone]) (toll-free)   24 Hour Nurse Line [phone] (toll-free)   Preferred Urgent Care No data recorded   Preferred Hospital [facility]  [phone]     Preferred Pharmacy  [id number] - [place], [place] - [address] [facility]     Behavioral Health Crisis Line The National Suicide Prevention Lifeline at 1-595.554.7678 or Text/Call 988           My Care Team Members  Patient Care Team         Relationship Specialty Notifications Start End    Lazaro Huston MD PCP - General Internal Medicine All results, Admissions 4/28/20     Phone: [phone] Fax: [phone]         [address] [place] [address]    Lazaro uHston MD Assigned PCP   3/14/21     Phone: [phone] Fax: [phone]         [address] [place] [address]    Sandra Amaya MD MD Dermatology  9/21/23     Phone: [phone] Fax: [phone]         [address] [facility] [address] [facility] [address]    Alma White, RN Clinic Care Coordinator  Admissions 1/29/24                 My Care Plans  Self Management and Treatment Plan    Care Plan  Care Plan: Health Maintenance       Problem: Health Maintenance Due or Overdue       Goal: Become up-to-date with health maintenance visit(s)       Start Date: 4/24/2024 Expected End Date: 10/21/2024    This Visit's Progress: 0%    Note:     Barriers: complex medical  Strengths: engaged and motivated  Patient expressed understanding of goal: yes  Action steps to achieve this goal:  1. I will ask to update HM at appointments  2. I will scheduled appts for important HM items  3. I will work with the CHW to complete my overdue HM                                Action Plans on File:                       Advance Care Plans/Directives:   Advanced Care Plan/Directives on file:   Yes    Status of Document(s):   On File and Validated    Advanced Care Plan/Directives Type:   Advanced Directive - On File           My Medical and Care Information  Problem List   Patient Active Problem List   Diagnosis    Long term current use of anticoagulant therapy    Hyperlipidemia LDL goal <100    Diverticulosis    Health Care Home    Essential hypertension    History of skin  cancer    AK (actinic keratosis)    Gilbert syndrome    Carpal tunnel syndrome of right wrist    Cellulitis    Anemia due to blood loss, acute    DVT prophylaxis    Obesity (BMI 30-39.9)    Long-term (current) use of anticoagulants [Z79.01]    Pain of right lower leg    Contusion of right lower extremity    Pain of left lower extremity    Longstanding persistent atrial fibrillation (H)    Anticoagulation goal of INR 2 to 3    Slow transit constipation    Sleep disturbances    Right pontine stroke (H)    Dry eyes    Restless legs syndrome (RLS)    Osteoarthritis of glenohumeral joint, left    Chronic atrial fibrillation (H)    Weakness    Pain of left thigh    Chronic right-sided heart failure (H)      Current Medications and Allergies:   No Known Allergies  Current Outpatient Medications   Medication Sig Dispense Refill    acetaminophen (TYLENOL) 500 MG tablet Take 2 tablets (1,000 mg) by mouth every 8 hours as needed      calcium carbonate (TUMS) 500 MG chewable tablet Take 1 tablet (500 mg) by mouth 3 times daily as needed for heartburn 100 tablet 0    carboxymethylcellulose PF (REFRESH PLUS) 0.5 % ophthalmic solution Place 1 drop into both eyes 4 times daily as needed for dry eyes Patient self-administers 30 each 0    cholecalciferol (VITAMIN D3) 25 mcg (1000 units) capsule Take 1 capsule (25 mcg) by mouth daily 30 capsule 0    furosemide (LASIX) 20 MG tablet Take 1.5 tablets (30 mg) by mouth daily 45 tablet 0    gabapentin (NEURONTIN) 100 MG capsule Take 2 capsules (200 mg) by mouth 2 times daily 120 capsule 0    ketotifen fumarate 0.035%, ketotifen 0.025%, (ZADITOR) 0.025 % ophthalmic solution Place 1 drop into both eyes 2 times daily as needed for dry eyes Patient self-administers 5 mL 0    lisinopril (ZESTRIL) 10 MG tablet Take 1 tablet (10 mg) by mouth daily 30 tablet 0    loratadine (CLARITIN) 10 MG tablet Take 1 tablet (10 mg) by mouth daily 30 tablet 0    metoprolol succinate ER (TOPROL XL) 50 MG 24 hr  tablet Take 1 tablet (50 mg) by mouth daily 30 tablet 0    order for DME Equipment being ordered: side rails/grab bars 1 each 0    order for DME Equipment being ordered: bedside commode 1 Device 0    order for DME Equipment being ordered: 4 legged walker 1 Device 0    polyethylene glycol (MIRALAX) 17 GM/Dose powder Take 9 g by mouth daily as needed for constipation Mix and hand to patient, Patient self-administers (he knows how much he needs to keep regular) 510 g 0    psyllium (METAMUCIL/KONSYL) 58.6 % powder Take 18 g (1 Tablespoonful) by mouth daily as needed for constipation Mix and hand to patient, Patient self-administers (he knows how much he needs to keep regular) 283 g 0    simvastatin (ZOCOR) 40 MG tablet Take 1 tablet (40 mg) by mouth at bedtime 30 tablet 0    sodium chloride (OCEAN) 0.65 % nasal spray Spray 2 sprays in nostril daily as needed for congestion Patient self-administers 30 mL 0    tamsulosin (FLOMAX) 0.4 MG capsule Take 1 capsule (0.4 mg) by mouth daily 30 capsule 0    warfarin ANTICOAGULANT (COUMADIN) 2.5 MG tablet Take 5 mg Mon/Wed/Fri and 2.5 mg all the other days OR as directed by INR clinic. Recheck INR on 4/22/24 20 tablet 0     No current facility-administered medications for this visit.         Care Coordination Start Date: 1/29/2024   Frequency of Care Coordination: monthly, more frequently as needed     Form Last Updated: 04/24/2024

## 2024-04-24 NOTE — PROGRESS NOTES
Clinic Care Coordination Contact    OUTREACH    Referral Information:  Referral Source: IP Handoff    Chief Complaint   Patient presents with    Clinic Care Coordination - Initial     RN CC- Initial        Universal Utilization: 78.2% Admission or ED Risk  Clinic Utilization  Difficulty keeping appointments:: No  Compliance Concerns: No  No-Show Concerns: No  No PCP office visit in Past Year: No  Utilization      No Show Count (past year)  0             ED Visits  2             Hospital Admissions  0                    Current as of: 4/22/2024  8:28 PM                Clinical Concerns:  Current Medical Concerns:  RN CC called and spoke patient's daughter in law Toña (consent to communicate on file). RN CC  had spoke to Toña previously as they were trying to get home care out to see patient for his leg wounds. Toña said [facility] was able to enroll patient and has been coming out to see him. Toña said that has been going well. Toña said [facility] did say patient needed a visit within 30 days for home care to continue. Toña said she asked the supervisor at the Home Care and they advised a virtual visit will be just fine. RN CC will send message to care team to call Toña and schedule visit. Toña said the patient has been taking all medications as prescribed. She has no other needs at this time.     Current Behavioral Concerns:  None noted.       Education Provided to patient:  RN CC advised patient and/or caregiver to call Primary Care Provider's office with any urgent questions or concerns. Advised patient and/or care giver to call the pharmacy for any refill requests needed. Advised patient and/or care giver to call RN CC with any non urgent needs or concerns      Health Maintenance Reviewed: Due/Overdue   Health Maintenance Due   Topic Date Due    HF ACTION PLAN  Never done    ZOSTER IMMUNIZATION (1 of 2) Never done    RSV VACCINE (Pregnancy & 60+) (1 - 1-dose 60+ series) Never done    DTAP/TDAP/TD  IMMUNIZATION (1 - Tdap) 08/31/2007    DIABETIC FOOT EXAM  05/29/2015    EYE EXAM  06/04/2022    A1C  07/20/2023    COVID-19 Vaccine (5 - 2023-24 season) 09/01/2023    MEDICARE ANNUAL WELLNESS VISIT  01/20/2024    ALT  01/20/2024    LIPID  01/20/2024    MICROALBUMIN  02/08/2024       Clinical Pathway: None    Medication Management:  Medication review status: Medications reviewed and no changes reported per patient.           Functional Status:       Living Situation:       Lifestyle & Psychosocial Needs:    Social Determinants of Health     Food Insecurity: Low Risk  (11/22/2023)    Food Insecurity     Within the past 12 months, did you worry that your food would run out before you got money to buy more?: No     Within the past 12 months, did the food you bought just not last and you didn t have money to get more?: No   Depression: Not at risk (2/28/2024)    PHQ-2     PHQ-2 Score: 0   Housing Stability: Low Risk  (11/22/2023)    Housing Stability     Do you have housing? : Yes     Are you worried about losing your housing?: No   Tobacco Use: Medium Risk (2/28/2024)    Patient History     Smoking Tobacco Use: Former     Smokeless Tobacco Use: Never     Passive Exposure: Not on file   Financial Resource Strain: Low Risk  (11/22/2023)    Financial Resource Strain     Within the past 12 months, have you or your family members you live with been unable to get utilities (heat, electricity) when it was really needed?: No   Alcohol Use: Not on file   Transportation Needs: Low Risk  (11/22/2023)    Transportation Needs     Within the past 12 months, has lack of transportation kept you from medical appointments, getting your medicines, non-medical meetings or appointments, work, or from getting things that you need?: No   Physical Activity: Not on file   Interpersonal Safety: Not on file   Stress: Not on file   Social Connections: Not on file   Health Literacy: Not on file     Diet:: Regular  Transportation means:: Regular  car     Informal Support system:: Family      Resources and Interventions:  Current Resources:      Community Resources: None     Equipment Currently Used at Home: walker, rolling, wheelchair, manual     Advance Care Plan/Directive  Advanced Care Plans/Directives on file:: Yes  Status of record:: On File and Validated  Type Advanced Care Plans/Directives: Advanced Directive - On File    Referrals Placed: None     Care Plan:  Care Plan: Health Maintenance       Problem: Health Maintenance Due or Overdue       Goal: Become up-to-date with health maintenance visit(s)       Start Date: 4/24/2024 Expected End Date: 10/21/2024    This Visit's Progress: 0%    Note:     Barriers: complex medical  Strengths: engaged and motivated  Patient expressed understanding of goal: yes  Action steps to achieve this goal:  1. I will ask to update HM at appointments  2. I will scheduled appts for important HM items  3. I will work with the CHW to complete my overdue HM                                Patient/Caregiver understanding: Patient/caregiver verbalized understanding and denies any additional questions or concerns at this time. RNCC engaged in [employer] communications during encounter.     Outreach Frequency: monthly, more frequently as needed      Plan: Care Coordination enrollment completed. Introduction letter and complex care plan sent to patient via [employer]. RN CC will send message to Primary Care Provider's care team to schedule appointment. Toña is OK with RN CC calling in 1 month to check in.     Alma White RN Care Coordination   [facility]  Email: [email or web address]  Phone: [phone]

## 2024-04-24 NOTE — LETTER
[facility] CARE COORDINATION  [address] [address]    April 24, 2024    Melvin Abad  [address] [facility] [address]      Dear Melvin,    I am a clinic care coordinator who works with Lazaro Huston MD with the [facility]. I wanted to thank you for spending the time to talk with me.  Below is a description of clinic care coordination and how I can further assist you.       The clinic care coordination team is made up of a registered nurse, , financial resource worker and community health worker who understand the health care system. The goal of clinic care coordination is to help you manage your health and improve access to the health care system. Our team works alongside your provider to assist you in determining your health and social needs. We can help you obtain health care and community resources, providing you with necessary information and education. We can work with you through any barriers and develop a care plan that helps coordinate and strengthen the communication between you and your care team.  Our services are voluntary and are offered without charge to you personally.    Please feel free to contact me with any questions or concerns regarding care coordination and what we can offer.      We are focused on providing you with the highest-quality healthcare experience possible.    Sincerely,     Alma White RN Care Coordination   [facility]-  [place], [place]  Phone: [phone]      Enclosed: I have enclosed a copy of the Patient Centered Plan of Care. This has helpful information and goals that we have talked about. Please keep this in an easy to access place to use as needed.

## 2024-04-26 ENCOUNTER — TELEPHONE (OUTPATIENT)
Dept: INTERNAL MEDICINE | Facility: CLINIC | Age: 89
End: 2024-04-26
Payer: COMMERCIAL

## 2024-04-26 NOTE — TELEPHONE ENCOUNTER
Home Care is calling regarding an established patient with [facility].     Requesting orders from: Lazaro Huston  Provider is following patient: Yes  Is this a 60-day recertification request?  No    Orders Requested    Home Visit by Wound Care Specialist (WOC)    WOC recommendations for bilateral medial ankles  3x/week and prn if soiled or dislodged  Cleanse with wound  and pat dry  Apply prep skin  Apply Methylene Blue  Apply silver foam, cut to fit.   Apply ABD  Wrap with kerlix and tape.      Recommended BLE elevation to control edema  RN stated TEDS are too tight and did tube  for legs instead.     WOC recommended referral to vascular for MUMTAZ testing to confirm PDD and if they need compression therapy. (Patient already has per RN)      FYI patient has 2 new open blisters on the back of left calf, 0.5/0.3/0.1 and cleansed per wound care recommendations starting today.     Skilled Nursing  Request for continuation of care with no increase or decrease in frequency  Frequency:  3x/wk for 2 wks        Confirmed ok to leave a detailed message with call back.  Contact information confirmed and updated as needed.  Charla DEWEY [facility] [phone] secure line    Dipti Cote, ZULEIMA

## 2024-04-26 NOTE — TELEPHONE ENCOUNTER
Called and notified ZULEIMA Dunham of the approval for requested home care orders.     PENELOPE MayN, RN

## 2024-04-30 ENCOUNTER — TELEPHONE (OUTPATIENT)
Dept: INTERNAL MEDICINE | Facility: CLINIC | Age: 89
End: 2024-04-30
Payer: COMMERCIAL

## 2024-04-30 NOTE — TELEPHONE ENCOUNTER
Charla DEWEY  from [facility] calling to check on wound care orders. Informed her of PCP approval of orders as requested.       Leann Araiza RN on 4/30/2024 at 10:13 AM

## 2024-05-01 ENCOUNTER — VIRTUAL VISIT (OUTPATIENT)
Dept: INTERNAL MEDICINE | Facility: CLINIC | Age: 89
End: 2024-05-01
Payer: COMMERCIAL

## 2024-05-01 DIAGNOSIS — S81.009D OPEN WOUND OF KNEE, LEG, AND ANKLE, UNSPECIFIED LATERALITY, SUBSEQUENT ENCOUNTER: Primary | ICD-10-CM

## 2024-05-01 DIAGNOSIS — M79.89 LEFT LEG SWELLING: ICD-10-CM

## 2024-05-01 DIAGNOSIS — S81.809D OPEN WOUND OF KNEE, LEG, AND ANKLE, UNSPECIFIED LATERALITY, SUBSEQUENT ENCOUNTER: Primary | ICD-10-CM

## 2024-05-01 DIAGNOSIS — S91.009D OPEN WOUND OF KNEE, LEG, AND ANKLE, UNSPECIFIED LATERALITY, SUBSEQUENT ENCOUNTER: Primary | ICD-10-CM

## 2024-05-01 DIAGNOSIS — I50.813 ACUTE ON CHRONIC RIGHT-SIDED HEART FAILURE (H): ICD-10-CM

## 2024-05-01 PROCEDURE — 99213 OFFICE O/P EST LOW 20 MIN: CPT | Mod: 95 | Performed by: INTERNAL MEDICINE

## 2024-05-01 RX ORDER — FUROSEMIDE 20 MG
40 TABLET ORAL DAILY
Qty: 60 TABLET | Refills: 3 | Status: SHIPPED | OUTPATIENT
Start: 2024-05-01

## 2024-05-01 NOTE — PROGRESS NOTES
"Melvin is a 89 year old who is being evaluated via a billable video visit.          Assessment & Plan   Problem List Items Addressed This Visit    None  Visit Diagnoses       Open wound of knee, leg, and ankle, unspecified laterality, subsequent encounter    -  Primary    Acute on chronic right-sided heart failure (H)        Relevant Medications    furosemide (LASIX) 20 MG tablet    Left leg swelling               89-year-old patient who needs home care, he has had home care on and off for wounds he continues to have swelling of his legs right-sided heart failure, chronic venous ulcerations.  He needs home care to apply bandages and help him heal these wounds multiple days a week.  Continue to elevate his legs when possible and educated him on elevating them even during the day to lay down for a nap.  He cannot wear compression stockings while he is open wounds.         BMI  Estimated body mass index is 30.72 kg/m  as calculated from the following:    Height as of 2/13/24: 1.753 m (5' 9\").    Weight as of 2/13/24: 94.3 kg (208 lb).             Subjective   Melvin is a 89 year old, presenting for the following health issues:  No chief complaint on file.      Video Start Time: 4:40 PM    HPI   Doing ok,     Left leg is more swollen and a couple sores. Right has some swelling.     Right leg chronic wound, healing.     Left leg chronic changes and posterior ulcer and redness is gone.     Feet are swelling and dryness.     Needs a visit to keep homecare coming and do wound care twice a week.  He definitely is homebound needs homecare.      Healing not needing antibiotics.      Taking lasix at 1.5 pills daily.  Will go to 2 pills a day for fluid retention.            Objective           Vitals:  No vitals were obtained today due to virtual visit.    Physical Exam   GENERAL: alert and no distress  EYES: Eyes grossly normal to inspection.  No discharge or erythema, or obvious scleral/conjunctival abnormalities.  RESP: No " audible wheeze, cough, or visible cyanosis.    SKIN: Visible skin clear. No significant rash, abnormal pigmentation or lesions.  NEURO: Cranial nerves grossly intact.  Mentation and speech appropriate for age.  PSYCH: Appropriate affect, tone, and pace of words  Legs are swollen, left more then right, ulcers are healing but open,           Video-Visit Details    Type of service:  Video Visit   Video End Time:4:47 PM  Originating Location (pt. Location): Home    Distant Location (provider location):  On-site  Platform used for Video Visit: Elodia  Signed Electronically by: Lazaro Huston MD

## 2024-05-03 ENCOUNTER — LAB REQUISITION (OUTPATIENT)
Dept: LAB | Facility: CLINIC | Age: 89
End: 2024-05-03
Payer: COMMERCIAL

## 2024-05-03 DIAGNOSIS — I48.20 CHRONIC ATRIAL FIBRILLATION, UNSPECIFIED (H): ICD-10-CM

## 2024-05-03 DIAGNOSIS — I48.91 UNSPECIFIED ATRIAL FIBRILLATION (H): ICD-10-CM

## 2024-05-06 ENCOUNTER — NURSE TRIAGE (OUTPATIENT)
Dept: INTERNAL MEDICINE | Facility: CLINIC | Age: 89
End: 2024-05-06

## 2024-05-06 ENCOUNTER — ANTICOAGULATION THERAPY VISIT (OUTPATIENT)
Dept: ANTICOAGULATION | Facility: CLINIC | Age: 89
End: 2024-05-06

## 2024-05-06 DIAGNOSIS — Z79.01 LONG TERM CURRENT USE OF ANTICOAGULANT THERAPY: Primary | ICD-10-CM

## 2024-05-06 DIAGNOSIS — I48.20 CHRONIC ATRIAL FIBRILLATION (H): ICD-10-CM

## 2024-05-06 LAB — INR PPP: 2.14 (ref 0.85–1.15)

## 2024-05-06 PROCEDURE — P9604 ONE-WAY ALLOW PRORATED TRIP: HCPCS | Mod: ORL | Performed by: INTERNAL MEDICINE

## 2024-05-06 PROCEDURE — 36415 COLL VENOUS BLD VENIPUNCTURE: CPT | Mod: ORL | Performed by: INTERNAL MEDICINE

## 2024-05-06 PROCEDURE — 85610 PROTHROMBIN TIME: CPT | Mod: ORL | Performed by: INTERNAL MEDICINE

## 2024-05-06 RX ORDER — WARFARIN SODIUM 2.5 MG/1
TABLET ORAL
Qty: 20 TABLET | Refills: 0 | Status: SHIPPED | OUTPATIENT
Start: 2024-05-06 | End: 2024-05-20

## 2024-05-06 NOTE — TELEPHONE ENCOUNTER
Nurse Triage SBAR    Is this a 2nd Level Triage? YES, LICENSED PRACTITIONER REVIEW IS REQUIRED    Situation: Spoke with Glenys nurse from [facility]. She reports patient continues to have redness and some yellow discharge to R eye. L eye is also mildly red. He reports it does itch and is bothering him. He has had this since 5/3/24.     Assessment: No fevers, cough, congestion or other symptoms at this time. Does not wear contacts.    Protocol Recommended Disposition:   See in Office Today    Recommendation: Family said they can do a virtual visit with you.      Routed to provider    Does the patient meet one of the following criteria for ADS visit consideration? No      GLEN Velasco, RN      Reason for Disposition   Patient wants to be seen    Additional Information   Negative: Eye exposure to chemical or fumes   Negative: Redness of white of eye (sclera), but no pus or only a small amount of brief pus   Negative: SEVERE pain (e.g., excruciating)   Negative: Patient sounds very sick or weak to the triager   Negative: MODERATE eye pain (e.g., interferes with normal activities)   Negative: Looking at light causes MODERATE to SEVERE eye pain (i.e., photophobia)   Negative: Blurred vision   Negative: Cloudy spot or sore seen on the cornea (clear part of the eye)   Negative: Eyelids are very swollen (shut or almost)   Negative: Eyelid (outer) is very red and painful (or tender to touch)   Negative: Fever > 103 F (39.4 C)   Negative: MILD eye pain or discomfort and wears contacts   Negative: Discharge from penis   Negative: New or abnormal vaginal discharge   Negative: Using antibiotic eyedrops > 3 days but pus persists   Negative: Lots of yellow or green nasal discharge   Negative: Weak immune system (e.g., HIV positive, cancer chemo, splenectomy, organ transplant, chronic steroids)    Protocols used: Eye - Pus or [id number]

## 2024-05-06 NOTE — PROGRESS NOTES
ANTICOAGULATION MANAGEMENT     Melvin Abad 89 year old male is on warfarin with therapeutic INR result. (Goal INR 2.0-3.0)    Recent labs: (last 7 days)     05/06/24  0729   INR 2.14*       ASSESSMENT     Source(s): Chart Review and Home Care/Facility Nurse     Warfarin doses taken: Warfarin taken as instructed  Diet: No new diet changes identified  Medication/supplement changes:  Furosemide increased  New illness, injury, or hospitalization: Yes, itchy eye with matter, redness, patient stated better today,  chronic wound management. no drainage today, in healing process.  Signs or symptoms of bleeding or clotting: No  Previous result: Therapeutic last visit; previously outside of goal range  Additional findings:  Home Care for wound care twice weekly  Pending virtual visit for eye condition       PLAN     Recommended plan for temporary change(s) affecting INR     Dosing Instructions: Continue your current warfarin dose with next INR in 2 weeks       Summary  As of 5/6/2024      Full warfarin instructions:  5 mg every Mon, Wed, Fri; 2.5 mg all other days   Next INR check:  5/20/2024               Telephone call with [facility] facility nurse who verbalizes understanding and agrees to plan    Orders given to  Homecare nurse/facility to recheck    AVS to Facility     Rx to [facility]      Education provided:   Please call back if any changes to your diet, medications or how you've been taking warfarin  Importance of notifying anticoagulation clinic for: changes in medications; a sooner lab recheck maybe needed    Plan made per [facility] anticoagulation protocol    Melonie Gavin RN  Anticoagulation Clinic  5/6/2024    _______________________________________________________________________     Anticoagulation Episode Summary       Current INR goal:  2.0-3.0   TTR:  95.9% (1 y)   Target end date:  Indefinite   Send INR reminders to:  LINDSEY COLON    Indications    Atrial fibrillation (Resolved) [I48.91]  Long-term  (current) use of anticoagulants [Z79.01] [Z79.01]  Chronic atrial fibrillation (H) [I48.20]             Comments:  Cheyenne SINGH/Ingrid Sanchez [phone] fax: [phone] & send to [facility]             Anticoagulation Care Providers       Provider Role Specialty Phone number    Lazaro Huston MD Referring Internal Medicine [phone]    Fransisco Cordon MD Referring Family Medicine

## 2024-05-06 NOTE — TELEPHONE ENCOUNTER
Junior nurse [phone] [facility] in [place]     TIANA called and stated she was requested by Nursing staff at [facility] to have patient be seen 5/3 or over weekend for itchy eyes with matter in them. Family not with patient.    RN called Glenys DEWEY at [facility] and she assessed patient 5/3 and recommended next steps to family. Right eye itchy, matter, redness and left itchy. She is going to assess patient this am and call for up date as patient is very [place] and hard to talk on the phone. After triage please give appropriate next steps to Toña MONTIEL.    Dipti Cote RN  [facility] - Registered Nurse  Clinic Triage [place]   May 6, 2024

## 2024-05-06 NOTE — TELEPHONE ENCOUNTER
Has he done any visine, otc eye drops, could be allergies or just irritation. Usually pink eye is viral and no treatment.

## 2024-05-06 NOTE — TELEPHONE ENCOUNTER
Phoned Glenys, the RN at [facility] in [place], and asked/ informed her of documented questions per Dr. Robel MD as stated below.  Glenys stated understanding.    Glenys stated patient has OTC eye drops that he can use up to 4 times daily and she will encourage patient to use OTC eye drops for another day or two and if eye is not getting better she will have patient's family call and schedule visit.      (Glenys stated patient's eyes were red on Friday and informed patient's family to try and scheduled visit.  She stated when family tried to schedule visit, there was not any availability for the day they were wanting to scheduled.)    Will close this encounter at this time.    Tatianna Kirkland RN

## 2024-05-08 ENCOUNTER — TELEPHONE (OUTPATIENT)
Dept: INTERNAL MEDICINE | Facility: CLINIC | Age: 89
End: 2024-05-08
Payer: COMMERCIAL

## 2024-05-08 DIAGNOSIS — Z53.9 DIAGNOSIS NOT YET DEFINED: Primary | ICD-10-CM

## 2024-05-08 PROCEDURE — G0180 MD CERTIFICATION HHA PATIENT: HCPCS | Performed by: INTERNAL MEDICINE

## 2024-05-08 NOTE — TELEPHONE ENCOUNTER
Reason for Call:  Form, our goal is to have forms completed with 72 hours, however, some forms may require a visit or additional information.    Type of letter, form or note:  Home Health Certification    Who is the form from?: Home care    Where did the form come from: form was faxed in    What clinic location was the form placed at?: [facility]    Where the form was placed: Given to physician    What number is listed as a contact on the form?: [phone]       Additional comments: [facility]    Call taken on 5/8/2024 at 1:29 PM by Monserrat Ellis

## 2024-05-09 ENCOUNTER — TELEPHONE (OUTPATIENT)
Dept: INTERNAL MEDICINE | Facility: CLINIC | Age: 89
End: 2024-05-09
Payer: COMMERCIAL

## 2024-05-09 NOTE — TELEPHONE ENCOUNTER
Home Care is calling regarding an established patient with  Taggo Aquiles.        4/26/2024     2:08 PM   Home Care Information    Name/Phone Number Charla ZULEIMA [phone]   Home Care agency [facility]     Requesting orders from: Lazaro Huston  Provider is following patient: Yes  Is this a 60-day recertification request?  No    Orders Requested    Skilled Nursing  Request for continuation of care with decrease in frequency   Goals have been met/progressing.  Frequency:  1x/wk for 3 wks  Then every 2 wks for 2 wks    Wound Care Discontinued  Wound is now healed    Needs orders for compression stockings and velcro compression  Patient has pitting edema +1 on from toes to knee, and trace of edema on R leg from foot to mid calf. Patient has stockings and Juxtafit, but they need an order to apply.     Eye Problem  Patient has been using Restasis, but R eye is still having problems. Home care notes that entire sclera is red. Wondering PCP's recommendations on this since it is not improving?      Information was gathered and will be sent to provider for review.  RN will contact Home Care with information after provider review.  Confirmed ok to leave a detailed message with call back.  Contact information confirmed and updated as needed.    Yancy Peacock RN

## 2024-05-10 NOTE — TELEPHONE ENCOUNTER
I approve of requested home care orders.    Ok to apply compression stockings during the day, off at night.    If vision is ok, continue drops to right eye.

## 2024-05-10 NOTE — TELEPHONE ENCOUNTER
Left detailed message on confidential voicemail of ZULEIMA Dunham with verbal approval for requested home care orders and additional information from PCP, per PCP note below.     PENELOPE MayN, RN

## 2024-05-13 ENCOUNTER — DOCUMENTATION ONLY (OUTPATIENT)
Dept: GERIATRICS | Facility: CLINIC | Age: 89
End: 2024-05-13
Payer: COMMERCIAL

## 2024-05-14 ENCOUNTER — ASSISTED LIVING VISIT (OUTPATIENT)
Dept: GERIATRICS | Facility: CLINIC | Age: 89
End: 2024-05-14
Payer: COMMERCIAL

## 2024-05-14 VITALS
SYSTOLIC BLOOD PRESSURE: 138 MMHG | BODY MASS INDEX: 30.33 KG/M2 | TEMPERATURE: 97.7 F | DIASTOLIC BLOOD PRESSURE: 69 MMHG | RESPIRATION RATE: 16 BRPM | WEIGHT: 205.4 LBS | HEART RATE: 66 BPM | OXYGEN SATURATION: 97 %

## 2024-05-14 DIAGNOSIS — I48.11 LONGSTANDING PERSISTENT ATRIAL FIBRILLATION (H): Primary | ICD-10-CM

## 2024-05-14 DIAGNOSIS — K59.01 SLOW TRANSIT CONSTIPATION: ICD-10-CM

## 2024-05-14 DIAGNOSIS — N40.0 BENIGN PROSTATIC HYPERPLASIA, UNSPECIFIED WHETHER LOWER URINARY TRACT SYMPTOMS PRESENT: ICD-10-CM

## 2024-05-14 DIAGNOSIS — Z79.01 LONG TERM CURRENT USE OF ANTICOAGULANT THERAPY: ICD-10-CM

## 2024-05-14 DIAGNOSIS — G25.81 RESTLESS LEGS SYNDROME (RLS): ICD-10-CM

## 2024-05-14 DIAGNOSIS — H44.003 INFECTION OF BOTH EYES: ICD-10-CM

## 2024-05-14 DIAGNOSIS — I10 ESSENTIAL HYPERTENSION: ICD-10-CM

## 2024-05-14 DIAGNOSIS — J44.9 CHRONIC OBSTRUCTIVE PULMONARY DISEASE, UNSPECIFIED COPD TYPE (H): ICD-10-CM

## 2024-05-14 DIAGNOSIS — E55.9 VITAMIN D DEFICIENCY: ICD-10-CM

## 2024-05-14 DIAGNOSIS — D59.10 AUTOIMMUNE HEMOLYTIC ANEMIA (H): ICD-10-CM

## 2024-05-14 DIAGNOSIS — M19.90 ARTHRITIS PAIN: ICD-10-CM

## 2024-05-14 PROCEDURE — 99344 HOME/RES VST NEW MOD MDM 60: CPT | Performed by: NURSE PRACTITIONER

## 2024-05-14 RX ORDER — ERYTHROMYCIN 5 MG/G
OINTMENT OPHTHALMIC AT BEDTIME
COMMUNITY
Start: 2024-05-10 | End: 2024-05-19

## 2024-05-14 RX ORDER — VALACYCLOVIR HYDROCHLORIDE 500 MG/1
500 TABLET, FILM COATED ORAL 3 TIMES DAILY
COMMUNITY
Start: 2024-05-10 | End: 2024-08-18

## 2024-05-14 NOTE — LETTER
5/14/2024      Melvin Abad  C/o Toña Abad  [address]  [facility] [address]        M [facility] GERIATRICS  INITIAL VISIT NOTE  May 14, 2024      PRIMARY CARE PROVIDER AND CLINIC:  Kim Chong [address]  / [facility] [address]    M [facility] Medical Record Number:  [id number]  Place of Service where encounter took place:  [facility] ([facility]) [[id number]]    Chief Complaint   Patient presents with     Establish Care       HPI:    Melvin Abad is a 89 year old  (4/24/1928) male was is being seen today by this NP as new to seeing a on-location provider.  He has not been in the hospital since January of this year when he went to the ED and from there to TCU at [facility].  Returned to his apartment on 2/22/24 where he lives here long term with nursing doing medication management and help with daily activities.   History obtained from: facility chart records, facility staff, patient report, [facility] chart review, and Care Everywhere [facility] chart review.      Ed is a 89 year old gentleman with chronic health issues that include Atrial Fib (on coumadin), BPH, Hypertension, hyperlipidemia, COPD, and RLS to name a few.      Meeting Ed today for initial visit for this NP to start following him in-house so he does not need to go to the clinic for primary care.  Nursing manages his medications and so work with them to ensure his medications are correct.    Reviewed [facility] charting and appears that Ed has been having issues with his eyes as being red and puffy.  Eye doctor placed him on Valacyclovir 500mg TID for 10 days, Ivizia eye drops 4x day and EES ointment to right eye at  for a limited time.      Found Ed in his apartment by his table where he has many art supplies to draw, color or paint.  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  Does very good work and he states it helps him pass the time.    Spoke about a variety of things to get to know him.  Review his  medications, talk about how this NP works here in the building, and what goals of care are.      Last trip to hospital was a ER visit in January for Hematoma of left iliopsoas muscle.  Has a short stay at the TCU next door and then back here.  Home care has been working with Ed due to open area(s) on his shins.  Areas are closed now.  Going back to compression socks and Juxtafix compression wraps PRN.  If edema under good control, maybe going to a lighter weight compression sock.        CODE STATUS/ADVANCE DIRECTIVES: DNR only    ALLERGIES:  No Known Allergies    PAST MEDICAL HISTORY:   Past Medical History:   Diagnosis Date     Actinic keratosis      Atrial fibrillation (H)      Basal cell carcinoma nos 06/10    Mohs excision, rt upper lip & rt temple     Cardiac dysrhythmia, unspecified      Cough     chronic cough     Diabetic eye exam (H) 8/29/14     Generalized osteoarthrosis, unspecified site     djd of the knees, hands, and neck     Other diseases of lung, not elsewhere classified     pulmonary nodule, benign     Pure hypercholesterolemia      Unspecified essential hypertension        PAST SURGICAL HISTORY:   Past Surgical History:   Procedure Laterality Date     COLONOSCOPY  05/02/2007    Multiple bxs of diminutive polyps of ascending colon.     COLONOSCOPY  11/10/2010    COLONOSCOPY performed by MARISELA GRIMM at  GI      HEMORRHOIDOPEXY BY STAPLING  09/26/08     [facility] TOTAL KNEE ARTHROPLASTY  1997    Knee Replacement, Total     [facility] COLONOSCOPY W/WO BRUSH/WASH  10/19/2001     [facility] COLONOSCOPY W/WO BRUSH/WASH  11/02/2004       FAMILY HISTORY:   Family History   Problem Relation Age of Onset     Cancer Mother      Prostate Cancer Father        SOCIAL HISTORY:   Patient's living condition: lives in an assisted living facility    MEDICATIONS  Post Discharge Medication Reconciliation Status: discharge medications reconciled and changed, per note/orders.    Current Outpatient Medications   Medication Sig  Dispense Refill     acetaminophen (TYLENOL) 500 MG tablet Take 2 tablets (1,000 mg) by mouth every 8 hours as needed for mild pain       furosemide (LASIX) 20 MG tablet Take 2 tablets (40 mg) by mouth daily 60 tablet 3     gabapentin (NEURONTIN) 100 MG capsule Take 2 capsules (200 mg) by mouth 2 times daily 120 capsule 0     ketotifen fumarate 0.035%, ketotifen 0.025%, (ZADITOR) 0.025 % ophthalmic solution Place 1 drop into both eyes 2 times daily as needed for dry eyes Patient self-administers 5 mL 0     lisinopril (ZESTRIL) 10 MG tablet Take 1 tablet (10 mg) by mouth daily 30 tablet 0     loratadine (CLARITIN) 10 MG tablet Take 1 tablet (10 mg) by mouth daily 30 tablet 0     metoprolol succinate ER (TOPROL XL) 50 MG 24 hr tablet Take 1 tablet (50 mg) by mouth daily 30 tablet 0     polyethylene glycol (MIRALAX) 17 GM/Dose powder Take 9 g by mouth daily as needed for constipation Mix and hand to patient, Patient self-administers (he knows how much he needs to keep regular) 510 g 0     psyllium (METAMUCIL/KONSYL) 58.6 % powder Take 18 g (1 Tablespoonful) by mouth daily Mix and hand to patient, Patient self-administers (he knows how much he needs to keep regular)       simvastatin (ZOCOR) 40 MG tablet Take 1 tablet (40 mg) by mouth at bedtime 30 tablet 0     sodium chloride (OCEAN) 0.65 % nasal spray Spray 2 sprays in nostril daily as needed for congestion Patient self-administers 30 mL 0     tamsulosin (FLOMAX) 0.4 MG capsule Take 1 capsule (0.4 mg) by mouth daily 30 capsule 0     valACYclovir (VALTREX) 500 MG tablet Take 500 mg by mouth 3 times daily       Vitamin D3 (CHOLECALCIFEROL) 25 mcg (1000 units) tablet Take 1 tablet (25 mcg) by mouth daily       order for DME Equipment being ordered: side rails/grab bars 1 each 0     order for DME Equipment being ordered: bedside commode 1 Device 0     order for DME Equipment being ordered: 4 legged walker 1 Device 0     warfarin ANTICOAGULANT (COUMADIN) 2.5 MG tablet Take  2.5 mgs mg Sunday, Tuesday, Thursday, and Saturday and 5 mgs all other days.  Recheck INR on 5/20/24 26 tablet 0       ROS:  10 point ROS neg other than the symptoms noted above in the HPI.  No complaints of heart palpitations, no SOB.      PHYSICAL EXAM:  /69   Pulse 66   Temp 97.7  F (36.5  C)   Resp 16   Wt 93.2 kg (205 lb 6.4 oz)   SpO2 97%   BMI 30.33 kg/m    Physical Exam  Vitals and nursing note reviewed.   Constitutional:       Appearance: Normal appearance.   HENT:      Head: Normocephalic.      Right Ear: External ear normal.      Left Ear: External ear normal.      Ears:      Comments: Wears hearing aides.  Quiet environment today.     Nose: Nose normal.      Mouth/Throat:      Mouth: Mucous membranes are moist.      Pharynx: Oropharynx is clear.      Comments: Has upper dentures.  No sores in mouth  Cardiovascular:      Rate and Rhythm: Normal rate. Rhythm irregular.      Heart sounds: Normal heart sounds.   Pulmonary:      Effort: Pulmonary effort is normal.      Breath sounds: Normal breath sounds.   Abdominal:      General: Bowel sounds are normal.      Palpations: Abdomen is soft.      Comments: States his bowels move well.  No concerns for loose stools   Musculoskeletal:         General: Normal range of motion.      Cervical back: Normal range of motion.      Comments: Uses a wheelchair to move self around in.  States he has balance trouble and does have a 2WW available.  Left lower leg is firm to touch and right is soft to touch.   Skin:     General: Skin is warm and dry.      Comments: Wearing black compression socks.      Neurological:      General: No focal deficit present.      Mental Status: He is alert and oriented to person, place, and time.   Psychiatric:         Mood and Affect: Mood normal.         Behavior: Behavior normal.         Thought Content: Thought content normal.         Judgment: Judgment normal.      LABORATORY/IMAGING DATA:  Reviewed as per Epic and/or  CareEverywhere    ASSESSMENT/PLAN:  (I48.11) Longstanding persistent atrial fibrillation (H)  (primary encounter diagnosis)  (Z79.01) Long term current use of anticoagulant therapy  Comment: Ed receives coumadin that the INRs are managed through the coumadin clinic.  Will be mindful when ordering ABXs that INRs should be checked.  No active bleeding noted.  No heart palpitations.      (I10) Essential hypertension  Comment: 110-140's/60-80's.  Receives lisinopril 10mg daily and metoprolol 50mg po daily.  Vitals done monthly or per their assessments here.  Overall in control and most are right in middle range.      (N40.0) Benign prostatic hyperplasia, unspecified whether lower urinary tract symptoms present  Comment: receives Flomax 0.4mg po daily.  No concerns of urinary discomfort.      (G25.81) Restless legs syndrome (RLS)  Comment: most recently started to complain of RLS to nursing and addressed with previous primary provider. Currently on Gabapentin 200mg twice a day and states it is helpful.  No changes.    (J44.9) Chronic obstructive pulmonary disease, unspecified COPD type (H)  Comment: no medications at this time.  Has ocean nasal spray if nostrils congested/plugged.  Continue to monitor.    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  Lab Results   Component Value Date    HGB 12.9 01/22/2024   No supplements.  Periodically will check a CBC.        (M19.90) Arthritis pain  Comment: currently no scheduled Tylenol - all as needed.  When he had his hematoma, suspected his tylenol was scheduled.  No concerns now.  Uses the w/c more for mobility due to his balance issues.  Plan: acetaminophen (TYLENOL) 500 MG tablet,         DISCONTINUED: acetaminophen (TYLENOL) 500 MG         tablet    (E55.9) Vitamin D deficiency  Comment: currently taking Vitamin D 25mcg daily.  No changes.  Plan: Vitamin D3 (CHOLECALCIFEROL) 25 mcg (1000         units) tablet    (H44.003) Infection of both  eyes  Comment: currently in treatment with Valacyclovir and EES ointment.  Has the eye drops 4x day with no end date.  Staff feel the eyes are clearing up.  Some puffiness noted but otherwise seems to manage in his environment.  Wears glasses as well.  Plan: valACYclovir (VALTREX) 500 MG tablet    (K59.01) Slow transit constipation  Comment: just fixing his metamucil order to reflect it as a every day medications and staff give him the powder and he fixes it the way he wishes.  PRN Miralax.  Continue to monitor.    Plan: psyllium (METAMUCIL/KONSYL) 58.6 % powder     Orders:   No new orders today.      Total time with a new patient visit in the assisted livin minutes including discussions about the POC and care coordination with the patient and caregiver. Greater than 50% of total time spent with counseling and coordinating care due to gathering information, reviewing medications, answering questions and informing him of how the NP works with the nurses here.    Electronically signed by:  DINH Silva CNP                Sincerely,        DINH Silva CNP

## 2024-05-14 NOTE — LETTER
5/14/2024        RE: Melvin Abad  [address] [facility] [address]        M [facility] GERIATRICS  INITIAL VISIT NOTE  May 14, 2024      PRIMARY CARE PROVIDER AND CLINIC:  Kim Chong [address] [facility] / [facility] [address]    M [facility] Medical Record Number:  [id number]  Place of Service where encounter took place:  [facility] ([facility] [[id number]]    Chief Complaint   Patient presents with     Establish Care       HPI:    Melvin Abad is a 89 year old  (4/24/1928) male was admitted to the above facility from  {HOSPITAL AND EMERGENCY ROOM SITES FOR NURSING HOME ADMISSIONS:736783}. Hospital stay *** through *** where they were admitted for ***  Now admitted to this facility for  {[facility] ADMISSION REASONS:109992}.      History obtained from: {[facility] HPI:161218}.      Brief Hospital Course: ***    TCU Course: ***    CODE STATUS/ADVANCE DIRECTIVES: DNR only    ALLERGIES:  No Known Allergies    PAST MEDICAL HISTORY:   Past Medical History:   Diagnosis Date     Actinic keratosis      Atrial fibrillation (H)      Basal cell carcinoma nos 06/10    Mohs excision, rt upper lip & rt temple     Cardiac dysrhythmia, unspecified      Cough     chronic cough     Diabetic eye exam (H) 8/29/14     Generalized osteoarthrosis, unspecified site     djd of the knees, hands, and neck     Other diseases of lung, not elsewhere classified     pulmonary nodule, benign     Pure hypercholesterolemia      Unspecified essential hypertension        PAST SURGICAL HISTORY:   Past Surgical History:   Procedure Laterality Date     COLONOSCOPY  05/02/2007    Multiple bxs of diminutive polyps of ascending colon.     COLONOSCOPY  11/10/2010    COLONOSCOPY performed by MARISELA GRIMM at [facility] HEMORRHOIDOPEXY BY STAPLING  09/26/08     [facility] TOTAL KNEE ARTHROPLASTY  1997    Knee Replacement, Total     [facility] COLONOSCOPY W/WO BRUSH/WASH  10/19/2001     [facility] COLONOSCOPY W/WO BRUSH/WASH  11/02/2004       FAMILY HISTORY:    Family History   Problem Relation Age of Onset     Cancer Mother      Prostate Cancer Father      Unable to review due to cognitive impairment***    SOCIAL HISTORY:   Patient's living condition: {LIVES WITH (NURSING HOME):626668}    MEDICATIONS  Post Discharge Medication Reconciliation Status: {O Med Rec (Provider):338218}.  Current Outpatient Medications   Medication Sig Dispense Refill     acetaminophen (TYLENOL) 500 MG tablet Take 2 tablets (1,000 mg) by mouth every 8 hours as needed       calcium carbonate (TUMS) 500 MG chewable tablet Take 1 tablet (500 mg) by mouth 3 times daily as needed for heartburn 100 tablet 0     carboxymethylcellulose PF (REFRESH PLUS) 0.5 % ophthalmic solution Place 1 drop into both eyes 4 times daily as needed for dry eyes Patient self-administers 30 each 0     cholecalciferol (VITAMIN D3) 25 mcg (1000 units) capsule Take 1 capsule (25 mcg) by mouth daily 30 capsule 0     erythromycin (ROMYCIN) 5 MG/GM ophthalmic ointment Place into the right eye at bedtime       furosemide (LASIX) 20 MG tablet Take 2 tablets (40 mg) by mouth daily 60 tablet 3     gabapentin (NEURONTIN) 100 MG capsule Take 2 capsules (200 mg) by mouth 2 times daily 120 capsule 0     ketotifen fumarate 0.035%, ketotifen 0.025%, (ZADITOR) 0.025 % ophthalmic solution Place 1 drop into both eyes 2 times daily as needed for dry eyes Patient self-administers 5 mL 0     lisinopril (ZESTRIL) 10 MG tablet Take 1 tablet (10 mg) by mouth daily 30 tablet 0     loratadine (CLARITIN) 10 MG tablet Take 1 tablet (10 mg) by mouth daily 30 tablet 0     metoprolol succinate ER (TOPROL XL) 50 MG 24 hr tablet Take 1 tablet (50 mg) by mouth daily 30 tablet 0     polyethylene glycol (MIRALAX) 17 GM/Dose powder Take 9 g by mouth daily as needed for constipation Mix and hand to patient, Patient self-administers (he knows how much he needs to keep regular) 510 g 0     psyllium (METAMUCIL/KONSYL) 58.6 % powder Take 18 g (1 Tablespoonful) by  mouth daily as needed for constipation Mix and hand to patient, Patient self-administers (he knows how much he needs to keep regular) 283 g 0     simvastatin (ZOCOR) 40 MG tablet Take 1 tablet (40 mg) by mouth at bedtime 30 tablet 0     sodium chloride (OCEAN) 0.65 % nasal spray Spray 2 sprays in nostril daily as needed for congestion Patient self-administers 30 mL 0     tamsulosin (FLOMAX) 0.4 MG capsule Take 1 capsule (0.4 mg) by mouth daily 30 capsule 0     valACYclovir (VALTREX) 500 MG tablet Take 500 mg by mouth 3 times daily       order for DME Equipment being ordered: side rails/grab bars 1 each 0     order for DME Equipment being ordered: bedside commode 1 Device 0     order for DME Equipment being ordered: 4 legged walker 1 Device 0     warfarin ANTICOAGULANT (COUMADIN) 2.5 MG tablet Take 5 mg Mon/Wed/Fri and 2.5 mg all the other days OR as directed by INR clinic. Recheck INR on 5/20/24 20 tablet 0       ROS:  10 point ROS neg other than the symptoms noted above in the HPI.***  Unable to obtain due to cognitive impairment or aphasia  ROS    PHYSICAL EXAM:  /69   Pulse 66   Temp 97.7  F (36.5  C)   Resp 16   Wt 93.2 kg (205 lb 6.4 oz)   SpO2 97%   BMI 30.33 kg/m    Physical Exam     LABORATORY/IMAGING DATA:  Reviewed as per [facility] and/or [facility]    ASSESSMENT/PLAN:  {[facility] DX INITIAL:384030}    Orders:   ***    {[facility] TIME SPENT:987422}    Electronically signed by:  Danielle Moore                Sincerely,        DINH Silva CNP

## 2024-05-14 NOTE — PROGRESS NOTES
[facility] GERIATRICS  INITIAL VISIT NOTE  May 14, 2024      PRIMARY CARE PROVIDER AND CLINIC:  Kim Chong [address] [address]    [facility] Medical Record Number:  [id number]  Place of Service where encounter took place:  [facility] [[id number]]    Chief Complaint   Patient presents with    Establish Care       HPI:    Melvin Abad is a 89 year old  (4/24/1928) male was is being seen today by this NP as new to seeing a on-location provider.  He has not been in the hospital since January of this year when he went to the ED and from there to TCU at [facility].  Returned to his apartment on 2/22/24 where he lives here long term with nursing doing medication management and help with daily activities.   History obtained from: facility chart records, facility staff, patient report, [facility] chart review, and Care Everywhere [facility] chart review.      Ed is a 89 year old gentleman with chronic health issues that include Atrial Fib (on coumadin), BPH, Hypertension, hyperlipidemia, COPD, and RLS to name a few.      Meeting Ed today for initial visit for this NP to start following him in-house so he does not need to go to the clinic for primary care.  Nursing manages his medications and so work with them to ensure his medications are correct.    Reviewed [facility] charting and appears that Ed has been having issues with his eyes as being red and puffy.  Eye doctor placed him on Valacyclovir 500mg TID for 10 days, Ivizia eye drops 4x day and EES ointment to right eye at  for a limited time.      Found Ed in his apartment by his table where he has many art supplies to draw, color or paint.  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  Does very good work and he states it helps him pass the time.    Spoke about a variety of things to get to know him.  Review his medications, talk about how this NP works here in the building, and what goals of care are.       Last trip to hospital was a ER visit in January for Hematoma of left iliopsoas muscle.  Has a short stay at the TCU next door and then back here.  Home care has been working with Ed due to open area(s) on his shins.  Areas are closed now.  Going back to compression socks and Juxtafix compression wraps PRN.  If edema under good control, maybe going to a lighter weight compression sock.        CODE STATUS/ADVANCE DIRECTIVES: DNR only    ALLERGIES:  No Known Allergies    PAST MEDICAL HISTORY:   Past Medical History:   Diagnosis Date    Actinic keratosis     Atrial fibrillation (H)     Basal cell carcinoma nos 06/10    Mohs excision, rt upper lip & rt temple    Cardiac dysrhythmia, unspecified     Cough     chronic cough    Diabetic eye exam (H) 8/29/14    Generalized osteoarthrosis, unspecified site     djd of the knees, hands, and neck    Other diseases of lung, not elsewhere classified     pulmonary nodule, benign    Pure hypercholesterolemia     Unspecified essential hypertension        PAST SURGICAL HISTORY:   Past Surgical History:   Procedure Laterality Date    COLONOSCOPY  05/02/2007    Multiple bxs of diminutive polyps of ascending colon.    COLONOSCOPY  11/10/2010    COLONOSCOPY performed by MARISELA GRIMM at  GI     HEMORRHOIDOPEXY BY STAPLING  09/26/08    [facility] TOTAL KNEE ARTHROPLASTY  1997    Knee Replacement, Total    [facility] COLONOSCOPY W/WO BRUSH/WASH  10/19/2001    [facility] COLONOSCOPY W/WO BRUSH/WASH  11/02/2004       FAMILY HISTORY:   Family History   Problem Relation Age of Onset    Cancer Mother     Prostate Cancer Father        SOCIAL HISTORY:   Patient's living condition: lives in an assisted living facility    MEDICATIONS  Post Discharge Medication Reconciliation Status: discharge medications reconciled and changed, per note/orders.    Current Outpatient Medications   Medication Sig Dispense Refill    acetaminophen (TYLENOL) 500 MG tablet Take 2 tablets (1,000 mg) by mouth every 8 hours as needed  for mild pain      furosemide (LASIX) 20 MG tablet Take 2 tablets (40 mg) by mouth daily 60 tablet 3    gabapentin (NEURONTIN) 100 MG capsule Take 2 capsules (200 mg) by mouth 2 times daily 120 capsule 0    ketotifen fumarate 0.035%, ketotifen 0.025%, (ZADITOR) 0.025 % ophthalmic solution Place 1 drop into both eyes 2 times daily as needed for dry eyes Patient self-administers 5 mL 0    lisinopril (ZESTRIL) 10 MG tablet Take 1 tablet (10 mg) by mouth daily 30 tablet 0    loratadine (CLARITIN) 10 MG tablet Take 1 tablet (10 mg) by mouth daily 30 tablet 0    metoprolol succinate ER (TOPROL XL) 50 MG 24 hr tablet Take 1 tablet (50 mg) by mouth daily 30 tablet 0    polyethylene glycol (MIRALAX) 17 GM/Dose powder Take 9 g by mouth daily as needed for constipation Mix and hand to patient, Patient self-administers (he knows how much he needs to keep regular) 510 g 0    psyllium (METAMUCIL/KONSYL) 58.6 % powder Take 18 g (1 Tablespoonful) by mouth daily Mix and hand to patient, Patient self-administers (he knows how much he needs to keep regular)      simvastatin (ZOCOR) 40 MG tablet Take 1 tablet (40 mg) by mouth at bedtime 30 tablet 0    sodium chloride (OCEAN) 0.65 % nasal spray Spray 2 sprays in nostril daily as needed for congestion Patient self-administers 30 mL 0    tamsulosin (FLOMAX) 0.4 MG capsule Take 1 capsule (0.4 mg) by mouth daily 30 capsule 0    valACYclovir (VALTREX) 500 MG tablet Take 500 mg by mouth 3 times daily      Vitamin D3 (CHOLECALCIFEROL) 25 mcg (1000 units) tablet Take 1 tablet (25 mcg) by mouth daily      order for DME Equipment being ordered: side rails/grab bars 1 each 0    order for DME Equipment being ordered: bedside commode 1 Device 0    order for DME Equipment being ordered: 4 legged walker 1 Device 0    warfarin ANTICOAGULANT (COUMADIN) 2.5 MG tablet Take 2.5 mgs mg Sunday, Tuesday, Thursday, and Saturday and 5 mgs all other days.  Recheck INR on 5/20/24 26 tablet 0       ROS:  10 point  ROS neg other than the symptoms noted above in the HPI.  No complaints of heart palpitations, no SOB.      PHYSICAL EXAM:  /69   Pulse 66   Temp 97.7  F (36.5  C)   Resp 16   Wt 93.2 kg (205 lb 6.4 oz)   SpO2 97%   BMI 30.33 kg/m    Physical Exam  Vitals and nursing note reviewed.   Constitutional:       Appearance: Normal appearance.   HENT:      Head: Normocephalic.      Right Ear: External ear normal.      Left Ear: External ear normal.      Ears:      Comments: Wears hearing aides.  Quiet environment today.     Nose: Nose normal.      Mouth/Throat:      Mouth: Mucous membranes are moist.      Pharynx: Oropharynx is clear.      Comments: Has upper dentures.  No sores in mouth  Cardiovascular:      Rate and Rhythm: Normal rate. Rhythm irregular.      Heart sounds: Normal heart sounds.   Pulmonary:      Effort: Pulmonary effort is normal.      Breath sounds: Normal breath sounds.   Abdominal:      General: Bowel sounds are normal.      Palpations: Abdomen is soft.      Comments: States his bowels move well.  No concerns for loose stools   Musculoskeletal:         General: Normal range of motion.      Cervical back: Normal range of motion.      Comments: Uses a wheelchair to move self around in.  States he has balance trouble and does have a 2WW available.  Left lower leg is firm to touch and right is soft to touch.   Skin:     General: Skin is warm and dry.      Comments: Wearing black compression socks.      Neurological:      General: No focal deficit present.      Mental Status: He is alert and oriented to person, place, and time.   Psychiatric:         Mood and Affect: Mood normal.         Behavior: Behavior normal.         Thought Content: Thought content normal.         Judgment: Judgment normal.      LABORATORY/IMAGING DATA:  Reviewed as per [facility] and/or [facility]    ASSESSMENT/PLAN:  (I48.11) Longstanding persistent atrial fibrillation (H)  (primary encounter diagnosis)  (Z79.01) Long term  current use of anticoagulant therapy  Comment: Ed receives coumadin that the INRs are managed through the coumadin clinic.  Will be mindful when ordering ABXs that INRs should be checked.  No active bleeding noted.  No heart palpitations.      (I10) Essential hypertension  Comment: 110-140's/60-80's.  Receives lisinopril 10mg daily and metoprolol 50mg po daily.  Vitals done monthly or per their assessments here.  Overall in control and most are right in middle range.      (N40.0) Benign prostatic hyperplasia, unspecified whether lower urinary tract symptoms present  Comment: receives Flomax 0.4mg po daily.  No concerns of urinary discomfort.      (G25.81) Restless legs syndrome (RLS)  Comment: most recently started to complain of RLS to nursing and addressed with previous primary provider. Currently on Gabapentin 200mg twice a day and states it is helpful.  No changes.    (J44.9) Chronic obstructive pulmonary disease, unspecified COPD type (H)  Comment: no medications at this time.  Has ocean nasal spray if nostrils congested/plugged.  Continue to monitor.    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  Lab Results   Component Value Date    HGB 12.9 01/22/2024   No supplements.  Periodically will check a CBC.        (M19.90) Arthritis pain  Comment: currently no scheduled Tylenol - all as needed.  When he had his hematoma, suspected his tylenol was scheduled.  No concerns now.  Uses the w/c more for mobility due to his balance issues.  Plan: acetaminophen (TYLENOL) 500 MG tablet,         DISCONTINUED: acetaminophen (TYLENOL) 500 MG         tablet    (E55.9) Vitamin D deficiency  Comment: currently taking Vitamin D 25mcg daily.  No changes.  Plan: Vitamin D3 (CHOLECALCIFEROL) 25 mcg (1000         units) tablet    (H44.003) Infection of both eyes  Comment: currently in treatment with Valacyclovir and EES ointment.  Has the eye drops 4x day with no end date.  Staff feel the eyes are  clearing up.  Some puffiness noted but otherwise seems to manage in his environment.  Wears glasses as well.  Plan: valACYclovir (VALTREX) 500 MG tablet    (K59.01) Slow transit constipation  Comment: just fixing his metamucil order to reflect it as a every day medications and staff give him the powder and he fixes it the way he wishes.  PRN Miralax.  Continue to monitor.    Plan: psyllium (METAMUCIL/KONSYL) 58.6 % powder     Orders:   No new orders today.      Total time with a new patient visit in the assisted livin minutes including discussions about the POC and care coordination with the patient and caregiver. Greater than 50% of total time spent with counseling and coordinating care due to gathering information, reviewing medications, answering questions and informing him of how the NP works with the nurses here.    Electronically signed by:  DINH Silva CNP

## 2024-05-17 ENCOUNTER — LAB REQUISITION (OUTPATIENT)
Dept: LAB | Facility: CLINIC | Age: 89
End: 2024-05-17
Payer: COMMERCIAL

## 2024-05-17 DIAGNOSIS — I48.91 UNSPECIFIED ATRIAL FIBRILLATION (H): ICD-10-CM

## 2024-05-17 DIAGNOSIS — I48.20 CHRONIC ATRIAL FIBRILLATION, UNSPECIFIED (H): ICD-10-CM

## 2024-05-17 DIAGNOSIS — Z79.01 LONG TERM (CURRENT) USE OF ANTICOAGULANTS: ICD-10-CM

## 2024-05-19 PROBLEM — M19.90 ARTHRITIS PAIN: Status: ACTIVE | Noted: 2024-05-19

## 2024-05-19 PROBLEM — J44.9 CHRONIC OBSTRUCTIVE PULMONARY DISEASE, UNSPECIFIED COPD TYPE (H): Status: ACTIVE | Noted: 2024-05-19

## 2024-05-19 PROBLEM — A04.72 CLOSTRIDIUM DIFFICILE ENTEROCOLITIS: Status: ACTIVE | Noted: 2024-05-19

## 2024-05-19 PROBLEM — N40.0 BENIGN PROSTATIC HYPERPLASIA, UNSPECIFIED WHETHER LOWER URINARY TRACT SYMPTOMS PRESENT: Status: ACTIVE | Noted: 2024-05-19

## 2024-05-19 RX ORDER — ACETAMINOPHEN 500 MG
TABLET ORAL
Status: SHIPPED
Start: 2023-05-24 | End: 2024-06-20

## 2024-05-20 ENCOUNTER — ANTICOAGULATION THERAPY VISIT (OUTPATIENT)
Dept: ANTICOAGULATION | Facility: CLINIC | Age: 89
End: 2024-05-20

## 2024-05-20 DIAGNOSIS — Z79.01 LONG TERM CURRENT USE OF ANTICOAGULANT THERAPY: Primary | ICD-10-CM

## 2024-05-20 DIAGNOSIS — I48.20 CHRONIC ATRIAL FIBRILLATION (H): ICD-10-CM

## 2024-05-20 LAB — INR PPP: 1.93 (ref 0.85–1.15)

## 2024-05-20 PROCEDURE — 36415 COLL VENOUS BLD VENIPUNCTURE: CPT | Mod: ORL | Performed by: INTERNAL MEDICINE

## 2024-05-20 PROCEDURE — P9604 ONE-WAY ALLOW PRORATED TRIP: HCPCS | Mod: ORL | Performed by: INTERNAL MEDICINE

## 2024-05-20 PROCEDURE — 85610 PROTHROMBIN TIME: CPT | Mod: ORL | Performed by: INTERNAL MEDICINE

## 2024-05-20 RX ORDER — WARFARIN SODIUM 2.5 MG/1
TABLET ORAL
Qty: 26 TABLET | Refills: 0 | Status: SHIPPED | OUTPATIENT
Start: 2024-05-20 | End: 2024-06-03

## 2024-05-20 NOTE — PROGRESS NOTES
ANTICOAGULATION MANAGEMENT     Melvin Abad 89 year old male is on warfarin with subtherapeutic INR result. (Goal INR 2.0-3.0)    Recent labs: (last 7 days)     05/20/24  0745   INR 1.93*       ASSESSMENT     Source(s): Chart Review and Home Care/Facility Nurse     Warfarin doses taken: Warfarin taken as instructed  Diet: No new diet changes identified  Medication/supplement changes: Acetaminophen change from PRN to scheduled, pending at this time,per 5/14 visit note.  Patient using PRN Tylenol very infrequently. Valtrex for eye, due to complete today, no interaction with warfarin expected.   New illness, injury, or hospitalization: Yes: treating for eye condition, wounds are healed  Signs or symptoms of bleeding or clotting: No  Previous result: Therapeutic last 2(+) visits  Additional findings:  INR Trending down, pharm consult for out of protocol dose increase       PLAN     Recommended plan for temporary change(s) affecting INR     Dosing Instructions: booster dose then Increase your warfarin dose (10% change) with next INR in 2 weeks       Summary  As of 5/20/2024      Full warfarin instructions:  5/20: 7.5 mg; Otherwise 2.5 mg every Sun, Tue, Thu; 5 mg all other days   Next INR check:  6/3/2024               Telephone call with [facility] facility nurse who agrees to plan and repeated back plan correctly    Orders given to  Homecare nurse/facility to recheck    Rx to Pharmacy    AVS to Facility    Education provided:   Please call back if any changes to your diet, medications or how you've been taking warfarin  Goal range and lab monitoring: goal range and significance of current result    Plan made with [facility] Pharmacist Molly Warren, dose increase outside of protocol.    Melonie Gavin, RN  Anticoagulation Clinic  5/20/2024    _______________________________________________________________________     Anticoagulation Episode Summary       Current INR goal:  2.0-3.0   TTR:  94.6% (1 y)   Target end  date:  Indefinite   Send INR reminders to:  [facility]    Indications    Atrial fibrillation (Resolved) [I48.91]  Long-term (current) use of anticoagulants [Z79.01] [Z79.01]  Chronic atrial fibrillation (H) [I48.20]             Comments:  [place] SINGH/Ingrid Sanchez [phone] fax: [phone] & send to [facility]             Anticoagulation Care Providers       Provider Role Specialty Phone number    Lazaro Huston MD Referring Internal Medicine [phone]    Fransisco Cordon MD Referring Family Medicine

## 2024-05-22 ENCOUNTER — TELEPHONE (OUTPATIENT)
Dept: INTERNAL MEDICINE | Facility: CLINIC | Age: 89
End: 2024-05-22
Payer: COMMERCIAL

## 2024-05-22 NOTE — TELEPHONE ENCOUNTER
BISI Hicks called stating patient has a new skin tear on his left shin that she thinks he scratched as it is open in the middle.     She states she sees him weekly, and this is new since her last visit. She states he does not know what day it happened, but told her he has been taking care of it on his own. She states the assisted living facility did not know anything about it.     She states it measures 0.2 cm x 0.1 cm x 0.1 cm. She states there are no signs of infection.     She is asking for orders to cleanse daily with soap and water and close it with a bandaid until healed.    Deloris Cummings, PENELOPEN, RN

## 2024-05-24 ENCOUNTER — PATIENT OUTREACH (OUTPATIENT)
Dept: CARE COORDINATION | Facility: CLINIC | Age: 89
End: 2024-05-24
Payer: COMMERCIAL

## 2024-05-24 NOTE — PROGRESS NOTES
Clinic Care Coordination Contact  [facility]/Voicemail    Clinical Data: Care Coordinator Outreach    Outreach Documentation Number of Outreach Attempt   5/24/2024  11:19 AM 1       Left message on  daughter in law Toña's  voicemail with call back information and requested return call.    Plan: Care Coordinator sent care coordination introduction letter on 4/17/2024 via [employer]. Care Coordinator will try to reach patient again in 10 business days.    Alma White RN Care Coordination   [facility]  Email: [email or web address]  Phone: [phone]

## 2024-05-31 ENCOUNTER — LAB REQUISITION (OUTPATIENT)
Dept: LAB | Facility: CLINIC | Age: 89
End: 2024-05-31
Payer: COMMERCIAL

## 2024-05-31 DIAGNOSIS — Z79.01 LONG TERM (CURRENT) USE OF ANTICOAGULANTS: ICD-10-CM

## 2024-05-31 DIAGNOSIS — I48.20 CHRONIC ATRIAL FIBRILLATION, UNSPECIFIED (H): ICD-10-CM

## 2024-06-03 ENCOUNTER — ANTICOAGULATION THERAPY VISIT (OUTPATIENT)
Dept: ANTICOAGULATION | Facility: CLINIC | Age: 89
End: 2024-06-03

## 2024-06-03 DIAGNOSIS — Z79.01 LONG TERM CURRENT USE OF ANTICOAGULANT THERAPY: Primary | ICD-10-CM

## 2024-06-03 DIAGNOSIS — I48.20 CHRONIC ATRIAL FIBRILLATION (H): ICD-10-CM

## 2024-06-03 LAB — INR PPP: 2.3 (ref 0.85–1.15)

## 2024-06-03 PROCEDURE — P9604 ONE-WAY ALLOW PRORATED TRIP: HCPCS | Mod: ORL | Performed by: NURSE PRACTITIONER

## 2024-06-03 PROCEDURE — 36415 COLL VENOUS BLD VENIPUNCTURE: CPT | Mod: ORL | Performed by: NURSE PRACTITIONER

## 2024-06-03 PROCEDURE — 85610 PROTHROMBIN TIME: CPT | Mod: ORL | Performed by: NURSE PRACTITIONER

## 2024-06-03 RX ORDER — WARFARIN SODIUM 2.5 MG/1
TABLET ORAL
Qty: 26 TABLET | Refills: 0 | Status: SHIPPED | OUTPATIENT
Start: 2024-06-03 | End: 2024-06-17

## 2024-06-03 NOTE — PROGRESS NOTES
ANTICOAGULATION MANAGEMENT     Melvin Abad 89 year old male is on warfarin with therapeutic INR result. (Goal INR 2.0-3.0)    Recent labs: (last 7 days)     06/03/24  0655   INR 2.30*       ASSESSMENT     Source(s): Chart Review and Home Care/Facility Nurse     Warfarin doses taken: Warfarin taken as instructed  Diet: No new diet changes identified  Medication/supplement changes: None noted  New illness, injury, or hospitalization: No  Signs or symptoms of bleeding or clotting: No  Previous result: Subtherapeutic  Additional findings:  patient discharged from wound care       PLAN     Recommended plan for no diet, medication or health factor changes affecting INR     Dosing Instructions: Continue your current warfarin dose with next INR in 2 weeks       Summary  As of 6/3/2024      Full warfarin instructions:  2.5 mg every Sun, Tue, Thu; 5 mg all other days   Next INR check:  6/17/2024               Telephone call with [facility] nurse who verbalizes understanding and agrees to plan  Faxed dosing and follow up instructions to Ingrid PIZANO    Orders given to  Homecare nurse/facility to recheck    Education provided:   Please call back if any changes to your diet, medications or how you've been taking warfarin    Plan made per [facility] anticoagulation protocol    Yancy Avendano, RN  Anticoagulation Clinic  6/3/2024    _______________________________________________________________________     Anticoagulation Episode Summary       Current INR goal:  2.0-3.0   TTR:  93.9% (1 y)   Target end date:  Indefinite   Send INR reminders to:  [facility]    Indications    Atrial fibrillation (Resolved) [I48.91]  Long-term (current) use of anticoagulants [Z79.01] [Z79.01]  Chronic atrial fibrillation (H) [I48.20]             Comments:  [place] SINGH/Ingrid Sanchez [phone] fax: [phone] & send to [facility] pharmacy             Anticoagulation Care Providers       Provider Role Specialty Phone  number    Lazaro Huston MD Referring Internal Medicine [phone]    Fransisco Cordon MD Referring Family Medicine

## 2024-06-04 ENCOUNTER — PATIENT OUTREACH (OUTPATIENT)
Dept: GERIATRIC MEDICINE | Facility: CLINIC | Age: 89
End: 2024-06-04
Payer: COMMERCIAL

## 2024-06-04 NOTE — PROGRESS NOTES
Clinic Care Coordination Contact    Situation: Patient chart reviewed by care coordinator.    Background: Patient in identified status. He was recently admitted to the TCU.     Assessment: RN CC attempted to call patient/daughter, no answer. RN CC chart reviewed and patient is now with [facility]. Patient's insurance switched 6/1/2024. RN CC with [facility] reached out today with welcome information.     Plan/Recommendations: RN CC will not reach out as there is a duplicate of service. Patient is now with [facility].    Alma White RN Care Coordination   [facility]  Email: [email or web address]  Phone: [phone]

## 2024-06-04 NOTE — LETTER
June 4, 2024      WIL COHEN  C/O SUZE COHEN  [address] [place] [address]      Dear Wil:    Bill, my name is Maru Rodriguez RN. You are eligible for [facility] Case Management program through your enrollment in [employer]. We think you may benefit from this program. I am writing to invite you to be in our Case Management program.     The following are a few things the Case Management program can help you with:  Select or change your primary care doctor or primary care clinic  Find a specialist, if needed, near your home  Receive preventive care, such as flu shots  Join programs offered by [facility] that interest you, like wellness programs    As your , I will do the following to enroll you in the Case Management Program:  Schedule a telephone call with you to answer any questions you may have about case management  Conduct an assessment by phone to identify needs case management can help you with  Develop a care plan to address those needs  Help you obtain available care and resources as needed    I will call you soon to discuss your interest in this program and your health care needs.    Being in the Case Management program is voluntary and offered to you at no cost. If you accept being in the Case Management program, you can stop any time by calling me at [phone].    Sincerely,      Maru Rodriguez RN    E-mail: [email or web address]  Phone: [phone]      [facility]    [id number]                                     (01/2020)          [facility] [place] [address]  [phone]  fax [phone]  [facility]

## 2024-06-04 NOTE — PROGRESS NOTES
[facility] Care Coordination Contact    Member became effective with Count includes the [facility] on 6/1/2024 with [employer].     Welcome letter sent to daughter in law Toña NUNOD.     Yancy Cortes  Care Management Specialist   [facility]   [phone]

## 2024-06-06 ENCOUNTER — ASSISTED LIVING VISIT (OUTPATIENT)
Dept: GERIATRICS | Facility: CLINIC | Age: 89
End: 2024-06-06
Payer: COMMERCIAL

## 2024-06-06 VITALS
SYSTOLIC BLOOD PRESSURE: 119 MMHG | BODY MASS INDEX: 30.91 KG/M2 | RESPIRATION RATE: 16 BRPM | HEART RATE: 64 BPM | TEMPERATURE: 98.1 F | DIASTOLIC BLOOD PRESSURE: 74 MMHG | WEIGHT: 209.3 LBS | OXYGEN SATURATION: 98 %

## 2024-06-06 DIAGNOSIS — G25.81 RESTLESS LEGS SYNDROME (RLS): ICD-10-CM

## 2024-06-06 DIAGNOSIS — I48.11 LONGSTANDING PERSISTENT ATRIAL FIBRILLATION (H): ICD-10-CM

## 2024-06-06 DIAGNOSIS — J44.9 CHRONIC OBSTRUCTIVE PULMONARY DISEASE, UNSPECIFIED COPD TYPE (H): ICD-10-CM

## 2024-06-06 DIAGNOSIS — Z86.73 HISTORY OF CVA (CEREBROVASCULAR ACCIDENT): ICD-10-CM

## 2024-06-06 DIAGNOSIS — I10 ESSENTIAL HYPERTENSION: Primary | ICD-10-CM

## 2024-06-06 PROCEDURE — 99349 HOME/RES VST EST MOD MDM 40: CPT | Performed by: NURSE PRACTITIONER

## 2024-06-06 NOTE — PROGRESS NOTES
[facility] GERIATRICS  ACUTE/EPISODIC VISIT    [facility] Medical Record Number:  [id number]  [facility] of [facility] where encounter took place[facility] [[id number]]    Chief Complaint   Patient presents with    RECHECK    FVP Care Coordination - Health Plan or Product Change       HPI:    Melvin Abad is a 89 year old  (4/24/1928), who is being seen today for an episodic care visit.  HPI information obtained from: facility chart records, facility staff, patient report, and [facility] chart review.    Today's concern is:    Diagnoses         Codes Comments    Essential hypertension    -  Primary I10     Chronic obstructive pulmonary disease, unspecified COPD type (H)     J44.9     Restless legs syndrome (RLS)     G25.81     History of CVA (cerebrovascular accident)     Z86.73     Longstanding persistent atrial fibrillation (H)     I48.11           Came to see Ed today to see how he is managing day to day but also new to [facility] and so insurance requires a visit within 30 days.  This NP is already established with him.      Found Ed in his room and so stepped in his apartment to visit with him.  Noticed a large canvas picture of he and his family and so went in detail with him about who everyone was.  He got a little confused but did overall well.  Ed showed this NP some of his drawings he likes to do in his free time.  He is a very good drawer.      No new concerns from nursing.  Did sign the recent INR / Coumadin orders that are managed from the clinic.        ALLERGIES:  No Known Allergies     MEDICATIONS:  Post Discharge Medication Reconciliation Status: patient was not discharged from an inpatient facility or TCU.     Current Outpatient Medications   Medication Sig Dispense Refill    acetaminophen (TYLENOL) 500 MG tablet 1000mg po three times a day and 500mg po daily as needed      furosemide (LASIX) 20 MG tablet Take 2 tablets (40 mg) by mouth daily 60 tablet 3    gabapentin  (NEURONTIN) 100 MG capsule Take 2 capsules (200 mg) by mouth 2 times daily 120 capsule 0    ketotifen fumarate 0.035%, ketotifen 0.025%, (ZADITOR) 0.025 % ophthalmic solution Place 1 drop into both eyes 2 times daily as needed for dry eyes Patient self-administers 5 mL 0    lisinopril (ZESTRIL) 10 MG tablet Take 1 tablet (10 mg) by mouth daily 30 tablet 0    loratadine (CLARITIN) 10 MG tablet Take 1 tablet (10 mg) by mouth daily 30 tablet 0    metoprolol succinate ER (TOPROL XL) 50 MG 24 hr tablet Take 1 tablet (50 mg) by mouth daily 30 tablet 0    order for DME Equipment being ordered: side rails/grab bars 1 each 0    order for DME Equipment being ordered: bedside commode 1 Device 0    order for DME Equipment being ordered: 4 legged walker 1 Device 0    polyethylene glycol (MIRALAX) 17 GM/Dose powder Take 9 g by mouth daily as needed for constipation Mix and hand to patient, Patient self-administers (he knows how much he needs to keep regular) 510 g 0    psyllium (METAMUCIL/KONSYL) 58.6 % powder Take 18 g (1 Tablespoonful) by mouth daily as needed for constipation Mix and hand to patient, Patient self-administers (he knows how much he needs to keep regular) 283 g 0    simvastatin (ZOCOR) 40 MG tablet Take 1 tablet (40 mg) by mouth at bedtime 30 tablet 0    sodium chloride (OCEAN) 0.65 % nasal spray Spray 2 sprays in nostril daily as needed for congestion Patient self-administers 30 mL 0    tamsulosin (FLOMAX) 0.4 MG capsule Take 1 capsule (0.4 mg) by mouth daily 30 capsule 0    valACYclovir (VALTREX) 500 MG tablet Take 500 mg by mouth 3 times daily      warfarin ANTICOAGULANT (COUMADIN) 2.5 MG tablet Take 2.5 mgs mg Sunday, Tuesday and Thursday, and 5 mgs all other days.  Recheck INR on 6/17/24 26 tablet 0     The health plan new enrollment has happened. I have reviewed the  MDS, the preventative needs,  and facility care plan. The level of care is appropriate. I have reviewed the code status/advanced directives.       REVIEW OF SYSTEMS:  4 point ROS neg other than the symptoms noted above in the HPI.  Denied any chest palpitations, no acute shortness of breathe.        PHYSICAL EXAM:  /74   Pulse 64   Temp 98.1  F (36.7  C)   Resp 16   Wt 94.9 kg (209 lb 4.8 oz)   SpO2 98%   BMI 30.91 kg/m    Ed propels self around in his wheelchair.  Able to use all 4 extremities.  Skin is pink, dry and warm to touch.  Skin is intact.  Wears glasses.  Sclera's are clear.  Did not see him touch his eyes.  Being followed by the eye doctor.    Heart rate regular / irregular and strong.  Wearing black compression stockings.  Skin is soft to touch. Trace edema.  Early morning yet.  Lungs are clear to auscultation.    Abdomen is soft, round, active bowel sounds     Component      Latest Ref Rng 6/3/2024  6:55 AM   INR      0.85 - 1.15  2.30 (H)           ASSESSMENT / PLAN:  (I10) Essential hypertension  (primary encounter diagnosis)  Comment: blood pressures range from 110-130's/60-80's.  Remains on lisinopril 10mg daily and Toprol XL 50mg in AM.  vitals done with assessments. No new orders      (J44.9) Chronic obstructive pulmonary disease, unspecified COPD type (H)  Comment: does not use oxygen long term.  No respiratory medications on his regimen.      (G25.81) Restless legs syndrome (RLS)  Comment: managed with Gabapentin 200mg twice a day.  No immediate concerns.    (Z86.73) History of CVA (cerebrovascular accident)  (I48.11) Longstanding persistent atrial fibrillation (H)  Comment: currently on Warfarin for his A. Fib diagnosis.  [facility] with [facility].    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   No labs will be written out today but at next visit will discuss with him about having his lipid panel, kidney function and CBC done.    No acute neurological symptoms.      Orders:  No new orders today.    Electronically signed by  DINH Silva CNP

## 2024-06-06 NOTE — LETTER
6/6/2024      Melvin Abad  C/o Toña Abad  [address]  [facility] [address]        [facility] GERIATRICS  ACUTE/EPISODIC VISIT    [facility] Medical Record Number:  [id number]  [facility] where encounter took place[facility] [[id number]]    Chief Complaint   Patient presents with     RECHECK     FVP Care Coordination - Health Plan or Product Change       HPI:    Melvin Abad is a 89 year old  (4/24/1928), who is being seen today for an episodic care visit.  HPI information obtained from: facility chart records, facility staff, patient report, and [facility] chart review.    Today's concern is:    Diagnoses         Codes Comments    Essential hypertension    -  Primary I10     Chronic obstructive pulmonary disease, unspecified COPD type (H)     J44.9     Restless legs syndrome (RLS)     G25.81     History of CVA (cerebrovascular accident)     Z86.73     Longstanding persistent atrial fibrillation (H)     I48.11           Came to see Ed today to see how he is managing day to day but also new to [facility] and so insurance requires a visit within 30 days.  This NP is already established with him.      Found Ed in his room and so stepped in his apartment to visit with him.  Noticed a large canvas picture of he and his family and so went in detail with him about who everyone was.  He got a little confused but did overall well.  Ed showed this NP some of his drawings he likes to do in his free time.  He is a very good drawer.      No new concerns from nursing.  Did sign the recent INR / Coumadin orders that are managed from the clinic.        ALLERGIES:  No Known Allergies     MEDICATIONS:  Post Discharge Medication Reconciliation Status: patient was not discharged from an inpatient facility or TCU.     Current Outpatient Medications   Medication Sig Dispense Refill     acetaminophen (TYLENOL) 500 MG tablet 1000mg po three times a day and 500mg po daily as needed        furosemide (LASIX) 20 MG tablet Take 2 tablets (40 mg) by mouth daily 60 tablet 3     gabapentin (NEURONTIN) 100 MG capsule Take 2 capsules (200 mg) by mouth 2 times daily 120 capsule 0     ketotifen fumarate 0.035%, ketotifen 0.025%, (ZADITOR) 0.025 % ophthalmic solution Place 1 drop into both eyes 2 times daily as needed for dry eyes Patient self-administers 5 mL 0     lisinopril (ZESTRIL) 10 MG tablet Take 1 tablet (10 mg) by mouth daily 30 tablet 0     loratadine (CLARITIN) 10 MG tablet Take 1 tablet (10 mg) by mouth daily 30 tablet 0     metoprolol succinate ER (TOPROL XL) 50 MG 24 hr tablet Take 1 tablet (50 mg) by mouth daily 30 tablet 0     order for DME Equipment being ordered: side rails/grab bars 1 each 0     order for DME Equipment being ordered: bedside commode 1 Device 0     order for DME Equipment being ordered: 4 legged walker 1 Device 0     polyethylene glycol (MIRALAX) 17 GM/Dose powder Take 9 g by mouth daily as needed for constipation Mix and hand to patient, Patient self-administers (he knows how much he needs to keep regular) 510 g 0     psyllium (METAMUCIL/KONSYL) 58.6 % powder Take 18 g (1 Tablespoonful) by mouth daily as needed for constipation Mix and hand to patient, Patient self-administers (he knows how much he needs to keep regular) 283 g 0     simvastatin (ZOCOR) 40 MG tablet Take 1 tablet (40 mg) by mouth at bedtime 30 tablet 0     sodium chloride (OCEAN) 0.65 % nasal spray Spray 2 sprays in nostril daily as needed for congestion Patient self-administers 30 mL 0     tamsulosin (FLOMAX) 0.4 MG capsule Take 1 capsule (0.4 mg) by mouth daily 30 capsule 0     valACYclovir (VALTREX) 500 MG tablet Take 500 mg by mouth 3 times daily       warfarin ANTICOAGULANT (COUMADIN) 2.5 MG tablet Take 2.5 mgs mg Sunday, Tuesday and Thursday, and 5 mgs all other days.  Recheck INR on 6/17/24 26 tablet 0     The health plan new enrollment has happened. I have reviewed the  MDS, the preventative needs,   and facility care plan. The level of care is appropriate. I have reviewed the code status/advanced directives.      REVIEW OF SYSTEMS:  4 point ROS neg other than the symptoms noted above in the HPI.  Denied any chest palpitations, no acute shortness of breathe.        PHYSICAL EXAM:  /74   Pulse 64   Temp 98.1  F (36.7  C)   Resp 16   Wt 94.9 kg (209 lb 4.8 oz)   SpO2 98%   BMI 30.91 kg/m    Ed propels self around in his wheelchair.  Able to use all 4 extremities.  Skin is pink, dry and warm to touch.  Skin is intact.  Wears glasses.  Sclera's are clear.  Did not see him touch his eyes.  Being followed by the eye doctor.    Heart rate regular / irregular and strong.  Wearing black compression stockings.  Skin is soft to touch. Trace edema.  Early morning yet.  Lungs are clear to auscultation.    Abdomen is soft, round, active bowel sounds     Component      Latest Ref Rng 6/3/2024  6:55 AM   INR      0.85 - 1.15  2.30 (H)           ASSESSMENT / PLAN:  (I10) Essential hypertension  (primary encounter diagnosis)  Comment: blood pressures range from 110-130's/60-80's.  Remains on lisinopril 10mg daily and Toprol XL 50mg in AM.  vitals done with assessments. No new orders      (J44.9) Chronic obstructive pulmonary disease, unspecified COPD type (H)  Comment: does not use oxygen long term.  No respiratory medications on his regimen.      (G25.81) Restless legs syndrome (RLS)  Comment: managed with Gabapentin 200mg twice a day.  No immediate concerns.    (Z86.73) History of CVA (cerebrovascular accident)  (I48.11) Longstanding persistent atrial fibrillation (H)  Comment: currently on Warfarin for his A. Fib diagnosis.  [facility] with [facility].    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   No labs will be written out today but at next visit will discuss with him about having his lipid panel, kidney function and CBC done.    No acute neurological symptoms.       Orders:  No new orders today.    Electronically signed by  DINH Silva CNP           Sincerely,        DINH Silva CNP

## 2024-06-06 NOTE — LETTER
6/6/2024      Melvin Abad  [address] [facility] [address]        No notes on file      Sincerely,        DINH Silva CNP

## 2024-06-10 ENCOUNTER — PATIENT OUTREACH (OUTPATIENT)
Dept: GERIATRIC MEDICINE | Facility: CLINIC | Age: 89
End: 2024-06-10
Payer: COMMERCIAL

## 2024-06-10 NOTE — PROGRESS NOTES
[facility] Care Coordination Contact    Member enrolled in [facility] with are Medicare effective 6/1/24.     Reviewed Epic notes and no triggering events noted - no recent hospitalizations and 1 ED visit in January due to leg pain. Daily assistance (including med management) provided by assisted living facility. Recent visit from onsite NP. Established care with onsite provider on 5/14/24.    Writer will continue to follow via EMR and is available as needed.     Maru Rodriguez RN  [facility]  Cell: [phone]

## 2024-06-14 ENCOUNTER — TELEPHONE (OUTPATIENT)
Dept: INTERNAL MEDICINE | Facility: CLINIC | Age: 89
End: 2024-06-14
Payer: COMMERCIAL

## 2024-06-14 ENCOUNTER — LAB REQUISITION (OUTPATIENT)
Dept: LAB | Facility: CLINIC | Age: 89
End: 2024-06-14
Payer: COMMERCIAL

## 2024-06-14 DIAGNOSIS — I48.91 UNSPECIFIED ATRIAL FIBRILLATION (H): ICD-10-CM

## 2024-06-14 DIAGNOSIS — I48.20 CHRONIC ATRIAL FIBRILLATION, UNSPECIFIED (H): ICD-10-CM

## 2024-06-14 DIAGNOSIS — Z79.01 LONG TERM (CURRENT) USE OF ANTICOAGULANTS: ICD-10-CM

## 2024-06-14 NOTE — TELEPHONE ENCOUNTER
Home Care is calling regarding an established patient with [facility].        4/26/2024     2:08 PM   Home Care Information    Name/Phone Number Charla ZULEIMA [phone]   Home Care agency [facility]     Requesting orders from: Kim Chong  Provider is following patient: Yes  Is this a 60-day recertification request?  No    Orders Requested    Skilled Nursing  Request for delay in care, service is not able to be provided within same scheduled day.   6/17/24      Confirmed ok to leave a detailed message with call back.  Contact information confirmed and updated as needed.    Tracy Fuller RN

## 2024-06-14 NOTE — TELEPHONE ENCOUNTER
Detailed approval left on confidential home care voicemail    Tracy Fuller RN on 6/14/2024 at 4:11 PM

## 2024-06-17 ENCOUNTER — ANTICOAGULATION THERAPY VISIT (OUTPATIENT)
Dept: ANTICOAGULATION | Facility: CLINIC | Age: 89
End: 2024-06-17

## 2024-06-17 DIAGNOSIS — Z79.01 LONG TERM CURRENT USE OF ANTICOAGULANT THERAPY: Primary | ICD-10-CM

## 2024-06-17 DIAGNOSIS — I48.20 CHRONIC ATRIAL FIBRILLATION (H): ICD-10-CM

## 2024-06-17 LAB — INR PPP: 2.3 (ref 0.85–1.15)

## 2024-06-17 PROCEDURE — 36415 COLL VENOUS BLD VENIPUNCTURE: CPT | Mod: ORL | Performed by: NURSE PRACTITIONER

## 2024-06-17 PROCEDURE — 85610 PROTHROMBIN TIME: CPT | Mod: ORL | Performed by: NURSE PRACTITIONER

## 2024-06-17 PROCEDURE — P9604 ONE-WAY ALLOW PRORATED TRIP: HCPCS | Mod: ORL | Performed by: NURSE PRACTITIONER

## 2024-06-17 RX ORDER — WARFARIN SODIUM 2.5 MG/1
TABLET ORAL
Qty: 26 TABLET | Refills: 0 | Status: SHIPPED | OUTPATIENT
Start: 2024-06-17 | End: 2024-07-01

## 2024-06-17 NOTE — PROGRESS NOTES
ANTICOAGULATION MANAGEMENT     Melvin Abad 89 year old male is on warfarin with therapeutic INR result. (Goal INR 2.0-3.0)    Recent labs: (last 7 days)     06/17/24  0749   INR 2.30*       ASSESSMENT     Source(s): Chart Review and Home Care/Facility Nurse     Warfarin doses taken: Warfarin taken as instructed  Diet: No new diet changes identified  Medication/supplement changes: None noted  New illness, injury, or hospitalization: No, new open area on legs.  Signs or symptoms of bleeding or clotting: No  Previous result: Therapeutic last visit; previously outside of goal range  Additional findings: None       PLAN     Recommended plan for no diet, medication or health factor changes affecting INR     Dosing Instructions: Continue your current warfarin dose with next INR in 2 weeks       Summary  As of 6/17/2024      Full warfarin instructions:  2.5 mg every Sun, Tue, Thu; 5 mg all other days   Next INR check:  7/1/2024               Telephone call with [facility] facility nurse who agrees to plan and repeated back plan correctly    Rx sent into the pharmacy with update sig and recheck date.    AVS faxed to facility and fax number verified.     Orders given to  Homecare nurse/facility to recheck    Education provided:   None required    Plan made per [facility] anticoagulation protocol    Denae Burciaga RN  Anticoagulation Clinic  6/17/2024    _______________________________________________________________________     Anticoagulation Episode Summary       Current INR goal:  2.0-3.0   TTR:  93.9% (1 y)   Target end date:  Indefinite   Send INR reminders to:  [facility]    Indications    Atrial fibrillation (Resolved) [I48.91]  Long-term (current) use of anticoagulants [Z79.01] [Z79.01]  Chronic atrial fibrillation (H) [I48.20]             Comments:  [facility] [phone] fax: [phone] & send to [facility]             Anticoagulation Care Providers       Provider Role  Specialty Phone number    Lazaro Huston MD Referring Internal Medicine [phone]    Fransisco Cordon MD Referring Family Medicine

## 2024-06-17 NOTE — PROGRESS NOTES
Called and spoke to Glenys about the fax not going through. She states that there is paper in the machine on there end and they have gotten faxes from other places. She notes to try later today but that it should be ok as they have the lab and information from the pharmacy already.    Denae Burciaga RN    [facility] Anticoagulation [facility]

## 2024-06-20 RX ORDER — ACETAMINOPHEN 500 MG
1000 TABLET ORAL EVERY 8 HOURS PRN
Status: SHIPPED
Start: 2024-02-29

## 2024-06-20 RX ORDER — VITAMIN B COMPLEX
25 TABLET ORAL DAILY
Status: SHIPPED
Start: 2024-02-21

## 2024-06-28 ENCOUNTER — LAB REQUISITION (OUTPATIENT)
Dept: LAB | Facility: CLINIC | Age: 89
End: 2024-06-28
Payer: COMMERCIAL

## 2024-06-28 DIAGNOSIS — I48.20 CHRONIC ATRIAL FIBRILLATION, UNSPECIFIED (H): ICD-10-CM

## 2024-06-28 DIAGNOSIS — Z79.01 LONG TERM (CURRENT) USE OF ANTICOAGULANTS: ICD-10-CM

## 2024-06-28 DIAGNOSIS — I48.91 UNSPECIFIED ATRIAL FIBRILLATION (H): ICD-10-CM

## 2024-07-01 ENCOUNTER — ANTICOAGULATION THERAPY VISIT (OUTPATIENT)
Dept: ANTICOAGULATION | Facility: CLINIC | Age: 89
End: 2024-07-01

## 2024-07-01 DIAGNOSIS — Z79.01 LONG TERM CURRENT USE OF ANTICOAGULANT THERAPY: Primary | ICD-10-CM

## 2024-07-01 DIAGNOSIS — I48.20 CHRONIC ATRIAL FIBRILLATION (H): ICD-10-CM

## 2024-07-01 LAB — INR PPP: 2.34 (ref 0.85–1.15)

## 2024-07-01 PROCEDURE — P9604 ONE-WAY ALLOW PRORATED TRIP: HCPCS | Mod: ORL | Performed by: INTERNAL MEDICINE

## 2024-07-01 PROCEDURE — 85610 PROTHROMBIN TIME: CPT | Mod: ORL | Performed by: INTERNAL MEDICINE

## 2024-07-01 PROCEDURE — 36415 COLL VENOUS BLD VENIPUNCTURE: CPT | Mod: ORL | Performed by: INTERNAL MEDICINE

## 2024-07-01 RX ORDER — WARFARIN SODIUM 2.5 MG/1
TABLET ORAL
Qty: 35 TABLET | Refills: 0 | Status: SHIPPED | OUTPATIENT
Start: 2024-07-01 | End: 2024-07-22

## 2024-07-01 NOTE — PROGRESS NOTES
ANTICOAGULATION MANAGEMENT     Melvin Abad 89 year old male is on warfarin with therapeutic INR result. (Goal INR 2.0-3.0)    Recent labs: (last 7 days)     07/01/24  0635   INR 2.34*       ASSESSMENT     Source(s): Chart Review and Home Care/Facility Nurse     Warfarin doses taken: Warfarin taken as instructed  Diet: No new diet changes identified  Medication/supplement changes: None noted  New illness, injury, or hospitalization: No  Signs or symptoms of bleeding or clotting: No  Previous result: Therapeutic last 2(+) visits  Additional findings: None       PLAN     Recommended plan for no diet, medication or health factor changes affecting INR     Dosing Instructions: Continue your current warfarin dose with next INR in 3 weeks       Summary  As of 7/1/2024      Full warfarin instructions:  2.5 mg every Sun, Tue, Thu; 5 mg all other days   Next INR check:  7/22/2024               Telephone call with [facility] nurse who verbalizes understanding and agrees to plan and who agrees to plan and repeated back plan correctly  Faxed dosing and follow up instructions to [facility]    Orders given to  Homecare nurse/facility to recheck    Education provided: Please call back if any changes to your diet, medications or how you've been taking warfarin    Plan made per [facility] anticoagulation protocol    Yancy Avendano, RN  Anticoagulation Clinic  7/1/2024    _______________________________________________________________________     Anticoagulation Episode Summary       Current INR goal:  2.0-3.0   TTR:  93.9% (1 y)   Target end date:  Indefinite   Send INR reminders to:  [facility]    Indications    Atrial fibrillation (Resolved) [I48.91]  Long-term (current) use of anticoagulants [Z79.01] [Z79.01]  Chronic atrial fibrillation (H) [I48.20]             Comments:  [facility] [phone] fax: [phone] & send to [facility]             Anticoagulation Care Providers        Provider Role Specialty Phone number    Lazaro Huston MD Referring Internal Medicine [phone]    Fransisco Cordon MD Referring Family Medicine

## 2024-07-18 ENCOUNTER — PATIENT OUTREACH (OUTPATIENT)
Dept: CARE COORDINATION | Facility: CLINIC | Age: 89
End: 2024-07-18
Payer: COMMERCIAL

## 2024-07-20 ENCOUNTER — LAB REQUISITION (OUTPATIENT)
Dept: LAB | Facility: CLINIC | Age: 89
End: 2024-07-20
Payer: COMMERCIAL

## 2024-07-20 DIAGNOSIS — Z79.01 LONG TERM (CURRENT) USE OF ANTICOAGULANTS: ICD-10-CM

## 2024-07-20 DIAGNOSIS — I48.91 UNSPECIFIED ATRIAL FIBRILLATION (H): ICD-10-CM

## 2024-07-20 DIAGNOSIS — I48.20 CHRONIC ATRIAL FIBRILLATION, UNSPECIFIED (H): ICD-10-CM

## 2024-07-22 ENCOUNTER — ANTICOAGULATION THERAPY VISIT (OUTPATIENT)
Dept: ANTICOAGULATION | Facility: CLINIC | Age: 89
End: 2024-07-22

## 2024-07-22 DIAGNOSIS — Z79.01 LONG TERM CURRENT USE OF ANTICOAGULANT THERAPY: Primary | ICD-10-CM

## 2024-07-22 DIAGNOSIS — I48.20 CHRONIC ATRIAL FIBRILLATION (H): ICD-10-CM

## 2024-07-22 LAB — INR PPP: 2.32 (ref 0.85–1.15)

## 2024-07-22 PROCEDURE — P9604 ONE-WAY ALLOW PRORATED TRIP: HCPCS | Mod: ORL | Performed by: NURSE PRACTITIONER

## 2024-07-22 PROCEDURE — 36415 COLL VENOUS BLD VENIPUNCTURE: CPT | Mod: ORL | Performed by: NURSE PRACTITIONER

## 2024-07-22 PROCEDURE — 85610 PROTHROMBIN TIME: CPT | Mod: ORL | Performed by: NURSE PRACTITIONER

## 2024-07-22 RX ORDER — WARFARIN SODIUM 2.5 MG/1
TABLET ORAL
Qty: 60 TABLET | Refills: 0 | Status: SHIPPED | OUTPATIENT
Start: 2024-07-22 | End: 2024-08-18

## 2024-07-22 NOTE — PROGRESS NOTES
ANTICOAGULATION MANAGEMENT     Melvin Abad 89 year old male is on warfarin with therapeutic INR result. (Goal INR 2.0-3.0)    Recent labs: (last 7 days)     07/22/24  0725   INR 2.32*       ASSESSMENT     Source(s): Chart Review and Home Care/Facility Nurse     Warfarin doses taken: Warfarin taken as instructed  Diet: No new diet changes identified  Medication/supplement changes: None noted  New illness, injury, or hospitalization: No  Signs or symptoms of bleeding or clotting: No  Previous result: Therapeutic last 2(+) visits  Additional findings: None       PLAN     Recommended plan for no diet, medication or health factor changes affecting INR     Dosing Instructions: Continue your current warfarin dose with next INR in 4 weeks       Summary  As of 7/22/2024      Full warfarin instructions:  2.5 mg every Sun, Tue, Thu; 5 mg all other days   Next INR check:  8/19/2024               Telephone call with Celestina [facility]WEY facility nurse who agrees to plan and repeated back plan correctly    Rx sent into the pharmacy with update sig and recheck date.    AVS faxed to facility and fax number verified.      Orders given to  Homecare nurse/facility to recheck    Education provided: None required    Plan made per [facility] anticoagulation protocol    Denae Burciaga RN  Anticoagulation Clinic  7/22/2024    _______________________________________________________________________     Anticoagulation Episode Summary       Current INR goal:  2.0-3.0   TTR:  93.9% (1 y)   Target end date:  Indefinite   Send INR reminders to:  [facility]    Indications    Atrial fibrillation (Resolved) [I48.91]  Long-term (current) use of anticoagulants [Z79.01] [Z79.01]  Chronic atrial fibrillation (H) [I48.20]             Comments:  [facility] [phone] fax: [phone] & send to [facility]             Anticoagulation Care Providers       Provider Role Specialty Phone number    Lazaro Huston MD Referring  Internal Medicine [phone]    Fransisco Cordon MD Referring Family Medicine

## 2024-08-08 ENCOUNTER — ASSISTED LIVING VISIT (OUTPATIENT)
Dept: GERIATRICS | Facility: CLINIC | Age: 89
End: 2024-08-08
Payer: COMMERCIAL

## 2024-08-08 VITALS
BODY MASS INDEX: 30.66 KG/M2 | HEART RATE: 63 BPM | WEIGHT: 207.6 LBS | TEMPERATURE: 98.1 F | OXYGEN SATURATION: 99 % | SYSTOLIC BLOOD PRESSURE: 121 MMHG | DIASTOLIC BLOOD PRESSURE: 84 MMHG | RESPIRATION RATE: 16 BRPM

## 2024-08-08 DIAGNOSIS — I10 ESSENTIAL HYPERTENSION: Primary | ICD-10-CM

## 2024-08-08 DIAGNOSIS — N18.31 CHRONIC KIDNEY DISEASE, STAGE 3A (H): ICD-10-CM

## 2024-08-08 DIAGNOSIS — N40.0 BENIGN PROSTATIC HYPERPLASIA, UNSPECIFIED WHETHER LOWER URINARY TRACT SYMPTOMS PRESENT: ICD-10-CM

## 2024-08-08 DIAGNOSIS — Z79.01 LONG TERM CURRENT USE OF ANTICOAGULANT THERAPY: ICD-10-CM

## 2024-08-08 DIAGNOSIS — I48.20 CHRONIC ATRIAL FIBRILLATION (H): ICD-10-CM

## 2024-08-08 DIAGNOSIS — H44.003 INFECTION OF BOTH EYES: ICD-10-CM

## 2024-08-08 DIAGNOSIS — I48.11 LONGSTANDING PERSISTENT ATRIAL FIBRILLATION (H): ICD-10-CM

## 2024-08-08 DIAGNOSIS — K59.01 SLOW TRANSIT CONSTIPATION: ICD-10-CM

## 2024-08-08 PROCEDURE — 99349 HOME/RES VST EST MOD MDM 40: CPT | Performed by: NURSE PRACTITIONER

## 2024-08-08 NOTE — PROGRESS NOTES
[facility] GERIATRICS  ACUTE/EPISODIC VISIT    [facility] Medical Record Number:  [id number]  Place of Service where encounter took place:  [facility] [[id number]]    Chief Complaint   Patient presents with    RECHECK       HPI:    Melvin Abad is a 89 year old  (4/24/1928), who is being seen today for an episodic care visit.  HPI information obtained from: facility chart records, facility staff, patient report, and [facility] chart review.    Today's concern is:    Diagnoses         Codes Comments    Essential hypertension    -  Primary I10     Chronic kidney disease, stage 3a (H)     N18.31     Longstanding persistent atrial fibrillation (H)     I48.11     Long term current use of anticoagulant therapy     Z79.01     Slow transit constipation     K59.01     Benign prostatic hyperplasia, unspecified whether lower urinary tract symptoms present     N40.0           Came to see Ed today and found him in his room.  Sitting in his wheelchair by his window.  He turned self around and had a nice visit.  Talked about things around his room.  Looked at his new picture he was drawing and coloring.      No new concerns from Ed.  Reviewed the usual health issues and let him talk about different subjects.  Seems content and denies any pain.  Reviewed medications with Ed.    ALLERGIES:  No Known Allergies     MEDICATIONS:  Post Discharge Medication Reconciliation Status: patient was not discharged from an inpatient facility or TCU.     Current Outpatient Medications   Medication Sig Dispense Refill    warfarin ANTICOAGULANT (COUMADIN) 2.5 MG tablet Take 2.5 mgs mg Sunday, Tuesday and Thursday, and 5 mgs all other days.  Recheck INR on 8/19/24      acetaminophen (TYLENOL) 500 MG tablet Take 2 tablets (1,000 mg) by mouth every 8 hours as needed for mild pain      furosemide (LASIX) 20 MG tablet Take 2 tablets (40 mg) by mouth daily 60 tablet 3    gabapentin (NEURONTIN) 100 MG capsule Take 2 capsules (200  mg) by mouth 2 times daily 120 capsule 0    ketotifen fumarate 0.035%, ketotifen 0.025%, (ZADITOR) 0.025 % ophthalmic solution Place 1 drop into both eyes 2 times daily as needed for dry eyes Patient self-administers 5 mL 0    lisinopril (ZESTRIL) 10 MG tablet Take 1 tablet (10 mg) by mouth daily 30 tablet 0    loratadine (CLARITIN) 10 MG tablet Take 1 tablet (10 mg) by mouth daily 30 tablet 0    metoprolol succinate ER (TOPROL XL) 50 MG 24 hr tablet Take 1 tablet (50 mg) by mouth daily 30 tablet 0    order for DME Equipment being ordered: side rails/grab bars 1 each 0    order for DME Equipment being ordered: bedside commode 1 Device 0    order for DME Equipment being ordered: 4 legged walker 1 Device 0    polyethylene glycol (MIRALAX) 17 GM/Dose powder Take 9 g by mouth daily as needed for constipation Mix and hand to patient, Patient self-administers (he knows how much he needs to keep regular) 510 g 0    psyllium (METAMUCIL/KONSYL) 58.6 % powder Take 18 g (1 Tablespoonful) by mouth daily Mix and hand to patient, Patient self-administers (he knows how much he needs to keep regular)      simvastatin (ZOCOR) 40 MG tablet Take 1 tablet (40 mg) by mouth at bedtime 30 tablet 0    sodium chloride (OCEAN) 0.65 % nasal spray Spray 2 sprays in nostril daily as needed for congestion Patient self-administers 30 mL 0    tamsulosin (FLOMAX) 0.4 MG capsule Take 1 capsule (0.4 mg) by mouth daily 30 capsule 0    Vitamin D3 (CHOLECALCIFEROL) 25 mcg (1000 units) tablet Take 1 tablet (25 mcg) by mouth daily         REVIEW OF SYSTEMS:  4 point ROS neg other than the symptoms noted above in the HPI.      PHYSICAL EXAM:  /84   Pulse 63   Temp 98.1  F (36.7  C)   Resp 16   Wt 94.2 kg (207 lb 9.6 oz)   SpO2 99%   BMI 30.66 kg/m    Alert and oriented to person, place, time with some forgetfulness.    Wears glasses and makes eye contact with conversation.  Heart rate regular and strong.  Trace edema.  Lungs are clear.     Abdomen is round, soft, and non-tender.  Active bowel sounds.    Skin is pink, warm and dry.  Has a blister on right 4th toe in which staff are doing a treatment.  Cleanse with NS, bacitracin and cover with bandaid/gauze.  No nurse here today so will not undo treatment.    Component      Latest Ref Rng 7/22/2024  7:25 AM   INR      0.85 - 1.15  2.32 (H)           ASSESSMENT / PLAN:  (I10) Essential hypertension  (primary encounter diagnosis)  (N18.31) Chronic kidney disease, stage 3a (H)  Comment: 110-130's/60-80's.  Needs update labs.  Remains on Lisinopril and Metoprolol.  Will order CBC and BMP next lab day along with lipid panel.    (I48.11) Longstanding persistent atrial fibrillation (H)  (Z79.01) Long term current use of anticoagulant therapy  Comment: INR is managed with the coumadin clinic.  Ed has not shown any bleeding nor any heart palpitations.  Tolerating the warfarin.  Facility typically lets this NP know what orders were given after the coumadin clinic give orders.    (K59.01) Slow transit constipation  Comment: staff set up for miralax and psyllium in a cup and then he will take them when he is ready.  Will mix on his own.  He shows this NP his two cups with powder present.  No changes.    (N40.0) Benign prostatic hyperplasia, unspecified whether lower urinary tract symptoms present  Comment: Ed mentioned he was on something for his prostate and so confirmed the Flomax 0.4mg daily.  He feels he is doing fine.  No changes.       Orders:  CBC and BMP next Monday due to atrial fib, HTN    Electronically signed by  DINH Silva CNP

## 2024-08-08 NOTE — LETTER
8/8/2024      Melvin Abad  C/o Toña Abad  [address]  [facility] [address]        [facility] GERIATRICS  ACUTE/EPISODIC VISIT    [facility] Medical Record Number:  [id number]  Place of Service where encounter took place[facility] [[id number]]    Chief Complaint   Patient presents with     RECHECK       HPI:    Melvin Abad is a 89 year old  (4/24/1928), who is being seen today for an episodic care visit.  HPI information obtained from: facility chart records, facility staff, patient report, and [facility] chart review.    Today's concern is:    Diagnoses         Codes Comments    Essential hypertension    -  Primary I10     Chronic kidney disease, stage 3a (H)     N18.31     Longstanding persistent atrial fibrillation (H)     I48.11     Long term current use of anticoagulant therapy     Z79.01     Slow transit constipation     K59.01     Benign prostatic hyperplasia, unspecified whether lower urinary tract symptoms present     N40.0           Came to see Ed today and found him in his room.  Sitting in his wheelchair by his window.  He turned self around and had a nice visit.  Talked about things around his room.  Looked at his new picture he was drawing and coloring.      No new concerns from Ed.  Reviewed the usual health issues and let him talk about different subjects.  Seems content and denies any pain.  Reviewed medications with Ed.    ALLERGIES:  No Known Allergies     MEDICATIONS:  Post Discharge Medication Reconciliation Status: patient was not discharged from an inpatient facility or TCU.     Current Outpatient Medications   Medication Sig Dispense Refill     warfarin ANTICOAGULANT (COUMADIN) 2.5 MG tablet Take 2.5 mgs mg Sunday, Tuesday and Thursday, and 5 mgs all other days.  Recheck INR on 8/19/24       acetaminophen (TYLENOL) 500 MG tablet Take 2 tablets (1,000 mg) by mouth every 8 hours as needed for mild pain       furosemide (LASIX) 20 MG tablet Take 2 tablets  (40 mg) by mouth daily 60 tablet 3     gabapentin (NEURONTIN) 100 MG capsule Take 2 capsules (200 mg) by mouth 2 times daily 120 capsule 0     ketotifen fumarate 0.035%, ketotifen 0.025%, (ZADITOR) 0.025 % ophthalmic solution Place 1 drop into both eyes 2 times daily as needed for dry eyes Patient self-administers 5 mL 0     lisinopril (ZESTRIL) 10 MG tablet Take 1 tablet (10 mg) by mouth daily 30 tablet 0     loratadine (CLARITIN) 10 MG tablet Take 1 tablet (10 mg) by mouth daily 30 tablet 0     metoprolol succinate ER (TOPROL XL) 50 MG 24 hr tablet Take 1 tablet (50 mg) by mouth daily 30 tablet 0     order for DME Equipment being ordered: side rails/grab bars 1 each 0     order for DME Equipment being ordered: bedside commode 1 Device 0     order for DME Equipment being ordered: 4 legged walker 1 Device 0     polyethylene glycol (MIRALAX) 17 GM/Dose powder Take 9 g by mouth daily as needed for constipation Mix and hand to patient, Patient self-administers (he knows how much he needs to keep regular) 510 g 0     psyllium (METAMUCIL/KONSYL) 58.6 % powder Take 18 g (1 Tablespoonful) by mouth daily Mix and hand to patient, Patient self-administers (he knows how much he needs to keep regular)       simvastatin (ZOCOR) 40 MG tablet Take 1 tablet (40 mg) by mouth at bedtime 30 tablet 0     sodium chloride (OCEAN) 0.65 % nasal spray Spray 2 sprays in nostril daily as needed for congestion Patient self-administers 30 mL 0     tamsulosin (FLOMAX) 0.4 MG capsule Take 1 capsule (0.4 mg) by mouth daily 30 capsule 0     Vitamin D3 (CHOLECALCIFEROL) 25 mcg (1000 units) tablet Take 1 tablet (25 mcg) by mouth daily         REVIEW OF SYSTEMS:  4 point ROS neg other than the symptoms noted above in the HPI.      PHYSICAL EXAM:  /84   Pulse 63   Temp 98.1  F (36.7  C)   Resp 16   Wt 94.2 kg (207 lb 9.6 oz)   SpO2 99%   BMI 30.66 kg/m    Alert and oriented to person, place, time with some forgetfulness.    Wears glasses  and makes eye contact with conversation.  Heart rate regular and strong.  Trace edema.  Lungs are clear.    Abdomen is round, soft, and non-tender.  Active bowel sounds.    Skin is pink, warm and dry.  Has a blister on right 4th toe in which staff are doing a treatment.  Cleanse with NS, bacitracin and cover with bandaid/gauze.  No nurse here today so will not undo treatment.    Component      Latest Ref Rng 7/22/2024  7:25 AM   INR      0.85 - 1.15  2.32 (H)           ASSESSMENT / PLAN:  (I10) Essential hypertension  (primary encounter diagnosis)  (N18.31) Chronic kidney disease, stage 3a (H)  Comment: 110-130's/60-80's.  Needs update labs.  Remains on Lisinopril and Metoprolol.  Will order CBC and BMP next lab day along with lipid panel.    (I48.11) Longstanding persistent atrial fibrillation (H)  (Z79.01) Long term current use of anticoagulant therapy  Comment: INR is managed with the coumadin clinic.  Ed has not shown any bleeding nor any heart palpitations.  Tolerating the warfarin.  Facility typically lets this NP know what orders were given after the coumadin clinic give orders.    (K59.01) Slow transit constipation  Comment: staff set up for miralax and psyllium in a cup and then he will take them when he is ready.  Will mix on his own.  He shows this NP his two cups with powder present.  No changes.    (N40.0) Benign prostatic hyperplasia, unspecified whether lower urinary tract symptoms present  Comment: Ed mentioned he was on something for his prostate and so confirmed the Flomax 0.4mg daily.  He feels he is doing fine.  No changes.       Orders:  CBC and BMP next Monday due to atrial fib, HTN    Electronically signed by  DINH Silva CNP            Sincerely,        DINH Silva CNP

## 2024-08-13 ENCOUNTER — LAB REQUISITION (OUTPATIENT)
Dept: LAB | Facility: CLINIC | Age: 89
End: 2024-08-13
Payer: COMMERCIAL

## 2024-08-13 DIAGNOSIS — E78.5 HYPERLIPIDEMIA, UNSPECIFIED: ICD-10-CM

## 2024-08-13 DIAGNOSIS — I48.20 CHRONIC ATRIAL FIBRILLATION, UNSPECIFIED (H): ICD-10-CM

## 2024-08-14 LAB
ANION GAP SERPL CALCULATED.3IONS-SCNC: 11 MMOL/L (ref 7–15)
BUN SERPL-MCNC: 26.1 MG/DL (ref 8–23)
CALCIUM SERPL-MCNC: 9.2 MG/DL (ref 8.8–10.4)
CHLORIDE SERPL-SCNC: 104 MMOL/L (ref 98–107)
CHOLEST SERPL-MCNC: 118 MG/DL
CREAT SERPL-MCNC: 1.28 MG/DL (ref 0.67–1.17)
EGFRCR SERPLBLD CKD-EPI 2021: 51 ML/MIN/1.73M2
ERYTHROCYTE [DISTWIDTH] IN BLOOD BY AUTOMATED COUNT: 14.2 % (ref 10–15)
FASTING STATUS PATIENT QL REPORTED: ABNORMAL
FASTING STATUS PATIENT QL REPORTED: NORMAL
GLUCOSE SERPL-MCNC: 142 MG/DL (ref 70–99)
HCO3 SERPL-SCNC: 25 MMOL/L (ref 22–29)
HCT VFR BLD AUTO: 36.1 % (ref 40–53)
HDLC SERPL-MCNC: 46 MG/DL
HGB BLD-MCNC: 11.9 G/DL (ref 13.3–17.7)
LDLC SERPL CALC-MCNC: 54 MG/DL
MCH RBC QN AUTO: 31.7 PG (ref 26.5–33)
MCHC RBC AUTO-ENTMCNC: 33 G/DL (ref 31.5–36.5)
MCV RBC AUTO: 96 FL (ref 78–100)
NONHDLC SERPL-MCNC: 72 MG/DL
PLATELET # BLD AUTO: 156 10E3/UL (ref 150–450)
POTASSIUM SERPL-SCNC: 4.3 MMOL/L (ref 3.4–5.3)
RBC # BLD AUTO: 3.75 10E6/UL (ref 4.4–5.9)
SODIUM SERPL-SCNC: 140 MMOL/L (ref 135–145)
TRIGL SERPL-MCNC: 90 MG/DL
WBC # BLD AUTO: 5.9 10E3/UL (ref 4–11)

## 2024-08-14 PROCEDURE — P9604 ONE-WAY ALLOW PRORATED TRIP: HCPCS | Mod: ORL | Performed by: NURSE PRACTITIONER

## 2024-08-14 PROCEDURE — 80048 BASIC METABOLIC PNL TOTAL CA: CPT | Mod: ORL | Performed by: NURSE PRACTITIONER

## 2024-08-14 PROCEDURE — 36415 COLL VENOUS BLD VENIPUNCTURE: CPT | Mod: ORL | Performed by: NURSE PRACTITIONER

## 2024-08-14 PROCEDURE — 85027 COMPLETE CBC AUTOMATED: CPT | Mod: ORL | Performed by: NURSE PRACTITIONER

## 2024-08-14 PROCEDURE — 80061 LIPID PANEL: CPT | Mod: ORL | Performed by: NURSE PRACTITIONER

## 2024-08-16 ENCOUNTER — LAB REQUISITION (OUTPATIENT)
Dept: LAB | Facility: CLINIC | Age: 89
End: 2024-08-16
Payer: COMMERCIAL

## 2024-08-16 DIAGNOSIS — Z79.01 LONG TERM (CURRENT) USE OF ANTICOAGULANTS: ICD-10-CM

## 2024-08-16 DIAGNOSIS — I48.91 UNSPECIFIED ATRIAL FIBRILLATION (H): ICD-10-CM

## 2024-08-16 DIAGNOSIS — I48.20 CHRONIC ATRIAL FIBRILLATION, UNSPECIFIED (H): ICD-10-CM

## 2024-08-18 PROBLEM — N18.31 CHRONIC KIDNEY DISEASE, STAGE 3A (H): Status: ACTIVE | Noted: 2024-08-18

## 2024-08-18 RX ORDER — WARFARIN SODIUM 2.5 MG/1
TABLET ORAL
Status: SHIPPED
Start: 2024-08-18 | End: 2024-08-19

## 2024-08-19 ENCOUNTER — ANTICOAGULATION THERAPY VISIT (OUTPATIENT)
Dept: ANTICOAGULATION | Facility: CLINIC | Age: 89
End: 2024-08-19

## 2024-08-19 DIAGNOSIS — I48.20 CHRONIC ATRIAL FIBRILLATION (H): ICD-10-CM

## 2024-08-19 DIAGNOSIS — Z79.01 LONG TERM CURRENT USE OF ANTICOAGULANT THERAPY: Primary | ICD-10-CM

## 2024-08-19 LAB — INR PPP: 2.47 (ref 0.85–1.15)

## 2024-08-19 PROCEDURE — 36415 COLL VENOUS BLD VENIPUNCTURE: CPT | Mod: ORL | Performed by: NURSE PRACTITIONER

## 2024-08-19 PROCEDURE — 85610 PROTHROMBIN TIME: CPT | Mod: ORL | Performed by: NURSE PRACTITIONER

## 2024-08-19 PROCEDURE — P9604 ONE-WAY ALLOW PRORATED TRIP: HCPCS | Mod: ORL | Performed by: NURSE PRACTITIONER

## 2024-08-19 RX ORDER — WARFARIN SODIUM 2.5 MG/1
TABLET ORAL
Qty: 25 TABLET | Refills: 0 | Status: SHIPPED | OUTPATIENT
Start: 2024-08-19 | End: 2024-09-16

## 2024-08-19 NOTE — PROGRESS NOTES
ANTICOAGULATION MANAGEMENT     Melvin Abad 89 year old male is on warfarin with therapeutic INR result. (Goal INR 2.0-3.0)    Recent labs: (last 7 days)     08/19/24  0726   INR 2.47*       ASSESSMENT     Source(s): Chart Review and Home Care/Facility Nurse     Warfarin doses taken: Warfarin taken as instructed  Diet: No new diet changes identified  Medication/supplement changes: None noted  New illness, injury, or hospitalization: No  Signs or symptoms of bleeding or clotting: No  Previous result: Therapeutic last 2(+) visits  Additional findings: None       PLAN     Recommended plan for no diet, medication or health factor changes affecting INR     Dosing Instructions: Continue your current warfarin dose with next INR in 4 weeks       Summary  As of 8/19/2024      Full warfarin instructions:  2.5 mg every Sun, Tue, Thu; 5 mg all other days   Next INR check:  9/16/2024               Telephone call with [facility] nurse who agrees to plan and repeated back plan correctly  Faxed dosing and follow up instructions to [facility]  and sent Script to [facility]    Orders given to  Homecare nurse/facility to recheck    Education provided: Please call back if any changes to your diet, medications or how you've been taking warfarin    Plan made per [facility] anticoagulation protocol    Idania Thao, RN  Anticoagulation Clinic  8/19/2024    _______________________________________________________________________     Anticoagulation Episode Summary       Current INR goal:  2.0-3.0   TTR:  93.9% (1 y)   Target end date:  Indefinite   Send INR reminders to:  [facility]    Indications    Atrial fibrillation (Resolved) [I48.91]  Long-term (current) use of anticoagulants [Z79.01] [Z79.01]  Chronic atrial fibrillation (H) [I48.20]             Comments:  [facility] [phone] fax: [phone] & send to [facility]             Anticoagulation Care Providers       Provider Role Specialty  Phone number    Lazaro Huston MD Referring Internal Medicine [phone]    Fransisco Cordon MD Referring Family Medicine

## 2024-09-13 ENCOUNTER — LAB REQUISITION (OUTPATIENT)
Dept: LAB | Facility: CLINIC | Age: 89
End: 2024-09-13
Payer: COMMERCIAL

## 2024-09-13 DIAGNOSIS — I48.20 CHRONIC ATRIAL FIBRILLATION, UNSPECIFIED (H): ICD-10-CM

## 2024-09-13 DIAGNOSIS — I48.91 UNSPECIFIED ATRIAL FIBRILLATION (H): ICD-10-CM

## 2024-09-13 DIAGNOSIS — Z79.01 LONG TERM (CURRENT) USE OF ANTICOAGULANTS: ICD-10-CM

## 2024-09-16 ENCOUNTER — ANTICOAGULATION THERAPY VISIT (OUTPATIENT)
Dept: ANTICOAGULATION | Facility: CLINIC | Age: 89
End: 2024-09-16

## 2024-09-16 DIAGNOSIS — I48.20 CHRONIC ATRIAL FIBRILLATION (H): ICD-10-CM

## 2024-09-16 DIAGNOSIS — Z79.01 LONG TERM CURRENT USE OF ANTICOAGULANT THERAPY: Primary | ICD-10-CM

## 2024-09-16 LAB — INR PPP: 2.33 (ref 0.85–1.15)

## 2024-09-16 PROCEDURE — 85610 PROTHROMBIN TIME: CPT | Mod: ORL | Performed by: NURSE PRACTITIONER

## 2024-09-16 PROCEDURE — P9604 ONE-WAY ALLOW PRORATED TRIP: HCPCS | Mod: ORL | Performed by: NURSE PRACTITIONER

## 2024-09-16 PROCEDURE — 36415 COLL VENOUS BLD VENIPUNCTURE: CPT | Mod: ORL | Performed by: NURSE PRACTITIONER

## 2024-09-16 RX ORDER — WARFARIN SODIUM 2.5 MG/1
TABLET ORAL
Qty: 44 TABLET | Refills: 0 | Status: SHIPPED | OUTPATIENT
Start: 2024-09-16

## 2024-09-16 NOTE — PROGRESS NOTES
ANTICOAGULATION MANAGEMENT     Melvin Abad 89 year old male is on warfarin with therapeutic INR result. (Goal INR 2.0-3.0)    Recent labs: (last 7 days)     09/16/24  0705   INR 2.33*       ASSESSMENT     Source(s): Chart Review and Home Care/Facility Nurse     Warfarin doses taken: Warfarin taken as instructed  Diet: No new diet changes identified  Medication/supplement changes: None noted  New illness, injury, or hospitalization: No  Signs or symptoms of bleeding or clotting: No  Previous result: Therapeutic last 2(+) visits  Additional findings: None       PLAN     Recommended plan for no diet, medication or health factor changes affecting INR     Dosing Instructions: Continue your current warfarin dose with next INR in 4 weeks       Summary  As of 9/16/2024      Full warfarin instructions:  2.5 mg every Sun, Tue, Thu; 5 mg all other days   Next INR check:  10/14/2024               Telephone call with [facility] facility nurse who verbalizes understanding and agrees to plan  Faxed dosing and follow up instructions to [phone]  RX sent electronically also     Orders given to  Homecare nurse/facility to recheck     Education provided: Goal range and lab monitoring: goal range and significance of current result  Written instructions provided  Contact [phone] with any changes, questions or concerns.     Plan made     Angy Alcala RN  9/16/2024  Anticoagulation Clinic  [facility] for routing messages: cristi COLON  [facility] patient phone line: [phone]        _______________________________________________________________________     Anticoagulation Episode Summary       Current INR goal:  2.0-3.0   TTR:  93.9% (1 y)   Target end date:  Indefinite   Send INR reminders to:  [facility]    Indications    Atrial fibrillation (Resolved) [I48.91]  Long-term (current) use of anticoagulants [Z79.01] [Z79.01]  Chronic atrial fibrillation (H) [I48.20]             Comments:  [facility]Ingrid  Laura [phone] fax: [phone] & send to [facility]             Anticoagulation Care Providers       Provider Role Specialty Phone number    Lazaro Huston MD Referring Internal Medicine [phone]    Fransisco Cordon MD Referring Family Medicine

## 2024-10-11 ENCOUNTER — LAB REQUISITION (OUTPATIENT)
Dept: LAB | Facility: CLINIC | Age: 89
End: 2024-10-11
Payer: COMMERCIAL

## 2024-10-11 DIAGNOSIS — I48.20 CHRONIC ATRIAL FIBRILLATION, UNSPECIFIED (H): ICD-10-CM

## 2024-10-11 DIAGNOSIS — I48.91 UNSPECIFIED ATRIAL FIBRILLATION (H): ICD-10-CM

## 2024-10-11 DIAGNOSIS — Z79.01 LONG TERM (CURRENT) USE OF ANTICOAGULANTS: ICD-10-CM

## 2024-10-12 ENCOUNTER — HEALTH MAINTENANCE LETTER (OUTPATIENT)
Age: 89
End: 2024-10-12

## 2024-10-14 ENCOUNTER — ANTICOAGULATION THERAPY VISIT (OUTPATIENT)
Dept: ANTICOAGULATION | Facility: CLINIC | Age: 89
End: 2024-10-14

## 2024-10-14 DIAGNOSIS — Z79.01 LONG TERM CURRENT USE OF ANTICOAGULANT THERAPY: Primary | ICD-10-CM

## 2024-10-14 DIAGNOSIS — I48.20 CHRONIC ATRIAL FIBRILLATION (H): ICD-10-CM

## 2024-10-14 LAB — INR PPP: 2.5 (ref 0.85–1.15)

## 2024-10-14 PROCEDURE — 85610 PROTHROMBIN TIME: CPT | Mod: ORL | Performed by: NURSE PRACTITIONER

## 2024-10-14 PROCEDURE — 36415 COLL VENOUS BLD VENIPUNCTURE: CPT | Mod: ORL | Performed by: NURSE PRACTITIONER

## 2024-10-14 RX ORDER — WARFARIN SODIUM 2.5 MG/1
TABLET ORAL
Qty: 44 TABLET | Refills: 0 | Status: SHIPPED | OUTPATIENT
Start: 2024-10-14 | End: 2024-11-08

## 2024-10-14 NOTE — PROGRESS NOTES
ANTICOAGULATION MANAGEMENT     Melvin Abad 89 year old male is on warfarin with therapeutic INR result. (Goal INR 2.0-3.0)    Recent labs: (last 7 days)     10/14/24  0800   INR 2.50*       ASSESSMENT     Source(s): Chart Review and Home Care/Facility Nurse     Warfarin doses taken: Warfarin taken as instructed  Diet: No new diet changes identified  Medication/supplement changes: None noted  New illness, injury, or hospitalization: No  Signs or symptoms of bleeding or clotting: No  Previous result: Therapeutic last 2(+) visits  Additional findings: None       PLAN     Recommended plan for no diet, medication or health factor changes affecting INR     Dosing Instructions: Continue your current warfarin dose with next INR in 4 weeks       Summary  As of 10/14/2024      Full warfarin instructions:  2.5 mg every Sun, Tue, Thu; 5 mg all other days   Next INR check:  11/11/2024               Detailed voice message left for [facility] facility nurse with dosing instructions and follow up date.     Orders given to  Homecare nurse/facility to recheck    Education provided: Please call back if any changes to your diet, medications or how you've been taking warfarin    Plan made per [facility] anticoagulation protocol    Idania Thao, RN  10/14/2024  Anticoagulation Clinic  [place] for routing messages: cristi COLON  [facility] patient phone line: [phone]        _______________________________________________________________________     Anticoagulation Episode Summary       Current INR goal:  2.0-3.0   TTR:  93.9% (1 y)   Target end date:  Indefinite   Send INR reminders to:  [facility]ON    Indications    Atrial fibrillation (Resolved) [I48.91]  Long-term (current) use of anticoagulants [Z79.01] [Z79.01]  Chronic atrial fibrillation (H) [I48.20]             Comments:  [facility] [phone] fax: [phone] & send to [facility]             Anticoagulation Care Providers        Provider Role Specialty Phone number    Lazaro Huston MD Referring Internal Medicine [phone]    Fransisco Cordon MD Referring Family Medicine

## 2024-10-15 ENCOUNTER — APPOINTMENT (OUTPATIENT)
Dept: GENERAL RADIOLOGY | Facility: CLINIC | Age: 89
DRG: 178 | End: 2024-10-15
Attending: STUDENT IN AN ORGANIZED HEALTH CARE EDUCATION/TRAINING PROGRAM
Payer: COMMERCIAL

## 2024-10-15 ENCOUNTER — HOSPITAL ENCOUNTER (INPATIENT)
Facility: CLINIC | Age: 89
LOS: 1 days | Discharge: NURSING, REHAB OR LONG-TERM CARE | DRG: 178 | End: 2024-10-18
Attending: STUDENT IN AN ORGANIZED HEALTH CARE EDUCATION/TRAINING PROGRAM | Admitting: FAMILY MEDICINE
Payer: COMMERCIAL

## 2024-10-15 DIAGNOSIS — R53.1 WEAKNESS GENERALIZED: ICD-10-CM

## 2024-10-15 DIAGNOSIS — N17.9 ACUTE RENAL FAILURE, UNSPECIFIED ACUTE RENAL FAILURE TYPE (H): Primary | ICD-10-CM

## 2024-10-15 DIAGNOSIS — D64.9 ANEMIA, UNSPECIFIED TYPE: ICD-10-CM

## 2024-10-15 DIAGNOSIS — I50.812 CHRONIC RIGHT-SIDED HEART FAILURE (H): ICD-10-CM

## 2024-10-15 DIAGNOSIS — I87.8 VENOUS STASIS OF BOTH LOWER EXTREMITIES: ICD-10-CM

## 2024-10-15 DIAGNOSIS — I48.20 CHRONIC ATRIAL FIBRILLATION (H): ICD-10-CM

## 2024-10-15 DIAGNOSIS — U07.1 COVID-19 VIRUS INFECTION: ICD-10-CM

## 2024-10-15 DIAGNOSIS — Z79.01 LONG TERM CURRENT USE OF ANTICOAGULANT THERAPY: ICD-10-CM

## 2024-10-15 DIAGNOSIS — L03.116 CELLULITIS OF LEFT LOWER EXTREMITY: ICD-10-CM

## 2024-10-15 PROBLEM — Z86.73 HISTORY OF CVA (CEREBROVASCULAR ACCIDENT): Status: ACTIVE | Noted: 2024-10-15

## 2024-10-15 PROBLEM — D69.6 THROMBOCYTOPENIA (H): Status: ACTIVE | Noted: 2024-10-15

## 2024-10-15 LAB
ALBUMIN SERPL BCG-MCNC: 3.4 G/DL (ref 3.5–5.2)
ALP SERPL-CCNC: 90 U/L (ref 40–150)
ALT SERPL W P-5'-P-CCNC: 17 U/L (ref 0–70)
ANION GAP SERPL CALCULATED.3IONS-SCNC: 12 MMOL/L (ref 7–15)
AST SERPL W P-5'-P-CCNC: 27 U/L (ref 0–45)
BASOPHILS # BLD AUTO: 0 10E3/UL (ref 0–0.2)
BASOPHILS NFR BLD AUTO: 0 %
BILIRUB SERPL-MCNC: 1 MG/DL
BUN SERPL-MCNC: 32 MG/DL (ref 8–23)
CALCIUM SERPL-MCNC: 8.7 MG/DL (ref 8.8–10.4)
CHLORIDE SERPL-SCNC: 102 MMOL/L (ref 98–107)
CREAT SERPL-MCNC: 1.47 MG/DL (ref 0.67–1.17)
CRP SERPL-MCNC: 29.23 MG/L
EGFRCR SERPLBLD CKD-EPI 2021: 43 ML/MIN/1.73M2
EOSINOPHIL # BLD AUTO: 0 10E3/UL (ref 0–0.7)
EOSINOPHIL NFR BLD AUTO: 1 %
ERYTHROCYTE [DISTWIDTH] IN BLOOD BY AUTOMATED COUNT: 14.1 % (ref 10–15)
FERRITIN SERPL-MCNC: 258 NG/ML (ref 31–409)
FLUAV RNA SPEC QL NAA+PROBE: NEGATIVE
FLUBV RNA RESP QL NAA+PROBE: NEGATIVE
GLUCOSE SERPL-MCNC: 101 MG/DL (ref 70–99)
HCO3 SERPL-SCNC: 23 MMOL/L (ref 22–29)
HCT VFR BLD AUTO: 32.3 % (ref 40–53)
HGB BLD-MCNC: 10.6 G/DL (ref 13.3–17.7)
IMM GRANULOCYTES # BLD: 0 10E3/UL
IMM GRANULOCYTES NFR BLD: 0 %
INR PPP: 2.46 (ref 0.85–1.15)
INR PPP: 2.47 (ref 0.85–1.15)
IRON BINDING CAPACITY (ROCHE): ABNORMAL
IRON SATN MFR SERPL: ABNORMAL %
IRON SERPL-MCNC: 17 UG/DL (ref 61–157)
LYMPHOCYTES # BLD AUTO: 1.4 10E3/UL (ref 0.8–5.3)
LYMPHOCYTES NFR BLD AUTO: 25 %
MCH RBC QN AUTO: 31.3 PG (ref 26.5–33)
MCHC RBC AUTO-ENTMCNC: 32.8 G/DL (ref 31.5–36.5)
MCV RBC AUTO: 95 FL (ref 78–100)
MONOCYTES # BLD AUTO: 1.1 10E3/UL (ref 0–1.3)
MONOCYTES NFR BLD AUTO: 19 %
NEUTROPHILS # BLD AUTO: 3.1 10E3/UL (ref 1.6–8.3)
NEUTROPHILS NFR BLD AUTO: 55 %
NRBC # BLD AUTO: 0 10E3/UL
NRBC BLD AUTO-RTO: 0 /100
PLATELET # BLD AUTO: 118 10E3/UL (ref 150–450)
POTASSIUM SERPL-SCNC: 4.6 MMOL/L (ref 3.4–5.3)
PROCALCITONIN SERPL IA-MCNC: 0.11 NG/ML
PROT SERPL-MCNC: 6.2 G/DL (ref 6.4–8.3)
RBC # BLD AUTO: 3.39 10E6/UL (ref 4.4–5.9)
RSV RNA SPEC NAA+PROBE: NEGATIVE
SARS-COV-2 RNA RESP QL NAA+PROBE: POSITIVE
SODIUM SERPL-SCNC: 137 MMOL/L (ref 135–145)
WBC # BLD AUTO: 5.7 10E3/UL (ref 4–11)

## 2024-10-15 PROCEDURE — 96365 THER/PROPH/DIAG IV INF INIT: CPT | Mod: 59

## 2024-10-15 PROCEDURE — 36415 COLL VENOUS BLD VENIPUNCTURE: CPT | Performed by: FAMILY MEDICINE

## 2024-10-15 PROCEDURE — 250N000013 HC RX MED GY IP 250 OP 250 PS 637: Performed by: FAMILY MEDICINE

## 2024-10-15 PROCEDURE — G0378 HOSPITAL OBSERVATION PER HR: HCPCS

## 2024-10-15 PROCEDURE — 85610 PROTHROMBIN TIME: CPT | Performed by: STUDENT IN AN ORGANIZED HEALTH CARE EDUCATION/TRAINING PROGRAM

## 2024-10-15 PROCEDURE — 258N000003 HC RX IP 258 OP 636: Performed by: EMERGENCY MEDICINE

## 2024-10-15 PROCEDURE — 86140 C-REACTIVE PROTEIN: CPT | Performed by: FAMILY MEDICINE

## 2024-10-15 PROCEDURE — 84145 PROCALCITONIN (PCT): CPT | Performed by: STUDENT IN AN ORGANIZED HEALTH CARE EDUCATION/TRAINING PROGRAM

## 2024-10-15 PROCEDURE — 83550 IRON BINDING TEST: CPT | Performed by: FAMILY MEDICINE

## 2024-10-15 PROCEDURE — 99285 EMERGENCY DEPT VISIT HI MDM: CPT | Performed by: STUDENT IN AN ORGANIZED HEALTH CARE EDUCATION/TRAINING PROGRAM

## 2024-10-15 PROCEDURE — 82040 ASSAY OF SERUM ALBUMIN: CPT | Performed by: STUDENT IN AN ORGANIZED HEALTH CARE EDUCATION/TRAINING PROGRAM

## 2024-10-15 PROCEDURE — 85025 COMPLETE CBC W/AUTO DIFF WBC: CPT | Performed by: STUDENT IN AN ORGANIZED HEALTH CARE EDUCATION/TRAINING PROGRAM

## 2024-10-15 PROCEDURE — 82728 ASSAY OF FERRITIN: CPT | Performed by: FAMILY MEDICINE

## 2024-10-15 PROCEDURE — 87637 SARSCOV2&INF A&B&RSV AMP PRB: CPT | Performed by: STUDENT IN AN ORGANIZED HEALTH CARE EDUCATION/TRAINING PROGRAM

## 2024-10-15 PROCEDURE — 36415 COLL VENOUS BLD VENIPUNCTURE: CPT | Performed by: STUDENT IN AN ORGANIZED HEALTH CARE EDUCATION/TRAINING PROGRAM

## 2024-10-15 PROCEDURE — 99285 EMERGENCY DEPT VISIT HI MDM: CPT | Mod: 25 | Performed by: STUDENT IN AN ORGANIZED HEALTH CARE EDUCATION/TRAINING PROGRAM

## 2024-10-15 PROCEDURE — 250N000013 HC RX MED GY IP 250 OP 250 PS 637: Performed by: EMERGENCY MEDICINE

## 2024-10-15 PROCEDURE — 71046 X-RAY EXAM CHEST 2 VIEWS: CPT

## 2024-10-15 PROCEDURE — 99222 1ST HOSP IP/OBS MODERATE 55: CPT | Performed by: FAMILY MEDICINE

## 2024-10-15 PROCEDURE — 250N000011 HC RX IP 250 OP 636: Mod: JZ | Performed by: EMERGENCY MEDICINE

## 2024-10-15 PROCEDURE — 85610 PROTHROMBIN TIME: CPT | Performed by: FAMILY MEDICINE

## 2024-10-15 PROCEDURE — XW033E5 INTRODUCTION OF REMDESIVIR ANTI-INFECTIVE INTO PERIPHERAL VEIN, PERCUTANEOUS APPROACH, NEW TECHNOLOGY GROUP 5: ICD-10-PCS | Performed by: STUDENT IN AN ORGANIZED HEALTH CARE EDUCATION/TRAINING PROGRAM

## 2024-10-15 RX ORDER — PROCHLORPERAZINE MALEATE 5 MG/1
5 TABLET ORAL EVERY 6 HOURS PRN
Status: DISCONTINUED | OUTPATIENT
Start: 2024-10-15 | End: 2024-10-18 | Stop reason: HOSPADM

## 2024-10-15 RX ORDER — AMOXICILLIN 250 MG
2 CAPSULE ORAL 2 TIMES DAILY PRN
Status: DISCONTINUED | OUTPATIENT
Start: 2024-10-15 | End: 2024-10-18 | Stop reason: HOSPADM

## 2024-10-15 RX ORDER — LISINOPRIL 10 MG/1
10 TABLET ORAL DAILY
Status: DISCONTINUED | OUTPATIENT
Start: 2024-10-15 | End: 2024-10-16

## 2024-10-15 RX ORDER — POLYETHYLENE GLYCOL 3350 17 G/17G
8.5 POWDER, FOR SOLUTION ORAL DAILY PRN
Status: DISCONTINUED | OUTPATIENT
Start: 2024-10-15 | End: 2024-10-18 | Stop reason: HOSPADM

## 2024-10-15 RX ORDER — PROCHLORPERAZINE 25 MG
12.5 SUPPOSITORY, RECTAL RECTAL EVERY 12 HOURS PRN
Status: DISCONTINUED | OUTPATIENT
Start: 2024-10-15 | End: 2024-10-18 | Stop reason: HOSPADM

## 2024-10-15 RX ORDER — LIDOCAINE 40 MG/G
CREAM TOPICAL
Status: DISCONTINUED | OUTPATIENT
Start: 2024-10-15 | End: 2024-10-18 | Stop reason: HOSPADM

## 2024-10-15 RX ORDER — TAMSULOSIN HYDROCHLORIDE 0.4 MG/1
0.4 CAPSULE ORAL DAILY
Status: DISCONTINUED | OUTPATIENT
Start: 2024-10-15 | End: 2024-10-18 | Stop reason: HOSPADM

## 2024-10-15 RX ORDER — ONDANSETRON 4 MG/1
4 TABLET, ORALLY DISINTEGRATING ORAL EVERY 6 HOURS PRN
Status: DISCONTINUED | OUTPATIENT
Start: 2024-10-15 | End: 2024-10-18 | Stop reason: HOSPADM

## 2024-10-15 RX ORDER — CALCIUM CARBONATE 500 MG/1
1000 TABLET, CHEWABLE ORAL 4 TIMES DAILY PRN
Status: DISCONTINUED | OUTPATIENT
Start: 2024-10-15 | End: 2024-10-18 | Stop reason: HOSPADM

## 2024-10-15 RX ORDER — ACETAMINOPHEN 325 MG/1
650 TABLET ORAL EVERY 4 HOURS PRN
Status: DISCONTINUED | OUTPATIENT
Start: 2024-10-15 | End: 2024-10-18 | Stop reason: HOSPADM

## 2024-10-15 RX ORDER — WARFARIN SODIUM 2.5 MG/1
2.5 TABLET ORAL
Status: COMPLETED | OUTPATIENT
Start: 2024-10-15 | End: 2024-10-15

## 2024-10-15 RX ORDER — GABAPENTIN 100 MG/1
200 CAPSULE ORAL 2 TIMES DAILY
Status: DISCONTINUED | OUTPATIENT
Start: 2024-10-15 | End: 2024-10-18 | Stop reason: HOSPADM

## 2024-10-15 RX ORDER — LORATADINE 10 MG/1
10 TABLET ORAL DAILY
Status: DISCONTINUED | OUTPATIENT
Start: 2024-10-16 | End: 2024-10-18 | Stop reason: HOSPADM

## 2024-10-15 RX ORDER — AMOXICILLIN 250 MG
1 CAPSULE ORAL 2 TIMES DAILY PRN
Status: DISCONTINUED | OUTPATIENT
Start: 2024-10-15 | End: 2024-10-18 | Stop reason: HOSPADM

## 2024-10-15 RX ORDER — GLIPIZIDE 10 MG/1
2 TABLET ORAL
Status: DISCONTINUED | OUTPATIENT
Start: 2024-10-15 | End: 2024-10-18 | Stop reason: HOSPADM

## 2024-10-15 RX ORDER — FUROSEMIDE 40 MG/1
40 TABLET ORAL DAILY
Status: DISCONTINUED | OUTPATIENT
Start: 2024-10-15 | End: 2024-10-18 | Stop reason: HOSPADM

## 2024-10-15 RX ORDER — METOPROLOL SUCCINATE 50 MG/1
50 TABLET, EXTENDED RELEASE ORAL DAILY
Status: DISCONTINUED | OUTPATIENT
Start: 2024-10-15 | End: 2024-10-18 | Stop reason: HOSPADM

## 2024-10-15 RX ORDER — ONDANSETRON 2 MG/ML
4 INJECTION INTRAMUSCULAR; INTRAVENOUS EVERY 6 HOURS PRN
Status: DISCONTINUED | OUTPATIENT
Start: 2024-10-15 | End: 2024-10-18 | Stop reason: HOSPADM

## 2024-10-15 RX ADMIN — REMDESIVIR 200 MG: 100 INJECTION, POWDER, LYOPHILIZED, FOR SOLUTION INTRAVENOUS at 11:23

## 2024-10-15 RX ADMIN — GABAPENTIN 200 MG: 100 CAPSULE ORAL at 20:40

## 2024-10-15 RX ADMIN — WARFARIN SODIUM 2.5 MG: 2.5 TABLET ORAL at 17:56

## 2024-10-15 RX ADMIN — CEPHALEXIN 500 MG: 250 CAPSULE ORAL at 16:37

## 2024-10-15 RX ADMIN — METOPROLOL SUCCINATE 50 MG: 50 TABLET, EXTENDED RELEASE ORAL at 17:56

## 2024-10-15 RX ADMIN — CEPHALEXIN 500 MG: 250 CAPSULE ORAL at 08:52

## 2024-10-15 RX ADMIN — ACETAMINOPHEN 650 MG: 325 TABLET ORAL at 20:39

## 2024-10-15 RX ADMIN — PSYLLIUM HUSK 3.4 G: 3.4 POWDER ORAL at 17:57

## 2024-10-15 RX ADMIN — CEPHALEXIN 500 MG: 250 CAPSULE ORAL at 20:41

## 2024-10-15 RX ADMIN — TAMSULOSIN HYDROCHLORIDE 0.4 MG: 0.4 CAPSULE ORAL at 17:56

## 2024-10-15 ASSESSMENT — ACTIVITIES OF DAILY LIVING (ADL)
ADLS_ACUITY_SCORE: 42
ADLS_ACUITY_SCORE: 40
ADLS_ACUITY_SCORE: 37
ADLS_ACUITY_SCORE: 42
ADLS_ACUITY_SCORE: 37
ADLS_ACUITY_SCORE: 40
ADLS_ACUITY_SCORE: 40
ADLS_ACUITY_SCORE: 37
ADLS_ACUITY_SCORE: 37
ADLS_ACUITY_SCORE: 32
ADLS_ACUITY_SCORE: 37
ADLS_ACUITY_SCORE: 42
ADLS_ACUITY_SCORE: 37
ADLS_ACUITY_SCORE: 42
ADLS_ACUITY_SCORE: 32
ADLS_ACUITY_SCORE: 40
ADLS_ACUITY_SCORE: 42
ADLS_ACUITY_SCORE: 37

## 2024-10-15 ASSESSMENT — COLUMBIA-SUICIDE SEVERITY RATING SCALE - C-SSRS
2. HAVE YOU ACTUALLY HAD ANY THOUGHTS OF KILLING YOURSELF IN THE PAST MONTH?: NO
1. IN THE PAST MONTH, HAVE YOU WISHED YOU WERE DEAD OR WISHED YOU COULD GO TO SLEEP AND NOT WAKE UP?: NO
6. HAVE YOU EVER DONE ANYTHING, STARTED TO DO ANYTHING, OR PREPARED TO DO ANYTHING TO END YOUR LIFE?: NO

## 2024-10-15 NOTE — ED TRIAGE NOTES
Increased weakness, difficulty with IND transfers last night and this morning. Pt positive for Covid at [facility]isted Living. Reports cough and runny nose. LLE is noted to be red and warm to touch.      Triage Assessment (Adult)       Row Name 10/15/24 0553          Triage Assessment    Airway WDL WDL        Respiratory WDL    Respiratory WDL cough     Cough Type congested        Skin Circulation/Temperature WDL    Skin Circulation/Temperature WDL WDL        Cardiac WDL    Cardiac WDL WDL        Peripheral/Neurovascular WDL    Peripheral Neurovascular WDL WDL        Cognitive/Neuro/Behavioral WDL    Cognitive/Neuro/Behavioral WDL WDL

## 2024-10-15 NOTE — PROGRESS NOTES
PRIMARY DIAGNOSIS: GENERALIZED WEAKNESS    OUTPATIENT/OBSERVATION GOALS TO BE MET BEFORE DISCHARGE  1. Orthostatic performed: No    2. Tolerating PO medications: Yes    3. Return to near baseline physical activity: No    4. Cleared for discharge by consultants (if involved): No    Discharge Planner Nurse   Safe discharge environment identified: No  Barriers to discharge: Yes       Entered by: Radha Puente RN 10/15/2024 6:42 PM     Please review provider order for any additional goals.   Nurse to notify provider when observation goals have been met and patient is ready for discharge.    A & O x3, some disorientation to time (knew month but not date), able to make needs known.  Very [place], uses pocket talker or speaking up.  Takes pills whole.  VSS.  On RA w SpO2 > 90%, lungs very coarse with inspiratory and expiratory wheezes.  RAC donita IV patent.  Mostly continent BB, +2 assist (per previous nurse report) to commode.  No c/o pain throughout shift.    BP (!) 149/61   Pulse 72   Temp 98.1  F (36.7  C) (Oral)   Resp 18   SpO2 95%     Radha Puente RN on 10/15/2024 at 6:44 PM

## 2024-10-15 NOTE — ED NOTES
Call placed to  to see if the [facility] is able to give him a little extra help while he is sick. Deloris will call back when she has the info.

## 2024-10-15 NOTE — PROGRESS NOTES
S-(situation): Patient registered to Observation. Patient arrived to room 247 via cart from ED    B-(background): weakness, COVID    A-(assessment): Pt arrives to room VSS on RA. Very [place]. Admits to being increasingly weak.     R-(recommendations): Orders and observation goals reviewed with pt.     Nursing Observation criteria listed below was met:    Skin issues/needs documented:Yes  Isolation needs addressed and Signage up: Yes  Fall Prevention: Education given and documented: NA  Education Assessment documented:Yes  Admission Education Documented: Yes  New medication patient education completed and documented (Possible Side Effects of Common Medications handout): Yes  OBS video/handout Reviewed & Documented: Yes  Allergies Reviewed: Yes  Medication Reconciliation Complete: Yes  Home medications if not able to send immediately home with family stored here: NA  Reminder note placed in discharge instructions of home meds: NA  Patient has discharge needs (If yes, please explain): Yes- back to [facility]  Patient discharge preferences addressed and charted on white board:  No  Provider notified that patient has arrived to the unit: Yes

## 2024-10-15 NOTE — PHARMACY-ANTICOAGULATION SERVICE
Clinical Pharmacy - Warfarin Dosing Consult     Pharmacy has been consulted to manage this patient s warfarin therapy.  Indication: Atrial Fibrillation  Therapy Goal: INR 2-3  OP Anticoag Clinic: [facility]  Warfarin Prior to Admission: Yes  Warfarin PTA Regimen: 2.5 mg every Sun, Tue, Thu; 5 mg all other days  Recent documented change in oral intake/nutrition: Unknown  Dose Comments: dosing per last [facility] vist on 10/14/24    INR   Date Value Ref Range Status   10/15/2024 2.46 (H) 0.85 - 1.15 Final   10/14/2024 2.50 (H) 0.85 - 1.15 Final       Recommend warfarin 2.5 mg today.  Pharmacy will monitor Melvin Abad daily and order warfarin doses to achieve specified goal.      Please contact pharmacy as soon as possible if the warfarin needs to be held for a procedure or if the warfarin goals change.

## 2024-10-15 NOTE — ED PROVIDER NOTES
Assumed care at shift change from Dr. Colten Montoya.  See his note for patient presenting details initial workup.  Briefly, this is a 89-year-old male with past medical history of atrial fibrillation anticoagulated on Coumadin, CVA, hypertension, COPD, who presents from assisted living facility for weakness.  Symptoms had started yesterday.  There are numerous ill contacts at his assisted living facility that have been COVID-positive.  Patient endorsed cough and congestion in addition to the weakness.  He was unable to get out of bed today.  Does not have any daily or routine lifting assistance at his assisted living facility.  Workup revealed positive COVID test.  Also noted venous stasis changes to bilateral lower extremities, but lab work including a CBC had a normal white blood cell count and patient had a normal procalcitonin at 0.11.  Favor these to be chronic venous stasis changes rather than a developing cellulitis, and do not think patient needs antibiotics currently.  Since he is unable to safely return to his assisted living facility, will speak with hospitalist team regarding admission.  Will also initiate remdesivir therapy for COVID treatment.    0724 unfortunately, there are no current beds available for admission until discharges happen today.  Unlikely given the number of admitted patients currently.  Will place  consult to see if we can get increased assistance for the patient in the short-term at his care facility, since he is not requiring supplemental oxygen, and could potentially be discharged with oral Paxlovid.  Informed RN to hold ordered remdesivir at this time, will consult with social work and reassess patient's needs, then we will be able to better plan for disposition.    Patient was seen and examined.  He is hard of hearing, but pleasant, alert, cooperative, and answering questions appropriately.  Updated the patient on plans for discharge versus admission.  State that we  are making several phone calls and trying to work out a safe plan for discharge if we are unable to keep him locally.  Additionally, RN requested that I look at patient's legs before she applies new dressing.  He does have some warmth and erythema noted on the left leg as well as a significant abrasion on anterior shin.  Will plan to cover with antibiotics given the warmth, but do agree that venous stasis changes are present on both lower extremities.  There is no warmth to right lower extremity, which supports venous stasis changes only.  Ordered for oral Keflex to be given with a breakfast tray, and if patient is discharged would plan to continue this prescription outpatient.    Social work made attempts to contact [facility], then nursing staff tried to follow-up.  Since many residents have come down with COVID, they are very short staffed and are unable to meet accommodations for increased services for Edward, and he would not be able to get in or out of bed without some standby assistance.  Due to this, we will plan to admit patient for observation.  Since he will be admitted will start IV remdesivir.    1118 spoke with Dr. Kianna Chun, hospitalist, who accepted patient for observation.     Yumiko Abad, DO  10/15/24 1925

## 2024-10-15 NOTE — PROGRESS NOTES
NASRA lvm for Celestina DEWEY at [facility] where patient resides #[phone] to discuss facilities accomodation level. Awaiting return call.    Deloris Padron, RNCM  Care Transitions Registered Nurse

## 2024-10-15 NOTE — H&P
[facility]    History and Physical - Hospitalist Service       Date of Admission:  10/15/2024    Assessment & Plan      Felicewilmar Abad is a 89 year old male with PMHx a fib on Coumadin, previous CVA with mild left leg weakness, HTN, HLD, BPH, CKA 3a who resides at an assistive living facility who presented with generalized weakness and URI infection symptoms and has been found to be COVID positive with concern for mild dehydration and inadequate PO intake and developing left lower leg cellulitis.  He is being registered to observation status for Remdesivir, Keflex, and close monitoring with physical therapy to evaluate patient tomorrow to help assist with disposition planning     Principal Problem:  # Confirmed COVID-19 infection       Symptom Onset During the day of 10/14/24   Date of 1st Positive Test 10/15/24   Vaccination Status Partially Vaccinated - last given in 9/2023 but has not gotten this years vaccine yet       - COVID-19 special precautions, continuous pulse-ox  - Oxygen: continue current support without oxygen but consider transition to inpatient status if oxygen is needed going forward.  If needed titrate to keep SpO2 between 90-96%  - Labs: Daily COVID labs ordered x4 days (CBC, BMP, CRP).  Continue to trend after 4 days as needed.   - Imaging: no additional imaging needed at this time  - Breathing treatments: no inhalers needed; avoid nebulizers in favor of MDIs   - IV fluids: not indicated at this time  - Antibiotics: not indicated   - COVID-Focused Medications: Remdesivir x 3 days or until hospital discharge, started on 10/15/24  - DVT Prophylaxis:         - At high risk of thrombotic complications due to COVID-19 (DDimer = N/A )         - Already on therapeutic anticoagulation with Coumadin    Active Problems:    Weakness generalized    Assessment: likely secondary to COVID infection with patient currently unable to be independent in [facility] facility as would be  needed for him to return home.  He does have some baseline left lower extremity weakness from previous stroke which may be affecting his functionality now that he has generalized weakness in addition    Plan:   -will proceed with treatment of COVID as above  -physical therapy evaluation tomorrow  -Social work consult to assist in disposition plan       Cellulitis of left lower extremity    Left anterior leg skin wound    Venous stasis of both lower extremities    Assessment: patient has baseline chronic venous stasis in bilateral lower legs and is on Lasix daily to decrease swelling.  He struggles intermittently with blisters and sores in this involved skin and normally self medicates with a cream and dressing.  Current sore has been there for several weeks on the left anterior shin and in the past few days there has been increased redness in the surrounding skin and ED provider felt it worsened slightly with increased warmth during the ED visit and patient was started on Keflex for possible developing cellulitis.     Plan:   -will continue Keflex 500 mg TID based on current renal function and monitor for improvement in surrounding skin erythema and warmth   -wound care RN referral made to assist in best medical management of wound       Thrombocytopenia (H)    Assessment: platelets 118 at time of admission, no history of thrombocytopenia.  Likely secondary to COVID infection    Plan:   -will continue to trend in upcoming days       Elevated creatinine - does not meet DELPHINE diagnosis     Chronic kidney disease, stage 3a (H)    Assessment: patient has recent baseline of 1.22-1.28 throughout this year.  Creatinine at presentation is 1.47 which is increased from recent baseline but does not meet DELPHINE criteria at this time.  Patient does appear slightly dry and has not been eating or drinking well.  BUN/creatinine ratio 21.76 which may indicate prerenal etiology to creatinine elevation     Plan:   -will hold home  lisinopril and lasix at this time  -encourage adequate PO intake and monitor intake and output   -monitor creatinine daily and trend  -avoid nephrotoxic medications       Anemia - multifactorial (history of acute blood loss, anemia of chronic disease, etc)    Assessment: patient has history of anemia over the past year but trending downward overall - patient did have a hematoma earlier in the year with acute blood loss but was not placed on iron supplementation at that time.  Hemoglobin has decreased from 11.9 in August down to 10.6 today with patient denying any active bleeding he is aware of.  Patient is on coumadin and therefore is at risk of increased bleeding - INR is therapeutic     Plan:   -will add on iron, iron binding capacity and ferritin to assess for possible iron deficiency anemia in patient with history of acute blood loss and lack of recovery to normal range   -will perform occult stool  -continue Coumadin and keep in therapeutic range for now unless signs of active bleeding are seen  -monitor hemoglobin daily or sooner if there is concern for active bleed.        Essential hypertension    Assessment: patient is on metoprolol XL 50 mg daily, lisinopril 10 mg daily and Lasix 40 mg daily with blood pressures well controlled on outpatient appointment review available.  Blood pressures currently in the  systolic in patient with poor PO intake since illness started yesterday    Plan:   -will hold lisinopril and lasix for now given concern for mild dehydration and low normal blood pressures  -continue metoprolol XL but with hold parameters       Chronic atrial fibrillation (H)    Long term current use of anticoagulant therapy    Assessment: patient has longstanding chronic atrial fibrillation with rate control achieved with metoprolol XL 50 mg daily and uses Coumadin for anticoagulation with INR of 2.46 today.      Plan:   -continue metoprolol with hold parameters for low blood pressures  -pharmacy  "to manage inpatient coumadin dosing during this hospital stay      Chronic obstructive pulmonary disease, unspecified COPD type (H)    Assessment: documented in his chart and has evidence of emphysema as far back os 2003 on available imaging but patient is not on any scheduled or prn medications for his breathing in recent years (last albuterol inhaler given in 2020) and reports it has never given him issues.  He quit smoking \"many, many years ago.\"    Plan:   -will proceed with treatment of COVID infection as above and monitor respiratory status closely.    -no steroids for now given patient stable on RA, no signs of respiratory distress  -could consider prn albuterol inhaler if wheezing occurs going forward.       History of chronic right-sided heart failure (H)    Assessment: patient has documented history of chronic right sided heart failure, however on most recent echocardiogram form 4/2023 he had normal right ventricle size, structure and function.  Patient does struggling with chronic lower legs swelling and venous status as above and continues on metoprolol, lisinopril and Lasix at baseline.  No signs of volume overload at this time and concern that patient may be slightly dehydrated as above    Plan:   -continue metoprolol with hold parameters  -hold lisinopril and lasix given decreased PO intake, increased creatinine and concern of mild dehydration and monitor daily weights, intake/output and restart home regimen as appropriate       Hyperlipidemia LDL goal <100    Assessment: patient takes simvastatin 40 mg daily    Plan:   -restart at time of hospital discharge       Benign prostatic hyperplasia, unspecified whether lower urinary tract symptoms present    Assessment: patient takes Flomax 0.4 mg daily and has not had any breakthrough symptoms    Plan:   -continue home regimen without change      History of CVA (cerebrovascular accident)    Assessment: previous history of a right pontine stroke with left " lower extremity weakness noted since.  No new focal neurological deficits     Plan:   -anticipate physical therapy evaluation tomorrow to assist in discharge planning and underlying impairment from previous CVA may impact patient's functionality and plan  -monitor for any new focal neurological deficits  -continue blood pressure medications and Coumadin as above and restart statin at time of discharge             Diet: Regular Diet Adult    DVT Prophylaxis: Warfarin  Ulloa Catheter: Not present  Lines: None     Cardiac Monitoring: None  Code Status:  DNR/DNI    Clinically Significant Risk Factors Present on Admission               # Hypoalbuminemia: Lowest albumin = 3.4 g/dL at 10/15/2024  6:16 AM, will monitor as appropriate  # Drug Induced Coagulation Defect: home medication list includes an anticoagulant medication  # Thrombocytopenia: Lowest platelets = 118 in last 2 days, will monitor for bleeding   # Hypertension: Noted on problem list    # Anemia: based on hgb <11                  Disposition Plan     Medically Ready for Discharge: Anticipated Tomorrow vs Thursday             Staci Abad MD  Hospitalist Service  [facility]  Securely message with [employer] (more info)  Text page via [employer] Paging/Directory     ______________________________________________________________________    Chief Complaint   Weakness, nasal congestion, cough, low appetite     History is obtained from the patient and emergency room provider     History of Present Illness   Felicewilmar KELLEE Abad is a 89 year old male with PMHx of a fib on coumadin, previous CVA with left lower leg weakness, HTN, HLD, CKD stage 3a, BPH who presented with weakness, nasal symptoms and decreased appetite that started yesterday but worsened greatly this morning.  Patient lives at an assistive living facility and many residents have been diagnosed with COVID.  Patient was hardly able to get out of bed this morning and  was brought to the ED for further evaluation.  He has been found to be COVID positive with also possible left anterior shin early cellulitis and was started on Remdesivir and Keflex.  Given his age, weakness, poor PO intake and concern for dehydration and possible developing DELPHINE, decision was made to register patient to observation status for ongoing medical management and disposition planning.        Past Medical History    Past Medical History:   Diagnosis Date    Actinic keratosis     Atrial fibrillation (H)     Basal cell carcinoma nos 06/10    Mohs excision, rt upper lip & rt temple    Cardiac dysrhythmia, unspecified     Cough     chronic cough    Diabetic eye exam (H) 8/29/14    Generalized osteoarthrosis, unspecified site     djd of the knees, hands, and neck    Other diseases of lung, not elsewhere classified     pulmonary nodule, benign    Pure hypercholesterolemia     Unspecified essential hypertension        Past Surgical History   Past Surgical History:   Procedure Laterality Date    COLONOSCOPY  05/02/2007    Multiple bxs of diminutive polyps of ascending colon.    COLONOSCOPY  11/10/2010    COLONOSCOPY performed by MARISELA GRIMM at [facility] HEMORRHOIDOPEXY BY STAPLING  09/26/08    [facility] TOTAL KNEE ARTHROPLASTY  1997    Knee Replacement, Total    [facility] COLONOSCOPY W/WO BRUSH/WASH  10/19/2001    [facility] COLONOSCOPY W/WO BRUSH/WASH  11/02/2004       Prior to Admission Medications   Prior to Admission Medications   Prescriptions Last Dose Informant Patient Reported? Taking?   Vitamin D3 (CHOLECALCIFEROL) 25 mcg (1000 units) tablet   No No   Sig: Take 1 tablet (25 mcg) by mouth daily   acetaminophen (TYLENOL) 500 MG tablet   No No   Sig: Take 2 tablets (1,000 mg) by mouth every 8 hours as needed for mild pain   furosemide (LASIX) 20 MG tablet   No No   Sig: Take 2 tablets (40 mg) by mouth daily   gabapentin (NEURONTIN) 100 MG capsule   No No   Sig: Take 2 capsules (200 mg) by mouth 2 times daily   ketotifen  fumarate 0.035%, ketotifen 0.025%, (ZADITOR) 0.025 % ophthalmic solution   No No   Sig: Place 1 drop into both eyes 2 times daily as needed for dry eyes Patient self-administers   lisinopril (ZESTRIL) 10 MG tablet   No No   Sig: Take 1 tablet (10 mg) by mouth daily   loratadine (CLARITIN) 10 MG tablet   No No   Sig: Take 1 tablet (10 mg) by mouth daily   metoprolol succinate ER (TOPROL XL) 50 MG 24 hr tablet   No No   Sig: Take 1 tablet (50 mg) by mouth daily   order for DME   No No   Sig: Equipment being ordered: bedside commode   order for DME   No No   Sig: Equipment being ordered: 4 legged walker   order for DME   No No   Sig: Equipment being ordered: side rails/grab bars   polyethylene glycol (MIRALAX) 17 GM/Dose powder   No No   Sig: Take 9 g by mouth daily as needed for constipation Mix and hand to patient, Patient self-administers (he knows how much he needs to keep regular)   psyllium (METAMUCIL/KONSYL) 58.6 % powder   No No   Sig: Take 18 g (1 Tablespoonful) by mouth daily Mix and hand to patient, Patient self-administers (he knows how much he needs to keep regular)   simvastatin (ZOCOR) 40 MG tablet   No No   Sig: Take 1 tablet (40 mg) by mouth at bedtime   sodium chloride (OCEAN) 0.65 % nasal spray   No No   Sig: Spray 2 sprays in nostril daily as needed for congestion Patient self-administers   tamsulosin (FLOMAX) 0.4 MG capsule   No No   Sig: Take 1 capsule (0.4 mg) by mouth daily   warfarin ANTICOAGULANT (COUMADIN) 2.5 MG tablet   No No   Sig: Take 2.5 mgs mg Sunday, Tuesday and Thursday, and 5 mgs all other days.  Recheck INR on 11/11/24      Facility-Administered Medications: None      Physical Exam   Vital Signs: Temp: 97.8  F (36.6  C) Temp src: Oral BP: 102/65 Pulse: 58   Resp: 18 SpO2: 97 %      Weight: 0 lbs 0 oz    Constitutional: awake, alert, very hard of hearing but cooperative and does better with use of a pocket talker at setting 6, no apparent distress, and appears stated  age  Respiratory: No increased work of breathing, good air exchange, clear to auscultation bilaterally, no crackles or wheezing  Cardiovascular: irregularly irregular rhythm currently in the 60s overall.    GI: bowel sounds present, abdomen soft and non-tender   Skin: patient does have an approximately 3 cm superficial ulceration with surrounding erythema and warmth noted in the left anterior shin.  He does have chronic skin changes in bilateral lower legs   Musculoskeletal: no edema noted in bilateral lower legs,  Patient has overall generalized weakness and unable to not significant difference between leg strength but patient reports his left leg is normally weaker than his right.   Neurologic: Awake, alert, oriented to name, place and situation once he was able to clearly understand the questions being asked.  Was very clear in his wishes and in agreement with current treatment plan     Medical Decision Making       65 MINUTES SPENT BY ME on the date of service doing chart review, history, exam, documentation & further activities per the note.      Data     I have personally reviewed the following data over the past 24 hrs:    5.7  \   10.6 (L)   / 118 (L)     137 102 32.0 (H) /  101 (H)   4.6 23 1.47 (H) \     ALT: 17 AST: 27 AP: 90 TBILI: 1.0   ALB: 3.4 (L) TOT PROTEIN: 6.2 (L) LIPASE: N/A     Procal: 0.11 CRP: 29.23 (H) Lactic Acid: N/A       INR:  2.47 (H) PTT:  N/A   D-dimer:  N/A Fibrinogen:  N/A       Imaging results reviewed over the past 24 hrs:   Recent Results (from the past 24 hour(s))   XR Chest 2 Views    Narrative    EXAM: XR CHEST 2 VIEWS  LOCATION: [facility]  DATE: 10/15/2024    INDICATION: Recent COVID diagnosis, cough with shortness of breath  COMPARISON: 9/28/2020.    FINDINGS: The heart is enlarged. There is no pulmonary edema. The thoracic aorta is calcified and tortuous. The lungs are clear. There is no pneumothorax or pleural effusion.      Impression     IMPRESSION: No acute abnormality.

## 2024-10-15 NOTE — ED PROVIDER NOTES
History     Chief Complaint   Patient presents with    Fatigue     HPI  Melvin Abad is a 89 year old male with complex medical history including hypertension, A-fib on Coumadin, CVA who presents for evaluation of generalized weakness.  Patient developed some progressively worsening weakness along with sinus congestion and a cough starting yesterday.  This morning he was hardly able to get out of bed so EMS was contacted.  Patient resides at an assisted living facility and multiple residents recently tested positive for COVID-19.  The patient was swabbed and also tested positive today.  Separately, he does have an area of chronic irritation to the left shin which has worsened over the last week.  Patient otherwise denies having any fever, sore throat, shortness of breath, nausea or vomiting, new swelling of the legs, other complaints today.    Allergies:  No Known Allergies    Problem List:    Patient Active Problem List    Diagnosis Date Noted    Chronic kidney disease, stage 3a (H) 08/18/2024     Priority: Medium    Arthritis pain 05/19/2024     Priority: Medium    Clostridium difficile enterocolitis 05/19/2024     Priority: Medium    Chronic obstructive pulmonary disease, unspecified COPD type (H) 05/19/2024     Priority: Medium    Benign prostatic hyperplasia, unspecified whether lower urinary tract symptoms present 05/19/2024     Priority: Medium    Chronic right-sided heart failure (H) 02/13/2024     Priority: Medium    Weakness 01/26/2024     Priority: Medium    Pain of left thigh 01/26/2024     Priority: Medium    Chronic atrial fibrillation (H) 05/01/2020     Priority: Medium    Osteoarthritis of glenohumeral joint, left 02/24/2020     Priority: Medium    Longstanding persistent atrial fibrillation (H) 01/23/2020     Priority: Medium    Anticoagulation goal of INR 2 to 3 01/23/2020     Priority: Medium    Slow transit constipation 01/23/2020     Priority: Medium    Sleep disturbances 01/23/2020      Priority: Medium    Right pontine stroke (H) 01/23/2020     Priority: Medium    Dry eyes 01/23/2020     Priority: Medium    Restless legs syndrome (RLS) 01/23/2020     Priority: Medium    Pain of right lower leg 03/31/2017     Priority: Medium    Contusion of right lower extremity 03/31/2017     Priority: Medium    Pain of left lower extremity 03/31/2017     Priority: Medium    Long-term (current) use of anticoagulants [Z79.01] 04/05/2016     Priority: Medium    Obesity (BMI 30-39.9) 10/28/2015     Priority: Medium    Cellulitis 11/20/2013     Priority: Medium    Anemia due to blood loss, acute 11/20/2013     Priority: Medium    DVT prophylaxis 11/20/2013     Priority: Medium    Carpal tunnel syndrome of right wrist 08/16/2013     Priority: Medium    Gilbert syndrome 01/31/2012     Priority: Medium    History of skin cancer 01/03/2012     Priority: Medium    AK (actinic keratosis) 01/03/2012     Priority: Medium    Essential hypertension 04/18/2011     Priority: Medium    Diverticulosis 03/25/2011     Priority: Medium    Hyperlipidemia LDL goal <100 10/31/2010     Priority: Medium    Long term current use of anticoagulant therapy 01/31/2001     Priority: Medium     Problem list name updated by automated process. Provider to review        Past Medical History:    Past Medical History:   Diagnosis Date    Actinic keratosis     Atrial fibrillation (H)     Basal cell carcinoma nos 06/10    Cardiac dysrhythmia, unspecified     Cough     Diabetic eye exam (H) 8/29/14    Generalized osteoarthrosis, unspecified site     Other diseases of lung, not elsewhere classified     Pure hypercholesterolemia     Unspecified essential hypertension      Past Surgical History:    Past Surgical History:   Procedure Laterality Date    COLONOSCOPY  05/02/2007    Multiple bxs of diminutive polyps of ascending colon.    COLONOSCOPY  11/10/2010    COLONOSCOPY performed by MARISELA GRIMM at [facility] HEMORRHOIDOPEXY BY STAPLING  09/26/08     [facility] TOTAL KNEE ARTHROPLASTY  1997    Knee Replacement, Total    [facility] COLONOSCOPY W/WO BRUSH/WASH  10/19/2001    [facility] COLONOSCOPY W/WO BRUSH/WASH  11/02/2004     Family History:    Family History   Problem Relation Age of Onset    Cancer Mother     Prostate Cancer Father      Social History:  Marital Status:   [5]  Social History     Tobacco Use    Smoking status: Former     Types: Cigarettes    Smokeless tobacco: Never    Tobacco comments:     5 years   Vaping Use    Vaping status: Never Used   Substance Use Topics    Alcohol use: No     Comment: denies    Drug use: No      Medications:    acetaminophen (TYLENOL) 500 MG tablet  furosemide (LASIX) 20 MG tablet  gabapentin (NEURONTIN) 100 MG capsule  ketotifen fumarate 0.035%, ketotifen 0.025%, (ZADITOR) 0.025 % ophthalmic solution  lisinopril (ZESTRIL) 10 MG tablet  loratadine (CLARITIN) 10 MG tablet  metoprolol succinate ER (TOPROL XL) 50 MG 24 hr tablet  order for DME  order for DME  order for DME  polyethylene glycol (MIRALAX) 17 GM/Dose powder  psyllium (METAMUCIL/KONSYL) 58.6 % powder  simvastatin (ZOCOR) 40 MG tablet  sodium chloride (OCEAN) 0.65 % nasal spray  tamsulosin (FLOMAX) 0.4 MG capsule  Vitamin D3 (CHOLECALCIFEROL) 25 mcg (1000 units) tablet  warfarin ANTICOAGULANT (COUMADIN) 2.5 MG tablet      Review of Systems   All other systems reviewed and are negative.  See HPI.    Physical Exam   BP: 100/64  Pulse: 74  Temp: 99.8  F (37.7  C)  Resp: 16  SpO2: 92 %    Physical Exam  Vitals and nursing note reviewed.   Constitutional:       General: He is not in acute distress.     Appearance: Normal appearance.      Comments: Pale.  Hard of hearing.  Otherwise nontoxic.   HENT:      Head: Normocephalic and atraumatic.      Mouth/Throat:      Comments: Tacky mucous membranes.  Oropharynx otherwise unremarkable.  Eyes:      General: No scleral icterus.     Conjunctiva/sclera: Conjunctivae normal.   Cardiovascular:      Rate and Rhythm: Normal rate and  regular rhythm.      Pulses: Normal pulses.      Heart sounds: Normal heart sounds. No murmur heard.  Pulmonary:      Effort: Pulmonary effort is normal. No respiratory distress.      Breath sounds: Normal breath sounds. No wheezing or rhonchi.      Comments: Speaking comfortably in full sentences.  Clear to auscultation.  Abdominal:      Palpations: Abdomen is soft.      Tenderness: There is no abdominal tenderness.   Musculoskeletal:         General: No deformity. Normal range of motion.      Cervical back: Normal range of motion and neck supple. No rigidity or tenderness.      Right lower leg: Edema present.      Left lower leg: Edema present.      Comments: Minimal bilateral lower extremity edema.  Equal in size with no calf tenderness.  See skin exam.   Skin:     General: Skin is warm.      Capillary Refill: Capillary refill takes less than 2 seconds.      Findings: Erythema and lesion present.      Comments: Patient has what appears to be venous stasis changes to both shins.  The erythema appears more prominent on the left and he also has a small superficial area of ulceration measuring 3 to 4 cm.  This is nontender to palpation.  I do not appreciate any particular warmth and there are no signs of fluctuance or purulent drainage.   Neurological:      Mental Status: He is alert and oriented to person, place, and time.      Sensory: No sensory deficit.      Motor: Weakness present.      Coordination: Coordination normal.      Comments: Generalized weakness.  No lateralizing deficits.   Psychiatric:         Mood and Affect: Mood normal.         ED Course        Procedures            Results for orders placed or performed during the hospital encounter of 10/15/24 (from the past 24 hour(s))   CBC with platelets differential    Narrative    The following orders were created for panel order CBC with platelets differential.  Procedure                               Abnormality         Status                      ---------                               -----------         ------                     CBC with platelets and d...[[id number]]  Abnormal            Final result                 Please view results for these tests on the individual orders.   Comprehensive metabolic panel   Result Value Ref Range    Sodium 137 135 - 145 mmol/L    Potassium 4.6 3.4 - 5.3 mmol/L    Carbon Dioxide (CO2) 23 22 - 29 mmol/L    Anion Gap 12 7 - 15 mmol/L    Urea Nitrogen 32.0 (H) 8.0 - 23.0 mg/dL    Creatinine 1.47 (H) 0.67 - 1.17 mg/dL    GFR Estimate 43 (L) >60 mL/min/1.73m2    Calcium 8.7 (L) 8.8 - 10.4 mg/dL    Chloride 102 98 - 107 mmol/L    Glucose 101 (H) 70 - 99 mg/dL    Alkaline Phosphatase 90 40 - 150 U/L    AST 27 0 - 45 U/L    ALT 17 0 - 70 U/L    Protein Total 6.2 (L) 6.4 - 8.3 g/dL    Albumin 3.4 (L) 3.5 - 5.2 g/dL    Bilirubin Total 1.0 <=1.2 mg/dL   Procalcitonin   Result Value Ref Range    Procalcitonin 0.11 <0.50 ng/mL   INR   Result Value Ref Range    INR 2.46 (H) 0.85 - 1.15   CBC with platelets and differential   Result Value Ref Range    WBC Count 5.7 4.0 - 11.0 10e3/uL    RBC Count 3.39 (L) 4.40 - 5.90 10e6/uL    Hemoglobin 10.6 (L) 13.3 - 17.7 g/dL    Hematocrit 32.3 (L) 40.0 - 53.0 %    MCV 95 78 - 100 fL    MCH 31.3 26.5 - 33.0 pg    MCHC 32.8 31.5 - 36.5 g/dL    RDW 14.1 10.0 - 15.0 %    Platelet Count 118 (L) 150 - 450 10e3/uL    % Neutrophils 55 %    % Lymphocytes 25 %    % Monocytes 19 %    % Eosinophils 1 %    % Basophils 0 %    % Immature Granulocytes 0 %    NRBCs per 100 WBC 0 <1 /100    Absolute Neutrophils 3.1 1.6 - 8.3 10e3/uL    Absolute Lymphocytes 1.4 0.8 - 5.3 10e3/uL    Absolute Monocytes 1.1 0.0 - 1.3 10e3/uL    Absolute Eosinophils 0.0 0.0 - 0.7 10e3/uL    Absolute Basophils 0.0 0.0 - 0.2 10e3/uL    Absolute Immature Granulocytes 0.0 <=0.4 10e3/uL    Absolute NRBCs 0.0 10e3/uL   Symptomatic Influenza A/B, RSV, & SARS-CoV2 PCR (COVID-19) Nose    Specimen: Nose; Swab   Result Value Ref Range     Influenza A PCR Negative Negative    Influenza B PCR Negative Negative    RSV PCR Negative Negative    SARS CoV2 PCR Positive (A) Negative    Narrative    Testing was performed using the Xpert Xpress CoV2/Flu/RSV Assay on the [employer] GeneXpert Instrument. This test should be ordered for the detection of SARS-CoV2, influenza, and RSV viruses in individuals with signs and symptoms of respiratory tract infection. This test is for in vitro diagnostic use under the [place] FDA for laboratories certified under CLIA to perform high or moderate complexity testing. This test has been [place] FDA cleared. A negative result does not rule out the presence of PCR inhibitors in the specimen or target RNA in concentration below the limit of detection for the assay. If only one viral target is positive but coinfection with multiple targets is suspected, the sample should be re-tested with another FDA cleared, approved, or authorized test, if coninfection would change clinical management. This test was validated by the [facility]. These laboratories are certified under the Clinical Laboratory Improvement Amendments of 1988 (CLIA-88) as qualified to perfom high complexity laboratory testing.   XR Chest 2 Views    Narrative    EXAM: XR CHEST 2 VIEWS  LOCATION: [facility]  DATE: 10/15/2024    INDICATION: Recent COVID diagnosis, cough with shortness of breath  COMPARISON: 9/28/2020.    FINDINGS: The heart is enlarged. There is no pulmonary edema. The thoracic aorta is calcified and tortuous. The lungs are clear. There is no pneumothorax or pleural effusion.      Impression    IMPRESSION: No acute abnormality.     *Note: Due to a large number of results and/or encounters for the requested time period, some results have not been displayed. A complete set of results can be found in Results Review.       Medications - No data to display    Assessments & Plan (with Medical Decision Making)     I have  reviewed the nursing notes.    I have reviewed the findings, diagnosis, plan and need for follow up with the patient.  Medical Decision Making  Melvin Abad is a 89 year old male with complex medical history including hypertension, A-fib on Coumadin, CVA who presents for evaluation of generalized weakness.  Temperature of 99.8  F, O2 saturations 92% on room air.  Vitals otherwise normal.  He appears a bit pale, but looks nontoxic.  Tacky mucous membranes present.  Lungs are clear to auscultation without wheezing or rhonchi.  Separately, patient has mild bilateral lower extremity edema and some chronic venous stasis changes, which appear more prominent on the left.  There is also a superficial area of ulceration to the left shin as described above.  This does not look obviously infected today and I think his symptoms are most likely due to COVID-19 infection instead.  Since he does not have help with transfers under his current living situation, I think he will require admission to the hospital for PT/OT evaluation and monitoring/treatment of COVID.  We will order confirmatory viral testing along with basic labs to rule out other causes of infection such as cellulitis or pneumonia.  Patient denies having any urinary symptoms at present.    Patient was positive for COVID-19 today on confirmation testing.  The rest of his labs were reassuring.  He has mild anemia with no leukocytosis.  Procalcitonin is negative.  INR is therapeutic.  He has mild DELPHINE but otherwise unremarkable metabolic panel.  Chest x-ray showed no obvious infiltrate or other abnormalities.  I do believe the patient will require admission for the sake of monitoring/treatment of COVID-19 infection and also PT/OT evaluation.  Conversation with the hospitalist is pending at shift change and he will therefore be signed out to Dr. Abad to follow-up on disposition.    New Prescriptions    No medications on file     Final diagnoses:   COVID-19 virus  infection     10/15/2024   [facility] EMERGENCY DEPT       Colten Montoya MD  10/15/24 0711

## 2024-10-16 ENCOUNTER — APPOINTMENT (OUTPATIENT)
Dept: PHYSICAL THERAPY | Facility: CLINIC | Age: 89
DRG: 178 | End: 2024-10-16
Attending: FAMILY MEDICINE
Payer: COMMERCIAL

## 2024-10-16 LAB
ALBUMIN SERPL BCG-MCNC: 3.3 G/DL (ref 3.5–5.2)
ALP SERPL-CCNC: 96 U/L (ref 40–150)
ALT SERPL W P-5'-P-CCNC: 17 U/L (ref 0–70)
ANION GAP SERPL CALCULATED.3IONS-SCNC: 12 MMOL/L (ref 7–15)
AST SERPL W P-5'-P-CCNC: 30 U/L (ref 0–45)
BILIRUB SERPL-MCNC: 0.6 MG/DL
BUN SERPL-MCNC: 28.5 MG/DL (ref 8–23)
CALCIUM SERPL-MCNC: 8.7 MG/DL (ref 8.8–10.4)
CHLORIDE SERPL-SCNC: 105 MMOL/L (ref 98–107)
CREAT SERPL-MCNC: 1.28 MG/DL (ref 0.67–1.17)
CRP SERPL-MCNC: 42.44 MG/L
EGFRCR SERPLBLD CKD-EPI 2021: 51 ML/MIN/1.73M2
ERYTHROCYTE [DISTWIDTH] IN BLOOD BY AUTOMATED COUNT: 14.1 % (ref 10–15)
GLUCOSE BLDC GLUCOMTR-MCNC: 101 MG/DL (ref 70–99)
GLUCOSE SERPL-MCNC: 79 MG/DL (ref 70–99)
HCO3 SERPL-SCNC: 22 MMOL/L (ref 22–29)
HCT VFR BLD AUTO: 33.9 % (ref 40–53)
HEMOCCULT STL QL: NEGATIVE
HGB BLD-MCNC: 11.2 G/DL (ref 13.3–17.7)
INR PPP: 2.33 (ref 0.85–1.15)
MCH RBC QN AUTO: 31.5 PG (ref 26.5–33)
MCHC RBC AUTO-ENTMCNC: 33 G/DL (ref 31.5–36.5)
MCV RBC AUTO: 95 FL (ref 78–100)
PLATELET # BLD AUTO: 116 10E3/UL (ref 150–450)
POTASSIUM SERPL-SCNC: 4.3 MMOL/L (ref 3.4–5.3)
PROT SERPL-MCNC: 6.1 G/DL (ref 6.4–8.3)
RBC # BLD AUTO: 3.56 10E6/UL (ref 4.4–5.9)
SODIUM SERPL-SCNC: 139 MMOL/L (ref 135–145)
WBC # BLD AUTO: 5.1 10E3/UL (ref 4–11)

## 2024-10-16 PROCEDURE — 86140 C-REACTIVE PROTEIN: CPT | Performed by: FAMILY MEDICINE

## 2024-10-16 PROCEDURE — G0378 HOSPITAL OBSERVATION PER HR: HCPCS

## 2024-10-16 PROCEDURE — 97161 PT EVAL LOW COMPLEX 20 MIN: CPT | Mod: GP | Performed by: PHYSICAL THERAPIST

## 2024-10-16 PROCEDURE — 96376 TX/PRO/DX INJ SAME DRUG ADON: CPT

## 2024-10-16 PROCEDURE — 99232 SBSQ HOSP IP/OBS MODERATE 35: CPT | Performed by: NURSE PRACTITIONER

## 2024-10-16 PROCEDURE — 85610 PROTHROMBIN TIME: CPT | Performed by: FAMILY MEDICINE

## 2024-10-16 PROCEDURE — 250N000013 HC RX MED GY IP 250 OP 250 PS 637: Performed by: FAMILY MEDICINE

## 2024-10-16 PROCEDURE — 85027 COMPLETE CBC AUTOMATED: CPT | Performed by: FAMILY MEDICINE

## 2024-10-16 PROCEDURE — 82272 OCCULT BLD FECES 1-3 TESTS: CPT | Performed by: FAMILY MEDICINE

## 2024-10-16 PROCEDURE — 258N000003 HC RX IP 258 OP 636: Performed by: FAMILY MEDICINE

## 2024-10-16 PROCEDURE — 36415 COLL VENOUS BLD VENIPUNCTURE: CPT | Performed by: FAMILY MEDICINE

## 2024-10-16 PROCEDURE — 97110 THERAPEUTIC EXERCISES: CPT | Mod: GP | Performed by: PHYSICAL THERAPIST

## 2024-10-16 PROCEDURE — 80053 COMPREHEN METABOLIC PANEL: CPT | Performed by: FAMILY MEDICINE

## 2024-10-16 PROCEDURE — 250N000013 HC RX MED GY IP 250 OP 250 PS 637: Performed by: NURSE PRACTITIONER

## 2024-10-16 PROCEDURE — 250N000011 HC RX IP 250 OP 636: Mod: JZ | Performed by: FAMILY MEDICINE

## 2024-10-16 PROCEDURE — G0463 HOSPITAL OUTPT CLINIC VISIT: HCPCS

## 2024-10-16 PROCEDURE — 82962 GLUCOSE BLOOD TEST: CPT

## 2024-10-16 RX ORDER — WARFARIN SODIUM 5 MG/1
5 TABLET ORAL
COMMUNITY
Start: 2024-10-14 | End: 2024-11-08

## 2024-10-16 RX ORDER — WARFARIN SODIUM 5 MG/1
5 TABLET ORAL
Status: COMPLETED | OUTPATIENT
Start: 2024-10-16 | End: 2024-10-16

## 2024-10-16 RX ORDER — LISINOPRIL 10 MG/1
10 TABLET ORAL DAILY
Status: DISCONTINUED | OUTPATIENT
Start: 2024-10-16 | End: 2024-10-18 | Stop reason: HOSPADM

## 2024-10-16 RX ORDER — FUROSEMIDE 40 MG/1
40 TABLET ORAL DAILY
Status: ON HOLD | COMMUNITY
Start: 2024-10-09 | End: 2024-10-18

## 2024-10-16 RX ADMIN — TAMSULOSIN HYDROCHLORIDE 0.4 MG: 0.4 CAPSULE ORAL at 09:31

## 2024-10-16 RX ADMIN — GABAPENTIN 200 MG: 100 CAPSULE ORAL at 21:00

## 2024-10-16 RX ADMIN — LISINOPRIL 10 MG: 10 TABLET ORAL at 12:04

## 2024-10-16 RX ADMIN — PSYLLIUM HUSK 3.4 G: 3.4 POWDER ORAL at 09:32

## 2024-10-16 RX ADMIN — SODIUM CHLORIDE 50 ML: 900 INJECTION INTRAVENOUS at 14:46

## 2024-10-16 RX ADMIN — METOPROLOL SUCCINATE 50 MG: 50 TABLET, EXTENDED RELEASE ORAL at 09:31

## 2024-10-16 RX ADMIN — CEPHALEXIN 500 MG: 250 CAPSULE ORAL at 05:37

## 2024-10-16 RX ADMIN — LORATADINE 10 MG: 10 TABLET ORAL at 09:31

## 2024-10-16 RX ADMIN — REMDESIVIR 100 MG: 100 INJECTION, POWDER, LYOPHILIZED, FOR SOLUTION INTRAVENOUS at 14:44

## 2024-10-16 RX ADMIN — WARFARIN SODIUM 5 MG: 5 TABLET ORAL at 18:03

## 2024-10-16 RX ADMIN — CEPHALEXIN 500 MG: 250 CAPSULE ORAL at 14:47

## 2024-10-16 RX ADMIN — CEPHALEXIN 500 MG: 250 CAPSULE ORAL at 21:01

## 2024-10-16 RX ADMIN — GABAPENTIN 200 MG: 100 CAPSULE ORAL at 09:31

## 2024-10-16 ASSESSMENT — ACTIVITIES OF DAILY LIVING (ADL)
ADLS_ACUITY_SCORE: 42
DEPENDENT_IADLS:: CLEANING;COOKING;LAUNDRY;SHOPPING;MEAL PREPARATION;MEDICATION MANAGEMENT;TRANSPORTATION
ADLS_ACUITY_SCORE: 42

## 2024-10-16 NOTE — PROVIDER NOTIFICATION
"PRIMARY DIAGNOSIS: \"GENERIC\" NURSING  OUTPATIENT/OBSERVATION GOALS TO BE MET BEFORE DISCHARGE:  ADLs back to baseline: No    Activity and level of assistance: AX2    Pain status: Improved-controlled with oral pain medications.    Return to near baseline physical activity: No     Discharge Planner Nurse   Safe discharge environment identified:  Pending assisted living vs TCU  Barriers to discharge: Yes       Entered by: Deandra Solitario RN 10/16/2024 3:50 AM     Please review provider order for any additional goals.   Nurse to notify provider when observation goals have been met and patient is ready for discharge.  "

## 2024-10-16 NOTE — PROGRESS NOTES
"PRIMARY DIAGNOSIS: \"GENERIC\" NURSING  OUTPATIENT/OBSERVATION GOALS TO BE MET BEFORE DISCHARGE:  ADLs back to baseline: No    Activity and level of assistance: AX2    Pain status: Improved-controlled with oral pain medications.    Return to near baseline physical activity: No     Discharge Planner Nurse   Safe discharge environment identified: Pending assisted living vs TCU   Barriers to discharge: Yes       Entered by: Deandra Solitario RN 10/16/2024 4:32 AM   Pt A&Ox3, VSS on RA, R shoulder pain managed with tylenol, [place]; needs reminding to use pocket talker, LLE dressing clean, dry, and intact. Waiting on blood occult sample.    Please review provider order for any additional goals.   Nurse to notify provider when observation goals have been met and patient is ready for discharge.  "

## 2024-10-16 NOTE — PROGRESS NOTES
".PRIMARY DIAGNOSIS: \"GENERIC\" NURSING  OUTPATIENT/OBSERVATION GOALS TO BE MET BEFORE DISCHARGE:  ADLs back to baseline: No    Activity and level of assistance: Axs2    Pain status: Improved-controlled with oral pain medications.    Return to near baseline physical activity: No     Discharge Planner Nurse   Safe discharge environment identified:  pending assisted living vs TCU  Barriers to discharge: Yes       Entered by: Deandra Solitario RN 10/15/2024 11:22 PM     Please review provider order for any additional goals.   Nurse to notify provider when observation goals have been met and patient is ready for discharge.  "

## 2024-10-16 NOTE — PROGRESS NOTES
PRIMARY DIAGNOSIS: GENERALIZED WEAKNESS    OUTPATIENT/OBSERVATION GOALS TO BE MET BEFORE DISCHARGE  1. Orthostatic performed: N/A    2. Tolerating PO medications: Yes    3. Return to near baseline physical activity: No, A2 with GBW    4. Cleared for discharge by consultants (if involved): No      Discharge Planner Nurse   Safe discharge environment identified: Yes  Barriers to discharge: No       Entered by: Jazlyn Beal RN 10/16/2024 6:19 PM     Please review provider order for any additional goals.   Nurse to notify provider when observation goals have been met and patient is ready for discharge.

## 2024-10-16 NOTE — PROGRESS NOTES
PRIMARY DIAGNOSIS: GENERALIZED WEAKNESS    OUTPATIENT/OBSERVATION GOALS TO BE MET BEFORE DISCHARGE  1. Orthostatic performed: N/A    2. Tolerating PO medications: Yes    3. Return to near baseline physical activity: No, A2 with GBW, baseline independent at [facility]    4. Cleared for discharge by consultants (if involved): No    A&Ox4. VSS on RA. Denies pain. Up in chair for meals. A2 GBW. Venous stasis wound on L shin, wrapped. WOC to see pt today.     Discharge Planner Nurse   Safe discharge environment identified: Yes  Barriers to discharge: Yes       Entered by: Jazlyn Beal RN 10/16/2024 1:07 PM     Please review provider order for any additional goals.   Nurse to notify provider when observation goals have been met and patient is ready for discharge.

## 2024-10-16 NOTE — PROGRESS NOTES
[facility]    Medicine Progress Note - Hospitalist Service    Date of Admission:  10/15/2024    Assessment & Plan      Melvin Abad is a 89 year old male with PMHx a fib on Coumadin, previous CVA with mild left leg weakness, HTN, HLD, BPH, CKA 3a who resides at an assistive living facility who presented with generalized weakness and URI infection symptoms and has been found to be COVID positive with concern for mild dehydration and inadequate PO intake and developing left lower leg cellulitis.  He is being registered to observation status for Remdesivir, Keflex, and close monitoring with physical therapy to evaluate patient tomorrow to help assist with disposition planning     Principal Problem:  # Confirmed COVID-19 infection      Patient has remained afebrile, remains on room air.     Symptom Onset During the day of 10/14/24   Date of 1st Positive Test 10/15/24   Vaccination Status Partially Vaccinated - last given in 9/2023 but has not gotten this years vaccine yet       - COVID-19 special precautions, continuous pulse-ox  - Oxygen: continue current support without oxygen but consider transition to inpatient status if oxygen is needed going forward.  If needed titrate to keep SpO2 between 90-96%  - Labs: Daily COVID labs ordered x4 days (CBC, BMP, CRP).  Continue to trend after 4 days as needed.   - Imaging: no additional imaging needed at this time  - Breathing treatments: no inhalers needed; avoid nebulizers in favor of MDIs   - IV fluids: not indicated at this time  - Antibiotics: not indicated   - COVID-Focused Medications: Remdesivir x 3 days or until hospital discharge, started on 10/15/24  - DVT Prophylaxis:         - At high risk of thrombotic complications due to COVID-19 (DDimer = N/A )         - Already on therapeutic anticoagulation with Coumadin    Active Problems:    Weakness generalized    Assessment: likely secondary to COVID infection with patient currently  unable to be independent in [facility] facility as would be needed for him to return home.  He does have some baseline left lower extremity weakness from previous stroke which may be affecting his functionality now that he has generalized weakness in addition.  Patient was assessed by physical therapy and determined patient is below baseline level of function and is recommending TCU at time of discharge    Plan:   -will proceed with treatment of COVID as above  -physical therapy eval and treat  -Social work consult to assist in disposition plan       Cellulitis of left lower extremity    Left anterior leg skin wound    Venous stasis of both lower extremities    Assessment: patient has baseline chronic venous stasis in bilateral lower legs and is on Lasix daily to decrease swelling.  He struggles intermittently with blisters and sores in this involved skin and normally self medicates with a cream and dressing.  Current sore has been there for several weeks on the left anterior shin and in the past few days there has been increased redness in the surrounding skin and ED provider felt it worsened slightly with increased warmth during the ED visit and patient was started on Keflex for possible developing cellulitis.     Plan:   -will continue Keflex 500 mg TID and monitor for improvement in surrounding skin erythema and warmth   -wound care RN referral made to assist in best medical management of wound - consult pending      Thrombocytopenia (H)    Assessment: platelets 118 at time of admission, no history of thrombocytopenia.  Today platelets essentially stable at 116.  likely secondary to COVID infection    Plan:   -will continue to trend in upcoming days       Elevated creatinine - does not meet DELPHINE diagnosis     Chronic kidney disease, stage 3a (H)    Assessment: patient has recent baseline of 1.22-1.28 throughout this year.  Creatinine at presentation is 1.47 which is increased from recent baseline but does not meet DELPHINE  criteria at this time. On admission patient does appeared slightly dry and has not been eating or drinking well.  Home dose of lisinopril and Lasix held on admission.  Creatinine back to baseline today at 1.25.  Patient reports appetite somewhat improved    Plan:   -Will resume lisinopril today  -Continue to hold Lasix until appetite back to baseline  -encourage adequate PO intake and monitor intake and output   -monitor creatinine daily and trend  -avoid nephrotoxic medications       Anemia - multifactorial (history of acute blood loss, anemia of chronic disease, etc)    Assessment: patient has history of anemia over the past year but trending downward overall - patient did have a hematoma earlier in the year with acute blood loss but was not placed on iron supplementation at that time.  Hemoglobin has decreased from 11.9 in August down to 10.6 today with patient denying any active bleeding he is aware of.  Patient is on coumadin and therefore is at risk of increased bleeding - INR is therapeutic.  Patient's ferritin normal at 258, iron level is low, iron binding capacity and iron sat index unable to obtain due to hemolyzed specimen.  But based on normal ferritin patient has likely anemia of chronic disease.  On recheck today hemoglobin is stable at 1.2.  Occult stool negative for blood    Plan:   -continue Coumadin and keep in therapeutic range for now unless signs of active bleeding are seen  -monitor hemoglobin daily or sooner if there is concern for active bleed.        Essential hypertension    Assessment: patient is on metoprolol XL 50 mg daily, lisinopril 10 mg daily and Lasix 40 mg daily with blood pressures well controlled on outpatient appointment review available.  On admission blood pressures  in the  systolic in patient with poor PO intake since illness started yesterday.  Lisinopril and Lasix held on admission.  Over the last 12 hours systolic blood pressure has ranged 120-150.    Plan:   -will  "resume lisinopril today  -Continue to hold Lasix  -continue metoprolol XL but with hold parameters       Chronic atrial fibrillation (H)    Long term current use of anticoagulant therapy    Assessment: patient has longstanding chronic atrial fibrillation with rate control achieved with metoprolol XL 50 mg daily and uses Coumadin for anticoagulation with INR of therapeutic    Plan:   -continue metoprolol with hold parameters for low blood pressures  -pharmacy to manage inpatient coumadin dosing during this hospital stay      Chronic obstructive pulmonary disease, unspecified COPD type (H)    Assessment: documented in his chart and has evidence of emphysema as far back os 2003 on available imaging but patient is not on any scheduled or prn medications for his breathing in recent years (last albuterol inhaler given in 2020) and reports it has never given him issues.  He quit smoking \"many, many years ago.\"    Plan:   -will proceed with treatment of COVID infection as above and monitor respiratory status closely.    -no steroids for now given patient stable on RA, no signs of respiratory distress  -could consider prn albuterol inhaler if wheezing occurs going forward.       History of chronic right-sided heart failure (H)    Assessment: patient has documented history of chronic right sided heart failure, however on most recent echocardiogram form 4/2023 he had normal right ventricle size, structure and function.  Patient does struggling with chronic lower legs swelling and venous status as above and continues on metoprolol, lisinopril and Lasix at baseline.  No signs of volume overload at this time and concern that patient may be slightly dehydrated as above    Plan:   -continue metoprolol with hold parameters  -Resume lisinopril   -Continue to hold Lasix until intake improved to baseline      Hyperlipidemia LDL goal <100    Assessment: patient takes simvastatin 40 mg daily    Plan:   -restart at time of hospital " "discharge       Benign prostatic hyperplasia, unspecified whether lower urinary tract symptoms present    Assessment: patient takes Flomax 0.4 mg daily and has not had any breakthrough symptoms    Plan:   -continue home regimen without change      History of CVA (cerebrovascular accident)    Assessment: previous history of a right pontine stroke with left lower extremity weakness noted since.  No new focal neurological deficits     Plan:   -monitor for any new focal neurological deficits  -continue blood pressure medications and Coumadin as above and restart statin at time of discharge          Observation Goals: -diagnostic tests and consults completed and resulted, -vital signs normal or at patient baseline, -tolerating oral intake to maintain hydration, -infection is improving, -dyspnea improved and O2 sats greater than 88% on room air or prior home oxygen levels, -safe disposition plan has been identified, Nurse to notify provider when observation goals have been met and patient is ready for discharge.  Diet: Regular Diet Adult    DVT Prophylaxis: Warfarin  Ulloa Catheter: Not present  Lines: None     Cardiac Monitoring: None  Code Status: No CPR- Do NOT Intubate      Clinically Significant Risk Factors Present on Admission               # Hypoalbuminemia: Lowest albumin = 3.3 g/dL at 10/16/2024  5:41 AM, will monitor as appropriate  # Drug Induced Coagulation Defect: home medication list includes an anticoagulant medication  # Thrombocytopenia: Lowest platelets = 116 in last 2 days, will monitor for bleeding   # Hypertension: Noted on problem list         # Obesity: Estimated body mass index is 30.31 kg/m  as calculated from the following:    Height as of this encounter: 1.753 m (5' 9\").    Weight as of this encounter: 93.1 kg (205 lb 4 oz).       # Financial/Environmental Concerns: none         Disposition Plan     Medically Ready for Discharge: Anticipated Today           The patient's care was discussed with " the Care Coordinator/ and Patient.    Farrah Batista, CNP  Hospitalist Service  [facility]  Securely message with [employer] (more info)  Text page via [employer] Paging/Directory   ______________________________________________________________________    Interval History   No acute events overnight, nursing notes reviewed.  Patient reports continues to feel some weakness and tired although appetite has improved some.  Denies any dyspnea or chest pain    Physical Exam   Vital Signs: Temp: 98.6  F (37  C) Temp src: Oral BP: (!) 148/85 Pulse: 75   Resp: 18 SpO2: 94 % O2 Device: None (Room air)    Weight: 205 lbs 3.97 oz    General Appearance: Patient lying in bed, no acute distress.  Hard of hearing, but able to communicate with pocket talker  Respiratory: Respiratory effort easy at rest, lung sounds clear to auscultation  Cardiovascular: Irregular regular rhythm, no murmur appreciated  GI: Abdomen soft and nontender with active bowel sounds  Skin: Warm and dry, did not visualize ulcer on left lower extremity today.  Does have redness that appears to be chronic venous stasis changes to bilateral lower extremities      Medical Decision Making       40 MINUTES SPENT BY ME on the date of service doing chart review, history, exam, documentation & further activities per the note.      Data     I have personally reviewed the following data over the past 24 hrs:    5.1  \   11.2 (L)   / 116 (L)     139 105 28.5 (H) /  79   4.3 22 1.28 (H) \     ALT: 17 AST: 30 AP: 96 TBILI: 0.6   ALB: 3.3 (L) TOT PROTEIN: 6.1 (L) LIPASE: N/A     Procal: N/A CRP: 42.44 (H) Lactic Acid: N/A       INR:  2.33 (H) PTT:  N/A   D-dimer:  N/A Fibrinogen:  N/A     Ferritin:  N/A % Retic:  N/A LDH:  N/A       Imaging results reviewed over the past 24 hrs:   No results found for this or any previous visit (from the past 24 hour(s)).

## 2024-10-16 NOTE — CONSULTS
Care Management Initial Consult    General Information  Assessment completed with: Children, VM-chart review,    Type of CM/SW Visit: Initial Assessment    Primary Care Provider verified and updated as needed: Yes   Readmission within the last 30 days:        Reason for Consult: discharge planning, other (see comments) (High Risk)  Advance Care Planning: Advance Care Planning Reviewed: present on chart        Communication Assessment  Patient's communication style: spoken language (English or Bilingual)    Hearing Difficulty or Deaf: yes   Wear Glasses or Blind: yes    Cognitive  Cognitive/Neuro/Behavioral: WDL                      Living Environment:   People in home: alone     Current living Arrangements: assisted living  Name of Facility: [facility]   Able to return to prior arrangements: no  Living Arrangement Comments: is currently 2 person assist    Family/Social Support:  Care provided by: self, other (see comments) ([facility] staff)  Provides care for: no one  Marital Status:   Support system: Children, Facility resident(s)/Staff          Description of Support System: Involved, Supportive    Support Assessment: Adequate family and caregiver support    Current Resources:   Patient receiving home care services: No        Community Resources: None  Equipment currently used at home: grab bar, toilet, grab bar, tub/shower, shower chair, walker, rolling  Supplies currently used at home: Hearing Aid Batteries    Employment/Financial:  Employment Status: retired        Financial Concerns: none   Referral to Financial Worker: No       Does the patient's insurance plan have a 3 day qualifying hospital stay waiver?  No    Lifestyle & Psychosocial Needs:  Social Determinants of Health     Food Insecurity: Low Risk  (10/15/2024)    Food Insecurity     Within the past 12 months, did you worry that your food would run out before you got money to buy more?: No     Within the past 12 months, did the food you bought just  not last and you didn t have money to get more?: No   Depression: Not at risk (2/28/2024)    PHQ-2     PHQ-2 Score: 0   Housing Stability: Low Risk  (10/15/2024)    Housing Stability     Do you have housing? : Yes     Are you worried about losing your housing?: No   Tobacco Use: Medium Risk (2/28/2024)    Patient History     Smoking Tobacco Use: Former     Smokeless Tobacco Use: Never     Passive Exposure: Not on file   Financial Resource Strain: Low Risk  (10/15/2024)    Financial Resource Strain     Within the past 12 months, have you or your family members you live with been unable to get utilities (heat, electricity) when it was really needed?: No   Alcohol Use: Not on file   Transportation Needs: Low Risk  (10/15/2024)    Transportation Needs     Within the past 12 months, has lack of transportation kept you from medical appointments, getting your medicines, non-medical meetings or appointments, work, or from getting things that you need?: No   Physical Activity: Not on file   Interpersonal Safety: Not on file   Stress: Not on file   Social Connections: Not on file   Health Literacy: Not on file       Functional Status:  Prior to admission patient needed assistance:   Dependent ADLs:: Wheelchair-independent  Dependent IADLs:: Cleaning, Cooking, Laundry, Shopping, Meal Preparation, Medication Management, Transportation       Mental Health Status:  Mental Health Status: No Current Concerns       Chemical Dependency Status:  Chemical Dependency Status: No Current Concerns             Values/Beliefs:  Spiritual, Cultural Beliefs, Zoroastrian Practices, Values that affect care: no               Discussed  Partnership in Safe Discharge Planning  document with patient/family: No    Additional Information:  Consult received for discharge planning and high risk score. Patient is COVID + and below baseline with strength and ambulation. Patient is [facility]. Due to not going into COVID + rooms, initial assessment completed with  patient's son, Harshad, by phone.    Patient lives at [facility]. He is quite independent at baseline. Harshad states patient is independent with transfers, toileting, and his manual wheelchair at baseline. Patient has an electric scooter that he uses when out in the community.   [facility] staff assist with meals, medications, bathing, cleaning and laundry.    Physical therapy recommendation this morning is TCU. Discussed recommendation with patient's son, Harshad, and his wife, Toña. They plan to discuss with patient that he is needing too much assistance for [facility] at this time and will need TCU.    Referral placed to [facility] (Main Phone: [phone] Admissions Phone: [phone] Fax: [phone]).    Family does not feel comfortable transporting patient due to assist level and COVID +. Patient will likely need ambulance transport due to COVID +.    MILES Olivarez  [facility] [phone]/ [facility] [phone]  Care Management

## 2024-10-16 NOTE — CONSULTS
Reason For Visit: Melvin Abad, 89 year old male, seen for a WO consultation to evaluate and treat Left LE venous ulcer.  Patient was admitted on 10/15/24 for UR infection, found to be COVID positive.  Woc nurse Josefina HINOJOSA RN cwocn is also present with patient in the room during the end of cares to assist with bandage application.  Patient is A&Ox4, pleasant upon interaction, some mild confusion noted.     Start of Care in [facility] Wound and Ostomy services: 10/16/2024 for inpatient consult.  Referring Provider: Dr NASH Abad dated 10/15/24 inpatient.  Primary Care Provider: Kim Chong   Wound Location: Left anterior lower leg.     Wound History: Per admission H&P from this current hospitalization pt noted to be developing left LE cellulitis.  PMH included chronic bilateral LE venous statis.  Assessment: patient has baseline chronic venous stasis in bilateral lower legs, on Lasix daily, struggles intermittently with blisters, sores in this involved skin and normally self medicates with a cream and dressing.  Current sore present several weeks on the left anterior shin, past few days increased redness in the surrounding skin, ED provider felt it worsened during the ED visit, patient started on Keflex for possible developing cellulitis. Plan: -will continue Keflex 500 mg TID based on current renal function and monitor for improvement in surrounding skin erythema and warmth.  Wound care RN referral made to assist in best medical management of wound.  Per pt reports during my initial assessment today 10/16, has had edema with occasional wounds for many years.  Wears compression socks that are pretty tight most of the time, but when he need to put any gauze over the wound he uses Manny stockings for some compression.  He does reports awareness of need to keep the LE elevated when sitting or laying to help with swelling control.  He has used bacitracin on the wound, then gauze and tapes  dressing in place.  Current wound the pt confirms has been present many weeks, first noted when in bed, swelling was increased in his left leg and he thinks friction from the bed linens scrapped off the loose skin on the left shin.      Past Medical History:  Patient Active Problem List   Diagnosis    Long term current use of anticoagulant therapy    Hyperlipidemia LDL goal <100    Diverticulosis    Essential hypertension    History of skin cancer    AK (actinic keratosis)    Gilbert syndrome    Carpal tunnel syndrome of right wrist    Cellulitis    Anemia due to blood loss, acute    DVT prophylaxis    Obesity (BMI 30-39.9)    Long-term (current) use of anticoagulants [Z79.01]    Pain of right lower leg    Contusion of right lower extremity    Pain of left lower extremity    Longstanding persistent atrial fibrillation (H)    Anticoagulation goal of INR 2 to 3    Slow transit constipation    Sleep disturbances    Right pontine stroke (H)    Dry eyes    Restless legs syndrome (RLS)    Osteoarthritis of glenohumeral joint, left    Chronic atrial fibrillation (H)    Weakness    Pain of left thigh    Chronic right-sided heart failure (H)    Arthritis pain    Clostridium difficile enterocolitis    Chronic obstructive pulmonary disease, unspecified COPD type (H)    Benign prostatic hyperplasia, unspecified whether lower urinary tract symptoms present    Chronic kidney disease, stage 3a (H)    Weakness generalized    Cellulitis of left lower extremity    COVID-19 virus infection    Thrombocytopenia (H)    Anemia - multifactorial (history of acute blood loss, anemia of chronic disease, etc)    Venous stasis of both lower extremities    History of CVA (cerebrovascular accident)                Tobacco Use:     Tobacco Use      Smoking status: Former        Types: Cigarettes      Smokeless tobacco: Never      Tobacco comments: 5 years     Diabetic: No  HgbA1C:   Hemoglobin A1C   Date Value Ref Range Status   01/20/2023 6.2 (H)  <5.7 % Final     Comment:     Normal <5.7%   Prediabetes 5.7-6.4%    Diabetes 6.5% or higher     Note: Adopted from ADA consensus guidelines.   04/10/2019 6.1 (H) 0 - 5.6 % Final     Comment:     Normal <5.7% Prediabetes 5.7-6.4%  Diabetes 6.5% or higher - adopted from ADA   consensus guidelines.     Checks Blood Glucose?:  NA    Personal/social history:  Prior to current hospitalization pt was a resident at [facility] here in [place].  Discharge recommendations per LSW is TCU, referral placed for [facility].      Objective:   Current treatment plan: Adaptic, ABD pad, secured with Kerlix roll gauze.  Last changed: Yesterday.    Wound #1 Left anterior lower leg, venous stasis ulcer.  Stage/tissue depth: partial thickness  9 cm L x 8 cm W x 0.1 cm D  Tunneling: none noted  Undermining: none noted  Wound bed type/amount: approximately 40 % red denuded moist dermal tissue and 60 % intact but likely nonviable fragile skin, hypopigmented in areas; Is fluctuant  Wound Edges: open where there is depth  Periwound: edema, erythema with increased warmth to touch noted, hemosiderin staining.  Drainage: small to moderate amounts serosanguinous  Odor: no  Pain: yes with direct cares    Photo's from today's initial inpatient wo nurse consult 10/16/24.    Dorsalis Pedal Pulse: weak but palpable: NA doppler: NA phasic  Posterior Tibial Pulse: not assessed this visit: NA doppler: NA phasic  Hair growth: none noted left leg knee distally  Capillary Refill: less than seconds  Feet/toes color: pale pink with hemosiderin staining and edema  Nails: not assessed this visit  R Leg: Edema Not assessed this visit. Ankle circumference NA cm. Calf circumference NA cm.  L Leg: Edema nonpitting firm in areas. Ankle circumference 35 cm. Calf circumference 35 cm.    Mobility: Currently limited due to weakness and COVID infection.  Current offloading/footwear: only wearing gripper socks  Sensation: wnl    Other callusing/areas  of concern: none noted, no full head to toe skin inspection performed by [facility] nurse today    Diet: Regular    Goals of Wound Healing:   - Transition from open wounds to fully reepithelialized stage of healing  - Maintain moist environment, with Adaptic  - Control exudate, with ABD pad  - Autolytic debridement of NA  - Protect wound from outside environment, with Adaptic, ABD pad and Kerlix roll gauze.  - Antimicrobial No topical antimicrobial in use  - Pack open space, No significant depth  - Promote healing by secondary intention for complete closure of wound, NA is partial thickness so would heal via new epithelialization.  - Offloading/pressure reduction, NA    Assessment:  The left leg wound is dressed on my arrival with Kerlix over ABD pad over Adaptic.  Dressing removed noted to have some small to moderate amounts of serosanguinous drainage on Adaptic, scant amounts on the inner ABD pad.  Lower leg noted for increased warmth to touch all around the wound and over most areas of erythema present.  When left open after cleansing with saline the warmth did not decrease, was not hot but significantly warmer then the surrounding skin or opposing right leg.    The wound is consistent with a venous stasis ulcer.  Area has two distinct open wound sites with moist denuded dermal tissue, one small linear opening within the very fragile to likely nonviable dermal tissue present in the total area measured.    Barriers to wound healing:   Poor nutrition: inadequate supply of protein, carbohydrates, fatty acids, and trace elements essential for all phases of wound healing.  Reduced Blood Supply: inadequate perfusion to heal wound, arterial flow appears adequate.  Medication: NA  Chemotherapy: suppresses the immune system and inflammatory response, NA  Radiotherapy: increases production of free radical which damage cells, NA  Psychological stress: none noted  Obesity: decreases tissue perfusion, NA  Infection: prolongs  inflammatory phase, uses vital nutrients, impairs epithelialization and releases toxins, active cellulitis is present, pt is on IV antibiotics  Underlying Disease: venous stasis history with reoccurring wounds, Afib, limited mobility, current URI.   Maceration: reduces wound tensile strength and inhibits epithelial migration, none noted, risk is low but being addressed with ABD pad for absorbency.   Patient compliance, TBD  Unrelieved pressure, NA  Immobility, limited due to current illness and infections  Substance abuse: NA    Plan:  For now it is best to simply apply a non adhesive no stick contact layer like Adaptic oil emulsion gauze to the open wounds, cover with ABD pad secured with either Kerlix roll gauze or stretch gauze and change daily.  One the increased warmth to touch resolves, recommend to continue to keep covered with same dressings and add either compression sock if able to don, or ace wrap, to address need for compression.  Return to use of tighter compression sock recommended once wound improve or heal.     Interventions to Barriers:  As listed above.    Topical care to Left LE venous ulcer.  See Plan    Offloading:  NA  Additional recommendations:  Cares provided as listed above.    Discussed plan of care with patient and his nurse Jazlyn DEWEY. Teaching done with patient and his nurse Jazlyn RN for dressing changes; pt is currently unable to perform self cares.    Discharge instructions updated to include:  Recommended wound and compression cares.    Supplies: Left in the room today extra Adaptic oil emulsion dressings, ABD pads, Kerlix and wound cleanser.    WOC Return visit: None scheduled as pt is likely to discharge to TCU soon  Nursing to notify Provider and WOC Nurse if wound deteriorates.    Verbal, written, & demonstrative education provided.  Face to face time (excluding procedure): approximately 20 minutes.  Procedure: NA  Care plan was not changed.    Rasta Payne RN  [facility]  [phone]

## 2024-10-16 NOTE — PROGRESS NOTES
10/16/24 0845   Appointment Info   Signing Clinician's Name / Credentials (PT) Diamond Rivera PT, DPT, ATC, LAT   Rehab Comments (PT) PT eval and treat COVID weakness, disposition placement Activity orders: IS, ambulate in room, up with assist       Present no   Living Environment   People in Home alone   Current Living Arrangements assisted living   Home Accessibility no concerns   Transportation Anticipated health plan transportation   Living Environment Comments unable to state degree of assistance   Self-Care   Usual Activity Tolerance fair   Current Activity Tolerance poor   Regular Exercise No   Equipment Currently Used at Home grab bar, toilet;grab bar, tub/shower;shower chair;walker, rolling   Fall history within last six months no   Activity/Exercise/Self-Care Comment at baseline using wheelchair for mobility in apartment and in facility. manual wheelchair to go outside the facility. transfers at baseline with walker but does not ambulate with walker since stroke   General Information   Onset of Illness/Injury or Date of Surgery 10/15/24   Referring Physician Staci Abad MD   Patient/Family Therapy Goals Statement (PT) go home   Pertinent History of Current Problem (include personal factors and/or comorbidities that impact the POC) Patient is a 89 year old male, registered OBSERVATION status from the Ed due to generalized weakness and URI symptoms found to have COVID 19, dehydration and LLE cellulitis. Patient with a previous medical history of HTN, HLD, AFib, COPD, CKD, anemia, CVA, restless leg syndrome.   Existing Precautions/Restrictions fall   Weight-Bearing Status - LUE full weight-bearing   Weight-Bearing Status - RUE full weight-bearing   Weight-Bearing Status - LLE full weight-bearing   Weight-Bearing Status - RLE full weight-bearing   General Observations on room air. COVID precautions, IV saline locked   Cognition   Affect/Mental Status (Cognition) WFL    Orientation Status (Cognition) oriented to;person;situation   Follows Commands (Cognition) WFL   Cognitive Status Comments hard of hearing   Pain Assessment   Patient Currently in Pain No   Integumentary/Edema   Integumentary/Edema Comments left shin dressings intact   Posture    Posture Forward head position;Protracted shoulders;Kyphosis   Range of Motion (ROM)   ROM Comment limited LLE at baseline. limited LUE at baseline.   Strength (Manual Muscle Testing)   Strength (Manual Muscle Testing) Able to perform R SLR;Able to perform L SLR   Bed Mobility   Bed Mobility supine-sit;sit-supine   Supine-Sit [place] (Bed Mobility) moderate assist (50% patient effort)   Sit-Supine [place] (Bed Mobility) independent   Bed Mobility Limitations impaired ability to control trunk for mobility   Impairments Contributing to Impaired Bed Mobility decreased strength   Transfers   Transfers sit-stand transfer;bed-chair transfer   Bed-Chair Transfer   Assistive Device (Bed-Chair Transfers) walker, front-wheeled   Bed-Chair [place] (Transfers) minimum assist (75% patient effort)   Sit-Stand Transfer   Sit-Stand [place] (Transfers) contact guard   Assistive Device (Sit-Stand Transfers) walker, front-wheeled   Balance   Balance Comments IND sitting balance. standing balance poor with posterior weight shift. transfer balance poor. using walker and once placed anterior and marching balance improves but as soon as this task is stopped patient with LOB retro and left   Sensory Examination   Sensory Perception patient reports no sensory changes   Muscle Tone   Muscle Tone no deficits were identified   Clinical Impression   Criteria for Skilled Therapeutic Intervention Yes, treatment indicated   PT Diagnosis (PT) impaired mobility, muscle weakness, impaired balance. impaired transfer   Influenced by the following impairments acute illness, baseline debility and left thiago deficits   Functional limitations due to impairments  use of walker. physical assistance for safety due to losses of balance.   Clinical Presentation (PT Evaluation Complexity) evolving   Clinical Presentation Rationale clinical judgement, medical status, acute infection, baseline debility   Clinical Decision Making (Complexity) low complexity   Planned Therapy Interventions (PT) balance training;bed mobility training;gait training;home exercise program;patient/family education;strengthening;transfer training;progressive activity/exercise;home program guidelines   Risk & Benefits of therapy have been explained evaluation/treatment results reviewed;participants voiced agreement with care plan;participants included;patient   PT Total Evaluation Time   PT Floral, Low Complexity Minutes (50692) 15   Physical Therapy Goals   PT Frequency 5x/week   PT Predicted Duration/Target Date for Goal Attainment 10/23/24   PT Goals Bed Mobility;Transfers;Gait   PT: Bed Mobility Supine to/from sit;Modified independent   PT: Transfers Modified independent;Bed to/from chair;Assistive device   PT: Gait Supervision/stand-by assist;10 feet;Rolling walker   PT Discharge Planning   PT Plan 1/5 Wed. transfers, bed mobility   PT Discharge Recommendation (DC Rec) Transitional Care Facility;home with assist;home with home care physical therapy   PT Rationale for DC Rec Patient from longterm, wheelchair bound using walker for transfers at baseline per patient. Patient currently is below his functional baseline, requiring physical assistance to prevent excessive loss of balance and transfer safely. At this time patient would benefit from TCU placement in order to facilitate a safe progression to his independent apartment at his longterm. IF mobility and strength improve he has the potential to return to his longterm with HHPT services and support increased for needs at the time.   PT Brief overview of current status IND sit ot supine, MOD assist supine to sit. MIN A to CGA sit to/from stand with walker. MIN assist  bed to chair transfer with walker. Seated HEP   Total Session Time   Total Session Time (sum of timed and untimed services) 15     Thank you for your referral.  Diamond Rivera, PT, DPT, ATC, LAT    [facility]  O: [phone]  E: [email or web address]

## 2024-10-16 NOTE — PROGRESS NOTES
Care Management Follow Up    Length of Stay (days): 0    Expected Discharge Date: 10/16/2024     Concerns to be Addressed: discharge planning       Patient plan of care discussed at interdisciplinary rounds: Yes    Anticipated Discharge Disposition: Transitional Care - [facility] (Main Phone: [phone] Admissions Phone: [phone] Fax: [phone])         Anticipated Discharge Services: None  Anticipated Discharge DME: None    Patient/family educated on Medicare website which has current facility and service quality ratings: yes  Education Provided on the Discharge Plan:    Patient/Family in Agreement with the Plan: yes    Referrals Placed by CM/SW: Internal Clinic Care Coordination, Post Acute Facilities, Transportation    Private pay costs discussed: Not applicable    Discussed  Partnership in Safe Discharge Planning  document with patient/family: No     Handoff Completed: Yes, HELIOFV PCP: Internal handoff referral completed    Additional Information:  Patient accepted at [facility] (Main Phone: [phone] Admissions Phone: [phone] Fax: [phone]) for tomorrow.    Discussed plan with son, Harshad, and patient's daughter-n-law, Toña. They are in agreement with plan. Discussed transportation. Family is not comfortable transporting patient with having COVID.  Ambulance transport will be arranged for morning. Discharge goal time is 1000. Updated charge nurse and IDT team at afternoon rounds.    PAS #[id number]    MILES Olivarez  [facility] [phone]/ [place] [phone]  Care Management

## 2024-10-16 NOTE — CONSULTS
SPIRITUAL HEALTH SERVICES Consult Note     Medical Surgical Unit at [facility].       SUMMARY - I have not visited Melvin due to his COVID diagnosis.  Chaplains in the system are not permitted to visit, in person, with patients who have this diagnosis.          Plan - I will continue to follow patient and be available for phone calls, as patient is able and requests it, or the care team requests it, until his discharge.      Joselito Armenta, Ph.D,   Spiritual Health Services  [address] [place] [address] [phone]  [email or web address]     Assessment -      Meaning, Beliefs, and Spirituality - Patient reported at admission that he is Latter day.

## 2024-10-16 NOTE — MEDICATION SCRIBE - ADMISSION MEDICATION HISTORY
Medication Scribe Admission Medication History    Admission medication history is complete. The information provided in this note is only as accurate as the sources available at the time of the update.    Information Source(s): [employer] via N/A    Pertinent Information: Unable to get through to [facility] requesting MAR. Updated with dispense history reports.     Changes made to PTA medication list:  Added: None  Deleted: None  Changed: None    Allergies reviewed with patient and updates made in EHR: no    Medication History Completed By: TIESHA FARAH 10/16/2024 4:44 PM    PTA Med List   Medication Sig Note Last Dose    furosemide (LASIX) 40 MG tablet Take 40 mg by mouth daily. 10/16/2024: Last dispensed: 10/9/24, 28 units      gabapentin (NEURONTIN) 100 MG capsule Take 2 capsules (200 mg) by mouth 2 times daily (Patient taking differently: Take 200 mg by mouth 2 times daily.) 10/16/2024: Last dispensed: 10/9/24, 112 units      lisinopril (ZESTRIL) 10 MG tablet Take 1 tablet (10 mg) by mouth daily (Patient taking differently: Take 10 mg by mouth daily.) 10/16/2024: Last dispensed: 10/9/24, 28 units      simvastatin (ZOCOR) 40 MG tablet Take 1 tablet (40 mg) by mouth at bedtime (Patient taking differently: Take 40 mg by mouth at bedtime.) 10/16/2024: Last dispensed: 10/9/24, 28 units      tamsulosin (FLOMAX) 0.4 MG capsule Take 1 capsule (0.4 mg) by mouth daily (Patient taking differently: Take 0.4 mg by mouth daily.) 10/16/2024: Last dispensed: 10/9/24, 28 units      warfarin ANTICOAGULANT (COUMADIN) 2.5 MG tablet Take 2.5 mgs mg Sunday, Tuesday and Thursday, and 5 mgs all other days.  Recheck INR on 11/11/24 (Patient taking differently: Take 2.5 mg by mouth. Take 2.5 mgs mg Sunday, Tuesday and Thursday, and 5 mgs all other days.  Recheck INR on 11/11/24) 10/16/2024: Last dispensed: 10/14/24, 13 units      warfarin ANTICOAGULANT (COUMADIN) 5 MG tablet Take 5 mg by mouth. Monday, Wednesday, Friday,  Saturday 10/16/2024: Last dispensed: 10/14/24, 17 units

## 2024-10-16 NOTE — PLAN OF CARE
Goal Outcome Evaluation:      Plan of Care Reviewed With: patient, child          Outcome Evaluation: [facility]

## 2024-10-16 NOTE — PROGRESS NOTES
PRIMARY DIAGNOSIS: GENERALIZED WEAKNESS    OUTPATIENT/OBSERVATION GOALS TO BE MET BEFORE DISCHARGE  1. Orthostatic performed: N/A    2. Tolerating PO medications: Yes    3. Return to near baseline physical activity: No    4. Cleared for discharge by consultants (if involved): Yes    A&Ox4 with intermittent confusion. VSS on RA. Denies pain. Ambulates A2 with GBW to BSC. Up in chair for meals. WOC consult today, venous stasis wound on LLE wrapped with adaptic, abd pads, and kerlix. Insp and exp wheezing. Remdesivir continued as ordered. Plan to discharge tomorrow @ 10 am via EMS      Discharge Planner Nurse   Safe discharge environment identified: Yes  Barriers to discharge: No       Entered by: Jazlyn Beal RN 10/16/2024 6:20 PM     Please review provider order for any additional goals.   Nurse to notify provider when observation goals have been met and patient is ready for discharge.

## 2024-10-16 NOTE — PHARMACY-ANTICOAGULATION SERVICE
Clinical Pharmacy - Warfarin Dosing Consult     Pharmacy has been consulted to manage this patient s warfarin therapy.  Indication: Atrial Fibrillation  Therapy Goal: INR 2-3  OP Anticoag Clinic: [facility]  Warfarin Prior to Admission: Yes  Warfarin PTA Regimen: 2.5 mg every Sun, Tue, Thu; 5 mg all other days  Recent documented change in oral intake/nutrition: Unknown  Dose Comments: dosing per last ACC vist on 10/14/24    INR   Date Value Ref Range Status   10/16/2024 2.33 (H) 0.85 - 1.15 Final   10/15/2024 2.47 (H) 0.85 - 1.15 Final       Recommend warfarin 5 mg today to continue home dosing regimen.  Pharmacy will monitor Melvin Abad daily and order warfarin doses to achieve specified goal.      Please contact pharmacy as soon as possible if the warfarin needs to be held for a procedure or if the warfarin goals change.      Thank you,   Sonal Quintanilla RP on 10/16/2024 at 8:35 AM

## 2024-10-17 ENCOUNTER — APPOINTMENT (OUTPATIENT)
Dept: PHYSICAL THERAPY | Facility: CLINIC | Age: 89
DRG: 178 | End: 2024-10-17
Attending: NURSE PRACTITIONER
Payer: COMMERCIAL

## 2024-10-17 PROBLEM — N17.9 ACUTE RENAL FAILURE, UNSPECIFIED ACUTE RENAL FAILURE TYPE (H): Status: ACTIVE | Noted: 2024-10-17

## 2024-10-17 PROBLEM — D64.9 ANEMIA, UNSPECIFIED TYPE: Status: ACTIVE | Noted: 2024-10-17

## 2024-10-17 LAB
ALBUMIN SERPL BCG-MCNC: 3.5 G/DL (ref 3.5–5.2)
ALP SERPL-CCNC: 103 U/L (ref 40–150)
ALT SERPL W P-5'-P-CCNC: 16 U/L (ref 0–70)
ANION GAP SERPL CALCULATED.3IONS-SCNC: 13 MMOL/L (ref 7–15)
AST SERPL W P-5'-P-CCNC: 34 U/L (ref 0–45)
BILIRUB SERPL-MCNC: 0.7 MG/DL
BUN SERPL-MCNC: 24.8 MG/DL (ref 8–23)
CALCIUM SERPL-MCNC: 8.7 MG/DL (ref 8.8–10.4)
CHLORIDE SERPL-SCNC: 104 MMOL/L (ref 98–107)
CREAT SERPL-MCNC: 1.1 MG/DL (ref 0.67–1.17)
CRP SERPL-MCNC: 30.88 MG/L
EGFRCR SERPLBLD CKD-EPI 2021: 61 ML/MIN/1.73M2
ERYTHROCYTE [DISTWIDTH] IN BLOOD BY AUTOMATED COUNT: 13.7 % (ref 10–15)
GLUCOSE SERPL-MCNC: 86 MG/DL (ref 70–99)
HCO3 SERPL-SCNC: 20 MMOL/L (ref 22–29)
HCT VFR BLD AUTO: 34.7 % (ref 40–53)
HGB BLD-MCNC: 11.5 G/DL (ref 13.3–17.7)
INR PPP: 1.98 (ref 0.85–1.15)
MCH RBC QN AUTO: 31.4 PG (ref 26.5–33)
MCHC RBC AUTO-ENTMCNC: 33.1 G/DL (ref 31.5–36.5)
MCV RBC AUTO: 95 FL (ref 78–100)
PLATELET # BLD AUTO: 125 10E3/UL (ref 150–450)
POTASSIUM SERPL-SCNC: 4 MMOL/L (ref 3.4–5.3)
PROT SERPL-MCNC: 6.5 G/DL (ref 6.4–8.3)
RBC # BLD AUTO: 3.66 10E6/UL (ref 4.4–5.9)
SODIUM SERPL-SCNC: 137 MMOL/L (ref 135–145)
WBC # BLD AUTO: 4.3 10E3/UL (ref 4–11)

## 2024-10-17 PROCEDURE — 258N000003 HC RX IP 258 OP 636: Performed by: NURSE PRACTITIONER

## 2024-10-17 PROCEDURE — 86140 C-REACTIVE PROTEIN: CPT | Performed by: FAMILY MEDICINE

## 2024-10-17 PROCEDURE — 120N000001 HC R&B MED SURG/OB

## 2024-10-17 PROCEDURE — 250N000013 HC RX MED GY IP 250 OP 250 PS 637: Performed by: FAMILY MEDICINE

## 2024-10-17 PROCEDURE — 99233 SBSQ HOSP IP/OBS HIGH 50: CPT | Performed by: NURSE PRACTITIONER

## 2024-10-17 PROCEDURE — 250N000013 HC RX MED GY IP 250 OP 250 PS 637: Performed by: NURSE PRACTITIONER

## 2024-10-17 PROCEDURE — 85027 COMPLETE CBC AUTOMATED: CPT | Performed by: FAMILY MEDICINE

## 2024-10-17 PROCEDURE — 36415 COLL VENOUS BLD VENIPUNCTURE: CPT | Performed by: FAMILY MEDICINE

## 2024-10-17 PROCEDURE — 85610 PROTHROMBIN TIME: CPT | Performed by: FAMILY MEDICINE

## 2024-10-17 PROCEDURE — 250N000011 HC RX IP 250 OP 636: Mod: JZ | Performed by: NURSE PRACTITIONER

## 2024-10-17 PROCEDURE — 80053 COMPREHEN METABOLIC PANEL: CPT | Performed by: FAMILY MEDICINE

## 2024-10-17 PROCEDURE — 97110 THERAPEUTIC EXERCISES: CPT | Mod: GP | Performed by: PHYSICAL THERAPIST

## 2024-10-17 PROCEDURE — G0378 HOSPITAL OBSERVATION PER HR: HCPCS

## 2024-10-17 RX ORDER — WARFARIN SODIUM 2.5 MG/1
2.5 TABLET ORAL
Status: COMPLETED | OUTPATIENT
Start: 2024-10-17 | End: 2024-10-17

## 2024-10-17 RX ADMIN — REMDESIVIR 100 MG: 100 INJECTION, POWDER, LYOPHILIZED, FOR SOLUTION INTRAVENOUS at 14:51

## 2024-10-17 RX ADMIN — TAMSULOSIN HYDROCHLORIDE 0.4 MG: 0.4 CAPSULE ORAL at 09:07

## 2024-10-17 RX ADMIN — METOPROLOL SUCCINATE 50 MG: 50 TABLET, EXTENDED RELEASE ORAL at 09:07

## 2024-10-17 RX ADMIN — SODIUM CHLORIDE 50 ML: 900 INJECTION INTRAVENOUS at 14:52

## 2024-10-17 RX ADMIN — GABAPENTIN 200 MG: 100 CAPSULE ORAL at 20:00

## 2024-10-17 RX ADMIN — GABAPENTIN 200 MG: 100 CAPSULE ORAL at 09:08

## 2024-10-17 RX ADMIN — CEPHALEXIN 500 MG: 250 CAPSULE ORAL at 14:52

## 2024-10-17 RX ADMIN — WARFARIN SODIUM 2.5 MG: 2.5 TABLET ORAL at 18:23

## 2024-10-17 RX ADMIN — CEPHALEXIN 500 MG: 250 CAPSULE ORAL at 20:01

## 2024-10-17 RX ADMIN — PSYLLIUM HUSK 3.4 G: 3.4 POWDER ORAL at 09:07

## 2024-10-17 RX ADMIN — LORATADINE 10 MG: 10 TABLET ORAL at 09:07

## 2024-10-17 RX ADMIN — LISINOPRIL 10 MG: 10 TABLET ORAL at 09:07

## 2024-10-17 RX ADMIN — CEPHALEXIN 500 MG: 250 CAPSULE ORAL at 05:35

## 2024-10-17 RX ADMIN — QUETIAPINE FUMARATE 12.5 MG: 25 TABLET ORAL at 19:59

## 2024-10-17 ASSESSMENT — ACTIVITIES OF DAILY LIVING (ADL)
ADLS_ACUITY_SCORE: 46
ADLS_ACUITY_SCORE: 42
ADLS_ACUITY_SCORE: 46

## 2024-10-17 NOTE — PLAN OF CARE
"Goal Outcome Evaluation:      Plan of Care Reviewed With: patient    Overall Patient Progress: no changeOverall Patient Progress: no change    Outcome Evaluation: VSS on RA. Pt alert, oriented to self, sometimes place. Has been redirectable.  More confused today and is having visual hallucinations. Pt sees spiders and other bugs on the floor. Seroquel scheduled for tonight to promote adequate rest. Denies pain. Up A2 with GBW. 1 BM today. Course LS with wheezes. IS education complete, pt up to 1250. Remdesivir continued as ordered.    Pt became agitated at end of shift, frustrated and confused about why he is in the room he's in. Writer was able to redirect pt and help calm him down. Pt will get scheduled seroquel around 7-8 pm    BP (!) 142/72 (BP Location: Left arm)   Pulse 65   Temp 98.3  F (36.8  C) (Oral)   Resp 18   Ht 1.753 m (5' 9\")   Wt 92 kg (202 lb 14.4 oz)   SpO2 95%   BMI 29.96 kg/m     "

## 2024-10-17 NOTE — PROGRESS NOTES
S-(situation): Patient changed to inpatient status    B-(background): Patient status change due to observation goals not being met. yes    A-(assessment): Alert, oriented to self only this AM. Now oriented to place and time, but intermittently confused. Pt experiencing visual hallucinations, seeing spiders on the floor. Setting off bed/chair alarm several times during shift. Pt is redirectable. VSS on RA. Very coarse LS with inspiratory and expiratory wheezes. IS teaching completed, pt at 1250. Up A2 with GBW. Assistance staff needs are inconsistent as pt is intermittently confused and [place].     R-(recommendations):  Monitoring orientation, working on increasing mobility. Will monitor patient per MD orders.       Inpatient nursing criteria listed below were met:    Adult Profile completedYes  Health care directives status obtained and documented: Yes  VTE prophylaxis orders: yes  (FYI: Asprin is not an approved anticoagulation for DVT prophylaxis)  SCD's Documented: Yes  Vaccine assessment done and vaccine ordered if needed. Yes  My Chart patient sign up addressed and documented: Yes  Care Plan initiated: Yes  Discharge planning review completed (admission navigator) Yes

## 2024-10-17 NOTE — PHARMACY-ANTICOAGULATION SERVICE
Clinical Pharmacy - Warfarin Dosing Consult     Pharmacy has been consulted to manage this patient s warfarin therapy.  Indication: Atrial Fibrillation  Therapy Goal: INR 2-3  OP Anticoag Clinic: [facility]  Warfarin Prior to Admission: Yes  Warfarin PTA Regimen: 2.5 mg every Sun, Tue, Thu; 5 mg all other days  Recent documented change in oral intake/nutrition: Unknown  Dose Comments: dosing per last ACC vist on 10/14/24    INR   Date Value Ref Range Status   10/17/2024 1.98 (H) 0.85 - 1.15 Final   10/16/2024 2.33 (H) 0.85 - 1.15 Final       Recommend warfarin 2.5 mg today.  Pharmacy will monitor Melvin Abad daily and order warfarin doses to achieve specified goal.      Please contact pharmacy as soon as possible if the warfarin needs to be held for a procedure or if the warfarin goals change.

## 2024-10-17 NOTE — PROGRESS NOTES
Care Management Follow Up    Length of Stay (days): 0    Expected Discharge Date: 10/19/2024     Concerns to be Addressed: discharge planning     Patient plan of care discussed at interdisciplinary rounds: Yes    Anticipated Discharge Disposition: Transitional Care  Anticipated Discharge Services: None  Anticipated Discharge DME: None    Patient/family educated on Medicare website which has current facility and service quality ratings: yes  Education Provided on the Discharge Plan:    Patient/Family in Agreement with the Plan: yes    Referrals Placed by CM/SW: Internal Clinic Care Coordination, Post Acute Facilities, Transportation  Private pay costs discussed: Not applicable    Discussed  Partnership in Safe Discharge Planning  document with patient/family: No     Handoff Completed: Yes, [facility] PCP: Internal handoff referral completed    Additional Information:  Per NP at IDT rounds pt is now NOT medically stable for discharge. NP plans to have patient discharge tomorrow.    NASRA updated Patric in admission at [facility] that pt will discharge tomorrow at 10am. NASRA called and spoke with son Harshad to update him on change in discharge plan. Harshad asking if pt still has covid, NASRA informed him that pt was positive on 10/15 and our policy is 10 days of isolation. Hasrhad understood and is in agreement with plan for pt to discharge tomorrow.    Plan: [facility] (Main Phone: [phone] Admissions Phone: [phone] Fax: [phone]) TCU 10/18    Transport: S at 10:00. [facility]    Deloris Padron, RNCM  Care Transitions Registered Nurse

## 2024-10-17 NOTE — PLAN OF CARE
PRIMARY DIAGNOSIS: GENERALIZED WEAKNESS    OUTPATIENT/OBSERVATION GOALS TO BE MET BEFORE DISCHARGE  1. Orthostatic performed: N/A    2. Tolerating PO medications: Yes    3. Return to near baseline physical activity: No- pt has generalized weakness, requires 1-2 assist, gait belt and walker.     4. Cleared for discharge by consultants (if involved): Yes    Discharge Planner Nurse   Safe discharge environment identified: Yes- plan for transfer to [facility] at 1000 today  Barriers to discharge: No       Entered by: Celestina Buenrostro RN 10/17/2024 2:09 AM     Please review provider order for any additional goals.   Nurse to notify provider when observation goals have been met and patient is ready for discharge.Goal Outcome Evaluation:

## 2024-10-17 NOTE — PROGRESS NOTES
"[facility]    Medicine Progress Note - Hospitalist Service    Date of Admission:  10/15/2024    Assessment & Plan   Melvin Abad is a 89 year old male with PMHx a fib on Coumadin, previous CVA with mild left leg weakness, HTN, HLD, BPH, CKA 3a who resides at an assistive living facility who presented with generalized weakness and URI infection symptoms and has been found to be COVID positive with concern for mild dehydration and inadequate PO intake and developing left lower leg cellulitis.  He is being registered to observation status for Remdesivir, Keflex, and close monitoring with physical therapy.     Acute encephalopathy  Starting last night in the middle of the night patient had increased confusion noted.  Patient was asking to \"go upstairs \".  Early this morning when I saw patient he thought he was at the [facility].  He was able to be redirected when informed he was at the hospital, although was wondering why he was at the hospital.  Continues to be restless, wanting to get out of bed.  He is able to move all extremities spontaneously, has difficulty following directions.  This is exacerbated by being hard of hearing vital signs have remained stable with the exception of a mildly elevated blood pressure.  Oxygen saturations normal.  Labs showing improved creatinine, electrolytes within normal limits, white count normal with hemoglobin stable..  Assessed about 2 hours later he was oriented to person, time and place.  Able to follow directions.   -Acute encephalopathy, possibly due to acute COVID-19 infection and cellulitis or hospital-acquired delirium due to advanced age and hearing loss.  -will give seroquel 12.5mg at HS to help him sleep tonight  -Will delay discharge at this time  -Consider head CT scan if worsens or does not improve  -Will delay discharge at this time, monitor  -encephalopathy order set placed.    # Confirmed COVID-19 infection      Patient has " remained afebrile, remains on room air. This morning as congested cough, but denies feeling SOB.       Symptom Onset During the day of 10/14/24   Date of 1st Positive Test 10/15/24   Vaccination Status Partially Vaccinated - last given in 9/2023 but has not gotten this years vaccine yet       - COVID-19 special precautions, continuous pulse-ox  - Oxygen: continue current support without oxygen but consider transition to inpatient status if oxygen is needed going forward.  If needed titrate to keep SpO2 between 90-96%  - Labs: Daily COVID labs ordered x4 days (CBC, BMP, CRP).  Continue to trend after 4 days as needed.   - Imaging: no additional imaging needed at this time  - Breathing treatments: no inhalers needed; avoid nebulizers in favor of MDIs   - IV fluids: not indicated at this time  - Antibiotics: not indicated   - COVID-Focused Medications: Remdesivir x 3 days or until hospital discharge, started on 10/15/24  - DVT Prophylaxis:         - At high risk of thrombotic complications due to COVID-19 (DDimer = N/A )         - Already on therapeutic anticoagulation with Coumadin    Active Problems:    Weakness generalized    Assessment: likely secondary to COVID infection with patient currently unable to be independent in senior care facility as would be needed for him to return home.  He does have some baseline left lower extremity weakness from previous stroke which may be affecting his functionality now that he has generalized weakness in addition.  Patient was assessed by physical therapy and determined patient is below baseline level of function and is recommending TCU at time of discharge    Plan:   -will proceed with treatment of COVID as above  -physical therapy eval and treat  -Social work consult to assist in disposition plan       Cellulitis of left lower extremity    Left anterior leg skin wound    Venous stasis of both lower extremities    Assessment: patient has baseline chronic venous stasis in bilateral  lower legs and is on Lasix daily to decrease swelling.  He struggles intermittently with blisters and sores in this involved skin and normally self medicates with a cream and dressing.  Current sore has been there for several weeks on the left anterior shin and in the past few days there has been increased redness in the surrounding skin and ED provider felt it worsened slightly with increased warmth during the ED visit and patient was started on Keflex for possible developing cellulitis. He was seen by WOC RN 10/16 with wound care recommendations made.  Continues to have mild warmth surrounding wound    Plan:   -will continue Keflex 500 mg TID and monitor for improvement in surrounding skin erythema and warmth   -wound care per WOC RN      Thrombocytopenia (H)    Assessment: platelets 118 at time of admission, no history of thrombocytopenia.  Platelets improving today to 125.  likely secondary to COVID infection    Plan:   -will continue to trend in upcoming days       Elevated creatinine - does not meet DELPHINE diagnosis     Chronic kidney disease, stage 3a (H)    Assessment: patient has recent baseline of 1.22-1.28 throughout this year.  Creatinine at presentation is 1.47 which is increased from recent baseline but does not meet DELPHINE criteria at this time. On admission patient does appeared slightly dry and has not been eating or drinking well.  Home dose of lisinopril and Lasix held on admission.  Creatinine 1.1o today Patient reports appetite somewhat improved    Plan:   -Will resume lisinopril today  -Continue to hold Lasix until appetite back to baseline  -encourage adequate PO intake and monitor intake and output   -monitor creatinine daily and trend  -avoid nephrotoxic medications       Anemia - multifactorial (history of acute blood loss, anemia of chronic disease, etc)    Assessment: patient has history of anemia over the past year but trending downward overall - patient did have a hematoma earlier in the year  with acute blood loss but was not placed on iron supplementation at that time.  Hemoglobin has decreased from 11.9 in August down to 10.6 today with patient denying any active bleeding he is aware of.  Patient is on coumadin and therefore is at risk of increased bleeding - INR is therapeutic.  Patient's ferritin normal at 258, iron level is low, iron binding capacity and iron sat index unable to obtain due to hemolyzed specimen.  But based on normal ferritin patient has likely anemia of chronic disease.  On recheck today hemoglobin is stable at 11.5.  Occult stool negative for blood    Plan:   -continue Coumadin and keep in therapeutic range for now unless signs of active bleeding are seen  -monitor hemoglobin daily or sooner if there is concern for active bleed.        Essential hypertension    Assessment: patient is on metoprolol XL 50 mg daily, lisinopril 10 mg daily and Lasix 40 mg daily with blood pressures well controlled on outpatient appointment review available.  On admission blood pressures  in the  systolic in patient with poor PO intake since illness started yesterday.  Lisinopril and Lasix held on admission.  Over the last 24 hours BP elevated at 150s-160s.  Lisinopril resumed yesterday    Plan:   -continue lisinopril  -Continue to hold Lasix until appetite improveds  -continue metoprolol XL but with hold parameters       Chronic atrial fibrillation (H)    Long term current use of anticoagulant therapy    Assessment: patient has longstanding chronic atrial fibrillation with rate control achieved with metoprolol XL 50 mg daily and uses Coumadin for anticoagulation with INR of therapeutic    Plan:   -continue metoprolol with hold parameters for low blood pressures  -pharmacy to manage inpatient coumadin dosing during this hospital stay      Chronic obstructive pulmonary disease, unspecified COPD type (H)    Assessment: documented in his chart and has evidence of emphysema as far back os 2003 on  "available imaging but patient is not on any scheduled or prn medications for his breathing in recent years (last albuterol inhaler given in 2020) and reports it has never given him issues.  He quit smoking \"many, many years ago.\"    Plan:   -will add incentive spirometer today  -will proceed with treatment of COVID infection as above and monitor respiratory status closely.    -no steroids for now given patient stable on RA, no signs of respiratory distress  -could consider prn albuterol inhaler if wheezing occurs going forward.       History of chronic right-sided heart failure (H)    Assessment: patient has documented history of chronic right sided heart failure, however on most recent echocardiogram form 4/2023 he had normal right ventricle size, structure and function.  Patient does struggling with chronic lower legs swelling and venous status as above and continues on metoprolol, lisinopril and Lasix at baseline.  No signs of volume overload at this time and concern that patient may be slightly dehydrated as above. Weight today 202lbs, down from 205 lbs yesterday    Plan:   -continue metoprolol with hold parameters  -continue lisinopril   -Continue to hold Lasix until intake improved to baseline or if s/s of fluid overload      Hyperlipidemia LDL goal <100    Assessment: patient takes simvastatin 40 mg daily    Plan:   -restart at time of hospital discharge       Benign prostatic hyperplasia, unspecified whether lower urinary tract symptoms present    Assessment: patient takes Flomax 0.4 mg daily and has not had any breakthrough symptoms    Plan:   -continue home regimen without change      History of CVA (cerebrovascular accident)    Assessment: previous history of a right pontine stroke with left lower extremity weakness noted since.  No new focal neurological deficits     Plan:   -monitor for any new focal neurological deficits  -continue blood pressure medications and Coumadin as above and restart statin at " "time of discharge          Observation Goals: -diagnostic tests and consults completed and resulted, -vital signs normal or at patient baseline, -tolerating oral intake to maintain hydration, -infection is improving, -dyspnea improved and O2 sats greater than 88% on room air or prior home oxygen levels, -safe disposition plan has been identified, Nurse to notify provider when observation goals have been met and patient is ready for discharge.  Diet: Regular Diet Adult    DVT Prophylaxis: Warfarin  Ulloa Catheter: Not present  Lines: None     Cardiac Monitoring: None  Code Status: No CPR- Do NOT Intubate      Clinically Significant Risk Factors Present on Admission               # Hypoalbuminemia: Lowest albumin = 3.3 g/dL at 10/16/2024  5:41 AM, will monitor as appropriate  # Drug Induced Coagulation Defect: home medication list includes an anticoagulant medication  # Thrombocytopenia: Lowest platelets = 116 in last 2 days, will monitor for bleeding   # Hypertension: Noted on problem list         # Overweight: Estimated body mass index is 29.96 kg/m  as calculated from the following:    Height as of this encounter: 1.753 m (5' 9\").    Weight as of this encounter: 92 kg (202 lb 14.4 oz).       # Financial/Environmental Concerns: none         Disposition Plan     Medically Ready for Discharge: Anticipated Tomorrow           The patient's care was discussed with the Patient and Patient's Family.  Updated son Harshad regarding acute encephalopathy overnight, likely delaying discharge today.    Farrah Batista CNP  Hospitalist Service  [facility]  Securely message with [place] (more info)  Text page via [facility] Paging/Directory   ______________________________________________________________________    Interval History   Patient noted to have increased confusion and restlessness overnight.  Continued this morning although improving as morning has gone on.  Denies any new pain or discomfort, denies " shortness of breath.  On exam though did note some increased congestion and intermittent cough    Physical Exam   Vital Signs: Temp: 98.1  F (36.7  C) Temp src: Oral BP: (!) 163/65 Pulse: 73   Resp: 18 SpO2: 93 % O2 Device: None (Room air)    Weight: 202 lbs 14.4 oz    General Appearance: Lying in bed, no acute distress  Respiratory: Respiratory effort easy at rest, he is on room air.  Intermittent nonproductive cough noted.  Voice sounds congested.  Fine Rales noted bilateral bases  Cardiovascular: Irregular irregular rhythm, no murmur noted  GI: Abdomen soft and nontender with active bowel sounds  Skin: Warm and dry  Neuro: Initially on exam patient disoriented, having difficulty following directions.  Did come back about 2 hours later and on exam patient was alert, oriented to person place and time.  Speech was fluent.  He was able to follow directions without difficulty.  Pupils were equal and reactive, extraocular eye motions were intact.  Some left upper and lower extremity weakness noted, although is at patient's baseline.  Other wise able to move both upper and lower extremities without difficulty    Medical Decision Making       45 MINUTES SPENT BY ME on the date of service doing chart review, history, exam, documentation & further activities per the note.      Data     I have personally reviewed the following data over the past 24 hrs:    4.3  \   11.5 (L)   / 125 (L)     137 104 24.8 (H) /  86   4.0 20 (L) 1.10 \     ALT: 16 AST: 34 AP: 103 TBILI: 0.7   ALB: 3.5 TOT PROTEIN: 6.5 LIPASE: N/A     Procal: N/A CRP: 30.88 (H) Lactic Acid: N/A       INR:  1.98 (H) PTT:  N/A   D-dimer:  N/A Fibrinogen:  N/A       Imaging results reviewed over the past 24 hrs:   No results found for this or any previous visit (from the past 24 hour(s)).

## 2024-10-17 NOTE — PLAN OF CARE
"PRIMARY DIAGNOSIS: GENERALIZED WEAKNESS    OUTPATIENT/OBSERVATION GOALS TO BE MET BEFORE DISCHARGE  1. Orthostatic performed: N/A    2. Tolerating PO medications: Yes    3. Return to near baseline physical activity: No- generalized weakness    4. Cleared for discharge by consultants (if involved): Yes    Discharge Planner Nurse   Safe discharge environment identified: Yes- plan for discharge to [facility] today  Barriers to discharge: No  Vss. Pt is confused to place, has been asking frequently to \"go upstairs\", \"see Jelena\", \"put a sweatshirt on upstairs\". Pt is unsteady and has generalized weakness. Pt has made several attempts to exit bed without help and is able to be redirected. Dressing to L lower leg is clean dry and intact. Tolerating po abx. Plan for discharge to [facility] today.        Entered by: Celestina Buenrostro RN 10/17/2024 5:44 AM     Please review provider order for any additional goals.   Nurse to notify provider when observation goals have been met and patient is ready for discharge.Goal Outcome Evaluation:                        "

## 2024-10-18 ENCOUNTER — LAB REQUISITION (OUTPATIENT)
Dept: LAB | Facility: CLINIC | Age: 89
End: 2024-10-18

## 2024-10-18 ENCOUNTER — DOCUMENTATION ONLY (OUTPATIENT)
Dept: GERIATRICS | Facility: CLINIC | Age: 89
End: 2024-10-18
Payer: COMMERCIAL

## 2024-10-18 ENCOUNTER — TELEPHONE (OUTPATIENT)
Dept: ANTICOAGULATION | Facility: CLINIC | Age: 89
End: 2024-10-18
Payer: COMMERCIAL

## 2024-10-18 VITALS
TEMPERATURE: 97.8 F | HEIGHT: 69 IN | WEIGHT: 194.5 LBS | OXYGEN SATURATION: 97 % | RESPIRATION RATE: 18 BRPM | BODY MASS INDEX: 28.81 KG/M2 | SYSTOLIC BLOOD PRESSURE: 134 MMHG | DIASTOLIC BLOOD PRESSURE: 65 MMHG | HEART RATE: 62 BPM

## 2024-10-18 DIAGNOSIS — Z79.01 LONG TERM CURRENT USE OF ANTICOAGULANT THERAPY: Primary | ICD-10-CM

## 2024-10-18 DIAGNOSIS — Z11.1 ENCOUNTER FOR SCREENING FOR RESPIRATORY TUBERCULOSIS: ICD-10-CM

## 2024-10-18 DIAGNOSIS — I48.20 CHRONIC ATRIAL FIBRILLATION (H): ICD-10-CM

## 2024-10-18 LAB
ALBUMIN SERPL BCG-MCNC: 3.2 G/DL (ref 3.5–5.2)
ALP SERPL-CCNC: 92 U/L (ref 40–150)
ALT SERPL W P-5'-P-CCNC: 15 U/L (ref 0–70)
ANION GAP SERPL CALCULATED.3IONS-SCNC: 13 MMOL/L (ref 7–15)
AST SERPL W P-5'-P-CCNC: 30 U/L (ref 0–45)
BILIRUB SERPL-MCNC: 0.6 MG/DL
BUN SERPL-MCNC: 23.7 MG/DL (ref 8–23)
CALCIUM SERPL-MCNC: 8.6 MG/DL (ref 8.8–10.4)
CHLORIDE SERPL-SCNC: 105 MMOL/L (ref 98–107)
CREAT SERPL-MCNC: 1.16 MG/DL (ref 0.67–1.17)
CRP SERPL-MCNC: 24.19 MG/L
EGFRCR SERPLBLD CKD-EPI 2021: 58 ML/MIN/1.73M2
ERYTHROCYTE [DISTWIDTH] IN BLOOD BY AUTOMATED COUNT: 13.8 % (ref 10–15)
GLUCOSE SERPL-MCNC: 79 MG/DL (ref 70–99)
HCO3 SERPL-SCNC: 21 MMOL/L (ref 22–29)
HCT VFR BLD AUTO: 35.1 % (ref 40–53)
HGB BLD-MCNC: 11.7 G/DL (ref 13.3–17.7)
INR PPP: 2.36 (ref 0.85–1.15)
MCH RBC QN AUTO: 31.5 PG (ref 26.5–33)
MCHC RBC AUTO-ENTMCNC: 33.3 G/DL (ref 31.5–36.5)
MCV RBC AUTO: 94 FL (ref 78–100)
PLATELET # BLD AUTO: 132 10E3/UL (ref 150–450)
POTASSIUM SERPL-SCNC: 4.1 MMOL/L (ref 3.4–5.3)
PROT SERPL-MCNC: 6.1 G/DL (ref 6.4–8.3)
RBC # BLD AUTO: 3.72 10E6/UL (ref 4.4–5.9)
SODIUM SERPL-SCNC: 139 MMOL/L (ref 135–145)
WBC # BLD AUTO: 4.6 10E3/UL (ref 4–11)

## 2024-10-18 PROCEDURE — 85027 COMPLETE CBC AUTOMATED: CPT | Performed by: FAMILY MEDICINE

## 2024-10-18 PROCEDURE — 86140 C-REACTIVE PROTEIN: CPT | Performed by: FAMILY MEDICINE

## 2024-10-18 PROCEDURE — 36415 COLL VENOUS BLD VENIPUNCTURE: CPT | Performed by: FAMILY MEDICINE

## 2024-10-18 PROCEDURE — 99207 PR NO BILLABLE SERVICE THIS VISIT: CPT | Performed by: STUDENT IN AN ORGANIZED HEALTH CARE EDUCATION/TRAINING PROGRAM

## 2024-10-18 PROCEDURE — 99239 HOSP IP/OBS DSCHRG MGMT >30: CPT | Performed by: NURSE PRACTITIONER

## 2024-10-18 PROCEDURE — 85610 PROTHROMBIN TIME: CPT | Performed by: FAMILY MEDICINE

## 2024-10-18 PROCEDURE — 250N000013 HC RX MED GY IP 250 OP 250 PS 637: Performed by: NURSE PRACTITIONER

## 2024-10-18 PROCEDURE — 80053 COMPREHEN METABOLIC PANEL: CPT | Performed by: FAMILY MEDICINE

## 2024-10-18 PROCEDURE — 250N000013 HC RX MED GY IP 250 OP 250 PS 637: Performed by: FAMILY MEDICINE

## 2024-10-18 RX ORDER — CEPHALEXIN 500 MG/1
500 CAPSULE ORAL EVERY 8 HOURS
Qty: 12 CAPSULE | Refills: 0 | Status: SHIPPED | OUTPATIENT
Start: 2024-10-18 | End: 2024-10-22

## 2024-10-18 RX ORDER — FUROSEMIDE 20 MG/1
20 TABLET ORAL DAILY
Qty: 30 TABLET | Refills: 0 | Status: SHIPPED | OUTPATIENT
Start: 2024-10-18

## 2024-10-18 RX ADMIN — GABAPENTIN 200 MG: 100 CAPSULE ORAL at 09:31

## 2024-10-18 RX ADMIN — LISINOPRIL 10 MG: 10 TABLET ORAL at 09:31

## 2024-10-18 RX ADMIN — METOPROLOL SUCCINATE 50 MG: 50 TABLET, EXTENDED RELEASE ORAL at 09:31

## 2024-10-18 RX ADMIN — TAMSULOSIN HYDROCHLORIDE 0.4 MG: 0.4 CAPSULE ORAL at 09:31

## 2024-10-18 RX ADMIN — LORATADINE 10 MG: 10 TABLET ORAL at 09:31

## 2024-10-18 RX ADMIN — CEPHALEXIN 500 MG: 250 CAPSULE ORAL at 05:19

## 2024-10-18 RX ADMIN — PSYLLIUM HUSK 3.4 G: 3.4 POWDER ORAL at 09:33

## 2024-10-18 ASSESSMENT — ACTIVITIES OF DAILY LIVING (ADL)
ADLS_ACUITY_SCORE: 48
ADLS_ACUITY_SCORE: 46
ADLS_ACUITY_SCORE: 48
ADLS_ACUITY_SCORE: 46

## 2024-10-18 NOTE — PROGRESS NOTES
S-(situation): Patient discharged to [facility] via EMS with EMS staff    B-(background): Covid 19+ with acute confusion    A-(assessment): Patient oriented to self and place. VSS on room air. Occasional loose cough. Ambulating with walker and gb. Tolerating oral intake and meds.     R-(recommendations): Discharge instructions reviewed with patient. Listed belongings gathered and returned to patient.          Discharge Nursing Criteria:   Patient Education given and documented: (STROKE, CHF, Diabetes):  {No   Care Plan and Patient education resolved: Yes    New Medications- pt has been educated about purpose and side effects: Not Applicable    Vaccines  Influenza status verified at discharge:  Yes      MISC  Prescriptions if needed, hard copies sent with patient  NA  Home medications returned to patient: NA  Medication Bin checked and emptied on discharge Yes  Patient reports post-discharge pain management plan is effective: Yes

## 2024-10-18 NOTE — PROGRESS NOTES
Physical Therapy Discharge Summary    Reason for therapy discharge:    Discharged to [facility] TCU.     Progress towards therapy goal(s). See goals on Care Plan in [facility] electronic health record for goal details.  Goals partially met.  Barriers to achieving goals:   discharge from facility.    Therapy recommendation(s):    Continued therapy is recommended.  Rationale/Recommendations:  Continued progression to baseline independence with mobility with further TCU based inpatient PT services for strengthening.  Continue home exercise program.    Thank you for your referral,  Newton Cohen PT, DPT    [facility]  O: [phone]  E: [email or web address]

## 2024-10-18 NOTE — DISCHARGE SUMMARY
"[facility]  Hospitalist Discharge Summary      Date of Admission:  10/15/2024  Date of Discharge:  10/18/2024  Discharging Provider: Farrah Batista CNP  Discharge Service: Hospitalist Service    Discharge Diagnoses   Acute encephalopathy  COVID-19 infection  Weakness  Cellulitis of left lower extremity  Left anterior leg skin wound  Venous stasis of both lower extremities  Thrombocytopenia  Elevated creatinine  CKD stage III  Anemia  Hypertension  Chronic atrial fibrillation  Long-term current use of anticoagulant therapy  COPD  History of chronic right-sided heart failure  Hyperlipidemia  BPH  History of CVA    Clinically Significant Risk Factors     # Overweight: Estimated body mass index is 28.72 kg/m  as calculated from the following:    Height as of this encounter: 1.753 m (5' 9\").    Weight as of this encounter: 88.2 kg (194 lb 8 oz).       Follow-ups Needed After Discharge   Follow-up Appointments     Follow Up and recommended labs and tests      Follow up with skilled nursing physician.  The following labs/tests are   recommended: CBC and BMP in 5-7 days.  Check INR on 10/21/24        During hospital stay due to elevated creatinine and poor oral intake with COVID-19 infection home dose of Lasix decreased from 40 mg daily to 20 mg daily.  Daily weights requested, monitor for fluid overload and may need to increase Lasix as appetite improves.    Unresulted Labs Ordered in the Past 30 Days of this Admission       No orders found from 9/15/2024 to 10/16/2024.        These results will be followed up by NA    Discharge Disposition   Discharged to rehabilitation facility  Condition at discharge: Stable    Hospital Course   Melvin Abad is a 89 year old male with PMHx a fib on Coumadin, previous CVA with mild left leg weakness, HTN, HLD, BPH, CKA 3a who resides at an assistive living facility who presented with generalized weakness and URI infection symptoms and has been found to be " "COVID positive with concern for mild dehydration and inadequate PO intake and developing left lower leg cellulitis.  He is being registered to observation status for Remdesivir, Keflex, and close monitoring with physical therapy.     Acute encephalopathy  Starting last night in the middle of the night patient had increased confusion noted.  Patient was asking to \"go upstairs \".  Early this morning when I saw patient he thought he was at the [facility].  He was able to be redirected when informed he was at the hospital, although was wondering why he was at the hospital.  Continues to be restless, wanting to get out of bed.  He is able to move all extremities spontaneously, has difficulty following directions.  This is exacerbated by being hard of hearing vital signs have remained stable with the exception of a mildly elevated blood pressure.  Oxygen saturations normal.  Labs showing improved creatinine, electrolytes within normal limits, white count normal with hemoglobin stable..  Assessed about 2 hours later he was oriented to person, time and place.  Able to follow directions.   Patient received 1 dose of Seroquel last night, and he slept through the night.  This morning he is alert and oriented to person place and time and situation.  Asking appropriate questions and able to follow directions  -Acute encephalopathy, possibly due to acute COVID-19 infection and cellulitis or hospital-acquired delirium due to advanced age and hearing loss.  -I did not order further Seroquel on discharge, but could consider if symptoms recur while at transitional care unit    # Confirmed COVID-19 infection      Patient has remained afebrile, remains on room air.  Last 2 days noted congested cough, patient has not needed increased oxygen, and denies feeling short of breath.     Symptom Onset During the day of 10/14/24   Date of 1st Positive Test 10/15/24   Vaccination Status Partially Vaccinated - last given in 9/2023 but has not " gotten this years vaccine yet       - COVID-19 special precautions, continuous pulse-ox  - Oxygen: continue current support without oxygen but consider transition to inpatient status if oxygen is needed going forward.  If needed titrate to keep SpO2 between 90-96%  - Labs: Daily COVID labs ordered x4 days (CBC, BMP, CRP).  Continue to trend after 4 days as needed.   - Imaging: no additional imaging needed at this time  - Breathing treatments: no inhalers needed; avoid nebulizers in favor of MDIs   - IV fluids: not indicated at this time  - Antibiotics: not indicated   - COVID-Focused Medications: Remdesivir x 3 days or until hospital discharge, started on 10/15/24  - DVT Prophylaxis:         - At high risk of thrombotic complications due to COVID-19 (DDimer = N/A )         - Already on therapeutic anticoagulation with Coumadin    Active Problems:    Weakness generalized    Assessment: likely secondary to COVID infection with patient currently unable to be independent in [facility] facility as would be needed for him to return home.  He does have some baseline left lower extremity weakness from previous stroke which may be affecting his functionality now that he has generalized weakness in addition.  Patient was assessed by physical therapy and determined patient is below baseline level of function and is recommending TCU at time of discharge    Plan:   -PT and OT to eval and treat at transitional care unit      Cellulitis of left lower extremity    Left anterior leg skin wound    Venous stasis of both lower extremities    Assessment: patient has baseline chronic venous stasis in bilateral lower legs and is on Lasix daily to decrease swelling.  He struggles intermittently with blisters and sores in this involved skin and normally self medicates with a cream and dressing.  Current sore has been there for several weeks on the left anterior shin and in the past few days there has been increased redness in the surrounding skin  and ED provider felt it worsened slightly with increased warmth during the ED visit and patient was started on Keflex for possible developing cellulitis. He was seen by WOC RN 10/16 with wound care recommendations made.  Continues to have mild warmth surrounding wound    Plan:   -will continue Keflex 500 mg TID for a total of 7 days of treatment  -wound care per WOC RN recommendations until healed      Thrombocytopenia (H)    Assessment: platelets 118 at time of admission, no history of thrombocytopenia.  Platelets trended up during hospital stay.  likely secondary to COVID infection    Plan:   -Recommend follow-up CBC in 5 to 7 days at TCU      Elevated creatinine - does not meet DELPHINE diagnosis     Chronic kidney disease, stage 3a (H)    Assessment: patient has recent baseline of 1.22-1.28 throughout this year.  Creatinine at presentation is 1.47 which is increased from recent baseline but does not meet DELPHINE criteria at this time. On admission patient does appeared slightly dry and has not been eating or drinking well.  Home dose of lisinopril and Lasix held on admission.  Creatinine has returned to baseline.  Patient oral intake continues not to be back at baseline plan:   -Continue lisinopril  -Will resume Lasix on discharge but a lower dose of 20 mg daily  -Recommend follow-up basic metabolic panel in 5 to 7 days      Anemia - multifactorial (history of acute blood loss, anemia of chronic disease, etc)    Assessment: patient has history of anemia over the past year but trending downward overall - patient did have a hematoma earlier in the year with acute blood loss but was not placed on iron supplementation at that time.  Hemoglobin has decreased from 11.9 in August down to 10.6 today with patient denying any active bleeding he is aware of.  Patient is on coumadin and therefore is at risk of increased bleeding - INR is therapeutic.  Patient's ferritin normal at 258, iron level is low, iron binding capacity and  "iron sat index unable to obtain due to hemolyzed specimen.  But based on normal ferritin patient has likely anemia of chronic disease.  .  Occult stool negative for blood.  Hemoglobin stabilized during hospital stay    Plan:   -Recommend follow-up CBC in 5 to 7 days.        Essential hypertension    Assessment: patient is on metoprolol XL 50 mg daily, lisinopril 10 mg daily and Lasix 40 mg daily with blood pressures well controlled on outpatient appointment review available.  On admission blood pressures  in the  systolic in patient with poor PO intake since illness started yesterday.  Lisinopril and Lasix held on admission.   Lisinopril resumed 10/16 as blood pressures recovered.  This morning patient's blood pressure within normal limits    Plan:   -continue lisinopril  -Continue oral Lasix but at a lower dose of 20 mg daily  -continue metoprolol XL       Chronic atrial fibrillation (H)    Long term current use of anticoagulant therapy    Assessment: patient has longstanding chronic atrial fibrillation with rate control achieved with metoprolol XL 50 mg daily and uses Coumadin for anticoagulation with INR of therapeutic    Plan:   -continue metoprolol   -Continue prior to admission dose of warfarin  -Recommend INR recheck on Monday, 10/21/2024      Chronic obstructive pulmonary disease, unspecified COPD type (H)    Assessment: documented in his chart and has evidence of emphysema as far back os 2003 on available imaging but patient is not on any scheduled or prn medications for his breathing in recent years (last albuterol inhaler given in 2020) and reports it has never given him issues.  He quit smoking \"many, many years ago.\"    Plan:   -will add incentive spirometer today  -will proceed with treatment of COVID infection as above and monitor respiratory status closely.    -no steroids for now given patient stable on RA, no signs of respiratory distress  -could consider prn albuterol inhaler if wheezing " occurs going forward.       History of chronic right-sided heart failure (H)    Assessment: patient has documented history of chronic right sided heart failure, however on most recent echocardiogram form 4/2023 he had normal right ventricle size, structure and function.  Patient does struggling with chronic lower legs swelling and venous status as above and continues on metoprolol, lisinopril and Lasix at baseline.  No signs of volume overload at this time and concern that patient may be slightly dehydrated as above. Weights have been stable    Plan:   -continue metoprolol   -continue lisinopril   -Will resume Lasix but at a lower dose on discharge  -Daily weights with nursing staff to update provider if weight gain of 2 pounds overnight or 5 pounds in a week      Hyperlipidemia LDL goal <100    Assessment: patient takes simvastatin 40 mg daily    Plan:   -restart at time of hospital discharge       Benign prostatic hyperplasia, unspecified whether lower urinary tract symptoms present    Assessment: patient takes Flomax 0.4 mg daily and has not had any breakthrough symptoms    Plan:   -continue home regimen without change      History of CVA (cerebrovascular accident)    Assessment: previous history of a right pontine stroke with left lower extremity weakness noted since.  No new focal neurological deficits     Plan:   -monitor for any new focal neurological deficits  -continue blood pressure medications and Coumadin as above and restart statin at time of discharge       Consultations This Hospital Stay   CARE MANAGEMENT / SOCIAL WORK IP CONSULT  PHYSICAL THERAPY ADULT IP CONSULT  PHARMACY TO DOSE WARFARIN  WOUND OSTOMY CONTINENCE NURSE  IP CONSULT  PHYSICAL THERAPY ADULT IP CONSULT  OCCUPATIONAL THERAPY ADULT IP CONSULT  PHARMACY IP CONSULT  PHYSICAL THERAPY ADULT IP CONSULT    Code Status   No CPR- Do NOT Intubate    Time Spent on this Encounter   Farrah KELLY CNP, personally saw the patient today and spent  greater than 30 minutes discharging this patient.       Farrah Batista, CNP  [facility] MEDICAL SURGICAL  [address] [place] [address]  Phone: [phone]  ______________________________________________________________________    Physical Exam   Vital Signs: Temp: 97.8  F (36.6  C) Temp src: Oral BP: 134/65 Pulse: 62   Resp: 18 SpO2: 97 % O2 Device: None (Room air)    Weight: 194 lbs 8 oz  General Appearance: Patient lying in bed, no acute distress.  Alert and oriented.  Hard of hearing  Respiratory: Respiratory effort easy at rest.  On room air.  Intermittent wheezing noted throughout lungs, dry intermittent cough  Cardiovascular: Irregular irregular rhythm,  GI: Abdomen soft and nontender with active bowel sounds  Skin: Dressing intact on left lower extremity         Primary Care Physician   Kim Chong    Discharge Orders      General info for SNF    Length of Stay Estimate: Short Term Care: Estimated # of Days <30  Condition at Discharge: Stable  Level of care:skilled   Rehabilitation Potential: Good  Admission H&P remains valid and up-to-date: Yes  Recent Chemotherapy: N/A  Use Nursing Home Standing Orders: Yes     Mantoux instructions    Give two-step Mantoux (PPD) Per Facility Policy Yes     Follow Up and recommended labs and tests    Follow up with detention physician.  The following labs/tests are recommended: CBC and BMP in 5-7 days.  Check INR on 10/21/24     Reason for your hospital stay    You were admitted to the hospital for observation with COVID-19 and weakness     Wound care    Site:   Left lower leg  Apply a non adhesive no stick contact layer like Adaptic oil emulsion gauze to the open wounds, cover with ABD pad secured with either Kerlix roll gauze or stretch gauze and change daily.  Once the increased warmth to touch resolves, recommend to continue to keep covered with same dressings and add either compression sock if able to don, or ace wrap, to  address need for compression.  Return to use of tighter compression sock recommended once wound improve or heal.     Activity - Up with nursing assistance     Daily weights    Call Provider for weight gain of more than 2 pounds per day or 5 pounds per week.     Physical Therapy Adult Consult    Evaluate and treat as clinically indicated.    Reason:  generalized weakness     Occupational Therapy Adult Consult    Evaluate and treat as clinically indicated.    Reason:  Generalized weakness     Airborne and Contact Isolation    Until 10/24/24     Fall precautions     Diet    Follow this diet upon discharge: Regular       Significant Results and Procedures   Most Recent 3 CBC's:  Recent Labs   Lab Test 10/18/24  0540 10/17/24  0549 10/16/24  0541   WBC 4.6 4.3 5.1   HGB 11.7* 11.5* 11.2*   MCV 94 95 95   * 125* 116*     Most Recent 3 BMP's:  Recent Labs   Lab Test 10/18/24  0540 10/17/24  0549 10/16/24  2054 10/16/24  0541    137  --  139   POTASSIUM 4.1 4.0  --  4.3   CHLORIDE 105 104  --  105   CO2 21* 20*  --  22   BUN 23.7* 24.8*  --  28.5*   CR 1.16 1.10  --  1.28*   ANIONGAP 13 13  --  12   DARIUS 8.6* 8.7*  --  8.7*   GLC 79 86 101* 79     Most Recent 2 LFT's:  Recent Labs   Lab Test 10/18/24  0540 10/17/24  0549   AST 30 34   ALT 15 16   ALKPHOS 92 103   BILITOTAL 0.6 0.7     Most Recent 3 INR's:  Recent Labs   Lab Test 10/18/24  0540 10/17/24  0549 10/16/24  0541   INR 2.36* 1.98* 2.33*   ,   Results for orders placed or performed during the hospital encounter of 10/15/24   XR Chest 2 Views    Narrative    EXAM: XR CHEST 2 VIEWS  LOCATION: [facility]  DATE: 10/15/2024    INDICATION: Recent COVID diagnosis, cough with shortness of breath  COMPARISON: 9/28/2020.    FINDINGS: The heart is enlarged. There is no pulmonary edema. The thoracic aorta is calcified and tortuous. The lungs are clear. There is no pneumothorax or pleural effusion.      Impression    IMPRESSION: No  acute abnormality.     *Note: Due to a large number of results and/or encounters for the requested time period, some results have not been displayed. A complete set of results can be found in Results Review.       Discharge Medications   Current Discharge Medication List        START taking these medications    Details   cephALEXin (KEFLEX) 500 MG capsule Take 1 capsule (500 mg) by mouth every 8 hours for 4 days.  Qty: 12 capsule, Refills: 0    Associated Diagnoses: Cellulitis of left lower extremity           CONTINUE these medications which have CHANGED    Details   furosemide (LASIX) 20 MG tablet Take 1 tablet (20 mg) by mouth daily.  Qty: 30 tablet, Refills: 0    Associated Diagnoses: Chronic right-sided heart failure (H)           CONTINUE these medications which have NOT CHANGED    Details   gabapentin (NEURONTIN) 100 MG capsule Take 2 capsules (200 mg) by mouth 2 times daily  Qty: 120 capsule, Refills: 0    Associated Diagnoses: Iliopsoas muscle hematoma, left, initial encounter      lisinopril (ZESTRIL) 10 MG tablet Take 1 tablet (10 mg) by mouth daily  Qty: 30 tablet, Refills: 0    Associated Diagnoses: Essential hypertension, benign      simvastatin (ZOCOR) 40 MG tablet Take 1 tablet (40 mg) by mouth at bedtime  Qty: 30 tablet, Refills: 0    Associated Diagnoses: Hyperlipidemia LDL goal <100      tamsulosin (FLOMAX) 0.4 MG capsule Take 1 capsule (0.4 mg) by mouth daily  Qty: 30 capsule, Refills: 0    Associated Diagnoses: Urinary retention      !! warfarin ANTICOAGULANT (COUMADIN) 2.5 MG tablet Take 2.5 mgs mg Sunday, Tuesday and Thursday, and 5 mgs all other days.  Recheck INR on 11/11/24  Qty: 44 tablet, Refills: 0    Associated Diagnoses: Long term current use of anticoagulant therapy; Chronic atrial fibrillation (H)      !! warfarin ANTICOAGULANT (COUMADIN) 5 MG tablet Take 5 mg by mouth. Monday, Wednesday, Friday, Saturday      acetaminophen (TYLENOL) 500 MG tablet Take 2 tablets (1,000 mg) by mouth  every 8 hours as needed for mild pain    Associated Diagnoses: Arthritis pain      ketotifen fumarate 0.035%, ketotifen 0.025%, (ZADITOR) 0.025 % ophthalmic solution Place 1 drop into both eyes 2 times daily as needed for dry eyes Patient self-administers  Qty: 5 mL, Refills: 0    Associated Diagnoses: Eye irritation      loratadine (CLARITIN) 10 MG tablet Take 1 tablet (10 mg) by mouth daily  Qty: 30 tablet, Refills: 0    Associated Diagnoses: Seasonal allergic rhinitis, unspecified trigger      metoprolol succinate ER (TOPROL XL) 50 MG 24 hr tablet Take 1 tablet (50 mg) by mouth daily  Qty: 30 tablet, Refills: 0    Associated Diagnoses: Essential hypertension, benign      !! order for DME Equipment being ordered: side rails/grab bars  Qty: 1 each, Refills: 0    Associated Diagnoses: Generalized muscle weakness      !! order for DME Equipment being ordered: bedside commode  Qty: 1 Device, Refills: 0      !! order for DME Equipment being ordered: 4 legged walker  Qty: 1 Device, Refills: 0      polyethylene glycol (MIRALAX) 17 GM/Dose powder Take 9 g by mouth daily as needed for constipation Mix and hand to patient, Patient self-administers (he knows how much he needs to keep regular)  Qty: 510 g, Refills: 0    Associated Diagnoses: Constipation, unspecified constipation type      psyllium (METAMUCIL/KONSYL) 58.6 % powder Take 18 g (1 Tablespoonful) by mouth daily Mix and hand to patient, Patient self-administers (he knows how much he needs to keep regular)    Associated Diagnoses: Slow transit constipation      sodium chloride (OCEAN) 0.65 % nasal spray Spray 2 sprays in nostril daily as needed for congestion Patient self-administers  Qty: 30 mL, Refills: 0    Associated Diagnoses: Nasal dryness      Vitamin D3 (CHOLECALCIFEROL) 25 mcg (1000 units) tablet Take 1 tablet (25 mcg) by mouth daily    Associated Diagnoses: Vitamin D deficiency       !! - Potential duplicate medications found. Please discuss with provider.         Allergies   No Known Allergies

## 2024-10-18 NOTE — TELEPHONE ENCOUNTER
ANTICOAGULATION  MANAGEMENT: Discharge Review    Melvin Abda chart reviewed for anticoagulation continuity of care    Hospital Admission on 10/15-10/18 for Acute encephalopathy  covid 19 infection; cellulitis of left lower extremity.    Discharge disposition:  U  [facility]  ([facility])  Transitional Care Unit  [address] [address]  [phone]    Results:    Recent labs: (last 7 days)     10/14/24  0800 10/15/24  0616 10/15/24  1608 10/16/24  0541 10/17/24  0549 10/18/24  0540   INR 2.50* 2.46* 2.47* 2.33* 1.98* 2.36*     Anticoagulation inpatient management:     home regimen continued    Anticoagulation discharge instructions:     Warfarin dosing: home regimen continued   Bridging: No   INR goal change: No      Medication changes affecting anticoagulation: Yes: remdesivir started on 10/15 for 3 days  Cephalexin 500 mg TID for 7 days    Additional factors affecting anticoagulation: Yes: covid positive and cellulitis     PLAN     Agree with dosing adjustment on discharge    Patient not contacted  [facility] will resume monitoring upon discharge from TCU    Anticoagulation Calendar updated    Toña Goemz RN  10/18/2024  Anticoagulation Clinic  [facility] for routing messages: cristi COLON  [facility] patient phone line: [phone]

## 2024-10-18 NOTE — PROGRESS NOTES
Care Management Discharge Note    Discharge Date: 10/18/2024     Discharge Disposition: Transitional Care - [facility] (Main Phone: [phone] Admissions Phone: [phone] Fax: [phone])     Discharge Services: None    Discharge DME: None    Discharge Transportation: health plan transportation (Needs ambulance transport due to COVID +)    Private pay costs discussed: Not applicable    Does the patient's insurance plan have a 3 day qualifying hospital stay waiver?  No    PAS Confirmation Code: [id number]  Patient/family educated on Medicare website which has current facility and service quality ratings: yes    Education Provided on the Discharge Plan:  yes  Persons Notified of Discharge Plans: eveline-n-Toña aldana  Patient/Family in Agreement with the Plan: yes    Handoff Referral Completed: Yes, [facility] PCP: Internal handoff referral completed    Additional Information:  Patient medically ready for discharge today. Updated Patric at [facility], regarding patient's discharge today. Updated daughter-n-lawToña, regarding discharge.    Ambulance transport due to COVID at 1030.    MILES Olivarez  [facility] [phone]/ [facility] [phone]  Care Management

## 2024-10-18 NOTE — PLAN OF CARE
Goal Outcome Evaluation:      Plan of Care Reviewed With: patient    Overall Patient Progress: no changeOverall Patient Progress: no change     Vitals stable on RA. Pt alert to self only. Lung sounds coarse with insp/exp wheeze and congested productive cough. Dressing on LLE remains CDI and was changed prior to shift. All night time meds given with seroquel medication. Pt sleeping well at this time.

## 2024-10-21 ENCOUNTER — TRANSITIONAL CARE UNIT VISIT (OUTPATIENT)
Dept: GERIATRICS | Facility: CLINIC | Age: 89
End: 2024-10-21
Payer: COMMERCIAL

## 2024-10-21 ENCOUNTER — TELEPHONE (OUTPATIENT)
Dept: GERIATRICS | Facility: CLINIC | Age: 89
End: 2024-10-21

## 2024-10-21 VITALS
HEIGHT: 69 IN | RESPIRATION RATE: 17 BRPM | TEMPERATURE: 97.9 F | HEART RATE: 66 BPM | OXYGEN SATURATION: 92 % | DIASTOLIC BLOOD PRESSURE: 87 MMHG | SYSTOLIC BLOOD PRESSURE: 148 MMHG | WEIGHT: 203.5 LBS | BODY MASS INDEX: 30.14 KG/M2

## 2024-10-21 DIAGNOSIS — J44.9 CHRONIC OBSTRUCTIVE PULMONARY DISEASE, UNSPECIFIED COPD TYPE (H): ICD-10-CM

## 2024-10-21 DIAGNOSIS — D64.9 ANEMIA, UNSPECIFIED TYPE: ICD-10-CM

## 2024-10-21 DIAGNOSIS — N18.31 CHRONIC KIDNEY DISEASE, STAGE 3A (H): ICD-10-CM

## 2024-10-21 DIAGNOSIS — I50.812 CHRONIC RIGHT-SIDED HEART FAILURE (H): ICD-10-CM

## 2024-10-21 DIAGNOSIS — D69.6 THROMBOCYTOPENIA (H): ICD-10-CM

## 2024-10-21 DIAGNOSIS — I48.11 LONGSTANDING PERSISTENT ATRIAL FIBRILLATION (H): ICD-10-CM

## 2024-10-21 DIAGNOSIS — N40.0 BENIGN PROSTATIC HYPERPLASIA, UNSPECIFIED WHETHER LOWER URINARY TRACT SYMPTOMS PRESENT: ICD-10-CM

## 2024-10-21 DIAGNOSIS — L03.116 CELLULITIS OF LEFT LOWER LEG: ICD-10-CM

## 2024-10-21 DIAGNOSIS — I10 ESSENTIAL HYPERTENSION: ICD-10-CM

## 2024-10-21 DIAGNOSIS — Z86.73 HISTORY OF CVA (CEREBROVASCULAR ACCIDENT): ICD-10-CM

## 2024-10-21 DIAGNOSIS — U07.1 INFECTION DUE TO 2019 NOVEL CORONAVIRUS: Primary | ICD-10-CM

## 2024-10-21 PROCEDURE — 99309 SBSQ NF CARE MODERATE MDM 30: CPT

## 2024-10-21 PROCEDURE — 36415 COLL VENOUS BLD VENIPUNCTURE: CPT | Performed by: FAMILY MEDICINE

## 2024-10-21 PROCEDURE — 86481 TB AG RESPONSE T-CELL SUSP: CPT | Performed by: FAMILY MEDICINE

## 2024-10-21 PROCEDURE — P9604 ONE-WAY ALLOW PRORATED TRIP: HCPCS | Performed by: FAMILY MEDICINE

## 2024-10-21 NOTE — TELEPHONE ENCOUNTER
Please call and give order to hold coumadin today, 10/21/24, give coumadin 2.5 mg po daily on 10/22 and 10/23 and recheck INR on 10/24/24 for diagnosis Afib.     Thanks,  DINH Munoz CNP

## 2024-10-21 NOTE — TELEPHONE ENCOUNTER
[facility] Triage Nurse INR     Provider: DINH Beltran CNP  Facility: [facility]  Facility Type:  TCU    Caller: Rolanda  Call Back Number: [phone]  Reason for call: INR  Diagnosis/Goal: A. Fib    Date INR New Dose/Orders   10/16 2.33 5 mg   10/17 1.98 2.5 mg   10/18 2.36 5 mg   10/21 3.7 Hold x 1 then 2.5 mg    Home dose: 2.5 mg Tue/TH/Sun and 5 mg AOD    Heparin/Lovenox:  No  Currently on ABX?: Yes; please explain: Keflex till 10/25  Other interacting medication:  None  Missed or refused doses: No    Provider Giving Order:  [facility]    Verbal Order given to: Rolanda Betts RN

## 2024-10-21 NOTE — LETTER
10/21/2024      Melvin Abad  C/o Toña Abad  [address]  [facility] [address]        [facility] GERIATRICS    PRIMARY CARE PROVIDER AND CLINIC:  DINH Silva CNP, [address] / [place] [address]  Chief Complaint   Patient presents with     Hospital F/U      [place] Medical Record Number:  [id number]  Place of Service where encounter took place:  [facility] ([facility]) [[id number]]    Melvin Abad  is a 89 year old  (4/24/1928), admitted to the above facility from  [facility]. Hospital stay 10/15/24 through 10/18/24.. Patient resides at the [facility] and started having symptoms on 10/14/24 and was diagnosed with Covid and left lower leg cellulitis s/p chronic venous stasis. Needed one dose of seroquel for acute encephalopathy on 10/14/24 that resolved by the next morning. He was treated with remdesivir at the hospital and will complete course of cephalexin 500 mg every 8 hours on 10/22/24. Patient's medical history includes Afib on coumadin, previous CVA with mild lower leg weakness, chronic kidney disease stage 3, hypertension, hyperlipidemia and anemia.     Today, patient reports that he has productive cough and shortness of breath with exertion only. Patient denies nasal congestion and no pain of his left lower leg. He reports that he has been applying wound dressing to his left leg for many years.     CODE STATUS/ADVANCE DIRECTIVES DISCUSSION:  No CPR- Do NOT Intubate  DNR / DNI  ALLERGIES: No Known Allergies   PAST MEDICAL HISTORY:   Past Medical History:   Diagnosis Date     Actinic keratosis      Atrial fibrillation (H)      Basal cell carcinoma nos 06/10    Mohs excision, rt upper lip & rt temple     Cardiac dysrhythmia, unspecified      Cough     chronic cough     Diabetic eye exam (H) 8/29/14     Generalized osteoarthrosis, unspecified site     djd of the knees, hands, and neck     Other diseases of  lung, not elsewhere classified     pulmonary nodule, benign     Pure hypercholesterolemia      Unspecified essential hypertension       PAST SURGICAL HISTORY:   has a past surgical history that includes TOTAL KNEE ARTHROPLASTY (1997); Colonoscopy w/wo [place] **Performed** (10/19/2001); Colonoscopy w/wo Brush **Performed** (11/02/2004); colonoscopy (05/02/2007); HEMORRHOIDOPEXY BY STAPLING (09/26/08); and Colonoscopy (11/10/2010).  FAMILY HISTORY: family history includes Cancer in his mother; Prostate Cancer in his father.  SOCIAL HISTORY:   reports that he has quit smoking. His smoking use included cigarettes. He has never used smokeless tobacco. He reports that he does not drink alcohol and does not use drugs.  Patient's living condition: lives in an assisted living facility    Post Discharge Medication Reconciliation Status:   MED REC REQUIRED  Post Medication Reconciliation Status:  Discharge medications reconciled, continue medications without change       Current Outpatient Medications   Medication Sig Dispense Refill     acetaminophen (TYLENOL) 500 MG tablet Take 2 tablets (1,000 mg) by mouth every 8 hours as needed for mild pain       furosemide (LASIX) 20 MG tablet Take 1 tablet (20 mg) by mouth daily. 30 tablet 0     gabapentin (NEURONTIN) 100 MG capsule Take 2 capsules (200 mg) by mouth 2 times daily (Patient taking differently: Take 200 mg by mouth 2 times daily.) 120 capsule 0     ketotifen fumarate 0.035%, ketotifen 0.025%, (ZADITOR) 0.025 % ophthalmic solution Place 1 drop into both eyes 2 times daily as needed for dry eyes Patient self-administers 5 mL 0     lisinopril (ZESTRIL) 10 MG tablet Take 1 tablet (10 mg) by mouth daily (Patient taking differently: Take 10 mg by mouth daily.) 30 tablet 0     loratadine (CLARITIN) 10 MG tablet Take 1 tablet (10 mg) by mouth daily (Patient taking differently: Take 10 mg by mouth daily.) 30 tablet 0     metoprolol succinate ER (TOPROL XL) 50 MG 24 hr tablet Take 1  tablet (50 mg) by mouth daily (Patient taking differently: Take 50 mg by mouth daily.) 30 tablet 0     order for DME Equipment being ordered: side rails/grab bars 1 each 0     order for DME Equipment being ordered: bedside commode 1 Device 0     order for DME Equipment being ordered: 4 legged walker 1 Device 0     polyethylene glycol (MIRALAX) 17 GM/Dose powder Take 9 g by mouth daily as needed for constipation Mix and hand to patient, Patient self-administers (he knows how much he needs to keep regular) 510 g 0     psyllium (METAMUCIL/KONSYL) 58.6 % powder Take 18 g (1 Tablespoonful) by mouth daily Mix and hand to patient, Patient self-administers (he knows how much he needs to keep regular) (Patient taking differently: Take 1 Tablespoonful by mouth daily. Mix and hand to patient, Patient self-administers (he knows how much he needs to keep regular))       simvastatin (ZOCOR) 40 MG tablet Take 1 tablet (40 mg) by mouth at bedtime (Patient taking differently: Take 40 mg by mouth at bedtime.) 30 tablet 0     sodium chloride (OCEAN) 0.65 % nasal spray Spray 2 sprays in nostril daily as needed for congestion Patient self-administers 30 mL 0     tamsulosin (FLOMAX) 0.4 MG capsule Take 1 capsule (0.4 mg) by mouth daily (Patient taking differently: Take 0.4 mg by mouth daily.) 30 capsule 0     Vitamin D3 (CHOLECALCIFEROL) 25 mcg (1000 units) tablet Take 1 tablet (25 mcg) by mouth daily (Patient taking differently: Take 25 mcg by mouth daily.)       warfarin ANTICOAGULANT (COUMADIN) 2.5 MG tablet Take 2.5 mgs mg Sunday, Tuesday and Thursday, and 5 mgs all other days.  Recheck INR on 11/11/24 (Patient taking differently: Take 2.5 mg by mouth. Take 2.5 mgs mg Sunday, Tuesday and Thursday, and 5 mgs all other days.  Recheck INR on 11/11/24) 44 tablet 0     warfarin ANTICOAGULANT (COUMADIN) 5 MG tablet Take 5 mg by mouth. Monday, Wednesday, Friday, Saturday       No current facility-administered medications for this visit.  "      ROS:  4 point ROS including Respiratory, CV, GI and , other than that noted in the HPI,  is negative    Vitals:  BP (!) 148/87   Pulse 66   Temp 97.9  F (36.6  C)   Resp 17   Ht 1.753 m (5' 9\")   Wt 92.3 kg (203 lb 8 oz)   SpO2 92%   BMI 30.05 kg/m    Exam:  GENERAL APPEARANCE:  Alert, in no distress  ENT:  Mouth and posterior oropharynx normal, moist mucous membranes, bilateral hearing aids  EYES:  EOM, conjunctivae, lids, pupils and irises normal  RESP:  respiratory effort and palpation of chest normal, crackles fine in bilateral lung fields  CV:  irregular rhythm irregular rate  ABDOMEN:  normal bowel sounds, soft, nontender, no hepatosplenomegaly or other masses  M/S:   generalized weakness, w/c dependent  SKIN:  left lower leg ulcer with no spreading redness, warmth or edema.  NEURO:   Cranial nerves 2-12 are normal tested and grossly at patient's baseline  PSYCH:  oriented X 3, affect and mood normal    Lab/Diagnostic data:  Labs done in SNF are in [place] EPIC. Please refer to them using [employer]Care Everywhere.    ASSESSMENT/PLAN:    Infection due to 2019 novel coronavirus  Treated with remdesivir at hospital. Did not require supplemental oxygen.   Declined guafenesin for productive cough. No fever/chills.   PT/OT for generalized weakness    Cellulitis of left lower leg s/p chronic venous stasis  Treated with course of cephalexin 500 mg every 8 hours to complete 10/22/24.   Continue daily wound cares on left lower leg and monitor for sxsx of cellulitis.    Essential hypertension  History of CVA (cerebrovascular accident)  Longstanding persistent atrial fibrillation (H)  Chronic right-sided heart failure  Continue coumadin 2.5 mg daily and recheck INR on 10/24/24.  Continue lisinopril 10 mg dialy and metoprolol 50 mg daily.   Patient was on lasix 40 mg daily but was held at hospital due to elevated creatinine and poor oral intake. Resumed lasix 20 mg daily at discharge. Monitor daily weights and " may need to increase dosage of lasix.     Chronic obstructive pulmonary disease, unspecified COPD type (H)  Noted in patient's history as emphysema in 2003. Patient unaware of COPD diagnosis    Chronic kidney disease, stage 3a (H)  Vaseline Creatinine ~ 1.22-1.28. Last Creatining was 1.47 on 10/15. Monitor BMP.     Benign prostatic hyperplasia, unspecified whether lower urinary tract symptoms present  Continue tamsulosin 0.4 mg daily.    Anemia, unspecified type  Thrombocytopenia  Hgb was low at 10.6 on 10/14/24. Negative occult stool.  Platelets low at 118 on 10/14/24, were trending upwards during hospital stay.   Recheck CBC.       Orders:  Labs: CBC, BMP, CRP on 10/23/24.       Electronically signed by:  DINH Munoz CNP                   Sincerely,        DINH Munoz CNP

## 2024-10-21 NOTE — PROGRESS NOTES
[facility] GERIATRICS    PRIMARY CARE PROVIDER AND CLINIC:  DINH Silva CNP, [address] [facility] [address]  Chief Complaint   Patient presents with    Hospital F/U      [place] Medical Record Number:  [id number]  Place of Service where encounter took place:  [facility] REHAB [facility] ([facility]) [[id number]]    Melvin Abad  is a 89 year old  (4/24/1928), admitted to the above facility from  [facility]. Hospital stay 10/15/24 through 10/18/24.. Patient resides at the [facility] and started having symptoms on 10/14/24 and was diagnosed with Covid and left lower leg cellulitis s/p chronic venous stasis. Needed one dose of seroquel for acute encephalopathy on 10/14/24 that resolved by the next morning. He was treated with remdesivir at the hospital and will complete course of cephalexin 500 mg every 8 hours on 10/22/24. Patient's medical history includes Afib on coumadin, previous CVA with mild lower leg weakness, chronic kidney disease stage 3, hypertension, hyperlipidemia and anemia.     Today, patient reports that he has productive cough and shortness of breath with exertion only. Patient denies nasal congestion and no pain of his left lower leg. He reports that he has been applying wound dressing to his left leg for many years.     CODE STATUS/ADVANCE DIRECTIVES DISCUSSION:  No CPR- Do NOT Intubate  DNR / DNI  ALLERGIES: No Known Allergies   PAST MEDICAL HISTORY:   Past Medical History:   Diagnosis Date    Actinic keratosis     Atrial fibrillation (H)     Basal cell carcinoma nos 06/10    Mohs excision, rt upper lip & rt temple    Cardiac dysrhythmia, unspecified     Cough     chronic cough    Diabetic eye exam (H) 8/29/14    Generalized osteoarthrosis, unspecified site     djd of the knees, hands, and neck    Other diseases of lung, not elsewhere classified     pulmonary nodule, benign    Pure hypercholesterolemia     Unspecified  essential hypertension       PAST SURGICAL HISTORY:   has a past surgical history that includes TOTAL KNEE ARTHROPLASTY (1997); Colonoscopy w/wo [place] **Performed** (10/19/2001); Colonoscopy w/wo Brush **Performed** (11/02/2004); colonoscopy (05/02/2007); HEMORRHOIDOPEXY BY STAPLING (09/26/08); and Colonoscopy (11/10/2010).  FAMILY HISTORY: family history includes Cancer in his mother; Prostate Cancer in his father.  SOCIAL HISTORY:   reports that he has quit smoking. His smoking use included cigarettes. He has never used smokeless tobacco. He reports that he does not drink alcohol and does not use drugs.  Patient's living condition: lives in an assisted living facility    Post Discharge Medication Reconciliation Status:   MED REC REQUIRED  Post Medication Reconciliation Status:  Discharge medications reconciled, continue medications without change       Current Outpatient Medications   Medication Sig Dispense Refill    acetaminophen (TYLENOL) 500 MG tablet Take 2 tablets (1,000 mg) by mouth every 8 hours as needed for mild pain      furosemide (LASIX) 20 MG tablet Take 1 tablet (20 mg) by mouth daily. 30 tablet 0    gabapentin (NEURONTIN) 100 MG capsule Take 2 capsules (200 mg) by mouth 2 times daily (Patient taking differently: Take 200 mg by mouth 2 times daily.) 120 capsule 0    ketotifen fumarate 0.035%, ketotifen 0.025%, (ZADITOR) 0.025 % ophthalmic solution Place 1 drop into both eyes 2 times daily as needed for dry eyes Patient self-administers 5 mL 0    lisinopril (ZESTRIL) 10 MG tablet Take 1 tablet (10 mg) by mouth daily (Patient taking differently: Take 10 mg by mouth daily.) 30 tablet 0    loratadine (CLARITIN) 10 MG tablet Take 1 tablet (10 mg) by mouth daily (Patient taking differently: Take 10 mg by mouth daily.) 30 tablet 0    metoprolol succinate ER (TOPROL XL) 50 MG 24 hr tablet Take 1 tablet (50 mg) by mouth daily (Patient taking differently: Take 50 mg by mouth daily.) 30 tablet 0    order for  DME Equipment being ordered: side rails/grab bars 1 each 0    order for DME Equipment being ordered: bedside commode 1 Device 0    order for DME Equipment being ordered: 4 legged walker 1 Device 0    polyethylene glycol (MIRALAX) 17 GM/Dose powder Take 9 g by mouth daily as needed for constipation Mix and hand to patient, Patient self-administers (he knows how much he needs to keep regular) 510 g 0    psyllium (METAMUCIL/KONSYL) 58.6 % powder Take 18 g (1 Tablespoonful) by mouth daily Mix and hand to patient, Patient self-administers (he knows how much he needs to keep regular) (Patient taking differently: Take 1 Tablespoonful by mouth daily. Mix and hand to patient, Patient self-administers (he knows how much he needs to keep regular))      simvastatin (ZOCOR) 40 MG tablet Take 1 tablet (40 mg) by mouth at bedtime (Patient taking differently: Take 40 mg by mouth at bedtime.) 30 tablet 0    sodium chloride (OCEAN) 0.65 % nasal spray Spray 2 sprays in nostril daily as needed for congestion Patient self-administers 30 mL 0    tamsulosin (FLOMAX) 0.4 MG capsule Take 1 capsule (0.4 mg) by mouth daily (Patient taking differently: Take 0.4 mg by mouth daily.) 30 capsule 0    Vitamin D3 (CHOLECALCIFEROL) 25 mcg (1000 units) tablet Take 1 tablet (25 mcg) by mouth daily (Patient taking differently: Take 25 mcg by mouth daily.)      warfarin ANTICOAGULANT (COUMADIN) 2.5 MG tablet Take 2.5 mgs mg Sunday, Tuesday and Thursday, and 5 mgs all other days.  Recheck INR on 11/11/24 (Patient taking differently: Take 2.5 mg by mouth. Take 2.5 mgs mg Sunday, Tuesday and Thursday, and 5 mgs all other days.  Recheck INR on 11/11/24) 44 tablet 0    warfarin ANTICOAGULANT (COUMADIN) 5 MG tablet Take 5 mg by mouth. Monday, Wednesday, Friday, Saturday       No current facility-administered medications for this visit.       ROS:  4 point ROS including Respiratory, CV, GI and , other than that noted in the HPI,  is negative    Vitals:  BP  "(!) 148/87   Pulse 66   Temp 97.9  F (36.6  C)   Resp 17   Ht 1.753 m (5' 9\")   Wt 92.3 kg (203 lb 8 oz)   SpO2 92%   BMI 30.05 kg/m    Exam:  GENERAL APPEARANCE:  Alert, in no distress  ENT:  Mouth and posterior oropharynx normal, moist mucous membranes, bilateral hearing aids  EYES:  EOM, conjunctivae, lids, pupils and irises normal  RESP:  respiratory effort and palpation of chest normal, crackles fine in bilateral lung fields  CV:  irregular rhythm irregular rate  ABDOMEN:  normal bowel sounds, soft, nontender, no hepatosplenomegaly or other masses  M/S:   generalized weakness, w/c dependent  SKIN:  left lower leg ulcer with no spreading redness, warmth or edema.  NEURO:   Cranial nerves 2-12 are normal tested and grossly at patient's baseline  PSYCH:  oriented X 3, affect and mood normal    Lab/Diagnostic data:  Labs done in SNF are in [place] EPIC. Please refer to them using [employer]/Care Everywhere.    ASSESSMENT/PLAN:    Infection due to 2019 novel coronavirus  Treated with remdesivir at hospital. Did not require supplemental oxygen.   Declined guafenesin for productive cough. No fever/chills.   PT/OT for generalized weakness    Cellulitis of left lower leg s/p chronic venous stasis  Treated with course of cephalexin 500 mg every 8 hours to complete 10/22/24.   Continue daily wound cares on left lower leg and monitor for sxsx of cellulitis.    Essential hypertension  History of CVA (cerebrovascular accident)  Longstanding persistent atrial fibrillation (H)  Chronic right-sided heart failure  Continue coumadin 2.5 mg daily and recheck INR on 10/24/24.  Continue lisinopril 10 mg dialy and metoprolol 50 mg daily.   Patient was on lasix 40 mg daily but was held at hospital due to elevated creatinine and poor oral intake. Resumed lasix 20 mg daily at discharge. Monitor daily weights and may need to increase dosage of lasix.     Chronic obstructive pulmonary disease, unspecified COPD type (H)  Noted in " patient's history as emphysema in 2003. Patient unaware of COPD diagnosis    Chronic kidney disease, stage 3a (H)  Vaseline Creatinine ~ 1.22-1.28. Last Creatining was 1.47 on 10/15. Monitor BMP.     Benign prostatic hyperplasia, unspecified whether lower urinary tract symptoms present  Continue tamsulosin 0.4 mg daily.    Anemia, unspecified type  Thrombocytopenia  Hgb was low at 10.6 on 10/14/24. Negative occult stool.  Platelets low at 118 on 10/14/24, were trending upwards during hospital stay.   Recheck CBC.       Orders:  Labs: CBC, BMP, CRP on 10/23/24.       Electronically signed by:  DINH Munoz CNP

## 2024-10-22 ENCOUNTER — DOCUMENTATION ONLY (OUTPATIENT)
Dept: OTHER | Facility: CLINIC | Age: 89
End: 2024-10-22
Payer: COMMERCIAL

## 2024-10-22 ENCOUNTER — LAB REQUISITION (OUTPATIENT)
Dept: LAB | Facility: CLINIC | Age: 89
End: 2024-10-22

## 2024-10-22 LAB
GAMMA INTERFERON BACKGROUND BLD IA-ACNC: 0.14 IU/ML
M TB IFN-G BLD-IMP: NEGATIVE
M TB IFN-G CD4+ BCKGRND COR BLD-ACNC: 9.86 IU/ML
MITOGEN IGNF BCKGRD COR BLD-ACNC: -0.02 IU/ML
MITOGEN IGNF BCKGRD COR BLD-ACNC: 0.08 IU/ML
QUANTIFERON MITOGEN: 10 IU/ML
QUANTIFERON NIL TUBE: 0.14 IU/ML
QUANTIFERON TB1 TUBE: 0.22 IU/ML
QUANTIFERON TB2 TUBE: 0.12

## 2024-10-23 ENCOUNTER — DOCUMENTATION ONLY (OUTPATIENT)
Dept: ANTICOAGULATION | Facility: CLINIC | Age: 89
End: 2024-10-23
Payer: COMMERCIAL

## 2024-10-23 LAB
ANION GAP SERPL CALCULATED.3IONS-SCNC: 14 MMOL/L (ref 7–15)
BASOPHILS # BLD AUTO: 0 10E3/UL (ref 0–0.2)
BASOPHILS NFR BLD AUTO: 0 %
BUN SERPL-MCNC: 15.9 MG/DL (ref 8–23)
CALCIUM SERPL-MCNC: 9 MG/DL (ref 8.8–10.4)
CHLORIDE SERPL-SCNC: 105 MMOL/L (ref 98–107)
CREAT SERPL-MCNC: 1.05 MG/DL (ref 0.67–1.17)
CRP SERPL-MCNC: 5.86 MG/L
EGFRCR SERPLBLD CKD-EPI 2021: 65 ML/MIN/1.73M2
EOSINOPHIL # BLD AUTO: 0.2 10E3/UL (ref 0–0.7)
EOSINOPHIL NFR BLD AUTO: 4 %
ERYTHROCYTE [DISTWIDTH] IN BLOOD BY AUTOMATED COUNT: 13.5 % (ref 10–15)
GLUCOSE SERPL-MCNC: 95 MG/DL (ref 70–99)
HCO3 SERPL-SCNC: 21 MMOL/L (ref 22–29)
HCT VFR BLD AUTO: 35.5 % (ref 40–53)
HGB BLD-MCNC: 11.7 G/DL (ref 13.3–17.7)
IMM GRANULOCYTES # BLD: 0 10E3/UL
IMM GRANULOCYTES NFR BLD: 0 %
LYMPHOCYTES # BLD AUTO: 1.9 10E3/UL (ref 0.8–5.3)
LYMPHOCYTES NFR BLD AUTO: 35 %
MCH RBC QN AUTO: 30.9 PG (ref 26.5–33)
MCHC RBC AUTO-ENTMCNC: 33 G/DL (ref 31.5–36.5)
MCV RBC AUTO: 94 FL (ref 78–100)
MONOCYTES # BLD AUTO: 0.6 10E3/UL (ref 0–1.3)
MONOCYTES NFR BLD AUTO: 12 %
NEUTROPHILS # BLD AUTO: 2.7 10E3/UL (ref 1.6–8.3)
NEUTROPHILS NFR BLD AUTO: 49 %
NRBC # BLD AUTO: 0 10E3/UL
NRBC BLD AUTO-RTO: 0 /100
PLATELET # BLD AUTO: 161 10E3/UL (ref 150–450)
POTASSIUM SERPL-SCNC: 4.3 MMOL/L (ref 3.4–5.3)
RBC # BLD AUTO: 3.79 10E6/UL (ref 4.4–5.9)
SODIUM SERPL-SCNC: 140 MMOL/L (ref 135–145)
WBC # BLD AUTO: 5.4 10E3/UL (ref 4–11)

## 2024-10-23 PROCEDURE — 36415 COLL VENOUS BLD VENIPUNCTURE: CPT | Performed by: FAMILY MEDICINE

## 2024-10-23 PROCEDURE — 80048 BASIC METABOLIC PNL TOTAL CA: CPT | Performed by: FAMILY MEDICINE

## 2024-10-23 PROCEDURE — P9604 ONE-WAY ALLOW PRORATED TRIP: HCPCS | Performed by: FAMILY MEDICINE

## 2024-10-23 PROCEDURE — 86140 C-REACTIVE PROTEIN: CPT | Performed by: FAMILY MEDICINE

## 2024-10-23 PROCEDURE — 85025 COMPLETE CBC W/AUTO DIFF WBC: CPT | Performed by: FAMILY MEDICINE

## 2024-10-23 NOTE — PROGRESS NOTES
ANTICOAGULATION     Melvin Abad is overdue for an INR check.     Per chart review, [facility]  and in house provider is managing warfarin. Will postpone reminder one week.    Neeta Cheung RN  10/23/2024  Anticoagulation Clinic  [facility] for routing messages: cristi COLON  [facility] patient phone line: [phone]

## 2024-10-24 ENCOUNTER — TELEPHONE (OUTPATIENT)
Dept: GERIATRICS | Facility: CLINIC | Age: 89
End: 2024-10-24
Payer: COMMERCIAL

## 2024-10-24 NOTE — TELEPHONE ENCOUNTER
"Provider: DINH Munoz  Facility: [facility]  Facility Type:  TCU    Caller: Neeta   Call Back Number: [phone]  Reason for call: INR  Diagnosis/Goal: A. Fib  Heparin/Lovenox:  No  Currently on ABX?: No Keflex need 10/22  Other interacting medication:  None  Missed or refused doses: No    Date INR New Dose/Orders   10/16 2.33 5 mg   10/17 1.98 2.5 mg   10/18 2.36 5 mg   10/21 3.7 Hold x 1 then 2.5 mg   10/24 2.6        Requesting orders for Warfarin dosing and date of next INR draw  Please respond to \"Geriatrics Nurse Pool\".    Mirna Terrazas RN   "

## 2024-10-24 NOTE — TELEPHONE ENCOUNTER
[facility]   2024     Name: Melvin Abad   : 1928     Background:  Afib    Orders:  Give warfarin 2.5 mg on 10/24, 5 mg on 10/25, 5 mg on 10/26, 2.5 mg on 10/27.  Recheck INR on 10/28/24.     Thanks.      Electronically signed by DINH Munoz CNP

## 2024-10-28 ENCOUNTER — TELEPHONE (OUTPATIENT)
Dept: GERIATRICS | Facility: CLINIC | Age: 89
End: 2024-10-28
Payer: COMMERCIAL

## 2024-10-28 NOTE — TELEPHONE ENCOUNTER
"Provider: DINH Munoz  Facility: [facility]  Facility Type:  TCU    Caller: Neeta   Call Back Number: [phone]  Reason for call: INR  Diagnosis/Goal: A. Fib  Heparin/Lovenox:  No  Currently on ABX?: No  Other interacting medication:  None  Missed or refused doses: No    No s/s of bleeding     Date INR New Dose/Orders   10/16 2.33 5 mg   10/17 1.98 2.5 mg   10/18 2.36 5 mg   10/21 3.7 Hold x 1 then 2.5 mg   10/24 2.6  2.5 mg on 10/24, 5 mg on 10/25, 5 mg on 10/26, 2.5 mg on 10/27.    10/28 3.7       Routed to Apolonia Bedolla and Diamond Siddiqui   Requesting orders for Warfarin dosing and date of next INR draw  Please respond to \"Geriatrics Nurse Pool\".    Mirna Terrazas RN   "

## 2024-10-28 NOTE — TELEPHONE ENCOUNTER
[facility]   2024     Name: Melvin Abad   : 1928     Background:  Afib    Orders:  Hold warfarin today, 10/28.   Give warfarin 2.5 mg by mouth once daily on 10/29, 10/30, and 10/31.   Recheck INR on 24.     Electronically signed by DINH Munoz CNP

## 2024-10-29 ENCOUNTER — TRANSITIONAL CARE UNIT VISIT (OUTPATIENT)
Dept: GERIATRICS | Facility: CLINIC | Age: 89
End: 2024-10-29
Payer: COMMERCIAL

## 2024-10-29 VITALS
BODY MASS INDEX: 29.02 KG/M2 | SYSTOLIC BLOOD PRESSURE: 107 MMHG | TEMPERATURE: 98.2 F | HEART RATE: 65 BPM | DIASTOLIC BLOOD PRESSURE: 54 MMHG | WEIGHT: 196.5 LBS | RESPIRATION RATE: 19 BRPM | OXYGEN SATURATION: 91 %

## 2024-10-29 DIAGNOSIS — J44.9 CHRONIC OBSTRUCTIVE PULMONARY DISEASE, UNSPECIFIED COPD TYPE (H): ICD-10-CM

## 2024-10-29 DIAGNOSIS — I50.812 CHRONIC RIGHT-SIDED HEART FAILURE (H): ICD-10-CM

## 2024-10-29 DIAGNOSIS — U07.1 INFECTION DUE TO 2019 NOVEL CORONAVIRUS: Primary | ICD-10-CM

## 2024-10-29 DIAGNOSIS — N40.0 BENIGN PROSTATIC HYPERPLASIA, UNSPECIFIED WHETHER LOWER URINARY TRACT SYMPTOMS PRESENT: ICD-10-CM

## 2024-10-29 DIAGNOSIS — I10 ESSENTIAL HYPERTENSION: ICD-10-CM

## 2024-10-29 DIAGNOSIS — N18.31 CHRONIC KIDNEY DISEASE, STAGE 3A (H): ICD-10-CM

## 2024-10-29 DIAGNOSIS — Z86.73 HISTORY OF CVA (CEREBROVASCULAR ACCIDENT): ICD-10-CM

## 2024-10-29 DIAGNOSIS — I48.11 LONGSTANDING PERSISTENT ATRIAL FIBRILLATION (H): ICD-10-CM

## 2024-10-29 DIAGNOSIS — L03.116 CELLULITIS OF LEFT LOWER LEG: ICD-10-CM

## 2024-10-29 DIAGNOSIS — D69.6 THROMBOCYTOPENIA (H): ICD-10-CM

## 2024-10-29 DIAGNOSIS — D64.9 ANEMIA, UNSPECIFIED TYPE: ICD-10-CM

## 2024-10-29 PROCEDURE — 99316 NF DSCHRG MGMT 30 MIN+: CPT

## 2024-10-29 NOTE — LETTER
10/29/2024      Melvin Abad  C/o Toña Abad  [address]  [place] [address]        [facility] GERIATRICS DISCHARGE SUMMARY  PATIENT'S NAME: Melvin Abad  YOB: 1928  MEDICAL RECORD NUMBER:  [id number]  Place of Service where encounter took place:  [facility] ([facility]) [[id number]]    PRIMARY CARE PROVIDER AND CLINIC RESPONSIBLE AFTER TRANSFER:   DINH Silva CNP, [address] / [facility] [address]    Assisted Living: [facility] ([facility]     Transferring providers: DINH Munoz CNP, Jacques Purcell MD  Recent Hospitalization/ED:  [facility] stay 10/15/24 to 10/18/24.  Date of SNF Admission:  10/18/24  Date of SNF (anticipated) Discharge:  11/5/24  Discharged to: previous assisted living  Cognitive Scores: BIMS: 14/15  Physical Function:  manual w/c for indoor mobility  DME: No new DME needed    CODE STATUS/ADVANCE DIRECTIVES DISCUSSION:  No CPR- Do NOT Intubate   ALLERGIES: Patient has no known allergies.    NURSING FACILITY COURSE   Medication Changes/Rationale:   Patient has progressed with PT/OT. Completed Keflex on 10/24/24. No medication changes.     Summary of nursing facility stay:   Infection due to 2019 novel coronavirus  Treated with remdesivir at hospital. Did not require supplemental oxygen.   Declined guafenesin for productive cough. No fever/chills.   PT/OT for generalized weakness     Cellulitis of left lower leg s/p chronic venous stasis  Treated with course of cephalexin 500 mg every 8 hours to complete 10/22/24.   Continue daily wound cares on left lower leg and monitor for sxsx of cellulitis.     Essential hypertension  History of CVA (cerebrovascular accident)  Longstanding persistent atrial fibrillation (H)  Chronic right-sided heart failure  Continue coumadin 2.5 mg daily and recheck INR on 10/24/24.  Continue lisinopril 10 mg dialy and metoprolol 50 mg  daily.   Patient was on lasix 40 mg daily but was held at hospital due to elevated creatinine and poor oral intake. Resumed lasix 20 mg daily at discharge. Monitor daily weights and may need to increase dosage of lasix. Daily weights have been steady at 196.5 lbs.      Chronic obstructive pulmonary disease, unspecified COPD type (H)  Noted in patient's history as emphysema in 2003. Patient unaware of COPD diagnosis     Chronic kidney disease, stage 3a (H)  Vaseline Creatinine ~ 1.22-1.28. Creatinine was 1.47 on 10/15 and 1.05 on 10/23/24.  Monitor BMP periodically.      Benign prostatic hyperplasia, unspecified whether lower urinary tract symptoms present  Continue tamsulosin 0.4 mg daily.     Anemia, unspecified type  Thrombocytopenia  Hgb was low at 10.6 on 10/14/24. 11.7 on 10/23/24. Negative occult stool.  Platelets low at 118 on 10/14/24, were trending upwards during hospital stay. Platelets 161 WNL on 10/23/24.        Discharge Medications:  Post Medication Reconciliation Status:  Discharge medications reconciled, continue medications without change       Current Outpatient Medications   Medication Sig Dispense Refill     acetaminophen (TYLENOL) 500 MG tablet Take 2 tablets (1,000 mg) by mouth every 8 hours as needed for mild pain       furosemide (LASIX) 20 MG tablet Take 1 tablet (20 mg) by mouth daily. 30 tablet 0     gabapentin (NEURONTIN) 100 MG capsule Take 2 capsules (200 mg) by mouth 2 times daily (Patient taking differently: Take 200 mg by mouth 2 times daily.) 120 capsule 0     ketotifen fumarate 0.035%, ketotifen 0.025%, (ZADITOR) 0.025 % ophthalmic solution Place 1 drop into both eyes 2 times daily as needed for dry eyes Patient self-administers 5 mL 0     lisinopril (ZESTRIL) 10 MG tablet Take 1 tablet (10 mg) by mouth daily (Patient taking differently: Take 10 mg by mouth daily.) 30 tablet 0     loratadine (CLARITIN) 10 MG tablet Take 1 tablet (10 mg) by mouth daily (Patient taking differently:  Take 10 mg by mouth daily.) 30 tablet 0     metoprolol succinate ER (TOPROL XL) 50 MG 24 hr tablet Take 1 tablet (50 mg) by mouth daily (Patient taking differently: Take 50 mg by mouth daily.) 30 tablet 0     order for DME Equipment being ordered: side rails/grab bars 1 each 0     order for DME Equipment being ordered: bedside commode 1 Device 0     order for DME Equipment being ordered: 4 legged walker 1 Device 0     polyethylene glycol (MIRALAX) 17 GM/Dose powder Take 9 g by mouth daily as needed for constipation Mix and hand to patient, Patient self-administers (he knows how much he needs to keep regular) 510 g 0     psyllium (METAMUCIL/KONSYL) 58.6 % powder Take 18 g (1 Tablespoonful) by mouth daily Mix and hand to patient, Patient self-administers (he knows how much he needs to keep regular) (Patient taking differently: Take 1 Tablespoonful by mouth daily. Mix and hand to patient, Patient self-administers (he knows how much he needs to keep regular))       simvastatin (ZOCOR) 40 MG tablet Take 1 tablet (40 mg) by mouth at bedtime (Patient taking differently: Take 40 mg by mouth at bedtime.) 30 tablet 0     sodium chloride (OCEAN) 0.65 % nasal spray Spray 2 sprays in nostril daily as needed for congestion Patient self-administers 30 mL 0     tamsulosin (FLOMAX) 0.4 MG capsule Take 1 capsule (0.4 mg) by mouth daily (Patient taking differently: Take 0.4 mg by mouth daily.) 30 capsule 0     Vitamin D3 (CHOLECALCIFEROL) 25 mcg (1000 units) tablet Take 1 tablet (25 mcg) by mouth daily (Patient taking differently: Take 25 mcg by mouth daily.)       warfarin ANTICOAGULANT (COUMADIN) 2.5 MG tablet Take 2.5 mgs mg Sunday, Tuesday and Thursday, and 5 mgs all other days.  Recheck INR on 11/11/24 (Patient taking differently: Take 2.5 mg by mouth. Take 2.5 mgs mg Sunday, Tuesday and Thursday, and 5 mgs all other days.  Recheck INR on 11/11/24) 44 tablet 0     warfarin ANTICOAGULANT (COUMADIN) 5 MG tablet Take 5 mg by mouth.  Monday, Wednesday, Friday, Saturday          Controlled medications:   not applicable/none     Past Medical History:   Past Medical History:   Diagnosis Date     Actinic keratosis      Atrial fibrillation (H)      Basal cell carcinoma nos 06/10    Mohs excision, rt upper lip & rt temple     Cardiac dysrhythmia, unspecified      Cough     chronic cough     Diabetic eye exam (H) 8/29/14     Generalized osteoarthrosis, unspecified site     djd of the knees, hands, and neck     Other diseases of lung, not elsewhere classified     pulmonary nodule, benign     Pure hypercholesterolemia      Unspecified essential hypertension      Physical Exam:   Vitals: /54   Pulse 65   Temp 98.2  F (36.8  C)   Resp 19   Wt 89.1 kg (196 lb 8 oz)   SpO2 91%   BMI 29.02 kg/m    BMI: Body mass index is 29.02 kg/m .  GENERAL APPEARANCE:  Alert, in no distress  RESP:  respiratory effort and palpation of chest normal, cough non-productive, expiratory wheezes  CV:  no edema, irregular rhythm irregular rate  ABDOMEN:  normal bowel sounds, soft, nontender, no hepatosplenomegaly or other masses  SKIN:  chronic wound on left lower leg covered with dressing  PSYCH:  oriented X 3, affect and mood normal     SNF labs:   Lab Requisition on 10/23/2024   Component Date Value Ref Range Status     Sodium 10/23/2024 140  135 - 145 mmol/L Final     Potassium 10/23/2024 4.3  3.4 - 5.3 mmol/L Final     Chloride 10/23/2024 105  98 - 107 mmol/L Final     Carbon Dioxide (CO2) 10/23/2024 21 (L)  22 - 29 mmol/L Final     Anion Gap 10/23/2024 14  7 - 15 mmol/L Final     Urea Nitrogen 10/23/2024 15.9  8.0 - 23.0 mg/dL Final     Creatinine 10/23/2024 1.05  0.67 - 1.17 mg/dL Final     GFR Estimate 10/23/2024 65  >60 mL/min/1.73m2 Final    eGFR calculated using 2021 CKD-EPI equation.     Calcium 10/23/2024 9.0  8.8 - 10.4 mg/dL Final    Reference intervals for this test were updated on 7/16/2024 to reflect our healthy population more accurately. There may be  differences in the flagging of prior results with similar values performed with this method. Those prior results can be interpreted in the context of the updated reference intervals.     Glucose 10/23/2024 95  70 - 99 mg/dL Final     WBC Count 10/23/2024 5.4  4.0 - 11.0 10e3/uL Final     RBC Count 10/23/2024 3.79 (L)  4.40 - 5.90 10e6/uL Final     Hemoglobin 10/23/2024 11.7 (L)  13.3 - 17.7 g/dL Final     Hematocrit 10/23/2024 35.5 (L)  40.0 - 53.0 % Final     MCV 10/23/2024 94  78 - 100 fL Final     MCH 10/23/2024 30.9  26.5 - 33.0 pg Final     MCHC 10/23/2024 33.0  31.5 - 36.5 g/dL Final     RDW 10/23/2024 13.5  10.0 - 15.0 % Final     Platelet Count 10/23/2024 161  150 - 450 10e3/uL Final     % Neutrophils 10/23/2024 49  % Final     % Lymphocytes 10/23/2024 35  % Final     % Monocytes 10/23/2024 12  % Final     % Eosinophils 10/23/2024 4  % Final     % Basophils 10/23/2024 0  % Final     % Immature Granulocytes 10/23/2024 0  % Final     NRBCs per 100 WBC 10/23/2024 0  <1 /100 Final     Absolute Neutrophils 10/23/2024 2.7  1.6 - 8.3 10e3/uL Final     Absolute Lymphocytes 10/23/2024 1.9  0.8 - 5.3 10e3/uL Final     Absolute Monocytes 10/23/2024 0.6  0.0 - 1.3 10e3/uL Final     Absolute Eosinophils 10/23/2024 0.2  0.0 - 0.7 10e3/uL Final     Absolute Basophils 10/23/2024 0.0  0.0 - 0.2 10e3/uL Final     Absolute Immature Granulocytes 10/23/2024 0.0  <=0.4 10e3/uL Final     Absolute NRBCs 10/23/2024 0.0  10e3/uL Final     CRP Inflammation 10/23/2024 5.86 (H)  <5.00 mg/L Final       DISCHARGE PLAN:  Follow up labs: No labs orders/due  Medical Follow Up:      Follow up with primary care provider in 1-2 weeks  [facility] scheduled appointments:   None  Discharge Services: Home Care:  Occupational Therapy, Physical Therapy, Registered Nurse, and Home Health Aide  Discharge Instructions Verbalized to Patient at Discharge:   None    TOTAL DISCHARGE TIME:   Greater than 30 minutes  Electronically signed by:  [place]  DINH Bedolla CNP     Documentation of Face to Face and Certification for Home Health Services    I certify that patient: Melvin Abad is under my care and that I, or a nurse practitioner or physician's assistant working with me, had a face-to-face encounter that meets the physician face-to-face encounter requirements with this patient on: 10/29/2024.    This encounter with the patient was in whole, or in part, for the following medical condition, which is the primary reason for home health care: Infection due to 2019 novel coronavirus, Cellulitis of left lower leg, M62.81 Muscle weakness (generalized).    I certify that, based on my findings, the following services are medically necessary home health services: Nursing, Occupational Therapy, and Physical Therapy.    My clinical findings support the need for the above services because: Nurse is needed: To provide assessment and oversight required in the home to assure adherence to the medical plan due to: need for assistance.., Occupational Therapy Services are needed to assess and treat cognitive ability and address ADL safety due to impairment in function and mobility., and Physical Therapy Services are needed to assess and treat the following functional impairments: mobility and physical deconditioning.    Further, I certify that my clinical findings support that this patient is homebound (i.e. absences from home require considerable and taxing effort and are for medical reasons or [employer] services or infrequently or of short duration when for other reasons) because: Requires assistance of another person or specialized equipment to access medical services because patient: Requires supervision of another for safe transfer...    Based on the above findings. I certify that this patient is confined to the home and needs intermittent skilled nursing care, physical therapy and/or speech therapy.  The patient is under my care, and I have initiated the establishment  of the plan of care.  This patient will be followed by a physician who will periodically review the plan of care.  Physician/Provider to provide follow up care: Kim Chong    Attending hospital physician (the Medicare certified [facility] provider): DINH Munoz CNP  Physician Signature: See electronic signature associated with these discharge orders.  Date: 11/1/2024              Sincerely,        DINH Munoz CNP

## 2024-10-29 NOTE — PROGRESS NOTES
[facility] GERIATRICS DISCHARGE SUMMARY  PATIENT'S NAME: Melvin Abad  YOB: 1928  MEDICAL RECORD NUMBER:  [id number]  Place of Service where encounter took place:  [facility] [[id number]]    PRIMARY CARE PROVIDER AND CLINIC RESPONSIBLE AFTER TRANSFER:   DINH Silva CNP, [address] [facility] / [facility] [address]    Assisted Living: [facility] ()     Transferring providers: DINH Munoz CNP, Jacques Purcell MD  Recent Hospitalization/ED:  [facility] stay 10/15/24 to 10/18/24.  Date of SNF Admission:  10/18/24  Date of SNF (anticipated) Discharge:  11/5/24  Discharged to: previous assisted living  Cognitive Scores: BIMS: 14/15  Physical Function:  manual w/c for indoor mobility  DME: No new DME needed    CODE STATUS/ADVANCE DIRECTIVES DISCUSSION:  No CPR- Do NOT Intubate   ALLERGIES: Patient has no known allergies.    NURSING FACILITY COURSE   Medication Changes/Rationale:   Patient has progressed with PT/OT. Completed Keflex on 10/24/24. No medication changes.     Summary of nursing facility stay:   Infection due to 2019 novel coronavirus  Treated with remdesivir at hospital. Did not require supplemental oxygen.   Declined guafenesin for productive cough. No fever/chills.   PT/OT for generalized weakness     Cellulitis of left lower leg s/p chronic venous stasis  Treated with course of cephalexin 500 mg every 8 hours to complete 10/22/24.   Continue daily wound cares on left lower leg and monitor for sxsx of cellulitis.     Essential hypertension  History of CVA (cerebrovascular accident)  Longstanding persistent atrial fibrillation (H)  Chronic right-sided heart failure  Continue coumadin 2.5 mg daily and recheck INR on 10/24/24.  Continue lisinopril 10 mg dialy and metoprolol 50 mg daily.   Patient was on lasix 40 mg daily but was held at hospital due to elevated creatinine and  poor oral intake. Resumed lasix 20 mg daily at discharge. Monitor daily weights and may need to increase dosage of lasix. Daily weights have been steady at 196.5 lbs.      Chronic obstructive pulmonary disease, unspecified COPD type (H)  Noted in patient's history as emphysema in 2003. Patient unaware of COPD diagnosis     Chronic kidney disease, stage 3a (H)  Vaseline Creatinine ~ 1.22-1.28. Creatinine was 1.47 on 10/15 and 1.05 on 10/23/24.  Monitor BMP periodically.      Benign prostatic hyperplasia, unspecified whether lower urinary tract symptoms present  Continue tamsulosin 0.4 mg daily.     Anemia, unspecified type  Thrombocytopenia  Hgb was low at 10.6 on 10/14/24. 11.7 on 10/23/24. Negative occult stool.  Platelets low at 118 on 10/14/24, were trending upwards during hospital stay. Platelets 161 WNL on 10/23/24.        Discharge Medications:  Post Medication Reconciliation Status:  Discharge medications reconciled, continue medications without change       Current Outpatient Medications   Medication Sig Dispense Refill    acetaminophen (TYLENOL) 500 MG tablet Take 2 tablets (1,000 mg) by mouth every 8 hours as needed for mild pain      furosemide (LASIX) 20 MG tablet Take 1 tablet (20 mg) by mouth daily. 30 tablet 0    gabapentin (NEURONTIN) 100 MG capsule Take 2 capsules (200 mg) by mouth 2 times daily (Patient taking differently: Take 200 mg by mouth 2 times daily.) 120 capsule 0    ketotifen fumarate 0.035%, ketotifen 0.025%, (ZADITOR) 0.025 % ophthalmic solution Place 1 drop into both eyes 2 times daily as needed for dry eyes Patient self-administers 5 mL 0    lisinopril (ZESTRIL) 10 MG tablet Take 1 tablet (10 mg) by mouth daily (Patient taking differently: Take 10 mg by mouth daily.) 30 tablet 0    loratadine (CLARITIN) 10 MG tablet Take 1 tablet (10 mg) by mouth daily (Patient taking differently: Take 10 mg by mouth daily.) 30 tablet 0    metoprolol succinate ER (TOPROL XL) 50 MG 24 hr tablet Take 1  tablet (50 mg) by mouth daily (Patient taking differently: Take 50 mg by mouth daily.) 30 tablet 0    order for DME Equipment being ordered: side rails/grab bars 1 each 0    order for DME Equipment being ordered: bedside commode 1 Device 0    order for DME Equipment being ordered: 4 legged walker 1 Device 0    polyethylene glycol (MIRALAX) 17 GM/Dose powder Take 9 g by mouth daily as needed for constipation Mix and hand to patient, Patient self-administers (he knows how much he needs to keep regular) 510 g 0    psyllium (METAMUCIL/KONSYL) 58.6 % powder Take 18 g (1 Tablespoonful) by mouth daily Mix and hand to patient, Patient self-administers (he knows how much he needs to keep regular) (Patient taking differently: Take 1 Tablespoonful by mouth daily. Mix and hand to patient, Patient self-administers (he knows how much he needs to keep regular))      simvastatin (ZOCOR) 40 MG tablet Take 1 tablet (40 mg) by mouth at bedtime (Patient taking differently: Take 40 mg by mouth at bedtime.) 30 tablet 0    sodium chloride (OCEAN) 0.65 % nasal spray Spray 2 sprays in nostril daily as needed for congestion Patient self-administers 30 mL 0    tamsulosin (FLOMAX) 0.4 MG capsule Take 1 capsule (0.4 mg) by mouth daily (Patient taking differently: Take 0.4 mg by mouth daily.) 30 capsule 0    Vitamin D3 (CHOLECALCIFEROL) 25 mcg (1000 units) tablet Take 1 tablet (25 mcg) by mouth daily (Patient taking differently: Take 25 mcg by mouth daily.)      warfarin ANTICOAGULANT (COUMADIN) 2.5 MG tablet Take 2.5 mgs mg Sunday, Tuesday and Thursday, and 5 mgs all other days.  Recheck INR on 11/11/24 (Patient taking differently: Take 2.5 mg by mouth. Take 2.5 mgs mg Sunday, Tuesday and Thursday, and 5 mgs all other days.  Recheck INR on 11/11/24) 44 tablet 0    warfarin ANTICOAGULANT (COUMADIN) 5 MG tablet Take 5 mg by mouth. Monday, Wednesday, Friday, Saturday          Controlled medications:   not applicable/none     Past Medical History:    Past Medical History:   Diagnosis Date    Actinic keratosis     Atrial fibrillation (H)     Basal cell carcinoma nos 06/10    Mohs excision, rt upper lip & rt temple    Cardiac dysrhythmia, unspecified     Cough     chronic cough    Diabetic eye exam (H) 8/29/14    Generalized osteoarthrosis, unspecified site     djd of the knees, hands, and neck    Other diseases of lung, not elsewhere classified     pulmonary nodule, benign    Pure hypercholesterolemia     Unspecified essential hypertension      Physical Exam:   Vitals: /54   Pulse 65   Temp 98.2  F (36.8  C)   Resp 19   Wt 89.1 kg (196 lb 8 oz)   SpO2 91%   BMI 29.02 kg/m    BMI: Body mass index is 29.02 kg/m .  GENERAL APPEARANCE:  Alert, in no distress  RESP:  respiratory effort and palpation of chest normal, cough non-productive, expiratory wheezes  CV:  no edema, irregular rhythm irregular rate  ABDOMEN:  normal bowel sounds, soft, nontender, no hepatosplenomegaly or other masses  SKIN:  chronic wound on left lower leg covered with dressing  PSYCH:  oriented X 3, affect and mood normal     SNF labs:   Lab Requisition on 10/23/2024   Component Date Value Ref Range Status    Sodium 10/23/2024 140  135 - 145 mmol/L Final    Potassium 10/23/2024 4.3  3.4 - 5.3 mmol/L Final    Chloride 10/23/2024 105  98 - 107 mmol/L Final    Carbon Dioxide (CO2) 10/23/2024 21 (L)  22 - 29 mmol/L Final    Anion Gap 10/23/2024 14  7 - 15 mmol/L Final    Urea Nitrogen 10/23/2024 15.9  8.0 - 23.0 mg/dL Final    Creatinine 10/23/2024 1.05  0.67 - 1.17 mg/dL Final    GFR Estimate 10/23/2024 65  >60 mL/min/1.73m2 Final    eGFR calculated using 2021 CKD-EPI equation.    Calcium 10/23/2024 9.0  8.8 - 10.4 mg/dL Final    Reference intervals for this test were updated on 7/16/2024 to reflect our healthy population more accurately. There may be differences in the flagging of prior results with similar values performed with this method. Those prior results can be interpreted in  the context of the updated reference intervals.    Glucose 10/23/2024 95  70 - 99 mg/dL Final    WBC Count 10/23/2024 5.4  4.0 - 11.0 10e3/uL Final    RBC Count 10/23/2024 3.79 (L)  4.40 - 5.90 10e6/uL Final    Hemoglobin 10/23/2024 11.7 (L)  13.3 - 17.7 g/dL Final    Hematocrit 10/23/2024 35.5 (L)  40.0 - 53.0 % Final    MCV 10/23/2024 94  78 - 100 fL Final    MCH 10/23/2024 30.9  26.5 - 33.0 pg Final    MCHC 10/23/2024 33.0  31.5 - 36.5 g/dL Final    RDW 10/23/2024 13.5  10.0 - 15.0 % Final    Platelet Count 10/23/2024 161  150 - 450 10e3/uL Final    % Neutrophils 10/23/2024 49  % Final    % Lymphocytes 10/23/2024 35  % Final    % Monocytes 10/23/2024 12  % Final    % Eosinophils 10/23/2024 4  % Final    % Basophils 10/23/2024 0  % Final    % Immature Granulocytes 10/23/2024 0  % Final    NRBCs per 100 WBC 10/23/2024 0  <1 /100 Final    Absolute Neutrophils 10/23/2024 2.7  1.6 - 8.3 10e3/uL Final    Absolute Lymphocytes 10/23/2024 1.9  0.8 - 5.3 10e3/uL Final    Absolute Monocytes 10/23/2024 0.6  0.0 - 1.3 10e3/uL Final    Absolute Eosinophils 10/23/2024 0.2  0.0 - 0.7 10e3/uL Final    Absolute Basophils 10/23/2024 0.0  0.0 - 0.2 10e3/uL Final    Absolute Immature Granulocytes 10/23/2024 0.0  <=0.4 10e3/uL Final    Absolute NRBCs 10/23/2024 0.0  10e3/uL Final    CRP Inflammation 10/23/2024 5.86 (H)  <5.00 mg/L Final       DISCHARGE PLAN:  Follow up labs: No labs orders/due  Medical Follow Up:      Follow up with primary care provider in 1-2 weeks  [facility] scheduled appointments:   None  Discharge Services: Home Care:  Occupational Therapy, Physical Therapy, Registered Nurse, and Home Health Aide  Discharge Instructions Verbalized to Patient at Discharge:   None    TOTAL DISCHARGE TIME:   Greater than 30 minutes  Electronically signed by:  DINH Munoz CNP     Documentation of Face to Face and Certification for Home Health Services    I certify that patient: Melvin Abad is under my care and that  I, or a nurse practitioner or physician's assistant working with me, had a face-to-face encounter that meets the physician face-to-face encounter requirements with this patient on: 10/29/2024.    This encounter with the patient was in whole, or in part, for the following medical condition, which is the primary reason for home health care: Infection due to 2019 novel coronavirus, Cellulitis of left lower leg, M62.81 Muscle weakness (generalized).    I certify that, based on my findings, the following services are medically necessary home health services: Nursing, Occupational Therapy, and Physical Therapy.    My clinical findings support the need for the above services because: Nurse is needed: To provide assessment and oversight required in the home to assure adherence to the medical plan due to: need for assistance.., Occupational Therapy Services are needed to assess and treat cognitive ability and address ADL safety due to impairment in function and mobility., and Physical Therapy Services are needed to assess and treat the following functional impairments: mobility and physical deconditioning.    Further, I certify that my clinical findings support that this patient is homebound (i.e. absences from home require considerable and taxing effort and are for medical reasons or [facility] services or infrequently or of short duration when for other reasons) because: Requires assistance of another person or specialized equipment to access medical services because patient: Requires supervision of another for safe transfer...    Based on the above findings. I certify that this patient is confined to the home and needs intermittent skilled nursing care, physical therapy and/or speech therapy.  The patient is under my care, and I have initiated the establishment of the plan of care.  This patient will be followed by a physician who will periodically review the plan of care.  Physician/Provider to provide follow up care:  Kim Chong    Attending hospital physician (the Medicare certified [facility] provider): DINH Munoz CNP  Physician Signature: See electronic signature associated with these discharge orders.  Date: 11/1/2024

## 2024-10-30 ENCOUNTER — DOCUMENTATION ONLY (OUTPATIENT)
Dept: ANTICOAGULATION | Facility: CLINIC | Age: 89
End: 2024-10-30
Payer: COMMERCIAL

## 2024-10-30 NOTE — PROGRESS NOTES
ANTICOAGULATION     Melvin Abad is overdue for an INR check.     Patient INR is currently being managed by [facility]: [facility] (TCU). Will postpone reminder one week.    Neeta Cheung RN  10/30/2024  Anticoagulation Clinic  [facility] for routing messages: cristi COLON  [facility] patient phone line: [phone]

## 2024-11-04 ENCOUNTER — TELEPHONE (OUTPATIENT)
Dept: GERIATRICS | Facility: CLINIC | Age: 89
End: 2024-11-04
Payer: COMMERCIAL

## 2024-11-04 NOTE — TELEPHONE ENCOUNTER
[facility]   2024     Name: Melvin Abad   : 1928     Background:  Afib    Orders:  Warfarin 5 mg po daily on 24 and 24.  Warfarin 2.5 mg po daily on 24 and 24.   Recheck INR on 24 at [facility].     Electronically signed by DINH Munoz CNP

## 2024-11-04 NOTE — TELEPHONE ENCOUNTER
"Provider: DINH Munoz  Facility: [facility]   Facility Type:  TCU    Caller: Rolanda  Call Back Number: [phone]  Reason for call: INR  Diagnosis/Goal: A. Fib  Heparin/Lovenox:  No  Currently on ABX?: No  Other interacting medication:  None  Missed or refused doses: No    Date INR New Dose/Orders   10/18 2.36 5 mg   10/21 3.7 Hold x 1 then 2.5 mg   10/24 2.6  2.5 mg on 10/24, 5 mg on 10/25, 5 mg on 10/26, 2.5 mg on 10/27.    10/28 3.7  held x1 then 2.5mg every day    11/1 2.0 5mg on 11/1-11/2, 2.5mg on 11/3   11/4 2.3         Telephone encounter sent to: DINH Munoz    Requesting orders for Warfarin dosing and date of next INR draw  Please respond to \"Geriatrics Nurse Pool\".    Mirna Terrazas RN   "

## 2024-11-06 ENCOUNTER — DOCUMENTATION ONLY (OUTPATIENT)
Dept: ANTICOAGULATION | Facility: CLINIC | Age: 89
End: 2024-11-06
Payer: COMMERCIAL

## 2024-11-06 NOTE — PROGRESS NOTES
ANTICOAGULATION     Melvin Abad is overdue for an INR check.     Per chart review, patient remains inpatient at TCU. It appears patient may be discharging back to [facility] and INR should be done at [facility] on 11/8. Will postpone reminder to 11/8.    Neeta Cheung RN  11/6/2024  Anticoagulation Clinic  [facility] for routing messages: cristi COLON  [facility] patient phone line: [phone]

## 2024-11-07 ENCOUNTER — LAB REQUISITION (OUTPATIENT)
Dept: LAB | Facility: CLINIC | Age: 89
End: 2024-11-07
Payer: COMMERCIAL

## 2024-11-07 DIAGNOSIS — I48.20 CHRONIC ATRIAL FIBRILLATION, UNSPECIFIED (H): ICD-10-CM

## 2024-11-08 ENCOUNTER — ANTICOAGULATION THERAPY VISIT (OUTPATIENT)
Dept: ANTICOAGULATION | Facility: CLINIC | Age: 89
End: 2024-11-08
Payer: COMMERCIAL

## 2024-11-08 DIAGNOSIS — Z79.01 LONG TERM CURRENT USE OF ANTICOAGULANT THERAPY: Primary | ICD-10-CM

## 2024-11-08 DIAGNOSIS — I48.20 CHRONIC ATRIAL FIBRILLATION (H): ICD-10-CM

## 2024-11-08 LAB
INR (EXTERNAL): 2
INR (EXTERNAL): 2.3
INR (EXTERNAL): 2.36
INR (EXTERNAL): 2.6
INR (EXTERNAL): 3.7
INR (EXTERNAL): 3.7
INR PPP: 2.08 (ref 0.85–1.15)

## 2024-11-08 PROCEDURE — 36415 COLL VENOUS BLD VENIPUNCTURE: CPT | Mod: ORL | Performed by: NURSE PRACTITIONER

## 2024-11-08 PROCEDURE — 85610 PROTHROMBIN TIME: CPT | Mod: ORL | Performed by: NURSE PRACTITIONER

## 2024-11-08 PROCEDURE — P9604 ONE-WAY ALLOW PRORATED TRIP: HCPCS | Mod: ORL | Performed by: NURSE PRACTITIONER

## 2024-11-08 RX ORDER — WARFARIN SODIUM 2.5 MG/1
TABLET ORAL
Qty: 5 TABLET | Refills: 0 | Status: SHIPPED | OUTPATIENT
Start: 2024-11-08 | End: 2024-11-15

## 2024-11-08 RX ORDER — WARFARIN SODIUM 5 MG/1
TABLET ORAL
Qty: 6 TABLET | Refills: 0 | Status: SHIPPED | OUTPATIENT
Start: 2024-11-08

## 2024-11-08 NOTE — PROGRESS NOTES
ANTICOAGULATION MANAGEMENT     Melvin Abad 89 year old male is on warfarin with therapeutic INR result. (Goal INR 2.0-3.0)    Recent labs: (last 7 days)     11/08/24  0650   INR 2.08*       ASSESSMENT     Source(s): Chart Review and Home Care/Facility Nurse     Warfarin doses taken:  was discharged back to [facility] on 11/5 was in TCU from 10/18  - Per Mar patient was given 2.5 mg 11/5 and 11/7 and 5 mg 11/5 Anticoagulation calendar updated of dosing received while patient was in TCU   Diet: No new diet changes identified  Medication/supplement changes:  Furosemide dose decreased on 11/4 to 20 mg No interaction anticipated  New illness, injury, or hospitalization: No  Signs or symptoms of bleeding or clotting: No  Previous result: Therapeutic last 2(+) visits  Additional findings: [facility]farin escribed [facility]           PLAN     Recommended plan for temporary change(s) affecting INR     Dosing Instructions: Continue your current warfarin dose with next INR in 1 week       Summary  As of 11/8/2024      Full warfarin instructions:  2.5 mg every Sun, Tue, Thu; 5 mg all other days   Next INR check:  11/15/2024               Telephone call with [facility] nurse who verbalizes understanding and agrees to plan and who agrees to plan and repeated back plan correctly  Faxed dosing and follow up instructions to [facility] [phone]    Orders given to  Homecare nurse/facility to recheck    Education provided: Contact [phone] with any changes, questions or concerns.     Plan made per [facility] anticoagulation protocol    Toña Goemz RN  11/8/2024  Anticoagulation Clinic  [facility] for routing messages: cristi COLON  [facility] patient phone line: [phone]        _______________________________________________________________________     Anticoagulation Episode Summary       Current INR goal:  2.0-3.0   TTR:  91.7% (1 y)   Target end date:  Indefinite   Send INR reminders to:  LINDSEY COLON     Indications    Atrial fibrillation (Resolved) [I48.91]  Long-term (current) use of anticoagulants [Z79.01] [Z79.01]  Chronic atrial fibrillation (H) [I48.20]             Comments:  [place] SINGH/Ingrid Sanchez [phone] fax: [phone] & send to [facility]             Anticoagulation Care Providers       Provider Role Specialty Phone number    Lazaro Huston MD Referring Internal Medicine [phone]    Fransisco Cordon MD Referring Family Medicine

## 2024-11-14 ENCOUNTER — LAB REQUISITION (OUTPATIENT)
Dept: LAB | Facility: CLINIC | Age: 89
End: 2024-11-14
Payer: COMMERCIAL

## 2024-11-14 ENCOUNTER — ASSISTED LIVING VISIT (OUTPATIENT)
Dept: GERIATRICS | Facility: CLINIC | Age: 89
End: 2024-11-14
Payer: COMMERCIAL

## 2024-11-14 VITALS
SYSTOLIC BLOOD PRESSURE: 107 MMHG | HEART RATE: 65 BPM | RESPIRATION RATE: 20 BRPM | WEIGHT: 204 LBS | OXYGEN SATURATION: 96 % | BODY MASS INDEX: 30.13 KG/M2 | TEMPERATURE: 97 F | DIASTOLIC BLOOD PRESSURE: 61 MMHG

## 2024-11-14 DIAGNOSIS — I48.11 LONGSTANDING PERSISTENT ATRIAL FIBRILLATION (H): ICD-10-CM

## 2024-11-14 DIAGNOSIS — K59.01 SLOW TRANSIT CONSTIPATION: ICD-10-CM

## 2024-11-14 DIAGNOSIS — Z79.01 LONG TERM (CURRENT) USE OF ANTICOAGULANTS: ICD-10-CM

## 2024-11-14 DIAGNOSIS — Z86.73 HISTORY OF CVA (CEREBROVASCULAR ACCIDENT): ICD-10-CM

## 2024-11-14 DIAGNOSIS — I10 ESSENTIAL HYPERTENSION: Primary | ICD-10-CM

## 2024-11-14 DIAGNOSIS — I48.91 UNSPECIFIED ATRIAL FIBRILLATION (H): ICD-10-CM

## 2024-11-14 DIAGNOSIS — I48.20 CHRONIC ATRIAL FIBRILLATION, UNSPECIFIED (H): ICD-10-CM

## 2024-11-14 DIAGNOSIS — Z79.01 LONG TERM CURRENT USE OF ANTICOAGULANT THERAPY: ICD-10-CM

## 2024-11-14 DIAGNOSIS — G25.81 RESTLESS LEGS SYNDROME (RLS): ICD-10-CM

## 2024-11-14 NOTE — PROGRESS NOTES
[facility] GERIATRICS  ACUTE/EPISODIC VISIT    [facility] Medical Record Number:  [id number]  Place of Service where encounter took place:  [facility] ([facility] [[id number]]    Chief Complaint   Patient presents with    RECHECK       HPI:    Melvin Abad is a 89 year old  (4/24/1928), who is being seen today for an episodic care visit.  HPI information obtained from: {[facility] HPI:[id number]}.    Today's concern is:      ALLERGIES:  No Known Allergies     MEDICATIONS:  Post Discharge Medication Reconciliation Status: {ACO Med Rec (Provider):[id number]}. ***    Current Outpatient Medications   Medication Sig Dispense Refill    acetaminophen (TYLENOL) 500 MG tablet Take 2 tablets (1,000 mg) by mouth every 8 hours as needed for mild pain      furosemide (LASIX) 20 MG tablet Take 1 tablet (20 mg) by mouth daily. 30 tablet 0    gabapentin (NEURONTIN) 100 MG capsule Take 2 capsules (200 mg) by mouth 2 times daily (Patient taking differently: Take 200 mg by mouth 2 times daily.) 120 capsule 0    ketotifen fumarate 0.035%, ketotifen 0.025%, (ZADITOR) 0.025 % ophthalmic solution Place 1 drop into both eyes 2 times daily as needed for dry eyes Patient self-administers 5 mL 0    lisinopril (ZESTRIL) 10 MG tablet Take 1 tablet (10 mg) by mouth daily (Patient taking differently: Take 10 mg by mouth daily.) 30 tablet 0    loratadine (CLARITIN) 10 MG tablet Take 1 tablet (10 mg) by mouth daily (Patient taking differently: Take 10 mg by mouth daily.) 30 tablet 0    metoprolol succinate ER (TOPROL XL) 50 MG 24 hr tablet Take 1 tablet (50 mg) by mouth daily (Patient taking differently: Take 50 mg by mouth daily.) 30 tablet 0    order for DME Equipment being ordered: side rails/grab bars 1 each 0    order for DME Equipment being ordered: bedside commode 1 Device 0    order for DME Equipment being ordered: 4 legged walker 1 Device 0    polyethylene glycol (MIRALAX) 17 GM/Dose powder Take 9 g by mouth daily as needed for constipation Mix  and hand to patient, Patient self-administers (he knows how much he needs to keep regular) 510 g 0    psyllium (METAMUCIL/KONSYL) 58.6 % powder Take 18 g (1 Tablespoonful) by mouth daily Mix and hand to patient, Patient self-administers (he knows how much he needs to keep regular) (Patient taking differently: Take 1 Tablespoonful by mouth daily. Mix and hand to patient, Patient self-administers (he knows how much he needs to keep regular))      simvastatin (ZOCOR) 40 MG tablet Take 1 tablet (40 mg) by mouth at bedtime (Patient taking differently: Take 40 mg by mouth at bedtime.) 30 tablet 0    sodium chloride (OCEAN) 0.65 % nasal spray Spray 2 sprays in nostril daily as needed for congestion Patient self-administers 30 mL 0    tamsulosin (FLOMAX) 0.4 MG capsule Take 1 capsule (0.4 mg) by mouth daily (Patient taking differently: Take 0.4 mg by mouth daily.) 30 capsule 0    Vitamin D3 (CHOLECALCIFEROL) 25 mcg (1000 units) tablet Take 1 tablet (25 mcg) by mouth daily (Patient taking differently: Take 25 mcg by mouth daily.)      warfarin ANTICOAGULANT (COUMADIN) 2.5 MG tablet Take 2.5 mgs mg Sunday, Tuesday and Thursday, and 5 mgs all other days.  Recheck INR on 11/15/24 5 tablet 0    warfarin ANTICOAGULANT (COUMADIN) 5 MG tablet Take 5 mg daily on Mon, Wed, Fri and Sat ( uses 2.5 mg on other days) recheck INR on 11/15/24 6 tablet 0     Medications reviewed:  Medications reconciled to facility chart and changes were made to reflect current medications as identified as above med list. Below are the changes that were made:   Medications stopped since last EPIC medication reconciliation:   There are no discontinued medications.    Medications started since last [facility] medication reconciliation:  No orders of the defined types were placed in this encounter.    ***    REVIEW OF SYSTEMS:  4 point ROS neg other than the symptoms noted above in the HPI.***  Unable to be obtained due to cognitive impairment or aphasia.    ROS    PHYSICAL EXAM:  /61   Pulse 65   Temp 97  F (36.1  C)   Resp 20   Wt 92.5 kg (204 lb)   SpO2 96%   BMI 30.13 kg/m    Physical Exam      ASSESSMENT / PLAN:  {[facility] DX:398635}    Orders:  ***    Electronically signed by  Danielle Moore         Longstanding persistent atrial fibrillation (H)  (Z79.01) Long-term (current) use of anticoagulants [Z79.01]  Comment: Currently at receives warfarin long-term and the dosing is managed by the Coumadin clinic.  This nurse practitioner signs off orders and occasionally will write for INR orders pending on medication changes for monitoring.  Otherwise no acute bleeding tolerating well    (Z86.73) History of CVA (cerebrovascular accident)  Comment: No new neurological deficits noted.  Face is symmetrical.  Voice is clear.  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  Encouraged to continue walking with a walker for mobility exercising endurance and balance      (K59.01) Slow transit constipation  Comment: Melvin takes Metamucil on a daily basis and has MiraLAX as needed.  No complaints of straining to have a bowel movement    (G25.81) Restless legs syndrome (RLS)  Comment: Currently receives Neurontin 200 mg twice a day.  This was initiated for a muscle hematoma but that has long resolved.  Gabapentin can be used for restless legs as well.  He is not on any other medication targeted for restless legs.  Changing the diagnosis of the gabapentin over to restless legs today      Orders:  No new orders today    Electronically signed by  DINH Silva CNP

## 2024-11-14 NOTE — LETTER
11/14/2024      Melvin Abad  C/o Toña Abad  [address]  [facility] [address]        No notes on file      Sincerely,        DINH Silva CNP       wheelchair.  Attends activities per his choice    ALLERGIES:  No Known Allergies     MEDICATIONS:  Post Discharge Medication Reconciliation Status: discharge medications reconciled, continue medications without change.     Current Outpatient Medications   Medication Sig Dispense Refill     gabapentin (NEURONTIN) 100 MG capsule Take 2 capsules (200 mg) by mouth 2 times daily.       lisinopril (ZESTRIL) 10 MG tablet Take 1 tablet (10 mg) by mouth daily.       metoprolol succinate ER (TOPROL XL) 50 MG 24 hr tablet Take 1 tablet (50 mg) by mouth daily.       acetaminophen (TYLENOL) 500 MG tablet Take 2 tablets (1,000 mg) by mouth every 8 hours as needed for mild pain       furosemide (LASIX) 20 MG tablet Take 1 tablet (20 mg) by mouth daily. 30 tablet 0     ketotifen fumarate 0.035%, ketotifen 0.025%, (ZADITOR) 0.025 % ophthalmic solution Place 1 drop into both eyes 2 times daily as needed for dry eyes Patient self-administers 5 mL 0     loratadine (CLARITIN) 10 MG tablet Take 1 tablet (10 mg) by mouth daily (Patient taking differently: Take 10 mg by mouth daily.) 30 tablet 0     order for DME Equipment being ordered: side rails/grab bars 1 each 0     order for DME Equipment being ordered: bedside commode 1 Device 0     order for DME Equipment being ordered: 4 legged walker 1 Device 0     polyethylene glycol (MIRALAX) 17 GM/Dose powder Take 9 g by mouth daily as needed for constipation Mix and hand to patient, Patient self-administers (he knows how much he needs to keep regular) 510 g 0     psyllium (METAMUCIL/KONSYL) 58.6 % powder Take 18 g (1 Tablespoonful) by mouth daily Mix and hand to patient, Patient self-administers (he knows how much he needs to keep regular) (Patient taking differently: Take 1 Tablespoonful by mouth daily. Mix and hand to patient, Patient self-administers (he knows how much he needs to keep regular))       simvastatin (ZOCOR) 40 MG tablet Take 1 tablet (40 mg) by mouth at bedtime (Patient  taking differently: Take 40 mg by mouth at bedtime.) 30 tablet 0     sodium chloride (OCEAN) 0.65 % nasal spray Spray 2 sprays in nostril daily as needed for congestion Patient self-administers 30 mL 0     tamsulosin (FLOMAX) 0.4 MG capsule Take 1 capsule (0.4 mg) by mouth daily (Patient taking differently: Take 0.4 mg by mouth daily.) 30 capsule 0     Vitamin D3 (CHOLECALCIFEROL) 25 mcg (1000 units) tablet Take 1 tablet (25 mcg) by mouth daily (Patient taking differently: Take 25 mcg by mouth daily.)       warfarin ANTICOAGULANT (COUMADIN) 2.5 MG tablet Take 2.5 mgs mg Sunday, Tuesday and Thursday, and 5 mgs all other days.  Recheck INR on 11/22/24 12 tablet 0     warfarin ANTICOAGULANT (COUMADIN) 5 MG tablet Take 5 mg daily on Mon, Wed, Fri and Sat ( uses 2.5 mg on other days) recheck INR on 11/15/24 6 tablet 0         REVIEW OF SYSTEMS:  4 point ROS neg other than the symptoms noted above in the HPI.  No chest pain or palpitations.  No SOB.      PHYSICAL EXAM:  /61   Pulse 65   Temp 97  F (36.1  C)   Resp 20   Wt 92.5 kg (204 lb)   SpO2 96%   BMI 30.13 kg/m    Elderly gentleman sitting in w/c with no distress.  Was not startled with name called either.  Melvin does wear glasses, EOMs intact, makes eye contact throughout the visit  Heart rate is regular/irregular and strong.  Currently wearing his black socks with his compression wraps on his lower extremities.  Lungs are clear with good expansion upon command.  No oxygen is used  Abdomen is round soft and nontender to touch.  Positive bowel sounds are heard in all quadrants  Skin is pink, dry, and intact  Is able to ambulate short distances with a walker, otherwise wheelchair for all other distances  Continent of bowel and bladder  Component      Latest Ref Rng 11/8/2024  6:50 AM   INR      0.85 - 1.15  2.08 (H)           ASSESSMENT / PLAN:  (I10) Essential hypertension  (primary encounter diagnosis)  Comment:  past b/p's have been 110-130's/60-80's.     Remains on Lisinopril 10mg daily and Metoprolol XL 50mg po daily which can help rate control of his A Fib    (I48.11) Longstanding persistent atrial fibrillation (H)  (Z79.01) Long-term (current) use of anticoagulants [Z79.01]  Comment: Currently at receives warfarin long-term and the dosing is managed by the Coumadin clinic.  This nurse practitioner signs off orders and occasionally will write for INR orders pending on medication changes for monitoring.  Otherwise no acute bleeding tolerating well    (Z86.73) History of CVA (cerebrovascular accident)  Comment: No new neurological deficits noted.  Face is symmetrical.  Voice is clear.  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  Encouraged to continue walking with a walker for mobility exercising endurance and balance      (K59.01) Slow transit constipation  Comment: Melvin takes Metamucil on a daily basis and has MiraLAX as needed.  No complaints of straining to have a bowel movement    (G25.81) Restless legs syndrome (RLS)  Comment: Currently receives Neurontin 200 mg twice a day.  This was initiated for a muscle hematoma but that has long resolved.  Gabapentin can be used for restless legs as well.  He is not on any other medication targeted for restless legs.  Changing the diagnosis of the gabapentin over to restless legs today      Orders:  No new orders today    Electronically signed by  DINH Silva CNP            Sincerely,        DINH Silva CNP

## 2024-11-15 ENCOUNTER — ANTICOAGULATION THERAPY VISIT (OUTPATIENT)
Dept: ANTICOAGULATION | Facility: CLINIC | Age: 89
End: 2024-11-15

## 2024-11-15 DIAGNOSIS — I48.20 CHRONIC ATRIAL FIBRILLATION (H): ICD-10-CM

## 2024-11-15 DIAGNOSIS — Z79.01 LONG TERM CURRENT USE OF ANTICOAGULANT THERAPY: Primary | ICD-10-CM

## 2024-11-15 LAB — INR PPP: 2.29 (ref 0.85–1.15)

## 2024-11-15 PROCEDURE — 36415 COLL VENOUS BLD VENIPUNCTURE: CPT | Mod: ORL | Performed by: NURSE PRACTITIONER

## 2024-11-15 PROCEDURE — 85610 PROTHROMBIN TIME: CPT | Mod: ORL | Performed by: NURSE PRACTITIONER

## 2024-11-15 PROCEDURE — P9604 ONE-WAY ALLOW PRORATED TRIP: HCPCS | Mod: ORL | Performed by: NURSE PRACTITIONER

## 2024-11-15 RX ORDER — WARFARIN SODIUM 2.5 MG/1
TABLET ORAL
Qty: 12 TABLET | Refills: 0 | Status: SHIPPED | OUTPATIENT
Start: 2024-11-15

## 2024-11-15 NOTE — PROGRESS NOTES
ANTICOAGULATION MANAGEMENT     Melvin Abad 89 year old male is on warfarin with therapeutic INR result. (Goal INR 2.0-3.0)    Recent labs: (last 7 days)     11/15/24  0719   INR 2.29*       ASSESSMENT     Source(s): Chart Review  Previous INR was Therapeutic last 2(+) visits  Medication, diet, health changes since last INR chart reviewed; none identified         PLAN     Recommended plan for no diet, medication or health factor changes affecting INR     Dosing Instructions: Continue your current warfarin dose with next INR in 1 week       Summary  As of 11/15/2024      Full warfarin instructions:  2.5 mg every Sun, Tue, Thu; 5 mg all other days   Next INR check:  11/22/2024               Detailed voice message left for [facility] facility nurse with dosing instructions and follow up date.     Orders given to  Homecare nurse/facility to recheck    Education provided: Please call back if any changes to your diet, medications or how you've been taking warfarin  Symptom monitoring: monitoring for bleeding signs and symptoms and monitoring for clotting signs and symptoms    Plan made per [facility] anticoagulation protocol    Toña De Luna RN  11/15/2024  Anticoagulation Clinic  [facility] for routing messages: cristi COLON  [facility] patient phone line: [phone]        _______________________________________________________________________     Anticoagulation Episode Summary       Current INR goal:  2.0-3.0   TTR:  91.7% (1 y)   Target end date:  Indefinite   Send INR reminders to:  LINDSEY COLON    Indications    Atrial fibrillation (Resolved) [I48.91]  Long-term (current) use of anticoagulants [Z79.01] [Z79.01]  Chronic atrial fibrillation (H) [I48.20]             Comments:  [place] [facility] [phone] fax: [phone] & send to [facility]             Anticoagulation Care Providers       Provider Role Specialty Phone number    Lazaro Huston MD Referring Internal Medicine [phone]     Neris, Fransisco MD Bharath Referring [facility] Medicine

## 2024-11-21 ENCOUNTER — LAB REQUISITION (OUTPATIENT)
Dept: LAB | Facility: CLINIC | Age: 89
End: 2024-11-21
Payer: COMMERCIAL

## 2024-11-21 DIAGNOSIS — Z79.01 LONG TERM (CURRENT) USE OF ANTICOAGULANTS: ICD-10-CM

## 2024-11-21 DIAGNOSIS — I48.20 CHRONIC ATRIAL FIBRILLATION, UNSPECIFIED (H): ICD-10-CM

## 2024-11-21 DIAGNOSIS — I48.91 UNSPECIFIED ATRIAL FIBRILLATION (H): ICD-10-CM

## 2024-11-21 PROBLEM — A04.72 CLOSTRIDIUM DIFFICILE ENTEROCOLITIS: Status: RESOLVED | Noted: 2024-05-19 | Resolved: 2024-11-21

## 2024-11-21 PROBLEM — U07.1 COVID-19 VIRUS INFECTION: Status: RESOLVED | Noted: 2024-10-15 | Resolved: 2024-11-21

## 2024-11-21 PROBLEM — M79.661 PAIN OF RIGHT LOWER LEG: Status: RESOLVED | Noted: 2017-03-31 | Resolved: 2024-11-21

## 2024-11-21 PROBLEM — S80.11XA CONTUSION OF RIGHT LOWER EXTREMITY: Status: RESOLVED | Noted: 2017-03-31 | Resolved: 2024-11-21

## 2024-11-21 PROBLEM — M79.605 PAIN OF LEFT LOWER EXTREMITY: Status: RESOLVED | Noted: 2017-03-31 | Resolved: 2024-11-21

## 2024-11-21 PROBLEM — M79.652 PAIN OF LEFT THIGH: Status: RESOLVED | Noted: 2024-01-26 | Resolved: 2024-11-21

## 2024-11-21 PROBLEM — L03.116 CELLULITIS OF LEFT LOWER EXTREMITY: Status: RESOLVED | Noted: 2024-10-15 | Resolved: 2024-11-21

## 2024-11-21 RX ORDER — GABAPENTIN 100 MG/1
200 CAPSULE ORAL 2 TIMES DAILY
Status: SHIPPED
Start: 2024-11-05

## 2024-11-21 RX ORDER — LISINOPRIL 10 MG/1
10 TABLET ORAL DAILY
Status: SHIPPED
Start: 2024-11-05

## 2024-11-21 RX ORDER — METOPROLOL SUCCINATE 50 MG/1
50 TABLET, EXTENDED RELEASE ORAL DAILY
Status: SHIPPED
Start: 2024-11-05

## 2024-11-22 LAB — INR PPP: 2.65 (ref 0.85–1.15)

## 2024-11-22 PROCEDURE — 85610 PROTHROMBIN TIME: CPT | Mod: ORL | Performed by: NURSE PRACTITIONER

## 2024-11-22 PROCEDURE — 36415 COLL VENOUS BLD VENIPUNCTURE: CPT | Mod: ORL | Performed by: NURSE PRACTITIONER

## 2024-11-22 PROCEDURE — P9604 ONE-WAY ALLOW PRORATED TRIP: HCPCS | Mod: ORL | Performed by: NURSE PRACTITIONER

## 2024-12-12 ENCOUNTER — LAB REQUISITION (OUTPATIENT)
Dept: LAB | Facility: CLINIC | Age: 89
End: 2024-12-12
Payer: COMMERCIAL

## 2024-12-12 DIAGNOSIS — I48.20 CHRONIC ATRIAL FIBRILLATION, UNSPECIFIED (H): ICD-10-CM

## 2024-12-12 DIAGNOSIS — I48.91 UNSPECIFIED ATRIAL FIBRILLATION (H): ICD-10-CM

## 2024-12-12 DIAGNOSIS — Z79.01 LONG TERM (CURRENT) USE OF ANTICOAGULANTS: ICD-10-CM

## 2024-12-13 LAB — INR PPP: 2.85 (ref 0.85–1.15)

## 2024-12-13 PROCEDURE — P9604 ONE-WAY ALLOW PRORATED TRIP: HCPCS | Mod: ORL | Performed by: NURSE PRACTITIONER

## 2024-12-13 PROCEDURE — 36415 COLL VENOUS BLD VENIPUNCTURE: CPT | Mod: ORL | Performed by: NURSE PRACTITIONER

## 2024-12-13 PROCEDURE — 85610 PROTHROMBIN TIME: CPT | Mod: ORL | Performed by: NURSE PRACTITIONER

## 2024-12-18 ENCOUNTER — DOCUMENTATION ONLY (OUTPATIENT)
Dept: ANTICOAGULATION | Facility: CLINIC | Age: 89
End: 2024-12-18
Payer: COMMERCIAL

## 2024-12-18 DIAGNOSIS — I48.20 CHRONIC ATRIAL FIBRILLATION (H): Primary | ICD-10-CM

## 2024-12-18 NOTE — PROGRESS NOTES
ANTICOAGULATION CLINIC REFERRAL RENEWAL REQUEST       An annual renewal order is required for all patients referred to [facility].?  Please review and sign the pended referral order for Melvin Abad.       ANTICOAGULATION SUMMARY      Warfarin indication(s)   Atrial Fibrillation    Mechanical heart valve present?  NO       Current goal range   INR: 2.0-3.0     Goal appropriate for indication? Goal INR 2-3, standard for indication(s) above     Time in Therapeutic Range (TTR)  (Goal > 60%) 91.7%       Office visit with referring provider's group within last year yes on 11/14/2024       Denae Burciaga, RN  [facility]

## 2025-01-09 ENCOUNTER — LAB REQUISITION (OUTPATIENT)
Dept: LAB | Facility: CLINIC | Age: OVER 89
End: 2025-01-09
Payer: COMMERCIAL

## 2025-01-09 DIAGNOSIS — I48.91 UNSPECIFIED ATRIAL FIBRILLATION (H): ICD-10-CM

## 2025-01-09 DIAGNOSIS — I48.20 CHRONIC ATRIAL FIBRILLATION, UNSPECIFIED (H): ICD-10-CM

## 2025-01-09 DIAGNOSIS — Z79.01 LONG TERM (CURRENT) USE OF ANTICOAGULANTS: ICD-10-CM

## 2025-01-10 LAB — INR PPP: 2.88 (ref 0.85–1.15)

## 2025-01-10 PROCEDURE — P9604 ONE-WAY ALLOW PRORATED TRIP: HCPCS | Mod: ORL | Performed by: NURSE PRACTITIONER

## 2025-01-10 PROCEDURE — 36415 COLL VENOUS BLD VENIPUNCTURE: CPT | Mod: ORL | Performed by: NURSE PRACTITIONER

## 2025-01-10 PROCEDURE — 85610 PROTHROMBIN TIME: CPT | Mod: ORL | Performed by: NURSE PRACTITIONER

## 2025-01-22 ENCOUNTER — TELEPHONE (OUTPATIENT)
Dept: GERIATRICS | Facility: CLINIC | Age: OVER 89
End: 2025-01-22
Payer: COMMERCIAL

## 2025-01-22 DIAGNOSIS — J10.1 INFLUENZA A: Primary | ICD-10-CM

## 2025-01-22 RX ORDER — OSELTAMIVIR PHOSPHATE 75 MG/1
75 CAPSULE ORAL 2 TIMES DAILY
Qty: 10 CAPSULE | Refills: 0 | Status: SHIPPED | OUTPATIENT
Start: 2025-01-22 | End: 2025-01-27

## 2025-01-22 NOTE — TELEPHONE ENCOUNTER
"[facility] Triage Call    Provider: DINH Liang CNP  Facility: [facility] Facility Type:  AL    Caller: Celestina  Call Back Number: [phone]    Allergies:  No Known Allergies     SBAR:     S-(situation): Nurse called to report that patient is experiencing cold symptoms.  Facility does have a couple cases of Influenza A in the building.      B-(background):     A-(assessment): Patient has audible wheezing.  Complaints of no appetite, fatigue, body aches, and a cough.  Fever noted of 100.6.  Other vitals: 123/81, 73, 18, and O2 96% on RA.  Facility does not have any Influenza A swabs and the nurse wasn't sure they are capable of doing this.  Only reason they know about the other confirmed Influenza cases is because those patient's were sent to ER for confirmation.      R-(recommendations): Please advise       Telephone encounter sent to:  Mar Auguste MD    Please send response/orders to \"Geriatrics Nurse Pool\"    Rolanda Leos RN      "

## 2025-01-22 NOTE — TELEPHONE ENCOUNTER
[facility]   2025     Name: Melvin Abad   : 1928     Background:  Fever, wheezing. Unable to test for influenza.     Orders:  Would recommend he get tested for influenza as would want to treat with with oseltamavir. If that needs to be in the ER, he would need to go over for the swab.     Electronically signed by Mar Auguste MD       no

## 2025-01-23 ENCOUNTER — LAB REQUISITION (OUTPATIENT)
Dept: LAB | Facility: CLINIC | Age: OVER 89
End: 2025-01-23
Payer: COMMERCIAL

## 2025-01-23 ENCOUNTER — DOCUMENTATION ONLY (OUTPATIENT)
Dept: ANTICOAGULATION | Facility: CLINIC | Age: OVER 89
End: 2025-01-23
Payer: COMMERCIAL

## 2025-01-23 DIAGNOSIS — I48.91 UNSPECIFIED ATRIAL FIBRILLATION (H): ICD-10-CM

## 2025-01-23 DIAGNOSIS — Z79.01 LONG TERM (CURRENT) USE OF ANTICOAGULANTS: ICD-10-CM

## 2025-01-23 DIAGNOSIS — I48.20 CHRONIC ATRIAL FIBRILLATION (H): ICD-10-CM

## 2025-01-23 DIAGNOSIS — Z79.01 LONG TERM CURRENT USE OF ANTICOAGULANT THERAPY: Primary | ICD-10-CM

## 2025-01-23 DIAGNOSIS — I48.20 CHRONIC ATRIAL FIBRILLATION, UNSPECIFIED (H): ICD-10-CM

## 2025-01-23 NOTE — PROGRESS NOTES
"ANTICOAGULATION  MANAGEMENT     Interacting Medication Review    Interacting medication(s): Oseltamivir (Tamiflu) with warfarin.    Duration: 5 days (1/22 to 1/27/25)    Indication: Influenza    New medication?:  Concurrent use of OSELTAMIVIR and WARFARIN may result in increased risk of bleeding         PLAN     Continue your current warfarin dose with next INR in 4 days            Faxed new order to [facility] to check INR sooner than original plan outlined on 1/10/25     New recheck date updated on anticoagulation calendar     [facility] priority set/moved to \"high\" for new major drug interaction    Plan made per [facility] anticoagulation protocol    Idania Thao, RN  1/23/2025  Anticoagulation Clinic  [facility] for routing messages: cristi COLON  [facility] patient phone line: [phone]   " clear

## 2025-01-23 NOTE — CONFIDENTIAL NOTE
Patient tested positive for influenza A. EMS were called due to SaO2 85% and temp 105. When EMS arrived his sats were 93% and temp was 100.5. He declined to be taken to the ED. He told staff that he feels just fine, he just doesn't like the wheeze sound in his nose.     Plan:  Tamiflu 75mg BID x 5 days  Check INR in the next 1-2 days when able

## 2025-01-24 LAB — INR PPP: 2.67 (ref 0.85–1.15)

## 2025-01-24 PROCEDURE — 36415 COLL VENOUS BLD VENIPUNCTURE: CPT | Mod: ORL | Performed by: NURSE PRACTITIONER

## 2025-01-24 PROCEDURE — P9604 ONE-WAY ALLOW PRORATED TRIP: HCPCS | Mod: ORL | Performed by: NURSE PRACTITIONER

## 2025-01-24 PROCEDURE — 85610 PROTHROMBIN TIME: CPT | Mod: ORL | Performed by: NURSE PRACTITIONER

## 2025-01-28 ENCOUNTER — TELEPHONE (OUTPATIENT)
Dept: GERIATRICS | Facility: CLINIC | Age: OVER 89
End: 2025-01-28
Payer: COMMERCIAL

## 2025-01-28 DIAGNOSIS — R05.1 ACUTE COUGH: Primary | ICD-10-CM

## 2025-01-28 RX ORDER — GUAIFENESIN AND DEXTROMETHORPHAN HYDROBROMIDE 600; 30 MG/1; MG/1
2 TABLET, EXTENDED RELEASE ORAL EVERY 12 HOURS
Qty: 28 TABLET | Refills: 0 | Status: SHIPPED | OUTPATIENT
Start: 2025-01-28 | End: 2025-02-04

## 2025-01-28 NOTE — TELEPHONE ENCOUNTER
Resident is done with Tamiflu now but still having trouble with lung congestion and coughing.    Vitals stable.     Looking for cough medications and CXR to rule out active disease.    ORDERS:  CXR AP and lateral view   due to lung congestion/cough    Mucinex DM 1200mg po BID x7 days for cough        DINH Silva CNP

## 2025-01-29 ENCOUNTER — ASSISTED LIVING VISIT (OUTPATIENT)
Dept: GERIATRICS | Facility: CLINIC | Age: OVER 89
End: 2025-01-29
Payer: COMMERCIAL

## 2025-01-29 VITALS
WEIGHT: 206 LBS | DIASTOLIC BLOOD PRESSURE: 85 MMHG | HEART RATE: 60 BPM | TEMPERATURE: 98 F | OXYGEN SATURATION: 95 % | RESPIRATION RATE: 20 BRPM | BODY MASS INDEX: 30.42 KG/M2 | SYSTOLIC BLOOD PRESSURE: 145 MMHG

## 2025-01-29 DIAGNOSIS — I48.11 LONGSTANDING PERSISTENT ATRIAL FIBRILLATION (H): ICD-10-CM

## 2025-01-29 DIAGNOSIS — J10.1 INFLUENZA A: Primary | ICD-10-CM

## 2025-01-29 DIAGNOSIS — R53.81 MALAISE: ICD-10-CM

## 2025-01-29 DIAGNOSIS — R06.2 WHEEZING: ICD-10-CM

## 2025-01-29 DIAGNOSIS — Z79.01 LONG TERM CURRENT USE OF ANTICOAGULANT THERAPY: ICD-10-CM

## 2025-01-29 PROCEDURE — 99349 HOME/RES VST EST MOD MDM 40: CPT | Performed by: NURSE PRACTITIONER

## 2025-01-29 NOTE — LETTER
1/29/2025      Melvin Abad  C/o Toña Abad  [address]  [facility] [address]        No notes on file      Sincerely,        DINH Silva CNP    Electronically signed   lung sounded full of rhonchi and audible Rales.  Had ordered the chest x-ray as well as Mucinex for 7 days.    ALLERGIES:  No Known Allergies     MEDICATIONS:  Post Discharge Medication Reconciliation Status: patient was not discharged from an inpatient facility or TCU.     Current Outpatient Medications   Medication Sig Dispense Refill     acetaminophen (TYLENOL) 500 MG tablet Take 2 tablets (1,000 mg) by mouth every 8 hours as needed for mild pain       dextromethorphan-guaiFENesin (MUCINEX DM)  MG 12 hr tablet Take 2 tablets by mouth every 12 hours for 7 days. 28 tablet 0     furosemide (LASIX) 20 MG tablet Take 1 tablet (20 mg) by mouth daily. 30 tablet 0     gabapentin (NEURONTIN) 100 MG capsule Take 2 capsules (200 mg) by mouth 2 times daily.       ketotifen fumarate 0.035%, ketotifen 0.025%, (ZADITOR) 0.025 % ophthalmic solution Place 1 drop into both eyes 2 times daily as needed for dry eyes Patient self-administers 5 mL 0     lisinopril (ZESTRIL) 10 MG tablet Take 1 tablet (10 mg) by mouth daily.       loratadine (CLARITIN) 10 MG tablet Take 1 tablet (10 mg) by mouth daily (Patient taking differently: Take 10 mg by mouth daily.) 30 tablet 0     metoprolol succinate ER (TOPROL XL) 50 MG 24 hr tablet Take 1 tablet (50 mg) by mouth daily.       order for DME Equipment being ordered: side rails/grab bars 1 each 0     order for DME Equipment being ordered: bedside commode 1 Device 0     order for DME Equipment being ordered: 4 legged walker 1 Device 0     polyethylene glycol (MIRALAX) 17 GM/Dose powder Take 9 g by mouth daily as needed for constipation Mix and hand to patient, Patient self-administers (he knows how much he needs to keep regular) 510 g 0     psyllium (METAMUCIL/KONSYL) 58.6 % powder Take 18 g (1 Tablespoonful) by mouth daily Mix and hand to patient, Patient self-administers (he knows how much he needs to keep regular) (Patient taking differently: Take 1 Tablespoonful by mouth daily. Mix and  hand to patient, Patient self-administers (he knows how much he needs to keep regular))       simvastatin (ZOCOR) 40 MG tablet Take 1 tablet (40 mg) by mouth at bedtime (Patient taking differently: Take 40 mg by mouth at bedtime.) 30 tablet 0     sodium chloride (OCEAN) 0.65 % nasal spray Spray 2 sprays in nostril daily as needed for congestion Patient self-administers 30 mL 0     tamsulosin (FLOMAX) 0.4 MG capsule Take 1 capsule (0.4 mg) by mouth daily (Patient taking differently: Take 0.4 mg by mouth daily.) 30 capsule 0     Vitamin D3 (CHOLECALCIFEROL) 25 mcg (1000 units) tablet Take 1 tablet (25 mcg) by mouth daily (Patient taking differently: Take 25 mcg by mouth daily.)       warfarin ANTICOAGULANT (COUMADIN) 2.5 MG tablet Take 2.5 mgs mg Sunday, Tuesday and Thursday, and 5 mgs all other days.  Recheck INR on 1/31/25 15 tablet 0     warfarin ANTICOAGULANT (COUMADIN) 5 MG tablet Take 5 mg daily on Mon, Wed, Fri and Sat ( uses 2.5 mg on other days) recheck INR on 2/7/25 20 tablet 0       REVIEW OF SYSTEMS:  4 point ROS neg other than the symptoms noted above in the HPI.  No chest pain, no other pain, just tired, coughing  Is sleeping at night.      PHYSICAL EXAM:  BP (!) 145/85   Pulse 60   Temp 98  F (36.7  C)   Resp 20   Wt 93.4 kg (206 lb)   SpO2 95%   BMI 30.42 kg/m    CXR was clear of active disease.  Lungs diminished.  Audible wheezing heard with each breath.  Tight cough  Heart rate regular/irregular and strong.    Skin is pink, dry, warm to touch  Abdomen is round, soft, active bowel sounds are heard  He is alert and oriented x 3 with minor forgetfulness    I did go over to his cabinet area and pulled out his incentive spirometer which she demonstrated how to use it to this nurse practitioner.  He actually did fairly well with using the spirometer considering how his lungs sound audibly.  It did not put him into a coughing spell      ASSESSMENT / PLAN:  (J10.1) Influenza A  (primary encounter  diagnosis)  (R06.2) Wheezing  (R53.81) Malaise  Comment: Feel Melvin is compromised because of his influenza A.  Expect that he is good to be tired and he looks tired.  Would like to help out his breathing to be more at ease and so we will start a low taper of prednisone 20 mg daily for 3 days then 10 mg daily for 3 days and then 5 mg daily for 3 days.  Will make sure an INR is drawn as anticipate the prednisone would affect his warfarin.  Staff to continue to monitor his status and update nurse practitioner as able.    (I48.11) Longstanding persistent atrial fibrillation (H)  (Z79.01) Long-term (current) use of anticoagulants [Z79.01]  Comment: INR was within the 2-3 range on the 24th.  Just finished Tamiflu for 5 days.  Is on mucus relief tabs now for 7 days to help with the cough.  Given that the prednisone is going to start tomorrow, the closest day to recheck his INR would be on Friday before the weekend.  This date is already taken for an INR from when he had it checked on 1/24/2025    Orders:  Prednisone 20 mg p.o. daily x 3 days, 10 mg p.o. daily x 3 days, 5 mg daily x 3 days and then discontinued.  Dx wheezing.  Start on 1/30/25.  INR to be done on Friday, 1/31/2025, which already is a planned lab from when he had his INR checked on 1/24/2025.    Electronically signed by  DINH Silva CNP            Sincerely,        DINH Silva CNP    Electronically signed

## 2025-01-29 NOTE — PROGRESS NOTES
[facility] GERIATRICS  ACUTE/EPISODIC VISIT    [facility] Medical Record Number:  [id number]  Place of Service where encounter took place:  [facility] ([facility]) [[id number]]    Chief Complaint   Patient presents with    RECHECK       HPI:    Melvin Abad is a 89 year old  (4/24/1928), who is being seen today for an episodic care visit.  HPI information obtained from: {[facility] HPI:[id number]}.    Today's concern is:      ALLERGIES:  No Known Allergies     MEDICATIONS:  Post Discharge Medication Reconciliation Status: {[facility] Med Rec (Provider):[id number]}. ***    Current Outpatient Medications   Medication Sig Dispense Refill    acetaminophen (TYLENOL) 500 MG tablet Take 2 tablets (1,000 mg) by mouth every 8 hours as needed for mild pain      dextromethorphan-guaiFENesin (MUCINEX DM)  MG 12 hr tablet Take 2 tablets by mouth every 12 hours for 7 days. 28 tablet 0    furosemide (LASIX) 20 MG tablet Take 1 tablet (20 mg) by mouth daily. 30 tablet 0    gabapentin (NEURONTIN) 100 MG capsule Take 2 capsules (200 mg) by mouth 2 times daily.      ketotifen fumarate 0.035%, ketotifen 0.025%, (ZADITOR) 0.025 % ophthalmic solution Place 1 drop into both eyes 2 times daily as needed for dry eyes Patient self-administers 5 mL 0    lisinopril (ZESTRIL) 10 MG tablet Take 1 tablet (10 mg) by mouth daily.      loratadine (CLARITIN) 10 MG tablet Take 1 tablet (10 mg) by mouth daily (Patient taking differently: Take 10 mg by mouth daily.) 30 tablet 0    metoprolol succinate ER (TOPROL XL) 50 MG 24 hr tablet Take 1 tablet (50 mg) by mouth daily.      order for DME Equipment being ordered: side rails/grab bars 1 each 0    order for DME Equipment being ordered: bedside commode 1 Device 0    order for DME Equipment being ordered: 4 legged walker 1 Device 0    polyethylene glycol (MIRALAX) 17 GM/Dose powder Take 9 g by mouth daily as needed for constipation Mix and hand to patient, Patient self-administers (he knows how much he needs to  keep regular) 510 g 0    psyllium (METAMUCIL/KONSYL) 58.6 % powder Take 18 g (1 Tablespoonful) by mouth daily Mix and hand to patient, Patient self-administers (he knows how much he needs to keep regular) (Patient taking differently: Take 1 Tablespoonful by mouth daily. Mix and hand to patient, Patient self-administers (he knows how much he needs to keep regular))      simvastatin (ZOCOR) 40 MG tablet Take 1 tablet (40 mg) by mouth at bedtime (Patient taking differently: Take 40 mg by mouth at bedtime.) 30 tablet 0    sodium chloride (OCEAN) 0.65 % nasal spray Spray 2 sprays in nostril daily as needed for congestion Patient self-administers 30 mL 0    tamsulosin (FLOMAX) 0.4 MG capsule Take 1 capsule (0.4 mg) by mouth daily (Patient taking differently: Take 0.4 mg by mouth daily.) 30 capsule 0    Vitamin D3 (CHOLECALCIFEROL) 25 mcg (1000 units) tablet Take 1 tablet (25 mcg) by mouth daily (Patient taking differently: Take 25 mcg by mouth daily.)      warfarin ANTICOAGULANT (COUMADIN) 2.5 MG tablet Take 2.5 mgs mg Sunday, Tuesday and Thursday, and 5 mgs all other days.  Recheck INR on 1/31/25 15 tablet 0    warfarin ANTICOAGULANT (COUMADIN) 5 MG tablet Take 5 mg daily on Mon, Wed, Fri and Sat ( uses 2.5 mg on other days) recheck INR on 2/7/25 20 tablet 0     Medications reviewed:  Medications reconciled to facility chart and changes were made to reflect current medications as identified as above med list. Below are the changes that were made:   Medications stopped since last EPIC medication reconciliation:   There are no discontinued medications.    Medications started since last [facility] medication reconciliation:  No orders of the defined types were placed in this encounter.    ***    REVIEW OF SYSTEMS:  4 point ROS neg other than the symptoms noted above in the HPI.***  Unable to be obtained due to cognitive impairment or aphasia.   ROS    PHYSICAL EXAM:  BP (!) 145/85   Pulse 60   Temp 98  F (36.7  C)   Resp 20    Wt 93.4 kg (206 lb)   SpO2 95%   BMI 30.42 kg/m    Physical Exam      ASSESSMENT / PLAN:  {[facility] DX:900325}    Orders:  ***    Electronically signed by  Danielle Moore         he is good to be tired and he looks tired.  Would like to help out his breathing to be more at ease and so we will start a low taper of prednisone 20 mg daily for 3 days then 10 mg daily for 3 days and then 5 mg daily for 3 days.  Will make sure an INR is drawn as anticipate the prednisone would affect his warfarin.  Staff to continue to monitor his status and update nurse practitioner as able.    (I48.11) Longstanding persistent atrial fibrillation (H)  (Z79.01) Long-term (current) use of anticoagulants [Z79.01]  Comment: INR was within the 2-3 range on the 24th.  Just finished Tamiflu for 5 days.  Is on mucus relief tabs now for 7 days to help with the cough.  Given that the prednisone is going to start tomorrow, the closest day to recheck his INR would be on Friday before the weekend.  This date is already taken for an INR from when he had it checked on 1/24/2025    Orders:  Prednisone 20 mg p.o. daily x 3 days, 10 mg p.o. daily x 3 days, 5 mg daily x 3 days and then discontinued.  Dx wheezing.  Start on 1/30/25.  INR to be done on Friday, 1/31/2025, which already is a planned lab from when he had his INR checked on 1/24/2025.    Electronically signed by  DINH Silva CNP

## 2025-01-30 ENCOUNTER — LAB REQUISITION (OUTPATIENT)
Dept: LAB | Facility: CLINIC | Age: OVER 89
End: 2025-01-30
Payer: COMMERCIAL

## 2025-01-30 DIAGNOSIS — I48.20 CHRONIC ATRIAL FIBRILLATION, UNSPECIFIED (H): ICD-10-CM

## 2025-01-30 DIAGNOSIS — Z79.01 LONG TERM (CURRENT) USE OF ANTICOAGULANTS: ICD-10-CM

## 2025-01-30 DIAGNOSIS — I48.91 UNSPECIFIED ATRIAL FIBRILLATION (H): ICD-10-CM

## 2025-01-31 ENCOUNTER — LAB REQUISITION (OUTPATIENT)
Dept: LAB | Facility: CLINIC | Age: OVER 89
End: 2025-01-31
Payer: COMMERCIAL

## 2025-01-31 DIAGNOSIS — I48.20 CHRONIC ATRIAL FIBRILLATION, UNSPECIFIED (H): ICD-10-CM

## 2025-01-31 DIAGNOSIS — Z79.01 LONG TERM (CURRENT) USE OF ANTICOAGULANTS: ICD-10-CM

## 2025-01-31 DIAGNOSIS — D68.59 OTHER PRIMARY THROMBOPHILIA: ICD-10-CM

## 2025-01-31 DIAGNOSIS — I48.91 UNSPECIFIED ATRIAL FIBRILLATION (H): ICD-10-CM

## 2025-01-31 LAB — INR PPP: 8.3 (ref 0.85–1.15)

## 2025-01-31 PROCEDURE — P9604 ONE-WAY ALLOW PRORATED TRIP: HCPCS | Mod: ORL | Performed by: NURSE PRACTITIONER

## 2025-01-31 PROCEDURE — 85610 PROTHROMBIN TIME: CPT | Mod: ORL | Performed by: NURSE PRACTITIONER

## 2025-01-31 PROCEDURE — 36415 COLL VENOUS BLD VENIPUNCTURE: CPT | Mod: ORL | Performed by: NURSE PRACTITIONER

## 2025-02-02 ENCOUNTER — APPOINTMENT (OUTPATIENT)
Dept: GENERAL RADIOLOGY | Facility: CLINIC | Age: OVER 89
End: 2025-02-02
Attending: EMERGENCY MEDICINE
Payer: COMMERCIAL

## 2025-02-02 ENCOUNTER — HOSPITAL ENCOUNTER (EMERGENCY)
Facility: CLINIC | Age: OVER 89
Discharge: HOME | End: 2025-02-02
Attending: EMERGENCY MEDICINE | Admitting: EMERGENCY MEDICINE
Payer: COMMERCIAL

## 2025-02-02 VITALS
HEIGHT: 69 IN | HEART RATE: 82 BPM | SYSTOLIC BLOOD PRESSURE: 154 MMHG | RESPIRATION RATE: 19 BRPM | DIASTOLIC BLOOD PRESSURE: 75 MMHG | TEMPERATURE: 97.7 F | OXYGEN SATURATION: 95 % | BODY MASS INDEX: 30.81 KG/M2 | WEIGHT: 208 LBS

## 2025-02-02 DIAGNOSIS — R79.1 ELEVATED INR: ICD-10-CM

## 2025-02-02 DIAGNOSIS — J10.1 INFLUENZA A: ICD-10-CM

## 2025-02-02 DIAGNOSIS — R53.1 GENERALIZED WEAKNESS: ICD-10-CM

## 2025-02-02 LAB
ALBUMIN SERPL BCG-MCNC: 3.9 G/DL (ref 3.5–5.2)
ALBUMIN UR-MCNC: NEGATIVE MG/DL
ALP SERPL-CCNC: 106 U/L (ref 40–150)
ALT SERPL W P-5'-P-CCNC: 14 U/L (ref 0–70)
ANION GAP SERPL CALCULATED.3IONS-SCNC: 10 MMOL/L (ref 7–15)
APPEARANCE UR: CLEAR
APTT PPP: 49 SECONDS (ref 22–38)
AST SERPL W P-5'-P-CCNC: 27 U/L (ref 0–45)
BASOPHILS # BLD AUTO: 0 10E3/UL (ref 0–0.2)
BASOPHILS NFR BLD AUTO: 0 %
BILIRUB SERPL-MCNC: 1.2 MG/DL
BILIRUB UR QL STRIP: NEGATIVE
BUN SERPL-MCNC: 19.6 MG/DL (ref 8–23)
CALCIUM SERPL-MCNC: 9.1 MG/DL (ref 8.8–10.4)
CHLORIDE SERPL-SCNC: 98 MMOL/L (ref 98–107)
COLOR UR AUTO: ABNORMAL
CREAT SERPL-MCNC: 1.04 MG/DL (ref 0.67–1.17)
EGFRCR SERPLBLD CKD-EPI 2021: 66 ML/MIN/1.73M2
EOSINOPHIL # BLD AUTO: 0 10E3/UL (ref 0–0.7)
EOSINOPHIL NFR BLD AUTO: 0 %
ERYTHROCYTE [DISTWIDTH] IN BLOOD BY AUTOMATED COUNT: 14.2 % (ref 10–15)
GLUCOSE SERPL-MCNC: 137 MG/DL (ref 70–99)
GLUCOSE UR STRIP-MCNC: NEGATIVE MG/DL
HCO3 SERPL-SCNC: 28 MMOL/L (ref 22–29)
HCT VFR BLD AUTO: 34 % (ref 40–53)
HGB BLD-MCNC: 11.3 G/DL (ref 13.3–17.7)
HGB UR QL STRIP: ABNORMAL
HYALINE CASTS: 9 /LPF
IMM GRANULOCYTES # BLD: 0.1 10E3/UL
IMM GRANULOCYTES NFR BLD: 1 %
INR PPP: 5.92 (ref 0.85–1.15)
KETONES UR STRIP-MCNC: NEGATIVE MG/DL
LEUKOCYTE ESTERASE UR QL STRIP: NEGATIVE
LYMPHOCYTES # BLD AUTO: 1.7 10E3/UL (ref 0.8–5.3)
LYMPHOCYTES NFR BLD AUTO: 20 %
MCH RBC QN AUTO: 31.3 PG (ref 26.5–33)
MCHC RBC AUTO-ENTMCNC: 33.2 G/DL (ref 31.5–36.5)
MCV RBC AUTO: 94 FL (ref 78–100)
MONOCYTES # BLD AUTO: 0.7 10E3/UL (ref 0–1.3)
MONOCYTES NFR BLD AUTO: 8 %
MUCOUS THREADS #/AREA URNS LPF: PRESENT /LPF
NEUTROPHILS # BLD AUTO: 5.8 10E3/UL (ref 1.6–8.3)
NEUTROPHILS NFR BLD AUTO: 71 %
NITRATE UR QL: NEGATIVE
NRBC # BLD AUTO: 0 10E3/UL
NRBC BLD AUTO-RTO: 0 /100
PH UR STRIP: 5 [PH] (ref 5–7)
PLATELET # BLD AUTO: 148 10E3/UL (ref 150–450)
POTASSIUM SERPL-SCNC: 4.3 MMOL/L (ref 3.4–5.3)
PROT SERPL-MCNC: 6.7 G/DL (ref 6.4–8.3)
RBC # BLD AUTO: 3.61 10E6/UL (ref 4.4–5.9)
RBC URINE: 2 /HPF
SODIUM SERPL-SCNC: 136 MMOL/L (ref 135–145)
SP GR UR STRIP: 1.02 (ref 1–1.03)
UROBILINOGEN UR STRIP-MCNC: NORMAL MG/DL
WBC # BLD AUTO: 8.3 10E3/UL (ref 4–11)
WBC URINE: 2 /HPF

## 2025-02-02 PROCEDURE — 36415 COLL VENOUS BLD VENIPUNCTURE: CPT | Performed by: EMERGENCY MEDICINE

## 2025-02-02 PROCEDURE — 99284 EMERGENCY DEPT VISIT MOD MDM: CPT | Mod: 25 | Performed by: EMERGENCY MEDICINE

## 2025-02-02 PROCEDURE — 250N000009 HC RX 250: Performed by: EMERGENCY MEDICINE

## 2025-02-02 PROCEDURE — 99284 EMERGENCY DEPT VISIT MOD MDM: CPT | Performed by: EMERGENCY MEDICINE

## 2025-02-02 PROCEDURE — 81001 URINALYSIS AUTO W/SCOPE: CPT | Performed by: EMERGENCY MEDICINE

## 2025-02-02 PROCEDURE — 94640 AIRWAY INHALATION TREATMENT: CPT

## 2025-02-02 PROCEDURE — 82040 ASSAY OF SERUM ALBUMIN: CPT | Performed by: EMERGENCY MEDICINE

## 2025-02-02 PROCEDURE — 71045 X-RAY EXAM CHEST 1 VIEW: CPT

## 2025-02-02 PROCEDURE — 85730 THROMBOPLASTIN TIME PARTIAL: CPT | Performed by: EMERGENCY MEDICINE

## 2025-02-02 PROCEDURE — 85610 PROTHROMBIN TIME: CPT | Performed by: EMERGENCY MEDICINE

## 2025-02-02 PROCEDURE — 85025 COMPLETE CBC W/AUTO DIFF WBC: CPT | Performed by: EMERGENCY MEDICINE

## 2025-02-02 RX ORDER — IPRATROPIUM BROMIDE AND ALBUTEROL SULFATE 2.5; .5 MG/3ML; MG/3ML
3 SOLUTION RESPIRATORY (INHALATION) ONCE
Status: COMPLETED | OUTPATIENT
Start: 2025-02-02 | End: 2025-02-02

## 2025-02-02 RX ORDER — ALBUTEROL SULFATE 90 UG/1
2 INHALANT RESPIRATORY (INHALATION) EVERY 6 HOURS PRN
Qty: 18 G | Refills: 0 | Status: SHIPPED | OUTPATIENT
Start: 2025-02-02

## 2025-02-02 RX ORDER — ALBUTEROL SULFATE 90 UG/1
2 INHALANT RESPIRATORY (INHALATION) EVERY 6 HOURS PRN
Qty: 18 G | Refills: 0 | Status: SHIPPED | OUTPATIENT
Start: 2025-02-02 | End: 2025-02-02

## 2025-02-02 RX ADMIN — IPRATROPIUM BROMIDE AND ALBUTEROL SULFATE 3 ML: .5; 3 SOLUTION RESPIRATORY (INHALATION) at 13:33

## 2025-02-02 ASSESSMENT — COLUMBIA-SUICIDE SEVERITY RATING SCALE - C-SSRS
1. IN THE PAST MONTH, HAVE YOU WISHED YOU WERE DEAD OR WISHED YOU COULD GO TO SLEEP AND NOT WAKE UP?: NO
6. HAVE YOU EVER DONE ANYTHING, STARTED TO DO ANYTHING, OR PREPARED TO DO ANYTHING TO END YOUR LIFE?: NO
2. HAVE YOU ACTUALLY HAD ANY THOUGHTS OF KILLING YOURSELF IN THE PAST MONTH?: NO

## 2025-02-02 ASSESSMENT — ACTIVITIES OF DAILY LIVING (ADL)
ADLS_ACUITY_SCORE: 59

## 2025-02-02 NOTE — DISCHARGE INSTRUCTIONS
Return to the emergency department if you develop new or worsening symptoms.  Use the albuterol as needed every 4-6 hours.  Continue your outpatient medication regimen.  Your labs and x-ray were reassuring.  I hope you get better quickly.  Please have your INR rechecked and discuss with your doctor.

## 2025-02-02 NOTE — ED PROVIDER NOTES
History     Chief Complaint   Patient presents with    Generalized Weakness     HPI  Melvin Abad is a 89 year old male who has a history of hypertension, hyperlipidemia, anemia, acute renal failure, venous stasis, generalized weakness, thrombocytopenia, COPD, atrial fibrillation, diabetes, long-term use of anticoagulants, Maryneal syndrome, CVA who presents to the emergency department secondary to generalized weakness.  He lives at [facility] and is recovering from influenza A.  he has had cough and wheezing and generalized weakness.  He is able to get out of bed on his own and uses a wheelchair.  He is eating and drinking but he does not like the food there.  He does not necessarily feel dehydrated.  No chest pain.  He does feel little bit short of breath.  No new leg swelling.    Allergies:  No Known Allergies    Problem List:    Patient Active Problem List    Diagnosis Date Noted    Acute renal failure, unspecified acute renal failure type 10/17/2024     Priority: Medium    Anemia, unspecified type 10/17/2024     Priority: Medium    Weakness generalized 10/15/2024     Priority: Medium    Thrombocytopenia 10/15/2024     Priority: Medium    Anemia - multifactorial (history of acute blood loss, anemia of chronic disease, etc) 10/15/2024     Priority: Medium    Venous stasis of both lower extremities 10/15/2024     Priority: Medium    History of CVA (cerebrovascular accident) 10/15/2024     Priority: Medium    Chronic kidney disease, stage 3a (H) 08/18/2024     Priority: Medium    Arthritis pain 05/19/2024     Priority: Medium    Chronic obstructive pulmonary disease, unspecified COPD type (H) 05/19/2024     Priority: Medium    Benign prostatic hyperplasia, unspecified whether lower urinary tract symptoms present 05/19/2024     Priority: Medium    Chronic right-sided heart failure (H) 02/13/2024     Priority: Medium    Weakness 01/26/2024     Priority: Medium    Chronic atrial fibrillation (H) 05/01/2020      Priority: Medium    Osteoarthritis of glenohumeral joint, left 02/24/2020     Priority: Medium    Longstanding persistent atrial fibrillation (H) 01/23/2020     Priority: Medium    Anticoagulation goal of INR 2 to 3 01/23/2020     Priority: Medium    Slow transit constipation 01/23/2020     Priority: Medium    Sleep disturbances 01/23/2020     Priority: Medium    Right pontine stroke (H) 01/23/2020     Priority: Medium    Dry eyes 01/23/2020     Priority: Medium    Restless legs syndrome (RLS) 01/23/2020     Priority: Medium    Long-term (current) use of anticoagulants [Z79.01] 04/05/2016     Priority: Medium    Obesity (BMI 30-39.9) 10/28/2015     Priority: Medium    Anemia due to blood loss, acute 11/20/2013     Priority: Medium    DVT prophylaxis 11/20/2013     Priority: Medium    Carpal tunnel syndrome of right wrist 08/16/2013     Priority: Medium    Gilbert syndrome 01/31/2012     Priority: Medium    History of skin cancer 01/03/2012     Priority: Medium    AK (actinic keratosis) 01/03/2012     Priority: Medium    Essential hypertension 04/18/2011     Priority: Medium    Diverticulosis 03/25/2011     Priority: Medium    Hyperlipidemia LDL goal <100 10/31/2010     Priority: Medium    Long term current use of anticoagulant therapy 01/31/2001     Priority: Medium     Problem list name updated by automated process. Provider to review          Past Medical History:    Past Medical History:   Diagnosis Date    Actinic keratosis     Atrial fibrillation (H)     Basal cell carcinoma nos 06/01/2010    Cardiac dysrhythmia, unspecified     Clostridium difficile enterocolitis 05/19/2024    Cough     COVID-19 virus infection 10/15/2024    Diabetic eye exam (H) 08/29/2014    Generalized osteoarthrosis, unspecified site     Other diseases of lung, not elsewhere classified     Pure hypercholesterolemia     Unspecified essential hypertension        Past Surgical History:    Past Surgical History:   Procedure Laterality Date     "COLONOSCOPY  05/02/2007    Multiple bxs of diminutive polyps of ascending colon.    COLONOSCOPY  11/10/2010    COLONOSCOPY performed by MARISELA GRIMM at [facility]    HC HEMORRHOIDOPEXY BY STAPLING  09/26/08    [facility] TOTAL KNEE ARTHROPLASTY  1997    Knee Replacement, Total    [facility] COLONOSCOPY W/WO BRUSH/WASH  10/19/2001    [facility] COLONOSCOPY W/WO BRUSH/WASH  11/02/2004       Family History:    Family History   Problem Relation Age of Onset    Cancer Mother     Prostate Cancer Father        Social History:  Marital Status:   [5]  Social History     Tobacco Use    Smoking status: Former     Types: Cigarettes    Smokeless tobacco: Never    Tobacco comments:     5 years   Vaping Use    Vaping status: Never Used   Substance Use Topics    Alcohol use: No     Comment: denies    Drug use: No        Medications:    albuterol (VENTOLIN HFA) 108 (90 Base) MCG/ACT inhaler  acetaminophen (TYLENOL) 500 MG tablet  dextromethorphan-guaiFENesin (MUCINEX DM)  MG 12 hr tablet  furosemide (LASIX) 20 MG tablet  gabapentin (NEURONTIN) 100 MG capsule  ketotifen fumarate 0.035%, ketotifen 0.025%, (ZADITOR) 0.025 % ophthalmic solution  lisinopril (ZESTRIL) 10 MG tablet  loratadine (CLARITIN) 10 MG tablet  metoprolol succinate ER (TOPROL XL) 50 MG 24 hr tablet  order for DME  order for DME  order for DME  polyethylene glycol (MIRALAX) 17 GM/Dose powder  psyllium (METAMUCIL/KONSYL) 58.6 % powder  simvastatin (ZOCOR) 40 MG tablet  sodium chloride (OCEAN) 0.65 % nasal spray  tamsulosin (FLOMAX) 0.4 MG capsule  Vitamin D3 (CHOLECALCIFEROL) 25 mcg (1000 units) tablet  warfarin ANTICOAGULANT (COUMADIN) 2.5 MG tablet  warfarin ANTICOAGULANT (COUMADIN) 5 MG tablet          Review of Systems   All other systems reviewed and are negative.      Physical Exam   BP: (!) 143/94  Pulse: 64  Temp: 97.6  F (36.4  C)  Resp: 20  Height: 175.3 cm (5' 9\")  Weight: 94.3 kg (208 lb)  SpO2: 96 %      Physical Exam  Vitals and nursing note reviewed. "   Constitutional:       General: He is not in acute distress.     Appearance: He is well-developed. He is not diaphoretic.   HENT:      Head: Normocephalic and atraumatic.      Nose: Nose normal. No congestion.      Mouth/Throat:      Mouth: Mucous membranes are moist.      Pharynx: Oropharynx is clear.   Eyes:      General: No scleral icterus.     Extraocular Movements: Extraocular movements intact.      Conjunctiva/sclera: Conjunctivae normal.      Pupils: Pupils are equal, round, and reactive to light.   Cardiovascular:      Rate and Rhythm: Normal rate. Rhythm irregular.   Pulmonary:      Effort: Pulmonary effort is normal. No respiratory distress.      Breath sounds: No stridor. Wheezing and rhonchi present. No rales.      Comments: Diffuse rhonchi and wheezing  Chest:      Chest wall: No tenderness.   Abdominal:      General: Abdomen is flat.   Musculoskeletal:         General: No tenderness or deformity. Normal range of motion.      Cervical back: Normal range of motion and neck supple.   Lymphadenopathy:      Cervical: No cervical adenopathy.   Skin:     General: Skin is warm and dry.      Capillary Refill: Capillary refill takes less than 2 seconds.      Findings: No rash.   Neurological:      General: No focal deficit present.      Mental Status: He is alert and oriented to person, place, and time.      Cranial Nerves: No cranial nerve deficit.      Sensory: No sensory deficit.      Coordination: Coordination normal.   Psychiatric:         Mood and Affect: Mood normal.         Behavior: Behavior normal.         ED Course        Procedures             Results for orders placed or performed during the hospital encounter of 02/02/25 (from the past 24 hours)   CBC with platelets differential    Narrative    The following orders were created for panel order CBC with platelets differential.  Procedure                               Abnormality         Status                     ---------                                -----------         ------                     CBC with platelets and d...[[id number]]  Abnormal            Final result                 Please view results for these tests on the individual orders.   Comprehensive metabolic panel   Result Value Ref Range    Sodium 136 135 - 145 mmol/L    Potassium 4.3 3.4 - 5.3 mmol/L    Carbon Dioxide (CO2) 28 22 - 29 mmol/L    Anion Gap 10 7 - 15 mmol/L    Urea Nitrogen 19.6 8.0 - 23.0 mg/dL    Creatinine 1.04 0.67 - 1.17 mg/dL    GFR Estimate 66 >60 mL/min/1.73m2    Calcium 9.1 8.8 - 10.4 mg/dL    Chloride 98 98 - 107 mmol/L    Glucose 137 (H) 70 - 99 mg/dL    Alkaline Phosphatase 106 40 - 150 U/L    AST 27 0 - 45 U/L    ALT 14 0 - 70 U/L    Protein Total 6.7 6.4 - 8.3 g/dL    Albumin 3.9 3.5 - 5.2 g/dL    Bilirubin Total 1.2 <=1.2 mg/dL   INR   Result Value Ref Range    INR 5.92 (HH) 0.85 - 1.15   Partial thromboplastin time   Result Value Ref Range    aPTT 49 (H) 22 - 38 Seconds   CBC with platelets and differential   Result Value Ref Range    WBC Count 8.3 4.0 - 11.0 10e3/uL    RBC Count 3.61 (L) 4.40 - 5.90 10e6/uL    Hemoglobin 11.3 (L) 13.3 - 17.7 g/dL    Hematocrit 34.0 (L) 40.0 - 53.0 %    MCV 94 78 - 100 fL    MCH 31.3 26.5 - 33.0 pg    MCHC 33.2 31.5 - 36.5 g/dL    RDW 14.2 10.0 - 15.0 %    Platelet Count 148 (L) 150 - 450 10e3/uL    % Neutrophils 71 %    % Lymphocytes 20 %    % Monocytes 8 %    % Eosinophils 0 %    % Basophils 0 %    % Immature Granulocytes 1 %    NRBCs per 100 WBC 0 <1 /100    Absolute Neutrophils 5.8 1.6 - 8.3 10e3/uL    Absolute Lymphocytes 1.7 0.8 - 5.3 10e3/uL    Absolute Monocytes 0.7 0.0 - 1.3 10e3/uL    Absolute Eosinophils 0.0 0.0 - 0.7 10e3/uL    Absolute Basophils 0.0 0.0 - 0.2 10e3/uL    Absolute Immature Granulocytes 0.1 <=0.4 10e3/uL    Absolute NRBCs 0.0 10e3/uL   XR Chest Port 1 View    Narrative    EXAM: XR CHEST PORTABLE 1 VIEW  LOCATION: [facility]  DATE: 02/02/2025    INDICATION: Wheezing,  influenza, shortness of breath..  COMPARISON: 10/15/2024.      Impression    IMPRESSION: Heart size and pulmonary vascularity within normal limits. No acute appearing infiltrates or consolidation. Benign calcified granuloma left lower lung. Calcified ectatic thoracic aorta. Generalized osteopenia with advanced degenerative   arthritis both shoulders.   UA with Microscopic reflex to Culture    Specimen: Urine, NOS   Result Value Ref Range    Color Urine Light Yellow Colorless, Straw, Light Yellow, Yellow    Appearance Urine Clear Clear    Glucose Urine Negative Negative mg/dL    Bilirubin Urine Negative Negative    Ketones Urine Negative Negative mg/dL    Specific Gravity Urine 1.016 1.003 - 1.035    Blood Urine Small (A) Negative    pH Urine 5.0 5.0 - 7.0    Protein Albumin Urine Negative Negative mg/dL    Urobilinogen Urine Normal Normal, 2.0 mg/dL    Nitrite Urine Negative Negative    Leukocyte Esterase Urine Negative Negative    Mucus Urine Present (A) None Seen /LPF    RBC Urine 2 <=2 /HPF    WBC Urine 2 <=5 /HPF    Hyaline Casts Urine 9 (H) <=2 /LPF    Narrative    Urine Culture not indicated     *Note: Due to a large number of results and/or encounters for the requested time period, some results have not been displayed. A complete set of results can be found in Results Review.       Medications   ipratropium - albuterol 0.5 mg/2.5 mg/3 mL (DUONEB) neb solution 3 mL (3 mLs Nebulization $Given 2/2/25 7940)       Assessments & Plan (with Medical Decision Making)  89-year-old with generalized weakness after noe influenza last week.  He has wheezing and some shortness of breath.  He is in no distress.  Vital signs show blood pressure 150/92, afebrile, oxygen saturations of 95%, pulse of 75.  Patient was given a nebulizer treatment here in the emergency department with improvement.  He does not have a nebulizer or an albuterol MDI at home.  He is currently being treated with a steroid.  Patient has a clear  chest x-ray here today and labs are reassuring.  Urine is negative.  INR is supratherapeutic.  I have asked the patient and his son to follow-up with his anticoagulation team regarding that.  Patient was discharged home in stable condition.  Return to ER precautions and follow-up precautions discussed.  All questions answered prior to discharge.     I have reviewed the nursing notes.    I have reviewed the findings, diagnosis, plan and need for follow up with the patient.        Discharge Medication List as of 2/2/2025  5:31 PM        START taking these medications    Details   albuterol (VENTOLIN HFA) 108 (90 Base) MCG/ACT inhaler Inhale 2 puffs into the lungs every 6 hours as needed for shortness of breath, wheezing or cough., Disp-18 g, R-0, [facility] may dispense brand covered by insurance (Proair, or proventil or ventolin or generic albuterol inhaler)             Final diagnoses:   Generalized weakness   Influenza A   Elevated INR       2/2/2025   [facility] EMERGENCY DEPT       Barry Llanos MD  02/02/25 2047

## 2025-02-02 NOTE — ED TRIAGE NOTES
Pt arrives by EMS with concerns of weakness related to Influenza.   Pt has had influenza A for the past week.  He lives at the [facility] in [facility].  Concerned that he is not improving.  Pt received a duo neb by EMS.  Pt having generalized weakness. Son at bedside.     Triage Assessment (Adult)       Row Name 02/02/25 1221          Triage Assessment    Airway WDL WDL        Respiratory WDL    Respiratory WDL X        Skin Circulation/Temperature WDL    Skin Circulation/Temperature WDL WDL        Cardiac WDL    Cardiac WDL WDL        Peripheral/Neurovascular WDL    Peripheral Neurovascular WDL WDL        Cognitive/Neuro/Behavioral WDL    Cognitive/Neuro/Behavioral WDL WDL

## 2025-02-03 ENCOUNTER — HOSPITAL ENCOUNTER (INPATIENT)
Facility: CLINIC | Age: OVER 89
LOS: 2 days | Discharge: NURSING, REHAB OR LONG-TERM CARE | DRG: 813 | End: 2025-02-06
Attending: STUDENT IN AN ORGANIZED HEALTH CARE EDUCATION/TRAINING PROGRAM | Admitting: INTERNAL MEDICINE
Payer: COMMERCIAL

## 2025-02-03 ENCOUNTER — DOCUMENTATION ONLY (OUTPATIENT)
Dept: ANTICOAGULATION | Facility: CLINIC | Age: OVER 89
End: 2025-02-03

## 2025-02-03 ENCOUNTER — APPOINTMENT (OUTPATIENT)
Dept: CT IMAGING | Facility: CLINIC | Age: OVER 89
DRG: 813 | End: 2025-02-03
Attending: STUDENT IN AN ORGANIZED HEALTH CARE EDUCATION/TRAINING PROGRAM
Payer: COMMERCIAL

## 2025-02-03 DIAGNOSIS — J10.1 INFLUENZA A: ICD-10-CM

## 2025-02-03 DIAGNOSIS — M79.81 NONTRAUMATIC INTRAMUSCULAR HEMATOMA: Primary | ICD-10-CM

## 2025-02-03 DIAGNOSIS — D69.6 THROMBOCYTOPENIA, UNSPECIFIED: ICD-10-CM

## 2025-02-03 DIAGNOSIS — Z87.891 PERSONAL HISTORY OF TOBACCO USE, PRESENTING HAZARDS TO HEALTH: ICD-10-CM

## 2025-02-03 DIAGNOSIS — R79.1 SUPRATHERAPEUTIC INR: ICD-10-CM

## 2025-02-03 DIAGNOSIS — S30.1XXD HEMATOMA OF RIGHT PSOAS REGION TO ANTICOAGULANT THERAPY, SUBSEQUENT ENCOUNTER: ICD-10-CM

## 2025-02-03 DIAGNOSIS — I10 ESSENTIAL HYPERTENSION, BENIGN: ICD-10-CM

## 2025-02-03 DIAGNOSIS — Z79.01 LONG TERM (CURRENT) USE OF ANTICOAGULANTS: ICD-10-CM

## 2025-02-03 DIAGNOSIS — R53.1 WEAKNESS GENERALIZED: ICD-10-CM

## 2025-02-03 PROBLEM — S30.1XXA PSOAS HEMATOMA, RIGHT, SECONDARY TO ANTICOAGULANT THERAPY: Status: ACTIVE | Noted: 2025-02-03

## 2025-02-03 LAB
ALBUMIN SERPL BCG-MCNC: 3.6 G/DL (ref 3.5–5.2)
ALP SERPL-CCNC: 97 U/L (ref 40–150)
ALT SERPL W P-5'-P-CCNC: 13 U/L (ref 0–70)
ANION GAP SERPL CALCULATED.3IONS-SCNC: 10 MMOL/L (ref 7–15)
ANION GAP SERPL CALCULATED.3IONS-SCNC: 12 MMOL/L (ref 7–15)
AST SERPL W P-5'-P-CCNC: 24 U/L (ref 0–45)
BASOPHILS # BLD AUTO: 0 10E3/UL (ref 0–0.2)
BASOPHILS NFR BLD AUTO: 0 %
BILIRUB SERPL-MCNC: 1.3 MG/DL
BUN SERPL-MCNC: 19.2 MG/DL (ref 8–23)
BUN SERPL-MCNC: 20.1 MG/DL (ref 8–23)
CALCIUM SERPL-MCNC: 8.7 MG/DL (ref 8.8–10.4)
CALCIUM SERPL-MCNC: 8.7 MG/DL (ref 8.8–10.4)
CHLORIDE SERPL-SCNC: 100 MMOL/L (ref 98–107)
CHLORIDE SERPL-SCNC: 101 MMOL/L (ref 98–107)
CREAT SERPL-MCNC: 0.92 MG/DL (ref 0.67–1.17)
CREAT SERPL-MCNC: 0.95 MG/DL (ref 0.67–1.17)
EGFRCR SERPLBLD CKD-EPI 2021: 73 ML/MIN/1.73M2
EGFRCR SERPLBLD CKD-EPI 2021: 76 ML/MIN/1.73M2
EOSINOPHIL # BLD AUTO: 0.1 10E3/UL (ref 0–0.7)
EOSINOPHIL NFR BLD AUTO: 1 %
ERYTHROCYTE [DISTWIDTH] IN BLOOD BY AUTOMATED COUNT: 14.3 % (ref 10–15)
ERYTHROCYTE [DISTWIDTH] IN BLOOD BY AUTOMATED COUNT: 14.4 % (ref 10–15)
GLUCOSE SERPL-MCNC: 120 MG/DL (ref 70–99)
GLUCOSE SERPL-MCNC: 122 MG/DL (ref 70–99)
HCO3 SERPL-SCNC: 23 MMOL/L (ref 22–29)
HCO3 SERPL-SCNC: 24 MMOL/L (ref 22–29)
HCT VFR BLD AUTO: 30.4 % (ref 40–53)
HCT VFR BLD AUTO: 31.3 % (ref 40–53)
HGB BLD-MCNC: 10.1 G/DL (ref 13.3–17.7)
HGB BLD-MCNC: 10.5 G/DL (ref 13.3–17.7)
IMM GRANULOCYTES # BLD: 0 10E3/UL
IMM GRANULOCYTES NFR BLD: 0 %
INR PPP: 2.15 (ref 0.85–1.15)
INR PPP: 3.82 (ref 0.85–1.15)
INR PPP: 5.95 (ref 0.85–1.15)
LYMPHOCYTES # BLD AUTO: 2.2 10E3/UL (ref 0.8–5.3)
LYMPHOCYTES NFR BLD AUTO: 21 %
MCH RBC QN AUTO: 31.2 PG (ref 26.5–33)
MCH RBC QN AUTO: 31.4 PG (ref 26.5–33)
MCHC RBC AUTO-ENTMCNC: 33.2 G/DL (ref 31.5–36.5)
MCHC RBC AUTO-ENTMCNC: 33.5 G/DL (ref 31.5–36.5)
MCV RBC AUTO: 94 FL (ref 78–100)
MCV RBC AUTO: 94 FL (ref 78–100)
MONOCYTES # BLD AUTO: 1.3 10E3/UL (ref 0–1.3)
MONOCYTES NFR BLD AUTO: 13 %
NEUTROPHILS # BLD AUTO: 6.9 10E3/UL (ref 1.6–8.3)
NEUTROPHILS NFR BLD AUTO: 66 %
NRBC # BLD AUTO: 0 10E3/UL
NRBC BLD AUTO-RTO: 0 /100
PLATELET # BLD AUTO: 140 10E3/UL (ref 150–450)
PLATELET # BLD AUTO: 145 10E3/UL (ref 150–450)
POTASSIUM SERPL-SCNC: 4.1 MMOL/L (ref 3.4–5.3)
POTASSIUM SERPL-SCNC: 4.2 MMOL/L (ref 3.4–5.3)
PROT SERPL-MCNC: 6.2 G/DL (ref 6.4–8.3)
RBC # BLD AUTO: 3.24 10E6/UL (ref 4.4–5.9)
RBC # BLD AUTO: 3.34 10E6/UL (ref 4.4–5.9)
SODIUM SERPL-SCNC: 135 MMOL/L (ref 135–145)
SODIUM SERPL-SCNC: 135 MMOL/L (ref 135–145)
WBC # BLD AUTO: 10.6 10E3/UL (ref 4–11)
WBC # BLD AUTO: 9.8 10E3/UL (ref 4–11)

## 2025-02-03 PROCEDURE — 85025 COMPLETE CBC W/AUTO DIFF WBC: CPT | Performed by: STUDENT IN AN ORGANIZED HEALTH CARE EDUCATION/TRAINING PROGRAM

## 2025-02-03 PROCEDURE — 93005 ELECTROCARDIOGRAM TRACING: CPT | Performed by: STUDENT IN AN ORGANIZED HEALTH CARE EDUCATION/TRAINING PROGRAM

## 2025-02-03 PROCEDURE — 250N000011 HC RX IP 250 OP 636: Performed by: STUDENT IN AN ORGANIZED HEALTH CARE EDUCATION/TRAINING PROGRAM

## 2025-02-03 PROCEDURE — 80053 COMPREHEN METABOLIC PANEL: CPT | Performed by: STUDENT IN AN ORGANIZED HEALTH CARE EDUCATION/TRAINING PROGRAM

## 2025-02-03 PROCEDURE — 99207 PR NO BILLABLE SERVICE THIS VISIT: CPT | Performed by: INTERNAL MEDICINE

## 2025-02-03 PROCEDURE — 96365 THER/PROPH/DIAG IV INF INIT: CPT | Performed by: STUDENT IN AN ORGANIZED HEALTH CARE EDUCATION/TRAINING PROGRAM

## 2025-02-03 PROCEDURE — 258N000003 HC RX IP 258 OP 636: Performed by: STUDENT IN AN ORGANIZED HEALTH CARE EDUCATION/TRAINING PROGRAM

## 2025-02-03 PROCEDURE — 36415 COLL VENOUS BLD VENIPUNCTURE: CPT | Performed by: STUDENT IN AN ORGANIZED HEALTH CARE EDUCATION/TRAINING PROGRAM

## 2025-02-03 PROCEDURE — 250N000011 HC RX IP 250 OP 636: Performed by: INTERNAL MEDICINE

## 2025-02-03 PROCEDURE — 85014 HEMATOCRIT: CPT | Performed by: INTERNAL MEDICINE

## 2025-02-03 PROCEDURE — 250N000013 HC RX MED GY IP 250 OP 250 PS 637: Performed by: INTERNAL MEDICINE

## 2025-02-03 PROCEDURE — 94640 AIRWAY INHALATION TREATMENT: CPT

## 2025-02-03 PROCEDURE — 250N000009 HC RX 250: Performed by: STUDENT IN AN ORGANIZED HEALTH CARE EDUCATION/TRAINING PROGRAM

## 2025-02-03 PROCEDURE — 94799 UNLISTED PULMONARY SVC/PX: CPT

## 2025-02-03 PROCEDURE — 96375 TX/PRO/DX INJ NEW DRUG ADDON: CPT

## 2025-02-03 PROCEDURE — 96376 TX/PRO/DX INJ SAME DRUG ADON: CPT | Performed by: STUDENT IN AN ORGANIZED HEALTH CARE EDUCATION/TRAINING PROGRAM

## 2025-02-03 PROCEDURE — 73701 CT LOWER EXTREMITY W/DYE: CPT | Mod: RT

## 2025-02-03 PROCEDURE — 36415 COLL VENOUS BLD VENIPUNCTURE: CPT | Performed by: INTERNAL MEDICINE

## 2025-02-03 PROCEDURE — 85610 PROTHROMBIN TIME: CPT | Performed by: STUDENT IN AN ORGANIZED HEALTH CARE EDUCATION/TRAINING PROGRAM

## 2025-02-03 PROCEDURE — 93010 ELECTROCARDIOGRAM REPORT: CPT | Performed by: STUDENT IN AN ORGANIZED HEALTH CARE EDUCATION/TRAINING PROGRAM

## 2025-02-03 PROCEDURE — 99285 EMERGENCY DEPT VISIT HI MDM: CPT | Mod: 25 | Performed by: STUDENT IN AN ORGANIZED HEALTH CARE EDUCATION/TRAINING PROGRAM

## 2025-02-03 PROCEDURE — 999N000157 HC STATISTIC RCP TIME EA 10 MIN

## 2025-02-03 PROCEDURE — 272N000202 HC AEROBIKA WITH MANOMETER

## 2025-02-03 PROCEDURE — 96375 TX/PRO/DX INJ NEW DRUG ADDON: CPT | Performed by: STUDENT IN AN ORGANIZED HEALTH CARE EDUCATION/TRAINING PROGRAM

## 2025-02-03 PROCEDURE — G0378 HOSPITAL OBSERVATION PER HR: HCPCS

## 2025-02-03 PROCEDURE — 99222 1ST HOSP IP/OBS MODERATE 55: CPT | Performed by: INTERNAL MEDICINE

## 2025-02-03 RX ORDER — ONDANSETRON 4 MG/1
4 TABLET, ORALLY DISINTEGRATING ORAL EVERY 6 HOURS PRN
Status: DISCONTINUED | OUTPATIENT
Start: 2025-02-03 | End: 2025-02-06 | Stop reason: HOSPADM

## 2025-02-03 RX ORDER — AMOXICILLIN 250 MG
2 CAPSULE ORAL 2 TIMES DAILY PRN
Status: DISCONTINUED | OUTPATIENT
Start: 2025-02-03 | End: 2025-02-06 | Stop reason: HOSPADM

## 2025-02-03 RX ORDER — AMOXICILLIN 250 MG
1 CAPSULE ORAL 2 TIMES DAILY PRN
Status: DISCONTINUED | OUTPATIENT
Start: 2025-02-03 | End: 2025-02-06 | Stop reason: HOSPADM

## 2025-02-03 RX ORDER — HYDROMORPHONE HCL IN WATER/PF 6 MG/30 ML
0.4 PATIENT CONTROLLED ANALGESIA SYRINGE INTRAVENOUS
Status: DISCONTINUED | OUTPATIENT
Start: 2025-02-03 | End: 2025-02-03

## 2025-02-03 RX ORDER — IPRATROPIUM BROMIDE AND ALBUTEROL SULFATE 2.5; .5 MG/3ML; MG/3ML
3 SOLUTION RESPIRATORY (INHALATION) ONCE
Status: COMPLETED | OUTPATIENT
Start: 2025-02-03 | End: 2025-02-03

## 2025-02-03 RX ORDER — PREDNISONE 5 MG/1
TABLET ORAL DAILY
Status: ON HOLD | COMMUNITY
Start: 2025-01-30 | End: 2025-02-06

## 2025-02-03 RX ORDER — NALOXONE HYDROCHLORIDE 0.4 MG/ML
0.4 INJECTION, SOLUTION INTRAMUSCULAR; INTRAVENOUS; SUBCUTANEOUS
Status: DISCONTINUED | OUTPATIENT
Start: 2025-02-03 | End: 2025-02-06 | Stop reason: HOSPADM

## 2025-02-03 RX ORDER — IOPAMIDOL 755 MG/ML
200 INJECTION, SOLUTION INTRAVASCULAR ONCE
Status: COMPLETED | OUTPATIENT
Start: 2025-02-03 | End: 2025-02-03

## 2025-02-03 RX ORDER — PROCHLORPERAZINE MALEATE 5 MG/1
5 TABLET ORAL EVERY 6 HOURS PRN
Status: DISCONTINUED | OUTPATIENT
Start: 2025-02-03 | End: 2025-02-06 | Stop reason: HOSPADM

## 2025-02-03 RX ORDER — AMOXICILLIN 250 MG
2 CAPSULE ORAL 2 TIMES DAILY
Status: DISCONTINUED | OUTPATIENT
Start: 2025-02-03 | End: 2025-02-06 | Stop reason: HOSPADM

## 2025-02-03 RX ORDER — ACETAMINOPHEN 650 MG/1
650 SUPPOSITORY RECTAL EVERY 4 HOURS PRN
Status: DISCONTINUED | OUTPATIENT
Start: 2025-02-03 | End: 2025-02-06 | Stop reason: HOSPADM

## 2025-02-03 RX ORDER — ALBUTEROL SULFATE 90 UG/1
2 INHALANT RESPIRATORY (INHALATION) EVERY 6 HOURS PRN
Status: DISCONTINUED | OUTPATIENT
Start: 2025-02-03 | End: 2025-02-06 | Stop reason: HOSPADM

## 2025-02-03 RX ORDER — METOPROLOL SUCCINATE 50 MG/1
50 TABLET, EXTENDED RELEASE ORAL DAILY
Status: DISCONTINUED | OUTPATIENT
Start: 2025-02-04 | End: 2025-02-06 | Stop reason: HOSPADM

## 2025-02-03 RX ORDER — ACETAMINOPHEN 325 MG/1
650 TABLET ORAL EVERY 4 HOURS PRN
Status: DISCONTINUED | OUTPATIENT
Start: 2025-02-03 | End: 2025-02-06 | Stop reason: HOSPADM

## 2025-02-03 RX ORDER — FUROSEMIDE 20 MG/1
20 TABLET ORAL DAILY
Status: DISCONTINUED | OUTPATIENT
Start: 2025-02-03 | End: 2025-02-06 | Stop reason: HOSPADM

## 2025-02-03 RX ORDER — MORPHINE SULFATE 2 MG/ML
2 INJECTION, SOLUTION INTRAMUSCULAR; INTRAVENOUS ONCE
Status: COMPLETED | OUTPATIENT
Start: 2025-02-03 | End: 2025-02-03

## 2025-02-03 RX ORDER — SIMVASTATIN 40 MG
40 TABLET ORAL AT BEDTIME
Status: DISCONTINUED | OUTPATIENT
Start: 2025-02-03 | End: 2025-02-06 | Stop reason: HOSPADM

## 2025-02-03 RX ORDER — ACETAMINOPHEN 500 MG
1000 TABLET ORAL EVERY 8 HOURS PRN
Status: DISCONTINUED | OUTPATIENT
Start: 2025-02-03 | End: 2025-02-03

## 2025-02-03 RX ORDER — ALBUTEROL SULFATE 0.83 MG/ML
2.5 SOLUTION RESPIRATORY (INHALATION) EVERY 4 HOURS PRN
Status: DISCONTINUED | OUTPATIENT
Start: 2025-02-03 | End: 2025-02-03

## 2025-02-03 RX ORDER — NALOXONE HYDROCHLORIDE 0.4 MG/ML
0.2 INJECTION, SOLUTION INTRAMUSCULAR; INTRAVENOUS; SUBCUTANEOUS
Status: DISCONTINUED | OUTPATIENT
Start: 2025-02-03 | End: 2025-02-06 | Stop reason: HOSPADM

## 2025-02-03 RX ORDER — POLYETHYLENE GLYCOL 3350 17 G/17G
8.5 POWDER, FOR SOLUTION ORAL DAILY PRN
Status: DISCONTINUED | OUTPATIENT
Start: 2025-02-03 | End: 2025-02-06 | Stop reason: HOSPADM

## 2025-02-03 RX ORDER — ONDANSETRON 2 MG/ML
4 INJECTION INTRAMUSCULAR; INTRAVENOUS EVERY 6 HOURS PRN
Status: DISCONTINUED | OUTPATIENT
Start: 2025-02-03 | End: 2025-02-06 | Stop reason: HOSPADM

## 2025-02-03 RX ORDER — TAMSULOSIN HYDROCHLORIDE 0.4 MG/1
0.4 CAPSULE ORAL DAILY
Status: DISCONTINUED | OUTPATIENT
Start: 2025-02-03 | End: 2025-02-06 | Stop reason: HOSPADM

## 2025-02-03 RX ORDER — HYDROMORPHONE HCL IN WATER/PF 6 MG/30 ML
0.2 PATIENT CONTROLLED ANALGESIA SYRINGE INTRAVENOUS
Status: DISCONTINUED | OUTPATIENT
Start: 2025-02-03 | End: 2025-02-03

## 2025-02-03 RX ORDER — MORPHINE SULFATE 2 MG/ML
2 INJECTION, SOLUTION INTRAMUSCULAR; INTRAVENOUS ONCE
Status: DISCONTINUED | OUTPATIENT
Start: 2025-02-03 | End: 2025-02-03

## 2025-02-03 RX ORDER — AMOXICILLIN 250 MG
1 CAPSULE ORAL 2 TIMES DAILY
Status: DISCONTINUED | OUTPATIENT
Start: 2025-02-03 | End: 2025-02-06 | Stop reason: HOSPADM

## 2025-02-03 RX ORDER — GABAPENTIN 100 MG/1
200 CAPSULE ORAL 2 TIMES DAILY
Status: DISCONTINUED | OUTPATIENT
Start: 2025-02-03 | End: 2025-02-06 | Stop reason: HOSPADM

## 2025-02-03 RX ORDER — LISINOPRIL 10 MG/1
10 TABLET ORAL DAILY
Status: DISCONTINUED | OUTPATIENT
Start: 2025-02-03 | End: 2025-02-06 | Stop reason: HOSPADM

## 2025-02-03 RX ADMIN — HYDROMORPHONE HYDROCHLORIDE 0.2 MG: 0.2 INJECTION, SOLUTION INTRAMUSCULAR; INTRAVENOUS; SUBCUTANEOUS at 10:51

## 2025-02-03 RX ADMIN — SENNOSIDES AND DOCUSATE SODIUM 1 TABLET: 50; 8.6 TABLET ORAL at 09:17

## 2025-02-03 RX ADMIN — POLYETHYLENE GLYCOL 3350 8.5 G: 17 POWDER, FOR SOLUTION ORAL at 17:24

## 2025-02-03 RX ADMIN — SODIUM CHLORIDE 60 ML: 9 INJECTION, SOLUTION INTRAVENOUS at 02:02

## 2025-02-03 RX ADMIN — ACETAMINOPHEN 650 MG: 325 TABLET, FILM COATED ORAL at 09:28

## 2025-02-03 RX ADMIN — PHYTONADIONE 5 MG: 10 INJECTION, EMULSION INTRAMUSCULAR; INTRAVENOUS; SUBCUTANEOUS at 03:49

## 2025-02-03 RX ADMIN — MORPHINE SULFATE 2 MG: 2 INJECTION, SOLUTION INTRAMUSCULAR; INTRAVENOUS at 02:41

## 2025-02-03 RX ADMIN — LISINOPRIL 10 MG: 10 TABLET ORAL at 17:26

## 2025-02-03 RX ADMIN — SENNOSIDES AND DOCUSATE SODIUM 1 TABLET: 50; 8.6 TABLET ORAL at 22:25

## 2025-02-03 RX ADMIN — IPRATROPIUM BROMIDE AND ALBUTEROL SULFATE 3 ML: .5; 3 SOLUTION RESPIRATORY (INHALATION) at 00:47

## 2025-02-03 RX ADMIN — FUROSEMIDE 20 MG: 20 TABLET ORAL at 17:25

## 2025-02-03 RX ADMIN — IOPAMIDOL 90 ML: 755 INJECTION, SOLUTION INTRAVENOUS at 02:02

## 2025-02-03 RX ADMIN — OXYCODONE HYDROCHLORIDE 2.5 MG: 5 TABLET ORAL at 17:25

## 2025-02-03 RX ADMIN — TAMSULOSIN HYDROCHLORIDE 0.4 MG: 0.4 CAPSULE ORAL at 17:26

## 2025-02-03 RX ADMIN — SIMVASTATIN 40 MG: 40 TABLET, FILM COATED ORAL at 22:25

## 2025-02-03 RX ADMIN — MORPHINE SULFATE 2 MG: 2 INJECTION, SOLUTION INTRAMUSCULAR; INTRAVENOUS at 01:17

## 2025-02-03 RX ADMIN — GABAPENTIN 200 MG: 100 CAPSULE ORAL at 17:33

## 2025-02-03 ASSESSMENT — ACTIVITIES OF DAILY LIVING (ADL)
ADLS_ACUITY_SCORE: 72
ADLS_ACUITY_SCORE: 59
ADLS_ACUITY_SCORE: 68
ADLS_ACUITY_SCORE: 72
ADLS_ACUITY_SCORE: 68
ADLS_ACUITY_SCORE: 67
ADLS_ACUITY_SCORE: 68
ADLS_ACUITY_SCORE: 59
ADLS_ACUITY_SCORE: 67
ADLS_ACUITY_SCORE: 68
ADLS_ACUITY_SCORE: 68
ADLS_ACUITY_SCORE: 67
ADLS_ACUITY_SCORE: 67
ADLS_ACUITY_SCORE: 68
DEPENDENT_IADLS:: CLEANING;COOKING;LAUNDRY;SHOPPING;MEAL PREPARATION;MEDICATION MANAGEMENT;TRANSPORTATION
ADLS_ACUITY_SCORE: 68
ADLS_ACUITY_SCORE: 68
ADLS_ACUITY_SCORE: 72
ADLS_ACUITY_SCORE: 59
ADLS_ACUITY_SCORE: 68

## 2025-02-03 ASSESSMENT — COLUMBIA-SUICIDE SEVERITY RATING SCALE - C-SSRS
1. IN THE PAST MONTH, HAVE YOU WISHED YOU WERE DEAD OR WISHED YOU COULD GO TO SLEEP AND NOT WAKE UP?: NO
2. HAVE YOU ACTUALLY HAD ANY THOUGHTS OF KILLING YOURSELF IN THE PAST MONTH?: NO
6. HAVE YOU EVER DONE ANYTHING, STARTED TO DO ANYTHING, OR PREPARED TO DO ANYTHING TO END YOUR LIFE?: NO

## 2025-02-03 NOTE — PLAN OF CARE
Goal Outcome Evaluation:      Plan of Care Reviewed With: child          Outcome Evaluation: Return to [facility] vs TCU

## 2025-02-03 NOTE — H&P
[facility]    History and Physical - Hospitalist Service       Date of Admission:  2/3/2025    Assessment & Plan      Melvin Abad is a 89 year old male admitted on 2/3/2025. He presented to the ED with pain in his right groin and was found to have hematomas.     Hematomas in right iliopsoas muscle   Supra-therapeutic INR  He presented right groin pain and ecchymosis in the area and imaging showing two small hematomas. His INR is supra-therapeutic at 5.9, although it was even higher on 1/31/2025 (8.3). He was given vitamin K in the ED.   - observation status  - trend INR  - trend Hgb q8h  - track ecchymosis in right groin   - pain control    Influenza A  COPD  He was positive for influenza A, reportedly, although I am not sure which day it was diagnosed. Of note, he was put on prednisone on 1/29/2025, probably for bronchitis or a COPD exacerbation. He is not currently hypoxic.    - droplet precautions  - albuterol nebulizer  - consider azithromycin or steroid course for lingering bronchitis or suspected COPD exacerbation    History of CVA  Once medications are confirmed, we can restart his home medications.     Atrial Fibrillation  - restart metoprolol once confirmed  - hold warfarin due to elevated INR    HFpEF  - restart furosemide once confirmed    Hypertension  - restart lisinopril once confirmed    BPH  - c/w tamsulosin once confirmed         Diet: Combination Diet Low Saturated Fat Na <2400mg Diet, No Caffeine Diet    DVT Prophylaxis: Pneumatic Compression Devices  Ulloa Catheter: Not present  Lines: None     Code Status: No CPR- Do NOT Intubate      Clinically Significant Risk Factors Present on Admission                # Drug Induced Coagulation Defect: home medication list includes an anticoagulant medication    # Hypertension: Noted on problem list      # Anemia: based on hgb <11       # Overweight: Estimated body mass index is 29.84 kg/m  as calculated from the  "following:    Height as of this encounter: 1.778 m (5' 10\").    Weight as of this encounter: 94.3 kg (208 lb).       # Financial/Environmental Concerns:           Disposition Plan      Expected Discharge Date: 02/04/2025                The patient's care was discussed with the Bedside Nurse and Patient.        Elliott Edwards MD  [facility]  Securely message with the Vocera Web Console (learn more here)  Text page via [employer] Paging/Directory      Visit/Communication Style   Virtual (Video) communication was used to evaluate Melvin.  Melvin consented to the use of video communication: yes  Video START time: 0530, 2/3/2025  Video STOP time: 0540, 2/3/2025   Patient's location: [facility]   Provider's location during the visit: [facility] Tele-medicine site        ______________________________________________________________________    Chief Complaint   Right groin pain    History is obtained from the patient    History of Present Illness   Melvin Abad is a 89 year old male who presented to the ED with right groin pain. He had been to the ED in the afternoon with generalized weakness. He came from his care home, [facility], and reportedly was recovering from Influenza A, although I am not sure what day he was diagnosed. He has a cough and some productive sputum. He was discharged home but apparently pulled his groin when trying to get into his grandson's vehicle. He started to complain about increasing pain in his right groin and came back to the ED.     In the ED he was found to have an elevated INR of 5.5 and imaging showed two small hematomas in his right iliopsoas muscle. His hemoglobin was found to be stable, however. He was given some vitamin K to reverse his warfarin and brought in for observation.     Review of Systems    General: negative for fever, chills, sweats  Eyes: negative for blurred vision, loss of vision  Ear Nose and Throat: " negative for pharyngitis, speech or swallowing difficulties  Respiratory:  negative for wheezing, RINALDI, pleuritic pain, SOB  Cardiology:  negative for chest pain, palpitations, orthopnea, PND, edema, syncope   Gastrointestinal: negative for abdominal pain, nausea, vomiting, diarrhea, constipation, hematemesis, melena or hematochezia  Genitourinary: negative for frequency, urgency, dysuria, hematuria   Neurological: negative for focal weakness, paresthesia    Past Medical History    I have reviewed this patient's medical history and updated it with pertinent information if needed.   Past Medical History:   Diagnosis Date    Actinic keratosis     Atrial fibrillation (H)     Basal cell carcinoma nos 06/01/2010    Mohs excision, rt upper lip & rt temple    Cardiac dysrhythmia, unspecified     Clostridium difficile enterocolitis 05/19/2024    Cough     chronic cough    COVID-19 virus infection 10/15/2024    Diabetic eye exam (H) 08/29/2014    Generalized osteoarthrosis, unspecified site     djd of the knees, hands, and neck    Other diseases of lung, not elsewhere classified     pulmonary nodule, benign    Pure hypercholesterolemia     Unspecified essential hypertension        Past Surgical History   I have reviewed this patient's surgical history and updated it with pertinent information if needed.  Past Surgical History:   Procedure Laterality Date    COLONOSCOPY  05/02/2007    Multiple bxs of diminutive polyps of ascending colon.    COLONOSCOPY  11/10/2010    COLONOSCOPY performed by MARISELA GRIMM at [facility] HEMORRHOIDOPEXY BY STAPLING  09/26/08    [facility] TOTAL KNEE ARTHROPLASTY  1997    Knee Replacement, Total    [facility] COLONOSCOPY W/WO BRUSH/WASH  10/19/2001    [facility] COLONOSCOPY W/WO BRUSH/WASH  11/02/2004       Social History   I have reviewed this patient's social history and updated it with pertinent information if needed.  Social History     Tobacco Use    Smoking status: Former     Types: Cigarettes    Smokeless  tobacco: Never    Tobacco comments:     5 years   Vaping Use    Vaping status: Never Used   Substance Use Topics    Alcohol use: No     Comment: denies    Drug use: No       Family History   I have reviewed this patient's family history and updated it with pertinent information if needed.  Family History   Problem Relation Age of Onset    Cancer Mother     Prostate Cancer Father        Prior to Admission Medications   Prior to Admission Medications   Prescriptions Last Dose Informant Patient Reported? Taking?   Vitamin D3 (CHOLECALCIFEROL) 25 mcg (1000 units) tablet  Other No No   Sig: Take 1 tablet (25 mcg) by mouth daily   Patient taking differently: Take 25 mcg by mouth daily.   acetaminophen (TYLENOL) 500 MG tablet  Other No No   Sig: Take 2 tablets (1,000 mg) by mouth every 8 hours as needed for mild pain   albuterol (VENTOLIN HFA) 108 (90 Base) MCG/ACT inhaler   No No   Sig: Inhale 2 puffs into the lungs every 6 hours as needed for shortness of breath, wheezing or cough.   dextromethorphan-guaiFENesin (MUCINEX DM)  MG 12 hr tablet   No No   Sig: Take 2 tablets by mouth every 12 hours for 7 days.   furosemide (LASIX) 20 MG tablet   No No   Sig: Take 1 tablet (20 mg) by mouth daily.   gabapentin (NEURONTIN) 100 MG capsule   No No   Sig: Take 2 capsules (200 mg) by mouth 2 times daily.   ketotifen fumarate 0.035%, ketotifen 0.025%, (ZADITOR) 0.025 % ophthalmic solution  Other No No   Sig: Place 1 drop into both eyes 2 times daily as needed for dry eyes Patient self-administers   lisinopril (ZESTRIL) 10 MG tablet   No No   Sig: Take 1 tablet (10 mg) by mouth daily.   loratadine (CLARITIN) 10 MG tablet  Other No No   Sig: Take 1 tablet (10 mg) by mouth daily   Patient taking differently: Take 10 mg by mouth daily.   metoprolol succinate ER (TOPROL XL) 50 MG 24 hr tablet   No No   Sig: Take 1 tablet (50 mg) by mouth daily.   order for DME  Other No No   Sig: Equipment being ordered: bedside commode   order for  DME  Other No No   Sig: Equipment being ordered: 4 legged walker   order for DME  Other No No   Sig: Equipment being ordered: side rails/grab bars   polyethylene glycol (MIRALAX) 17 GM/Dose powder  Other No No   Sig: Take 9 g by mouth daily as needed for constipation Mix and hand to patient, Patient self-administers (he knows how much he needs to keep regular)   psyllium (METAMUCIL/KONSYL) 58.6 % powder  Other No No   Sig: Take 18 g (1 Tablespoonful) by mouth daily Mix and hand to patient, Patient self-administers (he knows how much he needs to keep regular)   Patient taking differently: Take 1 Tablespoonful by mouth daily. Mix and hand to patient, Patient self-administers (he knows how much he needs to keep regular)   simvastatin (ZOCOR) 40 MG tablet  Other No No   Sig: Take 1 tablet (40 mg) by mouth at bedtime   Patient taking differently: Take 40 mg by mouth at bedtime.   sodium chloride (OCEAN) 0.65 % nasal spray  Other No No   Sig: Spray 2 sprays in nostril daily as needed for congestion Patient self-administers   tamsulosin (FLOMAX) 0.4 MG capsule  Other No No   Sig: Take 1 capsule (0.4 mg) by mouth daily   Patient taking differently: Take 0.4 mg by mouth daily.   warfarin ANTICOAGULANT (COUMADIN) 2.5 MG tablet   No No   Sig: Take 2.5 mgs mg Sunday, Tuesday and Thursday, and 5 mgs all other days.  Recheck INR on 1/31/25   warfarin ANTICOAGULANT (COUMADIN) 5 MG tablet   No No   Sig: Take 5 mg daily on Mon, Wed, Fri and Sat ( uses 2.5 mg on other days) recheck INR on 2/7/25      Facility-Administered Medications: None     Allergies   No Known Allergies    Physical Exam   Vital Signs: Temp: 97.9  F (36.6  C) Temp src: Oral BP: (!) 169/94 Pulse: 65   Resp: 20 SpO2: 95 % O2 Device: None (Room air)    Weight: 208 lbs 0 oz    Gen:  Well-developed, well-nourished, in no acute distress, lying semi-supine in hospital stretcher  HEENT:  Anicteric sclera, PER, hearing intact to voice  Resp:  No accessory muscle use,  diffuse rhonchi and expiratory wheezing  Card:  Regular rhythm and rate, no murmur, normal S1, S2, no JVD, no LE edema  Abd:  Soft per RN exam, no TTP, non-distended, normoactive bowel sounds are present  Musc:  Normal strength and movement of the major muscle groups without obvious deformity  Skin: Ecchymosis on this right groin. ACE bandages noted. Intact, no rashes noted  Psych:  Good insight, oriented to person, place and time, not anxious, not agitated  Neuro: CN 2-12 intact, no focal deficits or sensory deficits    Data     Recent Labs   Lab 02/03/25  0120 02/02/25  1258 01/31/25  0700   WBC 10.6 8.3  --    HGB 10.5* 11.3*  --    MCV 94 94  --    * 148*  --    INR 5.95* 5.92* 8.30*    136  --    POTASSIUM 4.2 4.3  --    CHLORIDE 100 98  --    CO2 23 28  --    BUN 20.1 19.6  --    CR 0.95 1.04  --    ANIONGAP 12 10  --    DARIUS 8.7* 9.1  --    * 137*  --    ALBUMIN 3.6 3.9  --    PROTTOTAL 6.2* 6.7  --    BILITOTAL 1.3* 1.2  --    ALKPHOS 97 106  --    ALT 13 14  --    AST 24 27  --          Recent Results (from the past 24 hours)   XR Chest Port 1 View    Narrative    EXAM: XR CHEST PORTABLE 1 VIEW  LOCATION: [facility]  DATE: 02/02/2025    INDICATION: Wheezing, influenza, shortness of breath..  COMPARISON: 10/15/2024.      Impression    IMPRESSION: Heart size and pulmonary vascularity within normal limits. No acute appearing infiltrates or consolidation. Benign calcified granuloma left lower lung. Calcified ectatic thoracic aorta. Generalized osteopenia with advanced degenerative   arthritis both shoulders.   CT Femur Thigh Right w Contrast    Narrative    EXAM: CT FEMUR THIGH RIGHT W CONTRAST  LOCATION: [facility]  DATE: 2/3/2025    INDICATION: right inner thigh pain   swelling bruising.  COMPARISON: CT pelvis dated 1/26/2024  TECHNIQUE: IV contrast. Axial, sagittal and coronal thin-section reconstruction. Dose reduction  techniques were used.   CONTRAST: 90 mL Isovue 370    FINDINGS:     BONES:  -The bones are mildly osteopenic. The bipolar total knee prosthesis and changes from patellar resurfacing are noted, without evidence of hardware loosening or failure. No acute fracture or malalignment. Mild degenerative changes of the right hip joint   along the inferior aspect.    SOFT TISSUES:  -There is soft tissue swelling within the iliopsoas muscle in the inferior pelvis extending to the femoral attachment with adjacent soft tissue stranding. Within the swollen iliopsoas muscle there is a focal fluid collection measuring 1.7 x 2.5 x 3 cm   (image 59, series 7; image 55, series 4). A second smaller fluid collection is seen just superiorly and anteriorly measuring 1.6 x 0.5 x 1.8 cm (image 51, series 4; image 50, series 7). These fluid collections are new when compared with 1/26/2024.      Impression    IMPRESSION:  1.  Soft tissue swelling of the iliopsoas muscle probably in the hip just proximal to its femoral attachment, with less pronounced findings seen in the included inferior aspect of the pelvis. There are 2 small intramuscular fluid collections measuring   1.7 x 2.5 x 3 cm and 1.6 x 0.5 x 1.8 cm, within the muscle belly just proximal to the femoral attachment. This could reflect small intramuscular hematomas although the distribution and appearance is also suggestive of iliopsoas bursa inflammation.   Correlation with patient's physical exam is recommended.  2.  No acute fracture or malalignment of the right femur and included hip.

## 2025-02-03 NOTE — PROGRESS NOTES
"ANTICOAGULATION  MANAGEMENT: Discharge Review    Melvin Abad chart reviewed for anticoagulation continuity of care    Emergency room visit on 2/2/25 for weakness, influenza A, elevated INR.    Discharge disposition: Home    Results:    Recent labs: (last 7 days)     01/31/25  0700 02/02/25  1258 02/03/25  0120 02/03/25  0532   INR 8.30* 5.92* 5.95* 3.82*     Anticoagulation inpatient management:     Unclear what patient did with warfarin on 2/2/25. Per ER note from 2/2/25: \"INR is supratherapeutic. I have asked the patient and his son to follow-up with his anticoagulation team regarding that\"     Anticoagulation discharge instructions:     Warfarin dosing:  follow up with [facility], see note above   Bridging: No   INR goal change: No      Medication changes affecting anticoagulation: No    Additional factors affecting anticoagulation: Yes: patient was diagnosed with Influenza A, this could impact INR     PLAN     No adjustment to anticoagulation plan needed    Patient not contacted    No adjustment to Anticoagulation Calendar was required    Of note, it appears patient presented back to the ER on 2/3/25 for development of a hematoma. Patient is currently admitted. [facility] will resume management once discharged from [facility].    Toña De Luna RN  2/3/2025  Anticoagulation Clinic  [employer] for routing messages: cristi COLON  [facility] patient phone line: [phone]    "

## 2025-02-03 NOTE — ED PROVIDER NOTES
History     Chief Complaint   Patient presents with    Groin Pain     HPI  Melvin Abad is a 89 year old male who a history of hypertension, hyperlipidemia, anemia, acute renal failure, venous stasis, generalized weakness, thrombocytopenia, COPD, atrial fibrillation, diabetes, long-term use of anticoagulants, [place] syndrome, CVA with inner thigh pain.  Patient was seen earlier today and was diagnosed with influenza A after being evaluated for generalized weakness.  Patient brought in by EMS with worsening inner thigh pain and bruising.  There is no obvious bruising noted today on initial evaluation.  Son at bedside believes that he likely got into his grandsons vehicle and with the attempted maneuvering may have either hit his thigh or stretches groin resulting in the injury.  Patient uses warfarin chronically and was found to have a elevated INR recently measuring above 5.  3 days ago it was measured above 8.  Currently resides at [facility].    Allergies:  No Known Allergies    Problem List:    Patient Active Problem List    Diagnosis Date Noted    Psoas hematoma, right, secondary to anticoagulant therapy 02/03/2025     Priority: Medium    Influenza A 02/03/2025     Priority: Medium    Supratherapeutic INR 02/03/2025     Priority: Medium    Nontraumatic intramuscular hematoma 02/03/2025     Priority: Medium    Acute renal failure, unspecified acute renal failure type 10/17/2024     Priority: Medium    Anemia, unspecified type 10/17/2024     Priority: Medium    Weakness generalized 10/15/2024     Priority: Medium    Thrombocytopenia 10/15/2024     Priority: Medium    Anemia - multifactorial (history of acute blood loss, anemia of chronic disease, etc) 10/15/2024     Priority: Medium    Venous stasis of both lower extremities 10/15/2024     Priority: Medium    History of CVA (cerebrovascular accident) 10/15/2024     Priority: Medium    Chronic kidney disease, stage 3a (H) 08/18/2024     Priority: Medium     Arthritis pain 05/19/2024     Priority: Medium    Chronic obstructive pulmonary disease, unspecified COPD type (H) 05/19/2024     Priority: Medium    Benign prostatic hyperplasia, unspecified whether lower urinary tract symptoms present 05/19/2024     Priority: Medium    Chronic right-sided heart failure (H) 02/13/2024     Priority: Medium    Weakness 01/26/2024     Priority: Medium    Chronic atrial fibrillation (H) 05/01/2020     Priority: Medium    Osteoarthritis of glenohumeral joint, left 02/24/2020     Priority: Medium    Longstanding persistent atrial fibrillation (H) 01/23/2020     Priority: Medium    Anticoagulation goal of INR 2 to 3 01/23/2020     Priority: Medium    Slow transit constipation 01/23/2020     Priority: Medium    Sleep disturbances 01/23/2020     Priority: Medium    Right pontine stroke (H) 01/23/2020     Priority: Medium    Dry eyes 01/23/2020     Priority: Medium    Restless legs syndrome (RLS) 01/23/2020     Priority: Medium    Long-term (current) use of anticoagulants [Z79.01] 04/05/2016     Priority: Medium    Obesity (BMI 30-39.9) 10/28/2015     Priority: Medium    Anemia due to blood loss, acute 11/20/2013     Priority: Medium    DVT prophylaxis 11/20/2013     Priority: Medium    Carpal tunnel syndrome of right wrist 08/16/2013     Priority: Medium    Gilbert syndrome 01/31/2012     Priority: Medium    History of skin cancer 01/03/2012     Priority: Medium    AK (actinic keratosis) 01/03/2012     Priority: Medium    Essential hypertension 04/18/2011     Priority: Medium    Diverticulosis 03/25/2011     Priority: Medium    Hyperlipidemia LDL goal <100 10/31/2010     Priority: Medium    Long term current use of anticoagulant therapy 01/31/2001     Priority: Medium     Problem list name updated by automated process. Provider to review          Past Medical History:    Past Medical History:   Diagnosis Date    Actinic keratosis     Atrial fibrillation (H)     Basal cell carcinoma nos  06/01/2010    Cardiac dysrhythmia, unspecified     Clostridium difficile enterocolitis 05/19/2024    Cough     COVID-19 virus infection 10/15/2024    Diabetic eye exam (H) 08/29/2014    Generalized osteoarthrosis, unspecified site     Other diseases of lung, not elsewhere classified     Pure hypercholesterolemia     Unspecified essential hypertension        Past Surgical History:    Past Surgical History:   Procedure Laterality Date    COLONOSCOPY  05/02/2007    Multiple bxs of diminutive polyps of ascending colon.    COLONOSCOPY  11/10/2010    COLONOSCOPY performed by MARISELA GRIMM at  GI     HEMORRHOIDOPEXY BY STAPLING  09/26/08    [facility] TOTAL KNEE ARTHROPLASTY  1997    Knee Replacement, Total    [facility] COLONOSCOPY W/WO BRUSH/WASH  10/19/2001    [facility] COLONOSCOPY W/WO BRUSH/WASH  11/02/2004       Family History:    Family History   Problem Relation Age of Onset    Cancer Mother     Prostate Cancer Father        Social History:  Marital Status:   [5]  Social History     Tobacco Use    Smoking status: Former     Types: Cigarettes    Smokeless tobacco: Never    Tobacco comments:     5 years   Vaping Use    Vaping status: Never Used   Substance Use Topics    Alcohol use: No     Comment: denies    Drug use: No        Medications:    acetaminophen (TYLENOL) 500 MG tablet  albuterol (VENTOLIN HFA) 108 (90 Base) MCG/ACT inhaler  dextromethorphan-guaiFENesin (MUCINEX DM)  MG 12 hr tablet  furosemide (LASIX) 20 MG tablet  gabapentin (NEURONTIN) 100 MG capsule  ketotifen fumarate 0.035%, ketotifen 0.025%, (ZADITOR) 0.025 % ophthalmic solution  lisinopril (ZESTRIL) 10 MG tablet  loratadine (CLARITIN) 10 MG tablet  metoprolol succinate ER (TOPROL XL) 50 MG 24 hr tablet  order for DME  order for DME  order for DME  polyethylene glycol (MIRALAX) 17 GM/Dose powder  psyllium (METAMUCIL/KONSYL) 58.6 % powder  simvastatin (ZOCOR) 40 MG tablet  sodium chloride (OCEAN) 0.65 % nasal spray  tamsulosin (FLOMAX) 0.4 MG  capsule  Vitamin D3 (CHOLECALCIFEROL) 25 mcg (1000 units) tablet  warfarin ANTICOAGULANT (COUMADIN) 2.5 MG tablet  warfarin ANTICOAGULANT (COUMADIN) 5 MG tablet          Review of Systems   Musculoskeletal:         Right inner thigh pain   All other systems reviewed and are negative.      Physical Exam   BP: (!) 169/94  Pulse: 72  Temp: 97.3  F (36.3  C)  Resp: 22  Weight: 94.3 kg (208 lb)  SpO2: 94 %      Physical Exam  Vitals and nursing note reviewed.   Constitutional:       General: He is not in acute distress.     Appearance: Normal appearance. He is not toxic-appearing.      Comments: Uncomfortable appearing   HENT:      Head: Normocephalic and atraumatic.   Eyes:      General: No scleral icterus.     Conjunctiva/sclera: Conjunctivae normal.   Cardiovascular:      Rate and Rhythm: Normal rate.      Pulses: Normal pulses.      Heart sounds: Normal heart sounds. No murmur heard.  Pulmonary:      Effort: Pulmonary effort is normal. No respiratory distress.      Breath sounds: Normal breath sounds.   Abdominal:      Palpations: Abdomen is soft.      Tenderness: There is no abdominal tenderness.   Musculoskeletal:         General: No deformity.      Cervical back: Neck supple.        Legs:       Comments: Bruising noted to the area showed a bump on the right inner thigh.  Dark discoloration.  No crepitus identified.  Scrotum well-appearing with no obvious signs of cellulitis or warmth.  No tenderness in the inguinal area.  Full range of motion of the hip.  Tenderness to palpation of the inner thigh to knee.  Tenderness to palpation of the medial femoral area.  Patient able to flex leg with discomfort.  Patient has venous stasis scars to the lower legs as well as numerous missing toes likely secondary to chronic arterial insufficiency.  Legs however are warm bilaterally with no obvious distal swelling.   Skin:     General: Skin is warm.      Capillary Refill: Capillary refill takes less than 2 seconds.    Neurological:      General: No focal deficit present.      Mental Status: He is alert and oriented to person, place, and time.      Cranial Nerves: No cranial nerve deficit.      Motor: No weakness.         ED Course        Procedures                Results for orders placed or performed during the hospital encounter of 02/03/25 (from the past 24 hours)   CBC with platelets differential    Narrative    The following orders were created for panel order CBC with platelets differential.  Procedure                               Abnormality         Status                     ---------                               -----------         ------                     CBC with platelets and d...[[id number]]  Abnormal            Final result                 Please view results for these tests on the individual orders.   INR   Result Value Ref Range    INR 5.95 (HH) 0.85 - 1.15   Comprehensive metabolic panel   Result Value Ref Range    Sodium 135 135 - 145 mmol/L    Potassium 4.2 3.4 - 5.3 mmol/L    Carbon Dioxide (CO2) 23 22 - 29 mmol/L    Anion Gap 12 7 - 15 mmol/L    Urea Nitrogen 20.1 8.0 - 23.0 mg/dL    Creatinine 0.95 0.67 - 1.17 mg/dL    GFR Estimate 73 >60 mL/min/1.73m2    Calcium 8.7 (L) 8.8 - 10.4 mg/dL    Chloride 100 98 - 107 mmol/L    Glucose 120 (H) 70 - 99 mg/dL    Alkaline Phosphatase 97 40 - 150 U/L    AST 24 0 - 45 U/L    ALT 13 0 - 70 U/L    Protein Total 6.2 (L) 6.4 - 8.3 g/dL    Albumin 3.6 3.5 - 5.2 g/dL    Bilirubin Total 1.3 (H) <=1.2 mg/dL   CBC with platelets and differential   Result Value Ref Range    WBC Count 10.6 4.0 - 11.0 10e3/uL    RBC Count 3.34 (L) 4.40 - 5.90 10e6/uL    Hemoglobin 10.5 (L) 13.3 - 17.7 g/dL    Hematocrit 31.3 (L) 40.0 - 53.0 %    MCV 94 78 - 100 fL    MCH 31.4 26.5 - 33.0 pg    MCHC 33.5 31.5 - 36.5 g/dL    RDW 14.4 10.0 - 15.0 %    Platelet Count 140 (L) 150 - 450 10e3/uL    % Neutrophils 66 %    % Lymphocytes 21 %    % Monocytes 13 %    % Eosinophils 1 %    % Basophils 0 %     % Immature Granulocytes 0 %    NRBCs per 100 WBC 0 <1 /100    Absolute Neutrophils 6.9 1.6 - 8.3 10e3/uL    Absolute Lymphocytes 2.2 0.8 - 5.3 10e3/uL    Absolute Monocytes 1.3 0.0 - 1.3 10e3/uL    Absolute Eosinophils 0.1 0.0 - 0.7 10e3/uL    Absolute Basophils 0.0 0.0 - 0.2 10e3/uL    Absolute Immature Granulocytes 0.0 <=0.4 10e3/uL    Absolute NRBCs 0.0 10e3/uL   CT Femur Thigh Right w Contrast    Narrative    EXAM: CT FEMUR THIGH RIGHT W CONTRAST  LOCATION: [facility]  DATE: 2/3/2025    INDICATION: right inner thigh pain   swelling bruising.  COMPARISON: CT pelvis dated 1/26/2024  TECHNIQUE: IV contrast. Axial, sagittal and coronal thin-section reconstruction. Dose reduction techniques were used.   CONTRAST: 90 mL Isovue 370    FINDINGS:     BONES:  -The bones are mildly osteopenic. The bipolar total knee prosthesis and changes from patellar resurfacing are noted, without evidence of hardware loosening or failure. No acute fracture or malalignment. Mild degenerative changes of the right hip joint   along the inferior aspect.    SOFT TISSUES:  -There is soft tissue swelling within the iliopsoas muscle in the inferior pelvis extending to the femoral attachment with adjacent soft tissue stranding. Within the swollen iliopsoas muscle there is a focal fluid collection measuring 1.7 x 2.5 x 3 cm   (image 59, series 7; image 55, series 4). A second smaller fluid collection is seen just superiorly and anteriorly measuring 1.6 x 0.5 x 1.8 cm (image 51, series 4; image 50, series 7). These fluid collections are new when compared with 1/26/2024.      Impression    IMPRESSION:  1.  Soft tissue swelling of the iliopsoas muscle probably in the hip just proximal to its femoral attachment, with less pronounced findings seen in the included inferior aspect of the pelvis. There are 2 small intramuscular fluid collections measuring   1.7 x 2.5 x 3 cm and 1.6 x 0.5 x 1.8 cm, within the muscle  belly just proximal to the femoral attachment. This could reflect small intramuscular hematomas although the distribution and appearance is also suggestive of iliopsoas bursa inflammation.   Correlation with patient's physical exam is recommended.  2.  No acute fracture or malalignment of the right femur and included hip.       *Note: Due to a large number of results and/or encounters for the requested time period, some results have not been displayed. A complete set of results can be found in Results Review.       Medications   HOLD: warfarin (COUMADIN) therapy (has no administration in time range)   phytonadione (AQUAMEPHYTON, VITAMIN K) 5 mg in sodium chloride 0.9 % 50 mL intermittent infusion (has no administration in time range)   ipratropium - albuterol 0.5 mg/2.5 mg/3 mL (DUONEB) neb solution 3 mL (3 mLs Nebulization $Given 2/3/25 0047)   morphine (PF) injection 2 mg (2 mg Intravenous $Given 2/3/25 0117)   iopamidol (ISOVUE-370) solution 200 mL (90 mLs Intravenous $Given 2/3/25 0202)   sodium chloride 0.9 % bag 100mL for CT scan flush use (60 mLs Intravenous $Given 2/3/25 0202)   morphine (PF) injection 2 mg (2 mg Intravenous $Given 2/3/25 0241)       Assessments & Plan (with Medical Decision Making)     I have reviewed the nursing notes.    I have reviewed the findings, diagnosis, plan and need for follow up with the patient.      Medical Decision Making  Melvin Abad is a 89 year old male who a history of hypertension, hyperlipidemia, anemia, acute renal failure, venous stasis, generalized weakness, thrombocytopenia, COPD, atrial fibrillation, diabetes, long-term use of anticoagulants, [place] syndrome, CVA with inner thigh pain.  Patient was seen earlier today and was diagnosed with influenza A after being evaluated for generalized weakness.  Patient brought in by EMS with worsening inner thigh pain and bruising.  There is no obvious bruising noted today on initial evaluation.  Son at bedside  believes that he likely got into his grandsons vehicle and with the attempted maneuvering may have either hit his thigh or stretches groin resulting in the injury.  Patient uses warfarin chronically and was found to have a elevated INR recently measuring above 5.  3 days ago it was measured above 8.  Currently resides at [facility].    Vitals are reassuring with mild hypertension 169/94 temperature 97.3 pulse of 65 nausea saturation of 95% on room air.    Significant bruising noted on the medial right side of his thigh.  Scrotum is well-appearing no obvious signs of infection or cellulitis.  Leg is otherwise warm throughout.  Tenderness to palpation of the medial inner thigh as well as medial inner femoral area concerning with likely hematoma formation.  Patient noted to have an elevated INR therefore this will be reevaluated and CT imaging ordered for evaluation of possible hematoma development.  Morphine provided for pain control.    Patient's vitals otherwise remained stable throughout medical management.  Patient CMP moderately unremarkable for any new concerns.  His CBC does show mild decrease in his hemoglobin to 10.5 and a platelet count of 140 chronic thrombocytopenia however it is present.  INR remains elevated at 5.95.    CT imaging ordered consistent with concerned hematoma development at 2 small loculated areas in the iliopsoas muscle.  Ace wrap applied to the upper thigh.  Patient remains in discomfort after short morphine boluses.  Due to patient's elevated INR and hematoma development and continued uncontrolled pain with recent worsening weakness and positive influenza test believe patient may benefit from observation to ensure improving INR as well as pain control as with his elevated INR patient could develop theoretically compartment syndrome if bleeding worsens into the thigh therefore close observation may be of benefit in this gentlemen's case.    After discussing the case with hospitalist  greatly appreciate their time we will initiate reversal with vitamin K and admit patient for close observation due to signs of possible active bleeding in the right thigh with a 6 significant supratherapeutic INR.  [place] orders placed.    New Prescriptions    No medications on file       Final diagnoses:   Nontraumatic intramuscular hematoma   Supratherapeutic INR   Influenza A   Weakness generalized       2/3/2025   [facility] EMERGENCY DEPT       Elizabeth Ramos MD  02/03/25 0338

## 2025-02-03 NOTE — LETTER
Transition Communication Hand-off for Care Transitions to Next Level of Care Provider    Name: Melvin Abad  : 1928  MRN #: [id number]  Primary Care Provider: Kim Chong     Primary Clinic: [address] [place] [id number]     Reason for Hospitalization:  Weakness generalized [R53.1]  Influenza A [J10.1]  Supratherapeutic INR [R79.1]  Nontraumatic intramuscular hematoma [M79.81]  Essential hypertension, benign [I10]  Thrombocytopenia, unspecified [D69.6]  Long term (current) use of anticoagulants [Z79.01]  Personal history of tobacco use, presenting hazards to health [Z87.891]  Admit Date/Time: 2/3/2025 12:22 AM  Discharge Date: 25  Payor Source: Payor: [facility] / Plan: ARE MEDICARE / Product Type: HMO /     Readmission Assessment Measure (DANNA) Risk Score/category: High          Reason for Communication Hand-off Referral: Other Needs a LTC plan - frequent falls, frequent hospital admissions    Discharge Plan: [facility] (Main Phone: [phone] Admissions Phone: [phone] Fax: [phone])     Discharge Plan:      Flowsheet Row Most Recent Value   Concerns Comments may need TCU           Concern for non-adherence with plan of care:   Y/N : yes    Discharge Needs Assessment:  Needs      Flowsheet Row Most Recent Value   Equipment Currently Used at Home wheelchair, manual, wheelchair, power, walker, standard, grab bar, toilet, grab bar, tub/shower, shower chair   # of Referrals Placed by  Internal Clinic Care Coordination, Post Acute Facilities, Transportation            Follow-up plan:  No future appointments.    Any outstanding tests or procedures:        Referrals       Future Labs/Procedures    Occupational Therapy Adult Consult     Comments:    Evaluate and treat as clinically indicated.    Reason:  Strengthening, Home safety, ADLs, transfers if needed.    Physical Therapy Adult Consult     Comments:    Evaluate and treat as clinically  indicated.    Reason:  Strengthening, Home safety, ADLs, transfers if needed.            Supplies       Future Labs/Procedures    Compression Sleeve/Stocking Order     Comments:    Compression Sleeve/Stocking Documentation:   The patient needs ace wrap for compression right thigh for Other reason: iliopsoas hematoma. NH provider to reassess when can remove     I, the undersigned, certify that the above prescribed supplies are medically necessary for this patient and is both reasonable and necessary in reference to accepted standards of medical and necessary in reference to accepted standards of medical practice in the treatment of this patient's condition and is not prescribed as a convenience.            MILES Olivarez  [facility] [phone]/ [facility] [phone]  Care Management     AVS/Discharge Summary is the source of truth; this is a helpful guide for improved communication of patient story

## 2025-02-03 NOTE — PROGRESS NOTES
[facility]    After midnight - Hospitalist Service    Assessment and Plan    Principal Problem:    Psoas hematoma, right, secondary to anticoagulant therapy  Active Problems:    Weakness generalized    Influenza A    Supratherapeutic INR    Nontraumatic intramuscular hematoma      Patient admitted after midnight, this is follow up.    Melvin Abad is a 89 year old male with right groin pain and was found to have hematomas.      Hematomas in right iliopsoas muscle   Supra-therapeutic INR  He presented right groin pain and ecchymosis in the area and imaging showing two small hematomas. His INR is supra-therapeutic at 5.9, although it was even higher on 1/31/2025 (8.3). He was given vitamin K in the ED.   - Hgb maintains to be stable  - INR in therapeutic range  - continue holding warfarin for now  - pain is improving  - keep on bedrest overnight for now and continue icing and compression. Reassess in am if can start activity and therapy  - repeat imaging in am to ensure no further bleeding and is stable for activity   - pending therapy recs determine if can return to [facility]      Influenza A  COPD  He was positive for influenza A positive from [facility]    - droplet precautions  - albuterol nebulizer  - breathing stable hold on steroids and abx for now      History of CVA  - resume home meds   - possible PT in am pending repeat imaging      Atrial Fibrillation  - resume metoprolol with hold parameters   - hold warfarin for now     HFpEF compensated   - resume furosemide     Hypertension  - restart lisinopril, lasix and metoprolol with hold parameters      BPH  - tamsulosin     VTE prophylaxis:  hold meds and SCD for now   DIET: Orders Placed This Encounter      Combination Diet Low Saturated Fat Na <2400mg Diet, No Caffeine Diet    Drains/Lines: none  Weight bearing status: bedrest     Code Status:No CPR- Do NOT Intubate  Clinically Significant Risk Factors Present on Admission              "   # Drug Induced Coagulation Defect: home medication list includes an anticoagulant medication    # Hypertension: Noted on problem list      # Anemia: based on hgb <11       # Overweight: Estimated body mass index is 29.84 kg/m  as calculated from the following:    Height as of this encounter: 1.778 m (5' 10\").    Weight as of this encounter: 94.3 kg (208 lb).       # Financial/Environmental Concerns:           Medically Ready for Discharge: Anticipated Tomorrow      Disposition/Barriers to discharge:      Expected Discharge Date: 02/04/2025            Subjective:  Pain improved. INR decreased     B/P: 169/94, T: 97.9, P: 65, R: 20     PERTINENT LABS  Imaging results reviewed over the past 24 hrs:   Recent Results (from the past 24 hours)   CT Femur Thigh Right w Contrast    Narrative    EXAM: CT FEMUR THIGH RIGHT W CONTRAST  LOCATION: [facility]  DATE: 2/3/2025    INDICATION: right inner thigh pain   swelling bruising.  COMPARISON: CT pelvis dated 1/26/2024  TECHNIQUE: IV contrast. Axial, sagittal and coronal thin-section reconstruction. Dose reduction techniques were used.   CONTRAST: 90 mL Isovue 370    FINDINGS:     BONES:  -The bones are mildly osteopenic. The bipolar total knee prosthesis and changes from patellar resurfacing are noted, without evidence of hardware loosening or failure. No acute fracture or malalignment. Mild degenerative changes of the right hip joint   along the inferior aspect.    SOFT TISSUES:  -There is soft tissue swelling within the iliopsoas muscle in the inferior pelvis extending to the femoral attachment with adjacent soft tissue stranding. Within the swollen iliopsoas muscle there is a focal fluid collection measuring 1.7 x 2.5 x 3 cm   (image 59, series 7; image 55, series 4). A second smaller fluid collection is seen just superiorly and anteriorly measuring 1.6 x 0.5 x 1.8 cm (image 51, series 4; image 50, series 7). These fluid collections are new " when compared with 1/26/2024.      Impression    IMPRESSION:  1.  Soft tissue swelling of the iliopsoas muscle probably in the hip just proximal to its femoral attachment, with less pronounced findings seen in the included inferior aspect of the pelvis. There are 2 small intramuscular fluid collections measuring   1.7 x 2.5 x 3 cm and 1.6 x 0.5 x 1.8 cm, within the muscle belly just proximal to the femoral attachment. This could reflect small intramuscular hematomas although the distribution and appearance is also suggestive of iliopsoas bursa inflammation.   Correlation with patient's physical exam is recommended.  2.  No acute fracture or malalignment of the right femur and included hip.       Most Recent 3 CBC's:  Recent Labs   Lab Test 02/03/25  0533 02/03/25  0120 02/02/25  1258   WBC 9.8 10.6 8.3   HGB 10.1* 10.5* 11.3*   MCV 94 94 94   * 140* 148*     Most Recent 3 BMP's:  Recent Labs   Lab Test 02/03/25  0533 02/03/25  0120 02/02/25  1258    135 136   POTASSIUM 4.1 4.2 4.3   CHLORIDE 101 100 98   CO2 24 23 28   BUN 19.2 20.1 19.6   CR 0.92 0.95 1.04   ANIONGAP 10 12 10   DARIUS 8.7* 8.7* 9.1   * 120* 137*     Most Recent 2 LFT's:  Recent Labs   Lab Test 02/03/25  0120 02/02/25  1258   AST 24 27   ALT 13 14   ALKPHOS 97 106   BILITOTAL 1.3* 1.2     Most Recent 3 INR's:  Recent Labs   Lab Test 02/03/25  1352 02/03/25  0532 02/03/25  0120   INR 2.15* 3.82* 5.95*     Most Recent 3 Troponin's:  Recent Labs   Lab Test 09/28/20  1043 12/20/19  0903 12/18/19  0934   TROPI 0.031 0.031 0.015     Most Recent 3 BNP's:  Recent Labs   Lab Test 09/28/20  1043   NTBNPI 1,622     Most Recent D-dimer:No lab results found.  Most Recent 6 glucoses:  Recent Labs   Lab Test 02/03/25  0533 02/03/25  0120 02/02/25  1258 10/23/24  0730 10/18/24  0540 10/17/24  0549   * 120* 137* 95 79 86     Most Recent Urinalysis:  Recent Labs   Lab Test 02/02/25  1556   COLOR Light Yellow   APPEARANCE Clear   URINEGLC  Negative   URINEBILI Negative   URINEKETONE Negative   SG 1.016   UBLD Small*   URINEPH 5.0   PROTEIN Negative   NITRITE Negative   LEUKEST Negative   RBCU 2   WBCU 2     Most Recent ESR & CRP:  Recent Labs   Lab Test 10/23/24  0730   CRPI 5.86*       Elisha Guerrero MD  [facility] Medicine Service

## 2025-02-03 NOTE — PROGRESS NOTES
S-(situation): Patient registered to Observation. Patient arrived to room 254 via cart from ED    B-(background): weakness d/t influenza     A-(assessment): Alert and oriented x 4. Vitals stable on RA. Reports pain in right groin area. States pain is much better when he is laying down. Received pain medication in ED, writer offered ice. Pt refused. Loose cough, wheezes in lungs. Ace wrap to upper thigh.    R-(recommendations): Orders and observation goals reviewed with patient    Nursing Observation criteria listed below was met:    Skin issues/needs documented:Yes  Isolation needs addressed and Signage up: Yes  Fall Prevention: Education given and documented: Yes  Education Assessment documented:Yes  Admission Education Documented: Yes  New medication patient education completed and documented (Possible Side Effects of Common Medications handout): Yes  OBS video/handout Reviewed & Documented: Yes  Allergies Reviewed: Yes  Medication Reconciliation Complete: Yes  Home medications if not able to send immediately home with family stored here: Yes  Reminder note placed in discharge instructions of home meds: Yes  Patient has discharge needs (If yes, please explain): Yes  Patient discharge preferences addressed and charted on white board:  Yes  Provider notified that patient has arrived to the unit: Yes

## 2025-02-03 NOTE — CONSULTS
Care Management Initial Consult    General Information  Assessment completed with: Children, VM-chart review,    Type of CM/SW Visit: Initial Assessment    Primary Care Provider verified and updated as needed: Yes   Readmission within the last 30 days:        Reason for Consult: discharge planning, other (see comments) (high risk)  Advance Care Planning: Advance Care Planning Reviewed: present on chart        Communication Assessment  Patient's communication style: spoken language (English or Bilingual)    Hearing Difficulty or Deaf: yes   Wear Glasses or Blind: yes    Cognitive  Cognitive/Neuro/Behavioral: WDL                      Living Environment:   People in home: alone     Current living Arrangements: assisted living  Name of Facility: [facility]   Able to return to prior arrangements: other (see comments) (TBD)  Living Arrangement Comments: may need TCU    Family/Social Support:  Care provided by: self  Provides care for: no one  Marital Status:   Support system: Children          Description of Support System: Involved, Supportive    Support Assessment: Adequate family and caregiver support    Current Resources:   Patient receiving home care services: No      Community Resources: None  Equipment currently used at home: wheelchair, manual, wheelchair, power, walker, standard  Supplies currently used at home: Hearing Aid Batteries    Employment/Financial:  Employment Status: retired        Financial Concerns: none   Referral to Financial Worker: No     Does the patient's insurance plan have a 3 day qualifying hospital stay waiver?  No    Lifestyle & Psychosocial Needs:  Social Drivers of Health     Food Insecurity: Low Risk  (2/3/2025)    Food Insecurity     Within the past 12 months, did you worry that your food would run out before you got money to buy more?: No     Within the past 12 months, did the food you bought just not last and you didn t have money to get more?: No   Depression: Not at risk  (2/28/2024)    PHQ-2     PHQ-2 Score: 0   Housing Stability: Low Risk  (2/3/2025)    Housing Stability     Do you have housing? : Yes     Are you worried about losing your housing?: No   Tobacco Use: Medium Risk (2/28/2024)    Patient History     Smoking Tobacco Use: Former     Smokeless Tobacco Use: Never     Passive Exposure: Not on file   Financial Resource Strain: Low Risk  (2/3/2025)    Financial Resource Strain     Within the past 12 months, have you or your family members you live with been unable to get utilities (heat, electricity) when it was really needed?: No   Alcohol Use: Not on file   Transportation Needs: Low Risk  (2/3/2025)    Transportation Needs     Within the past 12 months, has lack of transportation kept you from medical appointments, getting your medicines, non-medical meetings or appointments, work, or from getting things that you need?: No   Physical Activity: Not on file   Interpersonal Safety: Low Risk  (10/17/2024)    Interpersonal Safety     Do you feel physically and emotionally safe where you currently live?: Yes     Within the past 12 months, have you been hit, slapped, kicked or otherwise physically hurt by someone?: No     Within the past 12 months, have you been humiliated or emotionally abused in other ways by your partner or ex-partner?: No   Stress: Not on file   Social Connections: Not on file   Health Literacy: Not on file       Functional Status:  Prior to admission patient needed assistance:   Dependent ADLs:: Wheelchair-independent  Dependent IADLs:: Cleaning, Cooking, Laundry, Shopping, Meal Preparation, Medication Management, Transportation       Mental Health Status:  Mental Health Status: No Current Concerns       Chemical Dependency Status:  Chemical Dependency Status: No Current Concerns             Values/Beliefs:  Spiritual, Cultural Beliefs, Religion Practices, Values that affect care: no               Discussed  Partnership in Safe Discharge Planning  document  with patient/family: No    Additional Information:  Consult received for discharge planning and high risk score. Initial assessment completed with son, Harshad, and daughter-n-law, Toña, by phone.    Patient resides at [facility]. He is quite independent at baseline. Harshad states patient is independent with transfers, toileting, and his manual wheelchair at baseline. Patient has an electric scooter that he uses when out in the community.   [facility] staff assist with meals, medications, bathing, cleaning and laundry.    Awaiting P/T evaluation for recommendations once patient is medically cleared.    Patient must be near baseline, needing 1x assist max to return to [facility]. Family ok with sending referral to [facility] (Main Phone: [phone] Admissions Phone: [phone] Fax: [phone]) to get his name on the list in case he needs TCU. Referral sent to [facility] (Main Phone: [phone] Admissions Phone: [phone] Fax: [phone]).    Voicemail left for RN at [facility] to return call.       Next Steps:   Await recs from P/T  Await medical stability    MILES Olivarez  [facility] [phone]/ [place] [phone]  Care Management

## 2025-02-03 NOTE — ED TRIAGE NOTES
Pt previously here for influ A and now has leg/groin pain on the right side      Triage Assessment (Adult)       Row Name 02/03/25 0025          Triage Assessment    Airway WDL WDL        Respiratory WDL    Respiratory WDL X;cough;rhythm/pattern     Rhythm/Pattern, Respiratory shortness of breath     Cough Frequency frequent     Cough Type productive        Skin Circulation/Temperature WDL    Skin Circulation/Temperature WDL X;circulation     Skin Circulation pallor        Cardiac WDL    Cardiac WDL WDL        Peripheral/Neurovascular WDL    Peripheral Neurovascular WDL WDL        Cognitive/Neuro/Behavioral WDL    Cognitive/Neuro/Behavioral WDL WDL

## 2025-02-04 ENCOUNTER — APPOINTMENT (OUTPATIENT)
Dept: CT IMAGING | Facility: CLINIC | Age: OVER 89
DRG: 813 | End: 2025-02-04
Attending: INTERNAL MEDICINE
Payer: COMMERCIAL

## 2025-02-04 PROBLEM — I10 ESSENTIAL HYPERTENSION, BENIGN: Status: ACTIVE | Noted: 2025-02-04

## 2025-02-04 PROBLEM — D69.6 THROMBOCYTOPENIA, UNSPECIFIED: Status: ACTIVE | Noted: 2025-02-04

## 2025-02-04 PROBLEM — Z87.891 PERSONAL HISTORY OF TOBACCO USE, PRESENTING HAZARDS TO HEALTH: Status: ACTIVE | Noted: 2025-02-04

## 2025-02-04 PROBLEM — T50.905A MEDICATION INDUCED COAGULOPATHY: Status: ACTIVE | Noted: 2025-02-04

## 2025-02-04 PROBLEM — Z79.01 LONG TERM (CURRENT) USE OF ANTICOAGULANTS: Status: ACTIVE | Noted: 2025-02-04

## 2025-02-04 PROBLEM — D68.9 MEDICATION INDUCED COAGULOPATHY: Status: ACTIVE | Noted: 2025-02-04

## 2025-02-04 LAB
ANION GAP SERPL CALCULATED.3IONS-SCNC: 9 MMOL/L (ref 7–15)
BUN SERPL-MCNC: 17.9 MG/DL (ref 8–23)
CALCIUM SERPL-MCNC: 8.9 MG/DL (ref 8.8–10.4)
CHLORIDE SERPL-SCNC: 101 MMOL/L (ref 98–107)
CREAT SERPL-MCNC: 1.01 MG/DL (ref 0.67–1.17)
EGFRCR SERPLBLD CKD-EPI 2021: 68 ML/MIN/1.73M2
ERYTHROCYTE [DISTWIDTH] IN BLOOD BY AUTOMATED COUNT: 14.4 % (ref 10–15)
GLUCOSE SERPL-MCNC: 96 MG/DL (ref 70–99)
HCO3 SERPL-SCNC: 26 MMOL/L (ref 22–29)
HCT VFR BLD AUTO: 28.8 % (ref 40–53)
HGB BLD-MCNC: 9.5 G/DL (ref 13.3–17.7)
INR PPP: 1.55 (ref 0.85–1.15)
MCH RBC QN AUTO: 30.9 PG (ref 26.5–33)
MCHC RBC AUTO-ENTMCNC: 33 G/DL (ref 31.5–36.5)
MCV RBC AUTO: 94 FL (ref 78–100)
PLATELET # BLD AUTO: 149 10E3/UL (ref 150–450)
POTASSIUM SERPL-SCNC: 3.9 MMOL/L (ref 3.4–5.3)
RBC # BLD AUTO: 3.07 10E6/UL (ref 4.4–5.9)
SODIUM SERPL-SCNC: 136 MMOL/L (ref 135–145)
WBC # BLD AUTO: 8.5 10E3/UL (ref 4–11)

## 2025-02-04 PROCEDURE — 250N000013 HC RX MED GY IP 250 OP 250 PS 637: Performed by: INTERNAL MEDICINE

## 2025-02-04 PROCEDURE — 82565 ASSAY OF CREATININE: CPT | Performed by: INTERNAL MEDICINE

## 2025-02-04 PROCEDURE — 73701 CT LOWER EXTREMITY W/DYE: CPT | Mod: RT

## 2025-02-04 PROCEDURE — 85610 PROTHROMBIN TIME: CPT | Performed by: INTERNAL MEDICINE

## 2025-02-04 PROCEDURE — 250N000011 HC RX IP 250 OP 636: Performed by: INTERNAL MEDICINE

## 2025-02-04 PROCEDURE — 85014 HEMATOCRIT: CPT | Performed by: INTERNAL MEDICINE

## 2025-02-04 PROCEDURE — 120N000001 HC R&B MED SURG/OB

## 2025-02-04 PROCEDURE — 99233 SBSQ HOSP IP/OBS HIGH 50: CPT | Performed by: INTERNAL MEDICINE

## 2025-02-04 PROCEDURE — G0378 HOSPITAL OBSERVATION PER HR: HCPCS

## 2025-02-04 PROCEDURE — 36415 COLL VENOUS BLD VENIPUNCTURE: CPT | Performed by: INTERNAL MEDICINE

## 2025-02-04 PROCEDURE — 250N000009 HC RX 250: Performed by: INTERNAL MEDICINE

## 2025-02-04 RX ORDER — IOPAMIDOL 755 MG/ML
500 INJECTION, SOLUTION INTRAVASCULAR ONCE
Status: COMPLETED | OUTPATIENT
Start: 2025-02-04 | End: 2025-02-04

## 2025-02-04 RX ORDER — NICOTINE POLACRILEX 2 MG
LOZENGE BUCCAL
Status: ON HOLD | COMMUNITY
Start: 2024-11-05 | End: 2025-02-04

## 2025-02-04 RX ADMIN — FUROSEMIDE 20 MG: 20 TABLET ORAL at 09:05

## 2025-02-04 RX ADMIN — LISINOPRIL 10 MG: 10 TABLET ORAL at 09:04

## 2025-02-04 RX ADMIN — GABAPENTIN 200 MG: 100 CAPSULE ORAL at 09:04

## 2025-02-04 RX ADMIN — SODIUM CHLORIDE 77 ML: 9 INJECTION, SOLUTION INTRAVENOUS at 05:37

## 2025-02-04 RX ADMIN — SIMVASTATIN 40 MG: 40 TABLET, FILM COATED ORAL at 21:53

## 2025-02-04 RX ADMIN — METOPROLOL SUCCINATE 50 MG: 50 TABLET, EXTENDED RELEASE ORAL at 09:04

## 2025-02-04 RX ADMIN — TAMSULOSIN HYDROCHLORIDE 0.4 MG: 0.4 CAPSULE ORAL at 09:05

## 2025-02-04 RX ADMIN — GABAPENTIN 200 MG: 100 CAPSULE ORAL at 21:53

## 2025-02-04 RX ADMIN — IOPAMIDOL 90 ML: 755 INJECTION, SOLUTION INTRAVENOUS at 05:36

## 2025-02-04 RX ADMIN — SENNOSIDES AND DOCUSATE SODIUM 1 TABLET: 50; 8.6 TABLET ORAL at 21:53

## 2025-02-04 RX ADMIN — SENNOSIDES AND DOCUSATE SODIUM 1 TABLET: 50; 8.6 TABLET ORAL at 09:04

## 2025-02-04 ASSESSMENT — ACTIVITIES OF DAILY LIVING (ADL)
ADLS_ACUITY_SCORE: 67
ADLS_ACUITY_SCORE: 72
ADLS_ACUITY_SCORE: 67
ADLS_ACUITY_SCORE: 72
ADLS_ACUITY_SCORE: 67
ADLS_ACUITY_SCORE: 72
ADLS_ACUITY_SCORE: 67
ADLS_ACUITY_SCORE: 72
ADLS_ACUITY_SCORE: 67
ADLS_ACUITY_SCORE: 68
ADLS_ACUITY_SCORE: 72
ADLS_ACUITY_SCORE: 67
ADLS_ACUITY_SCORE: 67
ADLS_ACUITY_SCORE: 72

## 2025-02-04 NOTE — PROGRESS NOTES
PRIMARY DIAGNOSIS: GENERALIZED WEAKNESS    OUTPATIENT/OBSERVATION GOALS TO BE MET BEFORE DISCHARGE  1. Orthostatic performed: N/A    2. Tolerating PO medications: Yes    3. Return to near baseline physical activity: No, on bed rest per order, has hematoma on right hip/thigh. Turn and reposition. Ice applied several times overnight.     4. Cleared for discharge by consultants (if involved): No, plan for repeat CT scan this a.m.     Discharge Planner Nurse   Safe discharge environment identified: No  Barriers to discharge: Yes       Entered by: Carine Rangel RN 02/04/2025 7:46 AM     Please review provider order for any additional goals.   Nurse to notify provider when observation goals have been met and patient is ready for discharge.

## 2025-02-04 NOTE — PROGRESS NOTES
PRIMARY DIAGNOSIS: GENERALIZED WEAKNESS    OUTPATIENT/OBSERVATION GOALS TO BE MET BEFORE DISCHARGE  1. Orthostatic performed: N/A    2. Tolerating PO medications: Yes    3. Return to near baseline physical activity: No, patient bedrest, has hematoma on right hip/thigh. Reports no pain, accepted ice pack on/off.     4. Cleared for discharge by consultants (if involved): No    Discharge Planner Nurse   Safe discharge environment identified: No  Barriers to discharge: Yes       Entered by: Carine Rangel RN 02/04/2025 3:17 AM     Please review provider order for any additional goals.   Nurse to notify provider when observation goals have been met and patient is ready for discharge.

## 2025-02-04 NOTE — MEDICATION SCRIBE - ADMISSION MEDICATION HISTORY
Medication Scribe Admission Medication History    Admission medication history is complete. The information provided in this note is only as accurate as the sources available at the time of the update.    Information Source(s): Facility ([facility] medication list/MAR via with nurse Celestina from [facility] by cell phone [phone].    Pertinent Information: Medications are taken care of for him.  Warfarin is due at 5 PM daily.  Doses were held on 1/31/25 to 2/2/25 due to INR reading.  Albuterol is a new order and has not been used yet.  Prednisone taper puts him at starting 5 mg dose on 2/5/25.    Changes made to PTA medication list:  Added: None  Deleted:   Commode, walker, side rails for bed  Warfarin 5 mg tab  Changed: None    Allergies reviewed with patient's nurse at facility and updates made in EHR: yes, no allergies.    Medication History Completed By: Marley Benjamin 2/4/2025 2:07 PM    PTA Med List   Medication Sig Note Last Dose/Taking    acetaminophen (TYLENOL) 500 MG tablet Take 2 tablets (1,000 mg) by mouth every 8 hours as needed for mild pain  2/2/2025 at 11:37 PM    albuterol (VENTOLIN HFA) 108 (90 Base) MCG/ACT inhaler Inhale 2 puffs into the lungs every 6 hours as needed for shortness of breath, wheezing or cough. 2/4/2025: New order, not started yet. Taking As Needed    dextromethorphan-guaiFENesin (MUCINEX DM)  MG 12 hr tablet Take 2 tablets by mouth every 12 hours for 7 days.  2/2/2025 at  6:43 PM    furosemide (LASIX) 20 MG tablet Take 1 tablet (20 mg) by mouth daily.  2/2/2025 at  5:13 AM    gabapentin (NEURONTIN) 100 MG capsule Take 2 capsules (200 mg) by mouth 2 times daily.  2/2/2025 at  6:43 PM    ketotifen fumarate 0.035%, ketotifen 0.025%, (ZADITOR) 0.025 % ophthalmic solution Place 1 drop into both eyes 2 times daily as needed for dry eyes Patient self-administers  More than a month    lisinopril (ZESTRIL) 10 MG tablet Take 1 tablet (10 mg) by mouth daily.  2/2/2025 at  5:13 AM     loratadine (CLARITIN) 10 MG tablet Take 1 tablet (10 mg) by mouth daily  2/2/2025 at  5:13 AM    metoprolol succinate ER (TOPROL XL) 50 MG 24 hr tablet Take 1 tablet (50 mg) by mouth daily.  2/2/2025 at  5:13 AM    polyethylene glycol (MIRALAX) 17 GM/Dose powder Take 9 g by mouth daily as needed for constipation Mix and hand to patient, Patient self-administers (he knows how much he needs to keep regular)  More than a month    predniSONE (DELTASONE) 5 MG tablet Take by mouth daily. 20 mg x 3 days then 10 mg x 3 days then 5 mg x 3 days 9 day course 2/4/2025: Should start on 5 mg dose 2/5/2025 2/2/2025 Morning    psyllium (METAMUCIL/KONSYL) 58.6 % powder Take 18 g (1 Tablespoonful) by mouth daily Mix and hand to patient, Patient self-administers (he knows how much he needs to keep regular)  1/31/2025 at  5:39 AM    simvastatin (ZOCOR) 40 MG tablet Take 1 tablet (40 mg) by mouth at bedtime  2/2/2025 at  6:43 PM    sodium chloride (OCEAN) 0.65 % nasal spray Spray 2 sprays in nostril daily as needed for congestion Patient self-administers  More than a month    tamsulosin (FLOMAX) 0.4 MG capsule Take 1 capsule (0.4 mg) by mouth daily  2/2/2025 at  5:13 AM    Vitamin D3 (CHOLECALCIFEROL) 25 mcg (1000 units) tablet Take 1 tablet (25 mcg) by mouth daily  2/2/2025 at  5:13 AM    warfarin ANTICOAGULANT (COUMADIN) 2.5 MG tablet Take 2.5 mgs mg Sunday, Tuesday and Thursday, and 5 mgs all other days.  Recheck INR on 1/31/25 2/4/2025: Held on 1/31 to 2/2 due to INR level.  Takes at 5 pm daily if taking it. Taking

## 2025-02-04 NOTE — PROGRESS NOTES
PRIMARY DIAGNOSIS: GENERALIZED WEAKNESS    OUTPATIENT/OBSERVATION GOALS TO BE MET BEFORE DISCHARGE  1. Orthostatic performed: No    2. Tolerating PO medications: Yes    3. Return to near baseline physical activity: No    4. Cleared for discharge by consultants (if involved): No    Discharge Planner Nurse   Safe discharge environment identified: Yes  Barriers to discharge: Yes       Entered by: Carine Rangel RN 02/04/2025 7:48 AM     Please review provider order for any additional goals.   Nurse to notify provider when observation goals have been met and patient is ready for discharge.

## 2025-02-04 NOTE — PROGRESS NOTES
PRIMARY DIAGNOSIS: GENERALIZED WEAKNESS, right thigh hematoma.    OUTPATIENT/OBSERVATION GOALS TO BE MET BEFORE DISCHARGE  1. Orthostatic performed: N/A    2. Tolerating PO medications: Yes    3. Return to near baseline physical activity: No, remains bedrest, repositioned every 2hrs. Right upper thigh ace wrapped, intact, ice pack 20minutes on/off.    4. Cleared for discharge by consultants (if involved): No    Discharge Planner Nurse   Safe discharge environment identified: Yes  Barriers to discharge: No       Entered by: Micaela Gonzalez RN 02/03/2025 8:20 PM     Please review provider order for any additional goals.   Nurse to notify provider when observation goals have been met and patient is ready for discharge.

## 2025-02-04 NOTE — PROGRESS NOTES
[facility]    PROGRESS NOTE - Hospitalist Service    Assessment and Plan    Principal Problem:    Psoas hematoma, right, secondary to anticoagulant therapy  Active Problems:    Weakness generalized    Influenza A    Supratherapeutic INR    Nontraumatic intramuscular hematoma    Thrombocytopenia, unspecified    Essential hypertension, benign    Personal history of tobacco use, presenting hazards to health    Long term (current) use of anticoagulants    Medication induced coagulopathy    Melvin Abad is a 89 year old male with   right groin and was found to have hematomas.      Hematomas in right iliopsoas muscle   Supra-therapeutic INR  He presented right groin pain and ecchymosis in the area and imaging showing two small hematomas. His INR is supra-therapeutic at 5.9, although it was even higher on 1/31/2025 (8.3). He was given vitamin K in the ED.   - Hgb did drop slightly but vitals stable   - INR subtherapeutic   - continue holding warfarin for now  - pain is improving  - keep on bedrest overnight and start therapy tomorrow  - personally reviewed CT findings and updated patient and son  - continue ice and compression      Influenza A  COPD  He was positive for influenza A positive from [facility]    - droplet precautions for now  - albuterol nebulizer  - breathing stable hold on steroids and abx for now      History of CVA  - resume home meds   - possible PT in am if Hgb stable and minimal pain       Atrial Fibrillation  - metoprolol with hold parameters   - hold warfarin for now     HFpEF compensated   - continue furosemide     Hypertension  - lisinopril, lasix and metoprolol with hold parameters      BPH  - tamsulosin     Changed to inpatient today per UR    Clinically Significant Risk Factors Present on Admission                # Drug Induced Coagulation Defect: home medication list includes an anticoagulant medication    # Hypertension: Noted on problem list      # Anemia:  "based on hgb <11       # Overweight: Estimated body mass index is 29.84 kg/m  as calculated from the following:    Height as of this encounter: 1.778 m (5' 10\").    Weight as of this encounter: 94.3 kg (208 lb).         # Financial/Environmental Concerns: none           COVID-19 PCR Results          10/15/2024    06:17   COVID-19 PCR Results   SARS CoV2 PCR Positive      COVID-19 Antibody Results, Testing for Immunity           No data to display               Code Status: No CPR- Do NOT Intubate  VTE prophylaxis:  holding meds for now and SCD  DIET: Orders Placed This Encounter      Combination Diet Low Saturated Fat Na <2400mg Diet, No Caffeine Diet    Drains/Lines: Patient has No line.   Ulloa Catheter: Not present    Weight bearing status: bed rest today and start activity tomorrow      Expected Discharge Date: 02/05/2025      Destination: inpatient rehabilitation facility  Discharge Comments: Reimaging shows lsightly increased hematoma and keepign on bedrest another day and start therapy tomorrow    Medically Ready for Discharge: Anticipated Tomorrow      Subjective:  Pain significantly improved. Updated patient and son in regards to CT imaging and holding on therapy today and keep on bed rest additional day    PHYSICAL EXAM  Temp:  [97.5  F (36.4  C)-98.2  F (36.8  C)] 97.9  F (36.6  C)  Pulse:  [65-88] 73  Resp:  [20-24] 22  BP: ()/(51-84) 115/57  SpO2:  [92 %-96 %] 94 %  Wt Readings from Last 1 Encounters:   02/03/25 94.3 kg (208 lb)       Intake/Output Summary (Last 24 hours) at 2/4/2025 1349  Last data filed at 2/4/2025 1240  Gross per 24 hour   Intake 480 ml   Output 2575 ml   Net -2095 ml      Body mass index is 29.84 kg/m .    Physical Exam  Vitals and nursing note reviewed.   Constitutional:       Comments: Chronically Ill-appearing, but nontoxic and NAD   HENT:      Head: Normocephalic and atraumatic.   Cardiovascular:      Rate and Rhythm: Normal rate. Rhythm irregular.      Pulses: Normal " pulses.      Comments: RUSSELL  Pulmonary:      Effort: Pulmonary effort is normal. No respiratory distress.      Breath sounds: Normal breath sounds. No wheezing or rales.   Abdominal:      General: Bowel sounds are normal. There is no distension.      Palpations: Abdomen is soft.      Tenderness: There is no abdominal tenderness. There is no guarding.   Musculoskeletal:      Comments: Right hip and lower back/pelvic pain   Skin:     General: Skin is warm and dry.      Capillary Refill: Capillary refill takes less than 2 seconds.   Neurological:      Mental Status: He is alert and oriented to person, place, and time. Mental status is at baseline.      Comments: [facility]       PERTINENT LABS/IMAGING:      Imaging results reviewed over the past 24 hrs:   Recent Results (from the past 24 hours)   CT Femur Thigh Right w Contrast    Narrative    EXAM: CT FEMUR THIGH RIGHT W CONTRAST  LOCATION: [facility]  DATE: 2/4/2025    INDICATION: Right iliopsoas fluid collection.  COMPARISON: 2/3/2025  TECHNIQUE: IV contrast. Axial, sagittal and coronal thin-section reconstruction. Dose reduction techniques were used.   CONTRAST: 90mL Isovue 370    FINDINGS:     BONES:  -Anatomic alignment right femur. No acute displaced right femur fracture. No femoral head AVN on CT. 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 Diffuse bone demineralization. Mild degenerative change right hip. Mild arthritis right SI   joint.    SOFT TISSUES:  -Persistent and likely slightly progressive enlargement and swelling within and about the right iliopsoas muscle. Small fluid collections within the inferior iliopsoas are similar in size to prior. Some interval increased attenuation within the larger   collection since the prior, possibly blood products. Edema or inflammation is seen in the right pelvis along the right iliacus musculature. Presacral soft tissue edema or inflammation persists. Prominent prostate gland with  internal calcifications,   similar to prior. No bulky adenopathy. Progressive medial proximal right thigh soft tissue edema. Remaining right thigh muscle bulk is unchanged. Small right knee joint effusion. No appreciable soft tissue gas in the right hip or thigh.      Impression    IMPRESSION:  1.  Slight interval enlargement of an edematous right iliopsoas and iliacus muscle with similar-sized small fluid collections in the inferior iliopsoas musculature near the distal iliopsoas tendon attachment. Findings are compatible with an evolving   iliopsoas hematoma. Infection should be excluded clinically.  2.  Progressive medial proximal right thigh soft tissue swelling.  3.  No evidence for right femur fracture or right femur joint malalignment.  4.  Mild right hip osteoarthritis. Postop right total knee arthroplasty.               Most Recent 3 CBC's:  Recent Labs   Lab Test 02/04/25  0520 02/03/25  0533 02/03/25  0120   WBC 8.5 9.8 10.6   HGB 9.5* 10.1* 10.5*   MCV 94 94 94   * 145* 140*     Most Recent 3 BMP's:  Recent Labs   Lab Test 02/04/25  0520 02/03/25  0533 02/03/25  0120    135 135   POTASSIUM 3.9 4.1 4.2   CHLORIDE 101 101 100   CO2 26 24 23   BUN 17.9 19.2 20.1   CR 1.01 0.92 0.95   ANIONGAP 9 10 12   DARIUS 8.9 8.7* 8.7*   GLC 96 122* 120*     Most Recent 2 LFT's:  Recent Labs   Lab Test 02/03/25  0120 02/02/25  1258   AST 24 27   ALT 13 14   ALKPHOS 97 106   BILITOTAL 1.3* 1.2     Most Recent 3 INR's:  Recent Labs   Lab Test 02/04/25  0520 02/03/25  1352 02/03/25  0532   INR 1.55* 2.15* 3.82*     Most Recent 3 BNP's:  Recent Labs   Lab Test 09/28/20  1043   NTBNPI 1,622     Most Recent D-dimer:No lab results found.  Most Recent 6 Bacteria Isolates From Any Culture (See EPIC Reports for Culture Details):No lab results found.  Most Recent 6 glucoses:  Recent Labs   Lab Test 02/04/25  0520 02/03/25  0533 02/03/25  0120 02/02/25  1258 10/23/24  0730 10/18/24  0540   GLC 96 122* 120* 137* 95 79          50 MINUTES SPENT BY ME on the date of service doing chart review, history, exam, documentation, discussion with nursing staff and specialist, & further activities per the note.  Elisha Guerrero MD  [facility] [place] [facility]

## 2025-02-05 ENCOUNTER — APPOINTMENT (OUTPATIENT)
Dept: PHYSICAL THERAPY | Facility: CLINIC | Age: OVER 89
DRG: 813 | End: 2025-02-05
Attending: INTERNAL MEDICINE
Payer: COMMERCIAL

## 2025-02-05 LAB
ALBUMIN UR-MCNC: NEGATIVE MG/DL
ANION GAP SERPL CALCULATED.3IONS-SCNC: 11 MMOL/L (ref 7–15)
APPEARANCE UR: CLEAR
BILIRUB UR QL STRIP: NEGATIVE
BUN SERPL-MCNC: 18.2 MG/DL (ref 8–23)
CALCIUM SERPL-MCNC: 8.6 MG/DL (ref 8.8–10.4)
CHLORIDE SERPL-SCNC: 102 MMOL/L (ref 98–107)
COLOR UR AUTO: ABNORMAL
CREAT SERPL-MCNC: 0.92 MG/DL (ref 0.67–1.17)
EGFRCR SERPLBLD CKD-EPI 2021: 76 ML/MIN/1.73M2
ERYTHROCYTE [DISTWIDTH] IN BLOOD BY AUTOMATED COUNT: 14.4 % (ref 10–15)
GLUCOSE SERPL-MCNC: 115 MG/DL (ref 70–99)
GLUCOSE UR STRIP-MCNC: NEGATIVE MG/DL
HCO3 SERPL-SCNC: 26 MMOL/L (ref 22–29)
HCT VFR BLD AUTO: 29.9 % (ref 40–53)
HGB BLD-MCNC: 9.8 G/DL (ref 13.3–17.7)
HGB UR QL STRIP: NEGATIVE
HYALINE CASTS: 3 /LPF
INR PPP: 1.52 (ref 0.85–1.15)
KETONES UR STRIP-MCNC: NEGATIVE MG/DL
LEUKOCYTE ESTERASE UR QL STRIP: NEGATIVE
MCH RBC QN AUTO: 30.7 PG (ref 26.5–33)
MCHC RBC AUTO-ENTMCNC: 32.8 G/DL (ref 31.5–36.5)
MCV RBC AUTO: 94 FL (ref 78–100)
NITRATE UR QL: NEGATIVE
PH UR STRIP: 7 [PH] (ref 5–7)
PLATELET # BLD AUTO: 158 10E3/UL (ref 150–450)
POTASSIUM SERPL-SCNC: 4.2 MMOL/L (ref 3.4–5.3)
RBC # BLD AUTO: 3.19 10E6/UL (ref 4.4–5.9)
RBC URINE: <1 /HPF
SODIUM SERPL-SCNC: 139 MMOL/L (ref 135–145)
SP GR UR STRIP: 1.01 (ref 1–1.03)
UROBILINOGEN UR STRIP-MCNC: NORMAL MG/DL
WBC # BLD AUTO: 9.1 10E3/UL (ref 4–11)
WBC URINE: 1 /HPF

## 2025-02-05 PROCEDURE — 85041 AUTOMATED RBC COUNT: CPT | Performed by: INTERNAL MEDICINE

## 2025-02-05 PROCEDURE — 85610 PROTHROMBIN TIME: CPT | Performed by: INTERNAL MEDICINE

## 2025-02-05 PROCEDURE — 999N000111 HC STATISTIC OT IP EVAL DEFER

## 2025-02-05 PROCEDURE — 36415 COLL VENOUS BLD VENIPUNCTURE: CPT | Performed by: INTERNAL MEDICINE

## 2025-02-05 PROCEDURE — 250N000013 HC RX MED GY IP 250 OP 250 PS 637: Performed by: INTERNAL MEDICINE

## 2025-02-05 PROCEDURE — 97161 PT EVAL LOW COMPLEX 20 MIN: CPT | Mod: GP | Performed by: PHYSICAL THERAPIST

## 2025-02-05 PROCEDURE — 81001 URINALYSIS AUTO W/SCOPE: CPT | Performed by: INTERNAL MEDICINE

## 2025-02-05 PROCEDURE — 99232 SBSQ HOSP IP/OBS MODERATE 35: CPT | Performed by: INTERNAL MEDICINE

## 2025-02-05 PROCEDURE — 120N000001 HC R&B MED SURG/OB

## 2025-02-05 PROCEDURE — 82310 ASSAY OF CALCIUM: CPT | Performed by: INTERNAL MEDICINE

## 2025-02-05 RX ADMIN — TAMSULOSIN HYDROCHLORIDE 0.4 MG: 0.4 CAPSULE ORAL at 08:23

## 2025-02-05 RX ADMIN — GABAPENTIN 200 MG: 100 CAPSULE ORAL at 20:13

## 2025-02-05 RX ADMIN — FUROSEMIDE 20 MG: 20 TABLET ORAL at 08:23

## 2025-02-05 RX ADMIN — GABAPENTIN 200 MG: 100 CAPSULE ORAL at 08:23

## 2025-02-05 RX ADMIN — SENNOSIDES AND DOCUSATE SODIUM 2 TABLET: 50; 8.6 TABLET ORAL at 08:23

## 2025-02-05 RX ADMIN — SENNOSIDES AND DOCUSATE SODIUM 1 TABLET: 50; 8.6 TABLET ORAL at 20:13

## 2025-02-05 RX ADMIN — METOPROLOL SUCCINATE 50 MG: 50 TABLET, EXTENDED RELEASE ORAL at 08:23

## 2025-02-05 RX ADMIN — OXYCODONE HYDROCHLORIDE 2.5 MG: 5 TABLET ORAL at 14:32

## 2025-02-05 RX ADMIN — SIMVASTATIN 40 MG: 40 TABLET, FILM COATED ORAL at 20:13

## 2025-02-05 RX ADMIN — ACETAMINOPHEN 650 MG: 325 TABLET, FILM COATED ORAL at 14:32

## 2025-02-05 RX ADMIN — LISINOPRIL 10 MG: 10 TABLET ORAL at 08:23

## 2025-02-05 RX ADMIN — ACETAMINOPHEN 650 MG: 325 TABLET, FILM COATED ORAL at 03:13

## 2025-02-05 ASSESSMENT — ACTIVITIES OF DAILY LIVING (ADL)
ADLS_ACUITY_SCORE: 67
ADLS_ACUITY_SCORE: 69
ADLS_ACUITY_SCORE: 72
ADLS_ACUITY_SCORE: 69
ADLS_ACUITY_SCORE: 74
ADLS_ACUITY_SCORE: 78
ADLS_ACUITY_SCORE: 80
ADLS_ACUITY_SCORE: 70
ADLS_ACUITY_SCORE: 69
ADLS_ACUITY_SCORE: 72
ADLS_ACUITY_SCORE: 72
ADLS_ACUITY_SCORE: 74
ADLS_ACUITY_SCORE: 69
ADLS_ACUITY_SCORE: 69
ADLS_ACUITY_SCORE: 74
ADLS_ACUITY_SCORE: 70
ADLS_ACUITY_SCORE: 80
ADLS_ACUITY_SCORE: 69
ADLS_ACUITY_SCORE: 69
ADLS_ACUITY_SCORE: 72
ADLS_ACUITY_SCORE: 74
ADLS_ACUITY_SCORE: 80
ADLS_ACUITY_SCORE: 74

## 2025-02-05 NOTE — PROGRESS NOTES
[facility]    PROGRESS NOTE - Hospitalist Service    Assessment and Plan    Principal Problem:    Psoas hematoma, right, secondary to anticoagulant therapy  Active Problems:    Weakness generalized    Influenza A    Supratherapeutic INR    Nontraumatic intramuscular hematoma    Thrombocytopenia, unspecified    Essential hypertension, benign    Personal history of tobacco use, presenting hazards to health    Long term (current) use of anticoagulants    Medication induced coagulopathy    Melvin Abad is a 89 year old male with   right groin and was found to have hematomas.      Hematomas in right iliopsoas muscle   Supra-therapeutic INR  He presented right groin pain and ecchymosis in the area and imaging showing two small hematomas. His INR is supra-therapeutic at 5.9, although it was even higher on 1/31/2025 (8.3). He was given vitamin K in the ED.   - Hgb did drop slightly but vitals stable and stable to drop today   - INR subtherapeutic   - continue holding warfarin for now  - pain is improving  - personally reviewed CT findings and updated patient and son  - continue ice and compression   - PT seen recs TCU  - has bed tomorrow     Influenza A  COPD  He was positive for influenza A positive from [facility]    - would stop droplet precautions since asx and was positive on 1/22  - albuterol MDI as needed  - breathing stable hold on steroids and abx for now      History of CVA  - resume home meds   - PT     Atrial Fibrillation  - metoprolol with hold parameters   - hold warfarin for now and resume tomorrow and would allow yo trend up and would avoid bridging.      HFpEF compensated   - continue furosemide     Hypertension  - lisinopril, lasix and metoprolol with hold parameters      BPH  - tamsulosin     Clinically Significant Risk Factors Present on Admission                # Drug Induced Coagulation Defect: home medication list includes an anticoagulant medication    # Hypertension:  "Noted on problem list      # Anemia: based on hgb <11       # Overweight: Estimated body mass index is 29.84 kg/m  as calculated from the following:    Height as of this encounter: 1.778 m (5' 10\").    Weight as of this encounter: 94.3 kg (208 lb).         # Financial/Environmental Concerns: none           COVID-19 PCR Results          10/15/2024    06:17   COVID-19 PCR Results   SARS CoV2 PCR Positive      COVID-19 Antibody Results, Testing for Immunity           No data to display               Code Status: No CPR- Do NOT Intubate  VTE prophylaxis:  holding meds for now and SCD  DIET: Orders Placed This Encounter      Combination Diet Low Saturated Fat Na <2400mg Diet, No Caffeine Diet    Drains/Lines: Patient has No line.   Ulloa Catheter: Not present    Weight bearing status: WBAT     Expected Discharge Date: 02/06/2025    Discharge Delays: Placement - TCU  *Early Discharge Anticipated  Lab Result Pending (enter specific test & time in comments)  Destination: inpatient rehabilitation facility  Discharge Comments: plan for TCU tomorrow    Medically Ready for Discharge: Anticipated Tomorrow      Subjective:  Minimal pain with movement but improved and starting activity today. Pain controlled    PHYSICAL EXAM  Temp:  [97.5  F (36.4  C)-98.2  F (36.8  C)] 97.9  F (36.6  C)  Pulse:  [64-87] 64  Resp:  [18-22] 20  BP: (109-159)/(57-90) 151/57  SpO2:  [91 %-97 %] 97 %  Wt Readings from Last 1 Encounters:   02/03/25 94.3 kg (208 lb)       Intake/Output Summary (Last 24 hours) at 2/4/2025 1349  Last data filed at 2/4/2025 1240  Gross per 24 hour   Intake 480 ml   Output 2575 ml   Net -2095 ml      Body mass index is 29.84 kg/m .    Physical Exam  Vitals and nursing note reviewed.   Constitutional:       Comments: Chronically Ill-appearing, but nontoxic and NAD   HENT:      Head: Normocephalic and atraumatic.   Cardiovascular:      Rate and Rhythm: Normal rate. Rhythm irregular.      Pulses: Normal pulses.      Comments: " RUSSELL  Pulmonary:      Effort: Pulmonary effort is normal. No respiratory distress.      Breath sounds: Normal breath sounds. No wheezing or rales.   Abdominal:      General: Bowel sounds are normal. There is no distension.      Palpations: Abdomen is soft.      Tenderness: There is no abdominal tenderness. There is no guarding.   Musculoskeletal:      Comments: Right hip and lower back/pelvic pain bruising draining down.    Skin:     General: Skin is warm and dry.      Capillary Refill: Capillary refill takes less than 2 seconds.   Neurological:      Mental Status: He is alert and oriented to person, place, and time. Mental status is at baseline.      Comments: [facility]       PERTINENT LABS/IMAGING:  EXAM: CT FEMUR THIGH RIGHT W CONTRAST  LOCATION: [facility]  DATE: 2/4/2025     INDICATION: Right iliopsoas fluid collection.  COMPARISON: 2/3/2025  TECHNIQUE: IV contrast. Axial, sagittal and coronal thin-section reconstruction. Dose reduction techniques were used.   CONTRAST: 90mL Isovue 370     FINDINGS:      BONES:  -Anatomic alignment right femur. No acute displaced right femur fracture. No femoral head AVN on CT. 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 Diffuse bone demineralization. Mild degenerative change right hip. Mild arthritis right SI   joint.     SOFT TISSUES:  -Persistent and likely slightly progressive enlargement and swelling within and about the right iliopsoas muscle. Small fluid collections within the inferior iliopsoas are similar in size to prior. Some interval increased attenuation within the larger   collection since the prior, possibly blood products. Edema or inflammation is seen in the right pelvis along the right iliacus musculature. Presacral soft tissue edema or inflammation persists. Prominent prostate gland with internal calcifications,   similar to prior. No bulky adenopathy. Progressive medial proximal right thigh soft tissue edema. Remaining  right thigh muscle bulk is unchanged. Small right knee joint effusion. No appreciable soft tissue gas in the right hip or thigh.                                                                      IMPRESSION:  1.  Slight interval enlargement of an edematous right iliopsoas and iliacus muscle with similar-sized small fluid collections in the inferior iliopsoas musculature near the distal iliopsoas tendon attachment. Findings are compatible with an evolving   iliopsoas hematoma. Infection should be excluded clinically.  2.  Progressive medial proximal right thigh soft tissue swelling.  3.  No evidence for right femur fracture or right femur joint malalignment.  4.  Mild right hip osteoarthritis. Postop right total knee arthroplasty.     EXAM: CT FEMUR THIGH RIGHT W CONTRAST  LOCATION: [facility]  DATE: 2/3/2025     INDICATION: right inner thigh pain   swelling bruising.  COMPARISON: CT pelvis dated 1/26/2024  TECHNIQUE: IV contrast. Axial, sagittal and coronal thin-section reconstruction. Dose reduction techniques were used.   CONTRAST: 90 mL Isovue 370     FINDINGS:      BONES:  -The bones are mildly osteopenic. The bipolar total knee prosthesis and changes from patellar resurfacing are noted, without evidence of hardware loosening or failure. No acute fracture or malalignment. Mild degenerative changes of the right hip joint   along the inferior aspect.     SOFT TISSUES:  -There is soft tissue swelling within the iliopsoas muscle in the inferior pelvis extending to the femoral attachment with adjacent soft tissue stranding. Within the swollen iliopsoas muscle there is a focal fluid collection measuring 1.7 x 2.5 x 3 cm   (image 59, series 7; image 55, series 4). A second smaller fluid collection is seen just superiorly and anteriorly measuring 1.6 x 0.5 x 1.8 cm (image 51, series 4; image 50, series 7). These fluid collections are new when compared with 1/26/2024.                                                                       IMPRESSION:  1.  Soft tissue swelling of the iliopsoas muscle probably in the hip just proximal to its femoral attachment, with less pronounced findings seen in the included inferior aspect of the pelvis. There are 2 small intramuscular fluid collections measuring   1.7 x 2.5 x 3 cm and 1.6 x 0.5 x 1.8 cm, within the muscle belly just proximal to the femoral attachment. This could reflect small intramuscular hematomas although the distribution and appearance is also suggestive of iliopsoas bursa inflammation.   Correlation with patient's physical exam is recommended.  2.  No acute fracture or malalignment of the right femur and included hip.     Imaging results reviewed over the past 24 hrs:   No results found for this or any previous visit (from the past 24 hours).    Most Recent 3 CBC's:  Recent Labs   Lab Test 02/05/25 0531 02/04/25 0520 02/03/25  0533   WBC 9.1 8.5 9.8   HGB 9.8* 9.5* 10.1*   MCV 94 94 94    149* 145*     Most Recent 3 BMP's:  Recent Labs   Lab Test 02/05/25 0531 02/04/25 0520 02/03/25  0533    136 135   POTASSIUM 4.2 3.9 4.1   CHLORIDE 102 101 101   CO2 26 26 24   BUN 18.2 17.9 19.2   CR 0.92 1.01 0.92   ANIONGAP 11 9 10   DARIUS 8.6* 8.9 8.7*   * 96 122*     Most Recent 2 LFT's:  Recent Labs   Lab Test 02/03/25  0120 02/02/25  1258   AST 24 27   ALT 13 14   ALKPHOS 97 106   BILITOTAL 1.3* 1.2     Most Recent 3 INR's:  Recent Labs   Lab Test 02/05/25 0531 02/04/25  0520 02/03/25  1352   INR 1.52* 1.55* 2.15*     Most Recent 3 BNP's:  Recent Labs   Lab Test 09/28/20  1043   NTBNPI 1,622     Most Recent D-dimer:No lab results found.  Most Recent 6 Bacteria Isolates From Any Culture (See EPIC Reports for Culture Details):No lab results found.  Most Recent 6 glucoses:  Recent Labs   Lab Test 02/05/25 0531 02/04/25 0520 02/03/25  0533 02/03/25  0120 02/02/25  1258 10/23/24  0730   * 96 122* 120* 137* 95         50  MINUTES SPENT BY ME on the date of service doing chart review, history, exam, documentation, discussion with nursing staff and specialist, & further activities per the note.  Elisha Guerrero MD  [facility] Medicine [facility]

## 2025-02-05 NOTE — PLAN OF CARE
"Goal Outcome Evaluation:      Plan of Care Reviewed With: patient    Overall Patient Progress: improving    Outcome Evaluation: Patient A&O. VSS. Denies pain. Expiratory wheezes noted. Uses IS appropriately. SCD's on. Patient resting in bed. PRN APAP given for rectal discomfort.    /67 (BP Location: Left arm)   Pulse 87   Temp 97.8  F (36.6  C) (Oral)   Resp 20   Ht 1.778 m (5' 10\")   Wt 94.3 kg (208 lb)   SpO2 (!) 91%   BMI 29.84 kg/m        "

## 2025-02-05 NOTE — PLAN OF CARE
Goal Outcome Evaluation:      Plan of Care Reviewed With: patient    Overall Patient Progress: improvingOverall Patient Progress: improving    Outcome Evaluation: patient A&O, vss, no PRN needed for pain, wheezy, using IS, SCD's on, Ice applied and ace wrap in place, bedrest, assisting patient with repositioning, DIL Toña would like an update from the provider tomorrow if possible

## 2025-02-05 NOTE — PROGRESS NOTES
02/05/25 1001   Appointment Info   Signing Clinician's Name / Credentials (PT) Diamond Rivera PT, DPT, ATC, LAT   Rehab Comments (PT) PT eval and treat strengthening, home safety, Activity order up in chair, ambulate, IS       Present no   Living Environment   People in Home facility resident   Current Living Arrangements assisted living   Home Accessibility no concerns   Living Environment Comments [facility]- IND mobility at baseline for pivot transfers, using manual wheelchair for mobility, electric scooter in community   Self-Care   Usual Activity Tolerance fair   Current Activity Tolerance poor   Regular Exercise No   Equipment Currently Used at Home wheelchair, manual;wheelchair, power;walker, standard;grab bar, toilet;grab bar, tub/shower;shower chair   Fall history within last six months no   Activity/Exercise/Self-Care Comment [facility] staff assist with meals, medications, bathing, cleaning and laundry.   General Information   Onset of Illness/Injury or Date of Surgery 02/03/25   Referring Physician Elisha Guerrero MD   Pertinent History of Current Problem (include personal factors and/or comorbidities that impact the POC) Patient is a 89 year old male, admitted from the ED due to right groin pain found ot have two small hematomas of the right iliopsoas with supra-therapeutic INR. Patient found to have influenza A. Patient with a previous medical history of COPD, CVA, AFib, CHF, HTN.   Existing Precautions/Restrictions fall   Weight-Bearing Status - LUE full weight-bearing   Weight-Bearing Status - RUE full weight-bearing   Weight-Bearing Status - LLE full weight-bearing   Weight-Bearing Status - RLE weight-bearing as tolerated   General Observations on room air, IV saline locked, up in recliner, fearful of all mobility   Cognition   Affect/Mental Status (Cognition) anxious;flat/blunted affect   Orientation Status (Cognition) oriented to;person;place;situation   Follows  Commands (Cognition) WFL   Pain Assessment   Patient Currently in Pain Yes, see Vital Sign flowsheet   Posture    Posture Forward head position;Protracted shoulders;Kyphosis   Range of Motion (ROM)   ROM Comment limited end ranges throughout   Strength (Manual Muscle Testing)   Strength Comments weak knee extension bilaterally, right worse than left. functionally weak hip and knee extension   Bed Mobility   Comment, (Bed Mobility) declined to complete at this time   Transfers   Transfers sit-stand transfer;bed-chair transfer   Bed-Chair Transfer   Assistive Device (Bed-Chair Transfers) lift device  (jero stedy)   Bed-Chair Snyder (Transfers) maximum assist (25% patient effort);2 person assist   Sit-Stand Transfer   Sit-Stand Snyder (Transfers) dependent (less than 25% patient effort)   Assistive Device (Sit-Stand Transfers) walker, front-wheeled   Comment, (Sit-Stand Transfer) Assist of 2 with jero stedy   Gait/Stairs (Locomotion)   Comment, (Gait/Stairs) unable to progress   Balance   Balance Comments IND sitting balance. standing balance   Coordination   Coordination Comments at baseline   Clinical Impression   Criteria for Skilled Therapeutic Intervention Yes, treatment indicated   PT Diagnosis (PT) impaired mobility, weakness   Influenced by the following impairments acute bedrest increased weakness on baseline debility   Functional limitations due to impairments dependent for transfers and mobility, unable to complete baseline transfer   Clinical Presentation (PT Evaluation Complexity) stable   Clinical Presentation Rationale clinical jugdement, medical status   Clinical Decision Making (Complexity) low complexity   Planned Therapy Interventions (PT) balance training;bed mobility training;cryotherapy;gait training;home exercise program;patient/family education;stair training;ROM (range of motion);strengthening;transfer training;progressive activity/exercise;home program guidelines   Risk & Benefits  of therapy have been explained evaluation/treatment results reviewed;participants voiced agreement with care plan;participants included;patient   Clinical Impression Comments Patient is below his functional baseline, unable to complete his baseline transfer and requiring assist of 2 with jero stedy device for transfers. He would benefit from TCU placement in order to progress to his baseline functional mobility prior to returning to his [facility].   PT Total Evaluation Time   PT Eval, Low Complexity Minutes (82169) 25   Physical Therapy Goals   PT Frequency 5x/week   PT Predicted Duration/Target Date for Goal Attainment 02/12/25   PT Goals Bed Mobility;Transfers   PT: Bed Mobility Independent;Supine to/from sit   PT: Transfers Modified independent;Bed to/from chair;Assistive device   PT Discharge Planning   PT Plan 1/5 Wed. transfers, HEP   PT Discharge Recommendation (DC Rec) Transitional Care Facility   PT Rationale for DC Rec Patient form [facility], walker for pivot transfer into wheelchair at baseline, facility able to support A1 for transfers. Patient is below his functional baseline, unable to complete his baseline transfer and requiring assist of 2 with jero stedy device for transfers. He would benefit from TCU placement in order to progress to his baseline functional mobility prior to returning to his [facility].   PT Brief overview of current status MAX assist x 2 sit to stand with jero stedy. dependent transfer to commode   PT Total Distance Amb During Session (feet) 0   Physical Therapy Time and Intention   Total Session Time (sum of timed and untimed services) 25     Thank you for your referral.  Diamond Rivera, PT, DPT, ATC, LAT    [facility]  O: [phone]  E: [email or web address]

## 2025-02-05 NOTE — PROGRESS NOTES
S-(situation): Patient changed to inpatient status    B-(background): Patient status change due to observation goals not being met.     A-(assessment): Some interval increased attenuation within the larger   collection since the prior CT    R-(recommendations):  . Will monitor patient per MD orders.       Inpatient nursing criteria listed below were met:    Adult Profile completedYes  Health care directives status obtained and documented: Yes  VTE prophylaxis orders: SCD's  (FYI: Asprin is not an approved anticoagulation for DVT prophylaxis)  SCD's Documented: Yes  Vaccine assessment done and vaccine ordered if needed. Yes  My Chart patient sign up addressed and documented: Yes  Care Plan initiated: Yes  Discharge planning review completed (admission navigator) Yes

## 2025-02-05 NOTE — PLAN OF CARE
Occupational Therapy: Orders received. Chart reviewed and discussed with care team.? Occupational Therapy not indicated due to no IP OT needs.? Defer discharge recommendations to care team.? Will complete orders.     Terry Ayala OTR/L  Occupational Therapist

## 2025-02-05 NOTE — PROGRESS NOTES
Care Management Follow Up    Length of Stay (days): 1    Expected Discharge Date: 02/06/2025     Concerns to be Addressed: discharge planning  may need TCU  Patient plan of care discussed at interdisciplinary rounds: Yes    Anticipated Discharge Disposition: Transitional Care        Anticipated Discharge Services: None  Anticipated Discharge DME: None    Patient/family educated on Medicare website which has current facility and service quality ratings: yes  Education Provided on the Discharge Plan: Yes  Patient/Family in Agreement with the Plan: yes    Referrals Placed by CM/SW: Internal Clinic Care Coordination, Post Acute Facilities, Transportation    Private pay costs discussed: Not applicable    Discussed  Partnership in Safe Discharge Planning  document with patient/family: No     Handoff Completed: No, handoff not indicated or clinically appropriate    Additional Information:  Patient accepted at [facility] (Main Phone: [phone] Admissions Phone: [phone] Fax: [phone]) for admission tomorrow if he is medically ready.     Spoke with Patric, in admissions. He will check to see if the [employer] bus is available for transport tomorrow.     Updated son, Harshad, by phone. Discussed the need for [facility] wheelchair transport if [facility] is not available. Harshad in agreement.    MILES Olivarez  [facility] [phone]/ [facility] [phone]  Care Management               testicular pain

## 2025-02-06 ENCOUNTER — LAB REQUISITION (OUTPATIENT)
Dept: LAB | Facility: CLINIC | Age: OVER 89
End: 2025-02-06
Payer: COMMERCIAL

## 2025-02-06 ENCOUNTER — DOCUMENTATION ONLY (OUTPATIENT)
Dept: ANTICOAGULATION | Facility: CLINIC | Age: OVER 89
End: 2025-02-06
Payer: COMMERCIAL

## 2025-02-06 ENCOUNTER — DOCUMENTATION ONLY (OUTPATIENT)
Dept: GERIATRICS | Facility: CLINIC | Age: OVER 89
End: 2025-02-06
Payer: COMMERCIAL

## 2025-02-06 VITALS
DIASTOLIC BLOOD PRESSURE: 57 MMHG | OXYGEN SATURATION: 93 % | HEIGHT: 70 IN | HEART RATE: 76 BPM | RESPIRATION RATE: 18 BRPM | BODY MASS INDEX: 29.78 KG/M2 | SYSTOLIC BLOOD PRESSURE: 123 MMHG | TEMPERATURE: 98.4 F | WEIGHT: 208 LBS

## 2025-02-06 DIAGNOSIS — I48.91 UNSPECIFIED ATRIAL FIBRILLATION (H): ICD-10-CM

## 2025-02-06 LAB
ANION GAP SERPL CALCULATED.3IONS-SCNC: 8 MMOL/L (ref 7–15)
BUN SERPL-MCNC: 18.8 MG/DL (ref 8–23)
CALCIUM SERPL-MCNC: 8.7 MG/DL (ref 8.8–10.4)
CHLORIDE SERPL-SCNC: 100 MMOL/L (ref 98–107)
CREAT SERPL-MCNC: 0.99 MG/DL (ref 0.67–1.17)
EGFRCR SERPLBLD CKD-EPI 2021: 70 ML/MIN/1.73M2
GLUCOSE SERPL-MCNC: 98 MG/DL (ref 70–99)
HCO3 SERPL-SCNC: 27 MMOL/L (ref 22–29)
HGB BLD-MCNC: 9.7 G/DL (ref 13.3–17.7)
INR PPP: 1.35 (ref 0.85–1.15)
POTASSIUM SERPL-SCNC: 4.2 MMOL/L (ref 3.4–5.3)
SODIUM SERPL-SCNC: 135 MMOL/L (ref 135–145)

## 2025-02-06 PROCEDURE — 84295 ASSAY OF SERUM SODIUM: CPT | Performed by: INTERNAL MEDICINE

## 2025-02-06 PROCEDURE — 85018 HEMOGLOBIN: CPT | Performed by: INTERNAL MEDICINE

## 2025-02-06 PROCEDURE — 250N000013 HC RX MED GY IP 250 OP 250 PS 637: Performed by: INTERNAL MEDICINE

## 2025-02-06 PROCEDURE — 36415 COLL VENOUS BLD VENIPUNCTURE: CPT | Performed by: INTERNAL MEDICINE

## 2025-02-06 PROCEDURE — 99239 HOSP IP/OBS DSCHRG MGMT >30: CPT | Performed by: INTERNAL MEDICINE

## 2025-02-06 PROCEDURE — 85610 PROTHROMBIN TIME: CPT | Performed by: INTERNAL MEDICINE

## 2025-02-06 RX ORDER — OXYCODONE HYDROCHLORIDE 5 MG/1
2.5 TABLET ORAL EVERY 6 HOURS PRN
Qty: 10 TABLET | Refills: 0 | Status: SHIPPED | OUTPATIENT
Start: 2025-02-06 | End: 2025-02-20

## 2025-02-06 RX ADMIN — SENNOSIDES AND DOCUSATE SODIUM 1 TABLET: 50; 8.6 TABLET ORAL at 09:11

## 2025-02-06 RX ADMIN — METOPROLOL SUCCINATE 50 MG: 50 TABLET, EXTENDED RELEASE ORAL at 09:11

## 2025-02-06 RX ADMIN — ACETAMINOPHEN 650 MG: 325 TABLET, FILM COATED ORAL at 03:36

## 2025-02-06 RX ADMIN — TAMSULOSIN HYDROCHLORIDE 0.4 MG: 0.4 CAPSULE ORAL at 09:11

## 2025-02-06 RX ADMIN — GABAPENTIN 200 MG: 100 CAPSULE ORAL at 09:11

## 2025-02-06 RX ADMIN — FUROSEMIDE 20 MG: 20 TABLET ORAL at 09:11

## 2025-02-06 RX ADMIN — LISINOPRIL 10 MG: 10 TABLET ORAL at 09:11

## 2025-02-06 ASSESSMENT — ACTIVITIES OF DAILY LIVING (ADL)
ADLS_ACUITY_SCORE: 72
ADLS_ACUITY_SCORE: 74
ADLS_ACUITY_SCORE: 72

## 2025-02-06 NOTE — PROGRESS NOTES
S-(situation): Patient discharged to [facility] [place] via wheelchair with Bus    B-(background): psoas hematoma    A-(assessment): vss, denies pain, using jero steady for transfers  Last bowel movement: 2/5     R-(recommendations):Report called to [facility] message left. Listed belongings gathered and sent with patient.     Discharge Nursing Criteria:     Care Plan and Patient education resolved: Yes    Vaccines  Influenza status verified at discharge:  Yes      MISC  Home medications returned to patient: NA  Medication Bin checked and emptied on discharge Yes  All paperwork sent with patient/Copy of AVS given to patient or family Yes.

## 2025-02-06 NOTE — PLAN OF CARE
Goal Outcome Evaluation:      Plan of Care Reviewed With: patient    Overall Patient Progress: improvingOverall Patient Progress: improving    Outcome Evaluation: patient a&o, vss, wheezy, using IS, SCD's on, Ice applied and ACE wrap in place, up in chair for meals, PRN oxy andTylenol x1 for rectal pain, large BM

## 2025-02-06 NOTE — DISCHARGE SUMMARY
"[facility]  Hospitalist Discharge Summary      Date of Admission:  2/3/2025  Date of Discharge:  {DISCHARGE DATE:023157}  Discharging Provider: Elisha Guerrero MD  Discharge Service: Hospitalist Service    Discharge Diagnoses   ***    Clinically Significant Risk Factors     # Overweight: Estimated body mass index is 29.84 kg/m  as calculated from the following:    Height as of this encounter: 1.778 m (5' 10\").    Weight as of this encounter: 94.3 kg (208 lb).       Follow-ups Needed After Discharge   Follow-up Appointments       Follow Up and recommended labs and tests      Follow up with [facility] physician.  The following labs/tests are recommended: CBC, BMP and INR in 2 days.  Will need frequent monitoring for INR to become therapeutic. Would allow to slowly anticoagulate with iliopsoas hematoma and avoid bridging. .            {Additional follow-up instructions/to-do's for PCP    :***}    Unresulted Labs Ordered in the Past 30 Days of this Admission       No orders found from 1/4/2025 to 2/4/2025.        These results will be followed up by ***    Discharge Disposition   {Discharge to:1268389::\"Discharged to home\"}  Condition at discharge: {CONDITION:808780::\"Stable\"}    Hospital Course   {OPTIONAL -- use to select A&P section   :521930}    Consultations This Hospital Stay   CARE MANAGEMENT / SOCIAL WORK IP CONSULT  CARE MANAGEMENT / SOCIAL WORK IP CONSULT  PHYSICAL THERAPY ADULT IP CONSULT  OCCUPATIONAL THERAPY ADULT IP CONSULT  OCCUPATIONAL THERAPY ADULT IP CONSULT  PHYSICAL THERAPY ADULT IP CONSULT  INFECTION PREVENTION IP CONSULT  PHYSICAL THERAPY ADULT IP CONSULT  OCCUPATIONAL THERAPY ADULT IP CONSULT    Code Status   No CPR- Do NOT Intubate    Time Spent on this Encounter   {How much time did you spend on discharge?:68163422}       Elisha Guerrero MD  [facility]A MEDICAL SURGICAL  [address] [place] [address]  Phone: " [phone]  ______________________________________________________________________    Physical Exam   Vital Signs: Temp: 97.9  F (36.6  C) Temp src: Oral BP: 139/72 Pulse: 79   Resp: 20 SpO2: 92 % O2 Device: None (Room air)    Weight: 208 lbs 0 oz  {Recommend personal SmartPhrase or Notewriter for exam (OPTIONAL)   :178874}       Primary Care Physician   Kim Chong    Discharge Orders      General info for SNF    Length of Stay Estimate: Short Term Care: Estimated # of Days <30  Condition at Discharge: Improving  Level of care:skilled   Rehabilitation Potential: Good  Admission H&P remains valid and up-to-date: Yes  Recent Chemotherapy: N/A  Use Nursing Home Standing Orders: Yes     Mantoux instructions    Give two-step Mantoux (PPD) Per Facility Policy Yes     Follow Up and recommended labs and tests    Follow up with senior care physician.  The following labs/tests are recommended: CBC, BMP and INR in 2 days.  Will need frequent monitoring for INR to become therapeutic. Would allow to slowly anticoagulate with iliopsoas hematoma and avoid bridging. .     Reason for your hospital stay    Principal Problem:    Psoas hematoma, right, secondary to anticoagulant therapy  Active Problems:    Weakness generalized    Influenza A    Supratherapeutic INR    Nontraumatic intramuscular hematoma    Thrombocytopenia, unspecified    Essential hypertension, benign    Personal history of tobacco use, presenting hazards to health    Long term (current) use of anticoagulants    Medication induced coagulopathy     Daily weights    Call Provider for weight gain of more than 2 pounds per day or 5 pounds per week.     Activity - Up with nursing assistance     Activity - Up with assistive device     Weight bearing status    WBAT, but uses WC     Physical Therapy Adult Consult    Evaluate and treat as clinically indicated.    Reason:  Strengthening, Home safety, ADLs, transfers if needed.     Occupational Therapy Adult  Consult    Evaluate and treat as clinically indicated.    Reason:  Strengthening, Home safety, ADLs, transfers if needed.     Fall precautions     Compression Sleeve/Stocking Order    Compression Sleeve/Stocking Documentation:   The patient needs ace wrap for compression right thigh for Other reason: iliopsoas hematoma. NH provider to reassess when can remove     I, the undersigned, certify that the above prescribed supplies are medically necessary for this patient and is both reasonable and necessary in reference to accepted standards of medical and necessary in reference to accepted standards of medical practice in the treatment of this patient's condition and is not prescribed as a convenience.     Diet    Follow this diet upon discharge: Current Diet:Orders Placed This Encounter      Combination Diet Low Saturated Fat Na <2400mg Diet, No Caffeine Diet       Significant Results and Procedures   {Data for Discharge Summary:709314}    Discharge Medications   Current Discharge Medication List        START taking these medications    Details   oxyCODONE (ROXICODONE) 5 MG tablet Take 0.5 tablets (2.5 mg) by mouth every 6 hours as needed for severe pain.  Qty: 10 tablet, Refills: 0    Associated Diagnoses: Hematoma of right psoas region to anticoagulant therapy, subsequent encounter           CONTINUE these medications which have NOT CHANGED    Details   acetaminophen (TYLENOL) 500 MG tablet Take 2 tablets (1,000 mg) by mouth every 8 hours as needed for mild pain    Associated Diagnoses: Arthritis pain      albuterol (VENTOLIN HFA) 108 (90 Base) MCG/ACT inhaler Inhale 2 puffs into the lungs every 6 hours as needed for shortness of breath, wheezing or cough.  Qty: 18 g, Refills: 0    Comments: Pharmacy may dispense brand covered by insurance (Proair, or proventil or ventolin or generic albuterol inhaler)      furosemide (LASIX) 20 MG tablet Take 1 tablet (20 mg) by mouth daily.  Qty: 30 tablet, Refills: 0    Associated  Diagnoses: Chronic right-sided heart failure (H)      gabapentin (NEURONTIN) 100 MG capsule Take 2 capsules (200 mg) by mouth 2 times daily.    Associated Diagnoses: Restless legs syndrome (RLS)      ketotifen fumarate 0.035%, ketotifen 0.025%, (ZADITOR) 0.025 % ophthalmic solution Place 1 drop into both eyes 2 times daily as needed for dry eyes Patient self-administers  Qty: 5 mL, Refills: 0    Associated Diagnoses: Eye irritation      lisinopril (ZESTRIL) 10 MG tablet Take 1 tablet (10 mg) by mouth daily.    Associated Diagnoses: Essential hypertension      loratadine (CLARITIN) 10 MG tablet Take 1 tablet (10 mg) by mouth daily  Qty: 30 tablet, Refills: 0    Associated Diagnoses: Seasonal allergic rhinitis, unspecified trigger      metoprolol succinate ER (TOPROL XL) 50 MG 24 hr tablet Take 1 tablet (50 mg) by mouth daily.    Associated Diagnoses: Essential hypertension      polyethylene glycol (MIRALAX) 17 GM/Dose powder Take 9 g by mouth daily as needed for constipation Mix and hand to patient, Patient self-administers (he knows how much he needs to keep regular)  Qty: 510 g, Refills: 0    Associated Diagnoses: Constipation, unspecified constipation type      psyllium (METAMUCIL/KONSYL) 58.6 % powder Take 18 g (1 Tablespoonful) by mouth daily Mix and hand to patient, Patient self-administers (he knows how much he needs to keep regular)    Associated Diagnoses: Slow transit constipation      simvastatin (ZOCOR) 40 MG tablet Take 1 tablet (40 mg) by mouth at bedtime  Qty: 30 tablet, Refills: 0    Associated Diagnoses: Hyperlipidemia LDL goal <100      sodium chloride (OCEAN) 0.65 % nasal spray Spray 2 sprays in nostril daily as needed for congestion Patient self-administers  Qty: 30 mL, Refills: 0    Associated Diagnoses: Nasal dryness      tamsulosin (FLOMAX) 0.4 MG capsule Take 1 capsule (0.4 mg) by mouth daily  Qty: 30 capsule, Refills: 0    Associated Diagnoses: Urinary retention      Vitamin D3  (CHOLECALCIFEROL) 25 mcg (1000 units) tablet Take 1 tablet (25 mcg) by mouth daily    Associated Diagnoses: Vitamin D deficiency      warfarin ANTICOAGULANT (COUMADIN) 2.5 MG tablet Take 2.5 mgs mg Sunday, Tuesday and Thursday, and 5 mgs all other days.  Recheck INR on 1/31/25  Qty: 15 tablet, Refills: 0    Associated Diagnoses: Long term current use of anticoagulant therapy; Chronic atrial fibrillation (H)           STOP taking these medications       dextromethorphan-guaiFENesin (MUCINEX DM)  MG 12 hr tablet Comments:   Reason for Stopping:         predniSONE (DELTASONE) 5 MG tablet Comments:   Reason for Stopping:             Allergies   No Known Allergies   thin-section reconstruction. Dose reduction techniques were used.   CONTRAST: 90 mL Isovue 370    FINDINGS:     BONES:  -The bones are mildly osteopenic. The bipolar total knee prosthesis and changes from patellar resurfacing are noted, without evidence of hardware loosening or failure. No acute fracture or malalignment. Mild degenerative changes of the right hip joint   along the inferior aspect.    SOFT TISSUES:  -There is soft tissue swelling within the iliopsoas muscle in the inferior pelvis extending to the femoral attachment with adjacent soft tissue stranding. Within the swollen iliopsoas muscle there is a focal fluid collection measuring 1.7 x 2.5 x 3 cm   (image 59, series 7; image 55, series 4). A second smaller fluid collection is seen just superiorly and anteriorly measuring 1.6 x 0.5 x 1.8 cm (image 51, series 4; image 50, series 7). These fluid collections are new when compared with 1/26/2024.      Impression    IMPRESSION:  1.  Soft tissue swelling of the iliopsoas muscle probably in the hip just proximal to its femoral attachment, with less pronounced findings seen in the included inferior aspect of the pelvis. There are 2 small intramuscular fluid collections measuring   1.7 x 2.5 x 3 cm and 1.6 x 0.5 x 1.8 cm, within the muscle belly just proximal to the femoral attachment. This could reflect small intramuscular hematomas although the distribution and appearance is also suggestive of iliopsoas bursa inflammation.   Correlation with patient's physical exam is recommended.  2.  No acute fracture or malalignment of the right femur and included hip.     CT Femur Thigh Right w Contrast    Narrative    EXAM: CT FEMUR THIGH RIGHT W CONTRAST  LOCATION: [facility]  DATE: 2/4/2025    INDICATION: Right iliopsoas fluid collection.  COMPARISON: 2/3/2025  TECHNIQUE: IV contrast. Axial, sagittal and coronal thin-section reconstruction. Dose reduction techniques were used.    CONTRAST: 90mL Isovue 370    FINDINGS:     BONES:  -Anatomic alignment right femur. No acute displaced right femur fracture. No femoral head AVN on CT. 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 Diffuse bone demineralization. Mild degenerative change right hip. Mild arthritis right SI   joint.    SOFT TISSUES:  -Persistent and likely slightly progressive enlargement and swelling within and about the right iliopsoas muscle. Small fluid collections within the inferior iliopsoas are similar in size to prior. Some interval increased attenuation within the larger   collection since the prior, possibly blood products. Edema or inflammation is seen in the right pelvis along the right iliacus musculature. Presacral soft tissue edema or inflammation persists. Prominent prostate gland with internal calcifications,   similar to prior. No bulky adenopathy. Progressive medial proximal right thigh soft tissue edema. Remaining right thigh muscle bulk is unchanged. Small right knee joint effusion. No appreciable soft tissue gas in the right hip or thigh.      Impression    IMPRESSION:  1.  Slight interval enlargement of an edematous right iliopsoas and iliacus muscle with similar-sized small fluid collections in the inferior iliopsoas musculature near the distal iliopsoas tendon attachment. Findings are compatible with an evolving   iliopsoas hematoma. Infection should be excluded clinically.  2.  Progressive medial proximal right thigh soft tissue swelling.  3.  No evidence for right femur fracture or right femur joint malalignment.  4.  Mild right hip osteoarthritis. Postop right total knee arthroplasty.               *Note: Due to a large number of results and/or encounters for the requested time period, some results have not been displayed. A complete set of results can be found in Results Review.       Discharge Medications   Current Discharge Medication List        START taking these medications    Details    oxyCODONE (ROXICODONE) 5 MG tablet Take 0.5 tablets (2.5 mg) by mouth every 6 hours as needed for severe pain.  Qty: 10 tablet, Refills: 0    Associated Diagnoses: Hematoma of right psoas region to anticoagulant therapy, subsequent encounter           CONTINUE these medications which have NOT CHANGED    Details   acetaminophen (TYLENOL) 500 MG tablet Take 2 tablets (1,000 mg) by mouth every 8 hours as needed for mild pain    Associated Diagnoses: Arthritis pain      albuterol (VENTOLIN HFA) 108 (90 Base) MCG/ACT inhaler Inhale 2 puffs into the lungs every 6 hours as needed for shortness of breath, wheezing or cough.  Qty: 18 g, Refills: 0    Comments: Pharmacy may dispense brand covered by insurance (Proair, or proventil or ventolin or generic albuterol inhaler)      furosemide (LASIX) 20 MG tablet Take 1 tablet (20 mg) by mouth daily.  Qty: 30 tablet, Refills: 0    Associated Diagnoses: Chronic right-sided heart failure (H)      gabapentin (NEURONTIN) 100 MG capsule Take 2 capsules (200 mg) by mouth 2 times daily.    Associated Diagnoses: Restless legs syndrome (RLS)      ketotifen fumarate 0.035%, ketotifen 0.025%, (ZADITOR) 0.025 % ophthalmic solution Place 1 drop into both eyes 2 times daily as needed for dry eyes Patient self-administers  Qty: 5 mL, Refills: 0    Associated Diagnoses: Eye irritation      lisinopril (ZESTRIL) 10 MG tablet Take 1 tablet (10 mg) by mouth daily.    Associated Diagnoses: Essential hypertension      loratadine (CLARITIN) 10 MG tablet Take 1 tablet (10 mg) by mouth daily  Qty: 30 tablet, Refills: 0    Associated Diagnoses: Seasonal allergic rhinitis, unspecified trigger      metoprolol succinate ER (TOPROL XL) 50 MG 24 hr tablet Take 1 tablet (50 mg) by mouth daily.    Associated Diagnoses: Essential hypertension      polyethylene glycol (MIRALAX) 17 GM/Dose powder Take 9 g by mouth daily as needed for constipation Mix and hand to patient, Patient self-administers (he knows how much  he needs to keep regular)  Qty: 510 g, Refills: 0    Associated Diagnoses: Constipation, unspecified constipation type      psyllium (METAMUCIL/KONSYL) 58.6 % powder Take 18 g (1 Tablespoonful) by mouth daily Mix and hand to patient, Patient self-administers (he knows how much he needs to keep regular)    Associated Diagnoses: Slow transit constipation      simvastatin (ZOCOR) 40 MG tablet Take 1 tablet (40 mg) by mouth at bedtime  Qty: 30 tablet, Refills: 0    Associated Diagnoses: Hyperlipidemia LDL goal <100      sodium chloride (OCEAN) 0.65 % nasal spray Spray 2 sprays in nostril daily as needed for congestion Patient self-administers  Qty: 30 mL, Refills: 0    Associated Diagnoses: Nasal dryness      tamsulosin (FLOMAX) 0.4 MG capsule Take 1 capsule (0.4 mg) by mouth daily  Qty: 30 capsule, Refills: 0    Associated Diagnoses: Urinary retention      Vitamin D3 (CHOLECALCIFEROL) 25 mcg (1000 units) tablet Take 1 tablet (25 mcg) by mouth daily    Associated Diagnoses: Vitamin D deficiency      warfarin ANTICOAGULANT (COUMADIN) 2.5 MG tablet Take 2.5 mgs mg Sunday, Tuesday and Thursday, and 5 mgs all other days.  Recheck INR on 1/31/25  Qty: 15 tablet, Refills: 0    Associated Diagnoses: Long term current use of anticoagulant therapy; Chronic atrial fibrillation (H)           STOP taking these medications       dextromethorphan-guaiFENesin (MUCINEX DM)  MG 12 hr tablet Comments:   Reason for Stopping:         predniSONE (DELTASONE) 5 MG tablet Comments:   Reason for Stopping:             Allergies   No Known Allergies

## 2025-02-06 NOTE — PROGRESS NOTES
ANTICOAGULATION  MANAGEMENT: Discharge Review    Melvin Abad chart reviewed for anticoagulation continuity of care    Hospital Admission on 2/3/25 - 2/6/25 for hematoma.    Discharge disposition: TCU    [facility] (Main Phone: [phone] Admissions Phone: [phone] Fax: [phone]) TCU.     Results:    Recent labs: (last 7 days)     01/31/25  0700 02/02/25  1258 02/03/25  0120 02/03/25  0532 02/03/25  1352 02/04/25  0520 02/05/25  0531 02/06/25  0528   INR 8.30* 5.92* 5.95* 3.82* 2.15* 1.55* 1.52* 1.35*     Anticoagulation inpatient management:     held warfarin due to supratherapeutic INR and hematoma  and reversed with vitamin K 5 mg  on 2/3/25    Anticoagulation discharge instructions:     Warfarin dosing:  note as below   Follow up with long term physician. The following labs/tests are recommended: CBC, BMP and INR in 2 days. Will need frequent monitoring for INR to become therapeutic. Would allow to slowly anticoagulate with iliopsoas hematoma and avoid bridging. .    Bridging: No   INR goal change: No      Medication changes affecting anticoagulation: No    Additional factors affecting anticoagulation: Yes: hematoma     PLAN     No adjustment to anticoagulation plan needed    [facility] will resume monitoring upon discharge from TCU    No adjustment to Anticoagulation Calendar was required    Chanel Baldwin RN  2/6/2025  Anticoagulation Clinic  [employer] for routing messages: cristi COLON  [facility] patient phone line: [phone]

## 2025-02-06 NOTE — PLAN OF CARE
Goal Outcome Evaluation:      Plan of Care Reviewed With: child          Outcome Evaluation: [facility]

## 2025-02-06 NOTE — PROGRESS NOTES
Care Management Discharge Note    Discharge Date: 02/06/2025     Discharge Disposition: Transitional Care    Discharge Services: None    Discharge DME: None    Discharge Transportation: [employer] bus     Private pay costs discussed: Not applicable    Does the patient's insurance plan have a 3 day qualifying hospital stay waiver?  No    PAS Confirmation Code: [id number]    Patient/family educated on Medicare website which has current facility and service quality ratings: yes    Education Provided on the Discharge Plan: Yes  Persons Notified of Discharge Plans: sonHarshad   Patient/Family in Agreement with the Plan: yes    Handoff Referral Completed: Yes, JOSE ALEJANDRO PCP: Internal handoff referral completed    Additional Information:  Patient medically ready for discharge today, per provider. Plan is for patient to discharge to [facility] (Main Phone: [phone] Admissions Phone: [phone] Fax: [phone]) TCU.     Spoke with Patric at [facility]. He is able to use the [employer] bus to transport. Bus transport at 1030.    CM updated patient's son, Harshad, by phone.    MILES Olivarez  [facility] [phone]/ Ashlee [phone]  Care Management

## 2025-02-06 NOTE — PLAN OF CARE
Goal Outcome Evaluation:                 Outcome Evaluation: Patient alert and oriented, VSS on room air. Inspiratory and Expiratory wheeze in lungs, No further abdominal discomfort after large BM in evening, had 1 senna with HS meds. Up with Mar Velazquez. External catheter in place overnight. Patient imtermittently can be continent, but requires assist with using urinal. Turned and repositioned. Tylenol x1 for generalized pain.

## 2025-02-06 NOTE — PLAN OF CARE
Physical Therapy Discharge Summary    Reason for therapy discharge:    Discharged to transitional care facility.    Progress towards therapy goal(s). See goals on Care Plan in [facility] electronic health record for goal details.  Goals partially met.  Barriers to achieving goals:   limited tolerance for therapy and discharge from facility.    Therapy recommendation(s):    Continued therapy is recommended.  Rationale/Recommendations:  to address weakness and impaired mobility for greater safety and independence at his facility.      Thank you for your referral.  Diamond Rivera, PT, DPT, ATC, LAT    [facility]  O: [phone]  E: [email or web address]

## 2025-02-07 PROBLEM — E11.69 TYPE 2 DIABETES MELLITUS WITH OTHER SPECIFIED COMPLICATION, WITHOUT LONG-TERM CURRENT USE OF INSULIN (H): Status: RESOLVED | Noted: 2017-05-10 | Resolved: 2025-02-07

## 2025-02-07 PROBLEM — E11.69 TYPE 2 DIABETES MELLITUS WITH OTHER SPECIFIED COMPLICATION, WITHOUT LONG-TERM CURRENT USE OF INSULIN (H): Status: ACTIVE | Noted: 2017-05-10

## 2025-02-13 ENCOUNTER — TRANSITIONAL CARE UNIT VISIT (OUTPATIENT)
Dept: GERIATRICS | Facility: CLINIC | Age: OVER 89
End: 2025-02-13
Payer: COMMERCIAL

## 2025-02-13 VITALS
OXYGEN SATURATION: 95 % | DIASTOLIC BLOOD PRESSURE: 53 MMHG | RESPIRATION RATE: 17 BRPM | HEART RATE: 65 BPM | HEIGHT: 69 IN | TEMPERATURE: 97.9 F | SYSTOLIC BLOOD PRESSURE: 96 MMHG | WEIGHT: 182 LBS | BODY MASS INDEX: 26.96 KG/M2

## 2025-02-13 DIAGNOSIS — I10 ESSENTIAL HYPERTENSION: ICD-10-CM

## 2025-02-13 DIAGNOSIS — E78.5 HYPERLIPIDEMIA LDL GOAL <100: ICD-10-CM

## 2025-02-13 DIAGNOSIS — G25.81 RESTLESS LEGS SYNDROME (RLS): ICD-10-CM

## 2025-02-13 DIAGNOSIS — S70.11XD HEMATOMA OF RIGHT ILIOPSOAS MUSCLE, SUBSEQUENT ENCOUNTER: Primary | ICD-10-CM

## 2025-02-13 DIAGNOSIS — Z86.73 HISTORY OF CVA (CEREBROVASCULAR ACCIDENT): ICD-10-CM

## 2025-02-13 DIAGNOSIS — N18.31 CHRONIC KIDNEY DISEASE, STAGE 3A (H): ICD-10-CM

## 2025-02-13 DIAGNOSIS — J10.1 INFLUENZA A: ICD-10-CM

## 2025-02-13 DIAGNOSIS — I48.11 LONGSTANDING PERSISTENT ATRIAL FIBRILLATION (H): ICD-10-CM

## 2025-02-13 DIAGNOSIS — I50.812 CHRONIC RIGHT-SIDED HEART FAILURE (H): ICD-10-CM

## 2025-02-13 DIAGNOSIS — J44.9 CHRONIC OBSTRUCTIVE PULMONARY DISEASE, UNSPECIFIED COPD TYPE (H): ICD-10-CM

## 2025-02-13 NOTE — LETTER
2/13/2025      Melvin Abad  C/o Toña Abad  [address]  [facility] [address]        [facility] GERIATRICS    Chief Complaint   Patient presents with     RECHECK     HPI:  Melvin Abad is a 89 year old  (4/24/1928), who is being seen today for an episodic care visit at: [facility] AND REHAB [facility] ([facility]) [[id number]].  Patient was hospitalized at [facility] from 2/3/2025 through 2/6/2025..     Patient is a 89 year-old male with past medical history significant for Afib, history of CVA, HFpEF, hypertension, benign prostatic hyperplasia, chronic anemia, thrombocytopenia, type 2 diabetes, venous stasis, and Gilbert's syndrome. Patient tested positive for influenza A while at [facility] and was given tamiflu. He was brought to the ED for hypoxia and discharged the same day. However, when he was getting into his grandson's car upon discharge, he pulled his groin somehow and was readmitted to the ER for right groin pain. Patient was found to have a hematoma of right iliopsoas muscle. Previously, his INR on 1/31/25 was 8.3 and it was 5.9 on 2/3/25. Patient was given vitamin K and warfarin was held until discharge.     Today, patient is sitting in recliner and reports that he has no pain of right groin/hematoma when resting. When transfering to wheelchair, he reports his pain is about 4 out of 10. He has been asking for tylenol 1000 mg PRN and oxycodone 2.5 mg PRN. He reports that he received an oxycodone last night at 3 am and he slept very well after that. Reports good appetite. Patient reports some constipation and has had some little stools but no large BM's since 2/7/24. Was given miralax and prune juice this morning and will give another packet of miralax today and continue miralax daily. Patient will request suppository if he desires. Patient reports some frustration with not being able to hear well as he is wearing back-up hearing aids until [facility] hearing  [facility] and returned in a couple weeks. Denies lightheadedness, shortness of breath or chest pain.    Allergies, and PMH/PSH reviewed in EPIC today.  REVIEW OF SYSTEMS:  4 point ROS including Respiratory, CV, GI and , other than that noted in the HPI,  is negative    Objective:   BP 96/53   Pulse 65   Temp 97.9  F (36.6  C)   Resp 17   Ht 1.753 m (5' 9\")   Wt 82.6 kg (182 lb)   SpO2 95%   BMI 26.88 kg/m    GENERAL APPEARANCE:  Alert, in no distress, appears healthy  ENT:  Mouth and posterior oropharynx normal, moist mucous membranes, [place]  EYES:  EOM, conjunctivae, lids, pupils and irises normal  RESP:  respiratory effort and palpation of chest normal, lungs clear to auscultation   CV:  regular rate and rhythm, no murmur, rub, or gallop, no edema, +2 pedal pulses  ABDOMEN:  normal bowel sounds, soft, nontender, no hepatosplenomegaly or other masses  M/S:   Gait and station abnormal wheelchair dependent  SKIN:  dark purple discoloration of right medial thigh without swelling, warmth or induration  NEURO:   Cranial nerves 2-12 are normal tested and grossly at patient's baseline  PSYCH:  oriented X 3, affect and mood normal    Labs done in SNF are in [facility]. Please refer to them using [facility]/Care Everywhere.    Assessment/Plan:    Hematoma of right iliopsoas muscle, subsequent encounter  Physical debility  Stable; no longer bleeding.  Manage pain with PRN tylenol and oxycodone 2.5 mg every 6 hours PRN.   PT/OT to evaluate and treat.     Chronic obstructive pulmonary disease, unspecified COPD type (H)  Influenza A- resolved  Treated with Tamiflu and prednisone while at [facility].  Afebrile and no cough. Continues using incentive spirometer.  Albuterol inhaler PRN     Longstanding persistent atrial fibrillation (H)  History of CVA (cerebrovascular accident)  Warfarin was held during hospital stay; INR goal 2-3; bring up to therapeutic level without heparin bridging.  2/7/25: INR 1.2. " Warfarin 5 mg po daily and recheck INR on 2/11/25.   2/11/25: INR 1.9. Warfarin 5 mg po on 2/11. Warfarin 2.5 mg po on 2/12 and 2/13 and recheck INR on 2/14/25.  Continue metoprolol succinate 50 mg daily     Chronic right-sided heart failure (H)  Continue furosemide 20 mg daily  Daily weights and monitor for edema     Chronic kidney disease, stage 3a (H)  2/7/25: creatinine 1.01  Avoid nephrotoxic medications. Renally dose medications. Monitor electrolytes, creatinine and dehydration status.     Essential hypertension  Continue lisinopril 10 mg daily and low sodium, low fat diet  Monitor BP's.      Hyperlipidemia LDL goal <100  Continue simvastatin 40 mg at bedtime     Restless legs syndrome (RLS)  Continue gabapentin 200 mg BID        Orders:  2/11/25: INR 1.9. Warfarin 5 mg po on 2/11. Warfarin 2.5 mg po on 2/12 and 2/13 and recheck INR on 2/14/25.   Miralax 17 grams po daily for constipation.      Electronically signed by: DINH Munoz CNP       Sincerely,        DINH Munoz CNP    Electronically signed

## 2025-02-13 NOTE — PROGRESS NOTES
[facility] GERIATRICS    Chief Complaint   Patient presents with    RECHECK     HPI:  Melvin Abad is a 89 year old  (4/24/1928), who is being seen today for an episodic care visit at: [facility] AND REHAB [facility] ([facility]) [[id number]].  Patient was hospitalized at [facility] from 2/3/2025 through 2/6/2025..     Patient is a 89 year-old male with past medical history significant for Afib, history of CVA, HFpEF, hypertension, benign prostatic hyperplasia, chronic anemia, thrombocytopenia, type 2 diabetes, venous stasis, and Gilbert's syndrome. Patient tested positive for influenza A while at [facility] and was given tamiflu. He was brought to the ED for hypoxia and discharged the same day. However, when he was getting into his grandson's car upon discharge, he pulled his groin somehow and was readmitted to the ER for right groin pain. Patient was found to have a hematoma of right iliopsoas muscle. Previously, his INR on 1/31/25 was 8.3 and it was 5.9 on 2/3/25. Patient was given vitamin K and warfarin was held until discharge.     Today, patient is sitting in recliner and reports that he has no pain of right groin/hematoma when resting. When transfering to wheelchair, he reports his pain is about 4 out of 10. He has been asking for tylenol 1000 mg PRN and oxycodone 2.5 mg PRN. He reports that he received an oxycodone last night at 3 am and he slept very well after that. Reports good appetite. Patient reports some constipation and has had some little stools but no large BM's since 2/7/24. Was given miralax and prune juice this morning and will give another packet of miralax today and continue miralax daily. Patient will request suppository if he desires. Patient reports some frustration with not being able to hear well as he is wearing back-up hearing aids until [facility] and returned in a couple weeks. Denies lightheadedness, shortness of  "breath or chest pain.    Allergies, and PMH/PSH reviewed in EPIC today.  REVIEW OF SYSTEMS:  4 point ROS including Respiratory, CV, GI and , other than that noted in the HPI,  is negative    Objective:   BP 96/53   Pulse 65   Temp 97.9  F (36.6  C)   Resp 17   Ht 1.753 m (5' 9\")   Wt 82.6 kg (182 lb)   SpO2 95%   BMI 26.88 kg/m    GENERAL APPEARANCE:  Alert, in no distress, appears healthy  ENT:  Mouth and posterior oropharynx normal, moist mucous membranes, [place]  EYES:  EOM, conjunctivae, lids, pupils and irises normal  RESP:  respiratory effort and palpation of chest normal, lungs clear to auscultation   CV:  regular rate and rhythm, no murmur, rub, or gallop, no edema, +2 pedal pulses  ABDOMEN:  normal bowel sounds, soft, nontender, no hepatosplenomegaly or other masses  M/S:   Gait and station abnormal wheelchair dependent  SKIN:  dark purple discoloration of right medial thigh without swelling, warmth or induration  NEURO:   Cranial nerves 2-12 are normal tested and grossly at patient's baseline  PSYCH:  oriented X 3, affect and mood normal    [facility]. Please refer to them using [facility]/Care Everywhere.    Assessment/Plan:    Hematoma of right iliopsoas muscle, subsequent encounter  Physical debility  Stable; no longer bleeding.  Manage pain with PRN tylenol and oxycodone 2.5 mg every 6 hours PRN.   PT/OT to evaluate and treat.     Chronic obstructive pulmonary disease, unspecified COPD type (H)  Influenza A- resolved  Treated with Tamiflu and prednisone while at [facility].  Afebrile and no cough. Continues using incentive spirometer.  Albuterol inhaler PRN     Longstanding persistent atrial fibrillation (H)  History of CVA (cerebrovascular accident)  Warfarin was held during hospital stay; INR goal 2-3; bring up to therapeutic level without heparin bridging.  2/7/25: INR 1.2. Warfarin 5 mg po daily and recheck INR on 2/11/25.   2/11/25: INR 1.9. Warfarin 5 mg po on 2/11. " Warfarin 2.5 mg po on 2/12 and 2/13 and recheck INR on 2/14/25.  Continue metoprolol succinate 50 mg daily     Chronic right-sided heart failure (H)  Continue furosemide 20 mg daily  Daily weights and monitor for edema     Chronic kidney disease, stage 3a (H)  2/7/25: creatinine 1.01  Avoid nephrotoxic medications. Renally dose medications. Monitor electrolytes, creatinine and dehydration status.     Essential hypertension  Continue lisinopril 10 mg daily and low sodium, low fat diet  Monitor BP's.      Hyperlipidemia LDL goal <100  Continue simvastatin 40 mg at bedtime     Restless legs syndrome (RLS)  Continue gabapentin 200 mg BID        Orders:  2/11/25: INR 1.9. Warfarin 5 mg po on 2/11. Warfarin 2.5 mg po on 2/12 and 2/13 and recheck INR on 2/14/25.   Miralax 17 grams po daily for constipation.      Electronically signed by: DINH Munoz CNP

## 2025-02-20 DIAGNOSIS — S30.1XXD HEMATOMA OF RIGHT PSOAS REGION TO ANTICOAGULANT THERAPY, SUBSEQUENT ENCOUNTER: ICD-10-CM

## 2025-02-20 RX ORDER — OXYCODONE HYDROCHLORIDE 5 MG/1
2.5 TABLET ORAL EVERY 6 HOURS PRN
Qty: 10 TABLET | Refills: 0 | Status: SHIPPED | OUTPATIENT
Start: 2025-02-20

## 2025-02-20 NOTE — TELEPHONE ENCOUNTER
Orders Placed This Encounter   Medications    oxyCODONE (ROXICODONE) 5 MG tablet     Sig: Take 0.5 tablets (2.5 mg) by mouth every 6 hours as needed for severe pain.     Dispense:  10 tablet     Refill:  0     DINH Munoz CNP

## 2025-02-21 ENCOUNTER — TRANSITIONAL CARE UNIT VISIT (OUTPATIENT)
Dept: GERIATRICS | Facility: CLINIC | Age: OVER 89
End: 2025-02-21
Payer: COMMERCIAL

## 2025-02-21 VITALS
SYSTOLIC BLOOD PRESSURE: 118 MMHG | DIASTOLIC BLOOD PRESSURE: 66 MMHG | RESPIRATION RATE: 20 BRPM | OXYGEN SATURATION: 97 % | HEART RATE: 71 BPM | BODY MASS INDEX: 26.06 KG/M2 | WEIGHT: 176.5 LBS | TEMPERATURE: 97.9 F

## 2025-02-21 DIAGNOSIS — S30.1XXD HEMATOMA OF RIGHT PSOAS REGION TO ANTICOAGULANT THERAPY, SUBSEQUENT ENCOUNTER: Primary | ICD-10-CM

## 2025-02-21 DIAGNOSIS — Z87.09 HISTORY OF INFLUENZA: ICD-10-CM

## 2025-02-21 DIAGNOSIS — E78.5 HYPERLIPIDEMIA LDL GOAL <100: ICD-10-CM

## 2025-02-21 DIAGNOSIS — I48.11 LONGSTANDING PERSISTENT ATRIAL FIBRILLATION (H): ICD-10-CM

## 2025-02-21 DIAGNOSIS — I10 ESSENTIAL HYPERTENSION: ICD-10-CM

## 2025-02-21 DIAGNOSIS — I50.812 CHRONIC RIGHT-SIDED HEART FAILURE (H): ICD-10-CM

## 2025-02-21 DIAGNOSIS — N18.31 CHRONIC KIDNEY DISEASE, STAGE 3A (H): ICD-10-CM

## 2025-02-21 DIAGNOSIS — Z86.73 HISTORY OF CVA (CEREBROVASCULAR ACCIDENT): ICD-10-CM

## 2025-02-21 DIAGNOSIS — S70.11XD HEMATOMA OF RIGHT ILIOPSOAS MUSCLE, SUBSEQUENT ENCOUNTER: ICD-10-CM

## 2025-02-21 DIAGNOSIS — G25.81 RESTLESS LEGS SYNDROME (RLS): ICD-10-CM

## 2025-02-21 DIAGNOSIS — J44.9 CHRONIC OBSTRUCTIVE PULMONARY DISEASE, UNSPECIFIED COPD TYPE (H): ICD-10-CM

## 2025-02-21 PROCEDURE — 99309 SBSQ NF CARE MODERATE MDM 30: CPT

## 2025-02-21 NOTE — PROGRESS NOTES
[facility] GERIATRICS    Chief Complaint   Patient presents with    RECHECK     HPI:  Melvin Abad is a 89 year old  (4/24/1928), who is being seen today for an episodic care visit at: [facility] AND REHAB [facility] ([facility]) [[id number]]. Patient was hospitalized at [facility] from 2/3/2025 through 2/6/2025.      Patient is a 89 year-old male with past medical history significant for Afib, history of CVA, HFpEF, hypertension, benign prostatic hyperplasia, chronic anemia, thrombocytopenia, type 2 diabetes, venous stasis, and Gilbert's syndrome. Patient tested positive for influenza A while at [facility] and was given tamiflu. He was brought to the ED for hypoxia and discharged the same day. However, when he was getting into his grandson's car upon discharge, he pulled his groin somehow and was readmitted to the ER for right groin pain. Patient was found to have a hematoma of right iliopsoas muscle. Previously, his INR on 1/31/25 was 8.3 and it was 5.9 on 2/3/25. Patient was given vitamin K and warfarin was held until discharge.     Today's concern is: Patient says he has been having sharp nerve pain of his legs and feet, especially the bottom of his left foot, and he can't help but yell out at night from the pain. Last night we tried gabapentin 400 mg instead of 200 mg at bedtime and he says that he requested oxycodone, but it didn't help completely and he had a bad night. He says that yesterday in PT, he was standing up a lot and he is not yet able to walk his prior ability of 50 feet with a walker. Patient agreeable to increase the gabapentin to fully optimize and try voltaren 1% gel to apply before bedtime. Denies lightheadedness, shortness of breath or chest pain. No c/o constipation.      Allergies, and PMH/PSH reviewed in EPIC today.  REVIEW OF SYSTEMS:  4 point ROS including Respiratory, CV, GI and , other than that noted in the HPI,  is negative    Objective:    /66   Pulse 71   Temp 97.9  F (36.6  C)   Resp 20   Wt 80.1 kg (176 lb 8 oz)   SpO2 97%   BMI 26.06 kg/m    GENERAL APPEARANCE:  Alert, in no distress, appears healthy  ENT:  Mouth and posterior oropharynx normal, moist mucous membranes, [place]  EYES:  EOM, conjunctivae, lids, pupils and irises normal  RESP:  respiratory effort and palpation of chest normal, lungs clear to auscultation   CV:  regular rate and rhythm, no murmur, rub, or gallop, no edema, +2 pedal pulses  ABDOMEN:  normal bowel sounds, soft, nontender, no hepatosplenomegaly or other masses  M/S:   Gait and station abnormal wheelchair dependent  SKIN:  dark purple discoloration of right medial thigh without swelling, warmth or induration  NEURO:   Cranial nerves 2-12 are normal tested and grossly at patient's baseline  PSYCH:  oriented X 3, affect and mood normal    Labs done in SNF are in [place] EPIC. Please refer to them using EPIC/Care Everywhere.    Assessment/Plan:    Hematoma of right iliopsoas muscle, subsequent encounter  Physical debility  Stable; no longer bleeding.  Manage pain with PRN tylenol and oxycodone 2.5 mg every 6 hours PRN.   PT/OT for rehabilitation     Chronic obstructive pulmonary disease, unspecified COPD type (H)  History of Influenza A  Treated with Tamiflu and prednisone while at [facility].  Afebrile and no cough. Continues using incentive spirometer.  Albuterol inhaler PRN     Longstanding persistent atrial fibrillation (H)  History of CVA (cerebrovascular accident)  Warfarin was held during hospital stay; INR goal 2-3; bring up to therapeutic level without heparin bridging.  2/7/25: INR 1.2. Warfarin 5 mg po daily and recheck INR on 2/11/25.   2/11/25: INR 1.9. Warfarin 5 mg po on 2/11. Warfarin 2.5 mg po on 2/12 and 2/13 and recheck INR on 2/14/25.  Continue metoprolol succinate 50 mg daily     Restless legs syndrome (RLS)  Continue gabapentin 200 mg BID  With neuropathic pain overnight, will increase  gabapentin to 400 mg TID and add voltaren 1% gel to apply topically to right medial thigh, bilateral lower legs and bilateral feet at bedtime.    Chronic right-sided heart failure (H)  Continue furosemide 20 mg daily  Daily weights and monitor for edema     Chronic kidney disease, stage 3a (H)  2/7/25: creatinine 1.01  Avoid nephrotoxic medications. Renally dose medications. Monitor electrolytes, creatinine and dehydration status.     Essential hypertension  Continue lisinopril 10 mg daily and low sodium, low fat diet  Monitor BP's.      Hyperlipidemia LDL goal <100  Continue simvastatin 40 mg at bedtime         Orders:  Gabapentin 400 mg po TID   Voltaren 1% gel to apply topically to right medial thigh, bilateral lower legs and bilateral feet at bedtime.      Electronically signed by: DINH Munoz CNP

## 2025-02-21 NOTE — LETTER
2/21/2025      Melvin Abad  C/o Toña Abad  [address]  [facility] [address]        [facility] GERIATRICS    Chief Complaint   Patient presents with     RECHECK     HPI:  Melvin Abad is a 89 year old  (4/24/1928), who is being seen today for an episodic care visit at: [facility] REHAB [facility] [[id number]]. Patient was hospitalized at [facility] from 2/3/2025 through 2/6/2025.      Patient is a 89 year-old male with past medical history significant for Afib, history of CVA, HFpEF, hypertension, benign prostatic hyperplasia, chronic anemia, thrombocytopenia, type 2 diabetes, venous stasis, and Gilbert's syndrome. Patient tested positive for influenza A while at [facility] and was given tamiflu. He was brought to the ED for hypoxia and discharged the same day. However, when he was getting into his grandson's car upon discharge, he pulled his groin somehow and was readmitted to the ER for right groin pain. Patient was found to have a hematoma of right iliopsoas muscle. Previously, his INR on 1/31/25 was 8.3 and it was 5.9 on 2/3/25. Patient was given vitamin K and warfarin was held until discharge.     Today's concern is: Patient says he has been having sharp nerve pain of his legs and feet, especially the bottom of his left foot, and he can't help but yell out at night from the pain. Last night we tried gabapentin 400 mg instead of 200 mg at bedtime and he says that he requested oxycodone, but it didn't help completely and he had a bad night. He says that yesterday in PT, he was standing up a lot and he is not yet able to walk his prior ability of 50 feet with a walker. Patient agreeable to increase the gabapentin to fully optimize and try voltaren 1% gel to apply before bedtime. Denies lightheadedness, shortness of breath or chest pain. No c/o constipation.      Allergies, and PMH/PSH reviewed in EPIC today.  REVIEW OF SYSTEMS:  4 point ROS  including Respiratory, CV, GI and , other than that noted in the HPI,  is negative    Objective:   /66   Pulse 71   Temp 97.9  F (36.6  C)   Resp 20   Wt 80.1 kg (176 lb 8 oz)   SpO2 97%   BMI 26.06 kg/m    GENERAL APPEARANCE:  Alert, in no distress, appears healthy  ENT:  Mouth and posterior oropharynx normal, moist mucous membranes, [place]  EYES:  EOM, conjunctivae, lids, pupils and irises normal  RESP:  respiratory effort and palpation of chest normal, lungs clear to auscultation   CV:  regular rate and rhythm, no murmur, rub, or gallop, no edema, +2 pedal pulses  ABDOMEN:  normal bowel sounds, soft, nontender, no hepatosplenomegaly or other masses  M/S:   Gait and station abnormal wheelchair dependent  SKIN:  dark purple discoloration of right medial thigh without swelling, warmth or induration  NEURO:   Cranial nerves 2-12 are normal tested and grossly at patient's baseline  PSYCH:  oriented X 3, affect and mood normal    Labs done in SNF are in [place] EPIC. Please refer to them using [facility]/Care Everywhere.    Assessment/Plan:    Hematoma of right iliopsoas muscle, subsequent encounter  Physical debility  Stable; no longer bleeding.  Manage pain with PRN tylenol and oxycodone 2.5 mg every 6 hours PRN.   PT/OT for rehabilitation     Chronic obstructive pulmonary disease, unspecified COPD type (H)  History of Influenza A  Treated with Tamiflu and prednisone while at [facility].  Afebrile and no cough. Continues using incentive spirometer.  Albuterol inhaler PRN     Longstanding persistent atrial fibrillation (H)  History of CVA (cerebrovascular accident)  Warfarin was held during hospital stay; INR goal 2-3; bring up to therapeutic level without heparin bridging.  2/7/25: INR 1.2. Warfarin 5 mg po daily and recheck INR on 2/11/25.   2/11/25: INR 1.9. Warfarin 5 mg po on 2/11. Warfarin 2.5 mg po on 2/12 and 2/13 and recheck INR on 2/14/25.  Continue metoprolol succinate 50 mg daily     Restless legs  syndrome (RLS)  Continue gabapentin 200 mg BID  With neuropathic pain overnight, will increase gabapentin to 400 mg TID and add voltaren 1% gel to apply topically to right medial thigh, bilateral lower legs and bilateral feet at bedtime.    Chronic right-sided heart failure (H)  Continue furosemide 20 mg daily  Daily weights and monitor for edema     Chronic kidney disease, stage 3a (H)  2/7/25: creatinine 1.01  Avoid nephrotoxic medications. Renally dose medications. Monitor electrolytes, creatinine and dehydration status.     Essential hypertension  Continue lisinopril 10 mg daily and low sodium, low fat diet  Monitor BP's.      Hyperlipidemia LDL goal <100  Continue simvastatin 40 mg at bedtime         Orders:  Gabapentin 400 mg po TID   Voltaren 1% gel to apply topically to right medial thigh, bilateral lower legs and bilateral feet at bedtime.      Electronically signed by: DINH uMnoz CNP         Sincerely,        DINH Munoz CNP    Electronically signed

## 2025-02-28 ENCOUNTER — TRANSITIONAL CARE UNIT VISIT (OUTPATIENT)
Dept: GERIATRICS | Facility: CLINIC | Age: OVER 89
End: 2025-02-28
Payer: COMMERCIAL

## 2025-02-28 VITALS
HEART RATE: 64 BPM | DIASTOLIC BLOOD PRESSURE: 77 MMHG | TEMPERATURE: 97.7 F | OXYGEN SATURATION: 96 % | WEIGHT: 189.5 LBS | BODY MASS INDEX: 27.98 KG/M2 | SYSTOLIC BLOOD PRESSURE: 141 MMHG | RESPIRATION RATE: 18 BRPM

## 2025-02-28 DIAGNOSIS — G25.81 RESTLESS LEGS SYNDROME (RLS): Primary | ICD-10-CM

## 2025-02-28 DIAGNOSIS — Z87.09 HISTORY OF INFLUENZA: ICD-10-CM

## 2025-02-28 DIAGNOSIS — I50.812 CHRONIC RIGHT-SIDED HEART FAILURE (H): ICD-10-CM

## 2025-02-28 DIAGNOSIS — S70.11XD HEMATOMA OF RIGHT ILIOPSOAS MUSCLE, SUBSEQUENT ENCOUNTER: ICD-10-CM

## 2025-02-28 DIAGNOSIS — R53.81 PHYSICAL DEBILITY: ICD-10-CM

## 2025-02-28 DIAGNOSIS — E78.5 HYPERLIPIDEMIA LDL GOAL <100: ICD-10-CM

## 2025-02-28 DIAGNOSIS — I48.11 LONGSTANDING PERSISTENT ATRIAL FIBRILLATION (H): ICD-10-CM

## 2025-02-28 DIAGNOSIS — N18.31 CHRONIC KIDNEY DISEASE, STAGE 3A (H): ICD-10-CM

## 2025-02-28 DIAGNOSIS — I10 ESSENTIAL HYPERTENSION: ICD-10-CM

## 2025-02-28 DIAGNOSIS — J44.9 CHRONIC OBSTRUCTIVE PULMONARY DISEASE, UNSPECIFIED COPD TYPE (H): ICD-10-CM

## 2025-02-28 DIAGNOSIS — Z86.73 HISTORY OF CVA (CEREBROVASCULAR ACCIDENT): ICD-10-CM

## 2025-02-28 PROCEDURE — 99309 SBSQ NF CARE MODERATE MDM 30: CPT

## 2025-02-28 NOTE — PROGRESS NOTES
[facility] GERIATRICS    Chief Complaint   Patient presents with    RECHECK     HPI:  Melvin Abad is a 89 year old  (4/24/1928), who is being seen today for an episodic care visit at: [facility] AND REHAB [facility] ([facility]) [[id number]]. Patient was hospitalized at [facility] from 2/3/2025 through 2/6/2025.      Patient is a 89 year-old male with past medical history significant for Afib, history of CVA, HFpEF, hypertension, benign prostatic hyperplasia, chronic anemia, thrombocytopenia, type 2 diabetes, venous stasis, and Gilbert's syndrome. Patient tested positive for influenza A while at [facility] and was given tamiflu. He was brought to the ED for hypoxia and discharged the same day. However, when he was getting into his grandson's car upon discharge, he pulled his groin somehow and was readmitted to the ER for right groin pain. Patient was found to have a hematoma of right iliopsoas muscle. Previously, his INR on 1/31/25 was 8.3 and it was 5.9 on 2/3/25. Patient was given vitamin K and warfarin was held until discharge.     Today's concern is: Staff reports weight gain with weight of 183 lbs on 2/25, 187.5 lbs on 2/26 and 189.5 lbs on 2/27/25. Patient says he is has not noticed more edema, shortness of breath, and says he used to weight 200 lbs before hospitalization. Will continue to monitor weights as could be related to increased gabapentin. Patient reports that his right hip and right knee have more pain at night and he is having trouble getting tylenol when he requests it. Will schedule tylenol at bedtime. Patient worried because he reports that his legs are weak and he seems resigned to the fact that he might be wheelchair bound due to his old age. Denies lightheadedness, shortness of breath or chest pain.      Allergies, and PMH/PSH reviewed in EPIC today.  REVIEW OF SYSTEMS:  4 point ROS including Respiratory, CV, GI and , other than that noted in the  HPI,  is negative    Objective:   BP (!) 141/77   Pulse 64   Temp 97.7  F (36.5  C)   Resp 18   Wt 86 kg (189 lb 8 oz)   SpO2 96%   BMI 27.98 kg/m    GENERAL APPEARANCE:  Alert, in no distress, appears healthy  ENT:  Mouth and posterior oropharynx normal, moist mucous membranes, [place]  EYES:  EOM, conjunctivae, lids, pupils and irises normal  RESP:  respiratory effort and palpation of chest normal, lungs clear to auscultation   CV:  regular rate and rhythm, no murmur, rub, or gallop, +2 pedal pulses, trace edema of lower legs, compression socks in place bilaterally and kinesiotape on right knee and foot.   ABDOMEN:  normal bowel sounds, soft, nontender, no hepatosplenomegaly or other masses  M/S:   generalized weakness of lower extremities  SKIN:  dark purple discoloration of right medial thigh without swelling, warmth or induration  NEURO:   Cranial nerves 2-12 are normal tested and grossly at patient's baseline  PSYCH:  oriented X 3, affect and mood normal    Labs done in SNF are in [place] EPIC. Please refer to them using EPIC/Care Everywhere.    Assessment/Plan:  Hematoma of right iliopsoas muscle, subsequent encounter  Physical debility  Stable; no longer bleeding.  Manage pain with PRN tylenol and oxycodone 2.5 mg every 6 hours PRN.   PT/OT for rehabilitation  Lidocaine 4% external patch; apply patches to right hip and right superior knee daily in AM and remove in 12 hours.      Chronic obstructive pulmonary disease, unspecified COPD type (H)  History of Influenza A  Treated with Tamiflu and prednisone while at [facility].  Afebrile and no cough. Continues using incentive spirometer.  Albuterol inhaler PRN     Longstanding persistent atrial fibrillation (H)  History of CVA (cerebrovascular accident)  Warfarin was held during hospital stay; INR goal 2-3; bring up to therapeutic level without heparin bridging.  2/7/25: INR 1.2. Warfarin 5 mg po daily and recheck INR on 2/11/25.   2/11/25: INR 1.9. Warfarin  5 mg po on 2/11. Warfarin 2.5 mg po on 2/12 and 2/13 and recheck INR on 2/14/25.  Continue metoprolol succinate 50 mg daily     Restless legs syndrome (RLS)  Continue gabapentin 200 mg BID  With neuropathic pain overnight, will increase gabapentin to 400 mg TID and add voltaren 1% gel to apply topically to right medial thigh, bilateral lower legs and bilateral feet at bedtime.     Chronic right-sided heart failure (H)  Continue furosemide 20 mg daily  Daily weights and monitor for edema     Chronic kidney disease, stage 3a (H)  2/7/25: creatinine 1.01  Avoid nephrotoxic medications. Renally dose medications. Monitor electrolytes, creatinine and dehydration status.     Essential hypertension  Continue lisinopril 10 mg daily and low sodium, low fat diet  Monitor BP's.      Hyperlipidemia LDL goal <100  Continue simvastatin 40 mg at bedtime           Orders:  Tylenol 1000 mg po daily at bedtime.  Lidocaine 4% external patch; apply patches to right hip and right superior knee daily in AM and remove in 12 hours.     Electronically signed by: DINH Munoz CNP

## 2025-02-28 NOTE — LETTER
2/28/2025      Melvin Abad  C/o Toña Abad  [address]  [facility] [address]        [facility] GERIATRICS    Chief Complaint   Patient presents with     RECHECK     HPI:  Melvin Abad is a 89 year old  (4/24/1928), who is being seen today for an episodic care visit at: [facility] AND REHAB [facility] ([facility]) [[id number]]. Patient was hospitalized at [facility] from 2/3/2025 through 2/6/2025.      Patient is a 89 year-old male with past medical history significant for Afib, history of CVA, HFpEF, hypertension, benign prostatic hyperplasia, chronic anemia, thrombocytopenia, type 2 diabetes, venous stasis, and Gilbert's syndrome. Patient tested positive for influenza A while at [facility] and was given tamiflu. He was brought to the ED for hypoxia and discharged the same day. However, when he was getting into his grandson's car upon discharge, he pulled his groin somehow and was readmitted to the ER for right groin pain. Patient was found to have a hematoma of right iliopsoas muscle. Previously, his INR on 1/31/25 was 8.3 and it was 5.9 on 2/3/25. Patient was given vitamin K and warfarin was held until discharge.     Today's concern is: Staff reports weight gain with weight of 183 lbs on 2/25, 187.5 lbs on 2/26 and 189.5 lbs on 2/27/25. Patient says he is has not noticed more edema, shortness of breath, and says he used to weight 200 lbs before hospitalization. Will continue to monitor weights as could be related to increased gabapentin. Patient reports that his right hip and right knee have more pain at night and he is having trouble getting tylenol when he requests it. Will schedule tylenol at bedtime. Patient worried because he reports that his legs are weak and he seems resigned to the fact that he might be wheelchair bound due to his old age. Denies lightheadedness, shortness of breath or chest pain.      Allergies, and PMH/PSH reviewed in EPIC  today.  REVIEW OF SYSTEMS:  4 point ROS including Respiratory, CV, GI and , other than that noted in the HPI,  is negative    Objective:   BP (!) 141/77   Pulse 64   Temp 97.7  F (36.5  C)   Resp 18   Wt 86 kg (189 lb 8 oz)   SpO2 96%   BMI 27.98 kg/m    GENERAL APPEARANCE:  Alert, in no distress, appears healthy  ENT:  Mouth and posterior oropharynx normal, moist mucous membranes, [place]  EYES:  EOM, conjunctivae, lids, pupils and irises normal  RESP:  respiratory effort and palpation of chest normal, lungs clear to auscultation   CV:  regular rate and rhythm, no murmur, rub, or gallop, +2 pedal pulses, trace edema of lower legs, compression socks in place bilaterally and kinesiotape on right knee and foot.   ABDOMEN:  normal bowel sounds, soft, nontender, no hepatosplenomegaly or other masses  M/S:   generalized weakness of lower extremities  SKIN:  dark purple discoloration of right medial thigh without swelling, warmth or induration  NEURO:   Cranial nerves 2-12 are normal tested and grossly at patient's baseline  PSYCH:  oriented X 3, affect and mood normal    Labs done in SNF are in [place] EPIC. Please refer to them using EPIC/Care Everywhere.    Assessment/Plan:  Hematoma of right iliopsoas muscle, subsequent encounter  Physical debility  Stable; no longer bleeding.  Manage pain with PRN tylenol and oxycodone 2.5 mg every 6 hours PRN.   PT/OT for rehabilitation  Lidocaine 4% external patch; apply patches to right hip and right superior knee daily in AM and remove in 12 hours.      Chronic obstructive pulmonary disease, unspecified COPD type (H)  History of Influenza A  Treated with Tamiflu and prednisone while at [facility].  Afebrile and no cough. Continues using incentive spirometer.  Albuterol inhaler PRN     Longstanding persistent atrial fibrillation (H)  History of CVA (cerebrovascular accident)  Warfarin was held during hospital stay; INR goal 2-3; bring up to therapeutic level without heparin  bridging.  2/7/25: INR 1.2. Warfarin 5 mg po daily and recheck INR on 2/11/25.   2/11/25: INR 1.9. Warfarin 5 mg po on 2/11. Warfarin 2.5 mg po on 2/12 and 2/13 and recheck INR on 2/14/25.  Continue metoprolol succinate 50 mg daily     Restless legs syndrome (RLS)  Continue gabapentin 200 mg BID  With neuropathic pain overnight, will increase gabapentin to 400 mg TID and add voltaren 1% gel to apply topically to right medial thigh, bilateral lower legs and bilateral feet at bedtime.     Chronic right-sided heart failure (H)  Continue furosemide 20 mg daily  Daily weights and monitor for edema     Chronic kidney disease, stage 3a (H)  2/7/25: creatinine 1.01  Avoid nephrotoxic medications. Renally dose medications. Monitor electrolytes, creatinine and dehydration status.     Essential hypertension  Continue lisinopril 10 mg daily and low sodium, low fat diet  Monitor BP's.      Hyperlipidemia LDL goal <100  Continue simvastatin 40 mg at bedtime           Orders:  Tylenol 1000 mg po daily at bedtime.  Lidocaine 4% external patch; apply patches to right hip and right superior knee daily in AM and remove in 12 hours.     Electronically signed by: DINH Munoz CNP         Sincerely,        DINH Munoz CNP    Electronically signed

## 2025-03-03 ENCOUNTER — DOCUMENTATION ONLY (OUTPATIENT)
Dept: ANTICOAGULATION | Facility: CLINIC | Age: OVER 89
End: 2025-03-03
Payer: COMMERCIAL

## 2025-03-05 RX ORDER — LIDOCAINE 4 G/G
2 PATCH TOPICAL EVERY 24 HOURS
COMMUNITY

## 2025-03-05 RX ORDER — GABAPENTIN 100 MG/1
400 CAPSULE ORAL 3 TIMES DAILY
COMMUNITY
Start: 2025-03-05

## 2025-03-13 ENCOUNTER — TRANSITIONAL CARE UNIT VISIT (OUTPATIENT)
Dept: GERIATRICS | Facility: CLINIC | Age: OVER 89
End: 2025-03-13
Payer: COMMERCIAL

## 2025-03-13 VITALS
TEMPERATURE: 97.9 F | RESPIRATION RATE: 17 BRPM | WEIGHT: 212 LBS | DIASTOLIC BLOOD PRESSURE: 69 MMHG | BODY MASS INDEX: 31.31 KG/M2 | OXYGEN SATURATION: 93 % | HEART RATE: 67 BPM | SYSTOLIC BLOOD PRESSURE: 135 MMHG

## 2025-03-13 DIAGNOSIS — I48.11 LONGSTANDING PERSISTENT ATRIAL FIBRILLATION (H): ICD-10-CM

## 2025-03-13 DIAGNOSIS — R60.0 BILATERAL LOWER EXTREMITY EDEMA: ICD-10-CM

## 2025-03-13 DIAGNOSIS — I10 ESSENTIAL HYPERTENSION: ICD-10-CM

## 2025-03-13 DIAGNOSIS — E78.5 HYPERLIPIDEMIA LDL GOAL <100: ICD-10-CM

## 2025-03-13 DIAGNOSIS — N18.31 CHRONIC KIDNEY DISEASE, STAGE 3A (H): ICD-10-CM

## 2025-03-13 DIAGNOSIS — S70.11XD HEMATOMA OF RIGHT ILIOPSOAS MUSCLE, SUBSEQUENT ENCOUNTER: Primary | ICD-10-CM

## 2025-03-13 DIAGNOSIS — J44.9 CHRONIC OBSTRUCTIVE PULMONARY DISEASE, UNSPECIFIED COPD TYPE (H): ICD-10-CM

## 2025-03-13 DIAGNOSIS — G25.81 RESTLESS LEGS SYNDROME (RLS): ICD-10-CM

## 2025-03-13 DIAGNOSIS — I50.812 CHRONIC RIGHT-SIDED HEART FAILURE (H): ICD-10-CM

## 2025-03-13 DIAGNOSIS — Z86.73 HISTORY OF CVA (CEREBROVASCULAR ACCIDENT): ICD-10-CM

## 2025-03-13 DIAGNOSIS — R53.81 PHYSICAL DEBILITY: ICD-10-CM

## 2025-03-13 PROCEDURE — 99309 SBSQ NF CARE MODERATE MDM 30: CPT

## 2025-03-13 NOTE — LETTER
3/13/2025      Melvin Abad  C/o Toña Abad  [address]  [facility] [address]        No notes on file      Sincerely,        DINH Munoz CNP    Electronically signed   able to transfer to another chair independently. Denies lightheadedness, shortness of breath or chest pain.    Allergies, and PMH/PSH reviewed in EPIC today.  REVIEW OF SYSTEMS:  4 point ROS including Respiratory, CV, GI and , other than that noted in the HPI,  is negative    Objective:   /69   Pulse 67   Temp 97.9  F (36.6  C)   Resp 17   Wt 96.2 kg (212 lb)   SpO2 93%   BMI 31.31 kg/m    GENERAL APPEARANCE:  Alert, in no distress, appears healthy  ENT:  Mouth and posterior oropharynx normal, moist mucous membranes, [place]  EYES:  EOM, conjunctivae, lids, pupils and irises normal  RESP:  respiratory effort and palpation of chest normal, rhonchi in lower lobes  CV:  regular rate and rhythm, no murmur, rub, or gallop, +2 pedal pulses, 2 + edema of lower legs and feet, compression socks in place bilaterally  ABDOMEN:  normal bowel sounds, soft, nontender, no hepatosplenomegaly or other masses  M/S:   generalized weakness of lower extremities  SKIN:  intact bullous on posterior right heel with no sxsx of infection  NEURO:   Cranial nerves 2-12 are normal tested and grossly at patient's baseline  PSYCH:  oriented X 3, affect and mood normal    [facility]. Please refer to them using [facility]/Care Everywhere.    Assessment/Plan:    Hematoma of right iliopsoas muscle, subsequent encounter  Physical debility  Stable; no longer bleeding.  Manage pain with PRN tylenol and oxycodone 2.5 mg every 6 hours PRN.   PT/OT for rehabilitation  Lidocaine 4% external patch; apply patches to right hip and right superior knee daily in AM and remove in 12 hours.      Chronic obstructive pulmonary disease, unspecified COPD type (H)  History of Influenza A  Treated with Tamiflu and prednisone while at [facility].  Afebrile and no cough. Continues using incentive spirometer.  Albuterol inhaler PRN     Longstanding persistent atrial fibrillation (H)  History of CVA (cerebrovascular accident)  Warfarin was  held during hospital stay; INR goal 2-3; bring up to therapeutic level without heparin bridging.  2/7/25: INR 1.2. Warfarin 5 mg po daily and recheck INR on 2/11/25.   2/11/25: INR 1.9. Warfarin 5 mg po on 2/11. Warfarin 2.5 mg po on 2/12 and 2/13 and recheck INR on 2/14/25.  Continue metoprolol succinate 50 mg daily     Restless legs syndrome (RLS)  Continue gabapentin 200 mg BID  With neuropathic pain overnight, will increase gabapentin to 400 mg TID and add voltaren 1% gel to apply topically to right medial thigh, bilateral lower legs and bilateral feet at bedtime.  Due to increased lower extremity swelling, will decrease gabapentin to 200 mg BID.      Bilateral lower extremity edema  Chronic right-sided heart failure (H)  Continue furosemide 20 mg daily  Daily weights and monitor for edema  Increased weight at 212 lbs; will give lasix 20 mg po BID x 3 days    Chronic kidney disease, stage 3a (H)  2/7/25: creatinine 1.01  Avoid nephrotoxic medications. Renally dose medications. Monitor electrolytes, creatinine and dehydration status.  Recheck BMP on 3/17/25.     Essential hypertension  Continue lisinopril 10 mg daily and low sodium, low fat diet  Monitor BP's.      Hyperlipidemia LDL goal <100  Continue simvastatin 40 mg at bedtime      Orders:  Decrease gabapentin 200 mg po BID.  Will give lasix 20 mg po BID x 3 days  Recheck BMP on 3/17/25.      Electronically signed by: DINH Munoz CNP       Sincerely,        DINH Munoz CNP    Electronically signed

## 2025-03-14 ENCOUNTER — LAB REQUISITION (OUTPATIENT)
Dept: LAB | Facility: CLINIC | Age: OVER 89
End: 2025-03-14
Payer: COMMERCIAL

## 2025-03-14 DIAGNOSIS — R60.0 LOCALIZED EDEMA: ICD-10-CM

## 2025-03-17 ENCOUNTER — DISCHARGE SUMMARY NURSING HOME (OUTPATIENT)
Dept: GERIATRICS | Facility: CLINIC | Age: OVER 89
End: 2025-03-17
Payer: COMMERCIAL

## 2025-03-17 VITALS
BODY MASS INDEX: 31.07 KG/M2 | OXYGEN SATURATION: 93 % | SYSTOLIC BLOOD PRESSURE: 133 MMHG | HEART RATE: 67 BPM | TEMPERATURE: 98.1 F | WEIGHT: 210.4 LBS | DIASTOLIC BLOOD PRESSURE: 69 MMHG | RESPIRATION RATE: 16 BRPM

## 2025-03-17 DIAGNOSIS — I50.812 CHRONIC RIGHT-SIDED HEART FAILURE (H): ICD-10-CM

## 2025-03-17 DIAGNOSIS — J44.9 CHRONIC OBSTRUCTIVE PULMONARY DISEASE, UNSPECIFIED COPD TYPE (H): ICD-10-CM

## 2025-03-17 DIAGNOSIS — E78.5 HYPERLIPIDEMIA LDL GOAL <100: ICD-10-CM

## 2025-03-17 DIAGNOSIS — G25.81 RESTLESS LEGS SYNDROME (RLS): ICD-10-CM

## 2025-03-17 DIAGNOSIS — Z86.73 HISTORY OF CVA (CEREBROVASCULAR ACCIDENT): ICD-10-CM

## 2025-03-17 DIAGNOSIS — I48.11 LONGSTANDING PERSISTENT ATRIAL FIBRILLATION (H): ICD-10-CM

## 2025-03-17 DIAGNOSIS — I10 ESSENTIAL HYPERTENSION: ICD-10-CM

## 2025-03-17 DIAGNOSIS — R60.0 BILATERAL LOWER EXTREMITY EDEMA: ICD-10-CM

## 2025-03-17 DIAGNOSIS — N18.31 CHRONIC KIDNEY DISEASE, STAGE 3A (H): ICD-10-CM

## 2025-03-17 DIAGNOSIS — E11.9 TYPE 2 DIABETES MELLITUS WITHOUT COMPLICATION, WITHOUT LONG-TERM CURRENT USE OF INSULIN (H): ICD-10-CM

## 2025-03-17 DIAGNOSIS — S70.11XD HEMATOMA OF RIGHT ILIOPSOAS MUSCLE, SUBSEQUENT ENCOUNTER: Primary | ICD-10-CM

## 2025-03-17 DIAGNOSIS — R53.81 PHYSICAL DEBILITY: ICD-10-CM

## 2025-03-17 PROBLEM — E55.9 VITAMIN D DEFICIENCY, UNSPECIFIED: Status: ACTIVE | Noted: 2025-02-06

## 2025-03-17 PROBLEM — R05.9 COUGH, UNSPECIFIED: Status: ACTIVE | Noted: 2025-02-06

## 2025-03-17 PROBLEM — H57.9 UNSPECIFIED DISORDER OF EYE AND ADNEXA: Status: ACTIVE | Noted: 2025-02-06

## 2025-03-17 PROBLEM — R06.2 WHEEZING: Status: ACTIVE | Noted: 2025-02-06

## 2025-03-17 PROBLEM — J10.1 INFLUENZA DUE TO OTHER IDENTIFIED INFLUENZA VIRUS WITH OTHER RESPIRATORY MANIFESTATIONS: Status: ACTIVE | Noted: 2025-02-06

## 2025-03-17 PROBLEM — M25.50 PAIN IN UNSPECIFIED JOINT: Status: ACTIVE | Noted: 2025-02-06

## 2025-03-17 PROBLEM — H04.123 DRY EYE SYNDROME OF BILATERAL LACRIMAL GLANDS: Status: ACTIVE | Noted: 2025-02-06

## 2025-03-17 PROBLEM — K59.00 CONSTIPATION: Status: ACTIVE | Noted: 2025-03-17

## 2025-03-17 PROBLEM — K59.00 CONSTIPATION, UNSPECIFIED: Status: ACTIVE | Noted: 2025-02-06

## 2025-03-17 PROBLEM — H91.90 HEARING LOSS: Status: ACTIVE | Noted: 2025-03-17

## 2025-03-17 PROBLEM — M79.81 NONTRAUMATIC HEMATOMA OF SOFT TISSUE: Status: ACTIVE | Noted: 2025-02-06

## 2025-03-17 PROBLEM — H90.5 SENSORINEURAL HEARING LOSS: Status: ACTIVE | Noted: 2025-03-17

## 2025-03-17 PROBLEM — H93.19 SUBJECTIVE TINNITUS: Status: ACTIVE | Noted: 2025-03-17

## 2025-03-17 PROBLEM — R52 PAIN, UNSPECIFIED: Status: ACTIVE | Noted: 2025-02-06

## 2025-03-17 PROBLEM — E66.3 OVERWEIGHT: Status: ACTIVE | Noted: 2025-02-06

## 2025-03-17 PROBLEM — R53.1 WEAKNESS: Status: ACTIVE | Noted: 2019-12-21

## 2025-03-17 PROBLEM — I48.19 PERSISTENT ATRIAL FIBRILLATION (H): Status: ACTIVE | Noted: 2025-03-17

## 2025-03-17 PROBLEM — R33.9 RETENTION OF URINE, UNSPECIFIED: Status: ACTIVE | Noted: 2025-02-06

## 2025-03-17 PROBLEM — R73.9 HYPERGLYCEMIA: Status: ACTIVE | Noted: 2025-03-17

## 2025-03-17 PROBLEM — I48.20 CHRONIC ATRIAL FIBRILLATION, UNSPECIFIED (H): Status: ACTIVE | Noted: 2025-02-06

## 2025-03-17 PROBLEM — J30.9 ALLERGIC RHINITIS, UNSPECIFIED: Status: ACTIVE | Noted: 2025-02-06

## 2025-03-17 PROBLEM — M62.81 MUSCLE WEAKNESS (GENERALIZED): Status: ACTIVE | Noted: 2025-02-06

## 2025-03-17 PROBLEM — R06.02 SHORTNESS OF BREATH: Status: ACTIVE | Noted: 2025-02-06

## 2025-03-17 PROBLEM — I63.50 CEREBROVASCULAR ACCIDENT OF RIGHT PONTINE STRUCTURE (H): Status: ACTIVE | Noted: 2019-12-21

## 2025-03-17 PROBLEM — J34.89 OTHER SPECIFIED DISORDERS OF NOSE AND NASAL SINUSES: Status: ACTIVE | Noted: 2025-02-06

## 2025-03-17 PROBLEM — N40.0 BENIGN PROSTATIC HYPERPLASIA WITHOUT LOWER URINARY TRACT SYMPTOMS: Status: ACTIVE | Noted: 2025-02-06

## 2025-03-17 PROBLEM — Z87.891 PERSONAL HISTORY OF NICOTINE DEPENDENCE: Status: ACTIVE | Noted: 2025-02-06

## 2025-03-17 PROBLEM — R09.81 NASAL CONGESTION: Status: ACTIVE | Noted: 2025-02-06

## 2025-03-17 PROBLEM — G60.9 HEREDITARY AND IDIOPATHIC NEUROPATHY, UNSPECIFIED: Status: ACTIVE | Noted: 2025-02-21

## 2025-03-17 LAB
ANION GAP SERPL CALCULATED.3IONS-SCNC: 7 MMOL/L (ref 7–15)
BUN SERPL-MCNC: 15.4 MG/DL (ref 8–23)
CALCIUM SERPL-MCNC: 9.5 MG/DL (ref 8.8–10.4)
CHLORIDE SERPL-SCNC: 105 MMOL/L (ref 98–107)
CREAT SERPL-MCNC: 1.03 MG/DL (ref 0.67–1.17)
EGFRCR SERPLBLD CKD-EPI 2021: 66 ML/MIN/1.73M2
ERYTHROCYTE [DISTWIDTH] IN BLOOD BY AUTOMATED COUNT: 15.7 % (ref 10–15)
GLUCOSE SERPL-MCNC: 92 MG/DL (ref 70–99)
HCO3 SERPL-SCNC: 28 MMOL/L (ref 22–29)
HCT VFR BLD AUTO: 33.2 % (ref 40–53)
HGB BLD-MCNC: 10.7 G/DL (ref 13.3–17.7)
MCH RBC QN AUTO: 31.8 PG (ref 26.5–33)
MCHC RBC AUTO-ENTMCNC: 32.2 G/DL (ref 31.5–36.5)
MCV RBC AUTO: 99 FL (ref 78–100)
PLATELET # BLD AUTO: 127 10E3/UL (ref 150–450)
POTASSIUM SERPL-SCNC: 4.4 MMOL/L (ref 3.4–5.3)
RBC # BLD AUTO: 3.36 10E6/UL (ref 4.4–5.9)
SODIUM SERPL-SCNC: 140 MMOL/L (ref 135–145)
WBC # BLD AUTO: 5.5 10E3/UL (ref 4–11)

## 2025-03-17 PROCEDURE — 82310 ASSAY OF CALCIUM: CPT

## 2025-03-17 PROCEDURE — 36415 COLL VENOUS BLD VENIPUNCTURE: CPT

## 2025-03-17 PROCEDURE — 80048 BASIC METABOLIC PNL TOTAL CA: CPT

## 2025-03-17 PROCEDURE — P9604 ONE-WAY ALLOW PRORATED TRIP: HCPCS

## 2025-03-17 PROCEDURE — 85014 HEMATOCRIT: CPT

## 2025-03-17 PROCEDURE — 99315 NF DSCHRG MGMT 30 MIN/LESS: CPT

## 2025-03-17 NOTE — LETTER
3/17/2025      Melvin Abad  C/o Toña Abad  [address]  [facility] [address]        [facility] GERIATRICS    Chief Complaint   Patient presents with     Discharge Summary Nursing Home     HPI:  Melvin Abad is a 89 year old  (4/24/1928), who is being seen today for an episodic care visit at: [facility] REH[facility] [[id number]]. Patient was hospitalized at [facility] from 2/3/2025 through 2/6/2025.      Patient is a 89 year-old male with past medical history significant for Afib, history of CVA, HFpEF, hypertension, benign prostatic hyperplasia, chronic anemia, thrombocytopenia, type 2 diabetes, venous stasis, and Gilbert's syndrome. Patient tested positive for influenza A while at [facility] and was given tamiflu. He was brought to the ED for hypoxia and discharged the same day. However, when he was getting into his grandson's car upon discharge, he pulled his groin somehow and was readmitted to the ER for right groin pain. Patient was found to have a hematoma of right iliopsoas muscle. Previously, his INR on 1/31/25 was 8.3 and it was 5.9 on 2/3/25. Patient was given vitamin K and warfarin was held until discharge.     Today's concern is:     Neuropathy pain- improved with foam boots overnight and has been sleeping well.     Weight gain- 3 days of extra 20 mg of lasix daily and weight has decreased slightly from 213 lbs to 208.9 lbs. Mo increase of edema with compression socks in place and no open wounds on legs. Lungs clear, no cough or trouble breathing.     Diarrhea- patient reports that he has had diarrhea since yesterday with one accident. Says that his neighbor also had diarrhea. Denies abdominal pain. Requesting to hold laxatives until improved.     Patient says he feels good besides the diarrhea. His appeal for continued rehab was denied. Patient is not going to return to [facility] as he is a 2 person assist and will be  staying at [facility] for LTC.     Allergies, and PMH/PSH reviewed in EPIC today.  REVIEW OF SYSTEMS:  4 point ROS including Respiratory, CV, GI and , other than that noted in the HPI,  is negative    Objective:   /69   Pulse 67   Temp 98.1  F (36.7  C)   Resp 16   Wt 95.4 kg (210 lb 6.4 oz)   SpO2 93%   BMI 31.07 kg/m    GENERAL APPEARANCE:  Alert, in no distress, appears healthy  ENT:  Mouth and posterior oropharynx normal, moist mucous membranes, [place]  EYES:  EOM, conjunctivae, lids, pupils and irises normal  RESP:  respiratory effort and palpation of chest normal, rhonchi in lower lobes  CV:  regular rate and rhythm, no murmur, rub, or gallop, +2 pedal pulses, 1+ edema of lower legs and feet, compression socks in place bilaterally  ABDOMEN:  normal bowel sounds, soft, nontender, no hepatosplenomegaly or other masses  M/S:   generalized weakness of lower extremities  SKIN:  intact bullous on posterior right heel with no sxsx of infection  NEURO:   Cranial nerves 2-12 are normal tested and grossly at patient's baseline  PSYCH:  oriented X 3, affect and mood normal    Lab Requisition on 03/17/2025   Component Date Value Ref Range Status     WBC Count 03/17/2025 5.5  4.0 - 11.0 10e3/uL Final     RBC Count 03/17/2025 3.36 (L)  4.40 - 5.90 10e6/uL Final     Hemoglobin 03/17/2025 10.7 (L)  13.3 - 17.7 g/dL Final     Hematocrit 03/17/2025 33.2 (L)  40.0 - 53.0 % Final     MCV 03/17/2025 99  78 - 100 fL Final     MCH 03/17/2025 31.8  26.5 - 33.0 pg Final     MCHC 03/17/2025 32.2  31.5 - 36.5 g/dL Final     RDW 03/17/2025 15.7 (H)  10.0 - 15.0 % Final     Platelet Count 03/17/2025 127 (L)  150 - 450 10e3/uL Final     Sodium 03/17/2025 140  135 - 145 mmol/L Final     Potassium 03/17/2025 4.4  3.4 - 5.3 mmol/L Final     Chloride 03/17/2025 105  98 - 107 mmol/L Final     Carbon Dioxide (CO2) 03/17/2025 28  22 - 29 mmol/L Final     Anion Gap 03/17/2025 7  7 - 15 mmol/L Final     Urea Nitrogen 03/17/2025 15.4  8.0 -  23.0 mg/dL Final     Creatinine 03/17/2025 1.03  0.67 - 1.17 mg/dL Final     GFR Estimate 03/17/2025 66  >60 mL/min/1.73m2 Final    eGFR calculated using 2021 CKD-EPI equation.     Calcium 03/17/2025 9.5  8.8 - 10.4 mg/dL Final     Glucose 03/17/2025 92  70 - 99 mg/dL Final       Assessment/Plan:    Hematoma of right iliopsoas muscle, subsequent encounter  Physical debility  Manage pain with PRN tylenol and oxycodone 2.5 mg every 6 hours PRN.   Lidocaine 4% external patch; apply patches to right hip and right superior knee daily in AM and remove in 12 hours.   PT/OT for rehabilitation; lost appeal and will be staying at [facility] in LTC    Chronic obstructive pulmonary disease, unspecified COPD type (H)  History of Influenza A  Treated with Tamiflu and prednisone while at [facility].  Afebrile and no cough. Continues using incentive spirometer.  Albuterol inhaler PRN     Longstanding persistent atrial fibrillation (H)  History of CVA (cerebrovascular accident)  Warfarin was held during hospital stay; INR goal 2-3; bring up to therapeutic level without heparin bridging.  Continue metoprolol succinate 50 mg daily     Restless legs syndrome (RLS)  Continue gabapentin 200 mg BID  With neuropathic pain overnight, will increase gabapentin to 400 mg TID and add voltaren 1% gel to apply topically to right medial thigh, bilateral lower legs and bilateral feet at bedtime.  Due to increased lower extremity swelling, will decrease gabapentin to 200 mg BID.      Bilateral lower extremity edema  Chronic right-sided heart failure (H)  Continue furosemide 20 mg daily  Daily weights and monitor for edema  Increased weight at 212 lbs; will give lasix 20 mg po BID x 3 days  Weight decreased to 108.9 lbs with edema slightly improved.      Chronic kidney disease, stage 3a (H)  2/7/25: creatinine 1.01  Avoid nephrotoxic medications. Renally dose medications. Monitor electrolytes, creatinine and dehydration status.  3/17/25: creatinine 1.03      Essential hypertension  Continue lisinopril 10 mg daily and low sodium, low fat diet  Monitor BP's.      Hyperlipidemia LDL goal <100  Continue simvastatin 40 mg at bedtime          Orders:  Hold miralax and metamucil for 3 days due to diarrhea.       Electronically signed by: DINH Munoz CNP       Sincerely,        DINH Munoz CNP    Electronically signed

## 2025-03-17 NOTE — PROGRESS NOTES
"[facility] GERIATRICS    Chief Complaint   Patient presents with    RECHECK     HPI:  Melvin Abad is a 89 year old  (4/24/1928), who is being seen today for an episodic care visit at: [facility] AND REHAB [facility] ([facility]) [[id number]]. Patient was hospitalized at [facility] from 2/3/2025 through 2/6/2025.      Patient is a 89 year-old male with past medical history significant for Afib, history of CVA, HFpEF, hypertension, benign prostatic hyperplasia, chronic anemia, thrombocytopenia, type 2 diabetes, venous stasis, and Gilbert's syndrome. Patient tested positive for influenza A while at [facility] and was given tamiflu. He was brought to the ED for hypoxia and discharged the same day. However, when he was getting into his grandson's car upon discharge, he pulled his groin somehow and was readmitted to the ER for right groin pain. Patient was found to have a hematoma of right iliopsoas muscle. Previously, his INR on 1/31/25 was 8.3 and it was 5.9 on 2/3/25. Patient was given vitamin K and warfarin was held until discharge.     Today's concern is: The scale, even though it may be inaccurate, is still showing a pronounced weight gain. The last 5 days, his weight has been 190 lbs, 209 lbs,, 213 lbs, 210 lbs, and 212 lbs today. Patient is wearing cindy socks and some edema is apparent. Per staff, patient has a blister on the back of right heel  and they are applying skin protectant daily. Patient reports that he his still having neuropathic pain of his legs at night; describes it as \"sharp shooting pain of heels and legs\". He says the tylenol is not effective, and oxycodone is somewhat effective. Discussed with patient that the swelling might be making his pain worse, so we will double lasix for 3 days and reassess on Monday, 3/17. He reports therapy went well today and he was able to transfer to another chair independently. Denies lightheadedness, shortness of breath or " chest pain.    Allergies, and PMH/PSH reviewed in EPIC today.  REVIEW OF SYSTEMS:  4 point ROS including Respiratory, CV, GI and , other than that noted in the HPI,  is negative    Objective:   /69   Pulse 67   Temp 97.9  F (36.6  C)   Resp 17   Wt 96.2 kg (212 lb)   SpO2 93%   BMI 31.31 kg/m    GENERAL APPEARANCE:  Alert, in no distress, appears healthy  ENT:  Mouth and posterior oropharynx normal, moist mucous membranes, [place]  EYES:  EOM, conjunctivae, lids, pupils and irises normal  RESP:  respiratory effort and palpation of chest normal, rhonchi in lower lobes  CV:  regular rate and rhythm, no murmur, rub, or gallop, +2 pedal pulses, 2 + edema of lower legs and feet, compression socks in place bilaterally  ABDOMEN:  normal bowel sounds, soft, nontender, no hepatosplenomegaly or other masses  M/S:   generalized weakness of lower extremities  SKIN:  intact bullous on posterior right heel with no sxsx of infection  NEURO:   Cranial nerves 2-12 are normal tested and grossly at patient's baseline  PSYCH:  oriented X 3, affect and mood normal    Labs done in SNF are in [facility]. Please refer to them using [facility]/Care Everywhere.    Assessment/Plan:    Hematoma of right iliopsoas muscle, subsequent encounter  Physical debility  Stable; no longer bleeding.  Manage pain with PRN tylenol and oxycodone 2.5 mg every 6 hours PRN.   PT/OT for rehabilitation  Lidocaine 4% external patch; apply patches to right hip and right superior knee daily in AM and remove in 12 hours.      Chronic obstructive pulmonary disease, unspecified COPD type (H)  History of Influenza A  Treated with Tamiflu and prednisone while at [facility].  Afebrile and no cough. Continues using incentive spirometer.  Albuterol inhaler PRN     Longstanding persistent atrial fibrillation (H)  History of CVA (cerebrovascular accident)  Warfarin was held during hospital stay; INR goal 2-3; bring up to therapeutic level without heparin  bridging.  2/7/25: INR 1.2. Warfarin 5 mg po daily and recheck INR on 2/11/25.   2/11/25: INR 1.9. Warfarin 5 mg po on 2/11. Warfarin 2.5 mg po on 2/12 and 2/13 and recheck INR on 2/14/25.  Continue metoprolol succinate 50 mg daily     Restless legs syndrome (RLS)  Continue gabapentin 200 mg BID  With neuropathic pain overnight, will increase gabapentin to 400 mg TID and add voltaren 1% gel to apply topically to right medial thigh, bilateral lower legs and bilateral feet at bedtime.  Due to increased lower extremity swelling, will decrease gabapentin to 200 mg BID.      Bilateral lower extremity edema  Chronic right-sided heart failure (H)  Continue furosemide 20 mg daily  Daily weights and monitor for edema  Increased weight at 212 lbs; will give lasix 20 mg po BID x 3 days    Chronic kidney disease, stage 3a (H)  2/7/25: creatinine 1.01  Avoid nephrotoxic medications. Renally dose medications. Monitor electrolytes, creatinine and dehydration status.  Recheck BMP on 3/17/25.     Essential hypertension  Continue lisinopril 10 mg daily and low sodium, low fat diet  Monitor BP's.      Hyperlipidemia LDL goal <100  Continue simvastatin 40 mg at bedtime      Orders:  Decrease gabapentin 200 mg po BID.  Will give lasix 20 mg po BID x 3 days  Recheck BMP on 3/17/25.      Electronically signed by: DINH Munoz CNP

## 2025-03-17 NOTE — PROGRESS NOTES
[facility] GERIATRICS DISCHARGE SUMMARY  PATIENT'S NAME: Melvin Abad  YOB: 1928  MEDICAL RECORD NUMBER:  [id number]  Place of Service where encounter took place:  [facility] AND REHAB [facility] ([facility]) [[id number]]    PRIMARY CARE PROVIDER AND CLINIC RESPONSIBLE AFTER TRANSFER:   DINH Silva CNP, [address] [facility] [address] ***   {FV GERIATRIC DISCHARGE DISPOSITION:189114}     Transferring providers: Jacques Mendenhall MD  Recent Hospitalization/ED:  [facility] stay 2/3/2025 to 2/6/2025.  Date of SNF Admission: February 06, 2025  Date of SNF (anticipated) Discharge: {fgsdcdate:132826}  Discharged to: {fgsdischargeto1:220335}  Cognitive Scores: {fgscog1:046609}  Physical Function: {fgsphysicalfunction:816014}  DME: {fgsdmedc:633448}    CODE STATUS/ADVANCE DIRECTIVES DISCUSSION:  No CPR- Do NOT Intubate {Provider, verify code status is accurate as an order in Epic}  ALLERGIES: Patient has no known allergies.    NURSING FACILITY COURSE   Medication Changes/Rationale:   ***    Summary of nursing facility stay:   {FGS DX2:689865}    Discharge Medications:  MED REC REQUIRED{TIP  Click the link below to document or use med rec list, use list to pull in response :277755}  Post Medication Reconciliation Status: {MED REC LIST:530277}     {Providers Please double check the med list (in the plan section >> meds & orders tab) and Discontinue any of the meds flagged by the TC to be discontinued}  Current Outpatient Medications   Medication Sig Dispense Refill    acetaminophen (TYLENOL) 500 MG tablet Take 2 tablets (1,000 mg) by mouth every 8 hours as needed for mild pain      albuterol (VENTOLIN HFA) 108 (90 Base) MCG/ACT inhaler Inhale 2 puffs into the lungs every 6 hours as needed for shortness of breath, wheezing or cough. 18 g 0    furosemide (LASIX) 20 MG tablet Take 1 tablet (20 mg) by mouth daily. 30 tablet 0     gabapentin (NEURONTIN) 100 MG capsule Take 4 capsules (400 mg) by mouth 3 times daily.      ketotifen fumarate 0.035%, ketotifen 0.025%, (ZADITOR) 0.025 % ophthalmic solution Place 1 drop into both eyes 2 times daily as needed for dry eyes Patient self-administers 5 mL 0    Lidocaine (LIDOCARE) 4 % Patch Place 2 patches onto the skin every 24 hours. To prevent lidocaine toxicity, patient should be patch free for 12 hrs daily.      lisinopril (ZESTRIL) 10 MG tablet Take 1 tablet (10 mg) by mouth daily.      loratadine (CLARITIN) 10 MG tablet Take 1 tablet (10 mg) by mouth daily 30 tablet 0    metoprolol succinate ER (TOPROL XL) 50 MG 24 hr tablet Take 1 tablet (50 mg) by mouth daily.      oxyCODONE (ROXICODONE) 5 MG tablet Take 0.5 tablets (2.5 mg) by mouth every 6 hours as needed for severe pain. 10 tablet 0    polyethylene glycol (MIRALAX) 17 GM/Dose powder Take 9 g by mouth daily as needed for constipation Mix and hand to patient, Patient self-administers (he knows how much he needs to keep regular) 510 g 0    psyllium (METAMUCIL/KONSYL) 58.6 % powder Take 18 g (1 Tablespoonful) by mouth daily Mix and hand to patient, Patient self-administers (he knows how much he needs to keep regular)      simvastatin (ZOCOR) 40 MG tablet Take 1 tablet (40 mg) by mouth at bedtime 30 tablet 0    sodium chloride (OCEAN) 0.65 % nasal spray Spray 2 sprays in nostril daily as needed for congestion Patient self-administers 30 mL 0    tamsulosin (FLOMAX) 0.4 MG capsule Take 1 capsule (0.4 mg) by mouth daily 30 capsule 0    Vitamin D3 (CHOLECALCIFEROL) 25 mcg (1000 units) tablet Take 1 tablet (25 mcg) by mouth daily      warfarin ANTICOAGULANT (COUMADIN) 2.5 MG tablet Take 2.5 mgs mg Sunday, Tuesday and Thursday, and 5 mgs all other days.  Recheck INR on 1/31/25 15 tablet 0      ***    Controlled medications:   {fgscontrolledmeds1:708728}     Past Medical History:   Past Medical History:   Diagnosis Date    Actinic keratosis     Atrial  fibrillation (H)     Basal cell carcinoma nos 06/01/2010    Mohs excision, rt upper lip & rt temple    Cardiac dysrhythmia, unspecified     Clostridium difficile enterocolitis 05/19/2024    COPD (chronic obstructive pulmonary disease) (H)     Cough     chronic cough    COVID-19 virus infection 10/15/2024    Diabetic eye exam (H) 08/29/2014    Generalized osteoarthrosis, unspecified site     djd of the knees, hands, and neck    Heart failure with preserved ejection fraction, NYHA class I (H)     Other diseases of lung, not elsewhere classified     pulmonary nodule, benign    Pure hypercholesterolemia     Unspecified essential hypertension      Physical Exam:   Vitals: /69   Pulse 67   Temp 98.1  F (36.7  C)   Resp 16   Wt 95.4 kg (210 lb 6.4 oz)   SpO2 93%   BMI 31.07 kg/m    BMI: Body mass index is 31.07 kg/m .  {NURSING HOME PHYSICAL EXAM:880944}     SNF labs: {fgslabdischarge:617486}    DISCHARGE PLAN:  Follow up labs: {fgsfuturelabs1:881450}  Medical Follow Up:      {fgsdischargefollowup:371895}  [facility] scheduled appointments:  Appointments in Next Year      Mar 17, 2025 8:00 AM  Discharge Summary with DINH Munoz [facility] ([facility] ) [phone]         ***  Discharge Services: {fgsdcservices1:320721}  Discharge Instructions Verbalized to Patient at Discharge:   {fgsdischargeinstructions1:776852}    TOTAL DISCHARGE TIME:   {TIME:166623}  Electronically signed by:  Elliott Hand ***    {fgshomecare F2F:426325}

## 2025-03-19 NOTE — PROGRESS NOTES
[facility] GERIATRICS    Chief Complaint   Patient presents with    Discharge Summary Nursing Home     HPI:  Melvin Abad is a 89 year old  (4/24/1928), who is being seen today for an episodic care visit at: [facility] AND REHAB [facility] ([facility]) [[id number]]. Patient was hospitalized at [facility] from 2/3/2025 through 2/6/2025.      Patient is a 89 year-old male with past medical history significant for Afib, history of CVA, HFpEF, hypertension, benign prostatic hyperplasia, chronic anemia, thrombocytopenia, type 2 diabetes, venous stasis, and Gilbert's syndrome. Patient tested positive for influenza A while at [facility] and was given tamiflu. He was brought to the ED for hypoxia and discharged the same day. However, when he was getting into his grandson's car upon discharge, he pulled his groin somehow and was readmitted to the ER for right groin pain. Patient was found to have a hematoma of right iliopsoas muscle. Previously, his INR on 1/31/25 was 8.3 and it was 5.9 on 2/3/25. Patient was given vitamin K and warfarin was held until discharge.     Today's concern is:     Neuropathy pain- improved with foam boots overnight and has been sleeping well.     Weight gain- 3 days of extra 20 mg of lasix daily and weight has decreased slightly from 213 lbs to 208.9 lbs. Mo increase of edema with compression socks in place and no open wounds on legs. Lungs clear, no cough or trouble breathing.     Diarrhea- patient reports that he has had diarrhea since yesterday with one accident. Says that his neighbor also had diarrhea. Denies abdominal pain. Requesting to hold laxatives until improved.     Patient says he feels good besides the diarrhea. His appeal for continued rehab was denied. Patient is not going to return to [facility] as he is a 2 person assist and will be staying at [place] for LTC.     Allergies, and PMH/PSH reviewed in [facility] today.  REVIEW OF SYSTEMS:  4  point ROS including Respiratory, CV, GI and , other than that noted in the HPI,  is negative    Objective:   /69   Pulse 67   Temp 98.1  F (36.7  C)   Resp 16   Wt 95.4 kg (210 lb 6.4 oz)   SpO2 93%   BMI 31.07 kg/m    GENERAL APPEARANCE:  Alert, in no distress, appears healthy  ENT:  Mouth and posterior oropharynx normal, moist mucous membranes, [place]  EYES:  EOM, conjunctivae, lids, pupils and irises normal  RESP:  respiratory effort and palpation of chest normal, rhonchi in lower lobes  CV:  regular rate and rhythm, no murmur, rub, or gallop, +2 pedal pulses, 1+ edema of lower legs and feet, compression socks in place bilaterally  ABDOMEN:  normal bowel sounds, soft, nontender, no hepatosplenomegaly or other masses  M/S:   generalized weakness of lower extremities  SKIN:  intact bullous on posterior right heel with no sxsx of infection  NEURO:   Cranial nerves 2-12 are normal tested and grossly at patient's baseline  PSYCH:  oriented X 3, affect and mood normal    Lab Requisition on 03/17/2025   Component Date Value Ref Range Status    WBC Count 03/17/2025 5.5  4.0 - 11.0 10e3/uL Final    RBC Count 03/17/2025 3.36 (L)  4.40 - 5.90 10e6/uL Final    Hemoglobin 03/17/2025 10.7 (L)  13.3 - 17.7 g/dL Final    Hematocrit 03/17/2025 33.2 (L)  40.0 - 53.0 % Final    MCV 03/17/2025 99  78 - 100 fL Final    MCH 03/17/2025 31.8  26.5 - 33.0 pg Final    MCHC 03/17/2025 32.2  31.5 - 36.5 g/dL Final    RDW 03/17/2025 15.7 (H)  10.0 - 15.0 % Final    Platelet Count 03/17/2025 127 (L)  150 - 450 10e3/uL Final    Sodium 03/17/2025 140  135 - 145 mmol/L Final    Potassium 03/17/2025 4.4  3.4 - 5.3 mmol/L Final    Chloride 03/17/2025 105  98 - 107 mmol/L Final    Carbon Dioxide (CO2) 03/17/2025 28  22 - 29 mmol/L Final    Anion Gap 03/17/2025 7  7 - 15 mmol/L Final    Urea Nitrogen 03/17/2025 15.4  8.0 - 23.0 mg/dL Final    Creatinine 03/17/2025 1.03  0.67 - 1.17 mg/dL Final    GFR Estimate 03/17/2025 66  >60  mL/min/1.73m2 Final    eGFR calculated using 2021 CKD-EPI equation.    Calcium 03/17/2025 9.5  8.8 - 10.4 mg/dL Final    Glucose 03/17/2025 92  70 - 99 mg/dL Final       Assessment/Plan:    Hematoma of right iliopsoas muscle, subsequent encounter  Physical debility  Manage pain with PRN tylenol and oxycodone 2.5 mg every 6 hours PRN.   Lidocaine 4% external patch; apply patches to right hip and right superior knee daily in AM and remove in 12 hours.   PT/OT for rehabilitation; lost appeal and will be staying at [place] in LTC    Chronic obstructive pulmonary disease, unspecified COPD type (H)  History of Influenza A  Treated with Tamiflu and prednisone while at [facility].  Afebrile and no cough. Continues using incentive spirometer.  Albuterol inhaler PRN     Longstanding persistent atrial fibrillation (H)  History of CVA (cerebrovascular accident)  Warfarin was held during hospital stay; INR goal 2-3; bring up to therapeutic level without heparin bridging.  Continue metoprolol succinate 50 mg daily     Restless legs syndrome (RLS)  Continue gabapentin 200 mg BID  With neuropathic pain overnight, will increase gabapentin to 400 mg TID and add voltaren 1% gel to apply topically to right medial thigh, bilateral lower legs and bilateral feet at bedtime.  Due to increased lower extremity swelling, will decrease gabapentin to 200 mg BID.      Bilateral lower extremity edema  Chronic right-sided heart failure (H)  Continue furosemide 20 mg daily  Daily weights and monitor for edema  Increased weight at 212 lbs; will give lasix 20 mg po BID x 3 days  Weight decreased to 108.9 lbs with edema slightly improved.      Chronic kidney disease, stage 3a (H)  2/7/25: creatinine 1.01  Avoid nephrotoxic medications. Renally dose medications. Monitor electrolytes, creatinine and dehydration status.  3/17/25: creatinine 1.03     Essential hypertension  Continue lisinopril 10 mg daily and low sodium, low fat diet  Monitor BP's.       Hyperlipidemia LDL goal <100  Continue simvastatin 40 mg at bedtime     Type 2 diabetes mellitus without complication, without long-term current use of insulin (H)   Last A1c 6.2, 2 years ago. Well-controlled with diet.   Recheck A1c with next labs.        Orders:  Hold miralax and metamucil for 3 days due to diarrhea.       Electronically signed by: DINH Munoz CNP

## 2025-03-22 ENCOUNTER — HEALTH MAINTENANCE LETTER (OUTPATIENT)
Age: OVER 89
End: 2025-03-22

## 2025-03-27 ENCOUNTER — PATIENT OUTREACH (OUTPATIENT)
Dept: GERIATRIC MEDICINE | Facility: CLINIC | Age: OVER 89
End: 2025-03-27

## 2025-03-27 ENCOUNTER — TRANSITIONAL CARE UNIT VISIT (OUTPATIENT)
Dept: GERIATRICS | Facility: CLINIC | Age: OVER 89
End: 2025-03-27
Payer: COMMERCIAL

## 2025-03-27 VITALS
DIASTOLIC BLOOD PRESSURE: 62 MMHG | OXYGEN SATURATION: 95 % | BODY MASS INDEX: 31.12 KG/M2 | SYSTOLIC BLOOD PRESSURE: 142 MMHG | TEMPERATURE: 97.7 F | HEIGHT: 69 IN | RESPIRATION RATE: 18 BRPM | HEART RATE: 80 BPM | WEIGHT: 210.1 LBS

## 2025-03-27 DIAGNOSIS — S70.11XD HEMATOMA OF RIGHT ILIOPSOAS MUSCLE, SUBSEQUENT ENCOUNTER: Primary | ICD-10-CM

## 2025-03-27 DIAGNOSIS — E11.9 TYPE 2 DIABETES MELLITUS WITHOUT COMPLICATION, WITHOUT LONG-TERM CURRENT USE OF INSULIN (H): ICD-10-CM

## 2025-03-27 DIAGNOSIS — I48.11 LONGSTANDING PERSISTENT ATRIAL FIBRILLATION (H): ICD-10-CM

## 2025-03-27 DIAGNOSIS — N18.31 CHRONIC KIDNEY DISEASE, STAGE 3A (H): ICD-10-CM

## 2025-03-27 DIAGNOSIS — S30.1XXD HEMATOMA OF RIGHT PSOAS REGION TO ANTICOAGULANT THERAPY, SUBSEQUENT ENCOUNTER: ICD-10-CM

## 2025-03-27 DIAGNOSIS — R53.81 PHYSICAL DEBILITY: ICD-10-CM

## 2025-03-27 DIAGNOSIS — G25.81 RESTLESS LEGS SYNDROME (RLS): ICD-10-CM

## 2025-03-27 DIAGNOSIS — J44.9 CHRONIC OBSTRUCTIVE PULMONARY DISEASE, UNSPECIFIED COPD TYPE (H): ICD-10-CM

## 2025-03-27 DIAGNOSIS — I50.812 CHRONIC RIGHT-SIDED HEART FAILURE (H): ICD-10-CM

## 2025-03-27 DIAGNOSIS — I10 ESSENTIAL HYPERTENSION: ICD-10-CM

## 2025-03-27 DIAGNOSIS — E78.5 HYPERLIPIDEMIA LDL GOAL <100: ICD-10-CM

## 2025-03-27 DIAGNOSIS — Z86.73 HISTORY OF CVA (CEREBROVASCULAR ACCIDENT): ICD-10-CM

## 2025-03-27 DIAGNOSIS — R60.0 BILATERAL LOWER EXTREMITY EDEMA: ICD-10-CM

## 2025-03-27 RX ORDER — OXYCODONE HYDROCHLORIDE 5 MG/1
2.5 TABLET ORAL EVERY 6 HOURS PRN
Qty: 30 TABLET | Refills: 0 | Status: SHIPPED | OUTPATIENT
Start: 2025-03-27

## 2025-03-27 NOTE — PROGRESS NOTES
[facility] GERIATRICS    Chief Complaint   Patient presents with    RECHECK     HPI:  Melvin Abad is a 89 year old  (4/24/1928), who is being seen today for an episodic care visit at: [facility] REHAB [facility] [[id number]]. Patient was hospitalized at [facility] from 2/3/2025 through 2/6/2025.      Patient is a 89 year-old male with past medical history significant for Afib, history of CVA, HFpEF, hypertension, benign prostatic hyperplasia, chronic anemia, thrombocytopenia, type 2 diabetes, venous stasis, and Gilbert's syndrome. Patient tested positive for influenza A while at [facility] and was given tamiflu. He was brought to the ED for hypoxia and discharged the same day. However, when he was getting into his grandson's car upon discharge, he pulled his groin somehow and was readmitted to the ER for right groin pain. Patient was found to have a hematoma of right iliopsoas muscle. Previously, his INR on 1/31/25 was 8.3 and it was 5.9 on 2/3/25. Patient was given vitamin K and warfarin was held until discharge. Patient is now staying in LTC at [facility] as he requires assistance with transfers and requires 2 pt lift.    Today's concern is: Patient says that his right medial thigh has been swollen and tender to the touch for the last week, however the tenderness has improved. He says he has applied ice packs to help with the pain. Overall his leg and heel pain have been much improved since wearing the heel compressive boots daily at bedtime. There is no erythema or warmth noted but there is some tautness and a bulge of swelling of his distal medial thigh. No increased edema of bilateral lower extremities and weight stable at 210 lbs. No pain with ROM and no numbness or pins or needles. Due to his history of hematoma of right iliopsoas and being on warfarin, will order doppler US to elucidate the size and extent if it is a hematoma vs ecchymosis and  "also rule out blood clot.     Allergies, and PMH/PSH reviewed in EPIC today.  REVIEW OF SYSTEMS:  4 point ROS including Respiratory, CV, GI and , other than that noted in the HPI,  is negative    Objective:   BP (!) 142/62   Pulse 80   Temp 97.7  F (36.5  C)   Resp 18   Ht 1.753 m (5' 9\")   Wt 95.3 kg (210 lb 1.6 oz)   SpO2 95%   BMI 31.03 kg/m    GENERAL APPEARANCE:  Alert, in no distress, appears healthy  ENT:  Mouth and posterior oropharynx normal, moist mucous membranes, [place]  EYES:  EOM, conjunctivae, lids, pupils and irises normal  RESP:  respiratory effort and palpation of chest normal, rhonchi in lower lobes  CV:  regular rate and rhythm, no murmur, rub, or gallop, +2 pedal pulses, 1 + edema of BL lower legs, compression socks in place bilaterally  ABDOMEN:  normal bowel sounds, soft, nontender, no hepatosplenomegaly or other masses  M/S:   generalized weakness of lower extremities  SKIN:  intact bullous on posterior right heel with no sxsx of infection, right medial thigh with slight bulge as compared to left leg without slight tenderness to firm palpation and no rash, erythema or warmth.   NEURO:   Cranial nerves 2-12 are normal tested and grossly at patient's baseline  PSYCH:  oriented X 3, affect and mood normal    Labs done in SNF are in [place] EPIC. Please refer to them using EPIC/Care Everywhere.    Assessment/Plan:    Hematoma of right iliopsoas muscle, subsequent encounter  Physical debility  Manage pain with PRN tylenol and oxycodone 2.5 mg every 6 hours PRN.   PT/OT for rehabilitation; lost appeal and will be staying at [place] in LTC     Chronic obstructive pulmonary disease, unspecified COPD type (H)  History of Influenza A  Treated with Tamiflu and prednisone while at [facility].  Afebrile and no cough. Continues using incentive spirometer.  Albuterol inhaler PRN     Longstanding persistent atrial fibrillation (H)  History of CVA (cerebrovascular accident)  Warfarin was held during " hospital stay; INR goal 2-3; bring up to therapeutic level without heparin bridging.  Continue metoprolol succinate 50 mg daily     Restless legs syndrome (RLS)  Continue gabapentin 200 mg BID  With neuropathic pain overnight, will increase gabapentin to 400 mg TID and add voltaren 1% gel to apply topically to right medial thigh, bilateral lower legs and bilateral feet at bedtime.  Due to increased lower extremity swelling, will decrease gabapentin to 200 mg BID.      Bilateral lower extremity edema  Chronic right-sided heart failure (H)  Continue furosemide 20 mg daily  Daily weights and monitor for edema  Increased weight at 212 lbs; will give lasix 20 mg po BID x 3 days  Weight decreased to 108.9 lbs with edema slightly improved.      Chronic kidney disease, stage 3a (H)  2/7/25: creatinine 1.01  Avoid nephrotoxic medications. Renally dose medications. Monitor electrolytes, creatinine and dehydration status.  3/17/25: creatinine 1.03     Essential hypertension  Continue lisinopril 10 mg daily and low sodium, low fat diet  Monitor BP's.      Hyperlipidemia LDL goal <100  Continue simvastatin 40 mg at bedtime     Type 2 diabetes mellitus without complication, without long-term current use of insulin (H)   Last A1c 6.2, 2 years ago. Well-controlled with diet.   Recheck A1c with next labs.          Orders:  Doppler US of right upper leg to elucidate the size and extent if it is a hematoma vs ecchymosis and also rule out blood clot.       Electronically signed by: DINH Munoz CNP

## 2025-03-27 NOTE — LETTER
3/27/2025      Melvin Abad  C/o Toña Abad  [address]  [facility] [address]        [facility] GERIATRICS    Chief Complaint   Patient presents with     RECHECK     HPI:  Melvin Abad is a 89 year old  (4/24/1928), who is being seen today for an episodic care visit at: [facility] [[id number]]. Patient was hospitalized at [facility] from 2/3/2025 through 2/6/2025.      Patient is a 89 year-old male with past medical history significant for Afib, history of CVA, HFpEF, hypertension, benign prostatic hyperplasia, chronic anemia, thrombocytopenia, type 2 diabetes, venous stasis, and Gilbert's syndrome. Patient tested positive for influenza A while at [facility] and was given tamiflu. He was brought to the ED for hypoxia and discharged the same day. However, when he was getting into his grandson's car upon discharge, he pulled his groin somehow and was readmitted to the ER for right groin pain. Patient was found to have a hematoma of right iliopsoas muscle. Previously, his INR on 1/31/25 was 8.3 and it was 5.9 on 2/3/25. Patient was given vitamin K and warfarin was held until discharge. Patient is now staying in LTC at [facility] as he requires assistance with transfers and requires 2 pt lift.    Today's concern is: Patient says that his right medial thigh has been swollen and tender to the touch for the last week, however the tenderness has improved. He says he has applied ice packs to help with the pain. Overall his leg and heel pain have been much improved since wearing the heel compressive boots daily at bedtime. There is no erythema or warmth noted but there is some tautness and a bulge of swelling of his distal medial thigh. No increased edema of bilateral lower extremities and weight stable at 210 lbs. No pain with ROM and no numbness or pins or needles. Due to his history of hematoma of right iliopsoas and being on  "warfarin, will order doppler US to elucidate the size and extent if it is a hematoma vs ecchymosis and also rule out blood clot.     Allergies, and PMH/PSH reviewed in EPIC today.  REVIEW OF SYSTEMS:  4 point ROS including Respiratory, CV, GI and , other than that noted in the HPI,  is negative    Objective:   BP (!) 142/62   Pulse 80   Temp 97.7  F (36.5  C)   Resp 18   Ht 1.753 m (5' 9\")   Wt 95.3 kg (210 lb 1.6 oz)   SpO2 95%   BMI 31.03 kg/m    GENERAL APPEARANCE:  Alert, in no distress, appears healthy  ENT:  Mouth and posterior oropharynx normal, moist mucous membranes, [place]  EYES:  EOM, conjunctivae, lids, pupils and irises normal  RESP:  respiratory effort and palpation of chest normal, rhonchi in lower lobes  CV:  regular rate and rhythm, no murmur, rub, or gallop, +2 pedal pulses, 1 + edema of BL lower legs, compression socks in place bilaterally  ABDOMEN:  normal bowel sounds, soft, nontender, no hepatosplenomegaly or other masses  M/S:   generalized weakness of lower extremities  SKIN:  intact bullous on posterior right heel with no sxsx of infection, right medial thigh with slight bulge as compared to left leg without slight tenderness to firm palpation and no rash, erythema or warmth.   NEURO:   Cranial nerves 2-12 are normal tested and grossly at patient's baseline  PSYCH:  oriented X 3, affect and mood normal    Labs done in SNF [facility] in [facility]. Please refer to them using [facility]/Care Everywhere.    Assessment/Plan:    Hematoma of right iliopsoas muscle, subsequent encounter  Physical debility  Manage pain with PRN tylenol and oxycodone 2.5 mg every 6 hours PRN.   PT/OT for rehabilitation; lost appeal and will be staying at [facility] in LTC     Chronic obstructive pulmonary disease, unspecified COPD type (H)  History of Influenza A  Treated with Tamiflu and prednisone while at [facility].  Afebrile and no cough. Continues using incentive spirometer.  Albuterol inhaler PRN     Longstanding " persistent atrial fibrillation (H)  History of CVA (cerebrovascular accident)  Warfarin was held during hospital stay; INR goal 2-3; bring up to therapeutic level without heparin bridging.  Continue metoprolol succinate 50 mg daily     Restless legs syndrome (RLS)  Continue gabapentin 200 mg BID  With neuropathic pain overnight, will increase gabapentin to 400 mg TID and add voltaren 1% gel to apply topically to right medial thigh, bilateral lower legs and bilateral feet at bedtime.  Due to increased lower extremity swelling, will decrease gabapentin to 200 mg BID.      Bilateral lower extremity edema  Chronic right-sided heart failure (H)  Continue furosemide 20 mg daily  Daily weights and monitor for edema  Increased weight at 212 lbs; will give lasix 20 mg po BID x 3 days  Weight decreased to 108.9 lbs with edema slightly improved.      Chronic kidney disease, stage 3a (H)  2/7/25: creatinine 1.01  Avoid nephrotoxic medications. Renally dose medications. Monitor electrolytes, creatinine and dehydration status.  3/17/25: creatinine 1.03     Essential hypertension  Continue lisinopril 10 mg daily and low sodium, low fat diet  Monitor BP's.      Hyperlipidemia LDL goal <100  Continue simvastatin 40 mg at bedtime     Type 2 diabetes mellitus without complication, without long-term current use of insulin (H)   Last A1c 6.2, 2 years ago. Well-controlled with diet.   Recheck A1c with next labs.          Orders:  Doppler US of right upper leg to elucidate the size and extent if it is a hematoma vs ecchymosis and also rule out blood clot.       Electronically signed by: DINH Munoz CNP         Sincerely,        DINH Munoz CNP    Electronically signed

## 2025-03-27 NOTE — PROGRESS NOTES
[facility] Care Coordination Contact    Per Epic review, member will not be returning to his assisted living as he is requiring a two person transfer. He will stay LTC at [facility].     Notified [facility] CC and E&E.    Maru Rodriguez RN  [facility]  Cell: [phone]

## 2025-03-28 NOTE — PROGRESS NOTES
"[facility]  [place] [facility] Record Number:  [id number]  Place of Service where encounter took place: [facility] [[id number]]    HPI:    Melvin Abad is a 89 year old  (4/24/1928), who is being seen today for an episodic care visit at the above location.   HPI information obtained from: facility chart records, facility staff and [facility] chart review. Today's concern is INR/Coumadin management for A. Fib    ROS/Subjective:  Bleeding Signs/Symptoms:  None  Thromboembolic Signs/Symptoms:  None  Medication Changes:  Yes: DC Baclofen & bisacodyl, increase in gabapentin, melatonin changed to schduled q HS, DC Proventil  Dietary Changes:  Yes: at TCU, participating in meal plan  Activity Changes: Yes: in TCU participating in therapies  Bacterial/Viral Infection:  No  Missed Coumadin Doses:  None  On ASA: No  Other Concerns:  Hgb 10.6 from 15.5 1 month ago    OBJECTIVE:  /73   Pulse 86   Temp 97.6  F (36.4  C)   Resp 20   Ht 1.778 m (5' 10\")   Wt 91.9 kg (202 lb 8 oz)   SpO2 95%   BMI 29.06 kg/m    Last INR: 1.84 on 1/20/20  INR Today:  1.12  Current Dose:  Patient has received 19.5 mg of Coumadin over last 7 days.  1/20-24: 3 mg daily  1/17-1/29: 2.5 mg daily     ASSESSMENT:     Chronic atrial fibrillation  Long term current use of anticoagulant therapy  Encounter for therapeutic drug monitoring  Subtherapeutic INR for goal of 2-3    PLAN:   Orders written by provider at facility  New Dose: Coumadin 4 mg Fri, Sat, Sun    Next INR: Mon, 1/27/20       Electronically signed by:  DINH Sánchez CNP   ]    " Protopic Counseling: Patient may experience a mild burning sensation during topical application. Protopic is not approved in children less than 2 years of age. There have been case reports of hematologic and skin malignancies in patients using topical calcineurin inhibitors although causality is questionable. Albendazole Counseling:  I discussed with the patient the risks of albendazole including but not limited to cytopenia, kidney damage, nausea/vomiting and severe allergy.  The patient understands that this medication is being used in an off-label manner. Fluconazole Pregnancy And Lactation Text: This medication is Pregnancy Category C and it isn't know if it is safe during pregnancy. It is also excreted in breast milk. Hydroxychloroquine Counseling:  I discussed with the patient that a baseline ophthalmologic exam is needed at the start of therapy and every year thereafter while on therapy. A CBC may also be warranted for monitoring.  The side effects of this medication were discussed with the patient, including but not limited to agranulocytosis, aplastic anemia, seizures, rashes, retinopathy, and liver toxicity. Patient instructed to call the office should any adverse effect occur.  The patient verbalized understanding of the proper use and possible adverse effects of Plaquenil.  All the patient's questions and concerns were addressed. Olanzapine Pregnancy And Lactation Text: This medication is pregnancy category C.   There are no adequate and well controlled trials with olanzapine in pregnant females.  Olanzapine should be used during pregnancy only if the potential benefit justifies the potential risk to the fetus.   In a study in lactating healthy women, olanzapine was excreted in breast milk.  It is recommended that women taking olanzapine should not breast feed. Tremfya Pregnancy And Lactation Text: The risk during pregnancy and breastfeeding is uncertain with this medication. Clofazimine Pregnancy And Lactation Text: This medication is Pregnancy Category C and isn't considered safe during pregnancy. It is excreted in breast milk. Olumiant Pregnancy And Lactation Text: Based on animal studies, Olumiant may cause embryo-fetal harm when administered to pregnant women.  The medication should not be used in pregnancy.  Breastfeeding is not recommended during treatment. Nemluvio Pregnancy And Lactation Text: It is not known if Nemluvio causes fetal harm or is present in breast milk. Please proceed with caution if patients who are pregnant or breastfeeding. Skyrizi Counseling: I discussed with the patient the risks of risankizumab-rzaa including but not limited to immunosuppression, and serious infections.  The patient understands that monitoring is required including a PPD at baseline and must alert us or the primary physician if symptoms of infection or other concerning signs are noted. Winlevi Pregnancy And Lactation Text: This medication is considered safe during pregnancy and breastfeeding. Cimzia Pregnancy And Lactation Text: This medication crosses the placenta but can be considered safe in certain situations. Cimzia may be excreted in breast milk. Quinolones Counseling:  I discussed with the patient the risks of fluoroquinolones including but not limited to GI upset, allergic reaction, drug rash, diarrhea, dizziness, photosensitivity, yeast infections, liver function test abnormalities, tendonitis/tendon rupture. Clindamycin Pregnancy And Lactation Text: This medication can be used in pregnancy if certain situations. Clindamycin is also present in breast milk. Imiquimod Counseling:  I discussed with the patient the risks of imiquimod including but not limited to erythema, scaling, itching, weeping, crusting, and pain.  Patient understands that the inflammatory response to imiquimod is variable from person to person and was educated regarded proper titration schedule.  If flu-like symptoms develop, patient knows to discontinue the medication and contact us. Humira Counseling:  I discussed with the patient the risks of adalimumab including but not limited to myelosuppression, immunosuppression, autoimmune hepatitis, demyelinating diseases, lymphoma, and serious infections.  The patient understands that monitoring is required including a PPD at baseline and must alert us or the primary physician if symptoms of infection or other concerning signs are noted. Methotrexate Counseling:  Patient counseled regarding adverse effects of methotrexate including but not limited to nausea, vomiting, abnormalities in liver function tests. Patients may develop mouth sores, rash, diarrhea, and abnormalities in blood counts. The patient understands that monitoring is required including LFT's and blood counts.  There is a rare possibility of scarring of the liver and lung problems that can occur when taking methotrexate. Persistent nausea, loss of appetite, pale stools, dark urine, cough, and shortness of breath should be reported immediately. Patient advised to discontinue methotrexate treatment at least three months before attempting to become pregnant.  I discussed the need for folate supplements while taking methotrexate.  These supplements can decrease side effects during methotrexate treatment. The patient verbalized understanding of the proper use and possible adverse effects of methotrexate.  All of the patient's questions and concerns were addressed. Sski Pregnancy And Lactation Text: This medication is Pregnancy Category D and isn't considered safe during pregnancy. It is excreted in breast milk. Topical Clindamycin Counseling: Patient counseled that this medication may cause skin irritation or allergic reactions.  In the event of skin irritation, the patient was advised to reduce the amount of the drug applied or use it less frequently.   The patient verbalized understanding of the proper use and possible adverse effects of clindamycin.  All of the patient's questions and concerns were addressed. Ketoconazole Pregnancy And Lactation Text: This medication is Pregnancy Category C and it isn't know if it is safe during pregnancy. It is also excreted in breast milk and breast feeding isn't recommended. Benzoyl Peroxide Counseling: Patient counseled that medicine may cause skin irritation and bleach clothing.  In the event of skin irritation, the patient was advised to reduce the amount of the drug applied or use it less frequently.   The patient verbalized understanding of the proper use and possible adverse effects of benzoyl peroxide.  All of the patient's questions and concerns were addressed. Dutasteride Male Counseling: Dutasteride Counseling:  I discussed with the patient the risks of use of dutasteride including but not limited to decreased libido, decreased ejaculate volume, and gynecomastia. Women who can become pregnant should not handle medication.  All of the patient's questions and concerns were addressed. Protopic Pregnancy And Lactation Text: This medication is Pregnancy Category C. It is unknown if this medication is excreted in breast milk when applied topically. Drysol Pregnancy And Lactation Text: This medication is considered safe during pregnancy and breast feeding. Spironolactone Pregnancy And Lactation Text: This medication can cause feminization of the male fetus and should be avoided during pregnancy. The active metabolite is also found in breast milk. Acitretin Pregnancy And Lactation Text: This medication is Pregnancy Category X and should not be given to women who are pregnant or may become pregnant in the future. This medication is excreted in breast milk. Fluconazole Counseling:  Patient counseled regarding adverse effects of fluconazole including but not limited to headache, diarrhea, nausea, upset stomach, liver function test abnormalities, taste disturbance, and stomach pain.  There is a rare possibility of liver failure that can occur when taking fluconazole.  The patient understands that monitoring of LFTs and kidney function test may be required, especially at baseline. The patient verbalized understanding of the proper use and possible adverse effects of fluconazole.  All of the patient's questions and concerns were addressed. Oral Minoxidil Counseling- I discussed with the patient the risks of oral minoxidil including but not limited to shortness of breath, swelling of the feet or ankles, dizziness, lightheadedness, unwanted hair growth and allergic reaction.  The patient verbalized understanding of the proper use and possible adverse effects of oral minoxidil.  All of the patient's questions and concerns were addressed. Hydroxychloroquine Pregnancy And Lactation Text: This medication has been shown to cause fetal harm but it isn't assigned a Pregnancy Risk Category. There are small amounts excreted in breast milk. Albendazole Pregnancy And Lactation Text: This medication is Pregnancy Category C and it isn't known if it is safe during pregnancy. It is also excreted in breast milk. Olumiant Counseling: I discussed with the patient the risks of Olumiant therapy including but not limited to upper respiratory tract infections, shingles, cold sores, and nausea. Live vaccines should be avoided.  This medication has been linked to serious infections; higher rate of mortality; malignancy and lymphoproliferative disorders; major adverse cardiovascular events; thrombosis; gastrointestinal perforations; neutropenia; lymphopenia; anemia; liver enzyme elevations; and lipid elevations. Colchicine Counseling:  Patient counseled regarding adverse effects including but not limited to stomach upset (nausea, vomiting, stomach pain, or diarrhea).  Patient instructed to limit alcohol consumption while taking this medication.  Colchicine may reduce blood counts especially with prolonged use.  The patient understands that monitoring of kidney function and blood counts may be required, especially at baseline. The patient verbalized understanding of the proper use and possible adverse effects of colchicine.  All of the patient's questions and concerns were addressed. Rituxan Counseling:  I discussed with the patient the risks of Rituxan infusions. Side effects can include infusion reactions, severe drug rashes including mucocutaneous reactions, reactivation of latent hepatitis and other infections and rarely progressive multifocal leukoencephalopathy.  All of the patient's questions and concerns were addressed. Zyclara Counseling:  I discussed with the patient the risks of imiquimod including but not limited to erythema, scaling, itching, weeping, crusting, and pain.  Patient understands that the inflammatory response to imiquimod is variable from person to person and was educated regarded proper titration schedule.  If flu-like symptoms develop, patient knows to discontinue the medication and contact us. Cosentyx Counseling:  I discussed with the patient the risks of Cosentyx including but not limited to worsening of Crohn's disease, immunosuppression, allergic reactions and infections.  The patient understands that monitoring is required including a PPD at baseline and must alert us or the primary physician if symptoms of infection or other concerning signs are noted. Xolair Counseling:  Patient informed of potential adverse effects including but not limited to fever, muscle aches, rash and allergic reactions.  The patient verbalized understanding of the proper use and possible adverse effects of Xolair.  All of the patient's questions and concerns were addressed. Humira Pregnancy And Lactation Text: This medication is Pregnancy Category B and is considered safe during pregnancy. It is unknown if this medication is excreted in breast milk. Hydroquinone Pregnancy And Lactation Text: This medication has not been assigned a Pregnancy Risk Category but animal studies failed to show danger with the topical medication. It is unknown if the medication is excreted in breast milk. SSKI Counseling:  I discussed with the patient the risks of SSKI including but not limited to thyroid abnormalities, metallic taste, GI upset, fever, headache, acne, arthralgias, paraesthesias, lymphadenopathy, easy bleeding, arrhythmias, and allergic reaction. Cyclosporine Pregnancy And Lactation Text: This medication is Pregnancy Category C and it isn't know if it is safe during pregnancy. This medication is excreted in breast milk. Topical Clindamycin Pregnancy And Lactation Text: This medication is Pregnancy Category B and is considered safe during pregnancy. It is unknown if it is excreted in breast milk. Ketoconazole Counseling:   Patient counseled regarding improving absorption with orange juice.  Adverse effects include but are not limited to breast enlargement, headache, diarrhea, nausea, upset stomach, liver function test abnormalities, taste disturbance, and stomach pain.  There is a rare possibility of liver failure that can occur when taking ketoconazole. The patient understands that monitoring of LFTs may be required, especially at baseline. The patient verbalized understanding of the proper use and possible adverse effects of ketoconazole.  All of the patient's questions and concerns were addressed. Minocycline Pregnancy And Lactation Text: This medication is Pregnancy Category D and not consider safe during pregnancy. It is also excreted in breast milk. Clindamycin Counseling: I counseled the patient regarding use of clindamycin as an antibiotic for prophylactic and/or therapeutic purposes. Clindamycin is active against numerous classes of bacteria, including skin bacteria. Side effects may include nausea, diarrhea, gastrointestinal upset, rash, hives, yeast infections, and in rare cases, colitis. Rhofade Counseling: Rhofade is a topical medication which can decrease superficial blood flow where applied. Side effects are uncommon and include stinging, redness and allergic reactions. Drysol Counseling:  I discussed with the patient the risks of drysol/aluminum chloride including but not limited to skin rash, itching, irritation, burning. Spironolactone Counseling: Patient advised regarding risks of diarrhea, abdominal pain, hyperkalemia, birth defects (for female patients), liver toxicity and renal toxicity. The patient may need blood work to monitor liver and kidney function and potassium levels while on therapy. The patient verbalized understanding of the proper use and possible adverse effects of spironolactone.  All of the patient's questions and concerns were addressed. Use Enhanced Medication Counseling?: No Acitretin Counseling:  I discussed with the patient the risks of acitretin including but not limited to hair loss, dry lips/skin/eyes, liver damage, hyperlipidemia, depression/suicidal ideation, photosensitivity.  Serious rare side effects can include but are not limited to pancreatitis, pseudotumor cerebri, bony changes, clot formation/stroke/heart attack.  Patient understands that alcohol is contraindicated since it can result in liver toxicity and significantly prolong the elimination of the drug by many years. Ivermectin Counseling:  Patient instructed to take medication on an empty stomach with a full glass of water.  Patient informed of potential adverse effects including but not limited to nausea, diarrhea, dizziness, itching, and swelling of the extremities or lymph nodes.  The patient verbalized understanding of the proper use and possible adverse effects of ivermectin.  All of the patient's questions and concerns were addressed. Low Dose Naltrexone Counseling- I discussed with the patient the potential risks and side effects of low dose naltrexone including but not limited to: more vivid dreams, headaches, nausea, vomiting, abdominal pain, fatigue, dizziness, and anxiety. Litfulo Pregnancy And Lactation Text: There is insufficient data to evaluate whether or not Litfulo is safe to use during pregnancy.  Breastfeeding is not recommended during treatment. Oral Minoxidil Pregnancy And Lactation Text: This medication should only be used when clearly needed if you are pregnant, attempting to become pregnant or breast feeding. Spevigo Counseling: I discussed with the patient the risks of Spevigo including but not limited to fatigue, nasuea, vomiting, headache, pruritus, urinary tract infection, an infusion related reactions.  The patient understands that monitoring is required including screening for tuberculosis at baseline and yearly screening thereafter while continuing Spevigo therapy. They should contact us if symptoms of infection or other concerning signs are noted. Odomzo Pregnancy And Lactation Text: This medication is Pregnancy Category X and is absolutely contraindicated during pregnancy. It is unknown if it is excreted in breast milk. Xolair Pregnancy And Lactation Text: This medication is Pregnancy Category B and is considered safe during pregnancy. This medication is excreted in breast milk. Hyrimoz Counseling:  I discussed with the patient the risks of adalimumab including but not limited to myelosuppression, immunosuppression, autoimmune hepatitis, demyelinating diseases, lymphoma, and serious infections.  The patient understands that monitoring is required including a PPD at baseline and must alert us or the primary physician if symptoms of infection or other concerning signs are noted. Rituxan Pregnancy And Lactation Text: This medication is Pregnancy Category C and it isn't know if it is safe during pregnancy. It is unknown if this medication is excreted in breast milk but similar antibodies are known to be excreted. Cyclosporine Counseling:  I discussed with the patient the risks of cyclosporine including but not limited to hypertension, gingival hyperplasia,myelosuppression, immunosuppression, liver damage, kidney damage, neurotoxicity, lymphoma, and serious infections. The patient understands that monitoring is required including baseline blood pressure, CBC, CMP, lipid panel and uric acid, and then 1-2 times monthly CMP and blood pressure. Azelaic Acid Counseling: Patient counseled that medicine may cause skin irritation and to avoid applying near the eyes.  In the event of skin irritation, the patient was advised to reduce the amount of the drug applied or use it less frequently.   The patient verbalized understanding of the proper use and possible adverse effects of azelaic acid.  All of the patient's questions and concerns were addressed. Zyclara Pregnancy And Lactation Text: This medication is Pregnancy Category C. It is unknown if this medication is excreted in breast milk. Minocycline Counseling: Patient advised regarding possible photosensitivity and discoloration of the teeth, skin, lips, tongue and gums.  Patient instructed to avoid sunlight, if possible.  When exposed to sunlight, patients should wear protective clothing, sunglasses, and sunscreen.  The patient was instructed to call the office immediately if the following severe adverse effects occur:  hearing changes, easy bruising/bleeding, severe headache, or vision changes.  The patient verbalized understanding of the proper use and possible adverse effects of minocycline.  All of the patient's questions and concerns were addressed. Cephalexin Pregnancy And Lactation Text: This medication is Pregnancy Category B and considered safe during pregnancy.  It is also excreted in breast milk but can be used safely for shorter doses. 5-Fu Pregnancy And Lactation Text: This medication is Pregnancy Category X and contraindicated in pregnancy and in women who may become pregnant. It is unknown if this medication is excreted in breast milk. Propranolol Pregnancy And Lactation Text: This medication is Pregnancy Category C and it isn't known if it is safe during pregnancy. It is excreted in breast milk. Topical Ketoconazole Counseling: Patient counseled that this medication may cause skin irritation or allergic reactions.  In the event of skin irritation, the patient was advised to reduce the amount of the drug applied or use it less frequently.   The patient verbalized understanding of the proper use and possible adverse effects of ketoconazole.  All of the patient's questions and concerns were addressed. Birth Control Pills Pregnancy And Lactation Text: This medication should be avoided if pregnant and for the first 30 days post-partum. Rhofade Pregnancy And Lactation Text: This medication has not been assigned a Pregnancy Risk Category. It is unknown if the medication is excreted in breast milk. Low Dose Naltrexone Pregnancy And Lactation Text: Naltrexone is pregnancy category C.  There have been no adequate and well-controlled studies in pregnant women.  It should be used in pregnancy only if the potential benefit justifies the potential risk to the fetus.   Limited data indicates that naltrexone is minimally excreted into breastmilk. Litfulo Counseling: Litfulo (Ritlecitinib) Counseling: I discussed with the patient the risks of Litfulo therapy including but not limited to headache, upper respiratory infections, nausea, diarrhea, acne, and urticaria. Live vaccines should be avoided.  This medication has been linked to serious infections; higher rate of mortality; malignancy and lymphoproliferative disorders; major adverse cardiovascular events; thrombosis; decreases in lymphocytes and platelets; liver enzyme elevations; and CPK elevations. Doxepin Pregnancy And Lactation Text: This medication is Pregnancy Category C and it isn't known if it is safe during pregnancy. It is also excreted in breast milk and breast feeding isn't recommended. Otezla Counseling: The side effects of Otezla were discussed with the patient, including but not limited to worsening or new depression, weight loss, diarrhea, nausea, upper respiratory tract infection, and headache. Patient instructed to call the office should any adverse effect occur.  The patient verbalized understanding of the proper use and possible adverse effects of Otezla.  All the patient's questions and concerns were addressed. Odomzo Counseling- I discussed with the patient the risks of Odomzo including but not limited to nausea, vomiting, diarrhea, constipation, weight loss, changes in the sense of taste, decreased appetite, muscle spasms, and hair loss.  The patient verbalized understanding of the proper use and possible adverse effects of Odomzo.  All of the patient's questions and concerns were addressed. Xelajz Pregnancy And Lactation Text: This medication is Pregnancy Category D and is not considered safe during pregnancy.  The risk during breast feeding is also uncertain. Dapsone Counseling: I discussed with the patient the risks of dapsone including but not limited to hemolytic anemia, agranulocytosis, rashes, methemoglobinemia, kidney failure, peripheral neuropathy, headaches, GI upset, and liver toxicity.  Patients who start dapsone require monitoring including baseline LFTs and weekly CBCs for the first month, then every month thereafter.  The patient verbalized understanding of the proper use and possible adverse effects of dapsone.  All of the patient's questions and concerns were addressed. Siliq Counseling:  I discussed with the patient the risks of Siliq including but not limited to new or worsening depression, suicidal thoughts and behavior, immunosuppression, malignancy, posterior leukoencephalopathy syndrome, and serious infections.  The patient understands that monitoring is required including a PPD at baseline and must alert us or the primary physician if symptoms of infection or other concerning signs are noted. There is also a special program designed to monitor depression which is required with Siliq. Cyclophosphamide Pregnancy And Lactation Text: This medication is Pregnancy Category D and it isn't considered safe during pregnancy. This medication is excreted in breast milk. Spevigo Pregnancy And Lactation Text: The risk during pregnancy and breastfeeding is uncertain with this medication. This medication does cross the placenta. It is unknown if this medication is found in breast milk. Valtrex Pregnancy And Lactation Text: this medication is Pregnancy Category B and is considered safe during pregnancy. This medication is not directly found in breast milk but it's metabolite acyclovir is present. Tetracycline Counseling: Patient counseled regarding possible photosensitivity and increased risk for sunburn.  Patient instructed to avoid sunlight, if possible.  When exposed to sunlight, patients should wear protective clothing, sunglasses, and sunscreen.  The patient was instructed to call the office immediately if the following severe adverse effects occur:  hearing changes, easy bruising/bleeding, severe headache, or vision changes.  The patient verbalized understanding of the proper use and possible adverse effects of tetracycline.  All of the patient's questions and concerns were addressed. Patient understands to avoid pregnancy while on therapy due to potential birth defects. Dupixent Counseling: I discussed with the patient the risks of dupilumab including but not limited to eye infection and irritation, cold sores, injection site reactions, worsening of asthma, allergic reactions and increased risk of parasitic infection.  Live vaccines should be avoided while taking dupilumab. Dupilumab will also interact with certain medications such as warfarin and cyclosporine. The patient understands that monitoring is required and they must alert us or the primary physician if symptoms of infection or other concerning signs are noted. Metronidazole Pregnancy And Lactation Text: This medication is Pregnancy Category B and considered safe during pregnancy.  It is also excreted in breast milk. Cephalexin Counseling: I counseled the patient regarding use of cephalexin as an antibiotic for prophylactic and/or therapeutic purposes. Cephalexin (commonly prescribed under brand name Keflex) is a cephalosporin antibiotic which is active against numerous classes of bacteria, including most skin bacteria. Side effects may include nausea, diarrhea, gastrointestinal upset, rash, hives, yeast infections, and in rare cases, hepatitis, kidney disease, seizures, fever, confusion, neurologic symptoms, and others. Patients with severe allergies to penicillin medications are cautioned that there is about a 10% incidence of cross-reactivity with cephalosporins. When possible, patients with penicillin allergies should use alternatives to cephalosporins for antibiotic therapy. Propranolol Counseling:  I discussed with the patient the risks of propranolol including but not limited to low heart rate, low blood pressure, low blood sugar, restlessness and increased cold sensitivity. They should call the office if they experience any of these side effects. 5-Fu Counseling: 5-Fluorouracil Counseling:  I discussed with the patient the risks of 5-fluorouracil including but not limited to erythema, scaling, itching, weeping, crusting, and pain. Erivedge Counseling- I discussed with the patient the risks of Erivedge including but not limited to nausea, vomiting, diarrhea, constipation, weight loss, changes in the sense of taste, decreased appetite, muscle spasms, and hair loss.  The patient verbalized understanding of the proper use and possible adverse effects of Erivedge.  All of the patient's questions and concerns were addressed. Solaraze Counseling:  I discussed with the patient the risks of Solaraze including but not limited to erythema, scaling, itching, weeping, crusting, and pain. Birth Control Pills Counseling: Birth Control Pill Counseling: I discussed with the patient the potential side effects of OCPs including but not limited to increased risk of stroke, heart attack, thrombophlebitis, deep venous thrombosis, hepatic adenomas, breast changes, GI upset, headaches, and depression.  The patient verbalized understanding of the proper use and possible adverse effects of OCPs. All of the patient's questions and concerns were addressed. Opioid Counseling: I discussed with the patient the potential side effects of opioids including but not limited to addiction, altered mental status, and depression. I stressed avoiding alcohol, benzodiazepines, muscle relaxants and sleep aids unless specifically okayed by a physician. The patient verbalized understanding of the proper use and possible adverse effects of opioids. All of the patient's questions and concerns were addressed. They were instructed to flush the remaining pills down the toilet if they did not need them for pain. High Dose Vitamin A Pregnancy And Lactation Text: High dose vitamin A therapy is contraindicated during pregnancy and breast feeding. Cibinqo Pregnancy And Lactation Text: It is unknown if this medication will adversely affect pregnancy or breast feeding.  You should not take this medication if you are currently pregnant or planning a pregnancy or while breastfeeding. Doxepin Counseling:  Patient advised that the medication is sedating and not to drive a car after taking this medication. Patient informed of potential adverse effects including but not limited to dry mouth, urinary retention, and blurry vision.  The patient verbalized understanding of the proper use and possible adverse effects of doxepin.  All of the patient's questions and concerns were addressed. Niacinamide Counseling: I recommended taking niacin or niacinamide, also know as vitamin B3, twice daily. Recent evidence suggests that taking vitamin B3 (500 mg twice daily) can reduce the risk of actinic keratoses and non-melanoma skin cancers. Side effects of vitamin B3 include flushing and headache. Xeljanz Counseling: I discussed with the patient the risks of Xeljanz therapy including increased risk of infection, liver issues, headache, diarrhea, or cold symptoms. Live vaccines should be avoided. They were instructed to call if they have any problems. Dapsone Pregnancy And Lactation Text: This medication is Pregnancy Category C and is not considered safe during pregnancy or breast feeding. Otezla Pregnancy And Lactation Text: This medication is Pregnancy Category C and it isn't known if it is safe during pregnancy. It is unknown if it is excreted in breast milk. Stelara Counseling:  I discussed with the patient the risks of ustekinumab including but not limited to immunosuppression, malignancy, posterior leukoencephalopathy syndrome, and serious infections.  The patient understands that monitoring is required including a PPD at baseline and must alert us or the primary physician if symptoms of infection or other concerning signs are noted. Valtrex Counseling: I discussed with the patient the risks of valacyclovir including but not limited to kidney damage, nausea, vomiting and severe allergy.  The patient understands that if the infection seems to be worsening or is not improving, they are to call. Metronidazole Counseling:  I discussed with the patient the risks of metronidazole including but not limited to seizures, nausea/vomiting, a metallic taste in the mouth, nausea/vomiting and severe allergy. Dupixent Pregnancy And Lactation Text: This medication likely crosses the placenta but the risk for the fetus is uncertain. This medication is excreted in breast milk. Cyclophosphamide Counseling:  I discussed with the patient the risks of cyclophosphamide including but not limited to hair loss, hormonal abnormalities, decreased fertility, abdominal pain, diarrhea, nausea and vomiting, bone marrow suppression and infection. The patient understands that monitoring is required while taking this medication. Ilumya Counseling: I discussed with the patient the risks of tildrakizumab including but not limited to immunosuppression, malignancy, posterior leukoencephalopathy syndrome, and serious infections.  The patient understands that monitoring is required including a PPD at baseline and must alert us or the primary physician if symptoms of infection or other concerning signs are noted. Topical Sulfur Applications Counseling: Topical Sulfur Counseling: Patient counseled that this medication may cause skin irritation or allergic reactions.  In the event of skin irritation, the patient was advised to reduce the amount of the drug applied or use it less frequently.   The patient verbalized understanding of the proper use and possible adverse effects of topical sulfur application.  All of the patient's questions and concerns were addressed. Adbry Counseling: I discussed with the patient the risks of tralokinumab including but not limited to eye infection and irritation, cold sores, injection site reactions, worsening of asthma, allergic reactions and increased risk of parasitic infection.  Live vaccines should be avoided while taking tralokinumab. The patient understands that monitoring is required and they must alert us or the primary physician if symptoms of infection or other concerning signs are noted. Bactrim Pregnancy And Lactation Text: This medication is Pregnancy Category D and is known to cause fetal risk.  It is also excreted in breast milk. Solaraze Pregnancy And Lactation Text: This medication is Pregnancy Category B and is considered safe. There is some data to suggest avoiding during the third trimester. It is unknown if this medication is excreted in breast milk. Calcipotriene Pregnancy And Lactation Text: This medication has not been proven safe during pregnancy. It is unknown if this medication is excreted in breast milk. Finasteride Pregnancy And Lactation Text: This medication is absolutely contraindicated during pregnancy. It is unknown if it is excreted in breast milk. Opioid Pregnancy And Lactation Text: These medications can lead to premature delivery and should be avoided during pregnancy. These medications are also present in breast milk in small amounts. Opzelura Counseling:  I discussed with the patient the risks of Opzelura including but not limited to nasopharngitis, bronchitis, ear infection, eosinophila, hives, diarrhea, folliculitis, tonsillitis, and rhinorrhea.  Taken orally, this medication has been linked to serious infections; higher rate of mortality; malignancy and lymphoproliferative disorders; major adverse cardiovascular events; thrombosis; thrombocytopenia, anemia, and neutropenia; and lipid elevations. Hydroquinone Counseling:  Patient advised that medication may result in skin irritation, lightening (hypopigmentation), dryness, and burning.  In the event of skin irritation, the patient was advised to reduce the amount of the drug applied or use it less frequently.  Rarely, spots that are treated with hydroquinone can become darker (pseudoochronosis).  Should this occur, patient instructed to stop medication and call the office. The patient verbalized understanding of the proper use and possible adverse effects of hydroquinone.  All of the patient's questions and concerns were addressed. Cimetidine Pregnancy And Lactation Text: This medication is Pregnancy Category B and is considered safe during pregnancy. It is also excreted in breast milk and breast feeding isn't recommended. High Dose Vitamin A Counseling: Side effects reviewed, pt to contact office should one occur. Gabapentin Counseling: I discussed with the patient the risks of gabapentin including but not limited to dizziness, somnolence, fatigue and ataxia. Simlandi Counseling:  I discussed with the patient the risks of adalimumab including but not limited to myelosuppression, immunosuppression, autoimmune hepatitis, demyelinating diseases, lymphoma, and serious infections.  The patient understands that monitoring is required including a PPD at baseline and must alert us or the primary physician if symptoms of infection or other concerning signs are noted. Cibinqo Counseling: I discussed with the patient the risks of Cibinqo therapy including but not limited to common cold, nausea, headache, cold sores, increased blood CPK levels, dizziness, UTIs, fatigue, acne, and vomitting. Live vaccines should be avoided.  This medication has been linked to serious infections; higher rate of mortality; malignancy and lymphoproliferative disorders; major adverse cardiovascular events; thrombosis; thrombocytopenia and lymphopenia; lipid elevations; and retinal detachment. Niacinamide Pregnancy And Lactation Text: These medications are considered safe during pregnancy. Oxybutynin Counseling:  I discussed with the patient the risks of oxybutynin including but not limited to skin rash, drowsiness, dry mouth, difficulty urinating, and blurred vision. Ebglyss Counseling: I discussed with the patient the risks of lebrikizumab including but not limited to eye inflammation and irritation, cold sores, injection site reactions, allergic reactions and increased risk of parasitic infection. The patient understands that monitoring is required and they must alert us or the primary physician if symptoms of infection or other concerning signs are noted. Sotyktu Pregnancy And Lactation Text: There is insufficient data to evaluate whether or not Sotyktu is safe to use during pregnancy.   It is not known if Sotyktu passes into breast milk and whether or not it is safe to use when breastfeeding.   Tranexamic Acid Pregnancy And Lactation Text: It is unknown if this medication is safe during pregnancy or breast feeding. Cellcept Pregnancy And Lactation Text: This medication is Pregnancy Category D and isn't considered safe during pregnancy. It is unknown if this medication is excreted in breast milk. Topical Sulfur Applications Pregnancy And Lactation Text: This medication is Pregnancy Category C and has an unknown safety profile during pregnancy. It is unknown if this topical medication is excreted in breast milk. Adbry Pregnancy And Lactation Text: It is unknown if this medication will adversely affect pregnancy or breast feeding. Sarecycline Counseling: Patient advised regarding possible photosensitivity and discoloration of the teeth, skin, lips, tongue and gums.  Patient instructed to avoid sunlight, if possible.  When exposed to sunlight, patients should wear protective clothing, sunglasses, and sunscreen.  The patient was instructed to call the office immediately if the following severe adverse effects occur:  hearing changes, easy bruising/bleeding, severe headache, or vision changes.  The patient verbalized understanding of the proper use and possible adverse effects of sarecycline.  All of the patient's questions and concerns were addressed. Mirvaso Counseling: Mirvaso is a topical medication which can decrease superficial blood flow where applied. Side effects are uncommon and include stinging, redness and allergic reactions. Erythromycin Pregnancy And Lactation Text: This medication is Pregnancy Category B and is considered safe during pregnancy. It is also excreted in breast milk. Bactrim Counseling:  I discussed with the patient the risks of sulfa antibiotics including but not limited to GI upset, allergic reaction, drug rash, diarrhea, dizziness, photosensitivity, and yeast infections.  Rarely, more serious reactions can occur including but not limited to aplastic anemia, agranulocytosis, methemoglobinemia, blood dyscrasias, liver or kidney failure, lung infiltrates or desquamative/blistering drug rashes. Topical Retinoid counseling:  Patient advised to apply a pea-sized amount only at bedtime and wait 30 minutes after washing their face before applying.  If too drying, patient may add a non-comedogenic moisturizer. The patient verbalized understanding of the proper use and possible adverse effects of retinoids.  All of the patient's questions and concerns were addressed. Calcipotriene Counseling:  I discussed with the patient the risks of calcipotriene including but not limited to erythema, scaling, itching, and irritation. Libtayo Counseling- I discussed with the patient the risks of Libtayo including but not limited to nausea, vomiting, diarrhea, and bone or muscle pain.  The patient verbalized understanding of the proper use and possible adverse effects of Libtayo.  All of the patient's questions and concerns were addressed. Finasteride Female Counseling: Finasteride Counseling:  I discussed with the patient the risks of use of finasteride including but not limited to decreased libido and sexual dysfunction. Explained the teratogenic nature of the medication and stressed the importance of not getting pregnant during treatment. All of the patient's questions and concerns were addressed. Opzelura Pregnancy And Lactation Text: There is insufficient data to evaluate drug-associated risk for major birth defects, miscarriage, or other adverse maternal or fetal outcomes.  There is a pregnancy registry that monitors pregnancy outcomes in pregnant persons exposed to the medication during pregnancy.  It is unknown if this medication is excreted in breast milk.  Do not breastfeed during treatment and for about 4 weeks after the last dose. Cimetidine Counseling:  I discussed with the patient the risks of Cimetidine including but not limited to gynecomastia, headache, diarrhea, nausea, drowsiness, arrhythmias, pancreatitis, skin rashes, psychosis, bone marrow suppression and kidney toxicity. Itraconazole Counseling:  I discussed with the patient the risks of itraconazole including but not limited to liver damage, nausea/vomiting, neuropathy, and severe allergy.  The patient understands that this medication is best absorbed when taken with acidic beverages such as non-diet cola or ginger ale.  The patient understands that monitoring is required including baseline LFTs and repeat LFTs at intervals.  The patient understands that they are to contact us or the primary physician if concerning signs are noted. Isotretinoin Pregnancy And Lactation Text: This medication is Pregnancy Category X and is considered extremely dangerous during pregnancy. It is unknown if it is excreted in breast milk. Nsaids Counseling: NSAID Counseling: I discussed with the patient that NSAIDs should be taken with food. Prolonged use of NSAIDs can result in the development of stomach ulcers.  Patient advised to stop taking NSAIDs if abdominal pain occurs.  The patient verbalized understanding of the proper use and possible adverse effects of NSAIDs.  All of the patient's questions and concerns were addressed. Detail Level: Detailed Sotyktu Counseling:  I discussed the most common side effects of Sotyktu including: common cold, sore throat, sinus infections, cold sores, canker sores, folliculitis, and acne.  I also discussed more serious side effects of Sotyktu including but not limited to: serious allergic reactions; increased risk for infections such as TB; cancers such as lymphomas; rhabdomyolysis and elevated CPK; and elevated triglycerides and liver enzymes.  Infliximab Counseling:  I discussed with the patient the risks of infliximab including but not limited to myelosuppression, immunosuppression, autoimmune hepatitis, demyelinating diseases, lymphoma, and serious infections.  The patient understands that monitoring is required including a PPD at baseline and must alert us or the primary physician if symptoms of infection or other concerning signs are noted. Taltz Counseling: I discussed with the patient the risks of ixekizumab including but not limited to immunosuppression, serious infections, worsening of inflammatory bowel disease and drug reactions.  The patient understands that monitoring is required including a PPD at baseline and must alert us or the primary physician if symptoms of infection or other concerning signs are noted. Cellcept Counseling:  I discussed with the patient the risks of mycophenolate mofetil including but not limited to infection/immunosuppression, GI upset, hypokalemia, hypercholesterolemia, bone marrow suppression, lymphoproliferative disorders, malignancy, GI ulceration/bleed/perforation, colitis, interstitial lung disease, kidney failure, progressive multifocal leukoencephalopathy, and birth defects.  The patient understands that monitoring is required including a baseline creatinine and regular CBC testing. In addition, patient must alert us immediately if symptoms of infection or other concerning signs are noted. Wartpeel Counseling:  I discussed with the patient the risks of Wartpeel including but not limited to erythema, scaling, itching, weeping, crusting, and pain. Bimzelx Counseling:  I discussed with the patient the risks of Bimzelx including but not limited to depression, immunosuppression, allergic reactions and infections.  The patient understands that monitoring is required including a PPD at baseline and must alert us or the primary physician if symptoms of infection or other concerning signs are noted. Rifampin Pregnancy And Lactation Text: This medication is Pregnancy Category C and it isn't know if it is safe during pregnancy. It is also excreted in breast milk and should not be used if you are breast feeding. Arava Counseling:  Patient counseled regarding adverse effects of Arava including but not limited to nausea, vomiting, abnormalities in liver function tests. Patients may develop mouth sores, rash, diarrhea, and abnormalities in blood counts. The patient understands that monitoring is required including LFTs and blood counts.  There is a rare possibility of scarring of the liver and lung problems that can occur when taking methotrexate. Persistent nausea, loss of appetite, pale stools, dark urine, cough, and shortness of breath should be reported immediately. Patient advised to discontinue Arava treatment and consult with a physician prior to attempting conception. The patient will have to undergo a treatment to eliminate Arava from the body prior to conception. Ebglyss Pregnancy And Lactation Text: This medication likely crosses the placenta but the risk for the fetus is uncertain. It is unknown if this medication is excreted in breast milk. Tranexamic Acid Counseling:  Patient advised of the small risk of bleeding problems with tranexamic acid. They were also instructed to call if they developed any nausea, vomiting or diarrhea. All of the patient's questions and concerns were addressed. Erythromycin Counseling:  I discussed with the patient the risks of erythromycin including but not limited to GI upset, allergic reaction, drug rash, diarrhea, increase in liver enzymes, and yeast infections. Hydroxyzine Counseling: Patient advised that the medication is sedating and not to drive a car after taking this medication.  Patient informed of potential adverse effects including but not limited to dry mouth, urinary retention, and blurry vision.  The patient verbalized understanding of the proper use and possible adverse effects of hydroxyzine.  All of the patient's questions and concerns were addressed. Finasteride Male Counseling: Finasteride Counseling:  I discussed with the patient the risks of use of finasteride including but not limited to decreased libido, decreased ejaculate volume, gynecomastia, and depression. Women should not handle medication.  All of the patient's questions and concerns were addressed. Libtayo Pregnancy And Lactation Text: This medication is contraindicated in pregnancy and when breast feeding. Azithromycin Pregnancy And Lactation Text: This medication is considered safe during pregnancy and is also secreted in breast milk. Eucrisa Counseling: Patient may experience a mild burning sensation during topical application. Eucrisa is not approved in children less than 2 years of age. Isotretinoin Counseling: Patient should get monthly blood tests, not donate blood, not drive at night if vision affected, not share medication, and not undergo elective surgery for 6 months after tx completed. Side effects reviewed, pt to contact office should one occur. Griseofulvin Pregnancy And Lactation Text: This medication is Pregnancy Category X and is known to cause serious birth defects. It is unknown if this medication is excreted in breast milk but breast feeding should be avoided. Picato Counseling:  I discussed with the patient the risks of Picato including but not limited to erythema, scaling, itching, weeping, crusting, and pain. Nsaids Pregnancy And Lactation Text: These medications are considered safe up to 30 weeks gestation. It is excreted in breast milk. Glycopyrrolate Counseling:  I discussed with the patient the risks of glycopyrrolate including but not limited to skin rash, drowsiness, dry mouth, difficulty urinating, and blurred vision. Rinvoq Pregnancy And Lactation Text: Based on animal studies, Rinvoq may cause embryo-fetal harm when administered to pregnant women.  The medication should not be used in pregnancy.  Breastfeeding is not recommended during treatment and for 6 days after the last dose. Simponi Counseling:  I discussed with the patient the risks of golimumab including but not limited to myelosuppression, immunosuppression, autoimmune hepatitis, demyelinating diseases, lymphoma, and serious infections.  The patient understands that monitoring is required including a PPD at baseline and must alert us or the primary physician if symptoms of infection or other concerning signs are noted. Bimzelx Pregnancy And Lactation Text: This medication crosses the placenta and the safety is uncertain during pregnancy. It is unknown if this medication is present in breast milk. Enbrel Counseling:  I discussed with the patient the risks of etanercept including but not limited to myelosuppression, immunosuppression, autoimmune hepatitis, demyelinating diseases, lymphoma, and infections.  The patient understands that monitoring is required including a PPD at baseline and must alert us or the primary physician if symptoms of infection or other concerning signs are noted. Doxycycline Pregnancy And Lactation Text: This medication is Pregnancy Category D and not consider safe during pregnancy. It is also excreted in breast milk but is considered safe for shorter treatment courses. Prednisone Counseling:  I discussed with the patient the risks of prolonged use of prednisone including but not limited to weight gain, insomnia, osteoporosis, mood changes, diabetes, susceptibility to infection, glaucoma and high blood pressure.  In cases where prednisone use is prolonged, patients should be monitored with blood pressure checks, serum glucose levels and an eye exam.  Additionally, the patient may need to be placed on GI prophylaxis, PCP prophylaxis, and calcium and vitamin D supplementation and/or a bisphosphonate.  The patient verbalized understanding of the proper use and the possible adverse effects of prednisone.  All of the patient's questions and concerns were addressed. Hydroxyzine Pregnancy And Lactation Text: This medication is not safe during pregnancy and should not be taken. It is also excreted in breast milk and breast feeding isn't recommended. Carac Counseling:  I discussed with the patient the risks of Carac including but not limited to erythema, scaling, itching, weeping, crusting, and pain. Rifampin Counseling: I discussed with the patient the risks of rifampin including but not limited to liver damage, kidney damage, red-orange body fluids, nausea/vomiting and severe allergy. Minoxidil Counseling: Minoxidil is a topical medication which can increase blood flow where it is applied. It is uncertain how this medication increases hair growth. Side effects are uncommon and include stinging and allergic reactions. Tazorac Counseling:  Patient advised that medication is irritating and drying.  Patient may need to apply sparingly and wash off after an hour before eventually leaving it on overnight.  The patient verbalized understanding of the proper use and possible adverse effects of tazorac.  All of the patient's questions and concerns were addressed. Azithromycin Counseling:  I discussed with the patient the risks of azithromycin including but not limited to GI upset, allergic reaction, drug rash, diarrhea, and yeast infections. Dutasteride Pregnancy And Lactation Text: This medication is absolutely contraindicated in women during pregnancy and breast feeding. Feminization of male fetuses is possible if taking while pregnant. Griseofulvin Counseling:  I discussed with the patient the risks of griseofulvin including but not limited to photosensitivity, cytopenia, liver damage, nausea/vomiting and severe allergy.  The patient understands that this medication is best absorbed when taken with a fatty meal (e.g., ice cream or french fries). Bexarotene Pregnancy And Lactation Text: This medication is Pregnancy Category X and should not be given to women who are pregnant or may become pregnant. This medication should not be used if you are breast feeding. Rinvoq Counseling: I discussed with the patient the risks of Rinvoq therapy including but not limited to upper respiratory tract infections, shingles, cold sores, bronchitis, nausea, cough, fever, acne, and headache. Live vaccines should be avoided.  This medication has been linked to serious infections; higher rate of mortality; malignancy and lymphoproliferative disorders; major adverse cardiovascular events; thrombosis; thrombocytopenia, anemia, and neutropenia; lipid elevations; liver enzyme elevations; and gastrointestinal perforations. Glycopyrrolate Pregnancy And Lactation Text: This medication is Pregnancy Category B and is considered safe during pregnancy. It is unknown if it is excreted breast milk. Olanzapine Counseling- I discussed with the patient the common side effects of olanzapine including but are not limited to: lack of energy, dry mouth, increased appetite, sleepiness, tremor, constipation, dizziness, changes in behavior, or restlessness.  Explained that teenagers are more likely to experience headaches, abdominal pain, pain in the arms or legs, tiredness, and sleepiness.  Serious side effects include but are not limited: increased risk of death in elderly patients who are confused, have memory loss, or dementia-related psychosis; hyperglycemia; increased cholesterol and triglycerides; and weight gain. Tremfya Counseling: I discussed with the patient the risks of guselkumab including but not limited to immunosuppression, serious infections, worsening of inflammatory bowel disease and drug reactions.  The patient understands that monitoring is required including a PPD at baseline and must alert us or the primary physician if symptoms of infection or other concerning signs are noted. Clofazimine Counseling:  I discussed with the patient the risks of clofazimine including but not limited to skin and eye pigmentation, liver damage, nausea/vomiting, gastrointestinal bleeding and allergy. Nemluvio Counseling: I discussed with the patient the risks of nemolizumab including but not limited to headache, gastrointestinal complaints, nasopharyngitis, musculoskeletal complaints, injection site reactions, and allergic reactions. The patient understands that monitoring is required and they must alert us or the primary physician if any side effects are noted. Methotrexate Pregnancy And Lactation Text: This medication is Pregnancy Category X and is known to cause fetal harm. This medication is excreted in breast milk. Benzoyl Peroxide Pregnancy And Lactation Text: This medication is Pregnancy Category C. It is unknown if benzoyl peroxide is excreted in breast milk. Terbinafine Counseling: Patient counseling regarding adverse effects of terbinafine including but not limited to headache, diarrhea, rash, upset stomach, liver function test abnormalities, itching, taste/smell disturbance, nausea, abdominal pain, and flatulence.  There is a rare possibility of liver failure that can occur when taking terbinafine.  The patient understands that a baseline LFT and kidney function test may be required. The patient verbalized understanding of the proper use and possible adverse effects of terbinafine.  All of the patient's questions and concerns were addressed. Azathioprine Counseling:  I discussed with the patient the risks of azathioprine including but not limited to myelosuppression, immunosuppression, hepatotoxicity, lymphoma, and infections.  The patient understands that monitoring is required including baseline LFTs, Creatinine, possible TPMP genotyping and weekly CBCs for the first month and then every 2 weeks thereafter.  The patient verbalized understanding of the proper use and possible adverse effects of azathioprine.  All of the patient's questions and concerns were addressed. Winlevi Counseling:  I discussed with the patient the risks of topical clascoterone including but not limited to erythema, scaling, itching, and stinging. Patient voiced their understanding. Cimzia Counseling:  I discussed with the patient the risks of Cimzia including but not limited to immunosuppression, allergic reactions and infections.  The patient understands that monitoring is required including a PPD at baseline and must alert us or the primary physician if symptoms of infection or other concerning signs are noted. Doxycycline Counseling:  Patient counseled regarding possible photosensitivity and increased risk for sunburn.  Patient instructed to avoid sunlight, if possible.  When exposed to sunlight, patients should wear protective clothing, sunglasses, and sunscreen.  The patient was instructed to call the office immediately if the following severe adverse effects occur:  hearing changes, easy bruising/bleeding, severe headache, or vision changes.  The patient verbalized understanding of the proper use and possible adverse effects of doxycycline.  All of the patient's questions and concerns were addressed. Elidel Counseling: Patient may experience a mild burning sensation during topical application. Elidel is not approved in children less than 2 years of age. There have been case reports of hematologic and skin malignancies in patients using topical calcineurin inhibitors although causality is questionable. Thalidomide Counseling: I discussed with the patient the risks of thalidomide including but not limited to birth defects, anxiety, weakness, chest pain, dizziness, cough and severe allergy. Tazorac Pregnancy And Lactation Text: This medication is not safe during pregnancy. It is unknown if this medication is excreted in breast milk. Bexarotene Counseling:  I discussed with the patient the risks of bexarotene including but not limited to hair loss, dry lips/skin/eyes, liver abnormalities, hyperlipidemia, pancreatitis, depression/suicidal ideation, photosensitivity, drug rash/allergic reactions, hypothyroidism, anemia, leukopenia, infection, cataracts, and teratogenicity.  Patient understands that they will need regular blood tests to check lipid profile, liver function tests, white blood cell count, thyroid function tests and pregnancy test if applicable. Dutasteride Female Counseling: Dutasteride Counseling:  I discussed with the patient the risks of use of dutasteride including but not limited to decreased libido and sexual dysfunction. Explained the teratogenic nature of the medication and stressed the importance of not getting pregnant during treatment. All of the patient's questions and concerns were addressed.

## 2025-04-03 ENCOUNTER — DOCUMENTATION ONLY (OUTPATIENT)
Dept: GERIATRICS | Facility: CLINIC | Age: OVER 89
End: 2025-04-03
Payer: COMMERCIAL

## 2025-04-04 ENCOUNTER — NURSING HOME VISIT (OUTPATIENT)
Dept: GERIATRICS | Facility: CLINIC | Age: OVER 89
End: 2025-04-04
Payer: COMMERCIAL

## 2025-04-04 VITALS
RESPIRATION RATE: 21 BRPM | BODY MASS INDEX: 31.12 KG/M2 | WEIGHT: 210.1 LBS | OXYGEN SATURATION: 95 % | HEIGHT: 69 IN | DIASTOLIC BLOOD PRESSURE: 82 MMHG | SYSTOLIC BLOOD PRESSURE: 143 MMHG | TEMPERATURE: 97.5 F | HEART RATE: 73 BPM

## 2025-04-04 DIAGNOSIS — I50.812 CHRONIC RIGHT-SIDED HEART FAILURE (H): ICD-10-CM

## 2025-04-04 DIAGNOSIS — R53.81 PHYSICAL DEBILITY: ICD-10-CM

## 2025-04-04 DIAGNOSIS — R60.0 BILATERAL LOWER EXTREMITY EDEMA: ICD-10-CM

## 2025-04-04 DIAGNOSIS — E78.5 HYPERLIPIDEMIA LDL GOAL <100: ICD-10-CM

## 2025-04-04 DIAGNOSIS — S30.1XXD HEMATOMA OF RIGHT PSOAS REGION TO ANTICOAGULANT THERAPY, SUBSEQUENT ENCOUNTER: Primary | ICD-10-CM

## 2025-04-04 DIAGNOSIS — I10 ESSENTIAL HYPERTENSION: ICD-10-CM

## 2025-04-04 DIAGNOSIS — J44.9 CHRONIC OBSTRUCTIVE PULMONARY DISEASE, UNSPECIFIED COPD TYPE (H): ICD-10-CM

## 2025-04-04 DIAGNOSIS — N18.31 CHRONIC KIDNEY DISEASE, STAGE 3A (H): ICD-10-CM

## 2025-04-04 DIAGNOSIS — G25.81 RESTLESS LEGS SYNDROME (RLS): ICD-10-CM

## 2025-04-04 DIAGNOSIS — E11.9 TYPE 2 DIABETES MELLITUS WITHOUT COMPLICATION, WITHOUT LONG-TERM CURRENT USE OF INSULIN (H): ICD-10-CM

## 2025-04-04 DIAGNOSIS — I48.11 LONGSTANDING PERSISTENT ATRIAL FIBRILLATION (H): ICD-10-CM

## 2025-04-04 DIAGNOSIS — Z86.73 HISTORY OF CVA (CEREBROVASCULAR ACCIDENT): ICD-10-CM

## 2025-04-04 NOTE — PROGRESS NOTES
[facility]    Chief Complaint   Patient presents with    RECHECK     HPI:  Melvin Abad is a 89 year old  (4/24/1928), who is being seen today for an episodic care visit at: [facility] AND REH[facility] ([facility]) [[id number]]. Patient was hospitalized at [facility] from 2/3/2025 through 2/6/2025.      Patient is a 89 year-old male with past medical history significant for Afib, history of CVA, HFpEF, hypertension, benign prostatic hyperplasia, chronic anemia, thrombocytopenia, type 2 diabetes, venous stasis, and Gilbert's syndrome. Patient tested positive for influenza A while at [facility] and was given tamiflu. He was brought to the ED for hypoxia and discharged the same day. However, when he was getting into his grandson's car upon discharge, he pulled his groin somehow and was readmitted to the ER for right groin pain. Patient was found to have a hematoma of right iliopsoas muscle. Previously, his INR on 1/31/25 was 8.3 and it was 5.9 on 2/3/25. Patient was given vitamin K and warfarin was held until discharge. Patient is now staying in LTC at [facility] as he requires assistance with transfers and requires 2 pt lift.    Today's concern is: Follow-up post right thigh doppler results on 3/28/25: Right medial thigh shows a 5.3 cm hypoechoic area which may be a resolving hematoma. This lies 1.4 cm deep to the skin. No drainable fluid collection. Upon visit, patient is sitting in w/c in room with his youngest son, Thuan. Patient says that his right thigh has improved as it is less tender and no longer swollen in the upper thigh. Weight has been stable at 210 lbs. Patient more concerned about stiffness in left leg for which voltaren gel is somewhat helpful, as he has a history of bilateral knee replacement surgeries. Patient is still frustrated that he is unable to walk. Until a LTC room opens up, the facility has provided him an art table where he can do  "his drawings that give him pride and keeps him occupied. Denies lightheadedness, shortness of breath or chest pain.      Allergies, and PMH/PSH reviewed in EPIC today.  REVIEW OF SYSTEMS:  4 point ROS including Respiratory, CV, GI and , other than that noted in the HPI,  is negative    Objective:   BP (!) 143/82   Pulse 73   Temp 97.5  F (36.4  C)   Resp 21   Ht 1.753 m (5' 9\")   Wt 95.3 kg (210 lb 1.6 oz)   SpO2 95%   BMI 31.03 kg/m    GENERAL APPEARANCE:  Alert, in no distress, appears healthy  ENT:  Mouth and posterior oropharynx normal, moist mucous membranes, [place]  EYES:  EOM, conjunctivae, lids, pupils and irises normal  RESP:  respiratory effort and palpation of chest normal, rhonchi in lower lobes  CV:  regular rate and rhythm, no murmur, rub, or gallop, +2 pedal pulses, 1 + edema of BL lower legs, compression socks in place bilaterally  ABDOMEN:  normal bowel sounds, soft, nontender, no hepatosplenomegaly or other masses  M/S:   generalized weakness of lower extremities  SKIN:  no ecchymosis or erythema of right thigh.   NEURO:   Cranial nerves 2-12 are normal tested and grossly at patient's baseline  PSYCH:  oriented X 3, affect and mood normal    Labs done in SNF are in [place] EPIC. Please refer to them using [facility]/Care Everywhere.    Assessment/Plan:    Hematoma of right iliopsoas muscle, subsequent encounter  Physical debility  Manage pain with PRN tylenol and oxycodone 2.5 mg every 6 hours PRN.   PT/OT for rehabilitation; lost appeal and will be staying at [facility] in LTC due to increased care needs.   Likely had another hematoma of right thigh, per doppler US on 3/28/25: Right medial thigh shows a 5.3 cm hypoechoic area which may be a resolving hematoma. This lies 1.4 cm deep to the skin. No drainable fluid collection.     Chronic obstructive pulmonary disease, unspecified COPD type (H)  Stable; continue Albuterol inhaler PRN     Longstanding persistent atrial fibrillation (H)  History of CVA " (cerebrovascular accident)  Warfarin was held during hospital stay; INR goal 2-3; bring up to therapeutic level without heparin bridging.  Continue metoprolol succinate 50 mg daily     Restless legs syndrome (RLS)  Continue gabapentin 200 mg BID  With neuropathic pain overnight, will increase gabapentin to 400 mg TID and add voltaren 1% gel to apply topically to right medial thigh, bilateral lower legs and bilateral feet at bedtime.   Due to increased lower extremity swelling, will decrease gabapentin to 200 mg BID.   Pain improved with since wearing the heel compressive boots daily at bedtime      Bilateral lower extremity edema  Chronic right-sided heart failure (H)  Continue furosemide 20 mg daily  Daily weights and monitor for edema  Dry weight approx. 210 lbs.     Chronic kidney disease, stage 3a (H)  2/7/25: creatinine 1.01  Avoid nephrotoxic medications. Renally dose medications. Monitor electrolytes, creatinine and dehydration status.  3/17/25: creatinine 1.03     Essential hypertension  Continue lisinopril 10 mg daily and low sodium, low fat diet  Monitor BP's.      Hyperlipidemia LDL goal <100  Continue simvastatin 40 mg at bedtime  Check lipid panel with next labs.      Type 2 diabetes mellitus without complication, without long-term current use of insulin (H)   Last A1c 6.2, 2 years ago. Well-controlled with diet.   Recheck A1c with next labs.           Orders:  No changes to treatment plan.      Electronically signed by: DINH Munoz CNP

## 2025-04-04 NOTE — LETTER
4/4/2025      Melvin Abad  C/o Toña Abad  [address]  [facility] [address]        [facility]    Chief Complaint   Patient presents with     RECHECK     HPI:  Melvin Abad is a 89 year old  (4/24/1928), who is being seen today for an episodic care visit at[facility] [[id number]]. Patient was hospitalized at [facility] from 2/3/2025 through 2/6/2025.      Patient is a 89 year-old male with past medical history significant for Afib, history of CVA, HFpEF, hypertension, benign prostatic hyperplasia, chronic anemia, thrombocytopenia, type 2 diabetes, venous stasis, and Gilbert's syndrome. Patient tested positive for influenza A while at [facility] and was given tamiflu. He was brought to the ED for hypoxia and discharged the same day. However, when he was getting into his grandson's car upon discharge, he pulled his groin somehow and was readmitted to the ER for right groin pain. Patient was found to have a hematoma of right iliopsoas muscle. Previously, his INR on 1/31/25 was 8.3 and it was 5.9 on 2/3/25. Patient was given vitamin K and warfarin was held until discharge. Patient is now staying in LTC at [facility] as he requires assistance with transfers and requires 2 pt lift.    Today's concern is: Follow-up post right thigh doppler results on 3/28/25: Right medial thigh shows a 5.3 cm hypoechoic area which may be a resolving hematoma. This lies 1.4 cm deep to the skin. No drainable fluid collection. Upon visit, patient is sitting in w/c in room with his youngest son, Thuan. Patient says that his right thigh has improved as it is less tender and no longer swollen in the upper thigh. Weight has been stable at 210 lbs. Patient more concerned about stiffness in left leg for which voltaren gel is somewhat helpful, as he has a history of bilateral knee replacement surgeries. Patient is still frustrated that he is unable  "to walk. Until a LTC room opens up, the facility has provided him an art table where he can do his drawings that give him pride and keeps him occupied. Denies lightheadedness, shortness of breath or chest pain.      Allergies, and PMH/PSH reviewed in EPIC today.  REVIEW OF SYSTEMS:  4 point ROS including Respiratory, CV, GI and , other than that noted in the HPI,  is negative    Objective:   BP (!) 143/82   Pulse 73   Temp 97.5  F (36.4  C)   Resp 21   Ht 1.753 m (5' 9\")   Wt 95.3 kg (210 lb 1.6 oz)   SpO2 95%   BMI 31.03 kg/m    GENERAL APPEARANCE:  Alert, in no distress, appears healthy  ENT:  Mouth and posterior oropharynx normal, moist mucous membranes, [place]  EYES:  EOM, conjunctivae, lids, pupils and irises normal  RESP:  respiratory effort and palpation of chest normal, rhonchi in lower lobes  CV:  regular rate and rhythm, no murmur, rub, or gallop, +2 pedal pulses, 1 + edema of BL lower legs, compression socks in place bilaterally  ABDOMEN:  normal bowel sounds, soft, nontender, no hepatosplenomegaly or other masses  M/S:   generalized weakness of lower extremities  SKIN:  no ecchymosis or erythema of right thigh.   NEURO:   Cranial nerves 2-12 are normal tested and grossly at patient's baseline  PSYCH:  oriented X 3, affect and mood normal    Labs done in SNF are in [place] EPIC. Please refer to them using [facility]/Care Everywhere.    Assessment/Plan:    Hematoma of right iliopsoas muscle, subsequent encounter  Physical debility  Manage pain with PRN tylenol and oxycodone 2.5 mg every 6 hours PRN.   PT/OT for rehabilitation; lost appeal and will be staying at [facility] in LTC due to increased care needs.   Likely had another hematoma of right thigh, per doppler US on 3/28/25: Right medial thigh shows a 5.3 cm hypoechoic area which may be a resolving hematoma. This lies 1.4 cm deep to the skin. No drainable fluid collection.     Chronic obstructive pulmonary disease, unspecified COPD type (H)  Stable; " continue Albuterol inhaler PRN     Longstanding persistent atrial fibrillation (H)  History of CVA (cerebrovascular accident)  Warfarin was held during hospital stay; INR goal 2-3; bring up to therapeutic level without heparin bridging.  Continue metoprolol succinate 50 mg daily     Restless legs syndrome (RLS)  Continue gabapentin 200 mg BID  With neuropathic pain overnight, will increase gabapentin to 400 mg TID and add voltaren 1% gel to apply topically to right medial thigh, bilateral lower legs and bilateral feet at bedtime.   Due to increased lower extremity swelling, will decrease gabapentin to 200 mg BID.   Pain improved with since wearing the heel compressive boots daily at bedtime      Bilateral lower extremity edema  Chronic right-sided heart failure (H)  Continue furosemide 20 mg daily  Daily weights and monitor for edema  Dry weight approx. 210 lbs.     Chronic kidney disease, stage 3a (H)  2/7/25: creatinine 1.01  Avoid nephrotoxic medications. Renally dose medications. Monitor electrolytes, creatinine and dehydration status.  3/17/25: creatinine 1.03     Essential hypertension  Continue lisinopril 10 mg daily and low sodium, low fat diet  Monitor BP's.      Hyperlipidemia LDL goal <100  Continue simvastatin 40 mg at bedtime  Check lipid panel with next labs.      Type 2 diabetes mellitus without complication, without long-term current use of insulin (H)   Last A1c 6.2, 2 years ago. Well-controlled with diet.   Recheck A1c with next labs.           Orders:  No changes to treatment plan.      Electronically signed by: DINH Munoz CNP         Sincerely,        DINH Munoz CNP    Electronically signed

## 2025-04-08 ENCOUNTER — DOCUMENTATION ONLY (OUTPATIENT)
Dept: ANTICOAGULATION | Facility: CLINIC | Age: OVER 89
End: 2025-04-08
Payer: COMMERCIAL

## 2025-04-08 NOTE — PROGRESS NOTES
ANTICOAGULATION     Melvin Abad is overdue for an INR check.     Per chart review, patiently at [facility] TCU.  INR manage by TCU provider.  Will postpone for another week.    Chanel Baldwin RN  4/8/2025  Anticoagulation Clinic  [facility] for routing messages: cristi COLON  [facility] patient phone line: [phone]

## 2025-04-15 ENCOUNTER — DOCUMENTATION ONLY (OUTPATIENT)
Dept: ANTICOAGULATION | Facility: CLINIC | Age: OVER 89
End: 2025-04-15
Payer: COMMERCIAL

## 2025-04-15 NOTE — PROGRESS NOTES
ANTICOAGULATION     Melvin Abad is overdue for an INR check.     Patient is noted to be in TCU (name/phone): [facility] () . DVM left for RN to call back and verify that patient is still managed by in house provider.    04/16/25 12:24 PM  VM confirming patient IS being managed by TCU provider - will postpone 1 week.     Cheryle Sebastian RN  4/15/2025  Anticoagulation Clinic  [facility] for routing messages: cristi COLON  [facility] patient phone line: [phone]

## 2025-05-01 ENCOUNTER — HOSPITAL ENCOUNTER (EMERGENCY)
Facility: CLINIC | Age: OVER 89
Discharge: HOME | End: 2025-05-01
Attending: FAMILY MEDICINE
Payer: COMMERCIAL

## 2025-05-01 ENCOUNTER — NURSING HOME VISIT (OUTPATIENT)
Dept: GERIATRICS | Facility: CLINIC | Age: OVER 89
End: 2025-05-01
Payer: COMMERCIAL

## 2025-05-01 ENCOUNTER — DOCUMENTATION ONLY (OUTPATIENT)
Dept: ANTICOAGULATION | Facility: CLINIC | Age: OVER 89
End: 2025-05-01

## 2025-05-01 VITALS
DIASTOLIC BLOOD PRESSURE: 97 MMHG | SYSTOLIC BLOOD PRESSURE: 127 MMHG | WEIGHT: 214.7 LBS | TEMPERATURE: 97.9 F | OXYGEN SATURATION: 94 % | HEIGHT: 69 IN | HEART RATE: 62 BPM | BODY MASS INDEX: 31.8 KG/M2 | RESPIRATION RATE: 18 BRPM

## 2025-05-01 VITALS
HEIGHT: 69 IN | RESPIRATION RATE: 19 BRPM | OXYGEN SATURATION: 96 % | BODY MASS INDEX: 31.55 KG/M2 | SYSTOLIC BLOOD PRESSURE: 159 MMHG | TEMPERATURE: 97.4 F | DIASTOLIC BLOOD PRESSURE: 88 MMHG | WEIGHT: 213 LBS | HEART RATE: 74 BPM

## 2025-05-01 DIAGNOSIS — Z79.01 LONG TERM CURRENT USE OF ANTICOAGULANT THERAPY: Primary | ICD-10-CM

## 2025-05-01 DIAGNOSIS — S30.1XXD HEMATOMA OF RIGHT PSOAS REGION TO ANTICOAGULANT THERAPY, SUBSEQUENT ENCOUNTER: Primary | ICD-10-CM

## 2025-05-01 DIAGNOSIS — J44.9 CHRONIC OBSTRUCTIVE PULMONARY DISEASE, UNSPECIFIED COPD TYPE (H): ICD-10-CM

## 2025-05-01 DIAGNOSIS — I10 ESSENTIAL HYPERTENSION: ICD-10-CM

## 2025-05-01 DIAGNOSIS — R60.1 GENERALIZED EDEMA: ICD-10-CM

## 2025-05-01 DIAGNOSIS — E78.5 HYPERLIPIDEMIA LDL GOAL <100: ICD-10-CM

## 2025-05-01 DIAGNOSIS — E11.9 TYPE 2 DIABETES MELLITUS WITHOUT COMPLICATION, WITHOUT LONG-TERM CURRENT USE OF INSULIN (H): ICD-10-CM

## 2025-05-01 DIAGNOSIS — R53.81 PHYSICAL DEBILITY: ICD-10-CM

## 2025-05-01 DIAGNOSIS — G25.81 RESTLESS LEGS SYNDROME (RLS): ICD-10-CM

## 2025-05-01 DIAGNOSIS — I50.812 CHRONIC RIGHT-SIDED HEART FAILURE (H): ICD-10-CM

## 2025-05-01 DIAGNOSIS — I48.11 LONGSTANDING PERSISTENT ATRIAL FIBRILLATION (H): ICD-10-CM

## 2025-05-01 DIAGNOSIS — N18.31 CHRONIC KIDNEY DISEASE, STAGE 3A (H): ICD-10-CM

## 2025-05-01 DIAGNOSIS — R60.0 BILATERAL LOWER EXTREMITY EDEMA: ICD-10-CM

## 2025-05-01 DIAGNOSIS — Z86.73 HISTORY OF CVA (CEREBROVASCULAR ACCIDENT): ICD-10-CM

## 2025-05-01 DIAGNOSIS — I48.20 CHRONIC ATRIAL FIBRILLATION (H): ICD-10-CM

## 2025-05-01 LAB
ALBUMIN SERPL BCG-MCNC: 4 G/DL (ref 3.5–5.2)
ALP SERPL-CCNC: 136 U/L (ref 40–150)
ALT SERPL W P-5'-P-CCNC: 12 U/L (ref 0–70)
ANION GAP SERPL CALCULATED.3IONS-SCNC: 10 MMOL/L (ref 7–15)
APTT PPP: 39 SECONDS (ref 22–38)
AST SERPL W P-5'-P-CCNC: 19 U/L (ref 0–45)
BASOPHILS # BLD AUTO: 0 10E3/UL (ref 0–0.2)
BASOPHILS NFR BLD AUTO: 1 %
BILIRUB SERPL-MCNC: 1 MG/DL
BUN SERPL-MCNC: 20.6 MG/DL (ref 8–23)
CALCIUM SERPL-MCNC: 9.2 MG/DL (ref 8.8–10.4)
CHLORIDE SERPL-SCNC: 103 MMOL/L (ref 98–107)
CREAT SERPL-MCNC: 1.1 MG/DL (ref 0.67–1.17)
EGFRCR SERPLBLD CKD-EPI 2021: 61 ML/MIN/1.73M2
EOSINOPHIL # BLD AUTO: 0.3 10E3/UL (ref 0–0.7)
EOSINOPHIL NFR BLD AUTO: 6 %
ERYTHROCYTE [DISTWIDTH] IN BLOOD BY AUTOMATED COUNT: 15.2 % (ref 10–15)
GLUCOSE SERPL-MCNC: 114 MG/DL (ref 70–99)
HCO3 SERPL-SCNC: 28 MMOL/L (ref 22–29)
HCT VFR BLD AUTO: 36.4 % (ref 40–53)
HGB BLD-MCNC: 11.7 G/DL (ref 13.3–17.7)
IMM GRANULOCYTES # BLD: 0 10E3/UL
IMM GRANULOCYTES NFR BLD: 0 %
INR PPP: 2.42 (ref 0.85–1.15)
LYMPHOCYTES # BLD AUTO: 2 10E3/UL (ref 0.8–5.3)
LYMPHOCYTES NFR BLD AUTO: 34 %
MCH RBC QN AUTO: 31.8 PG (ref 26.5–33)
MCHC RBC AUTO-ENTMCNC: 32.1 G/DL (ref 31.5–36.5)
MCV RBC AUTO: 99 FL (ref 78–100)
MONOCYTES # BLD AUTO: 0.7 10E3/UL (ref 0–1.3)
MONOCYTES NFR BLD AUTO: 11 %
NEUTROPHILS # BLD AUTO: 2.9 10E3/UL (ref 1.6–8.3)
NEUTROPHILS NFR BLD AUTO: 49 %
NRBC # BLD AUTO: 0 10E3/UL
NRBC BLD AUTO-RTO: 0 /100
NT-PROBNP SERPL-MCNC: 3003 PG/ML (ref 0–1800)
PLATELET # BLD AUTO: 114 10E3/UL (ref 150–450)
POTASSIUM SERPL-SCNC: 4.3 MMOL/L (ref 3.4–5.3)
PROT SERPL-MCNC: 6.7 G/DL (ref 6.4–8.3)
PROTHROMBIN TIME: 25.6 SECONDS (ref 11.8–14.8)
RBC # BLD AUTO: 3.68 10E6/UL (ref 4.4–5.9)
SODIUM SERPL-SCNC: 141 MMOL/L (ref 135–145)
WBC # BLD AUTO: 5.9 10E3/UL (ref 4–11)

## 2025-05-01 PROCEDURE — 99309 SBSQ NF CARE MODERATE MDM 30: CPT

## 2025-05-01 PROCEDURE — 85610 PROTHROMBIN TIME: CPT | Performed by: FAMILY MEDICINE

## 2025-05-01 PROCEDURE — 99284 EMERGENCY DEPT VISIT MOD MDM: CPT | Mod: 25

## 2025-05-01 PROCEDURE — 83880 ASSAY OF NATRIURETIC PEPTIDE: CPT | Performed by: FAMILY MEDICINE

## 2025-05-01 PROCEDURE — 99284 EMERGENCY DEPT VISIT MOD MDM: CPT | Performed by: FAMILY MEDICINE

## 2025-05-01 PROCEDURE — 250N000011 HC RX IP 250 OP 636: Performed by: FAMILY MEDICINE

## 2025-05-01 PROCEDURE — 96374 THER/PROPH/DIAG INJ IV PUSH: CPT

## 2025-05-01 PROCEDURE — 85025 COMPLETE CBC W/AUTO DIFF WBC: CPT | Performed by: FAMILY MEDICINE

## 2025-05-01 PROCEDURE — 85730 THROMBOPLASTIN TIME PARTIAL: CPT | Performed by: FAMILY MEDICINE

## 2025-05-01 PROCEDURE — 36415 COLL VENOUS BLD VENIPUNCTURE: CPT | Performed by: FAMILY MEDICINE

## 2025-05-01 PROCEDURE — 82040 ASSAY OF SERUM ALBUMIN: CPT | Performed by: FAMILY MEDICINE

## 2025-05-01 RX ORDER — CLOTRIMAZOLE 1 %
CREAM (GRAM) TOPICAL 2 TIMES DAILY
COMMUNITY
Start: 2025-04-30 | End: 2025-05-07

## 2025-05-01 RX ORDER — GABAPENTIN 100 MG/1
2 CAPSULE ORAL 2 TIMES DAILY
COMMUNITY

## 2025-05-01 RX ORDER — WARFARIN SODIUM 5 MG/1
1 TABLET ORAL EVERY EVENING
COMMUNITY

## 2025-05-01 RX ORDER — WARFARIN SODIUM 2.5 MG/1
1 TABLET ORAL EVERY EVENING
COMMUNITY

## 2025-05-01 RX ORDER — ACETAMINOPHEN 500 MG
2 TABLET ORAL DAILY
COMMUNITY

## 2025-05-01 RX ORDER — FUROSEMIDE 10 MG/ML
40 INJECTION INTRAMUSCULAR; INTRAVENOUS ONCE
Status: COMPLETED | OUTPATIENT
Start: 2025-05-01 | End: 2025-05-01

## 2025-05-01 RX ADMIN — FUROSEMIDE 40 MG: 10 INJECTION, SOLUTION INTRAMUSCULAR; INTRAVENOUS at 14:14

## 2025-05-01 ASSESSMENT — ACTIVITIES OF DAILY LIVING (ADL)
ADLS_ACUITY_SCORE: 61

## 2025-05-01 NOTE — ED PROVIDER NOTES
History   No chief complaint on file.    HPI  Melvin Abad is a 89 year old male who sent over from the local nursing home because of concerns of edema that is going up to the legs.  Patient does have a history of generalized edema, patient is on Lasix daily and is also on Coumadin.  Patient denies any pain.  He states he does feel itchy around his belly.  He is also noted that his testicles seem a little bit more swollen 2.  Patient has not felt dizzy or lightheaded.  Nothing seems to make the symptoms better or worse.    Allergies:  No Known Allergies    Problem List:    Patient Active Problem List    Diagnosis Date Noted    Hyperglycemia 03/17/2025     Priority: Medium    Subjective tinnitus 03/17/2025     Priority: Medium    Sensorineural hearing loss 03/17/2025     Priority: Medium    Hearing loss 03/17/2025     Priority: Medium    Persistent atrial fibrillation (H) 03/17/2025     Priority: Medium    Constipation 03/17/2025     Priority: Medium    Localized edema 03/12/2025     Priority: Medium    Hereditary and idiopathic neuropathy, unspecified 02/21/2025     Priority: Medium    Pain in unspecified joint 02/06/2025     Priority: Medium    Overweight 02/06/2025     Priority: Medium    Nasal congestion 02/06/2025     Priority: Medium    Dry eye syndrome of bilateral lacrimal glands 02/06/2025     Priority: Medium    Cough, unspecified 02/06/2025     Priority: Medium    Allergic rhinitis, unspecified 02/06/2025     Priority: Medium    Muscle weakness (generalized) 02/06/2025     Priority: Medium    Personal history of nicotine dependence 02/06/2025     Priority: Medium    Wheezing 02/06/2025     Priority: Medium    Vitamin D deficiency, unspecified 02/06/2025     Priority: Medium    Unspecified disorder of eye and adnexa 02/06/2025     Priority: Medium    Shortness of breath 02/06/2025     Priority: Medium    Retention of urine, unspecified 02/06/2025     Priority: Medium    Other specified disorders of  nose and nasal sinuses 02/06/2025     Priority: Medium    Pain, unspecified 02/06/2025     Priority: Medium    Benign prostatic hyperplasia without lower urinary tract symptoms 02/06/2025     Priority: Medium    Chronic atrial fibrillation, unspecified (H) 02/06/2025     Priority: Medium    Chronic obstructive pulmonary disease, unspecified (H) 02/06/2025     Priority: Medium    Chronic right heart failure (H) 02/06/2025     Priority: Medium    Essential (primary) hypertension 02/06/2025     Priority: Medium    Personal history of transient ischemic attack (TIA), and cerebral infarction without residual deficits 02/06/2025     Priority: Medium    Influenza due to other identified influenza virus with other respiratory manifestations 02/06/2025     Priority: Medium    Nontraumatic hematoma of soft tissue 02/06/2025     Priority: Medium    Restless legs syndrome 02/06/2025     Priority: Medium    Constipation, unspecified 02/06/2025     Priority: Medium    Thrombocytopenia, unspecified 02/04/2025     Priority: Medium    Essential hypertension, benign 02/04/2025     Priority: Medium    Personal history of tobacco use, presenting hazards to health 02/04/2025     Priority: Medium    Long term (current) use of anticoagulants 02/04/2025     Priority: Medium    Medication induced coagulopathy 02/04/2025     Priority: Medium    Psoas hematoma, right, secondary to anticoagulant therapy 02/03/2025     Priority: Medium    Influenza A 02/03/2025     Priority: Medium    Supratherapeutic INR 02/03/2025     Priority: Medium    Nontraumatic intramuscular hematoma 02/03/2025     Priority: Medium    Acute renal failure, unspecified acute renal failure type 10/17/2024     Priority: Medium    Anemia, unspecified type 10/17/2024     Priority: Medium    Weakness generalized 10/15/2024     Priority: Medium    Thrombocytopenia 10/15/2024     Priority: Medium    Anemia - multifactorial (history of acute blood loss, anemia of chronic disease,  etc) 10/15/2024     Priority: Medium    Venous stasis of both lower extremities 10/15/2024     Priority: Medium    History of CVA (cerebrovascular accident) 10/15/2024     Priority: Medium    Chronic kidney disease, stage 3a (H) 08/18/2024     Priority: Medium    Arthritis pain 05/19/2024     Priority: Medium    Chronic obstructive pulmonary disease, unspecified COPD type (H) 05/19/2024     Priority: Medium    Benign prostatic hyperplasia, unspecified whether lower urinary tract symptoms present 05/19/2024     Priority: Medium    Chronic right-sided heart failure (H) 02/13/2024     Priority: Medium    Chronic atrial fibrillation (H) 05/01/2020     Priority: Medium    Osteoarthritis of glenohumeral joint, left 02/24/2020     Priority: Medium    Longstanding persistent atrial fibrillation (H) 01/23/2020     Priority: Medium    Anticoagulation goal of INR 2 to 3 01/23/2020     Priority: Medium    Slow transit constipation 01/23/2020     Priority: Medium    Sleep disturbances 01/23/2020     Priority: Medium    Right pontine stroke (H) 01/23/2020     Priority: Medium    Dry eyes 01/23/2020     Priority: Medium    Restless legs syndrome (RLS) 01/23/2020     Priority: Medium    Hyperlipidemia 12/22/2019     Priority: Medium    Type 2 diabetes mellitus (H) 12/22/2019     Priority: Medium    Weakness 12/21/2019     Priority: Medium    Cerebrovascular accident of right pontine structure (H) 12/21/2019     Priority: Medium    Long-term (current) use of anticoagulants [Z79.01] 04/05/2016     Priority: Medium    Obesity (BMI 30-39.9) 10/28/2015     Priority: Medium    Anemia due to blood loss, acute 11/20/2013     Priority: Medium    DVT prophylaxis 11/20/2013     Priority: Medium    Carpal tunnel syndrome of right wrist 08/16/2013     Priority: Medium    Gilbert syndrome 01/31/2012     Priority: Medium    History of skin cancer 01/03/2012     Priority: Medium    AK (actinic keratosis) 01/03/2012     Priority: Medium     Essential hypertension 04/18/2011     Priority: Medium    Diverticulosis 03/25/2011     Priority: Medium    Hyperlipidemia LDL goal <100 10/31/2010     Priority: Medium    Long term current use of anticoagulant therapy 01/31/2001     Priority: Medium     Problem list name updated by automated process. Provider to review          Past Medical History:    Past Medical History:   Diagnosis Date    Actinic keratosis     Atrial fibrillation (H)     Basal cell carcinoma nos 06/01/2010    Cardiac dysrhythmia, unspecified     Clostridium difficile enterocolitis 05/19/2024    COPD (chronic obstructive pulmonary disease) (H)     Cough     COVID-19 virus infection 10/15/2024    Diabetic eye exam (H) 08/29/2014    Generalized osteoarthrosis, unspecified site     Heart failure with preserved ejection fraction, NYHA class I (H)     Other diseases of lung, not elsewhere classified     Pure hypercholesterolemia     Unspecified essential hypertension        Past Surgical History:    Past Surgical History:   Procedure Laterality Date    COLONOSCOPY  05/02/2007    Multiple bxs of diminutive polyps of ascending colon.    COLONOSCOPY  11/10/2010    COLONOSCOPY performed by MARISELA GRIMM at [facility] HEMORRHOIDOPEXY BY STAPLING  09/26/08    [facility] TOTAL KNEE ARTHROPLASTY  1997    Knee Replacement, Total    [facility] COLONOSCOPY W/WO BRUSH/WASH  10/19/2001    [facility] COLONOSCOPY W/WO BRUSH/WASH  11/02/2004       Family History:    Family History   Problem Relation Age of Onset    Cancer Mother     Prostate Cancer Father        Social History:  Marital Status:   [5]  Social History     Tobacco Use    Smoking status: Former     Types: Cigarettes    Smokeless tobacco: Never    Tobacco comments:     5 years   Vaping Use    Vaping status: Never Used   Substance Use Topics    Alcohol use: No     Comment: denies    Drug use: No        Medications:    acetaminophen (TYLENOL) 500 MG tablet  albuterol (VENTOLIN HFA) 108 (90 Base) MCG/ACT  "inhaler  furosemide (LASIX) 20 MG tablet  gabapentin (NEURONTIN) 100 MG capsule  ketotifen fumarate 0.035%, ketotifen 0.025%, (ZADITOR) 0.025 % ophthalmic solution  Lidocaine (LIDOCARE) 4 % Patch  lisinopril (ZESTRIL) 10 MG tablet  loratadine (CLARITIN) 10 MG tablet  metoprolol succinate ER (TOPROL XL) 50 MG 24 hr tablet  oxyCODONE (ROXICODONE) 5 MG tablet  polyethylene glycol (MIRALAX) 17 GM/Dose powder  psyllium (METAMUCIL/KONSYL) 58.6 % powder  simvastatin (ZOCOR) 40 MG tablet  sodium chloride (OCEAN) 0.65 % nasal spray  tamsulosin (FLOMAX) 0.4 MG capsule  Vitamin D3 (CHOLECALCIFEROL) 25 mcg (1000 units) tablet  warfarin ANTICOAGULANT (COUMADIN) 2.5 MG tablet          Review of Systems   All other systems reviewed and are negative.      Physical Exam   BP: (!) 143/75  Pulse: 60  Temp: 97.4  F (36.3  C)  Resp: 20  Height: 175.3 cm (5' 9\")  Weight: 96.6 kg (213 lb)  SpO2: 96 %      Physical Exam  Vitals and nursing note reviewed.   Constitutional:       General: He is not in acute distress.     Appearance: He is well-developed. He is not diaphoretic.   HENT:      Head: Normocephalic and atraumatic.      Nose: Nose normal.      Mouth/Throat:      Pharynx: No oropharyngeal exudate.   Eyes:      General: No scleral icterus.     Conjunctiva/sclera: Conjunctivae normal.      Pupils: Pupils are equal, round, and reactive to light.   Cardiovascular:      Rate and Rhythm: Normal rate and regular rhythm.      Heart sounds: Normal heart sounds. No murmur heard.     No friction rub.   Pulmonary:      Effort: Pulmonary effort is normal. No respiratory distress.      Breath sounds: Normal breath sounds. No wheezing or rales.   Abdominal:      General: Bowel sounds are normal. There is no distension.      Palpations: Abdomen is soft. There is no mass.      Tenderness: There is no abdominal tenderness. There is no guarding or rebound.   Musculoskeletal:         General: No tenderness. Normal range of motion.      Cervical back: " Normal range of motion.   Skin:     General: Skin is warm.      Findings: No rash.      Comments: Patient has very minimal edema noted in the lower extremities bilaterally, no significant edema noted on the belly or back area.  There is this petechial rash noted on the legs all the way up to the belly and back area.   Neurological:      Mental Status: He is alert and oriented to person, place, and time.   Psychiatric:         Judgment: Judgment normal.         ED Course        Procedures                Results for orders placed or performed during the hospital encounter of 05/01/25 (from the past 24 hours)   CBC with platelets differential    Narrative    The following orders were created for panel order CBC with platelets differential.  Procedure                               Abnormality         Status                     ---------                               -----------         ------                     CBC with platelets and ...[[id number]]  Abnormal            Final result                 Please view results for these tests on the individual orders.   INR   Result Value Ref Range    INR 2.42 (H) 0.85 - 1.15    PT 25.6 (H) 11.8 - 14.8 Seconds   Partial thromboplastin time   Result Value Ref Range    aPTT 39 (H) 22 - 38 Seconds   Comprehensive metabolic panel   Result Value Ref Range    Sodium 141 135 - 145 mmol/L    Potassium 4.3 3.4 - 5.3 mmol/L    Carbon Dioxide (CO2) 28 22 - 29 mmol/L    Anion Gap 10 7 - 15 mmol/L    Urea Nitrogen 20.6 8.0 - 23.0 mg/dL    Creatinine 1.10 0.67 - 1.17 mg/dL    GFR Estimate 61 >60 mL/min/1.73m2    Calcium 9.2 8.8 - 10.4 mg/dL    Chloride 103 98 - 107 mmol/L    Glucose 114 (H) 70 - 99 mg/dL    Alkaline Phosphatase 136 40 - 150 U/L    AST 19 0 - 45 U/L    ALT 12 0 - 70 U/L    Protein Total 6.7 6.4 - 8.3 g/dL    Albumin 4.0 3.5 - 5.2 g/dL    Bilirubin Total 1.0 <=1.2 mg/dL   CBC with platelets and differential   Result Value Ref Range    WBC Count 5.9 4.0 - 11.0 10e3/uL    RBC  Count 3.68 (L) 4.40 - 5.90 10e6/uL    Hemoglobin 11.7 (L) 13.3 - 17.7 g/dL    Hematocrit 36.4 (L) 40.0 - 53.0 %    MCV 99 78 - 100 fL    MCH 31.8 26.5 - 33.0 pg    MCHC 32.1 31.5 - 36.5 g/dL    RDW 15.2 (H) 10.0 - 15.0 %    Platelet Count 114 (L) 150 - 450 10e3/uL    % Neutrophils 49 %    % Lymphocytes 34 %    % Monocytes 11 %    % Eosinophils 6 %    % Basophils 1 %    % Immature Granulocytes 0 %    NRBCs per 100 WBC 0 <1 /100    Absolute Neutrophils 2.9 1.6 - 8.3 10e3/uL    Absolute Lymphocytes 2.0 0.8 - 5.3 10e3/uL    Absolute Monocytes 0.7 0.0 - 1.3 10e3/uL    Absolute Eosinophils 0.3 0.0 - 0.7 10e3/uL    Absolute Basophils 0.0 0.0 - 0.2 10e3/uL    Absolute Immature Granulocytes 0.0 <=0.4 10e3/uL    Absolute NRBCs 0.0 10e3/uL   Nt probnp inpatient (BNP)   Result Value Ref Range    N terminal Pro BNP Inpatient 3,003 (H) 0 - 1,800 pg/mL     *Note: Due to a large number of results and/or encounters for the requested time period, some results have not been displayed. A complete set of results can be found in Results Review.       Medications   furosemide (LASIX) injection 40 mg (has no administration in time range)     Labs came back and were mostly unremarkable, INR was therapeutic, there was concern that patient could have developed a hematoma again but I think this is not likely.  Patient's BNP was elevated and I think the symptoms are from some mild edema which patient does have a known history of.  Patient is already on Lasix which is 20 mg a day.  I am in to give the patient IV dose here now which should come to help him pee some more the fluid off and have him take an additional Lasix for the next couple days and see how things go.  Would recommend follow-up with his nurse geriatrician on Monday to see how things are going.  I think patient is safe to be discharged home as patient has stable vitals, there is no significant shortness of breath.    Assessments & Plan (with Medical Decision Making)  Generalized  edema     I have reviewed the nursing notes.    I have reviewed the findings, diagnosis, plan and need for follow up with the patient.        5/1/2025   [facility] EMERGENCY DEPT       Sky Neri MD  05/01/25 [id number]

## 2025-05-01 NOTE — LETTER
5/1/2025      Melvin Abad  C/o Toña Abad  [address]  [facility] [address]        No notes on file      Sincerely,        DINH Munoz CNP    Electronically signed   " Ht 1.753 m (5' 9\")   Wt 97.4 kg (214 lb 11.2 oz)   SpO2 94%   BMI 31.71 kg/m    GENERAL APPEARANCE:  Alert, in no distress  RESP:  respiratory effort and palpation of chest normal, lungs clear to auscultation   CV:  regular rate and rhythm, no murmur, rub, or gallop, peripheral edema 1+ in BLE's  ABDOMEN:  Adomen is distended with fluid and more fluid on left side of abdomen with firmness in groin area.  SKIN:  penile shaft is slightly swollen, with no erythema and glans/foreskin able to retract. No rashes.    Labs done in SNF are in [place] EPIC. Please refer to them using [facility] Everywhere.    Assessment/Plan:    Hematoma of right iliopsoas muscle, subsequent encounter  Physical debility  Manage pain with PRN tylenol and oxycodone 2.5 mg every 6 hours PRN.   PT/OT for rehabilitation; lost appeal and will be staying at [facility] in LTC due to increased care needs.   Likely had another hematoma of right thigh, per doppler US on 3/28/25: Right medial thigh shows a 5.3 cm hypoechoic area which may be a resolving hematoma. This lies 1.4 cm deep to the skin. No drainable fluid collection.     Chronic obstructive pulmonary disease, unspecified COPD type (H)  Stable; continue Albuterol inhaler PRN     Longstanding persistent atrial fibrillation (H)  History of CVA (cerebrovascular accident)  Warfarin was held during hospital stay; INR goal 2-3; bring up to therapeutic level without heparin bridging.  Continue metoprolol succinate 50 mg daily     Restless legs syndrome (RLS)  Continue gabapentin 200 mg BID  Pain improved with since wearing the heel compressive boots daily at bedtime      Bilateral lower extremity edema  Chronic right-sided heart failure (H)  Continue furosemide 20 mg daily  Daily weights and monitor for edema  4/30/25: Ordered additional lasix 40 mg daily x 3 days due to weight increase to 213.7 lbs on 4/30/25 with dry weight approximately 210 lbs.      Chronic kidney disease, stage 3a (H)  2/7/25: " creatinine 1.01  Avoid nephrotoxic medications. Renally dose medications. Monitor electrolytes, creatinine and dehydration status.  3/17/25: creatinine 1.03     Essential hypertension  Continue lisinopril 10 mg daily and low sodium, low fat diet  Monitor BP's.      Hyperlipidemia LDL goal <100  Continue simvastatin 40 mg at bedtime  Check lipid panel with next labs.      Type 2 diabetes mellitus without complication, without long-term current use of insulin (H)   Last A1c 6.2, 2 years ago. Well-controlled with diet.   Recheck A1c with next labs.         Orders:  Send patient to ED with excess fluid of abdomen with concern for CHF exacerbation, hernia or hematoma.       Electronically signed by: DINH Munoz CNP       Sincerely,        DINH Munoz CNP    Electronically signed

## 2025-05-01 NOTE — DISCHARGE INSTRUCTIONS
1.  Increase your furosemide to 40 mg daily for the next 3 days and then go back to your 20 mg thereafter.  2.  Please follow-up with your geriatrician on Monday to see if things are improving.  3.  Return to the emergency department if your breathing worsens.

## 2025-05-01 NOTE — ED TRIAGE NOTES
Patient brought to the ED via EMS from [facility] with concerns for edema from his abdomen down into his donita area and legs. Mar Crowell RN

## 2025-05-01 NOTE — PROGRESS NOTES
ANTICOAGULATION  MANAGEMENT: Discharge Review    Melvin Abad chart reviewed for anticoagulation continuity of care    Emergency room visit on 5/1/25 for Generalized edema.    Discharge disposition: Home    Results:    Recent labs: (last 7 days)     05/01/25  1242   INR 2.42*     Anticoagulation inpatient management:     not applicable     Anticoagulation discharge instructions:     Warfarin dosing: home regimen continued   Bridging: No   INR goal change: No      Medication changes affecting anticoagulation: No, increased lasix for the next few days    Additional factors affecting anticoagulation: No     PLAN     No adjustment to anticoagulation plan needed    Recommended follow up is scheduled, [facility]    No adjustment to Anticoagulation Calendar was required    Neeta Cheung RN  5/1/2025  Anticoagulation Clinic  [facility] for routing messages: cristi COLON  [facility] patient phone line: [phone]

## 2025-05-01 NOTE — MEDICATION SCRIBE - ADMISSION MEDICATION HISTORY
Medication Scribe Admission Medication History    Admission medication history is complete. The information provided in this note is only as accurate as the sources available at the time of the update.    Information Source(s): Facility (U/NH/) medication list/MAR via PTA medications reviewed with MAR from Ingrid Laura, [phone]    Pertinent Information:     Changes made to PTA medication list:  Added: warfarin 5, clotrimazole cr  Deleted: None  Changed: gabapentin 100 mg, 4 caps, tid to 2 caps, bid, warfarin 2.5 mg, 1 tab, every Sun, Tues, Thurs and 5 mg all the other days to 2.5 mg, 1 tab, every Mon, Tues, Wed, Fri, Sat and 5 mg, Thurs, Sun     Allergies reviewed with patient and updates made in EHR: yes    Medication History Completed By: Keila Siddiqui 5/1/2025 2:36 PM    PTA Med List   Medication Sig Note Last Dose/Taking    acetaminophen (TYLENOL) 500 MG tablet Take 2 tablets by mouth daily.  4/30/2025 at  7:52 PM    acetaminophen (TYLENOL) 500 MG tablet Take 2 tablets (1,000 mg) by mouth every 8 hours as needed for mild pain  4/18/2025 at  2:15 AM    albuterol (VENTOLIN HFA) 108 (90 Base) MCG/ACT inhaler Inhale 2 puffs into the lungs every 6 hours as needed for shortness of breath, wheezing or cough.  Unknown    clotrimazole (LOTRIMIN) 1 % external cream Apply topically 2 times daily. Apply under foreskin and around glands, after cleaning with normal saline  Unknown    diclofenac (VOLTAREN) 1 % topical gel Apply 4 g topically daily. Apply to BL lower legs and feet  4/30/2025 at  8:08 PM    furosemide (LASIX) 20 MG tablet Take 1 tablet (20 mg) by mouth daily.  5/1/2025 at  7:43 AM    gabapentin (NEURONTIN) 100 MG capsule Take 2 capsules by mouth 2 times daily.  5/1/2025 at  7:43 AM    ketotifen fumarate 0.035%, ketotifen 0.025%, (ZADITOR) 0.025 % ophthalmic solution Place 1 drop into both eyes 2 times daily as needed for dry eyes Patient self-administers  Unknown    lisinopril (ZESTRIL) 10 MG  tablet Take 1 tablet (10 mg) by mouth daily.  5/1/2025 at  7:43 AM    loratadine (CLARITIN) 10 MG tablet Take 1 tablet (10 mg) by mouth daily  5/1/2025 at  7:43 AM    metoprolol succinate ER (TOPROL XL) 50 MG 24 hr tablet Take 1 tablet (50 mg) by mouth daily.  5/1/2025 at  7:43 AM    oxyCODONE (ROXICODONE) 5 MG tablet Take 0.5 tablets (2.5 mg) by mouth every 6 hours as needed for severe pain.  4/2/2025 at  8:48 PM    polyethylene glycol (MIRALAX) 17 GM/Dose powder Take 9 g by mouth daily as needed for constipation Mix and hand to patient, Patient self-administers (he knows how much he needs to keep regular)  4/28/2025 at  8:28 AM    psyllium (METAMUCIL/KONSYL) 58.6 % powder Take 18 g (1 Tablespoonful) by mouth daily Mix and hand to patient, Patient self-administers (he knows how much he needs to keep regular)  4/28/2025 at  8:28 AM    simvastatin (ZOCOR) 40 MG tablet Take 1 tablet (40 mg) by mouth at bedtime  4/30/2025 at  7:52 PM    sodium chloride (OCEAN) 0.65 % nasal spray Spray 2 sprays in nostril daily as needed for congestion Patient self-administers  Unknown    tamsulosin (FLOMAX) 0.4 MG capsule Take 1 capsule (0.4 mg) by mouth daily  5/1/2025 at  7:43 AM    Vitamin D3 (CHOLECALCIFEROL) 25 mcg (1000 units) tablet Take 1 tablet (25 mcg) by mouth daily  5/1/2025 at  7:43 AM    warfarin ANTICOAGULANT (COUMADIN) 2.5 MG tablet Take 1 tablet by mouth every evening. Mon, Tues, Wed, Fri, Sat  4/30/2025 at  5:22 PM    warfarin ANTICOAGULANT (COUMADIN) 5 MG tablet Take 1 tablet by mouth every evening. Sun, Thurs 5/1/2025: New order as of 4/29/2025 Taking

## 2025-05-03 ENCOUNTER — HEALTH MAINTENANCE LETTER (OUTPATIENT)
Age: OVER 89
End: 2025-05-03

## 2025-05-05 ENCOUNTER — NURSING HOME VISIT (OUTPATIENT)
Dept: GERIATRICS | Facility: CLINIC | Age: OVER 89
End: 2025-05-05
Payer: COMMERCIAL

## 2025-05-05 ENCOUNTER — PATIENT OUTREACH (OUTPATIENT)
Dept: GERIATRIC MEDICINE | Facility: CLINIC | Age: OVER 89
End: 2025-05-05

## 2025-05-05 DIAGNOSIS — I10 ESSENTIAL HYPERTENSION: ICD-10-CM

## 2025-05-05 DIAGNOSIS — Z86.73 HISTORY OF CVA (CEREBROVASCULAR ACCIDENT): ICD-10-CM

## 2025-05-05 DIAGNOSIS — S30.1XXD HEMATOMA OF RIGHT PSOAS REGION TO ANTICOAGULANT THERAPY, SUBSEQUENT ENCOUNTER: Primary | ICD-10-CM

## 2025-05-05 DIAGNOSIS — R60.0 BILATERAL LOWER EXTREMITY EDEMA: ICD-10-CM

## 2025-05-05 DIAGNOSIS — I50.812 CHRONIC RIGHT-SIDED HEART FAILURE (H): ICD-10-CM

## 2025-05-05 DIAGNOSIS — E11.9 TYPE 2 DIABETES MELLITUS WITHOUT COMPLICATION, WITHOUT LONG-TERM CURRENT USE OF INSULIN (H): ICD-10-CM

## 2025-05-05 DIAGNOSIS — J44.9 CHRONIC OBSTRUCTIVE PULMONARY DISEASE, UNSPECIFIED COPD TYPE (H): ICD-10-CM

## 2025-05-05 DIAGNOSIS — R53.81 PHYSICAL DEBILITY: ICD-10-CM

## 2025-05-05 DIAGNOSIS — G25.81 RESTLESS LEGS SYNDROME (RLS): ICD-10-CM

## 2025-05-05 DIAGNOSIS — I48.11 LONGSTANDING PERSISTENT ATRIAL FIBRILLATION (H): ICD-10-CM

## 2025-05-05 DIAGNOSIS — E78.5 HYPERLIPIDEMIA LDL GOAL <100: ICD-10-CM

## 2025-05-05 DIAGNOSIS — N18.31 CHRONIC KIDNEY DISEASE, STAGE 3A (H): ICD-10-CM

## 2025-05-05 NOTE — PROGRESS NOTES
CC received notification of Emergency Room visit.  ER visit occurred on 5/1/25 at [facility] with Dx of edema.    CC contacted and Harshad reviewed discharge summary.  Member has a follow-up appointment with PCP: Yes: will be followed by facility on-site care team.  Member has had a change in condition: No  New referrals placed: No  Home Visit Needed: No  Support Plan reviewed and updated.  PCP notified of ED visit via EMR.      GLEN Chandler, RN, PHN, [facility]  Care Coordinator-[place] [address]  [email or web address]   [email or web address]     Office: [phone]   Fax: [phone]

## 2025-05-05 NOTE — LETTER
5/5/2025      Melvin Abad  C/o Toña Abad  [address]  [facility] [address]        [facility] GERIATRICS    Chief Complaint   Patient presents with     RECHECK     HPI:  Melvin Abad is a 89 year old  (4/24/1928), who is being seen today for an episodic care visit at: [facility] () [[id number]]. Patient was hospitalized at [facility] from 2/3/2025 through 2/6/2025.      Patient is a 89 year-old male with past medical history significant for Afib, history of CVA, HFpEF, hypertension, benign prostatic hyperplasia, chronic anemia, thrombocytopenia, type 2 diabetes, venous stasis, and Gilbert's syndrome. Patient tested positive for influenza A while at [facility] and was given tamiflu. He was brought to the ED for hypoxia and discharged the same day. However, when he was getting into his grandson's car upon discharge, he pulled his groin somehow and was readmitted to the ER for right groin pain. Patient was found to have a hematoma of right iliopsoas muscle. Previously, his INR on 1/31/25 was 8.3 and it was 5.9 on 2/3/25. Patient was given vitamin K and warfarin was held until discharge. Patient is now staying in LTC at [facility] as he requires assistance with transfers and requires 2 pt lift.    Today's concern is: Patient was sent to ED on 5/1/25 due to excess fluid of abdomen with concern for CHF exacerbation, hernia or hematoma. Labs were unremarkable, with INR in therapeutic range at 2.42, ruling out hematoma, and BNP was elevated at 3,003 but patient had no shortness of breath. He was given an IV dose of lasix with orders for additional lasix 20 mg for 3 days and was brought back to the facility at 1400.     Patient says he still has some swelling of his penis but it is improved. Denies pain or rash of the area. Voiding well. Denies lightheadedness, shortness of breath or chest pain. Told him to notify us if he notices any  "worsening and we will also keep a close eye on weights in case we need to increase the diuretic. Patient is enjoying his new private room though he says he hasn't had the urge to draw.     Allergies, and PMH/PSH reviewed in EPIC today.  REVIEW OF SYSTEMS:  4 point ROS including Respiratory, CV, GI and , other than that noted in the HPI,  is negative    Objective:   BP (!) 127/97   Pulse 62   Temp 97.9  F (36.6  C)   Resp 18   Ht 1.753 m (5' 9\")   Wt 96 kg (211 lb 11.2 oz)   SpO2 94%   BMI 31.26 kg/m    GENERAL APPEARANCE:  Alert, in no distress  RESP:  respiratory effort and palpation of chest normal, lungs clear to auscultation   CV:  regular rate and rhythm, no murmur, rub, or gallop, peripheral edema 1+ in BLE's  ABDOMEN:  soft, non-distended with active BS x 4  SKIN:  penile shaft is slightly swollen, with no erythema and glans/foreskin able to retract. No rashes.    Admission on 05/01/2025, Discharged on 05/01/2025   Component Date Value Ref Range Status     INR 05/01/2025 2.42 (H)  0.85 - 1.15 Final     PT 05/01/2025 25.6 (H)  11.8 - 14.8 Seconds Final     aPTT 05/01/2025 39 (H)  22 - 38 Seconds Final     Sodium 05/01/2025 141  135 - 145 mmol/L Final     Potassium 05/01/2025 4.3  3.4 - 5.3 mmol/L Final     Carbon Dioxide (CO2) 05/01/2025 28  22 - 29 mmol/L Final     Anion Gap 05/01/2025 10  7 - 15 mmol/L Final     Urea Nitrogen 05/01/2025 20.6  8.0 - 23.0 mg/dL Final     Creatinine 05/01/2025 1.10  0.67 - 1.17 mg/dL Final     GFR Estimate 05/01/2025 61  >60 mL/min/1.73m2 Final    eGFR calculated using 2021 CKD-EPI equation.     Calcium 05/01/2025 9.2  8.8 - 10.4 mg/dL Final     Chloride 05/01/2025 103  98 - 107 mmol/L Final     Glucose 05/01/2025 114 (H)  70 - 99 mg/dL Final     Alkaline Phosphatase 05/01/2025 136  40 - 150 U/L Final     AST 05/01/2025 19  0 - 45 U/L Final     ALT 05/01/2025 12  0 - 70 U/L Final     Protein Total 05/01/2025 6.7  6.4 - 8.3 g/dL Final     Albumin 05/01/2025 4.0  3.5 - " 5.2 g/dL Final     Bilirubin Total 05/01/2025 1.0  <=1.2 mg/dL Final     WBC Count 05/01/2025 5.9  4.0 - 11.0 10e3/uL Final     RBC Count 05/01/2025 3.68 (L)  4.40 - 5.90 10e6/uL Final     Hemoglobin 05/01/2025 11.7 (L)  13.3 - 17.7 g/dL Final     Hematocrit 05/01/2025 36.4 (L)  40.0 - 53.0 % Final     MCV 05/01/2025 99  78 - 100 fL Final     MCH 05/01/2025 31.8  26.5 - 33.0 pg Final     MCHC 05/01/2025 32.1  31.5 - 36.5 g/dL Final     RDW 05/01/2025 15.2 (H)  10.0 - 15.0 % Final     Platelet Count 05/01/2025 114 (L)  150 - 450 10e3/uL Final     % Neutrophils 05/01/2025 49  % Final     % Lymphocytes 05/01/2025 34  % Final     % Monocytes 05/01/2025 11  % Final     % Eosinophils 05/01/2025 6  % Final     % Basophils 05/01/2025 1  % Final     % Immature Granulocytes 05/01/2025 0  % Final     NRBCs per 100 WBC 05/01/2025 0  <1 /100 Final     Absolute Neutrophils 05/01/2025 2.9  1.6 - 8.3 10e3/uL Final     Absolute Lymphocytes 05/01/2025 2.0  0.8 - 5.3 10e3/uL Final     Absolute Monocytes 05/01/2025 0.7  0.0 - 1.3 10e3/uL Final     Absolute Eosinophils 05/01/2025 0.3  0.0 - 0.7 10e3/uL Final     Absolute Basophils 05/01/2025 0.0  0.0 - 0.2 10e3/uL Final     Absolute Immature Granulocytes 05/01/2025 0.0  <=0.4 10e3/uL Final     Absolute NRBCs 05/01/2025 0.0  10e3/uL Final     N terminal Pro BNP Inpatient 05/01/2025 3,003 (H)  0 - 1,800 pg/mL Final    Reference range shown and results flagged as abnormal are suggested inpatient cut points for confirming diagnosis if CHF in an acute setting. Establishing a baseline value for each individual patient is useful for follow-up. An inpatient or emergency department NT-proPBNP <300 pg/mL effectively rules out acute CHF, with 99% negative predictive value.    The outpatient non-acute reference range for ruling out CHF is:  0-125 pg/mL (age 18 to less than 75)  0-450 pg/mL (age 75 yrs and older)        Assessment/Plan:    Hematoma of right iliopsoas muscle, subsequent  encounter  Physical debility  Manage pain with PRN tylenol and oxycodone 2.5 mg every 6 hours PRN.   PT/OT for rehabilitation; lost appeal and will be staying at [place] in LTC due to increased care needs.   Likely had another hematoma of right thigh, per doppler US on 3/28/25: Right medial thigh shows a 5.3 cm hypoechoic area which may be a resolving hematoma. This lies 1.4 cm deep to the skin. No drainable fluid collection.     Chronic obstructive pulmonary disease, unspecified COPD type (H)  Stable; continue Albuterol inhaler PRN     Longstanding persistent atrial fibrillation (H)  History of CVA (cerebrovascular accident)  Continue warfarin; INR goal 2-3  Continue metoprolol succinate 50 mg daily     Restless legs syndrome (RLS)  Continue gabapentin 200 mg BID  Pain improved with since wearing the heel compressive boots daily at bedtime      Bilateral lower extremity edema  Chronic right-sided heart failure (H)  Continue furosemide 20 mg daily  Daily weights and monitor for edema  4/30/25: Ordered additional lasix 40 mg daily x 3 days due to weight increase to 213.7 lbs on 4/30/25 with dry weight approximately 210 lbs.      Chronic kidney disease, stage 3a (H)  2/7/25: creatinine 1.01  Avoid nephrotoxic medications. Renally dose medications. Monitor electrolytes, creatinine and dehydration status.  3/17/25: creatinine 1.03  5/1/25: creatinine  1.10     Essential hypertension  Continue lisinopril 10 mg daily and low sodium, low fat diet  Monitor BP's.      Hyperlipidemia LDL goal <100  Continue simvastatin 40 mg at bedtime  Check lipid panel with next labs.      Type 2 diabetes mellitus without complication, without long-term current use of insulin (H)   Last A1c 6.2, 2 years ago. Well-controlled with diet.   Recheck A1c with next labs.         Orders:  No changes to plan of care.      Electronically signed by: DINH Munoz CNP         Sincerely,        DINH Munoz CNP    Electronically signed

## 2025-05-06 VITALS
DIASTOLIC BLOOD PRESSURE: 97 MMHG | RESPIRATION RATE: 18 BRPM | HEIGHT: 69 IN | SYSTOLIC BLOOD PRESSURE: 127 MMHG | BODY MASS INDEX: 31.36 KG/M2 | TEMPERATURE: 97.9 F | WEIGHT: 211.7 LBS | HEART RATE: 62 BPM | OXYGEN SATURATION: 94 %

## 2025-05-06 NOTE — PROGRESS NOTES
[facility] GERIATRICS    Chief Complaint   Patient presents with    RECHECK     HPI:  Melvin Abad is a 89 year old  (4/24/1928), who is being seen today for an episodic care visit at: [facility] () [[id number]]. Patient was hospitalized at [facility] from 2/3/2025 through 2/6/2025.      Patient is a 89 year-old male with past medical history significant for Afib, history of CVA, HFpEF, hypertension, benign prostatic hyperplasia, chronic anemia, thrombocytopenia, type 2 diabetes, venous stasis, and Gilbert's syndrome. Patient tested positive for influenza A while at [facility] and was given tamiflu. He was brought to the ED for hypoxia and discharged the same day. However, when he was getting into his grandson's car upon discharge, he pulled his groin somehow and was readmitted to the ER for right groin pain. Patient was found to have a hematoma of right iliopsoas muscle. Previously, his INR on 1/31/25 was 8.3 and it was 5.9 on 2/3/25. Patient was given vitamin K and warfarin was held until discharge. Patient is now staying in LTC at [facility] as he requires assistance with transfers and requires 2 pt lift.    Today's concern is: Patient was sent to ED on 5/1/25 due to excess fluid of abdomen with concern for CHF exacerbation, hernia or hematoma. Labs were unremarkable, with INR in therapeutic range at 2.42, ruling out hematoma, and BNP was elevated at 3,003 but patient had no shortness of breath. He was given an IV dose of lasix with orders for additional lasix 20 mg for 3 days and was brought back to the facility at 1400.     Patient says he still has some swelling of his penis but it is improved. Denies pain or rash of the area. Voiding well. Denies lightheadedness, shortness of breath or chest pain. Told him to notify us if he notices any worsening and we will also keep a close eye on weights in case we need to increase the diuretic.  "Patient is enjoying his new private room though he says he hasn't had the urge to draw.     Allergies, and PMH/PSH reviewed in EPIC today.  REVIEW OF SYSTEMS:  4 point ROS including Respiratory, CV, GI and , other than that noted in the HPI,  is negative    Objective:   BP (!) 127/97   Pulse 62   Temp 97.9  F (36.6  C)   Resp 18   Ht 1.753 m (5' 9\")   Wt 96 kg (211 lb 11.2 oz)   SpO2 94%   BMI 31.26 kg/m    GENERAL APPEARANCE:  Alert, in no distress  RESP:  respiratory effort and palpation of chest normal, lungs clear to auscultation   CV:  regular rate and rhythm, no murmur, rub, or gallop, peripheral edema 1+ in BLE's  ABDOMEN:  soft, non-distended with active BS x 4  SKIN:  penile shaft is slightly swollen, with no erythema and glans/foreskin able to retract. No rashes.    Admission on 05/01/2025, Discharged on 05/01/2025   Component Date Value Ref Range Status    INR 05/01/2025 2.42 (H)  0.85 - 1.15 Final    PT 05/01/2025 25.6 (H)  11.8 - 14.8 Seconds Final    aPTT 05/01/2025 39 (H)  22 - 38 Seconds Final    Sodium 05/01/2025 141  135 - 145 mmol/L Final    Potassium 05/01/2025 4.3  3.4 - 5.3 mmol/L Final    Carbon Dioxide (CO2) 05/01/2025 28  22 - 29 mmol/L Final    Anion Gap 05/01/2025 10  7 - 15 mmol/L Final    Urea Nitrogen 05/01/2025 20.6  8.0 - 23.0 mg/dL Final    Creatinine 05/01/2025 1.10  0.67 - 1.17 mg/dL Final    GFR Estimate 05/01/2025 61  >60 mL/min/1.73m2 Final    eGFR calculated using 2021 CKD-EPI equation.    Calcium 05/01/2025 9.2  8.8 - 10.4 mg/dL Final    Chloride 05/01/2025 103  98 - 107 mmol/L Final    Glucose 05/01/2025 114 (H)  70 - 99 mg/dL Final    Alkaline Phosphatase 05/01/2025 136  40 - 150 U/L Final    AST 05/01/2025 19  0 - 45 U/L Final    ALT 05/01/2025 12  0 - 70 U/L Final    Protein Total 05/01/2025 6.7  6.4 - 8.3 g/dL Final    Albumin 05/01/2025 4.0  3.5 - 5.2 g/dL Final    Bilirubin Total 05/01/2025 1.0  <=1.2 mg/dL Final    WBC Count 05/01/2025 5.9  4.0 - 11.0 10e3/uL " Final    RBC Count 05/01/2025 3.68 (L)  4.40 - 5.90 10e6/uL Final    Hemoglobin 05/01/2025 11.7 (L)  13.3 - 17.7 g/dL Final    Hematocrit 05/01/2025 36.4 (L)  40.0 - 53.0 % Final    MCV 05/01/2025 99  78 - 100 fL Final    MCH 05/01/2025 31.8  26.5 - 33.0 pg Final    MCHC 05/01/2025 32.1  31.5 - 36.5 g/dL Final    RDW 05/01/2025 15.2 (H)  10.0 - 15.0 % Final    Platelet Count 05/01/2025 114 (L)  150 - 450 10e3/uL Final    % Neutrophils 05/01/2025 49  % Final    % Lymphocytes 05/01/2025 34  % Final    % Monocytes 05/01/2025 11  % Final    % Eosinophils 05/01/2025 6  % Final    % Basophils 05/01/2025 1  % Final    % Immature Granulocytes 05/01/2025 0  % Final    NRBCs per 100 WBC 05/01/2025 0  <1 /100 Final    Absolute Neutrophils 05/01/2025 2.9  1.6 - 8.3 10e3/uL Final    Absolute Lymphocytes 05/01/2025 2.0  0.8 - 5.3 10e3/uL Final    Absolute Monocytes 05/01/2025 0.7  0.0 - 1.3 10e3/uL Final    Absolute Eosinophils 05/01/2025 0.3  0.0 - 0.7 10e3/uL Final    Absolute Basophils 05/01/2025 0.0  0.0 - 0.2 10e3/uL Final    Absolute Immature Granulocytes 05/01/2025 0.0  <=0.4 10e3/uL Final    Absolute NRBCs 05/01/2025 0.0  10e3/uL Final    N terminal Pro BNP Inpatient 05/01/2025 3,003 (H)  0 - 1,800 pg/mL Final    Reference range shown and results flagged as abnormal are suggested inpatient cut points for confirming diagnosis if CHF in an acute setting. Establishing a baseline value for each individual patient is useful for follow-up. An inpatient or emergency department NT-proPBNP <300 pg/mL effectively rules out acute CHF, with 99% negative predictive value.    The outpatient non-acute reference range for ruling out CHF is:  0-125 pg/mL (age 18 to less than 75)  0-450 pg/mL (age 75 yrs and older)        Assessment/Plan:    Hematoma of right iliopsoas muscle, subsequent encounter  Physical debility  Manage pain with PRN tylenol and oxycodone 2.5 mg every 6 hours PRN.   PT/OT for rehabilitation; lost appeal and will be  staying at [place] in LTC due to increased care needs.   Likely had another hematoma of right thigh, per doppler US on 3/28/25: Right medial thigh shows a 5.3 cm hypoechoic area which may be a resolving hematoma. This lies 1.4 cm deep to the skin. No drainable fluid collection.     Chronic obstructive pulmonary disease, unspecified COPD type (H)  Stable; continue Albuterol inhaler PRN     Longstanding persistent atrial fibrillation (H)  History of CVA (cerebrovascular accident)  Continue warfarin; INR goal 2-3  Continue metoprolol succinate 50 mg daily     Restless legs syndrome (RLS)  Continue gabapentin 200 mg BID  Pain improved with since wearing the heel compressive boots daily at bedtime      Bilateral lower extremity edema  Chronic right-sided heart failure (H)  Continue furosemide 20 mg daily  Daily weights and monitor for edema  4/30/25: Ordered additional lasix 40 mg daily x 3 days due to weight increase to 213.7 lbs on 4/30/25 with dry weight approximately 210 lbs.      Chronic kidney disease, stage 3a (H)  2/7/25: creatinine 1.01  Avoid nephrotoxic medications. Renally dose medications. Monitor electrolytes, creatinine and dehydration status.  3/17/25: creatinine 1.03  5/1/25: creatinine  1.10     Essential hypertension  Continue lisinopril 10 mg daily and low sodium, low fat diet  Monitor BP's.      Hyperlipidemia LDL goal <100  Continue simvastatin 40 mg at bedtime  Check lipid panel with next labs.      Type 2 diabetes mellitus without complication, without long-term current use of insulin (H)   Last A1c 6.2, 2 years ago. Well-controlled with diet.   Recheck A1c with next labs.         Orders:  No changes to plan of care.      Electronically signed by: DINH Munoz CNP

## 2025-05-07 NOTE — PROGRESS NOTES
"[facility] GERIATRICS    Chief Complaint   Patient presents with    RECHECK     HPI:  Melvin Abad is a 89 year old  (4/24/1928), who is being seen today for an episodic care visit at: [facility] () [[id number]]. Patient was hospitalized at [facility] from 2/3/2025 through 2/6/2025.      Patient is a 89 year-old male with past medical history significant for Afib, history of CVA, HFpEF, hypertension, benign prostatic hyperplasia, chronic anemia, thrombocytopenia, type 2 diabetes, venous stasis, and Gilbert's syndrome. Patient tested positive for influenza A while at [facility] and was given tamiflu. He was brought to the ED for hypoxia and discharged the same day. However, when he was getting into his grandson's car upon discharge, he pulled his groin somehow and was readmitted to the ER for right groin pain. Patient was found to have a hematoma of right iliopsoas muscle. Previously, his INR on 1/31/25 was 8.3 and it was 5.9 on 2/3/25. Patient was given vitamin K and warfarin was held until discharge. Patient is now staying in LTC at [facility] as he requires assistance with transfers and requires 2 pt lift.     Today's concern is: Staff reports that patient has had swelling of his penis that began yesterday. There is no scrotum swelling and no erythema or discharge to indicate balanitis. This provider ordered additional lasix 40 mg daily x 4 days in case it was related to fluid. Patient denies any pain, pain with urination or urinary retention. Denies lightheadedness, shortness of breath or chest pain.      Allergies, and PMH/PSH reviewed in EPIC today.  REVIEW OF SYSTEMS:  4 point ROS including Respiratory, CV, GI and , other than that noted in the HPI,  is negative    Objective:   BP (!) 127/97   Pulse 62   Temp 97.9  F (36.6  C)   Resp 18   Ht 1.753 m (5' 9\")   Wt 97.4 kg (214 lb 11.2 oz)   SpO2 94%   BMI 31.71 kg/m    GENERAL " APPEARANCE:  Alert, in no distress  RESP:  respiratory effort and palpation of chest normal, lungs clear to auscultation   CV:  regular rate and rhythm, no murmur, rub, or gallop, peripheral edema 1+ in BLE's  ABDOMEN:  Adomen is distended with fluid and more fluid on left side of abdomen with firmness in groin area.  SKIN:  penile shaft is slightly swollen, with no erythema and glans/foreskin able to retract. No rashes.    Labs done in SNF are in [place] EPIC. Please refer to them using EPIC/Care Everywhere.    Assessment/Plan:    Hematoma of right iliopsoas muscle, subsequent encounter  Physical debility  Manage pain with PRN tylenol and oxycodone 2.5 mg every 6 hours PRN.   PT/OT for rehabilitation; lost appeal and will be staying at [place] in LTC due to increased care needs.   Likely had another hematoma of right thigh, per doppler US on 3/28/25: Right medial thigh shows a 5.3 cm hypoechoic area which may be a resolving hematoma. This lies 1.4 cm deep to the skin. No drainable fluid collection.     Chronic obstructive pulmonary disease, unspecified COPD type (H)  Stable; continue Albuterol inhaler PRN     Longstanding persistent atrial fibrillation (H)  History of CVA (cerebrovascular accident)  Warfarin was held during hospital stay; INR goal 2-3; bring up to therapeutic level without heparin bridging.  Continue metoprolol succinate 50 mg daily     Restless legs syndrome (RLS)  Continue gabapentin 200 mg BID  Pain improved with since wearing the heel compressive boots daily at bedtime      Bilateral lower extremity edema  Chronic right-sided heart failure (H)  Continue furosemide 20 mg daily  Daily weights and monitor for edema  4/30/25: Ordered additional lasix 40 mg daily x 3 days due to weight increase to 213.7 lbs on 4/30/25 with dry weight approximately 210 lbs.      Chronic kidney disease, stage 3a (H)  2/7/25: creatinine 1.01  Avoid nephrotoxic medications. Renally dose medications. Monitor electrolytes,  creatinine and dehydration status.  3/17/25: creatinine 1.03     Essential hypertension  Continue lisinopril 10 mg daily and low sodium, low fat diet  Monitor BP's.      Hyperlipidemia LDL goal <100  Continue simvastatin 40 mg at bedtime  Check lipid panel with next labs.      Type 2 diabetes mellitus without complication, without long-term current use of insulin (H)   Last A1c 6.2, 2 years ago. Well-controlled with diet.   Recheck A1c with next labs.         Orders:  Send patient to ED with excess fluid of abdomen with concern for CHF exacerbation, hernia or hematoma.       Electronically signed by: DINH Munoz CNP

## 2025-05-09 ENCOUNTER — LAB REQUISITION (OUTPATIENT)
Dept: LAB | Facility: CLINIC | Age: OVER 89
End: 2025-05-09
Payer: COMMERCIAL

## 2025-05-09 DIAGNOSIS — N18.31 CHRONIC KIDNEY DISEASE, STAGE 3A (H): ICD-10-CM

## 2025-05-12 ENCOUNTER — RESULTS FOLLOW-UP (OUTPATIENT)
Dept: GERIATRICS | Facility: CLINIC | Age: OVER 89
End: 2025-05-12

## 2025-05-12 LAB
ANION GAP SERPL CALCULATED.3IONS-SCNC: 7 MMOL/L (ref 7–15)
BUN SERPL-MCNC: 19.3 MG/DL (ref 8–23)
CALCIUM SERPL-MCNC: 9.6 MG/DL (ref 8.8–10.4)
CHLORIDE SERPL-SCNC: 104 MMOL/L (ref 98–107)
CHOLEST SERPL-MCNC: 97 MG/DL
CREAT SERPL-MCNC: 1.05 MG/DL (ref 0.67–1.17)
EGFRCR SERPLBLD CKD-EPI 2021: 65 ML/MIN/1.73M2
ERYTHROCYTE [DISTWIDTH] IN BLOOD BY AUTOMATED COUNT: 15.5 % (ref 10–15)
EST. AVERAGE GLUCOSE BLD GHB EST-MCNC: 123 MG/DL
FASTING STATUS PATIENT QL REPORTED: YES
FASTING STATUS PATIENT QL REPORTED: YES
GLUCOSE SERPL-MCNC: 91 MG/DL (ref 70–99)
HBA1C MFR BLD: 5.9 %
HCO3 SERPL-SCNC: 30 MMOL/L (ref 22–29)
HCT VFR BLD AUTO: 36.7 % (ref 40–53)
HDLC SERPL-MCNC: 46 MG/DL
HGB BLD-MCNC: 11.9 G/DL (ref 13.3–17.7)
LDLC SERPL CALC-MCNC: 40 MG/DL
MCH RBC QN AUTO: 31.6 PG (ref 26.5–33)
MCHC RBC AUTO-ENTMCNC: 32.4 G/DL (ref 31.5–36.5)
MCV RBC AUTO: 98 FL (ref 78–100)
NONHDLC SERPL-MCNC: 51 MG/DL
PLATELET # BLD AUTO: 109 10E3/UL (ref 150–450)
POTASSIUM SERPL-SCNC: 4.3 MMOL/L (ref 3.4–5.3)
RBC # BLD AUTO: 3.76 10E6/UL (ref 4.4–5.9)
SODIUM SERPL-SCNC: 141 MMOL/L (ref 135–145)
TRIGL SERPL-MCNC: 57 MG/DL
WBC # BLD AUTO: 5.7 10E3/UL (ref 4–11)

## 2025-05-12 PROCEDURE — 36415 COLL VENOUS BLD VENIPUNCTURE: CPT | Mod: ORL

## 2025-05-12 PROCEDURE — P9604 ONE-WAY ALLOW PRORATED TRIP: HCPCS | Mod: ORL

## 2025-05-12 PROCEDURE — 80061 LIPID PANEL: CPT | Mod: ORL

## 2025-05-12 PROCEDURE — 83036 HEMOGLOBIN GLYCOSYLATED A1C: CPT | Mod: ORL

## 2025-05-12 PROCEDURE — 80048 BASIC METABOLIC PNL TOTAL CA: CPT | Mod: ORL

## 2025-05-12 PROCEDURE — 85027 COMPLETE CBC AUTOMATED: CPT | Mod: ORL

## 2025-05-12 NOTE — PROGRESS NOTES
[facility] GERIATRICS    PRIMARY CARE PROVIDER AND CLINIC:  DINH Munoz CNP, [address] / [place] [address]  Chief Complaint   Patient presents with    [facility] Medical Record Number:  [id number]  Place of Service where encounter took place:  [facility] [place] () [[id number]]    Melvin Abad  is a 89 year old  (4/24/1928), admitted to the above facility from home due to care needs on 4/425. .   HPI:     Hx of CVA, HFpEF, ch a/fib on OAC, recurrent hospitalization and TCU/LTC stay. Transitioned to LTC from [facility] due to increased cares needs. Recent recurrent iliopsoas hematoma    TODAY:  -CHF:  Recurrent exacerbation requires diuretics adjustment.   Reprots feeling tired, torres has atrial fibrillation, had stroke, 5.5 years ago, which left his left side bad.  Denies quivering.  Denies chest pain. . In the lat few days felt weak more than average  raises the head side of the be about half way.  Does not know why he does that, does it because it is comfortable. Wears socks for edema.  Reports the sores seem had gone away now.     ================================================================================  CODE STATUS/ADVANCE DIRECTIVES DISCUSSION:  Prior  DNR / DNI  ALLERGIES: No Known Allergies   PAST MEDICAL HISTORY:   Past Medical History:   Diagnosis Date    Actinic keratosis     Atrial fibrillation (H)     Basal cell carcinoma nos 06/01/2010    Mohs excision, rt upper lip & rt temple    Cardiac dysrhythmia, unspecified     Clostridium difficile enterocolitis 05/19/2024    COPD (chronic obstructive pulmonary disease) (H)     Cough     chronic cough    COVID-19 virus infection 10/15/2024    Diabetic eye exam (H) 08/29/2014    Generalized osteoarthrosis, unspecified site     djd of the knees, hands, and neck    Heart failure with preserved ejection fraction, NYHA class I (H)     Other diseases of lung, not elsewhere classified     pulmonary  nodule, benign    Pure hypercholesterolemia     Unspecified essential hypertension       PAST SURGICAL HISTORY:   has a past surgical history that includes TOTAL KNEE ARTHROPLASTY (1997); Colonoscopy w/wo Agency **Performed** (10/19/2001); Colonoscopy w/wo Brush **Performed** (11/02/2004); colonoscopy (05/02/2007); HEMORRHOIDOPEXY BY STAPLING (09/26/08); and Colonoscopy (11/10/2010).  FAMILY HISTORY: family history includes Cancer in his mother; Prostate Cancer in his father.  SOCIAL HISTORY:   reports that he has quit smoking. His smoking use included cigarettes. He has never used smokeless tobacco. He reports that he does not drink alcohol and does not use drugs.  Patient's living condition: lives in a nursing home    Post Discharge Medication Reconciliation Status:   MED REC REQUIREDPost Medication Reconciliation Status: medication reconcilation previously completed during another office visit       Current Outpatient Medications   Medication Sig Dispense Refill    acetaminophen (TYLENOL) 500 MG tablet Take 2 tablets by mouth daily.      acetaminophen (TYLENOL) 500 MG tablet Take 2 tablets (1,000 mg) by mouth every 8 hours as needed for mild pain      albuterol (VENTOLIN HFA) 108 (90 Base) MCG/ACT inhaler Inhale 2 puffs into the lungs every 6 hours as needed for shortness of breath, wheezing or cough. 18 g 0    diclofenac (VOLTAREN) 1 % topical gel Apply 4 g topically daily. Apply to BL lower legs and feet      furosemide (LASIX) 20 MG tablet Take 1 tablet (20 mg) by mouth daily. 30 tablet 0    gabapentin (NEURONTIN) 100 MG capsule Take 2 capsules by mouth 2 times daily.      ketotifen fumarate 0.035%, ketotifen 0.025%, (ZADITOR) 0.025 % ophthalmic solution Place 1 drop into both eyes 2 times daily as needed for dry eyes Patient self-administers 5 mL 0    Lidocaine (LIDOCARE) 4 % Patch Place 2 patches onto the skin every 24 hours. To prevent lidocaine toxicity, patient should be patch free for 12 hrs daily.       "lisinopril (ZESTRIL) 10 MG tablet Take 1 tablet (10 mg) by mouth daily.      loratadine (CLARITIN) 10 MG tablet Take 1 tablet (10 mg) by mouth daily 30 tablet 0    metoprolol succinate ER (TOPROL XL) 50 MG 24 hr tablet Take 1 tablet (50 mg) by mouth daily.      oxyCODONE (ROXICODONE) 5 MG tablet Take 0.5 tablets (2.5 mg) by mouth every 6 hours as needed for severe pain. 30 tablet 0    polyethylene glycol (MIRALAX) 17 GM/Dose powder Take 9 g by mouth daily as needed for constipation Mix and hand to patient, Patient self-administers (he knows how much he needs to keep regular) 510 g 0    psyllium (METAMUCIL/KONSYL) 58.6 % powder Take 18 g (1 Tablespoonful) by mouth daily Mix and hand to patient, Patient self-administers (he knows how much he needs to keep regular)      simvastatin (ZOCOR) 40 MG tablet Take 1 tablet (40 mg) by mouth at bedtime 30 tablet 0    sodium chloride (OCEAN) 0.65 % nasal spray Spray 2 sprays in nostril daily as needed for congestion Patient self-administers 30 mL 0    tamsulosin (FLOMAX) 0.4 MG capsule Take 1 capsule (0.4 mg) by mouth daily 30 capsule 0    Vitamin D3 (CHOLECALCIFEROL) 25 mcg (1000 units) tablet Take 1 tablet (25 mcg) by mouth daily      warfarin ANTICOAGULANT (COUMADIN) 2.5 MG tablet Take 1 tablet by mouth every evening. Mon, Tues, Wed, Fri, Sat      warfarin ANTICOAGULANT (COUMADIN) 5 MG tablet Take 1 tablet by mouth every evening. Sun, Thurs       No current facility-administered medications for this visit.       ROS:  10 point ROS of systems including Constitutional, Eyes, Respiratory, Cardiovascular, Gastroenterology, Genitourinary, Integumentary, Musculoskeletal, Psychiatric were all negative except for pertinent positives noted in my HPI.    Vitals:  BP (!) 140/76   Pulse 63   Temp 98.1  F (36.7  C)   Resp 17   Ht 1.753 m (5' 9\")   Wt 97.2 kg (214 lb 4.8 oz)   SpO2 (!) 91%   BMI 31.65 kg/m    Exam:  GENERAL APPEARANCE:  in no distress, sitting up in the wheel " chair  RESP:  labored breathing with minimal movements.   . Diminished over lower half, some crackles as well  CV:  S1S2 audible, regular HR, no murmur appreciated.  No edema in the legs. Has thick slight pitting edema over thighs.   ABDOMEN:  soft, NT/ND, BS audible.   M/S:   no joint deformity noted on observation.  SKIN:  extensive ecchymosis over left forearm.   NEURO:    ms strenght  (4/5  LUE and LLE  PSYCH:  affect and mood normal      Lab/Diagnostic data: Reviewed in the chart and EHR.        ASSESSMENT/PLAN:  -------------------------------  Chronic right-sided heart failure (H)  Longstanding persistent atrial fibrillation (H)  Essential hypertension  - left hemiparesis  History of CVA (cerebrovascular accident)  Hyperlipidemia LDL goal <100  - NYHA class 4.   -Is concerned more about his within the last couple of days.  He is on Robitussin for cough likely has upper [employer] infections.  Otherwise edema is better in the legs.  There is still has some edema in the thighs and scrotal area but this is improving as well.  Patient understand that his body is slowing down given his age and multiple morbidities.    - on torsemide 40 mg. Recurrent exacerbation. Renal function is wnl. Consider adding prn weight  gain.   - on coumadin for CVA prophylaxis. INR followed closely  - on metoprolol for rate control. CVR    Anemia of chronic disease  Hematoma of right psoas region to anticoagulant therapy, subsequent encounter  - Hb trending up. No concern today    Chronic obstructive pulmonary disease, unspecified COPD type (H)  -No concern today. Continue current plan of care.     Diet-controlled diabetes mellitus (H)    Benign prostatic hyperplasia, unspecified whether lower urinary tract symptoms present  - on flomax, no concern    Query dysphagia:  - reports coughing after eating. SLP consulted.     Frailty:  - Significant  Deficits requiring NH placement. Requiring extensive assistance from nursing.       Orders:  NNO    Electronically signed by:  Jacques Purcell MD

## 2025-05-13 ENCOUNTER — NURSING HOME VISIT (OUTPATIENT)
Dept: GERIATRICS | Facility: CLINIC | Age: OVER 89
End: 2025-05-13
Payer: COMMERCIAL

## 2025-05-13 VITALS
WEIGHT: 214.3 LBS | HEIGHT: 69 IN | DIASTOLIC BLOOD PRESSURE: 76 MMHG | BODY MASS INDEX: 31.74 KG/M2 | HEART RATE: 63 BPM | OXYGEN SATURATION: 91 % | SYSTOLIC BLOOD PRESSURE: 140 MMHG | TEMPERATURE: 98.1 F | RESPIRATION RATE: 17 BRPM

## 2025-05-13 VITALS
SYSTOLIC BLOOD PRESSURE: 140 MMHG | RESPIRATION RATE: 17 BRPM | WEIGHT: 214.3 LBS | TEMPERATURE: 98.1 F | DIASTOLIC BLOOD PRESSURE: 76 MMHG | OXYGEN SATURATION: 91 % | BODY MASS INDEX: 31.74 KG/M2 | HEART RATE: 63 BPM | HEIGHT: 69 IN

## 2025-05-13 DIAGNOSIS — Z86.73 HISTORY OF CVA (CEREBROVASCULAR ACCIDENT): ICD-10-CM

## 2025-05-13 DIAGNOSIS — S30.1XXD HEMATOMA OF RIGHT PSOAS REGION TO ANTICOAGULANT THERAPY, SUBSEQUENT ENCOUNTER: ICD-10-CM

## 2025-05-13 DIAGNOSIS — R53.81 PHYSICAL DEBILITY: ICD-10-CM

## 2025-05-13 DIAGNOSIS — I48.11 LONGSTANDING PERSISTENT ATRIAL FIBRILLATION (H): Primary | ICD-10-CM

## 2025-05-13 DIAGNOSIS — R60.0 BILATERAL LOWER EXTREMITY EDEMA: ICD-10-CM

## 2025-05-13 DIAGNOSIS — I10 ESSENTIAL HYPERTENSION: ICD-10-CM

## 2025-05-13 DIAGNOSIS — G25.81 RESTLESS LEGS SYNDROME (RLS): ICD-10-CM

## 2025-05-13 DIAGNOSIS — N18.31 CHRONIC KIDNEY DISEASE, STAGE 3A (H): ICD-10-CM

## 2025-05-13 DIAGNOSIS — J44.9 CHRONIC OBSTRUCTIVE PULMONARY DISEASE, UNSPECIFIED COPD TYPE (H): ICD-10-CM

## 2025-05-13 DIAGNOSIS — I50.812 CHRONIC RIGHT-SIDED HEART FAILURE (H): ICD-10-CM

## 2025-05-13 PROCEDURE — 99309 SBSQ NF CARE MODERATE MDM 30: CPT

## 2025-05-13 NOTE — LETTER
5/13/2025      Melvin Abad  C/o Toña Abad  [address]  [facility] [address]        [facility] GERIATRICS    Chief Complaint   Patient presents with     RECHECK     HPI:  Melvin Abad is a 89 year old  (4/24/1928), who is being seen today for an episodic care visit at: [facility] () [[id number]]. Patient was hospitalized at [facility] from 2/3/2025 through 2/6/2025.      Patient is a 89 year-old male with past medical history significant for Afib, history of CVA, HFpEF, hypertension, benign prostatic hyperplasia, chronic anemia, thrombocytopenia, type 2 diabetes, venous stasis, and Gilbert's syndrome. Patient tested positive for influenza A while at [facility] and was given tamiflu. He was brought to the ED for hypoxia and discharged the same day. However, when he was getting into his grandson's car upon discharge, he pulled his groin somehow and was readmitted to the ER for right groin pain. Patient was found to have a hematoma of right iliopsoas muscle. Previously, his INR on 1/31/25 was 8.3 and it was 5.9 on 2/3/25. Patient was given vitamin K and warfarin was held until discharge. Patient is now staying in LTC at [facility] as he requires assistance with transfers and requires 2 pt lift.    Today's concern is: Patient reports having a cold for the last couple days. He reports an occasional cough with some phlegm and his eyes have been tearing up. He denies shortness of breath, chest pain, headache or dizziness. He says the swelling of his groin is much improved. He notes that he has been having a harder time hearing even with his hearing aids and he blames his age. His son brought over his scooter from [facility] so he can start using it in the facility and he is looking forward to his scheduled care conference on 4/20/25.    Allergies, and PMH/PSH reviewed in EPIC today.  REVIEW OF SYSTEMS:  4 point ROS including  "Respiratory, CV, GI and , other than that noted in the HPI,  is negative    Objective:   BP (!) 140/76   Pulse 63   Temp 98.1  F (36.7  C)   Resp 17   Ht 1.753 m (5' 9\")   Wt 97.2 kg (214 lb 4.8 oz)   SpO2 (!) 91%   BMI 31.65 kg/m    GENERAL APPEARANCE:  Alert, in no distress  ENT:  Mouth and posterior oropharynx normal, moist mucous membranes, hearing aids in place  EYES:  EOM, conjunctivae, lids, pupils and irises normal, right periorbital area is puffy  RESP:  respiratory effort and palpation of chest normal, lungs clear to auscultation   CV:  regular rate and rhythm, no murmur, rub, or gallop, trace edema of BLE's, compression socks on  ABDOMEN:  normal bowel sounds, soft, nontender, no hepatosplenomegaly or other masses  SKIN:  Inspection of skin and subcutaneous tissue baseline  NEURO:   Cranial nerves 2-12 are normal tested and grossly at patient's baseline  PSYCH:  oriented X 3, affect and mood normal    Lab Requisition on 05/12/2025   Component Date Value Ref Range Status     WBC Count 05/12/2025 5.7  4.0 - 11.0 10e3/uL Final     RBC Count 05/12/2025 3.76 (L)  4.40 - 5.90 10e6/uL Final     Hemoglobin 05/12/2025 11.9 (L)  13.3 - 17.7 g/dL Final     Hematocrit 05/12/2025 36.7 (L)  40.0 - 53.0 % Final     MCV 05/12/2025 98  78 - 100 fL Final     MCH 05/12/2025 31.6  26.5 - 33.0 pg Final     MCHC 05/12/2025 32.4  31.5 - 36.5 g/dL Final     RDW 05/12/2025 15.5 (H)  10.0 - 15.0 % Final     Platelet Count 05/12/2025 109 (L)  150 - 450 10e3/uL Final     Sodium 05/12/2025 141  135 - 145 mmol/L Final     Potassium 05/12/2025 4.3  3.4 - 5.3 mmol/L Final     Chloride 05/12/2025 104  98 - 107 mmol/L Final     Carbon Dioxide (CO2) 05/12/2025 30 (H)  22 - 29 mmol/L Final     Anion Gap 05/12/2025 7  7 - 15 mmol/L Final     Urea Nitrogen 05/12/2025 19.3  8.0 - 23.0 mg/dL Final     Creatinine 05/12/2025 1.05  0.67 - 1.17 mg/dL Final     GFR Estimate 05/12/2025 65  >60 mL/min/1.73m2 Final    eGFR calculated using " 2021 CKD-EPI equation.     Calcium 05/12/2025 9.6  8.8 - 10.4 mg/dL Final     Glucose 05/12/2025 91  70 - 99 mg/dL Final     Patient Fasting > 8hrs? 05/12/2025 Yes   Final     Estimated Average Glucose 05/12/2025 123 (H)  <117 mg/dL Final     Hemoglobin A1C 05/12/2025 5.9 (H)  <5.7 % Final    Normal <5.7%   Prediabetes 5.7-6.4%    Diabetes 6.5% or higher     Note: Adopted from ADA consensus guidelines.     Cholesterol 05/12/2025 97  <200 mg/dL Final     Triglycerides 05/12/2025 57  <150 mg/dL Final     Direct Measure HDL 05/12/2025 46  >=40 mg/dL Final     LDL Cholesterol Calculated 05/12/2025 40  <100 mg/dL Final     Non HDL Cholesterol 05/12/2025 51  <130 mg/dL Final     Patient Fasting > 8hrs? 05/12/2025 Yes   Final       Assessment/Plan:    Longstanding persistent atrial fibrillation (H)  History of CVA (cerebrovascular accident)  History of hematoma of right ilioposoas and right medial thigh  Physical debility  Continue warfarin; INR goal 2-3  Continue metoprolol succinate 50 mg daily  No longer working with PT/OT and staying in LTC due to increased care needs and wheelchair bound.   Reports issue swallowing since stroke with recent coughing after meals; order SLP to evaluate and treat.      Chronic obstructive pulmonary disease, unspecified COPD type (H)  Stable; continue Albuterol inhaler PRN    Restless legs syndrome (RLS)  Continue gabapentin 200 mg BID  Pain improved with since wearing the heel compressive boots daily at bedtime      Bilateral lower extremity edema  Chronic right-sided heart failure (H)  Continue furosemide 20 mg daily  Daily weights and monitor for edema  4/30/25: Ordered additional lasix 40 mg daily x 3 days due to weight increase to 213.7 lbs on 4/30/25 with dry weight approximately 210 lbs.   5/5/25: Increase lasix to 40 mg daily. BMP WNL on 5/12/25.     Chronic kidney disease, stage 3a (H)  2/7/25: creatinine 1.01  Avoid nephrotoxic medications. Renally dose medications. Monitor  electrolytes, creatinine and dehydration status.  3/17/25: creatinine 1.03  5/1/25: creatinine  1.10     Essential hypertension  Continue lisinopril 10 mg daily and low sodium, low fat diet  Monitor BP's.      Hyperlipidemia LDL goal <100  Continue simvastatin 40 mg at bedtime  Check lipid panel with next labs.      Type 2 diabetes mellitus without complication, without long-term current use of insulin (H)   Last A1c 6.2, 2 years ago. Well-controlled with diet.   Recheck A1c with next labs.       Orders:  Robitussin DM 5-100 mg/ 5mL; give 20 mL po BID x 3 days for upper respiratory infection.  SLP to evaluate and treat with patient report of coughing after meals.  INR 2.7. Give warfarin 2.5 mg po on Tu, Wed, Fri, Sat, Mon. Give warfarin 5 mg po on Th, Sun. Recheck INR 5/20/25      Electronically signed by: DINH Munoz CNP         Sincerely,        DINH Munoz CNP    Electronically signed

## 2025-05-13 NOTE — PROGRESS NOTES
[facility] GERIATRICS    Chief Complaint   Patient presents with    RECHECK     HPI:  Melvin Abad is a 89 year old  (4/24/1928), who is being seen today for an episodic care visit at: [facility] () [[id number]]. Patient was hospitalized at [facility] from 2/3/2025 through 2/6/2025.      Patient is a 89 year-old male with past medical history significant for Afib, history of CVA, HFpEF, hypertension, benign prostatic hyperplasia, chronic anemia, thrombocytopenia, type 2 diabetes, venous stasis, and Gilbert's syndrome. Patient tested positive for influenza A while at [facility] and was given tamiflu. He was brought to the ED for hypoxia and discharged the same day. However, when he was getting into his grandson's car upon discharge, he pulled his groin somehow and was readmitted to the ER for right groin pain. Patient was found to have a hematoma of right iliopsoas muscle. Previously, his INR on 1/31/25 was 8.3 and it was 5.9 on 2/3/25. Patient was given vitamin K and warfarin was held until discharge. Patient is now staying in LTC at [facility] as he requires assistance with transfers and requires 2 pt lift.    Today's concern is: Patient reports having a cold for the last couple days. He reports an occasional cough with some phlegm and his eyes have been tearing up. He denies shortness of breath, chest pain, headache or dizziness. He says the swelling of his groin is much improved. He notes that he has been having a harder time hearing even with his hearing aids and he blames his age. His son brought over his scooter from [facility] so he can start using it in the facility and he is looking forward to his scheduled care conference on 4/20/25.    Allergies, and PMH/PSH reviewed in EPIC today.  REVIEW OF SYSTEMS:  4 point ROS including Respiratory, CV, GI and , other than that noted in the HPI,  is negative    Objective:   BP (!) 140/76    "Pulse 63   Temp 98.1  F (36.7  C)   Resp 17   Ht 1.753 m (5' 9\")   Wt 97.2 kg (214 lb 4.8 oz)   SpO2 (!) 91%   BMI 31.65 kg/m    GENERAL APPEARANCE:  Alert, in no distress  ENT:  Mouth and posterior oropharynx normal, moist mucous membranes, hearing aids in place  EYES:  EOM, conjunctivae, lids, pupils and irises normal, right periorbital area is puffy  RESP:  respiratory effort and palpation of chest normal, lungs clear to auscultation   CV:  regular rate and rhythm, no murmur, rub, or gallop, trace edema of BLE's, compression socks on  ABDOMEN:  normal bowel sounds, soft, nontender, no hepatosplenomegaly or other masses  SKIN:  Inspection of skin and subcutaneous tissue baseline  NEURO:   Cranial nerves 2-12 are normal tested and grossly at patient's baseline  PSYCH:  oriented X 3, affect and mood normal    Lab Requisition on 05/12/2025   Component Date Value Ref Range Status    WBC Count 05/12/2025 5.7  4.0 - 11.0 10e3/uL Final    RBC Count 05/12/2025 3.76 (L)  4.40 - 5.90 10e6/uL Final    Hemoglobin 05/12/2025 11.9 (L)  13.3 - 17.7 g/dL Final    Hematocrit 05/12/2025 36.7 (L)  40.0 - 53.0 % Final    MCV 05/12/2025 98  78 - 100 fL Final    MCH 05/12/2025 31.6  26.5 - 33.0 pg Final    MCHC 05/12/2025 32.4  31.5 - 36.5 g/dL Final    RDW 05/12/2025 15.5 (H)  10.0 - 15.0 % Final    Platelet Count 05/12/2025 109 (L)  150 - 450 10e3/uL Final    Sodium 05/12/2025 141  135 - 145 mmol/L Final    Potassium 05/12/2025 4.3  3.4 - 5.3 mmol/L Final    Chloride 05/12/2025 104  98 - 107 mmol/L Final    Carbon Dioxide (CO2) 05/12/2025 30 (H)  22 - 29 mmol/L Final    Anion Gap 05/12/2025 7  7 - 15 mmol/L Final    Urea Nitrogen 05/12/2025 19.3  8.0 - 23.0 mg/dL Final    Creatinine 05/12/2025 1.05  0.67 - 1.17 mg/dL Final    GFR Estimate 05/12/2025 65  >60 mL/min/1.73m2 Final    eGFR calculated using 2021 CKD-EPI equation.    Calcium 05/12/2025 9.6  8.8 - 10.4 mg/dL Final    Glucose 05/12/2025 91  70 - 99 mg/dL Final    " Patient Fasting > 8hrs? 05/12/2025 Yes   Final    Estimated Average Glucose 05/12/2025 123 (H)  <117 mg/dL Final    Hemoglobin A1C 05/12/2025 5.9 (H)  <5.7 % Final    Normal <5.7%   Prediabetes 5.7-6.4%    Diabetes 6.5% or higher     Note: Adopted from ADA consensus guidelines.    Cholesterol 05/12/2025 97  <200 mg/dL Final    Triglycerides 05/12/2025 57  <150 mg/dL Final    Direct Measure HDL 05/12/2025 46  >=40 mg/dL Final    LDL Cholesterol Calculated 05/12/2025 40  <100 mg/dL Final    Non HDL Cholesterol 05/12/2025 51  <130 mg/dL Final    Patient Fasting > 8hrs? 05/12/2025 Yes   Final       Assessment/Plan:    Longstanding persistent atrial fibrillation (H)  History of CVA (cerebrovascular accident)  History of hematoma of right ilioposoas and right medial thigh  Physical debility  Continue warfarin; INR goal 2-3  Continue metoprolol succinate 50 mg daily  No longer working with PT/OT and staying in LTC due to increased care needs and wheelchair bound.   Reports issue swallowing since stroke with recent coughing after meals; order SLP to evaluate and treat.      Chronic obstructive pulmonary disease, unspecified COPD type (H)  Stable; continue Albuterol inhaler PRN    Restless legs syndrome (RLS)  Continue gabapentin 200 mg BID  Pain improved with since wearing the heel compressive boots daily at bedtime      Bilateral lower extremity edema  Chronic right-sided heart failure (H)  Continue furosemide 20 mg daily  Daily weights and monitor for edema  4/30/25: Ordered additional lasix 40 mg daily x 3 days due to weight increase to 213.7 lbs on 4/30/25 with dry weight approximately 210 lbs.   5/5/25: Increase lasix to 40 mg daily. BMP WNL on 5/12/25.     Chronic kidney disease, stage 3a (H)  2/7/25: creatinine 1.01  Avoid nephrotoxic medications. Renally dose medications. Monitor electrolytes, creatinine and dehydration status.  3/17/25: creatinine 1.03  5/1/25: creatinine  1.10     Essential hypertension  Continue  lisinopril 10 mg daily and low sodium, low fat diet  Monitor BP's.      Hyperlipidemia LDL goal <100  Continue simvastatin 40 mg at bedtime  Check lipid panel with next labs.      Type 2 diabetes mellitus without complication, without long-term current use of insulin (H)   Last A1c 6.2, 2 years ago. Well-controlled with diet.   Recheck A1c with next labs.       Orders:  Robitussin DM 5-100 mg/ 5mL; give 20 mL po BID x 3 days for upper respiratory infection.  SLP to evaluate and treat with patient report of coughing after meals.  INR 2.7. Give warfarin 2.5 mg po on Tu, Wed, Fri, Sat, Mon. Give warfarin 5 mg po on Th, Sun. Recheck INR 5/20/25      Electronically signed by: DINH Munoz CNP

## 2025-05-14 ENCOUNTER — NURSING HOME VISIT (OUTPATIENT)
Dept: GERIATRICS | Facility: CLINIC | Age: OVER 89
End: 2025-05-14
Payer: COMMERCIAL

## 2025-05-14 DIAGNOSIS — J44.9 CHRONIC OBSTRUCTIVE PULMONARY DISEASE, UNSPECIFIED COPD TYPE (H): ICD-10-CM

## 2025-05-14 DIAGNOSIS — D63.8 ANEMIA OF CHRONIC DISEASE: ICD-10-CM

## 2025-05-14 DIAGNOSIS — N18.31 CHRONIC KIDNEY DISEASE, STAGE 3A (H): ICD-10-CM

## 2025-05-14 DIAGNOSIS — N40.0 BENIGN PROSTATIC HYPERPLASIA, UNSPECIFIED WHETHER LOWER URINARY TRACT SYMPTOMS PRESENT: ICD-10-CM

## 2025-05-14 DIAGNOSIS — I10 ESSENTIAL HYPERTENSION: ICD-10-CM

## 2025-05-14 DIAGNOSIS — I48.11 LONGSTANDING PERSISTENT ATRIAL FIBRILLATION (H): ICD-10-CM

## 2025-05-14 DIAGNOSIS — Z86.73 HISTORY OF CVA (CEREBROVASCULAR ACCIDENT): ICD-10-CM

## 2025-05-14 DIAGNOSIS — I50.812 CHRONIC RIGHT-SIDED HEART FAILURE (H): Primary | ICD-10-CM

## 2025-05-14 DIAGNOSIS — S30.1XXD HEMATOMA OF RIGHT PSOAS REGION TO ANTICOAGULANT THERAPY, SUBSEQUENT ENCOUNTER: ICD-10-CM

## 2025-05-14 DIAGNOSIS — E78.5 HYPERLIPIDEMIA LDL GOAL <100: ICD-10-CM

## 2025-05-14 DIAGNOSIS — G81.94 LEFT HEMIPARESIS (H): ICD-10-CM

## 2025-05-14 DIAGNOSIS — E11.9 DIET-CONTROLLED DIABETES MELLITUS (H): ICD-10-CM

## 2025-05-14 PROCEDURE — 99305 1ST NF CARE MODERATE MDM 35: CPT | Performed by: FAMILY MEDICINE

## 2025-05-14 RX ORDER — FUROSEMIDE 20 MG/1
40 TABLET ORAL DAILY
COMMUNITY
Start: 2025-05-14

## 2025-05-14 NOTE — LETTER
5/14/2025      Melvin Abad  C/o Toña Abad  [address] [facility] [address]        M [facility] GERIATRICS    PRIMARY CARE PROVIDER AND CLINIC:  DINH Munoz CNP, [address] / [place] [address]  Chief Complaint   Patient presents with     [facility] Medical Record Number:  [id number]  Place of Service where encounter took place:  [facility] AND REHAB [facility] () [[id number]]    Melvin Abad  is a 89 year old  (4/24/1928), admitted to the above facility from home due to care needs on 4/425. .   HPI:     Hx of CVA, HFpEF, ch a/fib on OAC, recurrent hospitalization and TCU/LTC stay. Transitioned to LTC from [facility] due to increased cares needs. Recent recurrent iliopsoas hematoma    TODAY:  -CHF:  Recurrent exacerbation requires diuretics adjustment.   Reprots feeling tired, torres has atrial fibrillation, had stroke, 5.5 years ago, which left his left side bad.  Denies quivering.  Denies chest pain. . In the lat few days felt weak more than average  raises the head side of the be about half way.  Does not know why he does that, does it because it is comfortable. Wears socks for edema.  Reports the sores seem had gone away now.     ================================================================================  CODE STATUS/ADVANCE DIRECTIVES DISCUSSION:  Prior  DNR / DNI  ALLERGIES: No Known Allergies   PAST MEDICAL HISTORY:   Past Medical History:   Diagnosis Date     Actinic keratosis      Atrial fibrillation (H)      Basal cell carcinoma nos 06/01/2010    Mohs excision, rt upper lip & rt temple     Cardiac dysrhythmia, unspecified      Clostridium difficile enterocolitis 05/19/2024     COPD (chronic obstructive pulmonary disease) (H)      Cough     chronic cough     COVID-19 virus infection 10/15/2024     Diabetic eye exam (H) 08/29/2014     Generalized osteoarthrosis, unspecified site     djd of the knees, hands, and neck     Heart failure with preserved  ejection fraction, NYHA class I (H)      Other diseases of lung, not elsewhere classified     pulmonary nodule, benign     Pure hypercholesterolemia      Unspecified essential hypertension       PAST SURGICAL HISTORY:   has a past surgical history that includes TOTAL KNEE ARTHROPLASTY (1997); Colonoscopy w/wo [place] **Performed** (10/19/2001); Colonoscopy w/wo [place] **Performed** (11/02/2004); colonoscopy (05/02/2007); HEMORRHOIDOPEXY BY STAPLING (09/26/08); and Colonoscopy (11/10/2010).  FAMILY HISTORY: family history includes Cancer in his mother; Prostate Cancer in his father.  SOCIAL HISTORY:   reports that he has quit smoking. His smoking use included cigarettes. He has never used smokeless tobacco. He reports that he does not drink alcohol and does not use drugs.  Patient's living condition: lives in a nursing home    Post Discharge Medication Reconciliation Status:   MED REC REQUIREDPost Medication Reconciliation Status: medication reconcilation previously completed during another office visit       Current Outpatient Medications   Medication Sig Dispense Refill     acetaminophen (TYLENOL) 500 MG tablet Take 2 tablets by mouth daily.       acetaminophen (TYLENOL) 500 MG tablet Take 2 tablets (1,000 mg) by mouth every 8 hours as needed for mild pain       albuterol (VENTOLIN HFA) 108 (90 Base) MCG/ACT inhaler Inhale 2 puffs into the lungs every 6 hours as needed for shortness of breath, wheezing or cough. 18 g 0     diclofenac (VOLTAREN) 1 % topical gel Apply 4 g topically daily. Apply to BL lower legs and feet       furosemide (LASIX) 20 MG tablet Take 1 tablet (20 mg) by mouth daily. 30 tablet 0     gabapentin (NEURONTIN) 100 MG capsule Take 2 capsules by mouth 2 times daily.       ketotifen fumarate 0.035%, ketotifen 0.025%, (ZADITOR) 0.025 % ophthalmic solution Place 1 drop into both eyes 2 times daily as needed for dry eyes Patient self-administers 5 mL 0     Lidocaine (LIDOCARE) 4 % Patch Place 2 patches  "onto the skin every 24 hours. To prevent lidocaine toxicity, patient should be patch free for 12 hrs daily.       lisinopril (ZESTRIL) 10 MG tablet Take 1 tablet (10 mg) by mouth daily.       loratadine (CLARITIN) 10 MG tablet Take 1 tablet (10 mg) by mouth daily 30 tablet 0     metoprolol succinate ER (TOPROL XL) 50 MG 24 hr tablet Take 1 tablet (50 mg) by mouth daily.       oxyCODONE (ROXICODONE) 5 MG tablet Take 0.5 tablets (2.5 mg) by mouth every 6 hours as needed for severe pain. 30 tablet 0     polyethylene glycol (MIRALAX) 17 GM/Dose powder Take 9 g by mouth daily as needed for constipation Mix and hand to patient, Patient self-administers (he knows how much he needs to keep regular) 510 g 0     psyllium (METAMUCIL/KONSYL) 58.6 % powder Take 18 g (1 Tablespoonful) by mouth daily Mix and hand to patient, Patient self-administers (he knows how much he needs to keep regular)       simvastatin (ZOCOR) 40 MG tablet Take 1 tablet (40 mg) by mouth at bedtime 30 tablet 0     sodium chloride (OCEAN) 0.65 % nasal spray Spray 2 sprays in nostril daily as needed for congestion Patient self-administers 30 mL 0     tamsulosin (FLOMAX) 0.4 MG capsule Take 1 capsule (0.4 mg) by mouth daily 30 capsule 0     Vitamin D3 (CHOLECALCIFEROL) 25 mcg (1000 units) tablet Take 1 tablet (25 mcg) by mouth daily       warfarin ANTICOAGULANT (COUMADIN) 2.5 MG tablet Take 1 tablet by mouth every evening. Mon, Tues, Wed, Fri, Sat       warfarin ANTICOAGULANT (COUMADIN) 5 MG tablet Take 1 tablet by mouth every evening. Sun, Thurs       No current facility-administered medications for this visit.       ROS:  10 point ROS of systems including Constitutional, Eyes, Respiratory, Cardiovascular, Gastroenterology, Genitourinary, Integumentary, Musculoskeletal, Psychiatric were all negative except for pertinent positives noted in my HPI.    Vitals:  BP (!) 140/76   Pulse 63   Temp 98.1  F (36.7  C)   Resp 17   Ht 1.753 m (5' 9\")   Wt 97.2 kg " (214 lb 4.8 oz)   SpO2 (!) 91%   BMI 31.65 kg/m    Exam:  GENERAL APPEARANCE:  in no distress, sitting up in the wheel chair  RESP:  labored breathing with minimal movements.   . Diminished over lower half, some crackles as well  CV:  S1S2 audible, regular HR, no murmur appreciated.  No edema in the legs. Has thick slight pitting edema over thighs.   ABDOMEN:  soft, NT/ND, BS audible.   M/S:   no joint deformity noted on observation.  SKIN:  extensive ecchymosis over left forearm.   NEURO:    ms strenght  (4/5  LUE and LLE  PSYCH:  affect and mood normal      Lab/Diagnostic data: Reviewed in the chart and EHR.        ASSESSMENT/PLAN:  -------------------------------  Chronic right-sided heart failure (H)  Longstanding persistent atrial fibrillation (H)  Essential hypertension  - left hemiparesis  History of CVA (cerebrovascular accident)  Hyperlipidemia LDL goal <100  - NYHA class 4.   -Is concerned more about his within the last couple of days.  He is on Robitussin for cough likely has upper [employer] infections.  Otherwise edema is better in the legs.  There is still has some edema in the thighs and scrotal area but this is improving as well.  Patient understand that his body is slowing down given his age and multiple morbidities.    - on torsemide 40 mg. Recurrent exacerbation. Renal function is wnl. Consider adding prn weight  gain.   - on coumadin for CVA prophylaxis. INR followed closely  - on metoprolol for rate control. CVR    Anemia of chronic disease  Hematoma of right psoas region to anticoagulant therapy, subsequent encounter  - Hb trending up. No concern today    Chronic obstructive pulmonary disease, unspecified COPD type (H)  -No concern today. Continue current plan of care.     Diet-controlled diabetes mellitus (H)    Benign prostatic hyperplasia, unspecified whether lower urinary tract symptoms present  - on flomax, no concern    Query dysphagia:  - reports coughing after eating. SLP consulted.      Frailty:  - Significant  Deficits requiring NH placement. Requiring extensive assistance from nursing.       Orders: NNO    Electronically signed by:  Jacques Purcell MD                     Sincerely,        Jacques Purcell MD    Electronically signed

## 2025-05-19 ENCOUNTER — DOCUMENTATION ONLY (OUTPATIENT)
Dept: ANTICOAGULATION | Facility: CLINIC | Age: OVER 89
End: 2025-05-19
Payer: COMMERCIAL

## 2025-05-28 ENCOUNTER — NURSING HOME VISIT (OUTPATIENT)
Dept: GERIATRICS | Facility: CLINIC | Age: OVER 89
End: 2025-05-28
Payer: COMMERCIAL

## 2025-05-28 VITALS
WEIGHT: 214.3 LBS | DIASTOLIC BLOOD PRESSURE: 68 MMHG | RESPIRATION RATE: 22 BRPM | SYSTOLIC BLOOD PRESSURE: 133 MMHG | HEART RATE: 71 BPM | TEMPERATURE: 97.5 F | BODY MASS INDEX: 31.74 KG/M2 | OXYGEN SATURATION: 91 % | HEIGHT: 69 IN

## 2025-05-28 DIAGNOSIS — I48.11 LONGSTANDING PERSISTENT ATRIAL FIBRILLATION (H): ICD-10-CM

## 2025-05-28 DIAGNOSIS — J44.9 CHRONIC OBSTRUCTIVE PULMONARY DISEASE, UNSPECIFIED COPD TYPE (H): ICD-10-CM

## 2025-05-28 DIAGNOSIS — S30.1XXD HEMATOMA OF RIGHT PSOAS REGION TO ANTICOAGULANT THERAPY, SUBSEQUENT ENCOUNTER: ICD-10-CM

## 2025-05-28 DIAGNOSIS — I50.812 CHRONIC RIGHT-SIDED HEART FAILURE (H): Primary | ICD-10-CM

## 2025-05-28 DIAGNOSIS — N40.0 BENIGN PROSTATIC HYPERPLASIA, UNSPECIFIED WHETHER LOWER URINARY TRACT SYMPTOMS PRESENT: ICD-10-CM

## 2025-05-28 DIAGNOSIS — G81.94 LEFT HEMIPARESIS (H): ICD-10-CM

## 2025-05-28 DIAGNOSIS — R60.0 BILATERAL LOWER EXTREMITY EDEMA: ICD-10-CM

## 2025-05-28 DIAGNOSIS — R54 FRAILTY SYNDROME IN GERIATRIC PATIENT: ICD-10-CM

## 2025-05-28 DIAGNOSIS — E78.5 HYPERLIPIDEMIA LDL GOAL <100: ICD-10-CM

## 2025-05-28 DIAGNOSIS — D63.8 ANEMIA OF CHRONIC DISEASE: ICD-10-CM

## 2025-05-28 DIAGNOSIS — N18.31 CHRONIC KIDNEY DISEASE, STAGE 3A (H): ICD-10-CM

## 2025-05-28 DIAGNOSIS — I10 ESSENTIAL HYPERTENSION: ICD-10-CM

## 2025-05-28 DIAGNOSIS — Z86.73 HISTORY OF CVA (CEREBROVASCULAR ACCIDENT): ICD-10-CM

## 2025-05-28 PROCEDURE — 99309 SBSQ NF CARE MODERATE MDM 30: CPT

## 2025-05-28 NOTE — PROGRESS NOTES
"[facility] GERIATRICS    Chief Complaint   Patient presents with    RECHECK     HPI:  Melvin Abad is a 89 year old  (4/24/1928), who is being seen today for an episodic care visit at: [facility] REHAB [facility] () [[id number]]. Today's concern is: ***    Allergies, and PMH/PSH reviewed in [facility] today.  REVIEW OF SYSTEMS:  {utuwio54:796981}    Objective:   /68   Pulse 71   Temp 97.5  F (36.4  C)   Resp 22   Ht 1.753 m (5' 9\")   Wt 97.2 kg (214 lb 4.8 oz)   SpO2 (!) 91%   BMI 31.65 kg/m    {Nursing home physical exam :312570}    {fgslab:810886}    Assessment/Plan:  {S DX2:829072}    MED REC REQUIRED{TIP  Click the link below to document or use med rec list, use list to pull in response :576845}  Post Medication Reconciliation Status: {MED REC LIST:565667}      Orders:  {fgsorders:153820}  ***    Electronically signed by: Elliott Hand ***  \  " "minutes before getting tired. Patient says he had some sticky, clear phlegm in the morning and instead of candy, I will order cough drops that he can have at bedside as needed. Denies lightheadedness, shortness of breath or chest pain.      Allergies, and PMH/PSH reviewed in EPIC today.  REVIEW OF SYSTEMS:  4 point ROS including Respiratory, CV, GI and , other than that noted in the HPI,  is negative    Objective:   /68   Pulse 71   Temp 97.5  F (36.4  C)   Resp 22   Ht 1.753 m (5' 9\")   Wt 97.2 kg (214 lb 4.8 oz)   SpO2 (!) 91%   BMI 31.65 kg/m    GENERAL APPEARANCE:  Alert, in no distress  ENT:  Mouth and posterior oropharynx normal, moist mucous membranes, hearing aids in place  EYES:  EOM, conjunctivae, lids, pupils and irises normal  RESP:  respiratory effort and palpation of chest normal, lungs clear to auscultation   CV:  regular rate and rhythm, no murmur, rub, or gallop, trace edema of BLE's, non-pitting edema of right upper thigh and lower abdomen/groin, compression socks on  ABDOMEN:  normal bowel sounds, soft, nontender, no hepatosplenomegaly or other masses  SKIN:  Inspection of skin and subcutaneous tissue baseline  NEURO:   Cranial nerves 2-12 are normal tested and grossly at patient's baseline  PSYCH:  oriented X 3, affect and mood normal    Labs done in SNF are in [facility]. Please refer to them using [facility]/Care Everywhere.    Assessment/Plan:    Chronic right-sided heart failure (H)  Bilateral lower extremity edema  Continue furosemide 20 mg daily  Daily weights and monitor for edema  5/5/25: Increase lasix to 40 mg daily. BMP WNL on 5/12/25.    Longstanding persistent atrial fibrillation (H)  History of CVA (cerebrovascular accident)   Left hemiparesis  History of hematoma of right ilioposoas and right medial thigh  Anemia of chronic disease  Frailty  Continue warfarin; INR goal 2-3  Continue metoprolol succinate 50 mg daily  Last Hgb 11.9 on 5/12/25; trending up slowing s/p " hematoma  No longer working with PT/OT and staying in LTC due to increased care needs and wheelchair bound.      Chronic obstructive pulmonary disease, unspecified COPD type (H)  Stable; continue Albuterol inhaler PRN     Restless legs syndrome (RLS)  Continue gabapentin 200 mg BID  Pain improved with since wearing the heel compressive boots daily at bedtime     Chronic kidney disease, stage 3a (H)  2/7/25: creatinine 1.01  Avoid nephrotoxic medications. Renally dose medications. Monitor electrolytes, creatinine and dehydration status.  3/17/25: creatinine 1.03  5/1/25: creatinine  1.10  5/12/25: Creatinine 1.05     Essential hypertension  Continue lisinopril 10 mg daily and low sodium, low fat diet  Monitor BP's.      Hyperlipidemia LDL goal <100  Continue simvastatin 40 mg at bedtime     Type 2 diabetes mellitus without complication, without long-term current use of insulin (H)   Last A1c 6.2, 2 years ago. Well-controlled without medications.   5/12/25: HgbA1c: 5.9 with estimated average glucose 123.     Benign prostatic hyperplasia, unspecified whether lower urinary tract symptoms present   Stable; on flomax      Orders:  House cough drops po every 2 hours as needed for cough.     Electronically signed by: DINH Munoz CNP   \

## 2025-05-28 NOTE — LETTER
5/28/2025      Melvin Abad  C/o Toña Abad  [address]  [facility] [address]        No notes on file      Sincerely,        DINH Munoz CNP    Electronically signed   "breath in that position. He will also stand up more during the day and says he can stand for 2 minutes before getting tired. Patient says he had some sticky, clear phlegm in the morning and instead of candy, I will order cough drops that he can have at bedside as needed. Denies lightheadedness, shortness of breath or chest pain.      Allergies, and PMH/PSH reviewed in EPIC today.  REVIEW OF SYSTEMS:  4 point ROS including Respiratory, CV, GI and , other than that noted in the HPI,  is negative    Objective:   /68   Pulse 71   Temp 97.5  F (36.4  C)   Resp 22   Ht 1.753 m (5' 9\")   Wt 97.2 kg (214 lb 4.8 oz)   SpO2 (!) 91%   BMI 31.65 kg/m    GENERAL APPEARANCE:  Alert, in no distress  ENT:  Mouth and posterior oropharynx normal, moist mucous membranes, hearing aids in place  EYES:  EOM, conjunctivae, lids, pupils and irises normal  RESP:  respiratory effort and palpation of chest normal, lungs clear to auscultation   CV:  regular rate and rhythm, no murmur, rub, or gallop, trace edema of BLE's, non-pitting edema of right upper thigh and lower abdomen/groin, compression socks on  ABDOMEN:  normal bowel sounds, soft, nontender, no hepatosplenomegaly or other masses  SKIN:  Inspection of skin and subcutaneous tissue baseline  NEURO:   Cranial nerves 2-12 are normal tested and grossly at patient's baseline  PSYCH:  oriented X 3, affect and mood normal    Labs done in SNF are in [facility]. Please refer to them using [facility]/Care Everywhere.    Assessment/Plan:    Chronic right-sided heart failure (H)  Bilateral lower extremity edema  Continue furosemide 20 mg daily  Daily weights and monitor for edema  5/5/25: Increase lasix to 40 mg daily. BMP WNL on 5/12/25.    Longstanding persistent atrial fibrillation (H)  History of CVA (cerebrovascular accident)   Left hemiparesis  History of hematoma of right ilioposoas and right medial thigh  Anemia of chronic disease  Frailty  Continue warfarin; INR goal " 2-3  Continue metoprolol succinate 50 mg daily  Last Hgb 11.9 on 5/12/25; trending up slowing s/p hematoma  No longer working with PT/OT and staying in LTC due to increased care needs and wheelchair bound.      Chronic obstructive pulmonary disease, unspecified COPD type (H)  Stable; continue Albuterol inhaler PRN     Restless legs syndrome (RLS)  Continue gabapentin 200 mg BID  Pain improved with since wearing the heel compressive boots daily at bedtime     Chronic kidney disease, stage 3a (H)  2/7/25: creatinine 1.01  Avoid nephrotoxic medications. Renally dose medications. Monitor electrolytes, creatinine and dehydration status.  3/17/25: creatinine 1.03  5/1/25: creatinine  1.10  5/12/25: Creatinine 1.05     Essential hypertension  Continue lisinopril 10 mg daily and low sodium, low fat diet  Monitor BP's.      Hyperlipidemia LDL goal <100  Continue simvastatin 40 mg at bedtime     Type 2 diabetes mellitus without complication, without long-term current use of insulin (H)   Last A1c 6.2, 2 years ago. Well-controlled without medications.   5/12/25: HgbA1c: 5.9 with estimated average glucose 123.     Benign prostatic hyperplasia, unspecified whether lower urinary tract symptoms present   Stable; on flomax      Orders:  House cough drops po every 2 hours as needed for cough.     Electronically signed by: DINH Munoz CNP   \      Sincerely,        DINH Munoz CNP    Electronically signed

## 2025-06-04 ENCOUNTER — DOCUMENTATION ONLY (OUTPATIENT)
Dept: ANTICOAGULATION | Facility: CLINIC | Age: OVER 89
End: 2025-06-04
Payer: COMMERCIAL

## 2025-06-04 NOTE — PROGRESS NOTES
ANTICOAGULATION     Melvin Abad is overdue for an INR check.     Per chart review, patient remains in TCU and in house provider is managing INR/warfarin. Will postpone one week.    Neeta Cheung RN  6/4/2025  Anticoagulation Clinic  [facility] for routing messages: cristi COLON  [facility] patient phone line: [phone]

## 2025-06-05 ENCOUNTER — LAB REQUISITION (OUTPATIENT)
Dept: LAB | Facility: CLINIC | Age: OVER 89
End: 2025-06-05
Payer: COMMERCIAL

## 2025-06-05 DIAGNOSIS — I48.20 CHRONIC ATRIAL FIBRILLATION, UNSPECIFIED (H): ICD-10-CM

## 2025-06-06 ENCOUNTER — NURSING HOME VISIT (OUTPATIENT)
Dept: GERIATRICS | Facility: CLINIC | Age: OVER 89
End: 2025-06-06
Payer: COMMERCIAL

## 2025-06-06 VITALS
HEIGHT: 69 IN | TEMPERATURE: 97.7 F | HEART RATE: 64 BPM | WEIGHT: 210.7 LBS | SYSTOLIC BLOOD PRESSURE: 129 MMHG | RESPIRATION RATE: 19 BRPM | OXYGEN SATURATION: 90 % | BODY MASS INDEX: 31.21 KG/M2 | DIASTOLIC BLOOD PRESSURE: 88 MMHG

## 2025-06-06 DIAGNOSIS — I50.812 CHRONIC RIGHT-SIDED HEART FAILURE (H): Primary | ICD-10-CM

## 2025-06-06 DIAGNOSIS — N40.0 BENIGN PROSTATIC HYPERPLASIA, UNSPECIFIED WHETHER LOWER URINARY TRACT SYMPTOMS PRESENT: ICD-10-CM

## 2025-06-06 DIAGNOSIS — I10 ESSENTIAL HYPERTENSION: ICD-10-CM

## 2025-06-06 DIAGNOSIS — S30.1XXD HEMATOMA OF RIGHT PSOAS REGION TO ANTICOAGULANT THERAPY, SUBSEQUENT ENCOUNTER: ICD-10-CM

## 2025-06-06 DIAGNOSIS — R54 FRAILTY SYNDROME IN GERIATRIC PATIENT: ICD-10-CM

## 2025-06-06 DIAGNOSIS — I48.11 LONGSTANDING PERSISTENT ATRIAL FIBRILLATION (H): ICD-10-CM

## 2025-06-06 DIAGNOSIS — E78.5 HYPERLIPIDEMIA LDL GOAL <100: ICD-10-CM

## 2025-06-06 DIAGNOSIS — Z86.73 HISTORY OF CVA (CEREBROVASCULAR ACCIDENT): ICD-10-CM

## 2025-06-06 DIAGNOSIS — G25.81 RESTLESS LEGS SYNDROME (RLS): ICD-10-CM

## 2025-06-06 DIAGNOSIS — N18.31 CHRONIC KIDNEY DISEASE, STAGE 3A (H): ICD-10-CM

## 2025-06-06 DIAGNOSIS — G81.94 LEFT HEMIPARESIS (H): ICD-10-CM

## 2025-06-06 DIAGNOSIS — J44.9 CHRONIC OBSTRUCTIVE PULMONARY DISEASE, UNSPECIFIED COPD TYPE (H): ICD-10-CM

## 2025-06-06 DIAGNOSIS — R60.0 BILATERAL LOWER EXTREMITY EDEMA: ICD-10-CM

## 2025-06-06 DIAGNOSIS — E11.9 TYPE 2 DIABETES MELLITUS WITHOUT COMPLICATION, WITHOUT LONG-TERM CURRENT USE OF INSULIN (H): ICD-10-CM

## 2025-06-06 DIAGNOSIS — D63.8 ANEMIA OF CHRONIC DISEASE: ICD-10-CM

## 2025-06-06 LAB
ANION GAP SERPL CALCULATED.3IONS-SCNC: 6 MMOL/L (ref 7–15)
BUN SERPL-MCNC: 21.4 MG/DL (ref 8–23)
CALCIUM SERPL-MCNC: 9.7 MG/DL (ref 8.8–10.4)
CHLORIDE SERPL-SCNC: 102 MMOL/L (ref 98–107)
CREAT SERPL-MCNC: 1.09 MG/DL (ref 0.67–1.17)
EGFRCR SERPLBLD CKD-EPI 2021: 62 ML/MIN/1.73M2
ERYTHROCYTE [DISTWIDTH] IN BLOOD BY AUTOMATED COUNT: 16 % (ref 10–15)
GLUCOSE SERPL-MCNC: 88 MG/DL (ref 70–99)
HCO3 SERPL-SCNC: 35 MMOL/L (ref 22–29)
HCT VFR BLD AUTO: 40.7 % (ref 40–53)
HGB BLD-MCNC: 13.1 G/DL (ref 13.3–17.7)
INR PPP: 2.12 (ref 0.85–1.15)
MCH RBC QN AUTO: 31.7 PG (ref 26.5–33)
MCHC RBC AUTO-ENTMCNC: 32.2 G/DL (ref 31.5–36.5)
MCV RBC AUTO: 99 FL (ref 78–100)
PLATELET # BLD AUTO: 106 10E3/UL (ref 150–450)
POTASSIUM SERPL-SCNC: 3.9 MMOL/L (ref 3.4–5.3)
PROTHROMBIN TIME: 23.5 SECONDS (ref 11.8–14.8)
RBC # BLD AUTO: 4.13 10E6/UL (ref 4.4–5.9)
SODIUM SERPL-SCNC: 143 MMOL/L (ref 135–145)
WBC # BLD AUTO: 6 10E3/UL (ref 4–11)

## 2025-06-06 PROCEDURE — 85610 PROTHROMBIN TIME: CPT | Mod: ORL

## 2025-06-06 PROCEDURE — 80048 BASIC METABOLIC PNL TOTAL CA: CPT | Mod: ORL

## 2025-06-06 PROCEDURE — P9604 ONE-WAY ALLOW PRORATED TRIP: HCPCS | Mod: ORL

## 2025-06-06 PROCEDURE — 85027 COMPLETE CBC AUTOMATED: CPT | Mod: ORL

## 2025-06-06 PROCEDURE — 36415 COLL VENOUS BLD VENIPUNCTURE: CPT | Mod: ORL

## 2025-06-06 NOTE — PROGRESS NOTES
[facility] GERIATRICS    Chief Complaint   Patient presents with    RECHECK     HPI:  Melvin Abad is a 89 year old  (4/24/1928), who is being seen today for an episodic care visit at: [facility] AND REHAB [facility] () [[id number]]. Patient was hospitalized at [facility] from 2/3/2025 through 2/6/2025.      Patient is a 89 year-old male with past medical history significant for Afib, history of CVA, HFpEF, hypertension, benign prostatic hyperplasia, chronic anemia, thrombocytopenia, type 2 diabetes, venous stasis, and Gilbert's syndrome. Patient tested positive for influenza A while at [facility] and was given tamiflu. He was brought to the ED for hypoxia and discharged the same day. However, when he was getting into his grandson's car upon discharge, he pulled his groin somehow and was readmitted to the ER for right groin pain. Patient was found to have a hematoma of right iliopsoas muscle. Previously, his INR on 1/31/25 was 8.3 and it was 5.9 on 2/3/25. Patient was given vitamin K and warfarin was held until discharge. Patient is now staying in LTC at [facility] as he requires assistance with transfers and requires 2 pt lift. Last SLUMS on 3/6/25 was 19/30.     Today's concern is: On 6/3/25, patient's INR was 4.9 and patient did not have any symptoms of bleeding. Gave order to hold warfarin on 6/3/25, give vitamin K 0.5 mg po once, and recheck INR on 6/4/25. On 6/4/25, INR was 3.3 so gave order to give warfarin 2.5 mg po on 6/4 and 6/5 and recheck today, 6/6. INR on 6/6/25 was 2.12 and gave order for warfarin 5 mg po daily and recheck on 6/10/25. The vitamin K can interfere with the INR for a few days after administration, which could account for the lower INR today. Patient says that the swelling of his legs and abdomen has decreased since starting the additional lasix 20 mg daily x 3 days on 6/3/25 due to weight gain. Patient's weight is back to baseline ~  "210 lbs. Denies lightheadedness, shortness of breath or chest pain.    Allergies, and PMH/PSH reviewed in EPIC today.  REVIEW OF SYSTEMS:  4 point ROS including Respiratory, CV, GI and , other than that noted in the HPI,  is negative    Objective:   /88   Pulse 64   Temp 97.7  F (36.5  C)   Resp 19   Ht 1.753 m (5' 9\")   Wt 95.6 kg (210 lb 11.2 oz)   SpO2 (!) 90%   BMI 31.11 kg/m    GENERAL APPEARANCE:  Alert, in no distress  ENT:  Mouth and posterior oropharynx normal, moist mucous membranes, hearing aids in place  EYES:  EOM, conjunctivae, lids, pupils and irises normal  RESP:  respiratory effort and palpation of chest normal, lungs clear to auscultation   CV:  regular rate and rhythm, no murmur, rub, or gallop, trace edema of BLE's  ABDOMEN:  normal bowel sounds, soft, nontender, no hepatosplenomegaly or other masses  SKIN:  slight redness of right groin region  NEURO:   Cranial nerves 2-12 are normal tested and grossly at patient's baseline  PSYCH:  oriented X 3, affect and mood normal    Lab Requisition on 06/06/2025   Component Date Value Ref Range Status    WBC Count 06/06/2025 6.0  4.0 - 11.0 10e3/uL Final    RBC Count 06/06/2025 4.13 (L)  4.40 - 5.90 10e6/uL Final    Hemoglobin 06/06/2025 13.1 (L)  13.3 - 17.7 g/dL Final    Hematocrit 06/06/2025 40.7  40.0 - 53.0 % Final    MCV 06/06/2025 99  78 - 100 fL Final    MCH 06/06/2025 31.7  26.5 - 33.0 pg Final    MCHC 06/06/2025 32.2  31.5 - 36.5 g/dL Final    RDW 06/06/2025 16.0 (H)  10.0 - 15.0 % Final    Platelet Count 06/06/2025 106 (L)  150 - 450 10e3/uL Final    INR 06/06/2025 2.12 (H)  0.85 - 1.15 Final    PT 06/06/2025 23.5 (H)  11.8 - 14.8 Seconds Final    Sodium 06/06/2025 143  135 - 145 mmol/L Final    Potassium 06/06/2025 3.9  3.4 - 5.3 mmol/L Final    Chloride 06/06/2025 102  98 - 107 mmol/L Final    Carbon Dioxide (CO2) 06/06/2025 35 (H)  22 - 29 mmol/L Final    Anion Gap 06/06/2025 6 (L)  7 - 15 mmol/L Final    Urea Nitrogen " 06/06/2025 21.4  8.0 - 23.0 mg/dL Final    Creatinine 06/06/2025 1.09  0.67 - 1.17 mg/dL Final    GFR Estimate 06/06/2025 62  >60 mL/min/1.73m2 Final    eGFR calculated using 2021 CKD-EPI equation.    Calcium 06/06/2025 9.7  8.8 - 10.4 mg/dL Final    Glucose 06/06/2025 88  70 - 99 mg/dL Final       Assessment/Plan:    Chronic right-sided heart failure (H)  Bilateral lower extremity edema  Continue furosemide 20 mg daily  Daily weights and monitor for edema  5/5/25: Increase lasix to 40 mg daily.   6/3/25: Additional lasix 20 mg po in the afternoon x 3 days.      Longstanding persistent atrial fibrillation (H)  History of CVA (cerebrovascular accident)   Left hemiparesis  History of hematoma of right ilioposoas and right medial thigh  Anemia of chronic disease  Frailty  Continue warfarin; INR goal 2-3  Continue metoprolol succinate 50 mg daily  Last Hgb 11.9 on 5/12/25; trending up slowing s/p hematoma  6/6/25: Hgb 13.1  No longer working with PT/OT and staying in LTC due to increased care needs and wheelchair bound.      Chronic obstructive pulmonary disease, unspecified COPD type (H)  Stable; continue Albuterol inhaler PRN     Restless legs syndrome (RLS)  Continue gabapentin 200 mg BID  Pain improved with since wearing the heel compressive boots daily at bedtime      Chronic kidney disease, stage 3a (H)  2/7/25: creatinine 1.01  Avoid nephrotoxic medications. Renally dose medications. Monitor electrolytes, creatinine and dehydration status.  3/17/25: creatinine 1.03  5/1/25: creatinine  1.10  5/12/25: creatinine 1.05  6/6/25: creatinine 1.09     Essential hypertension  Continue lisinopril 10 mg daily and low sodium, low fat diet  Monitor BP's.      Hyperlipidemia LDL goal <100  Continue simvastatin 40 mg at bedtime     Type 2 diabetes mellitus without complication, without long-term current use of insulin (H)   Last A1c 6.2, 2 years ago. Well-controlled without medications.   5/12/25: HgbA1c: 5.9 with estimated  average glucose 123.      Benign prostatic hyperplasia, unspecified whether lower urinary tract symptoms present   Stable; on flomax      Orders:  INR on 6/6/25 was 2.12. Give coumadin 5 mg po daily and recheck on 6/10/25.   Nystatin powder; apply to groin twice daily and as needed until resolved. Dx: candidiasis    Electronically signed by: DINH Munoz CNP

## 2025-06-06 NOTE — LETTER
6/6/2025      Melvin Abad  C/o Toña Abad  [address]  [facility] [address]        [facility] GERIATRICS    Chief Complaint   Patient presents with     RECHECK     HPI:  Melvin Abad is a 89 year old  (4/24/1928), who is being seen today for an episodic care visit at: [facility] () [[id number]]. Patient was hospitalized at [facility] from 2/3/2025 through 2/6/2025.      Patient is a 89 year-old male with past medical history significant for Afib, history of CVA, HFpEF, hypertension, benign prostatic hyperplasia, chronic anemia, thrombocytopenia, type 2 diabetes, venous stasis, and Gilbert's syndrome. Patient tested positive for influenza A while at [facility] and was given tamiflu. He was brought to the ED for hypoxia and discharged the same day. However, when he was getting into his grandson's car upon discharge, he pulled his groin somehow and was readmitted to the ER for right groin pain. Patient was found to have a hematoma of right iliopsoas muscle. Previously, his INR on 1/31/25 was 8.3 and it was 5.9 on 2/3/25. Patient was given vitamin K and warfarin was held until discharge. Patient is now staying in LTC at [facility] as he requires assistance with transfers and requires 2 pt lift. Last SLUMS on 3/6/25 was 19/30.     Today's concern is: On 6/3/25, patient's INR was 4.9 and patient did not have any symptoms of bleeding. Gave order to hold warfarin on 6/3/25, give vitamin K 0.5 mg po once, and recheck INR on 6/4/25. On 6/4/25, INR was 3.3 so gave order to give warfarin 2.5 mg po on 6/4 and 6/5 and recheck today, 6/6. INR on 6/6/25 was 2.12 and gave order for warfarin 5 mg po daily and recheck on 6/10/25. The vitamin K can interfere with the INR for a few days after administration, which could account for the lower INR today. Patient says that the swelling of his legs and abdomen has decreased since starting the additional  "lasix 20 mg daily x 3 days on 6/3/25 due to weight gain. Patient's weight is back to baseline ~ 210 lbs. Denies lightheadedness, shortness of breath or chest pain.    Allergies, and PMH/PSH reviewed in [facility] today.  REVIEW OF SYSTEMS:  4 point ROS including Respiratory, CV, GI and , other than that noted in the HPI,  is negative    Objective:   /88   Pulse 64   Temp 97.7  F (36.5  C)   Resp 19   Ht 1.753 m (5' 9\")   Wt 95.6 kg (210 lb 11.2 oz)   SpO2 (!) 90%   BMI 31.11 kg/m    GENERAL APPEARANCE:  Alert, in no distress  ENT:  Mouth and posterior oropharynx normal, moist mucous membranes, hearing aids in place  EYES:  EOM, conjunctivae, lids, pupils and irises normal  RESP:  respiratory effort and palpation of chest normal, lungs clear to auscultation   CV:  regular rate and rhythm, no murmur, rub, or gallop, trace edema of BLE's, non-pitting edema of right upper thigh and lower abdomen/groin, compression socks on  ABDOMEN:  normal bowel sounds, soft, nontender, no hepatosplenomegaly or other masses  SKIN:  Inspection of skin and subcutaneous tissue baseline  NEURO:   Cranial nerves 2-12 are normal tested and grossly at patient's baseline  PSYCH:  oriented X 3, affect and mood normal    Lab Requisition on 06/06/2025   Component Date Value Ref Range Status     WBC Count 06/06/2025 6.0  4.0 - 11.0 10e3/uL Final     RBC Count 06/06/2025 4.13 (L)  4.40 - 5.90 10e6/uL Final     Hemoglobin 06/06/2025 13.1 (L)  13.3 - 17.7 g/dL Final     Hematocrit 06/06/2025 40.7  40.0 - 53.0 % Final     MCV 06/06/2025 99  78 - 100 fL Final     MCH 06/06/2025 31.7  26.5 - 33.0 pg Final     MCHC 06/06/2025 32.2  31.5 - 36.5 g/dL Final     RDW 06/06/2025 16.0 (H)  10.0 - 15.0 % Final     Platelet Count 06/06/2025 106 (L)  150 - 450 10e3/uL Final     INR 06/06/2025 2.12 (H)  0.85 - 1.15 Final     PT 06/06/2025 23.5 (H)  11.8 - 14.8 Seconds Final     Sodium 06/06/2025 143  135 - 145 mmol/L Final     Potassium 06/06/2025 3.9  3.4 " - 5.3 mmol/L Final     Chloride 06/06/2025 102  98 - 107 mmol/L Final     Carbon Dioxide (CO2) 06/06/2025 35 (H)  22 - 29 mmol/L Final     Anion Gap 06/06/2025 6 (L)  7 - 15 mmol/L Final     Urea Nitrogen 06/06/2025 21.4  8.0 - 23.0 mg/dL Final     Creatinine 06/06/2025 1.09  0.67 - 1.17 mg/dL Final     GFR Estimate 06/06/2025 62  >60 mL/min/1.73m2 Final    eGFR calculated using 2021 CKD-EPI equation.     Calcium 06/06/2025 9.7  8.8 - 10.4 mg/dL Final     Glucose 06/06/2025 88  70 - 99 mg/dL Final       Assessment/Plan:    Chronic right-sided heart failure (H)  Bilateral lower extremity edema  Continue furosemide 20 mg daily  Daily weights and monitor for edema  5/5/25: Increase lasix to 40 mg daily.   6/3/25: Additional lasix 20 mg po in the afternoon x 3 days.      Longstanding persistent atrial fibrillation (H)  History of CVA (cerebrovascular accident)   Left hemiparesis  History of hematoma of right ilioposoas and right medial thigh  Anemia of chronic disease  Frailty  Continue warfarin; INR goal 2-3  Continue metoprolol succinate 50 mg daily  Last Hgb 11.9 on 5/12/25; trending up slowing s/p hematoma  6/6/25: Hgb 13.1  No longer working with PT/OT and staying in LTC due to increased care needs and wheelchair bound.      Chronic obstructive pulmonary disease, unspecified COPD type (H)  Stable; continue Albuterol inhaler PRN     Restless legs syndrome (RLS)  Continue gabapentin 200 mg BID  Pain improved with since wearing the heel compressive boots daily at bedtime      Chronic kidney disease, stage 3a (H)  2/7/25: creatinine 1.01  Avoid nephrotoxic medications. Renally dose medications. Monitor electrolytes, creatinine and dehydration status.  3/17/25: creatinine 1.03  5/1/25: creatinine  1.10  5/12/25: creatinine 1.05  6/6/25: creatinine 1.09     Essential hypertension  Continue lisinopril 10 mg daily and low sodium, low fat diet  Monitor BP's.      Hyperlipidemia LDL goal <100  Continue simvastatin 40 mg at  bedtime     Type 2 diabetes mellitus without complication, without long-term current use of insulin (H)   Last A1c 6.2, 2 years ago. Well-controlled without medications.   5/12/25: HgbA1c: 5.9 with estimated average glucose 123.      Benign prostatic hyperplasia, unspecified whether lower urinary tract symptoms present   Stable; on flomax      Orders:  INR on 6/6/25 was 2.12. Give coumadin 5 mg po daily and recheck on 6/10/25.     Electronically signed by: DINH Munoz CNP             Sincerely,        DINH Munoz CNP    Electronically signed

## 2025-06-08 ENCOUNTER — PATIENT OUTREACH (OUTPATIENT)
Dept: GERIATRIC MEDICINE | Facility: CLINIC | Age: OVER 89
End: 2025-06-08
Payer: COMMERCIAL

## 2025-06-08 NOTE — PROGRESS NOTES
[facility] [employer] Annual Chart Review    Reviewed member's chart per [facility] protocol.    ER visits in last 12 months: 2/2/2025 (5 hours)  [facility]  Generalized weakness    5/1/2025 (3 hours)  [facility]  Generalized edema     Hospitalizations in last 12 months: Yes     0/15/2024 - 10/18/2024 (3 days)  [facility]  Acute encephalopathy  COVID-19 infection  Weakness    2/3/2025 - 2/6/2025 (3 days)  [facility]  Psoas hematoma, right, secondary to anticoagulant therapy     Continues to have lower abdomen swelling as he sits in his wheelchair most of the day.  It was recommended he lay down flat at night and stand throughout the day.  He continues to have 24/7 care, support at LTC facility.       PENELOPE ChandlerN, RN, PHN, [facility]  Care Coordinator-Long Term Care  [address] [address]  [email or web address]   [email or web address]     Office: [phone]   Fax: [phone]

## 2025-06-09 ENCOUNTER — LAB REQUISITION (OUTPATIENT)
Dept: LAB | Facility: CLINIC | Age: OVER 89
End: 2025-06-09
Payer: COMMERCIAL

## 2025-06-09 ENCOUNTER — TELEPHONE (OUTPATIENT)
Dept: GERIATRICS | Facility: CLINIC | Age: OVER 89
End: 2025-06-09
Payer: COMMERCIAL

## 2025-06-09 LAB
ALBUMIN UR-MCNC: NEGATIVE MG/DL
APPEARANCE UR: CLEAR
BILIRUB UR QL STRIP: NEGATIVE
COLOR UR AUTO: YELLOW
GLUCOSE UR STRIP-MCNC: NEGATIVE MG/DL
HGB UR QL STRIP: NEGATIVE
KETONES UR STRIP-MCNC: NEGATIVE MG/DL
LEUKOCYTE ESTERASE UR QL STRIP: NEGATIVE
NITRATE UR QL: NEGATIVE
PH UR STRIP: 6.5 [PH] (ref 5–7)
SP GR UR STRIP: 1.01 (ref 1–1.03)
UROBILINOGEN UR STRIP-MCNC: 4 MG/DL

## 2025-06-09 PROCEDURE — 81003 URINALYSIS AUTO W/O SCOPE: CPT | Performed by: FAMILY MEDICINE

## 2025-06-09 PROCEDURE — 87086 URINE CULTURE/COLONY COUNT: CPT | Mod: ORL | Performed by: FAMILY MEDICINE

## 2025-06-09 PROCEDURE — 87086 URINE CULTURE/COLONY COUNT: CPT | Performed by: FAMILY MEDICINE

## 2025-06-09 NOTE — TELEPHONE ENCOUNTER
[facility]   2025     Name: Melvin Abad   : 1928     Background:  Afib    Orders:  Please give orders to hold warfarin today, instead of giving warfarin 5 mg today. There are orders to recheck INR tomorrow, 6/10.     I saw patient briefly today and he seemed okay, and vitals WNL. Will recent report of darker urine, please also order UA/UC with sensitivity as well.      Thanks.     Electronically signed by DINH Munoz CNP

## 2025-06-09 NOTE — TELEPHONE ENCOUNTER
"[facility] Triage Call    Provider: DINH Munoz  Facility: [facility] Facility Type:  LTC    Caller: Feroz   Call Back Number: [phone]    Allergies:  No Known Allergies       SBAR:     S-(situation): Today the nurse is calling to report that the patient is complaining about \" feeling off.\"  When asked what that meant the only thing he could say was he has had a decreased appetite and just does not feel right.  The nurse thought it would be a good idea to check the INR as since he is on warfarin. INR was to be checked tomorrow. INR today is 4.3.    B-(background): last INR on 6/6 was 2.1 and orders for 5mg every day     A-(assessment): /74, P65, T97.9, RR 17, O2 91% room air  Patient had a medium soft bowel movement today, no nausea or vomiting, no cough or shortness of breath, lung sounds clear.   Patient notes that he has had decreased appetite x 3 days, patient has been drinking water today.  R-(recommendations): Please advise regarding elevated INR of 4.3.  And any further orders regarding patient not feeling well      Telephone encounter sent to:  DINH Munoz    Please send response/orders to \"Geriatrics Nurse Pool\"    Mirna Terrazas RN      "

## 2025-06-10 ENCOUNTER — TELEPHONE (OUTPATIENT)
Dept: GERIATRICS | Facility: CLINIC | Age: OVER 89
End: 2025-06-10
Payer: COMMERCIAL

## 2025-06-10 NOTE — TELEPHONE ENCOUNTER
"Provider: DINH Munoz  Facility: [facility]  Facility Type:  LTC    Caller: Bhavna  Call Back Number: [phone]  Reason for call: INR  Diagnosis/Goal: A. Fib  Heparin/Lovenox:  No  Currently on ABX?: No  Other interacting medication:  None  Missed or refused doses: No    Date INR Previous Dose/Orders             5/20/25 3.4 5mg Thurs and Sun 2.5mg AOD   5/27/25 3.3 5mg Tues-Thurs-Sun and 2.5mg AOD   6/3/25 4.9 Hold and Vitamin K 1mg    6/4/25 3.3 2.5mg daily   6/6/25 2.1 5mg daily   6/10/25 4.0 Hold x 2 days and give Warfarin 2.5mg on 6/12.  Next INR 6/13/25.       Telephone encounter sent to: DINH Munoz    Requesting orders for Warfarin dosing and date of next INR draw  Please respond to \"Geriatrics Nurse Pool\".    Feroz Khanna RN   "

## 2025-06-10 NOTE — TELEPHONE ENCOUNTER
Verbal order per Apolonia Bedolla CNP:    Hold Warfarin x 2 days and give Warfarin 2.5mg on 6/12.  Next INR 6/13/25.      Verbal order/direction given to: Bhavna Khanna RN

## 2025-06-11 ENCOUNTER — RESULTS FOLLOW-UP (OUTPATIENT)
Dept: GERIATRICS | Facility: CLINIC | Age: OVER 89
End: 2025-06-11

## 2025-06-11 ENCOUNTER — DOCUMENTATION ONLY (OUTPATIENT)
Dept: ANTICOAGULATION | Facility: CLINIC | Age: OVER 89
End: 2025-06-11
Payer: COMMERCIAL

## 2025-06-11 LAB — BACTERIA UR CULT: NORMAL

## 2025-06-11 NOTE — PROGRESS NOTES
ANTICOAGULATION     Melvin Abad is overdue for an INR check.     Patient remains inpatient, INR being managed by in house provider.    Neeta Cheung, RN  6/11/2025  Anticoagulation Clinic  [facility] for routing messages: cristi COLON  [facility] patient phone line: [phone]

## 2025-06-19 ENCOUNTER — TELEPHONE (OUTPATIENT)
Dept: ANTICOAGULATION | Facility: CLINIC | Age: OVER 89
End: 2025-06-19
Payer: COMMERCIAL

## 2025-06-19 DIAGNOSIS — I48.20 CHRONIC ATRIAL FIBRILLATION (H): ICD-10-CM

## 2025-06-19 DIAGNOSIS — Z79.01 LONG TERM CURRENT USE OF ANTICOAGULANT THERAPY: Primary | ICD-10-CM

## 2025-06-19 NOTE — TELEPHONE ENCOUNTER
ANTICOAGULATION     Melvin Abad is overdue for an INR check.     Care Everywhere updated: yes    Patient is noted to be in TCU (name/phone): [facility]  . Spoke to Angela DEWEY, geriatrics,  and verified that patient is still there and managed by in house provider.    Denae Burciaga, RN  6/19/2025  Anticoagulation Clinic  [employer] for routing messages: p   [facility] patient phone line: [phone]

## 2025-06-19 NOTE — TELEPHONE ENCOUNTER
ANTICOAGULATION  MANAGEMENT    Melvin Abad is being discharged from the [facility] Anticoagulation Management Program ([facility]).    Reason for discharge: care has been transferred to Geriatrics Provider    Anticoagulation episode resolved, ACC referral closed, and Standing order discontinued    If patient needs warfarin management in the future, please send a new referral    Yancy Avendano RN

## 2025-06-23 ENCOUNTER — NURSING HOME VISIT (OUTPATIENT)
Dept: GERIATRICS | Facility: CLINIC | Age: OVER 89
End: 2025-06-23
Payer: COMMERCIAL

## 2025-06-23 VITALS
TEMPERATURE: 97.7 F | RESPIRATION RATE: 18 BRPM | WEIGHT: 212.1 LBS | BODY MASS INDEX: 31.32 KG/M2 | OXYGEN SATURATION: 92 % | DIASTOLIC BLOOD PRESSURE: 91 MMHG | SYSTOLIC BLOOD PRESSURE: 158 MMHG | HEART RATE: 64 BPM

## 2025-06-23 DIAGNOSIS — N18.31 CHRONIC KIDNEY DISEASE, STAGE 3A (H): ICD-10-CM

## 2025-06-23 DIAGNOSIS — G81.94 LEFT HEMIPARESIS (H): ICD-10-CM

## 2025-06-23 DIAGNOSIS — E11.9 TYPE 2 DIABETES MELLITUS WITHOUT COMPLICATION, WITHOUT LONG-TERM CURRENT USE OF INSULIN (H): ICD-10-CM

## 2025-06-23 DIAGNOSIS — G25.81 RESTLESS LEGS SYNDROME (RLS): ICD-10-CM

## 2025-06-23 DIAGNOSIS — R54 FRAILTY SYNDROME IN GERIATRIC PATIENT: ICD-10-CM

## 2025-06-23 DIAGNOSIS — R60.0 BILATERAL LOWER EXTREMITY EDEMA: ICD-10-CM

## 2025-06-23 DIAGNOSIS — S30.1XXD HEMATOMA OF RIGHT PSOAS REGION TO ANTICOAGULANT THERAPY, SUBSEQUENT ENCOUNTER: ICD-10-CM

## 2025-06-23 DIAGNOSIS — I50.812 CHRONIC RIGHT-SIDED HEART FAILURE (H): Primary | ICD-10-CM

## 2025-06-23 DIAGNOSIS — D63.8 ANEMIA OF CHRONIC DISEASE: ICD-10-CM

## 2025-06-23 DIAGNOSIS — I48.11 LONGSTANDING PERSISTENT ATRIAL FIBRILLATION (H): ICD-10-CM

## 2025-06-23 DIAGNOSIS — I10 ESSENTIAL HYPERTENSION: ICD-10-CM

## 2025-06-23 DIAGNOSIS — N40.0 BENIGN PROSTATIC HYPERPLASIA, UNSPECIFIED WHETHER LOWER URINARY TRACT SYMPTOMS PRESENT: ICD-10-CM

## 2025-06-23 DIAGNOSIS — Z86.73 HISTORY OF CVA (CEREBROVASCULAR ACCIDENT): ICD-10-CM

## 2025-06-23 DIAGNOSIS — J44.9 CHRONIC OBSTRUCTIVE PULMONARY DISEASE, UNSPECIFIED COPD TYPE (H): ICD-10-CM

## 2025-06-23 DIAGNOSIS — E78.5 HYPERLIPIDEMIA LDL GOAL <100: ICD-10-CM

## 2025-06-23 NOTE — PROGRESS NOTES
[facility] GERIATRICS  Chief Complaint   Patient presents with    [facility] Regulatory     [place] Medical Record Number:  [id number]  Place of Service where encounter took place:  [facility] () [[id number]]    HPI:    Melvin Abad  is 89 year old (4/24/1928), who is being seen today for a federally mandated E/M visit.      Patient is a 89 year-old male with past medical history significant for Afib, history of CVA, HFpEF, hypertension, benign prostatic hyperplasia, chronic anemia, thrombocytopenia, type 2 diabetes, venous stasis, and Gilbert's syndrome. Patient tested positive for influenza A while at [facility] and was given tamiflu. He was brought to the ED for hypoxia and discharged the same day. However, when he was getting into his grandson's car upon discharge, he pulled his groin somehow and was readmitted to the ER for right groin pain. Patient was found to have a hematoma of right iliopsoas muscle. Previously, his INR on 1/31/25 was 8.3 and it was 5.9 on 2/3/25. Patient was given vitamin K and warfarin was held until discharge. Patient was hospitalized at [facility] from 2/3/2025 through 2/6/2025. Patient is now staying in LTC at [facility] as he requires assistance with transfers and requires 2 pt lift. Last SLUMS on 3/6/25 was 19/30.     Today's concerns are:    CHF- patient has has intermittent increased swelling of pelvic area due to sitting for extended periods in w/c. He has occasional LE edema with weight gain which is managed with PRN lasix 20 mg if weight increases over 2 lbs in 1 day or 3-5 lbs in one week.     Dry eyes- patient says he used OTC eye drops at [facility]. Says his eyes feel gritty and eyelids of right eye are red. Will order house saline eye drops for patient to keep at bedside and self-administer.       ALLERGIES:Patient has no known allergies.  PAST MEDICAL HISTORY:   Past Medical History:    Diagnosis Date    Actinic keratosis     Atrial fibrillation (H)     Basal cell carcinoma nos 06/01/2010    Mohs excision, rt upper lip & rt temple    Cardiac dysrhythmia, unspecified     Clostridium difficile enterocolitis 05/19/2024    COPD (chronic obstructive pulmonary disease) (H)     Cough     chronic cough    COVID-19 virus infection 10/15/2024    Diabetic eye exam (H) 08/29/2014    Generalized osteoarthrosis, unspecified site     djd of the knees, hands, and neck    Heart failure with preserved ejection fraction, NYHA class I (H)     Other diseases of lung, not elsewhere classified     pulmonary nodule, benign    Pure hypercholesterolemia     Unspecified essential hypertension      PAST SURGICAL HISTORY:   has a past surgical history that includes TOTAL KNEE ARTHROPLASTY (1997); Colonoscopy w/wo [place] **Performed** (10/19/2001); Colonoscopy w/wo Brush **Performed** (11/02/2004); colonoscopy (05/02/2007); HEMORRHOIDOPEXY BY STAPLING (09/26/08); and Colonoscopy (11/10/2010).  FAMILY HISTORY: family history includes Cancer in his mother; Prostate Cancer in his father.  SOCIAL HISTORY:  reports that he has quit smoking. His smoking use included cigarettes. He has never used smokeless tobacco. He reports that he does not drink alcohol and does not use drugs.    MEDICATIONS:  MED REC REQUIRED  Post Medication Reconciliation Status:  Discharge medications reconciled, continue medications without change         Review of your medicines            Accurate as of June 23, 2025 11:59 PM. If you have any questions, ask your nurse or doctor.                CONTINUE these medicines which have NOT CHANGED        Dose / Directions   * acetaminophen 500 MG tablet  Commonly known as: TYLENOL      Dose: 2 tablet  Take 2 tablets by mouth daily.  Refills: 0     * acetaminophen 500 MG tablet  Commonly known as: TYLENOL  Used for: Arthritis pain      Dose: 1,000 mg  Take 2 tablets (1,000 mg) by mouth every 8 hours as needed for  mild pain  Refills: 0     albuterol 108 (90 Base) MCG/ACT inhaler  Commonly known as: Ventolin HFA      Dose: 2 puff  Inhale 2 puffs into the lungs every 6 hours as needed for shortness of breath, wheezing or cough.  Quantity: 18 g  Refills: 0     diclofenac 1 % topical gel  Commonly known as: VOLTAREN      Dose: 4 g  Apply 4 g topically daily. Apply to BL lower legs and feet  Refills: 0     furosemide 20 MG tablet  Commonly known as: LASIX  Used for: Chronic right-sided heart failure (H)      Dose: 40 mg  Take 2 tablets (40 mg) by mouth daily.  Refills: 0     gabapentin 100 MG capsule  Commonly known as: NEURONTIN      Dose: 2 capsule  Take 2 capsules by mouth 2 times daily.  Refills: 0     ketotifen fumarate 0.035% (ketotifen 0.025%) 0.025 % ophthalmic solution  Commonly known as: ZADITOR  Used for: Eye irritation      Dose: 1 drop  Place 1 drop into both eyes 2 times daily as needed for dry eyes Patient self-administers  Quantity: 5 mL  Refills: 0     lisinopril 10 MG tablet  Commonly known as: ZESTRIL  Used for: Essential hypertension      Dose: 10 mg  Take 1 tablet (10 mg) by mouth daily.  Refills: 0     loratadine 10 MG tablet  Commonly known as: CLARITIN  Used for: Seasonal allergic rhinitis, unspecified trigger      Dose: 10 mg  Take 1 tablet (10 mg) by mouth daily  Quantity: 30 tablet  Refills: 0     metoprolol succinate ER 50 MG 24 hr tablet  Commonly known as: TOPROL XL  Used for: Essential hypertension      Dose: 50 mg  Take 1 tablet (50 mg) by mouth daily.  Refills: 0     oxyCODONE 5 MG tablet  Commonly known as: ROXICODONE  Used for: Hematoma of right psoas region to anticoagulant therapy, subsequent encounter      Dose: 2.5 mg  Take 0.5 tablets (2.5 mg) by mouth every 6 hours as needed for severe pain.  Quantity: 30 tablet  Refills: 0     polyethylene glycol 17 GM/Dose powder  Commonly known as: MIRALAX  Used for: Constipation, unspecified constipation type      Dose: 9 g  Take 9 g by mouth daily as  needed for constipation Mix and hand to patient, Patient self-administers (he knows how much he needs to keep regular)  Quantity: 510 g  Refills: 0     psyllium 58.6 % powder  Commonly known as: METAMUCIL/KONSYL  Used for: Slow transit constipation      Dose: 1 Tablespoonful  Take 18 g (1 Tablespoonful) by mouth daily Mix and hand to patient, Patient self-administers (he knows how much he needs to keep regular)  Refills: 0     simvastatin 40 MG tablet  Commonly known as: ZOCOR  Used for: Hyperlipidemia LDL goal <100      Dose: 40 mg  Take 1 tablet (40 mg) by mouth at bedtime  Quantity: 30 tablet  Refills: 0     sodium chloride 0.65 % nasal spray  Commonly known as: OCEAN  Used for: Nasal dryness      Dose: 2 spray  Spray 2 sprays in nostril daily as needed for congestion Patient self-administers  Quantity: 30 mL  Refills: 0     tamsulosin 0.4 MG capsule  Commonly known as: FLOMAX  Used for: Urinary retention      Dose: 0.4 mg  Take 1 capsule (0.4 mg) by mouth daily  Quantity: 30 capsule  Refills: 0     Vitamin D3 25 mcg (1000 units) tablet  Commonly known as: CHOLECALCIFEROL  Used for: Vitamin D deficiency      Dose: 25 mcg  Take 1 tablet (25 mcg) by mouth daily  Refills: 0     * warfarin ANTICOAGULANT 2.5 MG tablet  Commonly known as: COUMADIN/JANTOVEN      Dose: 1 tablet  Take 1 tablet by mouth every evening. Mon, Tues, Wed, Fri, Sat  Refills: 0     * warfarin ANTICOAGULANT 5 MG tablet  Commonly known as: COUMADIN/JANTOVEN      Dose: 1 tablet  Take 1 tablet by mouth every evening. Sun, Thurs  Refills: 0           * This list has 4 medication(s) that are the same as other medications prescribed for you. Read the directions carefully, and ask your doctor or other care provider to review them with you.                   Case Management:  I have reviewed the care plan and MDS and do agree with the plan. Patient's desire to return to the community is not present. Information reviewed:  Medications, vital signs, orders,  and nursing notes.    ROS:  4 point ROS including Respiratory, CV, GI and , other than that noted in the HPI,  is negative    Vitals:  BP (!) 158/91   Pulse 64   Temp 97.7  F (36.5  C)   Resp 18   Wt 96.2 kg (212 lb 1.6 oz)   SpO2 92%   BMI 31.32 kg/m    Body mass index is 31.32 kg/m .  Exam:  GENERAL APPEARANCE:  Alert, in no distress  ENT:  Mouth and posterior oropharynx normal, moist mucous membranes, hearing aids in place  EYES:  EOM, conjunctivae, lids, pupils and irises normal  RESP:  respiratory effort and palpation of chest normal, lungs clear to auscultation   CV:  regular rate and rhythm, no murmur, rub, or gallop, trace non-pitting edema of BLE's  ABDOMEN:  normal bowel sounds, soft, nontender, no hepatosplenomegaly or other masses  SKIN:  no rash or skin breakdown  NEURO:   Cranial nerves 2-12 are normal tested and grossly at patient's baseline  PSYCH:  oriented X 3, affect and mood normal      Lab/Diagnostic data:   Labs done in SNF are in [place] EPIC. Please refer to them using [employer]/Care Everywhere.    ASSESSMENT/PLAN    Chronic right-sided heart failure (H)  Bilateral lower extremity edema  Continue furosemide 20 mg daily  Daily weights and monitor for edema. Baseline weight ~ 210 lbs.  5/5/25: Increase lasix to 40 mg daily.   6/13/25: Give additional lasix 20 mg po PRN for weight gain of 2 lbs in 1 day or 3-5 lbs in 1 week     Longstanding persistent atrial fibrillation (H)  History of CVA (cerebrovascular accident)   Left hemiparesis  History of hematoma of right ilioposoas and right medial thigh  Anemia of chronic disease  Frailty  Continue warfarin; INR goal 2-3  Continue metoprolol succinate 50 mg daily  Last Hgb 11.9 on 5/12/25; trending up slowing s/p hematoma  6/6/25: Hgb 13.1     Chronic obstructive pulmonary disease, unspecified COPD type (H)  Stable; continue Albuterol inhaler PRN  For cough and congestion, patient request PRN guaifenesin at bedtime.     Restless legs syndrome  (RLS)  Continue gabapentin 200 mg BID  Pain improved with since wearing the heel compressive boots daily at bedtime      Chronic kidney disease, stage 3a (H)  2/7/25: creatinine 1.01  Avoid nephrotoxic medications. Renally dose medications. Monitor electrolytes, creatinine and dehydration status.  3/17/25: creatinine 1.03  5/1/25: creatinine  1.10  5/12/25: creatinine 1.05  6/6/25: creatinine 1.09     Essential hypertension  Continue lisinopril 10 mg daily and low sodium, low fat diet  Monitor BP's- range from 129-162 over  in last 3 days.      Hyperlipidemia LDL goal <100  Continue simvastatin 40 mg at bedtime     Type 2 diabetes mellitus without complication, without long-term current use of insulin (H)   Last A1c 6.2, 2 years ago. Well-controlled without medications.   5/12/25: HgbA1c: 5.9 with estimated average glucose 123.      Benign prostatic hyperplasia, unspecified whether lower urinary tract symptoms present   Stable; on flomax       ORDERS:  House saline eye drop solution; instill 2 drops in both eyes every 2 hours PRN for dry eyes. Okay for patient to keep at bedside and self-administer.    Electronically signed by:  DINH Munoz CNP

## 2025-06-23 NOTE — LETTER
6/23/2025      Melvin Abad  C/o Toña Abad  [address]  [facility] [address]        [facility] GERIATRICS  Chief Complaint   Patient presents with     [facility] Medical Record Number:  [id number]  Place of Service where encounter took place:  [facility] () [[id number]]    HPI:    Melvin Abad  is 89 year old (4/24/1928), who is being seen today for a federally mandated E/M visit.      Patient is a 89 year-old male with past medical history significant for Afib, history of CVA, HFpEF, hypertension, benign prostatic hyperplasia, chronic anemia, thrombocytopenia, type 2 diabetes, venous stasis, and Gilbert's syndrome. Patient tested positive for influenza A while at [facility] and was given tamiflu. He was brought to the ED for hypoxia and discharged the same day. However, when he was getting into his grandson's car upon discharge, he pulled his groin somehow and was readmitted to the ER for right groin pain. Patient was found to have a hematoma of right iliopsoas muscle. Previously, his INR on 1/31/25 was 8.3 and it was 5.9 on 2/3/25. Patient was given vitamin K and warfarin was held until discharge. Patient was hospitalized at [facility] from 2/3/2025 through 2/6/2025. Patient is now staying in LTC at [facility] as he requires assistance with transfers and requires 2 pt lift. Last SLUMS on 3/6/25 was 19/30.     Today's concerns are:    CHF- patient has has intermittent increased swelling of pelvic area due to sitting for extended periods in w/c. He has occasional LE edema with weight gain which is managed with PRN lasix 20 mg if weight increases over 2 lbs in 1 day or 3-5 lbs in one week.     Dry eyes- patient says he used OTC eye drops at [facility]. Says his eyes feel gritty and eyelids of right eye are red. Will order house saline eye drops for patient to keep at bedside and self-administer.        ALLERGIES:Patient has no known allergies.  PAST MEDICAL HISTORY:   Past Medical History:   Diagnosis Date     Actinic keratosis      Atrial fibrillation (H)      Basal cell carcinoma nos 06/01/2010    Mohs excision, rt upper lip & rt temple     Cardiac dysrhythmia, unspecified      Clostridium difficile enterocolitis 05/19/2024     COPD (chronic obstructive pulmonary disease) (H)      Cough     chronic cough     COVID-19 virus infection 10/15/2024     Diabetic eye exam (H) 08/29/2014     Generalized osteoarthrosis, unspecified site     djd of the knees, hands, and neck     Heart failure with preserved ejection fraction, NYHA class I (H)      Other diseases of lung, not elsewhere classified     pulmonary nodule, benign     Pure hypercholesterolemia      Unspecified essential hypertension      PAST SURGICAL HISTORY:   has a past surgical history that includes TOTAL KNEE ARTHROPLASTY (1997); Colonoscopy w/wo Hart **Performed** (10/19/2001); Colonoscopy w/wo Brush **Performed** (11/02/2004); colonoscopy (05/02/2007); HEMORRHOIDOPEXY BY STAPLING (09/26/08); and Colonoscopy (11/10/2010).  FAMILY HISTORY: family history includes Cancer in his mother; Prostate Cancer in his father.  SOCIAL HISTORY:  reports that he has quit smoking. His smoking use included cigarettes. He has never used smokeless tobacco. He reports that he does not drink alcohol and does not use drugs.    MEDICATIONS:  MED REC REQUIRED  Post Medication Reconciliation Status:  Discharge medications reconciled, continue medications without change         Review of your medicines            Accurate as of June 23, 2025 11:59 PM. If you have any questions, ask your nurse or doctor.                CONTINUE these medicines which have NOT CHANGED        Dose / Directions   * acetaminophen 500 MG tablet  Commonly known as: TYLENOL      Dose: 2 tablet  Take 2 tablets by mouth daily.  Refills: 0     * acetaminophen 500 MG tablet  Commonly known as:  TYLENOL  Used for: Arthritis pain      Dose: 1,000 mg  Take 2 tablets (1,000 mg) by mouth every 8 hours as needed for mild pain  Refills: 0     albuterol 108 (90 Base) MCG/ACT inhaler  Commonly known as: Ventolin HFA      Dose: 2 puff  Inhale 2 puffs into the lungs every 6 hours as needed for shortness of breath, wheezing or cough.  Quantity: 18 g  Refills: 0     diclofenac 1 % topical gel  Commonly known as: VOLTAREN      Dose: 4 g  Apply 4 g topically daily. Apply to BL lower legs and feet  Refills: 0     furosemide 20 MG tablet  Commonly known as: LASIX  Used for: Chronic right-sided heart failure (H)      Dose: 40 mg  Take 2 tablets (40 mg) by mouth daily.  Refills: 0     gabapentin 100 MG capsule  Commonly known as: NEURONTIN      Dose: 2 capsule  Take 2 capsules by mouth 2 times daily.  Refills: 0     ketotifen fumarate 0.035% (ketotifen 0.025%) 0.025 % ophthalmic solution  Commonly known as: ZADITOR  Used for: Eye irritation      Dose: 1 drop  Place 1 drop into both eyes 2 times daily as needed for dry eyes Patient self-administers  Quantity: 5 mL  Refills: 0     lisinopril 10 MG tablet  Commonly known as: ZESTRIL  Used for: Essential hypertension      Dose: 10 mg  Take 1 tablet (10 mg) by mouth daily.  Refills: 0     loratadine 10 MG tablet  Commonly known as: CLARITIN  Used for: Seasonal allergic rhinitis, unspecified trigger      Dose: 10 mg  Take 1 tablet (10 mg) by mouth daily  Quantity: 30 tablet  Refills: 0     metoprolol succinate ER 50 MG 24 hr tablet  Commonly known as: TOPROL XL  Used for: Essential hypertension      Dose: 50 mg  Take 1 tablet (50 mg) by mouth daily.  Refills: 0     oxyCODONE 5 MG tablet  Commonly known as: ROXICODONE  Used for: Hematoma of right psoas region to anticoagulant therapy, subsequent encounter      Dose: 2.5 mg  Take 0.5 tablets (2.5 mg) by mouth every 6 hours as needed for severe pain.  Quantity: 30 tablet  Refills: 0     polyethylene glycol 17 GM/Dose powder  Commonly  known as: MIRALAX  Used for: Constipation, unspecified constipation type      Dose: 9 g  Take 9 g by mouth daily as needed for constipation Mix and hand to patient, Patient self-administers (he knows how much he needs to keep regular)  Quantity: 510 g  Refills: 0     psyllium 58.6 % powder  Commonly known as: METAMUCIL/KONSYL  Used for: Slow transit constipation      Dose: 1 Tablespoonful  Take 18 g (1 Tablespoonful) by mouth daily Mix and hand to patient, Patient self-administers (he knows how much he needs to keep regular)  Refills: 0     simvastatin 40 MG tablet  Commonly known as: ZOCOR  Used for: Hyperlipidemia LDL goal <100      Dose: 40 mg  Take 1 tablet (40 mg) by mouth at bedtime  Quantity: 30 tablet  Refills: 0     sodium chloride 0.65 % nasal spray  Commonly known as: OCEAN  Used for: Nasal dryness      Dose: 2 spray  Spray 2 sprays in nostril daily as needed for congestion Patient self-administers  Quantity: 30 mL  Refills: 0     tamsulosin 0.4 MG capsule  Commonly known as: FLOMAX  Used for: Urinary retention      Dose: 0.4 mg  Take 1 capsule (0.4 mg) by mouth daily  Quantity: 30 capsule  Refills: 0     Vitamin D3 25 mcg (1000 units) tablet  Commonly known as: CHOLECALCIFEROL  Used for: Vitamin D deficiency      Dose: 25 mcg  Take 1 tablet (25 mcg) by mouth daily  Refills: 0     * warfarin ANTICOAGULANT 2.5 MG tablet  Commonly known as: COUMADIN/JANTOVEN      Dose: 1 tablet  Take 1 tablet by mouth every evening. Mon, Tues, Wed, Fri, Sat  Refills: 0     * warfarin ANTICOAGULANT 5 MG tablet  Commonly known as: COUMADIN/JANTOVEN      Dose: 1 tablet  Take 1 tablet by mouth every evening. Sun, Thurs  Refills: 0           * This list has 4 medication(s) that are the same as other medications prescribed for you. Read the directions carefully, and ask your doctor or other care provider to review them with you.                   Case Management:  I have reviewed the care plan and MDS and do agree with the plan.  Patient's desire to return to the community is not present. Information reviewed:  Medications, vital signs, orders, and nursing notes.    ROS:  4 point ROS including Respiratory, CV, GI and , other than that noted in the HPI,  is negative    Vitals:  BP (!) 158/91   Pulse 64   Temp 97.7  F (36.5  C)   Resp 18   Wt 96.2 kg (212 lb 1.6 oz)   SpO2 92%   BMI 31.32 kg/m    Body mass index is 31.32 kg/m .  Exam:  GENERAL APPEARANCE:  Alert, in no distress  ENT:  Mouth and posterior oropharynx normal, moist mucous membranes, hearing aids in place  EYES:  EOM, conjunctivae, lids, pupils and irises normal  RESP:  respiratory effort and palpation of chest normal, lungs clear to auscultation   CV:  regular rate and rhythm, no murmur, rub, or gallop, trace non-pitting edema of BLE's  ABDOMEN:  normal bowel sounds, soft, nontender, no hepatosplenomegaly or other masses  SKIN:  no rash or skin breakdown  NEURO:   Cranial nerves 2-12 are normal tested and grossly at patient's baseline  PSYCH:  oriented X 3, affect and mood normal      Lab/Diagnostic data:   Labs done in SNF are in [facility]. Please refer to them using [place] Everywhere.    ASSESSMENT/PLAN    Chronic right-sided heart failure (H)  Bilateral lower extremity edema  Continue furosemide 20 mg daily  Daily weights and monitor for edema. Baseline weight ~ 210 lbs.  5/5/25: Increase lasix to 40 mg daily.   6/13/25: Give additional lasix 20 mg po PRN for weight gain of 2 lbs in 1 day or 3-5 lbs in 1 week     Longstanding persistent atrial fibrillation (H)  History of CVA (cerebrovascular accident)   Left hemiparesis  History of hematoma of right ilioposoas and right medial thigh  Anemia of chronic disease  Frailty  Continue warfarin; INR goal 2-3  Continue metoprolol succinate 50 mg daily  Last Hgb 11.9 on 5/12/25; trending up slowing s/p hematoma  6/6/25: Hgb 13.1     Chronic obstructive pulmonary disease, unspecified COPD type (H)  Stable; continue  Albuterol inhaler PRN  For cough and congestion, patient request PRN guaifenesin at bedtime.     Restless legs syndrome (RLS)  Continue gabapentin 200 mg BID  Pain improved with since wearing the heel compressive boots daily at bedtime      Chronic kidney disease, stage 3a (H)  2/7/25: creatinine 1.01  Avoid nephrotoxic medications. Renally dose medications. Monitor electrolytes, creatinine and dehydration status.  3/17/25: creatinine 1.03  5/1/25: creatinine  1.10  5/12/25: creatinine 1.05  6/6/25: creatinine 1.09     Essential hypertension  Continue lisinopril 10 mg daily and low sodium, low fat diet  Monitor BP's- range from 129-162 over  in last 3 days.      Hyperlipidemia LDL goal <100  Continue simvastatin 40 mg at bedtime     Type 2 diabetes mellitus without complication, without long-term current use of insulin (H)   Last A1c 6.2, 2 years ago. Well-controlled without medications.   5/12/25: HgbA1c: 5.9 with estimated average glucose 123.      Benign prostatic hyperplasia, unspecified whether lower urinary tract symptoms present   Stable; on flomax       ORDERS:  House saline eye drop solution; instill 2 drops in both eyes every 2 hours PRN for dry eyes. Okay for patient to keep at bedside and self-administer.    Electronically signed by:  DINH Munoz CNP          Sincerely,        DINH Munoz CNP    Electronically signed

## 2025-06-24 ENCOUNTER — TELEPHONE (OUTPATIENT)
Dept: GERIATRICS | Facility: CLINIC | Age: OVER 89
End: 2025-06-24
Payer: COMMERCIAL

## 2025-06-24 NOTE — TELEPHONE ENCOUNTER
"Provider: DINH Munoz  Facility: [facility]  Facility Type:  LTC    Caller: Aidee  Call Back Number: [phone]  Reason for call: INR  Diagnosis/Goal: A. Fib  Heparin/Lovenox:  No  Currently on ABX?: No  Other interacting medication:  None  Missed or refused doses: No    Date INR Previous Dose/Orders   6/6 2.1 5 mg   6/10 4.0 Hold x 2 then 2.5 mg   6/13 2.5 2.5 mg S/TH/Sat and 5 mg AOD   6/24 3.9      Telephone encounter sent to: DINH Munoz    Requesting orders for Warfarin dosing and date of next INR draw  Please respond to \"Geriatrics Nurse Pool\".    Yoon Betts RN  "

## 2025-06-24 NOTE — TELEPHONE ENCOUNTER
[facility] Geriatrics   2025     Name: Melvin Abad   : 1928     Background:  Afib    Orders:  INR 3.9.   Give warfarin 2.5 mg po on Tuesday, Wednesday, Friday, .  Give warfarin 5 mg po on Thursday, Saturday and Monday.   Recheck INR on 25.  Thanks,     Electronically signed by DINH Munoz CNP

## 2025-07-01 ENCOUNTER — TELEPHONE (OUTPATIENT)
Dept: GERIATRICS | Facility: CLINIC | Age: OVER 89
End: 2025-07-01
Payer: COMMERCIAL

## 2025-07-01 RX ORDER — AMLODIPINE BESYLATE 5 MG/1
5 TABLET ORAL DAILY
COMMUNITY
Start: 2025-06-27

## 2025-07-01 NOTE — TELEPHONE ENCOUNTER
"Provider: DINH Munoz  Facility: [facility]  Facility Type:  LTC    Caller: Laquita  Call Back Number: [phone]- [phone]  Reason for call: INR  Diagnosis/Goal: A. Fib  Heparin/Lovenox:  No  Currently on ABX?: No  Other interacting medication:  None  Missed or refused doses: No    Date INR Previous Dose/Orders   6/6 2.1 5 mg   6/10 4.0 Hold x 2 then 2.5 mg   6/13 2.5 2.5 mg S/TH/Sat and 5 mg AOD   6/24 3.9  2.5mg T/W/F/Sun, 5mg Th/Sat/Mon   6/30 4.7          Telephone encounter sent to: Hetal Quintero NP    Requesting orders for Warfarin dosing and date of next INR draw  Please respond to \"Geriatrics Nurse Pool\".    Mirna Terrazas RN   " 03-Apr-2025 02:10

## 2025-07-07 ENCOUNTER — TELEPHONE (OUTPATIENT)
Dept: ANTICOAGULATION | Facility: CLINIC | Age: OVER 89
End: 2025-07-07
Payer: COMMERCIAL

## 2025-07-07 NOTE — PROGRESS NOTES
[facility] GERIATRICS    PRIMARY CARE PROVIDER AND CLINIC:  DINH Munoz CNP, [address] / [place] [address]  Chief Complaint   Patient presents with    long term [facility] Medical Record Number:  [id number]  Place of Service where encounter took place:  [facility] () [[id number]]    Melvin Abad  is a 89 year old  (4/24/1928), admitted to the above facility from home due to care needs on 4/425. .   HPI:     Hx of CVA, HFpEF, ch a/fib on OAC, recurrent hospitalization and TCU/LTC stay. Transitioned to LTC from [facility] due to increased cares needs. Recent recurrent iliopsoas hematoma    TODAY:  -CHF / COPD  Pt reports swelling legs and around his penis, slightly better. Denies chest pain or pressure, sleeps with the headside  elevated. Endorses bendepnea.   Breathing is alirhg    -CVA:  -cough after breakfast.  Feels something is rivera in the throat from his stroke. ================================================================================  Post Discharge Medication Reconciliation Status:   MED REC REQUIREDPost Medication Reconciliation Status: medication reconcilation previously completed during another office visit    Current Outpatient Medications   Medication Sig Dispense Refill    acetaminophen (TYLENOL) 500 MG tablet Take 2 tablets by mouth daily.      acetaminophen (TYLENOL) 500 MG tablet Take 2 tablets (1,000 mg) by mouth every 8 hours as needed for mild pain      albuterol (VENTOLIN HFA) 108 (90 Base) MCG/ACT inhaler Inhale 2 puffs into the lungs every 6 hours as needed for shortness of breath, wheezing or cough. 18 g 0    amLODIPine (NORVASC) 5 MG tablet Take 5 mg by mouth daily.      diclofenac (VOLTAREN) 1 % topical gel Apply 4 g topically daily. Apply to BL lower legs and feet      furosemide (LASIX) 20 MG tablet Take 2 tablets (40 mg) by mouth daily.      gabapentin (NEURONTIN) 100 MG capsule Take 2 capsules by mouth 2 times daily.   "    ketotifen fumarate 0.035%, ketotifen 0.025%, (ZADITOR) 0.025 % ophthalmic solution Place 1 drop into both eyes 2 times daily as needed for dry eyes Patient self-administers 5 mL 0    lisinopril (ZESTRIL) 10 MG tablet Take 1 tablet (10 mg) by mouth daily.      loratadine (CLARITIN) 10 MG tablet Take 1 tablet (10 mg) by mouth daily 30 tablet 0    metoprolol succinate ER (TOPROL XL) 50 MG 24 hr tablet Take 1 tablet (50 mg) by mouth daily.      oxyCODONE (ROXICODONE) 5 MG tablet Take 0.5 tablets (2.5 mg) by mouth every 6 hours as needed for severe pain. 30 tablet 0    polyethylene glycol (MIRALAX) 17 GM/Dose powder Take 9 g by mouth daily as needed for constipation Mix and hand to patient, Patient self-administers (he knows how much he needs to keep regular) 510 g 0    psyllium (METAMUCIL/KONSYL) 58.6 % powder Take 18 g (1 Tablespoonful) by mouth daily Mix and hand to patient, Patient self-administers (he knows how much he needs to keep regular)      simvastatin (ZOCOR) 40 MG tablet Take 1 tablet (40 mg) by mouth at bedtime 30 tablet 0    sodium chloride (OCEAN) 0.65 % nasal spray Spray 2 sprays in nostril daily as needed for congestion Patient self-administers 30 mL 0    tamsulosin (FLOMAX) 0.4 MG capsule Take 1 capsule (0.4 mg) by mouth daily 30 capsule 0    Vitamin D3 (CHOLECALCIFEROL) 25 mcg (1000 units) tablet Take 1 tablet (25 mcg) by mouth daily      warfarin ANTICOAGULANT (COUMADIN) 2.5 MG tablet Take 1 tablet by mouth every evening. Mon, Tues, Wed, Fri, Sat      warfarin ANTICOAGULANT (COUMADIN) 5 MG tablet Take 1 tablet by mouth every evening. Sun, Thurs       No current facility-administered medications for this visit.       ROS: 4 point ROS of systems including Constitutional,Respiratory, Cardiovascular, Gastroenterology were all negative except for pertinent positives noted in my HPI.    Vitals:  /79   Pulse 59   Temp 97.7  F (36.5  C)   Resp 18   Ht 1.753 m (5' 9\")   Wt 96.6 kg (212 lb 14.4 " oz)   SpO2 92%   BMI 31.44 kg/m    Exam:  GENERAL APPEARANCE:  in no distress, sitting up in the wheel chair  RESP:  labored breathing with minimal movements.*** Diminished over lower half, some crackles as well  CV:  S1S2 audible, regular HR, no murmur appreciated.  No edema in the legs. Has thick slight pitting edema over thighs and extends to upper abdominal wall  ABDOMEN:  soft, NT/ND, BS audible.   M/S:   no joint deformity noted on observation.  SKIN:  extensive ecchymosis over left forearm.   NEURO:    ms strenght  (4/5  LUE and LLE  PSYCH:  affect and mood normal      Lab/Diagnostic data: Reviewed in the chart and EHR.        ASSESSMENT/PLAN:  -------------------------------  Chronic right-sided heart failure (H)  Longstanding persistent atrial fibrillation (H)  Essential hypertension  - left hemiparesis  History of CVA (cerebrovascular accident)  Hyperlipidemia LDL goal <100  - NYHA class 4.   -  has anasraca, with fluid extends up to upper abdomianl wall. Chronic.   - The current medical regimen is effective;  continue present plan and medications.    Anemia of chronic disease  Hematoma of right psoas region to anticoagulant therapy, subsequent encounter  - Hb trending up. No concern today    Chronic obstructive pulmonary disease, unspecified COPD type (H)  -No concern today. Continue current plan of care.     Diet-controlled diabetes mellitus (H)    Benign prostatic hyperplasia, unspecified whether lower urinary tract symptoms present  - on flomax, no concern      Frailty:  - Significant  Deficits requiring NH placement. Requiring extensive assistance from nursing.       Orders: NNO    Electronically signed by:  Jacques Purcell MD

## 2025-07-07 NOTE — TELEPHONE ENCOUNTER
Voicemail from nurse Laquita from 7/4/25 reporting an INR. Per chart, in-house provider is currently managing INR. Writer spoke with Laquita who states she did speak with in-house provider after leaving  and received dosing. Will close this encounter.

## 2025-07-08 VITALS
DIASTOLIC BLOOD PRESSURE: 79 MMHG | SYSTOLIC BLOOD PRESSURE: 136 MMHG | TEMPERATURE: 97.7 F | RESPIRATION RATE: 18 BRPM | WEIGHT: 212.9 LBS | BODY MASS INDEX: 31.53 KG/M2 | HEART RATE: 59 BPM | HEIGHT: 69 IN | OXYGEN SATURATION: 92 %

## 2025-07-09 ENCOUNTER — NURSING HOME VISIT (OUTPATIENT)
Dept: GERIATRICS | Facility: CLINIC | Age: OVER 89
End: 2025-07-09
Payer: COMMERCIAL

## 2025-07-09 DIAGNOSIS — D63.8 ANEMIA OF CHRONIC DISEASE: ICD-10-CM

## 2025-07-09 DIAGNOSIS — S30.1XXD HEMATOMA OF RIGHT PSOAS REGION TO ANTICOAGULANT THERAPY, SUBSEQUENT ENCOUNTER: ICD-10-CM

## 2025-07-09 DIAGNOSIS — N40.0 BENIGN PROSTATIC HYPERPLASIA, UNSPECIFIED WHETHER LOWER URINARY TRACT SYMPTOMS PRESENT: ICD-10-CM

## 2025-07-09 DIAGNOSIS — I10 ESSENTIAL HYPERTENSION: ICD-10-CM

## 2025-07-09 DIAGNOSIS — I50.812 CHRONIC RIGHT-SIDED HEART FAILURE (H): Primary | ICD-10-CM

## 2025-07-09 DIAGNOSIS — J44.9 CHRONIC OBSTRUCTIVE PULMONARY DISEASE, UNSPECIFIED COPD TYPE (H): ICD-10-CM

## 2025-07-09 DIAGNOSIS — Z86.73 HISTORY OF CVA (CEREBROVASCULAR ACCIDENT): ICD-10-CM

## 2025-07-09 DIAGNOSIS — I48.11 LONGSTANDING PERSISTENT ATRIAL FIBRILLATION (H): ICD-10-CM

## 2025-07-09 DIAGNOSIS — E11.9 DIET-CONTROLLED DIABETES MELLITUS (H): ICD-10-CM

## 2025-07-09 DIAGNOSIS — G81.94 LEFT HEMIPARESIS (H): ICD-10-CM

## 2025-07-09 DIAGNOSIS — E78.5 HYPERLIPIDEMIA LDL GOAL <100: ICD-10-CM

## 2025-07-09 NOTE — LETTER
7/9/2025      Melvin Abad  C/o Toña Abad  [address]  [facility] [address]        [facility] GERIATRICS  No chief complaint on file.    [place] Medical Record Number:  [id number]  Place of Service where encounter took place:  No question data found.    HPI:    Melvin Abad  is 89 year old (4/24/1928), who is being seen today for a federally mandated E/M visit. Today's concerns are:  {FGS DX:415994}    ALLERGIES:Patient has no known allergies.  PAST MEDICAL HISTORY:   Past Medical History:   Diagnosis Date    Actinic keratosis     Atrial fibrillation (H)     Basal cell carcinoma nos 06/01/2010    Mohs excision, rt upper lip & rt temple    Cardiac dysrhythmia, unspecified     Clostridium difficile enterocolitis 05/19/2024    COPD (chronic obstructive pulmonary disease) (H)     Cough     chronic cough    COVID-19 virus infection 10/15/2024    Diabetic eye exam (H) 08/29/2014    Generalized osteoarthrosis, unspecified site     djd of the knees, hands, and neck    Heart failure with preserved ejection fraction, NYHA class I (H)     Other diseases of lung, not elsewhere classified     pulmonary nodule, benign    Pure hypercholesterolemia     Unspecified essential hypertension      PAST SURGICAL HISTORY:   has a past surgical history that includes TOTAL KNEE ARTHROPLASTY (1997); Colonoscopy w/wo [place] **Performed** (10/19/2001); Colonoscopy w/wo Brush **Performed** (11/02/2004); colonoscopy (05/02/2007); HEMORRHOIDOPEXY BY STAPLING (09/26/08); and Colonoscopy (11/10/2010).  FAMILY HISTORY: family history includes Cancer in his mother; Prostate Cancer in his father.  SOCIAL HISTORY:  reports that he has quit smoking. His smoking use included cigarettes. He has never used smokeless tobacco. He reports that he does not drink alcohol and does not use drugs.    MEDICATIONS:  MED REC REQUIRED{TIP  Click the link below to document or use med rec list, use list to pull in response :579218}  Post  Medication Reconciliation Status: {MED REC LIST:115798}         Review of your medicines            Accurate as of July 7, 2025 12:36 PM. If you have any questions, ask your nurse or doctor.                CONTINUE these medicines which have NOT CHANGED        Dose / Directions   * acetaminophen 500 MG tablet  Commonly known as: TYLENOL      Dose: 2 tablet  Take 2 tablets by mouth daily.  Refills: 0     * acetaminophen 500 MG tablet  Commonly known as: TYLENOL  Used for: Arthritis pain      Dose: 1,000 mg  Take 2 tablets (1,000 mg) by mouth every 8 hours as needed for mild pain  Refills: 0     albuterol 108 (90 Base) MCG/ACT inhaler  Commonly known as: Ventolin HFA      Dose: 2 puff  Inhale 2 puffs into the lungs every 6 hours as needed for shortness of breath, wheezing or cough.  Quantity: 18 g  Refills: 0     amLODIPine 5 MG tablet  Commonly known as: NORVASC      Dose: 5 mg  Take 5 mg by mouth daily.  Refills: 0     diclofenac 1 % topical gel  Commonly known as: VOLTAREN      Dose: 4 g  Apply 4 g topically daily. Apply to BL lower legs and feet  Refills: 0     furosemide 20 MG tablet  Commonly known as: LASIX  Used for: Chronic right-sided heart failure (H)      Dose: 40 mg  Take 2 tablets (40 mg) by mouth daily.  Refills: 0     gabapentin 100 MG capsule  Commonly known as: NEURONTIN      Dose: 2 capsule  Take 2 capsules by mouth 2 times daily.  Refills: 0     ketotifen fumarate 0.035% (ketotifen 0.025%) 0.025 % ophthalmic solution  Commonly known as: ZADITOR  Used for: Eye irritation      Dose: 1 drop  Place 1 drop into both eyes 2 times daily as needed for dry eyes Patient self-administers  Quantity: 5 mL  Refills: 0     lisinopril 10 MG tablet  Commonly known as: ZESTRIL  Used for: Essential hypertension      Dose: 10 mg  Take 1 tablet (10 mg) by mouth daily.  Refills: 0     loratadine 10 MG tablet  Commonly known as: CLARITIN  Used for: Seasonal allergic rhinitis, unspecified trigger      Dose: 10 mg  Take 1  tablet (10 mg) by mouth daily  Quantity: 30 tablet  Refills: 0     metoprolol succinate ER 50 MG 24 hr tablet  Commonly known as: TOPROL XL  Used for: Essential hypertension      Dose: 50 mg  Take 1 tablet (50 mg) by mouth daily.  Refills: 0     oxyCODONE 5 MG tablet  Commonly known as: ROXICODONE  Used for: Hematoma of right psoas region to anticoagulant therapy, subsequent encounter      Dose: 2.5 mg  Take 0.5 tablets (2.5 mg) by mouth every 6 hours as needed for severe pain.  Quantity: 30 tablet  Refills: 0     polyethylene glycol 17 GM/Dose powder  Commonly known as: MIRALAX  Used for: Constipation, unspecified constipation type      Dose: 9 g  Take 9 g by mouth daily as needed for constipation Mix and hand to patient, Patient self-administers (he knows how much he needs to keep regular)  Quantity: 510 g  Refills: 0     psyllium 58.6 % powder  Commonly known as: METAMUCIL/KONSYL  Used for: Slow transit constipation      Dose: 1 Tablespoonful  Take 18 g (1 Tablespoonful) by mouth daily Mix and hand to patient, Patient self-administers (he knows how much he needs to keep regular)  Refills: 0     simvastatin 40 MG tablet  Commonly known as: ZOCOR  Used for: Hyperlipidemia LDL goal <100      Dose: 40 mg  Take 1 tablet (40 mg) by mouth at bedtime  Quantity: 30 tablet  Refills: 0     sodium chloride 0.65 % nasal spray  Commonly known as: OCEAN  Used for: Nasal dryness      Dose: 2 spray  Spray 2 sprays in nostril daily as needed for congestion Patient self-administers  Quantity: 30 mL  Refills: 0     tamsulosin 0.4 MG capsule  Commonly known as: FLOMAX  Used for: Urinary retention      Dose: 0.4 mg  Take 1 capsule (0.4 mg) by mouth daily  Quantity: 30 capsule  Refills: 0     Vitamin D3 25 mcg (1000 units) tablet  Commonly known as: CHOLECALCIFEROL  Used for: Vitamin D deficiency      Dose: 25 mcg  Take 1 tablet (25 mcg) by mouth daily  Refills: 0     * warfarin ANTICOAGULANT 2.5 MG tablet  Commonly known as:  COUMADIN/JANTOVEN      Dose: 1 tablet  Take 1 tablet by mouth every evening. Mon, Tues, Wed, Fri, Sat  Refills: 0     * warfarin ANTICOAGULANT 5 MG tablet  Commonly known as: COUMADIN/JANTOVEN      Dose: 1 tablet  Take 1 tablet by mouth every evening. Sun, Thurs  Refills: 0           * This list has 4 medication(s) that are the same as other medications prescribed for you. Read the directions carefully, and ask your doctor or other care provider to review them with you.               ***    Case Management:  I have reviewed the care plan and MDS and do agree with the plan. Patient's desire to return to the community is {FGS RETURN TO COMMUNITY:528417}. Information reviewed:  Medications, vital signs, orders, and nursing notes.    ROS:  {ROS FGS:498237}    Vitals:  There were no vitals taken for this visit.  There is no height or weight on file to calculate BMI.  Exam:  {intermediate physical exam :971358}    Lab/Diagnostic data:   {fgslab:299185}    ASSESSMENT/PLAN  {FGS DX2:076596}    {fgsorders:025439}  ***    Electronically signed by:  Alba Mercado***            Sincerely,        Jacques Purcell MD    Electronically signed

## 2025-07-16 ENCOUNTER — LAB REQUISITION (OUTPATIENT)
Dept: LAB | Facility: CLINIC | Age: OVER 89
End: 2025-07-16
Payer: COMMERCIAL

## 2025-07-16 ENCOUNTER — MEDICAL CORRESPONDENCE (OUTPATIENT)
Dept: HEALTH INFORMATION MANAGEMENT | Facility: CLINIC | Age: OVER 89
End: 2025-07-16

## 2025-07-16 ENCOUNTER — NURSING HOME VISIT (OUTPATIENT)
Dept: GERIATRICS | Facility: CLINIC | Age: OVER 89
End: 2025-07-16
Payer: COMMERCIAL

## 2025-07-16 VITALS
SYSTOLIC BLOOD PRESSURE: 143 MMHG | DIASTOLIC BLOOD PRESSURE: 69 MMHG | BODY MASS INDEX: 32.16 KG/M2 | OXYGEN SATURATION: 92 % | WEIGHT: 217.8 LBS | TEMPERATURE: 97.3 F | HEART RATE: 74 BPM | RESPIRATION RATE: 19 BRPM

## 2025-07-16 DIAGNOSIS — N18.31 CHRONIC KIDNEY DISEASE, STAGE 3A (H): ICD-10-CM

## 2025-07-16 NOTE — PROGRESS NOTES
[facility] GERIATRICS  Chief Complaint   Patient presents with    [facility]     [place] Medical Record Number:  [id number]  Place of Service where encounter took place:  [facility] () [[id number]]    HPI:    Melvin Abad  is 89 year old (4/24/1928), who is being seen today for a federally mandated E/M visit. Today's concerns are:  {FGS DX:795660}    ALLERGIES:Patient has no known allergies.  PAST MEDICAL HISTORY:   Past Medical History:   Diagnosis Date    Actinic keratosis     Atrial fibrillation (H)     Basal cell carcinoma nos 06/01/2010    Mohs excision, rt upper lip & rt temple    Cardiac dysrhythmia, unspecified     Clostridium difficile enterocolitis 05/19/2024    COPD (chronic obstructive pulmonary disease) (H)     Cough     chronic cough    COVID-19 virus infection 10/15/2024    Diabetic eye exam (H) 08/29/2014    Generalized osteoarthrosis, unspecified site     djd of the knees, hands, and neck    Heart failure with preserved ejection fraction, NYHA class I (H)     Other diseases of lung, not elsewhere classified     pulmonary nodule, benign    Pure hypercholesterolemia     Unspecified essential hypertension      PAST SURGICAL HISTORY:   has a past surgical history that includes TOTAL KNEE ARTHROPLASTY (1997); Colonoscopy w/wo [place] **Performed** (10/19/2001); Colonoscopy w/wo Brush **Performed** (11/02/2004); colonoscopy (05/02/2007); HEMORRHOIDOPEXY BY STAPLING (09/26/08); and Colonoscopy (11/10/2010).  FAMILY HISTORY: family history includes Cancer in his mother; Prostate Cancer in his father.  SOCIAL HISTORY:  reports that he has quit smoking. His smoking use included cigarettes. He has never used smokeless tobacco. He reports that he does not drink alcohol and does not use drugs.    MEDICATIONS:  MED REC REQUIRED{TIP  Click the link below to document or use med rec list, use list to pull in response :911971}  Post Medication Reconciliation Status:  {MED REC LIST:651512}         Review of your medicines            Accurate as of July 16, 2025 11:59 PM. If you have any questions, ask your nurse or doctor.                CONTINUE these medicines which have NOT CHANGED        Dose / Directions   * acetaminophen 500 MG tablet  Commonly known as: TYLENOL      Dose: 2 tablet  Take 2 tablets by mouth daily.  Refills: 0     * acetaminophen 500 MG tablet  Commonly known as: TYLENOL  Used for: Arthritis pain      Dose: 1,000 mg  Take 2 tablets (1,000 mg) by mouth every 8 hours as needed for mild pain  Refills: 0     albuterol 108 (90 Base) MCG/ACT inhaler  Commonly known as: Ventolin HFA      Dose: 2 puff  Inhale 2 puffs into the lungs every 6 hours as needed for shortness of breath, wheezing or cough.  Quantity: 18 g  Refills: 0     amLODIPine 5 MG tablet  Commonly known as: NORVASC      Dose: 5 mg  Take 5 mg by mouth daily.  Refills: 0     diclofenac 1 % topical gel  Commonly known as: VOLTAREN      Dose: 4 g  Apply 4 g topically daily. Apply to BL lower legs and feet  Refills: 0     furosemide 20 MG tablet  Commonly known as: LASIX  Used for: Chronic right-sided heart failure (H)      Dose: 40 mg  Take 2 tablets (40 mg) by mouth daily.  Refills: 0     gabapentin 100 MG capsule  Commonly known as: NEURONTIN      Dose: 2 capsule  Take 2 capsules by mouth 2 times daily.  Refills: 0     ketotifen fumarate 0.035% (ketotifen 0.025%) 0.025 % ophthalmic solution  Commonly known as: ZADITOR  Used for: Eye irritation      Dose: 1 drop  Place 1 drop into both eyes 2 times daily as needed for dry eyes Patient self-administers  Quantity: 5 mL  Refills: 0     lisinopril 10 MG tablet  Commonly known as: ZESTRIL  Used for: Essential hypertension      Dose: 10 mg  Take 1 tablet (10 mg) by mouth daily.  Refills: 0     loratadine 10 MG tablet  Commonly known as: CLARITIN  Used for: Seasonal allergic rhinitis, unspecified trigger      Dose: 10 mg  Take 1 tablet (10 mg) by mouth  daily  Quantity: 30 tablet  Refills: 0     metoprolol succinate ER 50 MG 24 hr tablet  Commonly known as: TOPROL XL  Used for: Essential hypertension      Dose: 50 mg  Take 1 tablet (50 mg) by mouth daily.  Refills: 0     oxyCODONE 5 MG tablet  Commonly known as: ROXICODONE  Used for: Hematoma of right psoas region to anticoagulant therapy, subsequent encounter      Dose: 2.5 mg  Take 0.5 tablets (2.5 mg) by mouth every 6 hours as needed for severe pain.  Quantity: 30 tablet  Refills: 0     polyethylene glycol 17 GM/Dose powder  Commonly known as: MIRALAX  Used for: Constipation, unspecified constipation type      Dose: 9 g  Take 9 g by mouth daily as needed for constipation Mix and hand to patient, Patient self-administers (he knows how much he needs to keep regular)  Quantity: 510 g  Refills: 0     psyllium 58.6 % powder  Commonly known as: METAMUCIL/KONSYL  Used for: Slow transit constipation      Dose: 1 Tablespoonful  Take 18 g (1 Tablespoonful) by mouth daily Mix and hand to patient, Patient self-administers (he knows how much he needs to keep regular)  Refills: 0     simvastatin 40 MG tablet  Commonly known as: ZOCOR  Used for: Hyperlipidemia LDL goal <100      Dose: 40 mg  Take 1 tablet (40 mg) by mouth at bedtime  Quantity: 30 tablet  Refills: 0     sodium chloride 0.65 % nasal spray  Commonly known as: OCEAN  Used for: Nasal dryness      Dose: 2 spray  Spray 2 sprays in nostril daily as needed for congestion Patient self-administers  Quantity: 30 mL  Refills: 0     tamsulosin 0.4 MG capsule  Commonly known as: FLOMAX  Used for: Urinary retention      Dose: 0.4 mg  Take 1 capsule (0.4 mg) by mouth daily  Quantity: 30 capsule  Refills: 0     Vitamin D3 25 mcg (1000 units) tablet  Commonly known as: CHOLECALCIFEROL  Used for: Vitamin D deficiency      Dose: 25 mcg  Take 1 tablet (25 mcg) by mouth daily  Refills: 0     * warfarin ANTICOAGULANT 2.5 MG tablet  Commonly known as: COUMADIN/JANTOVEN      Dose: 1  tablet  Take 1 tablet by mouth every evening. Mon, Tues, Wed, Fri, Sat  Refills: 0     * warfarin ANTICOAGULANT 5 MG tablet  Commonly known as: COUMADIN/JANTOVEN      Dose: 1 tablet  Take 1 tablet by mouth every evening. Sun, Thurs  Refills: 0           * This list has 4 medication(s) that are the same as other medications prescribed for you. Read the directions carefully, and ask your doctor or other care provider to review them with you.               ***    Case Management:  I have reviewed the care plan and MDS and do agree with the plan. Patient's desire to return to the community is {FGS RETURN TO COMMUNITY:033158}. Information reviewed:  Medications, vital signs, orders, and nursing notes.    ROS:  {ROS FGS:588943}    Vitals:  BP (!) 143/69   Pulse 74   Temp 97.3  F (36.3  C)   Resp 19   Wt 98.8 kg (217 lb 12.8 oz)   SpO2 92%   BMI 32.16 kg/m    Body mass index is 32.16 kg/m .  Exam:  {Nursing home physical exam :782023}    Lab/Diagnostic data:   {fgslab:581355}    ASSESSMENT/PLAN  {FGS DX2:599611}    {fgsorders:225903}  ***    Electronically signed by:  Elliott Hand***

## 2025-07-16 NOTE — LETTER
7/16/2025      Melvin Abad  C/o Toña Abad  [address]  [facility] [address]        No notes on file      Sincerely,        DINH Munoz CNP    Electronically signed

## 2025-07-17 ENCOUNTER — LAB REQUISITION (OUTPATIENT)
Dept: LAB | Facility: CLINIC | Age: OVER 89
End: 2025-07-17
Payer: COMMERCIAL

## 2025-07-17 DIAGNOSIS — N18.31 CHRONIC KIDNEY DISEASE, STAGE 3A (H): ICD-10-CM

## 2025-07-18 LAB
ANION GAP SERPL CALCULATED.3IONS-SCNC: 5 MMOL/L (ref 7–15)
BUN SERPL-MCNC: 26.8 MG/DL (ref 8–23)
CALCIUM SERPL-MCNC: 9.8 MG/DL (ref 8.8–10.4)
CHLORIDE SERPL-SCNC: 102 MMOL/L (ref 98–107)
CREAT SERPL-MCNC: 1.21 MG/DL (ref 0.67–1.17)
EGFRCR SERPLBLD CKD-EPI 2021: 54 ML/MIN/1.73M2
ERYTHROCYTE [DISTWIDTH] IN BLOOD BY AUTOMATED COUNT: 16.3 % (ref 10–15)
GLUCOSE SERPL-MCNC: 71 MG/DL (ref 70–99)
HCO3 SERPL-SCNC: 34 MMOL/L (ref 22–29)
HCT VFR BLD AUTO: 37.3 % (ref 40–53)
HGB BLD-MCNC: 11.9 G/DL (ref 13.3–17.7)
MCH RBC QN AUTO: 31.1 PG (ref 26.5–33)
MCHC RBC AUTO-ENTMCNC: 31.9 G/DL (ref 31.5–36.5)
MCV RBC AUTO: 97 FL (ref 78–100)
PLATELET # BLD AUTO: 103 10E3/UL (ref 150–450)
POTASSIUM SERPL-SCNC: 3.9 MMOL/L (ref 3.4–5.3)
RBC # BLD AUTO: 3.83 10E6/UL (ref 4.4–5.9)
SODIUM SERPL-SCNC: 141 MMOL/L (ref 135–145)
WBC # BLD AUTO: 5.2 10E3/UL (ref 4–11)

## 2025-07-18 PROCEDURE — 36415 COLL VENOUS BLD VENIPUNCTURE: CPT | Mod: ORL

## 2025-07-18 PROCEDURE — 80048 BASIC METABOLIC PNL TOTAL CA: CPT | Mod: ORL

## 2025-07-18 PROCEDURE — P9604 ONE-WAY ALLOW PRORATED TRIP: HCPCS | Mod: ORL

## 2025-07-18 PROCEDURE — 85027 COMPLETE CBC AUTOMATED: CPT | Mod: ORL

## 2025-07-21 ENCOUNTER — NURSING HOME VISIT (OUTPATIENT)
Dept: GERIATRICS | Facility: CLINIC | Age: OVER 89
End: 2025-07-21
Payer: COMMERCIAL

## 2025-07-21 VITALS
DIASTOLIC BLOOD PRESSURE: 72 MMHG | WEIGHT: 218.9 LBS | OXYGEN SATURATION: 93 % | TEMPERATURE: 97.9 F | HEART RATE: 76 BPM | BODY MASS INDEX: 32.33 KG/M2 | RESPIRATION RATE: 18 BRPM | SYSTOLIC BLOOD PRESSURE: 158 MMHG

## 2025-07-21 DIAGNOSIS — N18.31 CHRONIC KIDNEY DISEASE, STAGE 3A (H): Primary | ICD-10-CM

## 2025-07-21 DIAGNOSIS — I50.33 ACUTE ON CHRONIC DIASTOLIC HEART FAILURE (H): ICD-10-CM

## 2025-07-21 PROCEDURE — 99309 SBSQ NF CARE MODERATE MDM 30: CPT

## 2025-07-21 NOTE — PROGRESS NOTES
[facility] GERIATRICS    Chief Complaint   Patient presents with    RECHECK     HPI:  Melvin Abad is a 89 year old  (4/24/1928), who is being seen today for an episodic care visit at: [facility] () [[id number]].     Today's concern is:     Encounter Diagnosis   Name Primary?    Acute on chronic diastolic heart failure (H)    Staff report that patient's weight is up to 218.9 lbs with baseline weight around 210 lbs. On 7/16 and 7/17, he was treated with Bumex 1 mg daily in addition to daily lasix 40 mg. Renal function was checked on 7/18 with only a slight increase in creatining from 109 to 1.21 and GFR from 62 to 54. Today, he reports some stringy phlegm and has noticeable increased work of breathing, though denies dyspnea when lying down. Will add bumex 1 mg daily x 4 days, beginning this afternoon.      Allergies, and PMH/PSH reviewed in EPIC today.  REVIEW OF SYSTEMS:  4 point ROS including Respiratory, CV, GI and , other than that noted in the HPI,  is negative    Objective:   BP (!) 158/72   Pulse 76   Temp 97.9  F (36.6  C)   Resp 18   Wt 99.3 kg (218 lb 14.4 oz)   SpO2 93%   BMI 32.33 kg/m    GENERAL APPEARANCE:  Alert, in no distress  ENT:  Mouth and posterior oropharynx normal, moist mucous membranes, hearing aids in place  EYES:  EOM, conjunctivae, lids, pupils and irises normal  RESP:  respiratory effort and palpation of chest normal, lungs clear to auscultation   CV:  regular rate and rhythm, no murmur, rub, or gallop,1+ non-pitting edema of BLE's, soft swelling in groin area  ABDOMEN:  normal bowel sounds, soft, nontender, no hepatosplenomegaly or other masses  SKIN:  no rash or skin breakdown  NEURO:   Cranial nerves 2-12 are normal tested and grossly at patient's baseline  PSYCH:  oriented X 3, affect and mood normal    Lab Requisition on 07/18/2025   Component Date Value Ref Range Status    WBC Count 07/18/2025 5.2  4.0 - 11.0 10e3/uL Final    RBC Count  07/18/2025 3.83 (L)  4.40 - 5.90 10e6/uL Final    Hemoglobin 07/18/2025 11.9 (L)  13.3 - 17.7 g/dL Final    Hematocrit 07/18/2025 37.3 (L)  40.0 - 53.0 % Final    MCV 07/18/2025 97  78 - 100 fL Final    MCH 07/18/2025 31.1  26.5 - 33.0 pg Final    MCHC 07/18/2025 31.9  31.5 - 36.5 g/dL Final    RDW 07/18/2025 16.3 (H)  10.0 - 15.0 % Final    Platelet Count 07/18/2025 103 (L)  150 - 450 10e3/uL Final    Sodium 07/18/2025 141  135 - 145 mmol/L Final    Potassium 07/18/2025 3.9  3.4 - 5.3 mmol/L Final    Chloride 07/18/2025 102  98 - 107 mmol/L Final    Carbon Dioxide (CO2) 07/18/2025 34 (H)  22 - 29 mmol/L Final    Anion Gap 07/18/2025 5 (L)  7 - 15 mmol/L Final    Urea Nitrogen 07/18/2025 26.8 (H)  8.0 - 23.0 mg/dL Final    Creatinine 07/18/2025 1.21 (H)  0.67 - 1.17 mg/dL Final    GFR Estimate 07/18/2025 54 (L)  >60 mL/min/1.73m2 Final    eGFR calculated using 2021 CKD-EPI equation.    Calcium 07/18/2025 9.8  8.8 - 10.4 mg/dL Final    Glucose 07/18/2025 71  70 - 99 mg/dL Final    Specimen received unspun > 2 hours post collection. Glucose levels may be falsely decreased, interpret with caution.       Assessment/Plan:  Chronic right-sided heart failure (H)  Bilateral lower extremity edema  Continue furosemide 40 mg daily and additional lasix 20 mg po PRN for weight gain of 2 lbs in 1 day or 3-5 lbs in 1 week  Daily weights and monitor for edema. Baseline weight ~ 210 lbs.  Give Bumex 1 mg daily x 4 days. Recheck on 7/25/25    Chronic kidney disease, stage 3a (H)  Avoid nephrotoxic medications. Renally dose medications. Monitor electrolytes, creatinine and dehydration status.  6/6/25: creatinine 1.09  7/18/25: creainine 1.21, GFR 54    Orders:  Bumex 1 mg daily x 4 days.     Electronically signed by: DINH Munoz CNP

## 2025-07-21 NOTE — LETTER
7/21/2025      Melvin Abad  C/o Toña Abad  [address]  [facility] [address]        [facility] GERIATRICS    Chief Complaint   Patient presents with     RECHECK     HPI:  Melvin Abad is a 89 year old  (4/24/1928), who is being seen today for an episodic care visit at: [facility] () [[id number]].     Today's concern is:     Encounter Diagnosis   Name Primary?     Acute on chronic diastolic heart failure (H)    Staff report that patient's weight is up to 218.9 lbs with baseline weight around 210 lbs. On 7/16 and 7/17, he was treated with Bumex 1 mg daily in addition to daily lasix 40 mg. Renal function was checked on 7/18 with only a slight increase in creatining from 109 to 1.21 and GFR from 62 to 54. Today, he reports some stringy phlegm and has noticeable increased work of breathing, though denies dyspnea when lying down. Will add bumex 1 mg daily x 4 days, beginning this afternoon.      Allergies, and PMH/PSH reviewed in EPIC today.  REVIEW OF SYSTEMS:  4 point ROS including Respiratory, CV, GI and , other than that noted in the HPI,  is negative    Objective:   BP (!) 158/72   Pulse 76   Temp 97.9  F (36.6  C)   Resp 18   Wt 99.3 kg (218 lb 14.4 oz)   SpO2 93%   BMI 32.33 kg/m    GENERAL APPEARANCE:  Alert, in no distress  ENT:  Mouth and posterior oropharynx normal, moist mucous membranes, hearing aids in place  EYES:  EOM, conjunctivae, lids, pupils and irises normal  RESP:  respiratory effort and palpation of chest normal, lungs clear to auscultation   CV:  regular rate and rhythm, no murmur, rub, or gallop,1+ non-pitting edema of BLE's, soft swelling in groin area  ABDOMEN:  normal bowel sounds, soft, nontender, no hepatosplenomegaly or other masses  SKIN:  no rash or skin breakdown  NEURO:   Cranial nerves 2-12 are normal tested and grossly at patient's baseline  PSYCH:  oriented X 3, affect and mood normal    Lab Requisition on 07/18/2025   Component  Date Value Ref Range Status     WBC Count 07/18/2025 5.2  4.0 - 11.0 10e3/uL Final     RBC Count 07/18/2025 3.83 (L)  4.40 - 5.90 10e6/uL Final     Hemoglobin 07/18/2025 11.9 (L)  13.3 - 17.7 g/dL Final     Hematocrit 07/18/2025 37.3 (L)  40.0 - 53.0 % Final     MCV 07/18/2025 97  78 - 100 fL Final     MCH 07/18/2025 31.1  26.5 - 33.0 pg Final     MCHC 07/18/2025 31.9  31.5 - 36.5 g/dL Final     RDW 07/18/2025 16.3 (H)  10.0 - 15.0 % Final     Platelet Count 07/18/2025 103 (L)  150 - 450 10e3/uL Final     Sodium 07/18/2025 141  135 - 145 mmol/L Final     Potassium 07/18/2025 3.9  3.4 - 5.3 mmol/L Final     Chloride 07/18/2025 102  98 - 107 mmol/L Final     Carbon Dioxide (CO2) 07/18/2025 34 (H)  22 - 29 mmol/L Final     Anion Gap 07/18/2025 5 (L)  7 - 15 mmol/L Final     Urea Nitrogen 07/18/2025 26.8 (H)  8.0 - 23.0 mg/dL Final     Creatinine 07/18/2025 1.21 (H)  0.67 - 1.17 mg/dL Final     GFR Estimate 07/18/2025 54 (L)  >60 mL/min/1.73m2 Final    eGFR calculated using 2021 CKD-EPI equation.     Calcium 07/18/2025 9.8  8.8 - 10.4 mg/dL Final     Glucose 07/18/2025 71  70 - 99 mg/dL Final    Specimen received unspun > 2 hours post collection. Glucose levels may be falsely decreased, interpret with caution.       Assessment/Plan:  Chronic right-sided heart failure (H)  Bilateral lower extremity edema  Continue furosemide 40 mg daily and additional lasix 20 mg po PRN for weight gain of 2 lbs in 1 day or 3-5 lbs in 1 week  Daily weights and monitor for edema. Baseline weight ~ 210 lbs.  Give Bumex 1 mg daily x 4 days. Recheck on 7/25/25    Chronic kidney disease, stage 3a (H)  Avoid nephrotoxic medications. Renally dose medications. Monitor electrolytes, creatinine and dehydration status.  6/6/25: creatinine 1.09  7/18/25: creainine 1.21, GFR 54    Orders:  Bumex 1 mg daily x 4 days.     Electronically signed by: DINH Munoz CNP         Sincerely,        DINH Munoz CNP    Electronically signed

## 2025-07-23 ENCOUNTER — MEDICAL CORRESPONDENCE (OUTPATIENT)
Dept: HEALTH INFORMATION MANAGEMENT | Facility: CLINIC | Age: OVER 89
End: 2025-07-23

## 2025-07-23 ENCOUNTER — APPOINTMENT (OUTPATIENT)
Dept: GENERAL RADIOLOGY | Facility: CLINIC | Age: OVER 89
DRG: 291 | End: 2025-07-23
Attending: EMERGENCY MEDICINE
Payer: COMMERCIAL

## 2025-07-23 ENCOUNTER — NURSING HOME VISIT (OUTPATIENT)
Dept: GERIATRICS | Facility: CLINIC | Age: OVER 89
End: 2025-07-23
Payer: COMMERCIAL

## 2025-07-23 ENCOUNTER — HOSPITAL ENCOUNTER (INPATIENT)
Facility: CLINIC | Age: OVER 89
End: 2025-07-23
Attending: EMERGENCY MEDICINE | Admitting: INTERNAL MEDICINE
Payer: COMMERCIAL

## 2025-07-23 VITALS
HEART RATE: 76 BPM | OXYGEN SATURATION: 93 % | DIASTOLIC BLOOD PRESSURE: 72 MMHG | WEIGHT: 221.9 LBS | SYSTOLIC BLOOD PRESSURE: 158 MMHG | HEIGHT: 69 IN | TEMPERATURE: 97.9 F | BODY MASS INDEX: 32.87 KG/M2 | RESPIRATION RATE: 18 BRPM

## 2025-07-23 DIAGNOSIS — I50.33 ACUTE ON CHRONIC DIASTOLIC HEART FAILURE (H): ICD-10-CM

## 2025-07-23 DIAGNOSIS — J96.01 ACUTE RESPIRATORY FAILURE WITH HYPOXIA (H): ICD-10-CM

## 2025-07-23 DIAGNOSIS — E53.8 VITAMIN B12 DEFICIENCY (NON ANEMIC): ICD-10-CM

## 2025-07-23 DIAGNOSIS — I50.9 CONGESTIVE HEART FAILURE, UNSPECIFIED HF CHRONICITY, UNSPECIFIED HEART FAILURE TYPE (H): Primary | ICD-10-CM

## 2025-07-23 DIAGNOSIS — I87.8 VENOUS STASIS OF BOTH LOWER EXTREMITIES: ICD-10-CM

## 2025-07-23 DIAGNOSIS — R60.0 LOCALIZED EDEMA: ICD-10-CM

## 2025-07-23 DIAGNOSIS — K59.00 CONSTIPATION, UNSPECIFIED CONSTIPATION TYPE: ICD-10-CM

## 2025-07-23 DIAGNOSIS — R09.02 HYPOXIA: ICD-10-CM

## 2025-07-23 DIAGNOSIS — I48.20 CHRONIC ATRIAL FIBRILLATION (H): ICD-10-CM

## 2025-07-23 DIAGNOSIS — I50.33 ACUTE ON CHRONIC DIASTOLIC HEART FAILURE (H): Primary | ICD-10-CM

## 2025-07-23 DIAGNOSIS — I87.2 STASIS DERMATITIS: ICD-10-CM

## 2025-07-23 DIAGNOSIS — G62.9 NEUROPATHY: ICD-10-CM

## 2025-07-23 DIAGNOSIS — B35.4 TINEA CORPORIS: ICD-10-CM

## 2025-07-23 PROBLEM — R06.02 SHORTNESS OF BREATH: Status: RESOLVED | Noted: 2025-02-06 | Resolved: 2025-07-23

## 2025-07-23 PROBLEM — I50.812 CHRONIC RIGHT-SIDED HEART FAILURE (H): Status: RESOLVED | Noted: 2024-02-13 | Resolved: 2025-07-23

## 2025-07-23 PROBLEM — J44.1 COPD EXACERBATION (H): Status: ACTIVE | Noted: 2024-05-19

## 2025-07-23 PROBLEM — N17.9 AKI (ACUTE KIDNEY INJURY): Status: ACTIVE | Noted: 2024-10-17

## 2025-07-23 PROBLEM — D64.9 ANEMIA, UNSPECIFIED TYPE: Status: ACTIVE | Noted: 2024-10-17

## 2025-07-23 PROBLEM — J44.9 CHRONIC OBSTRUCTIVE PULMONARY DISEASE, UNSPECIFIED (H): Status: RESOLVED | Noted: 2025-02-06 | Resolved: 2025-07-23

## 2025-07-23 PROBLEM — R73.03 PREDIABETES: Status: ACTIVE | Noted: 2025-07-23

## 2025-07-23 PROBLEM — J44.1 COPD EXACERBATION (H): Status: RESOLVED | Noted: 2024-05-19 | Resolved: 2025-07-23

## 2025-07-23 PROBLEM — Z79.01 LONG TERM (CURRENT) USE OF ANTICOAGULANTS: Status: RESOLVED | Noted: 2025-02-04 | Resolved: 2025-07-23

## 2025-07-23 PROBLEM — D69.6 THROMBOCYTOPENIA: Status: ACTIVE | Noted: 2024-10-15

## 2025-07-23 LAB
ALBUMIN SERPL BCG-MCNC: 3.7 G/DL (ref 3.5–5.2)
ALP SERPL-CCNC: 143 U/L (ref 40–150)
ALT SERPL W P-5'-P-CCNC: 9 U/L (ref 0–70)
ANION GAP SERPL CALCULATED.3IONS-SCNC: 7 MMOL/L (ref 7–15)
AST SERPL W P-5'-P-CCNC: 20 U/L (ref 0–45)
ATRIAL RATE - MUSE: NORMAL BPM
BASOPHILS # BLD AUTO: 0 10E3/UL (ref 0–0.2)
BASOPHILS NFR BLD AUTO: 0 %
BILIRUB SERPL-MCNC: 1.3 MG/DL
BUN SERPL-MCNC: 27.7 MG/DL (ref 8–23)
CALCIUM SERPL-MCNC: 9.3 MG/DL (ref 8.8–10.4)
CHLORIDE SERPL-SCNC: 101 MMOL/L (ref 98–107)
CREAT SERPL-MCNC: 1.34 MG/DL (ref 0.67–1.17)
DIASTOLIC BLOOD PRESSURE - MUSE: NORMAL MMHG
EGFRCR SERPLBLD CKD-EPI 2021: 48 ML/MIN/1.73M2
EOSINOPHIL # BLD AUTO: 0.4 10E3/UL (ref 0–0.7)
EOSINOPHIL NFR BLD AUTO: 8 %
ERYTHROCYTE [DISTWIDTH] IN BLOOD BY AUTOMATED COUNT: 16.2 % (ref 10–15)
GLUCOSE SERPL-MCNC: 144 MG/DL (ref 70–99)
HCO3 SERPL-SCNC: 31 MMOL/L (ref 22–29)
HCT VFR BLD AUTO: 36.3 % (ref 40–53)
HGB BLD-MCNC: 11.9 G/DL (ref 13.3–17.7)
IMM GRANULOCYTES # BLD: 0 10E3/UL
IMM GRANULOCYTES NFR BLD: 0 %
INR PPP: 1.94 (ref 0.85–1.15)
INTERPRETATION ECG - MUSE: NORMAL
LYMPHOCYTES # BLD AUTO: 1.7 10E3/UL (ref 0.8–5.3)
LYMPHOCYTES NFR BLD AUTO: 32 %
MCH RBC QN AUTO: 31.6 PG (ref 26.5–33)
MCHC RBC AUTO-ENTMCNC: 32.8 G/DL (ref 31.5–36.5)
MCV RBC AUTO: 97 FL (ref 78–100)
MONOCYTES # BLD AUTO: 0.7 10E3/UL (ref 0–1.3)
MONOCYTES NFR BLD AUTO: 14 %
NEUTROPHILS # BLD AUTO: 2.5 10E3/UL (ref 1.6–8.3)
NEUTROPHILS NFR BLD AUTO: 46 %
NRBC # BLD AUTO: 0 10E3/UL
NRBC BLD AUTO-RTO: 0 /100
NT-PROBNP SERPL-MCNC: 2733 PG/ML (ref 0–852)
P AXIS - MUSE: NORMAL DEGREES
PLATELET # BLD AUTO: 105 10E3/UL (ref 150–450)
POTASSIUM SERPL-SCNC: 4.3 MMOL/L (ref 3.4–5.3)
PR INTERVAL - MUSE: NORMAL MS
PROT SERPL-MCNC: 6.8 G/DL (ref 6.4–8.3)
PROTHROMBIN TIME: 21.7 SECONDS (ref 11.8–14.8)
QRS DURATION - MUSE: 150 MS
QT - MUSE: 430 MS
QTC - MUSE: 450 MS
R AXIS - MUSE: -59 DEGREES
RBC # BLD AUTO: 3.76 10E6/UL (ref 4.4–5.9)
SODIUM SERPL-SCNC: 139 MMOL/L (ref 135–145)
SYSTOLIC BLOOD PRESSURE - MUSE: NORMAL MMHG
T AXIS - MUSE: 15 DEGREES
VENTRICULAR RATE- MUSE: 66 BPM
WBC # BLD AUTO: 5.4 10E3/UL (ref 4–11)

## 2025-07-23 PROCEDURE — 250N000011 HC RX IP 250 OP 636: Performed by: INTERNAL MEDICINE

## 2025-07-23 PROCEDURE — 93005 ELECTROCARDIOGRAM TRACING: CPT | Performed by: EMERGENCY MEDICINE

## 2025-07-23 PROCEDURE — 96374 THER/PROPH/DIAG INJ IV PUSH: CPT | Performed by: EMERGENCY MEDICINE

## 2025-07-23 PROCEDURE — 99310 SBSQ NF CARE HIGH MDM 45: CPT

## 2025-07-23 PROCEDURE — 99291 CRITICAL CARE FIRST HOUR: CPT | Mod: 25 | Performed by: EMERGENCY MEDICINE

## 2025-07-23 PROCEDURE — 83880 ASSAY OF NATRIURETIC PEPTIDE: CPT | Performed by: EMERGENCY MEDICINE

## 2025-07-23 PROCEDURE — 93005 ELECTROCARDIOGRAM TRACING: CPT

## 2025-07-23 PROCEDURE — 93010 ELECTROCARDIOGRAM REPORT: CPT | Performed by: EMERGENCY MEDICINE

## 2025-07-23 PROCEDURE — 85004 AUTOMATED DIFF WBC COUNT: CPT | Performed by: EMERGENCY MEDICINE

## 2025-07-23 PROCEDURE — 250N000013 HC RX MED GY IP 250 OP 250 PS 637: Performed by: EMERGENCY MEDICINE

## 2025-07-23 PROCEDURE — 120N000001 HC R&B MED SURG/OB

## 2025-07-23 PROCEDURE — 250N000013 HC RX MED GY IP 250 OP 250 PS 637: Performed by: INTERNAL MEDICINE

## 2025-07-23 PROCEDURE — 82947 ASSAY GLUCOSE BLOOD QUANT: CPT | Performed by: EMERGENCY MEDICINE

## 2025-07-23 PROCEDURE — 85610 PROTHROMBIN TIME: CPT | Performed by: INTERNAL MEDICINE

## 2025-07-23 PROCEDURE — 36415 COLL VENOUS BLD VENIPUNCTURE: CPT | Performed by: INTERNAL MEDICINE

## 2025-07-23 PROCEDURE — 36415 COLL VENOUS BLD VENIPUNCTURE: CPT | Performed by: EMERGENCY MEDICINE

## 2025-07-23 PROCEDURE — 250N000011 HC RX IP 250 OP 636: Performed by: EMERGENCY MEDICINE

## 2025-07-23 PROCEDURE — 71045 X-RAY EXAM CHEST 1 VIEW: CPT

## 2025-07-23 PROCEDURE — 99223 1ST HOSP IP/OBS HIGH 75: CPT | Performed by: INTERNAL MEDICINE

## 2025-07-23 PROCEDURE — 96374 THER/PROPH/DIAG INJ IV PUSH: CPT | Mod: 59

## 2025-07-23 PROCEDURE — 99285 EMERGENCY DEPT VISIT HI MDM: CPT | Performed by: EMERGENCY MEDICINE

## 2025-07-23 PROCEDURE — 82607 VITAMIN B-12: CPT | Performed by: INTERNAL MEDICINE

## 2025-07-23 RX ORDER — ACETAMINOPHEN 650 MG/1
650 SUPPOSITORY RECTAL EVERY 4 HOURS PRN
Status: DISCONTINUED | OUTPATIENT
Start: 2025-07-23 | End: 2025-08-04 | Stop reason: HOSPADM

## 2025-07-23 RX ORDER — ACETAMINOPHEN 325 MG/1
650 TABLET ORAL EVERY 4 HOURS PRN
Status: DISCONTINUED | OUTPATIENT
Start: 2025-07-23 | End: 2025-08-04 | Stop reason: HOSPADM

## 2025-07-23 RX ORDER — SIMVASTATIN 40 MG
40 TABLET ORAL AT BEDTIME
Status: DISCONTINUED | OUTPATIENT
Start: 2025-07-23 | End: 2025-08-04 | Stop reason: HOSPADM

## 2025-07-23 RX ORDER — BUMETANIDE 1 MG/1
1 TABLET ORAL DAILY
Status: ON HOLD | COMMUNITY
Start: 2025-07-21 | End: 2025-08-04

## 2025-07-23 RX ORDER — ACETAMINOPHEN 500 MG
1000 TABLET ORAL ONCE
Status: COMPLETED | OUTPATIENT
Start: 2025-07-23 | End: 2025-07-23

## 2025-07-23 RX ORDER — FUROSEMIDE 40 MG/1
40 TABLET ORAL DAILY
Status: ON HOLD | COMMUNITY
Start: 2025-07-20 | End: 2025-08-04

## 2025-07-23 RX ORDER — ONDANSETRON 4 MG/1
4 TABLET, ORALLY DISINTEGRATING ORAL EVERY 6 HOURS PRN
Status: DISCONTINUED | OUTPATIENT
Start: 2025-07-23 | End: 2025-08-04 | Stop reason: HOSPADM

## 2025-07-23 RX ORDER — METOPROLOL SUCCINATE 50 MG/1
50 TABLET, EXTENDED RELEASE ORAL DAILY
Status: DISCONTINUED | OUTPATIENT
Start: 2025-07-24 | End: 2025-08-04 | Stop reason: HOSPADM

## 2025-07-23 RX ORDER — GLIPIZIDE 10 MG/1
2 TABLET ORAL
Status: DISCONTINUED | OUTPATIENT
Start: 2025-07-23 | End: 2025-08-04 | Stop reason: HOSPADM

## 2025-07-23 RX ORDER — FUROSEMIDE 10 MG/ML
40 INJECTION INTRAMUSCULAR; INTRAVENOUS
Status: DISCONTINUED | OUTPATIENT
Start: 2025-07-23 | End: 2025-07-24

## 2025-07-23 RX ORDER — TAMSULOSIN HYDROCHLORIDE 0.4 MG/1
0.4 CAPSULE ORAL DAILY
Status: DISCONTINUED | OUTPATIENT
Start: 2025-07-24 | End: 2025-08-04 | Stop reason: HOSPADM

## 2025-07-23 RX ORDER — WARFARIN SODIUM 2.5 MG/1
2.5 TABLET ORAL
Status: COMPLETED | OUTPATIENT
Start: 2025-07-23 | End: 2025-07-23

## 2025-07-23 RX ORDER — AMOXICILLIN 250 MG
2 CAPSULE ORAL 2 TIMES DAILY PRN
Status: DISCONTINUED | OUTPATIENT
Start: 2025-07-23 | End: 2025-08-04 | Stop reason: HOSPADM

## 2025-07-23 RX ORDER — HYDRALAZINE HYDROCHLORIDE 25 MG/1
25 TABLET, FILM COATED ORAL EVERY 6 HOURS PRN
Status: DISCONTINUED | OUTPATIENT
Start: 2025-07-23 | End: 2025-08-04 | Stop reason: HOSPADM

## 2025-07-23 RX ORDER — NYSTATIN 100000 [USP'U]/G
POWDER TOPICAL 2 TIMES DAILY
COMMUNITY
Start: 2025-07-13 | End: 2025-07-23

## 2025-07-23 RX ORDER — AMLODIPINE BESYLATE 5 MG/1
5 TABLET ORAL DAILY
Status: DISCONTINUED | OUTPATIENT
Start: 2025-07-24 | End: 2025-08-03

## 2025-07-23 RX ORDER — AMOXICILLIN 250 MG
1 CAPSULE ORAL 2 TIMES DAILY PRN
Status: DISCONTINUED | OUTPATIENT
Start: 2025-07-23 | End: 2025-08-04 | Stop reason: HOSPADM

## 2025-07-23 RX ORDER — BENZOCAINE/MENTHOL 6 MG-10 MG
LOZENGE MUCOUS MEMBRANE 2 TIMES DAILY
Status: DISCONTINUED | OUTPATIENT
Start: 2025-07-23 | End: 2025-07-29

## 2025-07-23 RX ORDER — WARFARIN SODIUM 2 MG/1
2 TABLET ORAL EVERY EVENING
Status: ON HOLD | COMMUNITY
Start: 2025-07-11 | End: 2025-08-04

## 2025-07-23 RX ORDER — LORATADINE 10 MG/1
10 TABLET ORAL DAILY
Status: DISCONTINUED | OUTPATIENT
Start: 2025-07-24 | End: 2025-08-04 | Stop reason: HOSPADM

## 2025-07-23 RX ORDER — FUROSEMIDE 10 MG/ML
60 INJECTION INTRAMUSCULAR; INTRAVENOUS ONCE
Status: COMPLETED | OUTPATIENT
Start: 2025-07-23 | End: 2025-07-23

## 2025-07-23 RX ORDER — GUAIFENESIN 200 MG/10ML
20 SOLUTION ORAL EVERY 8 HOURS PRN
COMMUNITY
Start: 2025-06-10

## 2025-07-23 RX ORDER — NYSTATIN 100000 [USP'U]/G
POWDER TOPICAL 2 TIMES DAILY
Status: ON HOLD | COMMUNITY
End: 2025-08-03

## 2025-07-23 RX ORDER — NYSTATIN 100000 [USP'U]/G
POWDER TOPICAL 2 TIMES DAILY PRN
Status: ON HOLD | COMMUNITY
End: 2025-08-03

## 2025-07-23 RX ORDER — ONDANSETRON 2 MG/ML
4 INJECTION INTRAMUSCULAR; INTRAVENOUS EVERY 6 HOURS PRN
Status: DISCONTINUED | OUTPATIENT
Start: 2025-07-23 | End: 2025-08-04 | Stop reason: HOSPADM

## 2025-07-23 RX ORDER — ALBUTEROL SULFATE 90 UG/1
2 INHALANT RESPIRATORY (INHALATION) EVERY 4 HOURS PRN
Status: DISCONTINUED | OUTPATIENT
Start: 2025-07-23 | End: 2025-08-04 | Stop reason: HOSPADM

## 2025-07-23 RX ORDER — LIDOCAINE 40 MG/G
CREAM TOPICAL
Status: DISCONTINUED | OUTPATIENT
Start: 2025-07-23 | End: 2025-08-04 | Stop reason: HOSPADM

## 2025-07-23 RX ORDER — GUAIFENESIN 600 MG/1
600 TABLET, EXTENDED RELEASE ORAL
Status: DISCONTINUED | OUTPATIENT
Start: 2025-07-23 | End: 2025-07-29

## 2025-07-23 RX ORDER — CALCIUM CARBONATE 500 MG/1
1000 TABLET, CHEWABLE ORAL 4 TIMES DAILY PRN
Status: DISCONTINUED | OUTPATIENT
Start: 2025-07-23 | End: 2025-08-04 | Stop reason: HOSPADM

## 2025-07-23 RX ORDER — GABAPENTIN 100 MG/1
200 CAPSULE ORAL 2 TIMES DAILY
Status: DISCONTINUED | OUTPATIENT
Start: 2025-07-23 | End: 2025-08-04 | Stop reason: HOSPADM

## 2025-07-23 RX ADMIN — GABAPENTIN 200 MG: 100 CAPSULE ORAL at 21:28

## 2025-07-23 RX ADMIN — FUROSEMIDE 60 MG: 10 INJECTION, SOLUTION INTRAMUSCULAR; INTRAVENOUS at 13:05

## 2025-07-23 RX ADMIN — ACETAMINOPHEN 650 MG: 325 TABLET ORAL at 21:29

## 2025-07-23 RX ADMIN — FUROSEMIDE 40 MG: 10 INJECTION, SOLUTION INTRAMUSCULAR; INTRAVENOUS at 21:30

## 2025-07-23 RX ADMIN — SIMVASTATIN 40 MG: 40 TABLET, FILM COATED ORAL at 21:29

## 2025-07-23 RX ADMIN — HYDROCORTISONE: 1 CREAM TOPICAL at 21:30

## 2025-07-23 RX ADMIN — WARFARIN SODIUM 2.5 MG: 2.5 TABLET ORAL at 18:13

## 2025-07-23 RX ADMIN — ACETAMINOPHEN 1000 MG: 500 TABLET, FILM COATED ORAL at 14:10

## 2025-07-23 ASSESSMENT — ACTIVITIES OF DAILY LIVING (ADL)
ADLS_ACUITY_SCORE: 71
ADLS_ACUITY_SCORE: 61
ADLS_ACUITY_SCORE: 71
ADLS_ACUITY_SCORE: 61
ADLS_ACUITY_SCORE: 61
ADLS_ACUITY_SCORE: 67
ADLS_ACUITY_SCORE: 61

## 2025-07-23 NOTE — ED NOTES
Bed: ED18  Expected date:   Expected time:   Means of arrival:   Comments:  [facility]  89 y male

## 2025-07-23 NOTE — ED NOTES
Pt c/o pain in penis related to edema. MD updated and will order tylenol.    Referred by: Germán Tanner MD; Medical Diagnosis (from order):    Diagnosis Information      Diagnosis    726.10 (ICD-9-CM) - M75.82 (ICD-10-CM) - Tendinitis of left shoulder    716.91 (ICD-9-CM) - M19.019 (ICD-10-CM) - Shoulder arthritis                Physical Therapy -  Daily Treatment Note    Visit:  23     SUBJECTIVE                                                                                                             Pt is here s/p LEFT Shoulder Arthroscopic Distal Clavicle Resection and Acromioplasty (DOS: 3/11/2020).      6/1/20: Pt reports, she did lots of yard work over the week-end and feels the LEFT shoulder being sore. Grades pain as \"not too bad\", very little pain: 2-3/10. She has been doing ex's at home.     6/8/20: Pt reports that she saw a massage therapist last week and was very sore during the session but noted some improvement in shoulder ROM afterwards. She is scheduled to see him again tomorrow. Grades pain as 2-3/10. Saw MD the other day and was happy with the progress.     6/15/20: Pt reports, she feels that her shoulder is about 85 % better. Sleeping at night has been fine.    Functional Change: Difficulties with lifting the LEFT arm above the shoulder level - improving        Pain / Symptoms:  Pain rating (out of 10): Current: 2     OBJECTIVE                                                                                                                     Range of Motion (ROM)   (norms in parentheses, degrees unless noted, active unless noted):   Shoulder:     - Flexion (180):        • Left: 135         • Right: 150     - Abduction (180):        • Left: 150         • Right: 150    - Behind Back Internal Rotation:        • Left: waist line         • Right: bra-line     - External Rotation at 0°:         • Left: 70        • Right: 80        TREATMENT                                                                                                                  Therapeutic  Exercise:  UBE 70/60 x 5 min alternating motion     Pulleys for AAROM in dir of flexion, abduction and extension  Wall climbing #24   Letters of the alphabet on the wall w/ 2lb resistance   Magnets with 2 lb (cross over)  Parallel bar push ups x 10  Shoulder ER with GTB    Supine on 1/2 foam roll:  Serratus punches w 3 lb  Chest presses w 3 lb   Shoulder flexion with 3 lb   \"W\" with no resistance     Seated resisted ER with GTB     Side lying:  Shoulder abduction   Shoulder ER with 3 lb: 2x10     Prone:   Posterior glides GH joint   Side weight shifting   Stretching in dire direction of flexion     Prone horizontal ABD, rows and extension with 2 lb x 10     Manual Therapy:  GH joint mobilization, inferior glides/posterior glides   Subscapularis release   Long axis pull       ASSESSMENT                                                                                                             6/1/20: Pt continues to improve with LEFT shoulder ROM and with strength. Her active flexion is at 150 deg and ER is at 60 deg. Reaching back, behind the back is getting easier as the patient is able to reach about 4 in above the waist line now. Pt able to work out with 1-2 lb weights/resistance with good response.  Main focus is on strengthening and GH/scapulothoraic  joint mobilization in order to allow for increase in shoulder ROM. Progress as able.     6/8/20: Pt seems to benefit fromtrigger point massage therapy last week as her passive and active shoulder range of motion was significantly better today, in particular ER was at 70-80 deg and the patient was able to reach back up to the waist line with less discomfort. Current focus is on strengthening as moving the arm in dir of abduction with 2 lb is still challanging. Progress once per week per patient's tolerance.     6/15/20: Internal rotation is at and slightly above waist line, indicating improvement from the last session. Patient is about 85% better with shoulder  flexion being restricted the most compared to other motions: 135 deg vs 150 deg on the RIGHT. Added 3 lb resistance to abduction from S/L position with good response. Patient continues to benefit from skilled PT (once per week) for joint mobilization, manual therapy to shoulder and strengthening exercises.        Procedures and total treatment time documented Time Entry flowsheet.

## 2025-07-23 NOTE — MEDICATION SCRIBE - ADMISSION MEDICATION HISTORY
Medication Scribe Admission Medication History    Admission medication history is complete. The information provided in this note is only as accurate as the sources available at the time of the update.    Information Source(s): Facility ([facility]/) medication list/MAR via N/A    Pertinent Information: [facility]     Changes made to PTA medication list:  Added: Bumetanide 1 mg  Nystatin BID & PRN   Cough Drops   Tussin   Visine   Deleted: ketotifen eye drops   Lisinopril 10 mg  Oxycodone 5 mg  Sodium Chloride nasal spray    Changed: Furosemide 20 mg daily changed to 40 mg daily   Warfarin changed to 2 mg every evening     Allergies reviewed with patient and updates made in EHR: no    Medication History Completed By: TIESHA FARAH 7/23/2025 2:20 PM    PTA Med List   Medication Sig Last Dose/Taking    acetaminophen (TYLENOL) 500 MG tablet Take 2 tablets by mouth daily. 7/22/2025 at  8:17 PM    acetaminophen (TYLENOL) 500 MG tablet Take 2 tablets (1,000 mg) by mouth every 8 hours as needed for mild pain 4/18/2025 at  2:15 AM    albuterol (VENTOLIN HFA) 108 (90 Base) MCG/ACT inhaler Inhale 2 puffs into the lungs every 6 hours as needed for shortness of breath, wheezing or cough. 7/23/2025 at  6:30 AM    amLODIPine (NORVASC) 5 MG tablet Take 5 mg by mouth daily. 7/23/2025 at 10:06 AM    bumetanide (BUMEX) 1 MG tablet Take 1 mg by mouth daily. 7/23/2025 at 10:06 AM    diclofenac (VOLTAREN) 1 % topical gel Apply 4 g topically daily. Apply to BL lower legs and feet 7/18/2025 at  1:52 AM    FT TUSSIN ADULT 200 MG/10ML liquid Take 20 mLs by mouth every 8 hours as needed for cough or other (Congestion). 6/27/2025 at  4:27 PM    furosemide (LASIX) 20 MG tablet Take 20 mg by mouth daily as needed (For weight gain of 2-3 lbs in 1 day or 5 lbs in 1 week.). 7/6/2025 at  3:42 PM    furosemide (LASIX) 40 MG tablet Take 40 mg by mouth daily. 7/23/2025 at 10:06 AM    gabapentin (NEURONTIN) 100 MG capsule Take 2  capsules by mouth 2 times daily. 7/23/2025 at 10:06 AM    loratadine (CLARITIN) 10 MG tablet Take 1 tablet (10 mg) by mouth daily 7/23/2025 at 10:06 AM    metoprolol succinate ER (TOPROL XL) 50 MG 24 hr tablet Take 1 tablet (50 mg) by mouth daily. 7/23/2025 at 10:06 AM    nystatin (MYCOSTATIN) 910811 UNIT/GM external powder Apply topically 2 times daily. 7/22/2025 at 11:20 PM    nystatin (MYCOSTATIN) 134614 UNIT/GM external powder Apply topically 2 times daily as needed. Unknown    polyethylene glycol (MIRALAX) 17 GM/Dose powder Take 9 g by mouth daily as needed for constipation Mix and hand to patient, Patient self-administers (he knows how much he needs to keep regular) (Patient taking differently: Take 17 g by mouth daily. Mix and hand to patient, Patient self-administers (he knows how much he needs to keep regular)) 7/23/2025 at 10:06 AM    Polyethylene Glycol 400 (VISINE DRY EYE RELIEF OP) Apply 2 drops to eye every 2 hours as needed (Dry eyes). Kept at bedside; self administration Unknown    psyllium (METAMUCIL/KONSYL) 58.6 % powder Take 18 g (1 Tablespoonful) by mouth daily Mix and hand to patient, Patient self-administers (he knows how much he needs to keep regular) 7/23/2025 at 10:06 AM    simvastatin (ZOCOR) 40 MG tablet Take 1 tablet (40 mg) by mouth at bedtime 7/22/2025 at  8:17 PM    tamsulosin (FLOMAX) 0.4 MG capsule Take 1 capsule (0.4 mg) by mouth daily 7/23/2025 at 10:06 AM    Throat Lozenges (COUGH DROPS) LOZG Take 1 lozenge by mouth every hour as needed. Kept at bedside; self administers Unknown    Vitamin D3 (CHOLECALCIFEROL) 25 mcg (1000 units) tablet Take 1 tablet (25 mcg) by mouth daily 7/23/2025 at 10:06 AM    warfarin ANTICOAGULANT (COUMADIN/JANTOVEN) 2 MG tablet Take 2 mg by mouth every evening. INR: 7/25/25 7/22/2025 at  5:51 PM

## 2025-07-23 NOTE — PROGRESS NOTES
S-(situation): Patient arrives to room 268 via cart from ED.    B-(background): 89 year old here with Acute chronic CHF.    A-(assessment): Pt. A&Ox4. VSS on 1 L NC. Denies pain. Generalized edema throughout of mild to moderate. Pink/redness scattered. Enlarged and redness to penis. Darian redness to bilateral lower extremities with intact blisters throughout. LS with exp wheezes and crackles. Lift at baseline. External cath in place.     R-(recommendations): Orders reviewed with patient. Will monitor patient per MD orders.     Inpatient nursing criteria listed below were met:    Health care directives status obtained and documented: Yes  VTE ordered/documented: Yes  Skin issues/needs documented:Yes  Isolation addressed and Signage used: NA  Fall Prevention: Care plan updated Yes Education given and documented Yes  Care Plan initiated and Co-Morbidities added: Yes  Education Assessment documented:Yes  Admission Education Documented: Yes  If present CAUTI/CLABI Education done: NA  New medication patient education completed and documented (Possible Side Effects of Common Medications handout): Yes  Allergies Reviewed: Yes  Admission Medication Reconciliation completed: Yes  Home medications if not able to send immediately home with family stored here: NA  Reminder note placed in discharge instructions regarding home meds: NA  Individualized care needs/preferences addressed and charted: Yes  Provider Notified that patient has arrived to the unit: Yes

## 2025-07-23 NOTE — ED TRIAGE NOTES
Pt brought from [facility] with concerns of edema and decreased SpO2 into 80's, opt not on home O2; pt reports fatigue for last week and itchy back. Nursing did increase lasix dose from 40 mg daily 60 mg daily and started Bumex yesterday; pt reports decreased UOP.      Triage Assessment (Adult)       Row Name 07/23/25 1209          Triage Assessment    Airway WDL WDL     Additional Documentation Breath Sounds (Group);Edema (Group)        Respiratory WDL    Respiratory WDL X;cough;rhythm/pattern     Rhythm/Pattern, Respiratory dyspnea upon exertion;gasping;shortness of breath;tachypneic     Cough Frequency frequent     Cough Type dry        Breath Sounds    Breath Sounds All Fields;MICHELLE;LLL     All Lung Fields Breath Sounds Anterior:;Posterior:;diminished;wheezes, inspiratory     MICHELLE Breath Sounds crackles     LLL Breath Sounds crackles        Skin Circulation/Temperature WDL    Skin Circulation/Temperature WDL WDL        Cardiac WDL    Cardiac WDL WDL        Peripheral/Neurovascular WDL    Peripheral Neurovascular WDL WDL        Edema    Edema generalized;arm, left;wrist, left;hand, left;hip, right;hip, left;scrotum     Generalized Edema 3+ (Moderate)     Arm, Left Edema 3+ (Moderate)     Wrist, Left Edema 3+ (Moderate)     Hand, Left Edema 3+ (Moderate)     Hip, Left Edema 3+ (Moderate)     Hip, Right Edema 3+ (Moderate)     Scrotum Edema 3+ (Moderate)        Cognitive/Neuro/Behavioral WDL    Cognitive/Neuro/Behavioral WDL WDL

## 2025-07-23 NOTE — ED NOTES
Update given to [facility] nurse, Ritika, regarding pt condition and admission status. Pt's son, Harshad, also at bedside and updated.

## 2025-07-23 NOTE — PROGRESS NOTES
"[facility] GERIATRICS    Chief Complaint   Patient presents with    RECHECK     HPI:  Melvin Abad is a 89 year old  (4/24/1928), who is being seen today for an episodic care visit at: [facility] () [[id number]]. Today's concern is: Staff reports patient's weight is increased today to 221.9 lbs, despite adding Bumex 1 mg daily on 7/21. Per staff, patient's spO2 dropped to 83% overnight and improved on 2L O2 via NC. He was not acting like himself this morning and spilled his coffee.     Today, patient is stooped in wheelchair with continuous oxygen but has shallow respirations. He has some new periorbital edema and new edema of his left arm and hand. His abdomen is taut with fluid. Patient has not been receiving PRN lasix 20 mg as needed for weight gain, with last administration on 7/6/25. Patient denies not feeling well or chest pain. Endorses SOB. Agreeable to go to ED.     Allergies, and PMH/PSH reviewed in EPIC today.  REVIEW OF SYSTEMS:  4 point ROS including Respiratory, CV, GI and , other than that noted in the HPI,  is negative    Objective:   BP (!) 158/72   Pulse 76   Temp 97.9  F (36.6  C)   Resp 18   Ht 1.753 m (5' 9\")   Wt 100.7 kg (221 lb 14.4 oz)   SpO2 93%   BMI 32.77 kg/m    GENERAL APPEARANCE:  Alert, in no distress, ill-appearing  ENT:  Mouth and posterior oropharynx normal, moist mucous membranes, hearing aids in place  EYES:  EOM, conjunctivae, lids, pupils and irises normal  RESP:  use of shoulders to help with breathing, crackles in lower lobes  CV:  regular rate and rhythm, no murmur, rub, or gallop, 3+ edema of BLE's, taut swelling of lower abdomen, edema of perioribital area and left arm and hand.   ABDOMEN:  normal bowel sounds, soft, nontender, no hepatosplenomegaly or other masses  SKIN:  Left dorsal hand with new purpura  NEURO:   Cranial nerves 2-12 are normal tested and grossly at patient's baseline  PSYCH:  oriented X 3, fatigued       Labs " done in SNF are in [place] EPIC. Please refer to them using EPIC/Care Everywhere.    Assessment/Plan:    Acute on chronic diastolic heart failure (H)  Continue furosemide 40 mg daily and additional lasix 20 mg po PRN for weight gain of 2 lbs in 1 day or 3-5 lbs in 1 week  Daily weights and monitor for edema. Baseline weight ~ 210 lbs.  Give Bumex 1 mg daily x 4 days. Recheck on 7/25/25    Acute respiratory failure with hypoxia (H)  SpO2 dropped to 83% overnight and improved to 93% on 2L O2 via NC      Orders:  Please send patient to ED due to dyspnea requiring oxygen and increased swelling of abdomen with periorbital and left arm edema. Patient will need closer monitoring for CHF exacerbation.     Electronically signed by: DINH Munoz CNP

## 2025-07-23 NOTE — LETTER
" 7/23/2025      Melvin Abad  C/o Toña Abad  [address]  [facility] [address]        [facility]    Chief Complaint   Patient presents with     RECHECK     HPI:  Melvin Abad is a 89 year old  (4/24/1928), who is being seen today for an episodic care visit at: [facility] [place] () [[id number]]. Today's concern is: Staff reports patient's weight is increased today to 221.9 lbs, despite adding Bumex 1 mg daily on 7/21. Per staff, patient's spO2 dropped to 83% overnight and improved on 2L O2 via NC. He was not acting like himself this morning and spilled his coffee.     Today, patient is stooped in wheelchair with continuous oxygen but has shallow respirations. He has some new periorbital edema and new edema of his left arm and hand. His abdomen is taut with fluid. Patient has not been receiving PRN lasix 20 mg as needed for weight gain, with last administration on 7/6/25. Patient denies not feeling well or chest pain. Endorses SOB. Agreeable to go to ED.     Allergies, and PMH/PSH reviewed in EPIC today.  REVIEW OF SYSTEMS:  4 point ROS including Respiratory, CV, GI and , other than that noted in the HPI,  is negative    Objective:   BP (!) 158/72   Pulse 76   Temp 97.9  F (36.6  C)   Resp 18   Ht 1.753 m (5' 9\")   Wt 100.7 kg (221 lb 14.4 oz)   SpO2 93%   BMI 32.77 kg/m    GENERAL APPEARANCE:  Alert, in no distress, ill-appearing  ENT:  Mouth and posterior oropharynx normal, moist mucous membranes, hearing aids in place  EYES:  EOM, conjunctivae, lids, pupils and irises normal  RESP:  use of shoulders to help with breathing, crackles in lower lobes  CV:  regular rate and rhythm, no murmur, rub, or gallop, 3+ edema of BLE's, taut swelling of lower abdomen, edema of perioribital area and left arm and hand.   ABDOMEN:  normal bowel sounds, soft, nontender, no hepatosplenomegaly or other masses  SKIN:  Left dorsal hand with new purpura  NEURO:   Cranial nerves 2-12 " are normal tested and grossly at patient's baseline  PSYCH:  oriented X 3, fatigued       Labs done in SNF are in [place] EPIC. Please refer to them using [employer]/Care Everywhere.    Assessment/Plan:    Acute on chronic diastolic heart failure (H)  Continue furosemide 40 mg daily and additional lasix 20 mg po PRN for weight gain of 2 lbs in 1 day or 3-5 lbs in 1 week  Daily weights and monitor for edema. Baseline weight ~ 210 lbs.  Give Bumex 1 mg daily x 4 days. Recheck on 7/25/25    Acute respiratory failure with hypoxia (H)  SpO2 dropped to 83% overnight and improved to 93% on 2L O2 via NC      Orders:  Please send patient to ED due to dyspnea requiring oxygen and increased swelling of abdomen with periorbital and left arm edema. Patient will need closer monitoring for CHF exacerbation.     Electronically signed by: DINH Munoz CNP         Sincerely,        DINH Munoz CNP    Electronically signed

## 2025-07-23 NOTE — PHARMACY-ANTICOAGULATION SERVICE
Clinical Pharmacy - Warfarin Dosing Consult     Pharmacy has been consulted to manage this patient s warfarin therapy.  Indication: Atrial Fibrillation  Therapy Goal: INR 2-2.5  Warfarin Prior to Admission: Yes  Warfarin PTA Regimen: 2 mg daily    INR   Date Value Ref Range Status   07/23/2025 1.94 (H) 0.85 - 1.15 Final   06/06/2025 2.12 (H) 0.85 - 1.15 Final       Recommend warfarin 2.5 mg today.  Pharmacy will monitor Felicewilmar KELLEE Teagueon daily and order warfarin doses to achieve specified goal.      Please contact pharmacy as soon as possible if the warfarin needs to be held for a procedure or if the warfarin goals change.

## 2025-07-23 NOTE — H&P
"[facility], located within [facility]    History and Physical  Hospitalist       Date of Admission:  7/23/2025    Assessment & Plan   89 year old male with past medical hx of CHF, COPD and hard of hearing -- who presents with SOB and leg swelling    Summary:    Principal Problem:    Acute on chronic diastolic CHF   -- IV lasix, follow weight and urine output, with dry wt about 206 lbs      Chronic atrial fib -- on Warfarin   -- Pharmacy to dose warfarin, will aim for INR 2.0 to 2.5 given advanced age and prior spontaneous hematoma Feb 2025   -- cont Metoprolol XL 50 mg daily       Chronic obstructive pulmonary disease   -- continue albuterol inhaler prn       Thrombocytopenia -- 105K, no change, chronically low      DELPHINE (acute kidney injury)   -- BMP in the AM      Anemia, chronic -- hgb 11.9, with baseline hgb 10-12   -- will check Vit B12      Sensorineural hearing loss      Acute respiratory failure with hypoxia (H)   -- Oxygen, wean as able       Prediabetes -- Hgb A1C 5.9 on 5/12/25      Stasis dermatitis   -- topical hydrocortisone cream 1% bid       BPH    -- continue Flomax        Plan: as above    DVT Prophylaxis: Warfarin  Code Status: DNR / DNI       Expected Discharge Date: 07/25/2025            Medically Ready for Discharge: Anticipated in 2-4 Days, back to [facility]       Other Diagnoses:  Clinically Significant Risk Factors Present on Admission                # Drug Induced Coagulation Defect: home medication list includes an anticoagulant medication  # Thrombocytopenia: Lowest platelets = 105 in last 2 days, will monitor for bleeding   # Hypertension: Noted on problem list           # Obesity: Estimated body mass index is 32.77 kg/m  as calculated from the following:    Height as of this encounter: 1.753 m (5' 9\").    Weight as of this encounter: 100.7 kg (221 lb 14.4 oz).         # Financial/Environmental Concerns:             Shreyas Finch MD  Pager: [phone]  Cell Phone: "  [phone]     Primary Care Physician   Apolonia Bedolla    Chief Complaint   SOB and leg swelling     History is obtained from Patient    History of Present Illness   89 year old male with hx of CHF, COPD, prediabetes, [place] who had pneumonia 2-3 months ago, who presents for evaluation of increasing edema with associated SOB.  His normal weight is around  206 lbs and currently he is up to 220 lbs.  He comes [facility].  He recently by report had his Lasix increased from 40 to 60 mg a day.  Also recently started Bumex.  Oxygen saturations have been marginal.  He has a cough but no sputum production, and no fever or chills.      In ER, O2 sat 89% on room air, and 96% on 2 LPM nasal canula, BNP 2733 (nl up to 852), Creat 1.34 (was 1.01 on 2/7/25), Hgb 11.9 and plt's 105 K and Bili total elevated at 1.3. CXR with pulm vascular congestion.  EKG with atrial fib, rate 66, LAD with RBBB and PRWP.     PAST MEDICAL HISTORY    Past Medical History:   Diagnosis Date    Actinic keratosis     Atrial fibrillation (H)     Basal cell carcinoma nos 06/01/2010    Mohs excision, rt upper lip & rt temple    Cardiac dysrhythmia, unspecified     Clostridium difficile enterocolitis 05/19/2024    COPD (chronic obstructive pulmonary disease) (H)     Cough     chronic cough    COVID-19 virus infection 10/15/2024    Diabetic eye exam (H) 08/29/2014    Generalized osteoarthrosis, unspecified site     djd of the knees, hands, and neck    Heart failure with preserved ejection fraction, NYHA class I (H)     Other diseases of lung, not elsewhere classified     pulmonary nodule, benign    Pure hypercholesterolemia     Unspecified essential hypertension         PAST SURGICAL HISTORY    Past Surgical History:   Procedure Laterality Date    COLONOSCOPY  05/02/2007    Multiple bxs of diminutive polyps of ascending colon.    COLONOSCOPY  11/10/2010    COLONOSCOPY performed by MARISELA GRIMM at [facility] HEMORRHOIDOPEXY BY STAPLING  09/26/08     [facility] TOTAL KNEE ARTHROPLASTY  1997    Knee Replacement, Total    [facility] COLONOSCOPY W/WO BRUSH/WASH  10/19/2001    [facility] COLONOSCOPY W/WO BRUSH/WASH  11/02/2004        Prior to Admission Medications   Prior to Admission Medications   Prescriptions Last Dose Informant Patient Reported? Taking?   FT TUSSIN ADULT 200 MG/10ML liquid 6/27/2025 at  4:27 PM Nursing Home Yes Yes   Sig: Take 20 mLs by mouth every 8 hours as needed for cough or other (Congestion).   Polyethylene Glycol 400 (VISINE DRY EYE RELIEF OP) Unknown Nursing Home Yes Yes   Sig: Apply 2 drops to eye every 2 hours as needed (Dry eyes). Kept at bedside; self administration   Throat Lozenges (COUGH DROPS) LOZG Unknown Nursing Home Yes Yes   Sig: Take 1 lozenge by mouth every hour as needed. Kept at bedside; self administers   Vitamin D3 (CHOLECALCIFEROL) 25 mcg (1000 units) tablet 7/23/2025 at 10:06 AM Nursing Home No Yes   Sig: Take 1 tablet (25 mcg) by mouth daily   acetaminophen (TYLENOL) 500 MG tablet 4/18/2025 at  2:15 AM Nursing Home No Yes   Sig: Take 2 tablets (1,000 mg) by mouth every 8 hours as needed for mild pain   acetaminophen (TYLENOL) 500 MG tablet 7/22/2025 at  8:17 PM Nursing Home Yes Yes   Sig: Take 2 tablets by mouth daily.   albuterol (VENTOLIN HFA) 108 (90 Base) MCG/ACT inhaler 7/23/2025 at  6:30 AM Nursing Home No Yes   Sig: Inhale 2 puffs into the lungs every 6 hours as needed for shortness of breath, wheezing or cough.   amLODIPine (NORVASC) 5 MG tablet 7/23/2025 at 10:06 AM Nursing Home Yes Yes   Sig: Take 5 mg by mouth daily.   bumetanide (BUMEX) 1 MG tablet 7/23/2025 at 10:06 AM Nursing Home Yes Yes   Sig: Take 1 mg by mouth daily.   diclofenac (VOLTAREN) 1 % topical gel 7/18/2025 at  1:52 AM Nursing Home Yes Yes   Sig: Apply 4 g topically daily. Apply to BL lower legs and feet   furosemide (LASIX) 20 MG tablet 7/6/2025 at  3:42 PM Nursing Home Yes Yes   Sig: Take 20 mg by mouth daily as needed (For weight gain of 2-3 lbs in 1 day  or 5 lbs in 1 week.).   furosemide (LASIX) 40 MG tablet 7/23/2025 at 10:06 AM Nursing Home Yes Yes   Sig: Take 40 mg by mouth daily.   gabapentin (NEURONTIN) 100 MG capsule 7/23/2025 at 10:06 AM Nursing Home Yes Yes   Sig: Take 2 capsules by mouth 2 times daily.   loratadine (CLARITIN) 10 MG tablet 7/23/2025 at 10:06 AM Nursing Home No Yes   Sig: Take 1 tablet (10 mg) by mouth daily   metoprolol succinate ER (TOPROL XL) 50 MG 24 hr tablet 7/23/2025 at 10:06 AM Nursing Home No Yes   Sig: Take 1 tablet (50 mg) by mouth daily.   nystatin (MYCOSTATIN) 731524 UNIT/GM external powder 7/22/2025 at 11:20 PM Nursing Home Yes Yes   Sig: Apply topically 2 times daily.   nystatin (MYCOSTATIN) 593607 UNIT/GM external powder Unknown Nursing Home Yes Yes   Sig: Apply topically 2 times daily as needed.   polyethylene glycol (MIRALAX) 17 GM/Dose powder 7/23/2025 at 10:06 AM Nursing Home No Yes   Sig: Take 9 g by mouth daily as needed for constipation Mix and hand to patient, Patient self-administers (he knows how much he needs to keep regular)   Patient taking differently: Take 17 g by mouth daily. Mix and hand to patient, Patient self-administers (he knows how much he needs to keep regular)   psyllium (METAMUCIL/KONSYL) 58.6 % powder 7/23/2025 at 10:06 AM Nursing Home No Yes   Sig: Take 18 g (1 Tablespoonful) by mouth daily Mix and hand to patient, Patient self-administers (he knows how much he needs to keep regular)   simvastatin (ZOCOR) 40 MG tablet 7/22/2025 at  8:17 PM Nursing Home No Yes   Sig: Take 1 tablet (40 mg) by mouth at bedtime   tamsulosin (FLOMAX) 0.4 MG capsule 7/23/2025 at 10:06 AM Nursing Home No Yes   Sig: Take 1 capsule (0.4 mg) by mouth daily   warfarin ANTICOAGULANT (COUMADIN/JANTOVEN) 2 MG tablet 7/22/2025 at  5:51 PM Nursing Home Yes Yes   Sig: Take 2 mg by mouth every evening. INR: 7/25/25      Facility-Administered Medications: None     Allergies   No Known Allergies    SOCIAL HISTORY    Social History      Social History Narrative    , 3 sons, he is retired but previously worked building [employer], currently lives in [facility] [place].  He has a living will and is DNR/DNI per his son.  (last updated 7/23/2025)       Social History     Tobacco Use    Smoking status: Former     Types: Cigarettes    Smokeless tobacco: Never    Tobacco comments:     5 years   Vaping Use    Vaping status: Never Used   Substance Use Topics    Alcohol use: No     Comment: denies    Drug use: No        FAMILY HISTORY    Family History   Problem Relation Age of Onset    Cancer Mother     Prostate Cancer Father         Review of Systems   The 10 point Review of Systems is negative other than noted in the HPI or here.       PHYSICAL EXAM     Temp: 97.8  F (36.6  C) Temp src: Oral BP: 138/80 Pulse: 68   Resp: 22 SpO2: 95 % O2 Device: Nasal cannula Oxygen Delivery: 2 LPM  Vital Signs with Ranges  Temp:  [97.8  F (36.6  C)-97.9  F (36.6  C)] 97.9  F (36.6  C)  Pulse:  [61-76] 76  Resp:  [18-22] 18  BP: (101-158)/(71-82) 158/72  SpO2:  [86 %-96 %] 93 %  221 lbs 14.4 oz    Constitutional: Awake, alert, cooperative, no apparent distress.  Eyes: Conjunctiva and pupils examined and normal.  HEENT: Moist mucous membranes, normal dentition.  Respiratory: Clear to auscultation bilaterally, no crackles or wheezing.  Cardiovascular: Regular rate and rhythm, normal S1 and S2, and no murmur noted, no carotid bruits.  2+ bilateral ankle edema up above knees.    GI: Soft, non-distended, non-tender, normal bowel sounds.  Lymph/Hematologic: No anterior cervical, supraclavicular or axillary adenopathy.  Skin: No rashes, no cyanosis.   Musculoskeletal: No joint swelling, erythema or tenderness.  Lower extremities with redness over anterior shins bilaterally consistent with venous stasis changes (son says it has been that way for a long time).  Slight tenderness to pressure.   Neurologic: Alert, Ox3 but very [place], Cranial nerves 2-12 intact, no focal weakness  or numbnes but generalized weakness  Psychiatric:  No obvious anxiety or depression.    Data   Data reviewed today:  I personally reviewed the EKG tracing showing atrial fib.  Recent Labs   Lab 07/23/25  1220 07/18/25  0735   WBC 5.4 5.2   HGB 11.9* 11.9*   MCV 97 97   * 103*    141   POTASSIUM 4.3 3.9   CHLORIDE 101 102   CO2 31* 34*   BUN 27.7* 26.8*   CR 1.34* 1.21*   ANIONGAP 7 5*   DARIUS 9.3 9.8   * 71   ALBUMIN 3.7  --    PROTTOTAL 6.8  --    BILITOTAL 1.3*  --    ALKPHOS 143  --    ALT 9  --    AST 20  --        Imaging:  Recent Results (from the past 24 hours)   XR Chest Port 1 View    Narrative    EXAM: XR CHEST PORT 1 VIEW  LOCATION: [facility]  DATE: 7/23/2025    INDICATION: soa  COMPARISON: 02/02/2025.      Impression    IMPRESSION: Cardiopericardial silhouette is enlarged and appears increased in size from prior, noting differences in inspiration. Prominence of the central pulmonary vasculature. Low lung volumes. No significant pleural effusion or discernible   pneumothorax.

## 2025-07-23 NOTE — ED PROVIDER NOTES
History     Chief Complaint   Patient presents with    Edema    Fatigue     HPI  Melvin Abad is a 89 year old male who presents for evaluation of increasing edema.  He tells me old-age is just catching up with him he thinks.   He comes from [place] nursing home.  He recently by report had his Lasix increased from 40 to 60 mg a day.  Also recently started Bumex.  Oxygen saturations have been marginal.  He states she has had a cough and more difficulty breathing.  Also with some swelling to the legs and genitalia.    Allergies:  No Known Allergies    Problem List:    Patient Active Problem List    Diagnosis Date Noted    Congestive heart failure, unspecified HF chronicity, unspecified heart failure type (H) 07/23/2025     Priority: Medium    Hyperglycemia 03/17/2025     Priority: Medium    Subjective tinnitus 03/17/2025     Priority: Medium    Sensorineural hearing loss 03/17/2025     Priority: Medium    Hearing loss 03/17/2025     Priority: Medium    Persistent atrial fibrillation (H) 03/17/2025     Priority: Medium    Constipation 03/17/2025     Priority: Medium    Localized edema 03/12/2025     Priority: Medium    Hereditary and idiopathic neuropathy, unspecified 02/21/2025     Priority: Medium    Pain in unspecified joint 02/06/2025     Priority: Medium    Overweight 02/06/2025     Priority: Medium    Nasal congestion 02/06/2025     Priority: Medium    Dry eye syndrome of bilateral lacrimal glands 02/06/2025     Priority: Medium    Cough, unspecified 02/06/2025     Priority: Medium    Allergic rhinitis, unspecified 02/06/2025     Priority: Medium    Muscle weakness (generalized) 02/06/2025     Priority: Medium    Personal history of nicotine dependence 02/06/2025     Priority: Medium    Wheezing 02/06/2025     Priority: Medium    Vitamin D deficiency, unspecified 02/06/2025     Priority: Medium    Unspecified disorder of eye and adnexa 02/06/2025     Priority: Medium    Shortness of breath 02/06/2025      Priority: Medium    Retention of urine, unspecified 02/06/2025     Priority: Medium    Other specified disorders of nose and nasal sinuses 02/06/2025     Priority: Medium    Pain, unspecified 02/06/2025     Priority: Medium    Benign prostatic hyperplasia without lower urinary tract symptoms 02/06/2025     Priority: Medium    Chronic atrial fibrillation, unspecified (H) 02/06/2025     Priority: Medium    Chronic obstructive pulmonary disease, unspecified (H) 02/06/2025     Priority: Medium    Chronic right heart failure (H) 02/06/2025     Priority: Medium    Essential (primary) hypertension 02/06/2025     Priority: Medium    Personal history of transient ischemic attack (TIA), and cerebral infarction without residual deficits 02/06/2025     Priority: Medium    Influenza due to other identified influenza virus with other respiratory manifestations 02/06/2025     Priority: Medium    Nontraumatic hematoma of soft tissue 02/06/2025     Priority: Medium    Restless legs syndrome 02/06/2025     Priority: Medium    Constipation, unspecified 02/06/2025     Priority: Medium    Thrombocytopenia, unspecified 02/04/2025     Priority: Medium    Essential hypertension, benign 02/04/2025     Priority: Medium    Personal history of tobacco use, presenting hazards to health 02/04/2025     Priority: Medium    Long term (current) use of anticoagulants 02/04/2025     Priority: Medium    Medication induced coagulopathy 02/04/2025     Priority: Medium    Psoas hematoma, right, secondary to anticoagulant therapy 02/03/2025     Priority: Medium    Influenza A 02/03/2025     Priority: Medium    Supratherapeutic INR 02/03/2025     Priority: Medium    Nontraumatic intramuscular hematoma 02/03/2025     Priority: Medium    Acute renal failure, unspecified acute renal failure type 10/17/2024     Priority: Medium    Anemia, unspecified type 10/17/2024     Priority: Medium    Weakness generalized 10/15/2024     Priority: Medium    Thrombocytopenia  10/15/2024     Priority: Medium    Anemia - multifactorial (history of acute blood loss, anemia of chronic disease, etc) 10/15/2024     Priority: Medium    Venous stasis of both lower extremities 10/15/2024     Priority: Medium    History of CVA (cerebrovascular accident) 10/15/2024     Priority: Medium    Chronic kidney disease, stage 3a (H) 08/18/2024     Priority: Medium    Arthritis pain 05/19/2024     Priority: Medium    Chronic obstructive pulmonary disease, unspecified COPD type (H) 05/19/2024     Priority: Medium    Benign prostatic hyperplasia, unspecified whether lower urinary tract symptoms present 05/19/2024     Priority: Medium    Chronic right-sided heart failure (H) 02/13/2024     Priority: Medium    Chronic atrial fibrillation (H) 05/01/2020     Priority: Medium    Osteoarthritis of glenohumeral joint, left 02/24/2020     Priority: Medium    Longstanding persistent atrial fibrillation (H) 01/23/2020     Priority: Medium    Anticoagulation goal of INR 2 to 3 01/23/2020     Priority: Medium    Slow transit constipation 01/23/2020     Priority: Medium    Sleep disturbances 01/23/2020     Priority: Medium    Right pontine stroke (H) 01/23/2020     Priority: Medium    Dry eyes 01/23/2020     Priority: Medium    Restless legs syndrome (RLS) 01/23/2020     Priority: Medium    Hyperlipidemia 12/22/2019     Priority: Medium    Type 2 diabetes mellitus (H) 12/22/2019     Priority: Medium    Weakness 12/21/2019     Priority: Medium    Cerebrovascular accident of right pontine structure (H) 12/21/2019     Priority: Medium    Long-term (current) use of anticoagulants [Z79.01] 04/05/2016     Priority: Medium    Obesity (BMI 30-39.9) 10/28/2015     Priority: Medium    Anemia due to blood loss, acute 11/20/2013     Priority: Medium    DVT prophylaxis 11/20/2013     Priority: Medium    Carpal tunnel syndrome of right wrist 08/16/2013     Priority: Medium    Gilbert syndrome 01/31/2012     Priority: Medium    History  of skin cancer 01/03/2012     Priority: Medium    AK (actinic keratosis) 01/03/2012     Priority: Medium    Essential hypertension 04/18/2011     Priority: Medium    Diverticulosis 03/25/2011     Priority: Medium    Hyperlipidemia LDL goal <100 10/31/2010     Priority: Medium    Long term current use of anticoagulant therapy 01/31/2001     Priority: Medium     Problem list name updated by automated process. Provider to review          Past Medical History:    Past Medical History:   Diagnosis Date    Actinic keratosis     Atrial fibrillation (H)     Basal cell carcinoma nos 06/01/2010    Cardiac dysrhythmia, unspecified     Clostridium difficile enterocolitis 05/19/2024    COPD (chronic obstructive pulmonary disease) (H)     Cough     COVID-19 virus infection 10/15/2024    Diabetic eye exam (H) 08/29/2014    Generalized osteoarthrosis, unspecified site     Heart failure with preserved ejection fraction, NYHA class I (H)     Other diseases of lung, not elsewhere classified     Pure hypercholesterolemia     Unspecified essential hypertension        Past Surgical History:    Past Surgical History:   Procedure Laterality Date    COLONOSCOPY  05/02/2007    Multiple bxs of diminutive polyps of ascending colon.    COLONOSCOPY  11/10/2010    COLONOSCOPY performed by MARISELA GRIMM at  GI     HEMORRHOIDOPEXY BY STAPLING  09/26/08    [facility] TOTAL KNEE ARTHROPLASTY  1997    Knee Replacement, Total    [facility] COLONOSCOPY W/WO BRUSH/WASH  10/19/2001    [facility] COLONOSCOPY W/WO BRUSH/WASH  11/02/2004       Family History:    Family History   Problem Relation Age of Onset    Cancer Mother     Prostate Cancer Father        Social History:  Marital Status:   [5]  Social History     Tobacco Use    Smoking status: Former     Types: Cigarettes    Smokeless tobacco: Never    Tobacco comments:     5 years   Vaping Use    Vaping status: Never Used   Substance Use Topics    Alcohol use: No     Comment: denies    Drug use: No     "    Medications:    acetaminophen (TYLENOL) 500 MG tablet  acetaminophen (TYLENOL) 500 MG tablet  albuterol (VENTOLIN HFA) 108 (90 Base) MCG/ACT inhaler  amLODIPine (NORVASC) 5 MG tablet  diclofenac (VOLTAREN) 1 % topical gel  furosemide (LASIX) 20 MG tablet  furosemide (LASIX) 20 MG tablet  gabapentin (NEURONTIN) 100 MG capsule  ketotifen fumarate 0.035%, ketotifen 0.025%, (ZADITOR) 0.025 % ophthalmic solution  lisinopril (ZESTRIL) 10 MG tablet  loratadine (CLARITIN) 10 MG tablet  metoprolol succinate ER (TOPROL XL) 50 MG 24 hr tablet  oxyCODONE (ROXICODONE) 5 MG tablet  polyethylene glycol (MIRALAX) 17 GM/Dose powder  psyllium (METAMUCIL/KONSYL) 58.6 % powder  simvastatin (ZOCOR) 40 MG tablet  sodium chloride (OCEAN) 0.65 % nasal spray  tamsulosin (FLOMAX) 0.4 MG capsule  Vitamin D3 (CHOLECALCIFEROL) 25 mcg (1000 units) tablet  warfarin ANTICOAGULANT (COUMADIN) 2.5 MG tablet  warfarin ANTICOAGULANT (COUMADIN) 5 MG tablet          Review of Systems  All other systems are reviewed and are negative    Physical Exam   BP: 105/82  Pulse: 71  Temp: 97.8  F (36.6  C)  Resp: 22  Height: 175.3 cm (5' 9\")  Weight: 100.7 kg (221 lb 14.4 oz)  SpO2: (!) 86 %      Physical Exam  Vitals and nursing note reviewed.   Constitutional:       General: He is not in acute distress.     Appearance: He is well-developed. He is not diaphoretic.   HENT:      Head: Normocephalic and atraumatic.      Nose: Nose normal.      Mouth/Throat:      Mouth: Mucous membranes are moist.      Pharynx: Oropharynx is clear.   Eyes:      General: No scleral icterus.        Right eye: No discharge.         Left eye: No discharge.      Conjunctiva/sclera: Conjunctivae normal.   Cardiovascular:      Rate and Rhythm: Normal rate. Rhythm irregular.      Heart sounds: Normal heart sounds. No murmur heard.  Pulmonary:      Effort: Pulmonary effort is normal. No respiratory distress.      Breath sounds: No stridor. Examination of the right-lower field reveals " rales. Examination of the left-lower field reveals rales. Rales present.   Abdominal:      General: There is no distension.      Palpations: Abdomen is soft.      Tenderness: There is no abdominal tenderness. There is no guarding or rebound.   Genitourinary:     Comments: Edema noted through the genitalia as well and lower abdomen  Musculoskeletal:         General: Normal range of motion.      Cervical back: Normal range of motion and neck supple.      Right lower leg: Edema (3+) present.      Left lower leg: Edema (3+) present.   Skin:     General: Skin is warm and dry.      Coloration: Skin is not pale.      Findings: No erythema or rash.   Neurological:      General: No focal deficit present.      Mental Status: He is alert.      Cranial Nerves: No cranial nerve deficit.      Motor: No abnormal muscle tone.   Psychiatric:         Mood and Affect: Mood normal.         ED Course        Procedures         EKG reveals atrial fibrillation at 66 bpm.  Right bundle branch block.  No acute ST segment or T wave changes noted.  Interpreted by myself.         Recent Results (from the past 24 hours)   EKG 12-lead, tracing only   Result Value Ref Range    Systolic Blood Pressure  mmHg    Diastolic Blood Pressure  mmHg    Ventricular Rate 66 BPM    Atrial Rate  BPM    CA Interval  ms    QRS Duration 150 ms     ms    QTc 450 ms    P Axis  degrees    R AXIS -59 degrees    T Axis 15 degrees    Interpretation ECG       Atrial fibrillation  Left axis deviation  Right bundle branch block  Anteroseptal infarct , age undetermined  Abnormal ECG  No previous ECGs available  Confirmed by SEE ED PROVIDER NOTE FOR, ECG INTERPRETATION ([id number]),  Yancy Aguila ([id number]) on 7/23/2025 12:28:22 PM     CBC with Platelets and Differential (Limited Occurrences)    Narrative    The following orders were created for panel order CBC with Platelets and Differential (Limited Occurrences).  Procedure                                Abnormality         Status                     ---------                               -----------         ------                     CBC with platelets and ...[[id number]]  Abnormal            Final result                 Please view results for these tests on the individual orders.   NT-proBNP   Result Value Ref Range    NT-proBNP 2,733 (H) 0 - 852 pg/mL   Comprehensive Metabolic Panel (Limited Occurrences)   Result Value Ref Range    Sodium 139 135 - 145 mmol/L    Potassium 4.3 3.4 - 5.3 mmol/L    Carbon Dioxide (CO2) 31 (H) 22 - 29 mmol/L    Anion Gap 7 7 - 15 mmol/L    Urea Nitrogen 27.7 (H) 8.0 - 23.0 mg/dL    Creatinine 1.34 (H) 0.67 - 1.17 mg/dL    GFR Estimate 48 (L) >60 mL/min/1.73m2    Calcium 9.3 8.8 - 10.4 mg/dL    Chloride 101 98 - 107 mmol/L    Glucose 144 (H) 70 - 99 mg/dL    Alkaline Phosphatase 143 40 - 150 U/L    AST 20 0 - 45 U/L    ALT 9 0 - 70 U/L    Protein Total 6.8 6.4 - 8.3 g/dL    Albumin 3.7 3.5 - 5.2 g/dL    Bilirubin Total 1.3 (H) <=1.2 mg/dL   CBC with platelets and differential   Result Value Ref Range    WBC Count 5.4 4.0 - 11.0 10e3/uL    RBC Count 3.76 (L) 4.40 - 5.90 10e6/uL    Hemoglobin 11.9 (L) 13.3 - 17.7 g/dL    Hematocrit 36.3 (L) 40.0 - 53.0 %    MCV 97 78 - 100 fL    MCH 31.6 26.5 - 33.0 pg    MCHC 32.8 31.5 - 36.5 g/dL    RDW 16.2 (H) 10.0 - 15.0 %    Platelet Count 105 (L) 150 - 450 10e3/uL    % Neutrophils 46 %    % Lymphocytes 32 %    % Monocytes 14 %    % Eosinophils 8 %    % Basophils 0 %    % Immature Granulocytes 0 %    NRBCs per 100 WBC 0 <1 /100    Absolute Neutrophils 2.5 1.6 - 8.3 10e3/uL    Absolute Lymphocytes 1.7 0.8 - 5.3 10e3/uL    Absolute Monocytes 0.7 0.0 - 1.3 10e3/uL    Absolute Eosinophils 0.4 0.0 - 0.7 10e3/uL    Absolute Basophils 0.0 0.0 - 0.2 10e3/uL    Absolute Immature Granulocytes 0.0 <=0.4 10e3/uL    Absolute NRBCs 0.0 10e3/uL   XR Chest Port 1 View    Narrative    EXAM: XR CHEST PORT 1 VIEW  LOCATION: [facility]  DATE: 7/23/2025    INDICATION: soa  COMPARISON: 02/02/2025.      Impression    IMPRESSION: Cardiopericardial silhouette is enlarged and appears increased in size from prior, noting differences in inspiration. Prominence of the central pulmonary vasculature. Low lung volumes. No significant pleural effusion or discernible   pneumothorax.       Medications   furosemide (LASIX) injection 60 mg (60 mg Intravenous $Given 7/23/25 1305)       Assessments & Plan (with Medical Decision Making)  89-year-old male who presents with an exacerbation of congestive heart failure.  Hypoxic to 88%.  Given IV Lasix.  Accepted for admission by the hospitalist, Dr. Infante.       I have reviewed the nursing notes.    I have reviewed the findings, diagnosis, plan and need for follow up with the patient.          New Prescriptions    No medications on file       Final diagnoses:   Congestive heart failure, unspecified HF chronicity, unspecified heart failure type (H)       7/23/2025   [facility] EMERGENCY DEPT       Mario Gilbert MD  07/23/25 [id number]

## 2025-07-24 ENCOUNTER — APPOINTMENT (OUTPATIENT)
Dept: CARDIOLOGY | Facility: CLINIC | Age: OVER 89
End: 2025-07-24
Attending: INTERNAL MEDICINE
Payer: COMMERCIAL

## 2025-07-24 ENCOUNTER — PATIENT OUTREACH (OUTPATIENT)
Dept: GERIATRIC MEDICINE | Facility: CLINIC | Age: OVER 89
End: 2025-07-24

## 2025-07-24 ENCOUNTER — APPOINTMENT (OUTPATIENT)
Dept: OCCUPATIONAL THERAPY | Facility: CLINIC | Age: OVER 89
DRG: 291 | End: 2025-07-24
Attending: INTERNAL MEDICINE
Payer: COMMERCIAL

## 2025-07-24 ENCOUNTER — LAB REQUISITION (OUTPATIENT)
Dept: LAB | Facility: CLINIC | Age: OVER 89
DRG: 291 | End: 2025-07-24
Payer: COMMERCIAL

## 2025-07-24 VITALS
TEMPERATURE: 97.7 F | SYSTOLIC BLOOD PRESSURE: 111 MMHG | RESPIRATION RATE: 18 BRPM | WEIGHT: 217.15 LBS | OXYGEN SATURATION: 93 % | BODY MASS INDEX: 32.16 KG/M2 | HEART RATE: 64 BPM | DIASTOLIC BLOOD PRESSURE: 53 MMHG | HEIGHT: 69 IN

## 2025-07-24 DIAGNOSIS — I50.812 CHRONIC RIGHT HEART FAILURE (H): ICD-10-CM

## 2025-07-24 PROBLEM — Z86.73 HISTORY OF CVA (CEREBROVASCULAR ACCIDENT): Status: ACTIVE | Noted: 2024-10-15

## 2025-07-24 PROBLEM — N18.31 CHRONIC KIDNEY DISEASE, STAGE 3A (H): Status: ACTIVE | Noted: 2024-08-18

## 2025-07-24 LAB
ANION GAP SERPL CALCULATED.3IONS-SCNC: 8 MMOL/L (ref 7–15)
BUN SERPL-MCNC: 25.5 MG/DL (ref 8–23)
CALCIUM SERPL-MCNC: 9.2 MG/DL (ref 8.8–10.4)
CHLORIDE SERPL-SCNC: 101 MMOL/L (ref 98–107)
CREAT SERPL-MCNC: 1.3 MG/DL (ref 0.67–1.17)
EGFRCR SERPLBLD CKD-EPI 2021: 50 ML/MIN/1.73M2
GLUCOSE SERPL-MCNC: 86 MG/DL (ref 70–99)
HCO3 SERPL-SCNC: 31 MMOL/L (ref 22–29)
HOLD SPECIMEN: NORMAL
INR PPP: 2.27 (ref 0.85–1.15)
LVEF ECHO: NORMAL
MAGNESIUM SERPL-MCNC: 2.1 MG/DL (ref 1.7–2.3)
POTASSIUM SERPL-SCNC: 4 MMOL/L (ref 3.4–5.3)
PROTHROMBIN TIME: 24.4 SECONDS (ref 11.8–14.8)
SODIUM SERPL-SCNC: 140 MMOL/L (ref 135–145)
VIT B12 SERPL-MCNC: 360 PG/ML (ref 232–1245)

## 2025-07-24 PROCEDURE — 999N000111 HC STATISTIC OT IP EVAL DEFER

## 2025-07-24 PROCEDURE — 120N000001 HC R&B MED SURG/OB

## 2025-07-24 PROCEDURE — 83735 ASSAY OF MAGNESIUM: CPT | Performed by: INTERNAL MEDICINE

## 2025-07-24 PROCEDURE — 255N000002 HC RX 255 OP 636: Performed by: INTERNAL MEDICINE

## 2025-07-24 PROCEDURE — 250N000011 HC RX IP 250 OP 636: Performed by: INTERNAL MEDICINE

## 2025-07-24 PROCEDURE — 93306 TTE W/DOPPLER COMPLETE: CPT | Mod: 26 | Performed by: INTERNAL MEDICINE

## 2025-07-24 PROCEDURE — 250N000013 HC RX MED GY IP 250 OP 250 PS 637: Performed by: INTERNAL MEDICINE

## 2025-07-24 PROCEDURE — 80048 BASIC METABOLIC PNL TOTAL CA: CPT | Performed by: INTERNAL MEDICINE

## 2025-07-24 PROCEDURE — 36415 COLL VENOUS BLD VENIPUNCTURE: CPT | Performed by: INTERNAL MEDICINE

## 2025-07-24 PROCEDURE — G0463 HOSPITAL OUTPT CLINIC VISIT: HCPCS

## 2025-07-24 PROCEDURE — 85610 PROTHROMBIN TIME: CPT | Performed by: INTERNAL MEDICINE

## 2025-07-24 PROCEDURE — 99233 SBSQ HOSP IP/OBS HIGH 50: CPT | Performed by: INTERNAL MEDICINE

## 2025-07-24 PROCEDURE — 999N000208 ECHOCARDIOGRAM COMPLETE

## 2025-07-24 RX ORDER — WARFARIN SODIUM 2 MG/1
2 TABLET ORAL
Status: COMPLETED | OUTPATIENT
Start: 2025-07-24 | End: 2025-07-24

## 2025-07-24 RX ORDER — FUROSEMIDE 40 MG/1
40 TABLET ORAL
Status: DISCONTINUED | OUTPATIENT
Start: 2025-07-25 | End: 2025-07-27

## 2025-07-24 RX ORDER — MULTIVITAMIN WITH IRON
1000 TABLET ORAL DAILY
Status: DISCONTINUED | OUTPATIENT
Start: 2025-07-24 | End: 2025-08-04 | Stop reason: HOSPADM

## 2025-07-24 RX ORDER — MINERAL OIL/HYDROPHIL PETROLAT
OINTMENT (GRAM) TOPICAL EVERY 8 HOURS
Status: DISCONTINUED | OUTPATIENT
Start: 2025-07-25 | End: 2025-08-04 | Stop reason: HOSPADM

## 2025-07-24 RX ORDER — FUROSEMIDE 10 MG/ML
40 INJECTION INTRAMUSCULAR; INTRAVENOUS EVERY 6 HOURS
Status: COMPLETED | OUTPATIENT
Start: 2025-07-24 | End: 2025-07-25

## 2025-07-24 RX ORDER — GINSENG 100 MG
CAPSULE ORAL EVERY 8 HOURS
Status: COMPLETED | OUTPATIENT
Start: 2025-07-24 | End: 2025-07-25

## 2025-07-24 RX ORDER — FUROSEMIDE 10 MG/ML
40 INJECTION INTRAMUSCULAR; INTRAVENOUS EVERY 6 HOURS
Status: DISCONTINUED | OUTPATIENT
Start: 2025-07-24 | End: 2025-07-24

## 2025-07-24 RX ADMIN — LORATADINE 10 MG: 10 TABLET ORAL at 09:17

## 2025-07-24 RX ADMIN — TAMSULOSIN HYDROCHLORIDE 0.4 MG: 0.4 CAPSULE ORAL at 09:17

## 2025-07-24 RX ADMIN — AMLODIPINE BESYLATE 5 MG: 5 TABLET ORAL at 09:16

## 2025-07-24 RX ADMIN — CYANOCOBALAMIN TAB 500 MCG 1000 MCG: 500 TAB at 14:03

## 2025-07-24 RX ADMIN — WARFARIN SODIUM 2 MG: 2 TABLET ORAL at 17:18

## 2025-07-24 RX ADMIN — FUROSEMIDE 40 MG: 10 INJECTION, SOLUTION INTRAMUSCULAR; INTRAVENOUS at 14:03

## 2025-07-24 RX ADMIN — FUROSEMIDE 40 MG: 10 INJECTION, SOLUTION INTRAMUSCULAR; INTRAVENOUS at 20:16

## 2025-07-24 RX ADMIN — FUROSEMIDE 40 MG: 10 INJECTION, SOLUTION INTRAMUSCULAR; INTRAVENOUS at 09:16

## 2025-07-24 RX ADMIN — ACETAMINOPHEN 650 MG: 325 TABLET ORAL at 09:27

## 2025-07-24 RX ADMIN — HUMAN ALBUMIN MICROSPHERES AND PERFLUTREN 2 ML (DILUTED): 10; .22 INJECTION, SOLUTION INTRAVENOUS at 11:27

## 2025-07-24 RX ADMIN — ACETAMINOPHEN 650 MG: 325 TABLET ORAL at 21:19

## 2025-07-24 RX ADMIN — HYDROCORTISONE: 1 CREAM TOPICAL at 21:13

## 2025-07-24 RX ADMIN — SIMVASTATIN 40 MG: 40 TABLET, FILM COATED ORAL at 20:16

## 2025-07-24 RX ADMIN — METOPROLOL SUCCINATE 50 MG: 50 TABLET, EXTENDED RELEASE ORAL at 09:17

## 2025-07-24 RX ADMIN — GABAPENTIN 200 MG: 100 CAPSULE ORAL at 09:17

## 2025-07-24 RX ADMIN — GABAPENTIN 200 MG: 100 CAPSULE ORAL at 20:16

## 2025-07-24 RX ADMIN — HYDROCORTISONE: 1 CREAM TOPICAL at 09:27

## 2025-07-24 ASSESSMENT — ACTIVITIES OF DAILY LIVING (ADL)
ADLS_ACUITY_SCORE: 72
ADLS_ACUITY_SCORE: 68
ADLS_ACUITY_SCORE: 71
ADLS_ACUITY_SCORE: 72
ADLS_ACUITY_SCORE: 71
ADLS_ACUITY_SCORE: 72
ADLS_ACUITY_SCORE: 72
ADLS_ACUITY_SCORE: 71
ADLS_ACUITY_SCORE: 72
ADLS_ACUITY_SCORE: 71
ADLS_ACUITY_SCORE: 72
ADLS_ACUITY_SCORE: 72
ADLS_ACUITY_SCORE: 68
ADLS_ACUITY_SCORE: 71
ADLS_ACUITY_SCORE: 76
ADLS_ACUITY_SCORE: 71
ADLS_ACUITY_SCORE: 72
ADLS_ACUITY_SCORE: 71
ADLS_ACUITY_SCORE: 71
ADLS_ACUITY_SCORE: 76
ADLS_ACUITY_SCORE: 72
ADLS_ACUITY_SCORE: 72
ADLS_ACUITY_SCORE: 71

## 2025-07-24 NOTE — PLAN OF CARE
Occupational Therapy: Orders received. Chart reviewed and discussed with care team.? Occupational Therapy order per CHF order set- pt currently dependent (lift) transfers and receives assistance with all cares at LTC facility. EC techniques/education not appropriate due to pt's limited activity engagement without assistance. Medications and diet are being managed by living facility. Pt has received CHF education in the past to support needs. OT plan to defer order at this time. Pt plans to return to LTC once medically stable. Will complete orders.       Thank you for the referral.   ANTOINETTE Parnell/L   [facility]  Email: [email or web address]  Phone: [phone]

## 2025-07-24 NOTE — PLAN OF CARE
Problem: Adult Inpatient Plan of Care  Goal: Plan of Care Review  Description: The Plan of Care Review/Shift note should be completed every shift.  The Outcome Evaluation is a brief statement about your assessment that the patient is improving, declining, or no change.  This information will be displayed automatically on your shift  note.  Outcome: Progressing  Flowsheets (Taken 7/23/2025 1900)  Outcome Evaluation: Pt. admitted this afternoon. A&Ox4. [place]. VSS on 1 L NC. While patient sleeping sats dropped in the 80's and patient breathing through mouth. Woke patient and sats recovered into the 90's. Using pocket talker but noting that patient doing better with verbal and looking at patient. SL. Warfarin given. Baseline lift. Edema generalized from toes to abd and back mild to moderate. Denies pain. Sleeping on and off between cares.  Plan of Care Reviewed With: patient  Overall Patient Progress: no change

## 2025-07-24 NOTE — PROGRESS NOTES
[facility]    PROGRESS NOTE - Hospitalist Service    Assessment and Plan    Principal Problem:    Acute on chronic diastolic CHF  Active Problems:    Chronic atrial fib -- on Warfarin    Benign prostatic hyperplasia, unspecified whether lower urinary tract symptoms present    Chronic kidney disease, stage 3a (H)    Thrombocytopenia    History of CVA (cerebrovascular accident)    Anemia, unspecified type    Medication induced coagulopathy    Sensorineural hearing loss    COPD    Acute respiratory failure with hypoxia (H)    Prediabetes -- Hgb A1C 5.9 on 5/12/25    Stasis dermatitis    Melvin Abad is a 89 year old male with CHF, COPD, prediabetes, [place] who had pneumonia 2-3 months ago, who presents for evaluation of increasing edema with associated SOB.  His normal weight is around  206 lbs and currently he is up to 220 lbs.  He comes from [facility].  He recently by report had his Lasix increased from 40 to 60 mg a day.  Also recently started Bumex.  Oxygen saturations have been marginal.  He has a cough but no sputum production, and no fever or chills.         Acute on chronic diastolic CHF  - CHF protocol I/os and daily weights, dry wt about 206 lbs  - IV lasix 40mg q6 through this evening then start Lasix 40mg BID tomorrow   - trend renal function and lytes  - ECHO completed today  The left ventricle is normal in size.  There is mild concentric left ventricular hypertrophy. Left ventricular systolic function is normal. The visual ejection fraction is  55-60%. No regional wall motion abnormalities noted.  The aortic valve is trileaflet with aortic valve sclerosis. There is mild (1+)  aortic regurgitation. Moderately decreased right ventricular systolic function There is moderate (2+) tricuspid regurgitation. Right ventricular systolic pressure is elevated, consistent with moderate  pulmonary hypertension. Ascending aorta dilatation is present, 4.4 cm   Compared to the previous  "study, the TR and RVSP have increased.  - will need CHF clinic follow up   - continue metoprolol   - BLE edema persists with venous stasis changes will have WOC see patient   - Telemetry       Acute respiratory failure with hypoxia (H)  - diuresing   - wean as able      Chronic atrial fib -- on Warfarin  Medication induced coagulopathy   -- Pharmacy to dose warfarin, will aim for INR 2.0 to 2.5 given   -- cont Metoprolol XL 50 mg daily      Chronic obstructive pulmonary disease   -- continue albuterol inhaler prn      Thrombocytopenia -- 105K, no change, chronically low  - trend      CKD3a  - Renal dose meds and avoid nephrotoxic agents   - Trend renal function     Anemia, chronic -- hgb 11.9, with baseline hgb 10-12  - B12 low normal 360 will start b12      Sensorineural hearing loss   - baseline    Prediabetes -- Hgb A1C 5.9 on 5/12/25     Stasis dermatitis  -- topical hydrocortisone cream 1% bid      BPH   -- continue Flomax          Clinically Significant Risk Factors Present on Admission                          # Obesity: Estimated body mass index is 32.07 kg/m  as calculated from the following:    Height as of this encounter: 1.753 m (5' 9\").    Weight as of this encounter: 98.5 kg (217 lb 2.5 oz).         # Financial/Environmental Concerns:             COVID-19 PCR Results          10/15/2024    06:17   COVID-19 PCR Results   SARS CoV2 PCR Positive      COVID-19 Antibody Results, Testing for Immunity           No data to display               Code Status: No CPR- Do NOT Intubate  VTE prophylaxis:  Warfarin  DIET: Orders Placed This Encounter      Combination Diet Low Saturated Fat Na <2400mg Diet, No Caffeine Diet    Drains/Lines: Patient has No line.   [place]ll[place] Catheter: Not present    Weight bearing status: WBAT, patient is non-ambulatory at baseline     Expected Discharge Date: 07/26/2025    Discharge Delays: IV Medication - consider oral or Home Infusion  Lab Result Pending (enter specific test & time in " comments)  Destination: inpatient rehabilitation facility  Discharge Comments: Likely in 2 days pendign CHF compensation. giveing IV diuretics ovenright and then resumption of oral diuretics in am with dose adjustment and monitor. plan for return to facility Saturday    Medically Ready for Discharge: Anticipated in 2-4 Days      Subjective:  Legs still swollen but feels improving and complains of abd fullness from swelling    PHYSICAL EXAM  Temp:  [97  F (36.1  C)-97.9  F (36.6  C)] 97  F (36.1  C)  Pulse:  [61-76] 67  Resp:  [18] 18  BP: (110-158)/(59-82) 128/63  SpO2:  [89 %-97 %] 95 %  Wt Readings from Last 1 Encounters:   25 98.5 kg (217 lb 2.5 oz)       Intake/Output Summary (Last 24 hours) at 2025 0801  Last data filed at 2025 2130  Gross per 24 hour   Intake 480 ml   Output 800 ml   Net -320 ml      Body mass index is 32.07 kg/m .    Physical Exam  Vitals and nursing note reviewed.       PERTINENT LABS/IMAGING:      Imaging results reviewed over the past 24 hrs:   Recent Results (from the past 24 hours)   Echocardiogram Complete   Result Value    LVEF  55-60%    Narrative    [id number]  [id number]  [id number]  [id number]^NADIA^JAGDISH^R     [facility]  Echocardiography Laboratory  [address] [place] [address]     Name: WIL COHEN  MRN: [id number]  : 1928  Study Date: 2025 10:55 AM  Age: 89 yrs  Gender: Male  Patient Location: [facility]  Reason For Study: CHF  History: HTN, Hyperlipidemia, AFIB, CKD, CVA, COPD  Ordering Physician: JAGDISH ZUNIGA  Performed By: Sofiya Mueller     BSA: 2.1 m2  Height: 69 in  Weight: 217 lb  HR: 66  BP: 128/63 mmHg  ______________________________________________________________________________  Procedure  Echocardiogram with two-dimensional, color and spectral Doppler. Optison (NDC  #[id number]) given intravenously.  ______________________________________________________________________________  Interpretation  Summary     The left ventricle is normal in size.  There is mild concentric left ventricular hypertrophy.  Left ventricular systolic function is normal. The visual ejection fraction is  55-60%.  No regional wall motion abnormalities noted.  The aortic valve is trileaflet with aortic valve sclerosis. There is mild (1+)  aortic regurgitation.  Moderately decreased right ventricular systolic function  There is moderate (2+) tricuspid regurgitation.  Right ventricular systolic pressure is elevated, consistent with moderate  pulmonary hypertension.  Ascending aorta dilatation is present, 4.4 cm     Compared to the previous study, the TR and RVSP have increased.  ______________________________________________________________________________  Left Ventricle  The left ventricle is normal in size. There is mild concentric left  ventricular hypertrophy. Left ventricular systolic function is normal.  Diastolic Doppler findings (E/E' ratio and/or other parameters) suggest left  ventricular filling pressures are indeterminate. The visual ejection fraction  is 55-60%. No regional wall motion abnormalities noted.     Right Ventricle  The right ventricle is mildly dilated. Moderately decreased right ventricular  systolic function.     Atria  There is severe biatrial enlargement. There is no atrial shunt seen.     Mitral Valve  The mitral valve is normal in structure and function. There is trace mitral  regurgitation.     Tricuspid Valve  There is moderate (2+) tricuspid regurgitation. The right ventricular systolic  pressure is approximated at 26.9 mmHg plus the right atrial pressure. IVC  diameter >2.1 cm collapsing <50% with sniff suggests a high RA pressure  estimated at 15 mmHg or greater. Right ventricular systolic pressure is  elevated, consistent with moderate pulmonary hypertension.     Aortic Valve  The aortic valve is trileaflet with aortic valve sclerosis. There is mild (1+)  aortic regurgitation. No hemodynamically  significant valvular aortic stenosis.     Pulmonic Valve  The pulmonic valve is not well seen, but is grossly normal. There is trace  pulmonic valvular regurgitation.     Vessels  Ascending aorta dilatation is present. 4.4 cm.     Pericardium  There is no pericardial effusion. Small left pleural effusion.     ______________________________________________________________________________  MMode/2D Measurements & Calculations  IVSd: 1.3 cm  LVIDd: 4.4 cm  LVIDs: 3.5 cm  LVPWd: 1.2 cm  FS: 21.1 %     LV mass(C)d: 196.6 grams  LV mass(C)dI: 91.9 grams/m2  Ao root diam: 3.6 cm  LA dimension: 5.9 cm  asc Aorta Diam: 4.4 cm  LA/Ao: 1.6  LVOT diam: 2.3 cm  LVOT area: 4.0 cm2  Ao root diam index Ht(cm/m): 2.1  Ao root diam index BSA (cm/m2): 1.7  Asc Ao diam index BSA (cm/m2): 2.1  Asc Ao diam index Ht(cm/m): 2.5  LA Volume (BP): 120.0 ml  LA Volume Index (BP): 56.1 ml/m2     RV Base: 5.5 cm  RWT: 0.54  TAPSE: 1.9 cm     Doppler Measurements & Calculations  MV E max jan: 99.0 cm/sec  MV dec slope: 532.4 cm/sec2  MV dec time: 0.19 sec  Ao V2 max: 198.9 cm/sec  Ao max P.0 mmHg  Ao V2 mean: 134.8 cm/sec  Ao mean P.4 mmHg  Ao V2 VTI: 43.8 cm  MECHELLE(I,D): 2.0 cm2  MECHELLE(V,D): 1.9 cm2  AI P1/2t: 591.6 msec  LV V1 max PG: 3.6 mmHg  LV V1 max: 95.1 cm/sec  LV V1 VTI: 21.3 cm  SV(LVOT): 85.9 ml  SI(LVOT): 40.2 ml/m2  PA acc time: 0.10 sec  TR max jan: 259.3 cm/sec  TR max P.9 mmHg  AV Jan Ratio (DI): 0.48  MECHELLE Index (cm2/m2): 0.92  E/E' av.6     Lateral E/e': 10.6  Medial E/e': 16.5  RV S Jan: 10.4 cm/sec     ______________________________________________________________________________  Report approved by: Giorgio Michaud MD on 2025 12:22 PM           Most Recent 3 CBC's:  Recent Labs   Lab Test 25  1220 25  0735 25  0801   WBC 5.4 5.2 6.0   HGB 11.9* 11.9* 13.1*   MCV 97 97 99   * 103* 106*     Most Recent 3 BMP's:  Recent Labs   Lab Test 25  0530 25  1220 25  0735   NA  140 139 141   POTASSIUM 4.0 4.3 3.9   CHLORIDE 101 101 102   CO2 31* 31* 34*   BUN 25.5* 27.7* 26.8*   CR 1.30* 1.34* 1.21*   ANIONGAP 8 7 5*   DARIUS 9.2 9.3 9.8   GLC 86 144* 71     Most Recent 2 LFT's:  Recent Labs   Lab Test 07/23/25  1220 05/01/25  1242   AST 20 19   ALT 9 12   ALKPHOS 143 136   BILITOTAL 1.3* 1.0     Most Recent 3 Troponin's:  Recent Labs   Lab Test 09/28/20  1043 12/20/19  0903 12/18/19  0934   TROPI 0.031 0.031 0.015     Most Recent 3 BNP's:  Recent Labs   Lab Test 07/23/25  1220 05/01/25  1242 09/28/20  1043   NTBNPI  --  3,003* 1,622   NTBNP 2,733*  --   --      Most Recent 6 glucoses:  Recent Labs   Lab Test 07/24/25  0530 07/23/25  1220 07/18/25  0735 06/06/25  0801 05/12/25  0741 05/01/25  1242   GLC 86 144* 71 88 91 114*     Most Recent Urinalysis:  Recent Labs   Lab Test 06/09/25  1818 02/05/25  1235   COLOR Yellow Light Yellow   APPEARANCE Clear Clear   URINEGLC Negative Negative   URINEBILI Negative Negative   URINEKETONE Negative Negative   SG 1.013 1.012   UBLD Negative Negative   URINEPH 6.5 7.0   PROTEIN Negative Negative   NITRITE Negative Negative   LEUKEST Negative Negative   RBCU  --  <1   WBCU  --  1         50 MINUTES SPENT BY ME on the date of service doing chart review, history, exam, documentation, discussion with nursing staff and specialist, & further activities per the note.  Elisha Guerrero MD  [facility] Medicine Service

## 2025-07-24 NOTE — PHARMACY-ANTICOAGULATION SERVICE
Clinical Pharmacy - Warfarin Dosing Consult     Pharmacy has been consulted to manage this patient s warfarin therapy.  Indication: Atrial Fibrillation  Therapy Goal: INR 2-2.5  Warfarin Prior to Admission: Yes  Warfarin PTA Regimen: 2 mg daily    INR   Date Value Ref Range Status   07/24/2025 2.27 (H) 0.85 - 1.15 Final   07/23/2025 1.94 (H) 0.85 - 1.15 Final       Recommend warfarin 2 mg today.  Pharmacy will monitor Melvin Teagueon daily and order warfarin doses to achieve specified goal.      Please contact pharmacy as soon as possible if the warfarin needs to be held for a procedure or if the warfarin goals change.

## 2025-07-24 NOTE — CONSULTS
Reason For Visit: Melvin Abad, 89 year old male, seen for a [facility] consultation to evaluate and treat bilateral LE venous stasis changes. Patient was admitted on 7/23/25 for increasing edema and SOB.  Pt's off going RN Cortez is present during most of the visit today with patient in the room.  Patient is A&Ox4, pleasant upon interaction.     Start of Care in [facility] Wound and Ostomy Services: 7/24/2025   Referring Provider: Dr. MEHDI Guerrero MD dated 7/24  Primary Care Provider: Apolonia Bedolla   Wound Location: Bilateral lower legs, no open wounds but venous stasis changes noted.     Wound History:   Pt who presented to the ED 7/23.  Per admission H&P - 89 year old male with hx of CHF, COPD, prediabetes, [place] who had pneumonia 2-3 months ago, who presents for evaluation of increasing edema with associated SOB.  His normal weight is around  206 lbs and currently he is up to 220 lbs.  He comes from [facility].  He recently by report had his Lasix increased from 40 to 60 mg a day.  Also recently started Bumex.  Oxygen saturations have been marginal.  He has a cough but no sputum production, and no fever or chills.   In ER, O2 sat 89% on room air, and 96% on 2 LPM nasal canula, BNP 2733 (nl up to 852), Creat 1.34 (was 1.01 on 2/7/25), Hgb 11.9 and plt's 105 K and Bili total elevated at 1.3. CXR with pulm vascular congestion.  EKG with atrial fib, rate 66, LAD with RBBB and PRWP.  He has history of LE venous status and edema.  Per the pt today 7/24 at my initial [facility] nurse assessment he reports he has worn some type of compression socks in the past, per family present they state most of these stocking do not have any significant squeeze and were purchased at [facility].   Pt also reports today that his heels are very sore today.  His nurse reports no wounds noted to the heels but some blanchable erythema and that the pt has restless leg syndrome and tends to rub his heels on the lines of  his mattress, trying to keep elevated/floated off bed surface.     Past Medical History:  Patient Active Problem List   Diagnosis    Long term current use of anticoagulant therapy    Hyperlipidemia LDL goal <100    Diverticulosis    Essential hypertension    History of skin cancer    AK (actinic keratosis)    Gilbert syndrome    Carpal tunnel syndrome of right wrist    Anemia due to blood loss, acute    DVT prophylaxis    Obesity (BMI 30-39.9)    Long-term (current) use of anticoagulants [Z79.01]    Longstanding persistent atrial fibrillation (H)    Anticoagulation goal of INR 2 to 3    Slow transit constipation    Sleep disturbances    Right pontine stroke (H)    Dry eyes    Restless legs syndrome (RLS)    Osteoarthritis of glenohumeral joint, left    Chronic atrial fib -- on Warfarin    Weakness    Arthritis pain    Benign prostatic hyperplasia, unspecified whether lower urinary tract symptoms present    Chronic kidney disease, stage 3a (H)    Weakness generalized    Thrombocytopenia    Anemia - multifactorial (history of acute blood loss, anemia of chronic disease, etc)    Venous stasis of both lower extremities    History of CVA (cerebrovascular accident)    Anemia, unspecified type    Psoas hematoma, right, secondary to anticoagulant therapy    Influenza A    Supratherapeutic INR    Nontraumatic intramuscular hematoma    Thrombocytopenia, unspecified    Essential hypertension, benign    Personal history of tobacco use, presenting hazards to health    Medication induced coagulopathy    Pain in unspecified joint    Overweight    Nasal congestion    Localized edema    Hyperglycemia    Dry eye syndrome of bilateral lacrimal glands    Cough, unspecified    Allergic rhinitis, unspecified    Hereditary and idiopathic neuropathy, unspecified    Muscle weakness (generalized)    Personal history of nicotine dependence    Wheezing    Vitamin D deficiency, unspecified    Unspecified disorder of eye and adnexa    Subjective  tinnitus    Sensorineural hearing loss    Hearing loss    Retention of urine, unspecified    Other specified disorders of nose and nasal sinuses    Pain, unspecified    Benign prostatic hyperplasia without lower urinary tract symptoms    Persistent atrial fibrillation (H)    COPD    Chronic right heart failure (H)    Essential (primary) hypertension    Personal history of transient ischemic attack (TIA), and cerebral infarction without residual deficits    Hyperlipidemia    Influenza due to other identified influenza virus with other respiratory manifestations    Nontraumatic hematoma of soft tissue    Restless legs syndrome    Cerebrovascular accident of right pontine structure (H)    Constipation, unspecified    Constipation    Type 2 diabetes mellitus (H)    Acute respiratory failure with hypoxia (H)    Acute on chronic diastolic CHF    Prediabetes -- Hgb A1C 5.9 on 5/12/25    Stasis dermatitis                 Tobacco Use:     Tobacco Use      Smoking status: Former        Types: Cigarettes      Smokeless tobacco: Never      Tobacco comments: 5 years    Diabetic: type 2  HgbA1C:   Hemoglobin A1C   Date Value Ref Range Status   05/12/2025 5.9 (H) <5.7 % Final     Comment:     Normal <5.7%   Prediabetes 5.7-6.4%    Diabetes 6.5% or higher     Note: Adopted from ADA consensus guidelines.   04/10/2019 6.1 (H) 0 - 5.6 % Final     Comment:     Normal <5.7% Prediabetes 5.7-6.4%  Diabetes 6.5% or higher - adopted from ADA   consensus guidelines.     Checks Blood Glucose?:  See Hospital flow sheet for values while hospitalized.     Personal/social history:  Pt at time of admission is a resident at the [facility].    Objective:   Current treatment plan:   Bilateral lower legs - NA open to air, no compression and no bandaging.  Bilateral heels - RN applied Mepilex 4x4 inch gentle adhesive foam dressings this am.  Last changed: Heel applied this am.    Wound #1 Right and Left lower legs.  Note today 7/24  pt has one flaccid intact serous blister to the left proximal pretibial lower leg and no other wounds, see Assessment for details  Stage/tissue depth: partial thickness   - 0.5 cm L x 0.5 cm W x 0 cm D  Tunneling: none  Undermining: none  Wound bed type/amount: flaccid serous blister; Not fluctuant  Wound Edges: NA no open wound  Periwound: hemosiderin staining pink to deeper red pigmentation, rcuz intact skin, thin waxy hyperkeratotic tissue   Drainage: none  Odor: no  Pain: heels are sore bilaterally, see below for other areas to monitor for details.    Photo's from today's initial inpatient woc nurse consult visit 7/24/25.    Dorsalis Pedal Pulse: is palpable: NA doppler: NA phasic  Posterior Tibial Pulse: unable to palpate: NA doppler: NAphasic  Hair growth: none noted knee's distally  Capillary Refill: 3 seconds  Feet/toes color: hemosiderin staining and hyperpigmented  Nails: some are dystrophic  R Leg: Edema plus 2 pitting in the foot, plus 1 and nonpitting firm woody edema in the bilateral lower legs. Ankle circumference 33.5 cm. Calf circumference 34.5 cm.  L Leg: Edema plus 2 pitting in the foot, plus 1 and nonpitting firm woody edema in the bilateral lower legs. Ankle circumference 35.5 cm. Calf circumference 37 cm.    Mobility: limited  Current offloading/footwear: none currently  Sensation: not assessed this visit.    Other callusing/areas of concern: Bilateral heels, no wounds, small amounts of scattered blanchable erythema on fragile tissue with pitting edema.  Pt reports they are both sore to touch, left greater than the right and most notable on the lateral left heel.    Photo's from today's initial inpatient woc nurse consult visit 7/24/25.  L - R right heel, left heel.    Diet: Combination Diet Low Saturated Fat Na <2400mg Diet, No Caffeine    Assessment:  Pt has no open wounds to the lower legs.  One flaccid serous blister is present with no drainage located on the left proximal pretibial lower  leg. Bilateral lower legs pt has what appears to be chronic venous stasis changes, minimal venous dermatitis as manifest by thin scattered hyperkeratotic tissue, hemosiderin staining and localized edema with the erythema and hemosiderin staining.    The heels were noted to be sore with touch, small amount of blanchable erythema present in light of LE edema with pitting, making them very fragile with lesser tissue tolerance to pressure and friction.     Barriers to wound healing:   Poor nutrition: inadequate supply of protein, carbohydrates, fatty acids, and trace elements essential for all phases of wound healing.  Per pt appetite is poor  Reduced Blood Supply: inadequate perfusion to heal wound, risk for with heels due to fragility, interventions recommend to elevate/float above the bed surface on pillows.  Medication: NA  Chemotherapy: suppresses the immune system and inflammatory response, NA  Radiotherapy: increases production of free radical which damage cells, NA  Psychological stress: none noted  Obesity: NA  Infection: prolongs inflammatory phase, uses vital nutrients, impairs epithelialization and releases toxins, none noted in the lower legs or feet today  Underlying Disease: diabetes mellitis, LE chronic edema with venous stasis changes, no current open wounds  Maceration: reduces wound tensile strength and inhibits epithelial migration, none noted, low current risk  Patient compliance, TBD  Unrelieved pressure,interventions recommend to elevate/float above the bed surface on pillows.  Immobility, limited but not immobile  Substance abuse: NA    Plan:  Cares provided today and recommend as ongoing cares while hospitalized.  Lower legs and feet edema and intact venous stasis skin changes:  No dressings or ointments needed at this time.  I will look into the options we have in clinic for Flexigrip compression and or compression socks.  Aside from for DVT preventions I would not recommend use of ANDREA hose to  reduce compression nor ace wraps currently, as may cause wounds in attempt to reduce edema.  Bilateral heels: soreness and fragile with blanchable erythema no wounds.    - Recommend to apply Mepilex Gentle adhesive foam 4x4 for protection from friction and shearing, change once daily.  - Recommend to keep bilateral heels elevated/floated above the bed surface on pillows.    Interventions to Barriers:  As above    Offloading:  As above for heels.  Additional recommendations:  If the pt seem too restless to keep heels elevated as listed above would consider use of Prevalon heel protection boots or Heel-franck's     Discussed plan of care with patient and on comming RN Eva. Teaching done with patient and his on coming RN Eva for skin and heel protection cares; Pt is unable to do self cares, needs assist and reminders to reposition and keep heels elevated.    Discharge instructions updated to include:  Above recommendations    Supplies: Hosptial stock    WOC Return visit: TBD no follow up scheduled.  Nursing to notify Provider and WOC Nurse if wound deteriorates.    Verbal, written, & demonstrative education provided.  Face to face time (excluding procedure): approximately 25 minutes.  Procedure: NA   Care plan was not changed but confirmed for the heels.    Rasta Payne RN cwocn  [phone]

## 2025-07-24 NOTE — PLAN OF CARE
Goal Outcome Evaluation:      Plan of Care Reviewed With: patient    Overall Patient Progress: no changeOverall Patient Progress: no change    Outcome Evaluation: A&Ox4, VSS on 1-2L NC. External cath in place with adequate output. [place], pocket talker in room. PRN tylenol given for pain at HS. Edema in LE/groin causing discomfort. Pillows in use. Lift transfer at baseline.     Temp: 97.8  F (36.6  C) Temp src: Oral BP: 110/59 Pulse: 64   Resp: 18 SpO2: 94 % O2 Device: Nasal cannula Oxygen Delivery: 2 LPM

## 2025-07-24 NOTE — PLAN OF CARE
Problem: Adult Inpatient Plan of Care  Goal: Plan of Care Review  Description: The Plan of Care Review/Shift note should be completed every shift.  The Outcome Evaluation is a brief statement about your assessment that the patient is improving, declining, or no change.  This information will be displayed automatically on your shift  note.  Outcome: Progressing  Flowsheets (Taken 7/24/2025 6085)  Outcome Evaluation: Pt. A&Ox4. VSS on RA throughout most of shift. 1-2 liters when sleeping. External cath removed and patient using urinal. Call light appropriate. PRN tylenol x1 for pain this am. LS course with wheezing and some cleared with coughing. Up to chair for lunch and breakfast with ceiling lift and assist of 2. WOC to see patient this afternoon. Son abd granddaughter visiting this AM. IV lasix with good output. Tolerating diet. Generalized edema noted less today than yesterday on admission except for his penis.  Overall Patient Progress: improving

## 2025-07-25 LAB
ANION GAP SERPL CALCULATED.3IONS-SCNC: 9 MMOL/L (ref 7–15)
BUN SERPL-MCNC: 25.1 MG/DL (ref 8–23)
CALCIUM SERPL-MCNC: 9.4 MG/DL (ref 8.8–10.4)
CHLORIDE SERPL-SCNC: 99 MMOL/L (ref 98–107)
CREAT SERPL-MCNC: 1.23 MG/DL (ref 0.67–1.17)
EGFRCR SERPLBLD CKD-EPI 2021: 53 ML/MIN/1.73M2
ERYTHROCYTE [DISTWIDTH] IN BLOOD BY AUTOMATED COUNT: 16 % (ref 10–15)
GLUCOSE SERPL-MCNC: 80 MG/DL (ref 70–99)
HCO3 SERPL-SCNC: 33 MMOL/L (ref 22–29)
HCT VFR BLD AUTO: 35.6 % (ref 40–53)
HGB BLD-MCNC: 11.6 G/DL (ref 13.3–17.7)
INR PPP: 2.29 (ref 0.85–1.15)
MAGNESIUM SERPL-MCNC: 2.1 MG/DL (ref 1.7–2.3)
MCH RBC QN AUTO: 31.6 PG (ref 26.5–33)
MCHC RBC AUTO-ENTMCNC: 32.6 G/DL (ref 31.5–36.5)
MCV RBC AUTO: 97 FL (ref 78–100)
PLATELET # BLD AUTO: 100 10E3/UL (ref 150–450)
POTASSIUM SERPL-SCNC: 3.9 MMOL/L (ref 3.4–5.3)
PROTHROMBIN TIME: 24.6 SECONDS (ref 11.8–14.8)
RBC # BLD AUTO: 3.67 10E6/UL (ref 4.4–5.9)
SODIUM SERPL-SCNC: 141 MMOL/L (ref 135–145)
WBC # BLD AUTO: 4.8 10E3/UL (ref 4–11)

## 2025-07-25 PROCEDURE — 99233 SBSQ HOSP IP/OBS HIGH 50: CPT | Performed by: INTERNAL MEDICINE

## 2025-07-25 PROCEDURE — 250N000009 HC RX 250: Performed by: INTERNAL MEDICINE

## 2025-07-25 PROCEDURE — 120N000001 HC R&B MED SURG/OB

## 2025-07-25 PROCEDURE — 80048 BASIC METABOLIC PNL TOTAL CA: CPT | Performed by: INTERNAL MEDICINE

## 2025-07-25 PROCEDURE — 250N000011 HC RX IP 250 OP 636: Performed by: INTERNAL MEDICINE

## 2025-07-25 PROCEDURE — 36415 COLL VENOUS BLD VENIPUNCTURE: CPT | Performed by: INTERNAL MEDICINE

## 2025-07-25 PROCEDURE — 250N000013 HC RX MED GY IP 250 OP 250 PS 637: Performed by: INTERNAL MEDICINE

## 2025-07-25 PROCEDURE — 83735 ASSAY OF MAGNESIUM: CPT | Performed by: INTERNAL MEDICINE

## 2025-07-25 PROCEDURE — 85610 PROTHROMBIN TIME: CPT | Performed by: INTERNAL MEDICINE

## 2025-07-25 PROCEDURE — 85014 HEMATOCRIT: CPT | Performed by: INTERNAL MEDICINE

## 2025-07-25 RX ORDER — WARFARIN SODIUM 2 MG/1
2 TABLET ORAL
Status: COMPLETED | OUTPATIENT
Start: 2025-07-25 | End: 2025-07-25

## 2025-07-25 RX ORDER — BUMETANIDE 0.25 MG/ML
1 INJECTION, SOLUTION INTRAMUSCULAR; INTRAVENOUS EVERY 8 HOURS
Status: COMPLETED | OUTPATIENT
Start: 2025-07-25 | End: 2025-07-25

## 2025-07-25 RX ORDER — BUMETANIDE 0.25 MG/ML
0.5 INJECTION, SOLUTION INTRAMUSCULAR; INTRAVENOUS ONCE
Status: COMPLETED | OUTPATIENT
Start: 2025-07-25 | End: 2025-07-25

## 2025-07-25 RX ADMIN — TAMSULOSIN HYDROCHLORIDE 0.4 MG: 0.4 CAPSULE ORAL at 08:41

## 2025-07-25 RX ADMIN — SIMVASTATIN 40 MG: 40 TABLET, FILM COATED ORAL at 21:38

## 2025-07-25 RX ADMIN — WHITE PETROLATUM: 1.75 OINTMENT TOPICAL at 16:29

## 2025-07-25 RX ADMIN — WARFARIN SODIUM 2 MG: 2 TABLET ORAL at 18:16

## 2025-07-25 RX ADMIN — CYANOCOBALAMIN TAB 500 MCG 1000 MCG: 500 TAB at 08:35

## 2025-07-25 RX ADMIN — HYDROCORTISONE: 1 CREAM TOPICAL at 12:30

## 2025-07-25 RX ADMIN — ACETAMINOPHEN 650 MG: 325 TABLET ORAL at 21:37

## 2025-07-25 RX ADMIN — FUROSEMIDE 40 MG: 10 INJECTION, SOLUTION INTRAMUSCULAR; INTRAVENOUS at 02:42

## 2025-07-25 RX ADMIN — PSYLLIUM HUSK 1 PACKET: 3.4 POWDER ORAL at 08:31

## 2025-07-25 RX ADMIN — BUMETANIDE 1 MG: 0.25 INJECTION INTRAMUSCULAR; INTRAVENOUS at 13:54

## 2025-07-25 RX ADMIN — GABAPENTIN 200 MG: 100 CAPSULE ORAL at 08:36

## 2025-07-25 RX ADMIN — BUMETANIDE 1 MG: 0.25 INJECTION INTRAMUSCULAR; INTRAVENOUS at 21:34

## 2025-07-25 RX ADMIN — LORATADINE 10 MG: 10 TABLET ORAL at 08:35

## 2025-07-25 RX ADMIN — BUMETANIDE 0.5 MG: 0.25 INJECTION INTRAMUSCULAR; INTRAVENOUS at 08:32

## 2025-07-25 RX ADMIN — METOPROLOL SUCCINATE 50 MG: 50 TABLET, EXTENDED RELEASE ORAL at 08:36

## 2025-07-25 RX ADMIN — GABAPENTIN 200 MG: 100 CAPSULE ORAL at 21:37

## 2025-07-25 ASSESSMENT — ACTIVITIES OF DAILY LIVING (ADL)
ADLS_ACUITY_SCORE: 76
ADLS_ACUITY_SCORE: 76
ADLS_ACUITY_SCORE: 80
ADLS_ACUITY_SCORE: 78
ADLS_ACUITY_SCORE: 76
ADLS_ACUITY_SCORE: 78
ADLS_ACUITY_SCORE: 80
ADLS_ACUITY_SCORE: 80
ADLS_ACUITY_SCORE: 78
DEPENDENT_IADLS:: CLEANING;COOKING;LAUNDRY;SHOPPING;MEAL PREPARATION;MEDICATION MANAGEMENT;MONEY MANAGEMENT;TRANSPORTATION;INCONTINENCE
ADLS_ACUITY_SCORE: 78
ADLS_ACUITY_SCORE: 80
ADLS_ACUITY_SCORE: 76

## 2025-07-25 NOTE — CONSULTS
Care Management Initial Consult    General Information  Assessment completed with: Patient, Family, VM-chart review,    Type of CM/SW Visit: Initial Assessment    Primary Care Provider verified and updated as needed: Yes   Readmission within the last 30 days:        Reason for Consult: discharge planning  Advance Care Planning: Advance Care Planning Reviewed: present on chart        Communication Assessment  Patient's communication style: spoken language (English or Bilingual)    Hearing Difficulty or Deaf: yes   Wear Glasses or Blind: yes    Cognitive  Cognitive/Neuro/Behavioral: WDL  Level of Consciousness: alert  Arousal Level: opens eyes spontaneously  Orientation: oriented x 4  Mood/Behavior: calm, cooperative  Best Language: 0 - No aphasia  Speech: clear    Living Environment:   People in home: facility resident     Current living Arrangements: extended care facility  Name of Facility: [facility]   Able to return to prior arrangements: yes  Living Arrangement Comments: has bed hold at [facility]    Family/Social Support:  Care provided by: other (see comments) (staff)  Provides care for: no one, unable/limited ability to care for self  Marital Status:   Support system: Children, Facility resident(s)/Staff          Description of Support System: Supportive, Involved    Support Assessment: Adequate family and caregiver support    Current Resources:   Patient receiving home care services: No      Community Resources: None  Equipment currently used at home: lift device  Supplies currently used at home: Hearing Aid Batteries    Employment/Financial:  Employment Status: retired        Financial Concerns: none   Referral to Financial Worker: No     Does the patient's insurance plan have a 3 day qualifying hospital stay waiver?  No    Lifestyle & Psychosocial Needs:  Social Drivers of Health     Food Insecurity: Low Risk  (7/23/2025)    Food Insecurity     Within the past 12 months, did you worry that your  food would run out before you got money to buy more?: No     Within the past 12 months, did the food you bought just not last and you didn t have money to get more?: No   Depression: Not at risk (2/28/2024)    PHQ-2     PHQ-2 Score: 0   Housing Stability: Low Risk  (7/23/2025)    Housing Stability     Do you have housing? : Yes     Are you worried about losing your housing?: No   Tobacco Use: Medium Risk (7/23/2025)    Patient History     Smoking Tobacco Use: Former     Smokeless Tobacco Use: Never     Passive Exposure: Not on file   Financial Resource Strain: Low Risk  (7/23/2025)    Financial Resource Strain     Within the past 12 months, have you or your family members you live with been unable to get utilities (heat, electricity) when it was really needed?: No   Alcohol Use: Not on file   Transportation Needs: Low Risk  (7/23/2025)    Transportation Needs     Within the past 12 months, has lack of transportation kept you from medical appointments, getting your medicines, non-medical meetings or appointments, work, or from getting things that you need?: No   Physical Activity: Not on file   Interpersonal Safety: Low Risk  (7/23/2025)    Interpersonal Safety     Do you feel physically and emotionally safe where you currently live?: Yes     Within the past 12 months, have you been hit, slapped, kicked or otherwise physically hurt by someone?: No     Within the past 12 months, have you been humiliated or emotionally abused in other ways by your partner or ex-partner?: No   Stress: Not on file   Social Connections: Not on file   Health Literacy: Not on file       Functional Status:  Prior to admission patient needed assistance:   Dependent ADLs:: Bathing, Dressing, Grooming, Incontinence, Positioning, Transfers, Wheelchair-independent, Toileting  Dependent IADLs:: Cleaning, Cooking, Laundry, Shopping, Meal Preparation, Medication Management, Money Management, Transportation, Incontinence       Mental Health  Status:  Mental Health Status: No Current Concerns       Chemical Dependency Status:  Chemical Dependency Status: No Current Concerns             Values/Beliefs:  Spiritual, Cultural Beliefs, Rastafari Practices, Values that affect care: no               Discussed  Partnership in Safe Discharge Planning  document with patient/family: No    Additional Information:  Consult received for discharge planning and elevated risk score.    Patient resides at [facility] (Main Phone: [phone] Admissions Phone: [phone] Fax: [phone]) [facility]. Confirmed with Patric at [facility], that patient DOES have a bed hold.     At baseline, patient is an EZ stand into his electric or manual wheelchair. He has both. Staff here are using a ceiling lift as there is not an EZ stand option.     Patient does NOT have O2 at baseline at the [place] home. He is currently on 2L NC.    Per provider, patient might be medically ready for discharge over the weekend.    NASRA informed Patric, at [facility], that patient may return over the weekend. CM can call [phone] and ask for his floor to let them know patient is returning.     NASRA spoke with the family main contact, daughter-n-law, Toña Abad (Harshad's wife). Informed Toña that patient may be ready for discharge over the weekend. Toña states patient has used both [facility] and the ambulance to return to [facility] in the past. Toña is the  for payment if patient returns via [facility].    NASRA spoke with Nohemi, with [facility] transport to ask about weekend coverage. [facility] states to call the main number for [facility] and usually someone will be able to transport on Saturdays. They are NOT available to transport on Sunday.    Next Steps: Call [facility] for transport if Saturday. If Sunday, patient will need BLS ambulance transport to return to [facility].    MILSE Olivarez  [facility] [phone]/ [facility] [phone]  Care Management

## 2025-07-25 NOTE — PLAN OF CARE
Goal Outcome Evaluation:      Plan of Care Reviewed With: patient    Overall Patient Progress: improvingOverall Patient Progress: improving    Outcome Evaluation: Pt A&Ox4. Reweight completed and pt down 6lbs. Patients norvasc held per parameters, VSS. Lungs present with scattered wheezes. Crackles to L lower lobe. IV bumex given with good urine output. Attempted to wean oxygen and pt's sats dropped to 85%, placed back on 1L nasal cannula. Edema present from flank down. Was up to the BSC with ceiling lift. T&R q2hrs and heels floated with protectors on.

## 2025-07-25 NOTE — PROGRESS NOTES
4 hour shift note: VSS on 1LPM, tried weaning off oxygen but desaturated to 84%. [place]. Kept heels floated with foam heel protectors.

## 2025-07-25 NOTE — CONSULTS
"SPIRITUAL HEALTH SERVICES Consult Note    Medical Surgical Unit at [facility]    REFERRAL SOURCE/REASON FOR VISIT - Saw patient per  consult.    SUMMARY - I visited Ed, who, according to his documentation came in from [facility] 2 days ago with SOB, an increase in weight and not acting himself.  He also has issues with the following: CHF, respiratory failure, COPD and CKD.  Last evening, he was wheezing, but had less edema.  This morning, when I visited, he was resting and seemed tired.  He was open to my introduction and to emotional and spiritual support, saying, \"Nice to meet you.\"  Ed was expressionless, even when laughing a couple of times during conversation.  About feeling better, he reported, \"I'm trying.  I need to get up and going again.\"  I listened attentively to Ed and offered reassurance.  I also gave him a blessing.  He thanked me.  Ed will go back to [facility] when he is ready for discharge.         Plan - I will continue to follow patient and be available for any ongoing support needs until his discharge.     Joselito Armenta, Ph.D, [facility]  Spiritual Health Services  [address] [place], [place] [id number] [phone]  [email or web address]    Assessment -     Patient/Family Understanding of Illness and Goals of Care - Patient understands his illness and goals.    Strengths, Coping, and Resources - family, joel, care staff    Meaning, Beliefs, and Spirituality - Patient reported at admission that he is Caodaism.  "

## 2025-07-25 NOTE — PLAN OF CARE
Goal Outcome Evaluation:      Plan of Care Reviewed With: patient, family          Outcome Evaluation: Return to [facility]

## 2025-07-25 NOTE — PLAN OF CARE
"Goal Outcome Evaluation:           Overall Patient Progress: no changeOverall Patient Progress: no change    Outcome Evaluation: [id number]: WO visited this afternoon (see WOC notes). [place], 2 assist with turning, on a ceiling lift. Adjusted to 2L/min of O2 via NC. Uses urinal with assistance.      /63   Pulse 67   Temp 97  F (36.1  C) (Oral)   Resp 18   Ht 1.753 m (5' 9\")   Wt 98.5 kg (217 lb 2.5 oz)   SpO2 94%   BMI 32.07 kg/m      "

## 2025-07-25 NOTE — PLAN OF CARE
Goal Outcome Evaluation:      Plan of Care Reviewed With: patient    Overall Patient Progress: no changeOverall Patient Progress: no change    Outcome Evaluation: Pt A&O x4, able to make needs known. Very [place]. Edema +3 scrotum, penis and thighs. Pt incontinent x1 over night. On 2 L NC. Afib w/BBB. Foam heel protectors applied and heels floated. Unable to ambulate, lift at baseline. PRN tylenol given for general aches and pains.

## 2025-07-25 NOTE — PROGRESS NOTES
[facility]    PROGRESS NOTE - Hospitalist Service    Assessment and Plan    Principal Problem:    Acute on chronic diastolic CHF  Active Problems:    Chronic atrial fib -- on Warfarin    Benign prostatic hyperplasia, unspecified whether lower urinary tract symptoms present    Chronic kidney disease, stage 3a (H)    Thrombocytopenia    History of CVA (cerebrovascular accident)    Anemia, unspecified type    Medication induced coagulopathy    Sensorineural hearing loss    COPD    Acute respiratory failure with hypoxia (H)    Prediabetes -- Hgb A1C 5.9 on 5/12/25    Stasis dermatitis    Melvin Abad is a 89 year old male with CHF, COPD, prediabetes, [place] who had pneumonia 2-3 months ago, who presents for evaluation of increasing edema with associated SOB.  His normal weight is around  206 lbs and currently he is up to 220 lbs.  He comes from [facility].  He recently by report had his Lasix increased from 40 to 60 mg a day.  Also recently started Bumex.  Oxygen saturations have been marginal.  He has a cough but no sputum production, and no fever or chills.         Acute on chronic diastolic CHF  - CHF protocol I/os and daily weights, dry wt about 206 lbs  - IV lasix 40mg q6 through last night with adequate diuresis but still edematous.   - trial of IV bumex today through this evening then start oral diuretic possibly tomorrow pending how he responds overnight may change to PO bumex from furosemide   - trend renal function and lytes  - ECHO completed 7/24  The left ventricle is normal in size.  There is mild concentric left ventricular hypertrophy. Left ventricular systolic function is normal. The visual ejection fraction is  55-60%. No regional wall motion abnormalities noted.  The aortic valve is trileaflet with aortic valve sclerosis. There is mild (1+)  aortic regurgitation. Moderately decreased right ventricular systolic function There is moderate (2+) tricuspid regurgitation.  "Right ventricular systolic pressure is elevated, consistent with moderate  pulmonary hypertension. 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  - will need CHF clinic follow up   - continue metoprolol   - BLE edema persists with venous stasis changes will have WOC see patient   - Telemetry       Acute respiratory failure with hypoxia (H)  - diuresing   - wean as able      Chronic atrial fib -- on Warfarin  Medication induced coagulopathy   -- Pharmacy to dose warfarin, will aim for INR 2.0 to 2.5 given   -- cont Metoprolol XL 50 mg daily      Chronic obstructive pulmonary disease   -- continue albuterol inhaler prn      Thrombocytopenia chronically low   - trend      CKD3a stable  - Renal dose meds and avoid nephrotoxic agents   - Trend renal function  - monitor closely while diuresing      Anemia, chronic -- hgb 11.9, with baseline hgb 10-12  - B12 low normal 360 will start b12      Sensorineural hearing loss   - baseline    Prediabetes -- Hgb A1C 5.9 on 5/12/25     Stasis dermatitis  -- topical hydrocortisone cream 1% bid      BPH   -- continue Flomax      Clinically Significant Risk Factors                           # Obesity: Estimated body mass index is 31.48 kg/m  as calculated from the following:    Height as of this encounter: 1.753 m (5' 9\").    Weight as of this encounter: 96.7 kg (213 lb 3 oz)., PRESENT ON ADMISSION       # Financial/Environmental Concerns: none           COVID-19 PCR Results          10/15/2024    06:17   COVID-19 PCR Results   SARS CoV2 PCR Positive      COVID-19 Antibody Results, Testing for Immunity           No data to display               Code Status: No CPR- Do NOT Intubate  VTE prophylaxis:  Warfarin  DIET: Orders Placed This Encounter      Combination Diet Low Saturated Fat Na <2400mg Diet, No Caffeine Diet    Drains/Lines: Patient has No line.   [place] Catheter: Not present    Weight bearing status: WBAT, " patient is non-ambulatory at baseline     Expected Discharge Date: 07/28/2025    Discharge Delays: IV Medication - consider oral or Home Infusion  Lab Result Pending (enter specific test & time in comments)  Destination: long-term care facility  Discharge Comments: still needs IV diuretics today and reassess tomorrow if able to switch to PO.  likely here through the weekend    Medically Ready for Discharge: Anticipated in 2-4 Days      Subjective:  He is feeling better but still has edema. Discussed plan of trying to remove more fluid off him with IV diuretics since not back to baseline weight  PHYSICAL EXAM  Temp:  [97.7  F (36.5  C)-98.2  F (36.8  C)] 98.2  F (36.8  C)  Pulse:  [62-69] 69  Resp:  [18] 18  BP: (109-117)/(50-71) 117/62  SpO2:  [90 %-94 %] 93 %  Wt Readings from Last 1 Encounters:   07/25/25 96.7 kg (213 lb 3 oz)       Intake/Output Summary (Last 24 hours) at 7/24/2025 0801  Last data filed at 7/23/2025 2130  Gross per 24 hour   Intake 480 ml   Output 800 ml   Net -320 ml      Body mass index is 31.48 kg/m .    Physical Exam  Vitals and nursing note reviewed.   Constitutional:       General: He is not in acute distress.     Appearance: He is obese. He is ill-appearing.   Cardiovascular:      Rate and Rhythm: Normal rate and regular rhythm.      Pulses: Normal pulses.   Pulmonary:      Effort: Pulmonary effort is normal. No respiratory distress.      Breath sounds: Normal breath sounds. No wheezing or rales.   Abdominal:      General: Bowel sounds are normal.      Palpations: Abdomen is soft.      Comments: ND, but abdominal wall edema persists, but decreased to lower abd   Musculoskeletal:         General: Normal range of motion.      Comments: 2+ BLE edema slightly decreased form previous but still up thighs   Skin:     General: Skin is warm and dry.      Capillary Refill: Capillary refill takes less than 2 seconds.      Comments: Venous stasis changes. Blister anteriorly intact   Neurological:       Mental Status: He is alert and oriented to person, place, and time. Mental status is at baseline.       PERTINENT LABS/IMAGING:      Imaging results reviewed over the past 24 hrs:   No results found for this or any previous visit (from the past 24 hours).    Most Recent 3 CBC's:  Recent Labs   Lab Test 07/25/25  0521 07/23/25  1220 07/18/25  0735   WBC 4.8 5.4 5.2   HGB 11.6* 11.9* 11.9*   MCV 97 97 97   * 105* 103*     Most Recent 3 BMP's:  Recent Labs   Lab Test 07/25/25  0521 07/24/25  0530 07/23/25  1220    140 139   POTASSIUM 3.9 4.0 4.3   CHLORIDE 99 101 101   CO2 33* 31* 31*   BUN 25.1* 25.5* 27.7*   CR 1.23* 1.30* 1.34*   ANIONGAP 9 8 7   DARIUS 9.4 9.2 9.3   GLC 80 86 144*     Most Recent 2 LFT's:  Recent Labs   Lab Test 07/23/25  1220 05/01/25  1242   AST 20 19   ALT 9 12   ALKPHOS 143 136   BILITOTAL 1.3* 1.0     Most Recent 3 Troponin's:  Recent Labs   Lab Test 09/28/20  1043 12/20/19  0903 12/18/19  0934   TROPI 0.031 0.031 0.015     Most Recent 3 BNP's:  Recent Labs   Lab Test 07/23/25  1220 05/01/25  1242 09/28/20  1043   NTBNPI  --  3,003* 1,622   NTBNP 2,733*  --   --      Most Recent 6 glucoses:  Recent Labs   Lab Test 07/25/25  0521 07/24/25  0530 07/23/25  1220 07/18/25  0735 06/06/25  0801 05/12/25  0741   GLC 80 86 144* 71 88 91     Most Recent Urinalysis:  Recent Labs   Lab Test 06/09/25  1818 02/05/25  1235   COLOR Yellow Light Yellow   APPEARANCE Clear Clear   URINEGLC Negative Negative   URINEBILI Negative Negative   URINEKETONE Negative Negative   SG 1.013 1.012   UBLD Negative Negative   URINEPH 6.5 7.0   PROTEIN Negative Negative   NITRITE Negative Negative   LEUKEST Negative Negative   RBCU  --  <1   WBCU  --  1         50 MINUTES SPENT BY ME on the date of service doing chart review, history, exam, documentation, discussion with nursing staff and specialist, & further activities per the note.  Elisha Guerrero MD  [facility] Medicine Service

## 2025-07-25 NOTE — PHARMACY-ANTICOAGULATION SERVICE
Clinical Pharmacy - Warfarin Dosing Consult     Pharmacy has been consulted to manage this patient s warfarin therapy.  Indication: Atrial Fibrillation  Therapy Goal: INR 2-2.5  Warfarin Prior to Admission: Yes  Warfarin PTA Regimen: 2 mg daily    INR   Date Value Ref Range Status   07/25/2025 2.29 (H) 0.85 - 1.15 Final   07/24/2025 2.27 (H) 0.85 - 1.15 Final       Recommend warfarin 2 mg today.  Pharmacy will monitor Melvin Teagueon daily and order warfarin doses to achieve specified goal.      Please contact pharmacy as soon as possible if the warfarin needs to be held for a procedure or if the warfarin goals change.

## 2025-07-26 LAB
ANION GAP SERPL CALCULATED.3IONS-SCNC: 6 MMOL/L (ref 7–15)
BUN SERPL-MCNC: 24.5 MG/DL (ref 8–23)
CALCIUM SERPL-MCNC: 9.5 MG/DL (ref 8.8–10.4)
CHLORIDE SERPL-SCNC: 99 MMOL/L (ref 98–107)
CREAT SERPL-MCNC: 1.1 MG/DL (ref 0.67–1.17)
EGFRCR SERPLBLD CKD-EPI 2021: 61 ML/MIN/1.73M2
GLUCOSE SERPL-MCNC: 87 MG/DL (ref 70–99)
HCO3 SERPL-SCNC: 33 MMOL/L (ref 22–29)
HGB BLD-MCNC: 11.6 G/DL (ref 13.3–17.7)
INR PPP: 2.17 (ref 0.85–1.15)
MAGNESIUM SERPL-MCNC: 2.2 MG/DL (ref 1.7–2.3)
MCV RBC AUTO: 97 FL (ref 78–100)
MCV RBC AUTO: 97 FL (ref 78–100)
PLATELET # BLD AUTO: 100 10E3/UL (ref 150–450)
POTASSIUM SERPL-SCNC: 3.7 MMOL/L (ref 3.4–5.3)
PROTHROMBIN TIME: 23.6 SECONDS (ref 11.8–14.8)
SODIUM SERPL-SCNC: 138 MMOL/L (ref 135–145)

## 2025-07-26 PROCEDURE — 120N000001 HC R&B MED SURG/OB

## 2025-07-26 PROCEDURE — 83735 ASSAY OF MAGNESIUM: CPT | Performed by: INTERNAL MEDICINE

## 2025-07-26 PROCEDURE — 85049 AUTOMATED PLATELET COUNT: CPT | Performed by: INTERNAL MEDICINE

## 2025-07-26 PROCEDURE — 80048 BASIC METABOLIC PNL TOTAL CA: CPT | Performed by: INTERNAL MEDICINE

## 2025-07-26 PROCEDURE — 85018 HEMOGLOBIN: CPT | Performed by: INTERNAL MEDICINE

## 2025-07-26 PROCEDURE — 250N000011 HC RX IP 250 OP 636: Mod: JZ | Performed by: INTERNAL MEDICINE

## 2025-07-26 PROCEDURE — 85610 PROTHROMBIN TIME: CPT | Performed by: INTERNAL MEDICINE

## 2025-07-26 PROCEDURE — 250N000013 HC RX MED GY IP 250 OP 250 PS 637: Performed by: INTERNAL MEDICINE

## 2025-07-26 PROCEDURE — 36415 COLL VENOUS BLD VENIPUNCTURE: CPT | Performed by: INTERNAL MEDICINE

## 2025-07-26 PROCEDURE — 99233 SBSQ HOSP IP/OBS HIGH 50: CPT | Performed by: INTERNAL MEDICINE

## 2025-07-26 RX ORDER — WARFARIN SODIUM 2 MG/1
2 TABLET ORAL
Status: COMPLETED | OUTPATIENT
Start: 2025-07-26 | End: 2025-07-26

## 2025-07-26 RX ORDER — BUMETANIDE 0.25 MG/ML
1 INJECTION, SOLUTION INTRAMUSCULAR; INTRAVENOUS EVERY 8 HOURS
Status: COMPLETED | OUTPATIENT
Start: 2025-07-26 | End: 2025-07-27

## 2025-07-26 RX ADMIN — LORATADINE 10 MG: 10 TABLET ORAL at 08:33

## 2025-07-26 RX ADMIN — WHITE PETROLATUM: 1.75 OINTMENT TOPICAL at 08:33

## 2025-07-26 RX ADMIN — WHITE PETROLATUM: 1.75 OINTMENT TOPICAL at 00:31

## 2025-07-26 RX ADMIN — BUMETANIDE 1 MG: 0.25 INJECTION INTRAMUSCULAR; INTRAVENOUS at 17:27

## 2025-07-26 RX ADMIN — AMLODIPINE BESYLATE 5 MG: 5 TABLET ORAL at 08:33

## 2025-07-26 RX ADMIN — HYDROCORTISONE: 1 CREAM TOPICAL at 08:33

## 2025-07-26 RX ADMIN — GABAPENTIN 200 MG: 100 CAPSULE ORAL at 21:12

## 2025-07-26 RX ADMIN — METOPROLOL SUCCINATE 50 MG: 50 TABLET, EXTENDED RELEASE ORAL at 08:33

## 2025-07-26 RX ADMIN — HYDROCORTISONE: 1 CREAM TOPICAL at 21:12

## 2025-07-26 RX ADMIN — GABAPENTIN 200 MG: 100 CAPSULE ORAL at 08:32

## 2025-07-26 RX ADMIN — WHITE PETROLATUM: 1.75 OINTMENT TOPICAL at 17:27

## 2025-07-26 RX ADMIN — TAMSULOSIN HYDROCHLORIDE 0.4 MG: 0.4 CAPSULE ORAL at 08:33

## 2025-07-26 RX ADMIN — BUMETANIDE 1 MG: 0.25 INJECTION INTRAMUSCULAR; INTRAVENOUS at 08:40

## 2025-07-26 RX ADMIN — CYANOCOBALAMIN TAB 500 MCG 1000 MCG: 500 TAB at 08:32

## 2025-07-26 RX ADMIN — WARFARIN SODIUM 2 MG: 2 TABLET ORAL at 17:26

## 2025-07-26 RX ADMIN — SIMVASTATIN 40 MG: 40 TABLET, FILM COATED ORAL at 21:12

## 2025-07-26 ASSESSMENT — ACTIVITIES OF DAILY LIVING (ADL)
ADLS_ACUITY_SCORE: 78

## 2025-07-26 NOTE — PLAN OF CARE
Goal Outcome Evaluation:      Plan of Care Reviewed With: patient    Overall Patient Progress: improvingOverall Patient Progress: improving    Outcome Evaluation: Vss. Alert and oriented x4. 1L nc O2 with O2 sats 91-92%. Lungs are diminished with fine crackles and fine expiratory wheezes to bilateral bases. Loose nonproductive cough. Pt has been turned and repositioned every two hours. Small loose/watery bowel movement x1. Denies pain. Telemetry is Afib with SVR/CVR, HR ranging high 57-70s. Scrotal edema 3-4+, sacral/lower abdominal edema, BLE edema. Weight is down 0.2kg from yesterday.

## 2025-07-26 NOTE — PROGRESS NOTES
Pt A&Ox3, frustrated with feeling uncomfortable and scrotal edema. Supported scrotum with interdry. Pt up to chair once with ceiling lift. Lungs coarse crackles throughout. Occasional wet cough.     Visit Vitals  /61 (BP Location: Right arm)   Pulse 67   Temp 97.8  F (36.6  C) (Oral)   Resp 20

## 2025-07-26 NOTE — PROGRESS NOTES
[facility]    PROGRESS NOTE - Hospitalist Service    Assessment and Plan    Principal Problem:    Acute on chronic diastolic CHF  Active Problems:    Chronic atrial fib -- on Warfarin    Benign prostatic hyperplasia, unspecified whether lower urinary tract symptoms present    Chronic kidney disease, stage 3a (H)    Thrombocytopenia    History of CVA (cerebrovascular accident)    Anemia, unspecified type    Medication induced coagulopathy    Sensorineural hearing loss    COPD    Acute respiratory failure with hypoxia (H)    Prediabetes -- Hgb A1C 5.9 on 5/12/25    Stasis dermatitis    Melvin Abad is a 89 year old male with CHF, COPD, prediabetes, [place] who had pneumonia 2-3 months ago, who presents for evaluation of increasing edema with associated SOB.  His normal weight is around  206 lbs and currently he is up to 220 lbs.  He comes from [facility].  He recently by report had his Lasix increased from 40 to 60 mg a day.  Also recently started Bumex.  Oxygen saturations have been marginal.  He has a cough but no sputum production, and no fever or chills.         Acute on chronic diastolic CHF  - CHF protocol I/os and daily weights, dry wt about 206 lbs was 220 on admission  - IV lasix 40mg q6 changed to IV bumex 1gm Q8hrs. Given through today and reassess in am  - will need increase in his diuretic and likely change to Bumex.   - trend renal function and lytes  - ECHO completed 7/24  The left ventricle is normal in size.  There is mild concentric left ventricular hypertrophy. Left ventricular systolic function is normal. The visual ejection fraction is  55-60%. No regional wall motion abnormalities noted.  The aortic valve is trileaflet with aortic valve sclerosis. There is mild (1+)  aortic regurgitation. Moderately decreased right ventricular systolic function There is moderate (2+) tricuspid regurgitation. Right ventricular systolic pressure is elevated, consistent with  "moderate  pulmonary hypertension. 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  - will need CHF clinic follow up   - continue metoprolol   - BLE edema persists with venous stasis changes WOC seen   - Telemetry   - monitor lytes      Acute respiratory failure with hypoxia (H)  - diuresing   - wean as able    - O2 sats drop to 85% on RA    Chronic atrial fib -- on Warfarin  Medication induced coagulopathy  - Pharmacy to dose warfarin, will aim for INR 2.0 to 2.5 given   -- cont Metoprolol XL 50 mg daily      Chronic obstructive pulmonary disease   - continue albuterol inhaler prn   - not in exacerbation      Thrombocytopenia chronically low   - trend      CKD3a stable improved from basleine  - Renal dose meds and avoid nephrotoxic agents   - Trend renal function  - monitor closely while diuresing      Anemia, chronic -- hgb 11.9, with baseline hgb 10-12  - B12 low normal 360 will start b12      Sensorineural hearing loss   - baseline    Prediabetes -- Hgb A1C 5.9 on 5/12/25     Stasis dermatitis  -- topical hydrocortisone cream 1% bid      BPH   -- continue Flomax      Clinically Significant Risk Factors                           # Obesity: Estimated body mass index is 31.42 kg/m  as calculated from the following:    Height as of this encounter: 1.753 m (5' 9\").    Weight as of this encounter: 96.5 kg (212 lb 11.9 oz)., PRESENT ON ADMISSION       # Financial/Environmental Concerns: none           COVID-19 PCR Results          10/15/2024    06:17   COVID-19 PCR Results   SARS CoV2 PCR Positive      COVID-19 Antibody Results, Testing for Immunity           No data to display               Code Status: No CPR- Do NOT Intubate  VTE prophylaxis:  Warfarin  DIET: Orders Placed This Encounter      Combination Diet 2 gm Sodium Diet; Low Fat Diet (up to 50g)    Drains/Lines: Patient has No line.   Ulloa Catheter: Not present    Weight bearing status: WBAT, " patient is non-ambulatory at baseline     Expected Discharge Date: 07/28/2025    Discharge Delays: IV Medication - consider oral or Home Infusion  Lab Result Pending (enter specific test & time in comments)  Destination: long-term care facility  Discharge Comments: still needs IV diuretics today and reassess tomorrow if able to switch to PO.Matt ross for discahrge on Monday to LTC if on oral diuretics    Medically Ready for Discharge: Anticipated in 2-4 Days      Subjective:  Can't tell he's less swollen and is feeling better each day. Updated his son ishaan and patient here until Monday at least.  PHYSICAL EXAM  Temp:  [97.8  F (36.6  C)-98.4  F (36.9  C)] 97.8  F (36.6  C)  Pulse:  [66-72] 66  Resp:  [18-20] 18  BP: (117-133)/(58-66) 133/66  SpO2:  [84 %-94 %] 92 %  Wt Readings from Last 1 Encounters:   07/26/25 96.5 kg (212 lb 11.9 oz)       Intake/Output Summary (Last 24 hours) at 7/24/2025 0801  Last data filed at 7/23/2025 2130  Gross per 24 hour   Intake 480 ml   Output 800 ml   Net -320 ml      Body mass index is 31.42 kg/m .    Physical Exam  Vitals and nursing note reviewed.   Constitutional:       General: He is not in acute distress.     Appearance: He is obese. He is ill-appearing.   Cardiovascular:      Rate and Rhythm: Normal rate and regular rhythm.      Pulses: Normal pulses.   Pulmonary:      Effort: Pulmonary effort is normal. No respiratory distress.      Breath sounds: Normal breath sounds. No wheezing or rales.      Comments: Upper airway gurgling but lungs clear otherwise   Abdominal:      General: Bowel sounds are normal.      Palpations: Abdomen is soft.      Comments: ND, abdominal wall edema significantly decreased    Musculoskeletal:         General: Normal range of motion.      Comments: 2+ BLE pitting edema up thighs but decreasing.    Skin:     General: Skin is warm and dry.      Capillary Refill: Capillary refill takes less than 2 seconds.      Comments: Venous stasis changes. Blister  anteriorly intact   Neurological:      Mental Status: He is alert and oriented to person, place, and time. Mental status is at baseline.       PERTINENT LABS/IMAGING:      Imaging results reviewed over the past 24 hrs:   No results found for this or any previous visit (from the past 24 hours).    Most Recent 3 CBC's:  Recent Labs   Lab Test 07/26/25  0546 07/25/25  0521 07/23/25  1220 07/18/25  0735   WBC  --  4.8 5.4 5.2   HGB 11.6* 11.6* 11.9* 11.9*   MCV 97  97 97 97 97   * 100* 105* 103*     Most Recent 3 BMP's:  Recent Labs   Lab Test 07/26/25  0546 07/25/25  0521 07/24/25  0530    141 140   POTASSIUM 3.7 3.9 4.0   CHLORIDE 99 99 101   CO2 33* 33* 31*   BUN 24.5* 25.1* 25.5*   CR 1.10 1.23* 1.30*   ANIONGAP 6* 9 8   DARIUS 9.5 9.4 9.2   GLC 87 80 86     Most Recent 2 LFT's:  Recent Labs   Lab Test 07/23/25  1220 05/01/25  1242   AST 20 19   ALT 9 12   ALKPHOS 143 136   BILITOTAL 1.3* 1.0     Most Recent 3 Troponin's:  Recent Labs   Lab Test 09/28/20  1043 12/20/19  0903 12/18/19  0934   TROPI 0.031 0.031 0.015     Most Recent 3 BNP's:  Recent Labs   Lab Test 07/23/25  1220 05/01/25  1242 09/28/20  1043   NTBNPI  --  3,003* 1,622   NTBNP 2,733*  --   --      Most Recent 6 glucoses:  Recent Labs   Lab Test 07/26/25  0546 07/25/25  0521 07/24/25  0530 07/23/25  1220 07/18/25  0735 06/06/25  0801   GLC 87 80 86 144* 71 88     Most Recent Urinalysis:  Recent Labs   Lab Test 06/09/25  1818 02/05/25  1235   COLOR Yellow Light Yellow   APPEARANCE Clear Clear   URINEGLC Negative Negative   URINEBILI Negative Negative   URINEKETONE Negative Negative   SG 1.013 1.012   UBLD Negative Negative   URINEPH 6.5 7.0   PROTEIN Negative Negative   NITRITE Negative Negative   LEUKEST Negative Negative   RBCU  --  <1   WBCU  --  1         50 MINUTES SPENT BY ME on the date of service doing chart review, history, exam, documentation, discussion with nursing staff and specialist, & further activities per the note.  Elisha JOHNSON  MD [place]ol[place]  [facility] Service

## 2025-07-26 NOTE — PLAN OF CARE
Goal Outcome Evaluation:      Plan of Care Reviewed With: patient    Overall Patient Progress: no changeOverall Patient Progress: no change    VSS, kept on 1LPM. O2 sats maintained above 92%. [place]. LS coarse with exp. Wheezing. IS encouraged. Continues to have edema from flank down. Scheduled bumex given with adequate uo. Heels offloaded and heels protectors in place. BM 2x, soft, metamucil held. Up with ceiling lift. Tele afib with BBB.

## 2025-07-26 NOTE — PHARMACY-ANTICOAGULATION SERVICE
Clinical Pharmacy - Warfarin Dosing Consult     Pharmacy has been consulted to manage this patient s warfarin therapy.  Indication: Atrial Fibrillation  Therapy Goal: INR 2-2.5  Warfarin Prior to Admission: Yes  Warfarin PTA Regimen: 2 mg daily    INR   Date Value Ref Range Status   07/26/2025 2.17 (H) 0.85 - 1.15 Final   07/25/2025 2.29 (H) 0.85 - 1.15 Final       Recommend warfarin 2 mg today.  Pharmacy will monitor Melvin Teagueon daily and order warfarin doses to achieve specified goal.      Please contact pharmacy as soon as possible if the warfarin needs to be held for a procedure or if the warfarin goals change.

## 2025-07-27 ENCOUNTER — APPOINTMENT (OUTPATIENT)
Dept: GENERAL RADIOLOGY | Facility: CLINIC | Age: OVER 89
DRG: 291 | End: 2025-07-27
Attending: INTERNAL MEDICINE
Payer: COMMERCIAL

## 2025-07-27 LAB
ALBUMIN SERPL BCG-MCNC: 3.3 G/DL (ref 3.5–5.2)
ANION GAP SERPL CALCULATED.3IONS-SCNC: 8 MMOL/L (ref 7–15)
BUN SERPL-MCNC: 22.2 MG/DL (ref 8–23)
CALCIUM SERPL-MCNC: 9.3 MG/DL (ref 8.8–10.4)
CHLORIDE SERPL-SCNC: 99 MMOL/L (ref 98–107)
CREAT SERPL-MCNC: 1.13 MG/DL (ref 0.67–1.17)
EGFRCR SERPLBLD CKD-EPI 2021: 59 ML/MIN/1.73M2
GLUCOSE SERPL-MCNC: 89 MG/DL (ref 70–99)
HCO3 SERPL-SCNC: 34 MMOL/L (ref 22–29)
HGB BLD-MCNC: 11.6 G/DL (ref 13.3–17.7)
INR PPP: 2.05 (ref 0.85–1.15)
MAGNESIUM SERPL-MCNC: 2.2 MG/DL (ref 1.7–2.3)
MCV RBC AUTO: 98 FL (ref 78–100)
MCV RBC AUTO: 98 FL (ref 78–100)
PHOSPHATE SERPL-MCNC: 3.2 MG/DL (ref 2.5–4.5)
PLATELET # BLD AUTO: 93 10E3/UL (ref 150–450)
POTASSIUM SERPL-SCNC: 3.9 MMOL/L (ref 3.4–5.3)
PROTHROMBIN TIME: 22.5 SECONDS (ref 11.8–14.8)
SODIUM SERPL-SCNC: 141 MMOL/L (ref 135–145)

## 2025-07-27 PROCEDURE — 94640 AIRWAY INHALATION TREATMENT: CPT

## 2025-07-27 PROCEDURE — 250N000009 HC RX 250: Performed by: INTERNAL MEDICINE

## 2025-07-27 PROCEDURE — 36415 COLL VENOUS BLD VENIPUNCTURE: CPT | Performed by: INTERNAL MEDICINE

## 2025-07-27 PROCEDURE — 85610 PROTHROMBIN TIME: CPT | Performed by: INTERNAL MEDICINE

## 2025-07-27 PROCEDURE — 99233 SBSQ HOSP IP/OBS HIGH 50: CPT | Performed by: INTERNAL MEDICINE

## 2025-07-27 PROCEDURE — 94640 AIRWAY INHALATION TREATMENT: CPT | Mod: 76

## 2025-07-27 PROCEDURE — 120N000001 HC R&B MED SURG/OB

## 2025-07-27 PROCEDURE — 71045 X-RAY EXAM CHEST 1 VIEW: CPT

## 2025-07-27 PROCEDURE — 85018 HEMOGLOBIN: CPT | Performed by: INTERNAL MEDICINE

## 2025-07-27 PROCEDURE — 85049 AUTOMATED PLATELET COUNT: CPT | Performed by: INTERNAL MEDICINE

## 2025-07-27 PROCEDURE — 250N000013 HC RX MED GY IP 250 OP 250 PS 637: Performed by: INTERNAL MEDICINE

## 2025-07-27 PROCEDURE — 80069 RENAL FUNCTION PANEL: CPT | Performed by: INTERNAL MEDICINE

## 2025-07-27 PROCEDURE — 250N000011 HC RX IP 250 OP 636: Mod: JZ | Performed by: INTERNAL MEDICINE

## 2025-07-27 PROCEDURE — 83735 ASSAY OF MAGNESIUM: CPT | Performed by: INTERNAL MEDICINE

## 2025-07-27 RX ORDER — IPRATROPIUM BROMIDE AND ALBUTEROL SULFATE 2.5; .5 MG/3ML; MG/3ML
3 SOLUTION RESPIRATORY (INHALATION) 4 TIMES DAILY
Status: DISCONTINUED | OUTPATIENT
Start: 2025-07-27 | End: 2025-07-28

## 2025-07-27 RX ORDER — BUMETANIDE 0.25 MG/ML
1 INJECTION, SOLUTION INTRAMUSCULAR; INTRAVENOUS EVERY 8 HOURS
Status: COMPLETED | OUTPATIENT
Start: 2025-07-27 | End: 2025-07-28

## 2025-07-27 RX ORDER — WARFARIN SODIUM 2.5 MG/1
2.5 TABLET ORAL
Status: COMPLETED | OUTPATIENT
Start: 2025-07-27 | End: 2025-07-27

## 2025-07-27 RX ORDER — BUMETANIDE 0.25 MG/ML
1 INJECTION, SOLUTION INTRAMUSCULAR; INTRAVENOUS EVERY 8 HOURS
Status: DISCONTINUED | OUTPATIENT
Start: 2025-07-27 | End: 2025-07-27

## 2025-07-27 RX ORDER — BUMETANIDE 0.5 MG/1
1 TABLET ORAL
Status: DISCONTINUED | OUTPATIENT
Start: 2025-07-28 | End: 2025-07-30

## 2025-07-27 RX ADMIN — SIMVASTATIN 40 MG: 40 TABLET, FILM COATED ORAL at 20:35

## 2025-07-27 RX ADMIN — BUMETANIDE 1 MG: 0.25 INJECTION INTRAMUSCULAR; INTRAVENOUS at 00:25

## 2025-07-27 RX ADMIN — AMLODIPINE BESYLATE 5 MG: 5 TABLET ORAL at 08:05

## 2025-07-27 RX ADMIN — IPRATROPIUM BROMIDE AND ALBUTEROL SULFATE 3 ML: .5; 3 SOLUTION RESPIRATORY (INHALATION) at 19:41

## 2025-07-27 RX ADMIN — HYDROCORTISONE: 1 CREAM TOPICAL at 08:07

## 2025-07-27 RX ADMIN — GABAPENTIN 200 MG: 100 CAPSULE ORAL at 20:35

## 2025-07-27 RX ADMIN — METOPROLOL SUCCINATE 50 MG: 50 TABLET, EXTENDED RELEASE ORAL at 08:05

## 2025-07-27 RX ADMIN — WHITE PETROLATUM: 1.75 OINTMENT TOPICAL at 00:25

## 2025-07-27 RX ADMIN — GABAPENTIN 200 MG: 100 CAPSULE ORAL at 08:05

## 2025-07-27 RX ADMIN — WHITE PETROLATUM: 1.75 OINTMENT TOPICAL at 23:50

## 2025-07-27 RX ADMIN — TAMSULOSIN HYDROCHLORIDE 0.4 MG: 0.4 CAPSULE ORAL at 08:05

## 2025-07-27 RX ADMIN — IPRATROPIUM BROMIDE AND ALBUTEROL SULFATE 3 ML: .5; 3 SOLUTION RESPIRATORY (INHALATION) at 12:35

## 2025-07-27 RX ADMIN — WARFARIN SODIUM 2.5 MG: 2.5 TABLET ORAL at 17:13

## 2025-07-27 RX ADMIN — CYANOCOBALAMIN TAB 500 MCG 1000 MCG: 500 TAB at 08:05

## 2025-07-27 RX ADMIN — BUMETANIDE 1 MG: 0.25 INJECTION INTRAMUSCULAR; INTRAVENOUS at 08:05

## 2025-07-27 RX ADMIN — HYDROCORTISONE: 1 CREAM TOPICAL at 20:38

## 2025-07-27 RX ADMIN — WHITE PETROLATUM: 1.75 OINTMENT TOPICAL at 08:06

## 2025-07-27 RX ADMIN — WHITE PETROLATUM: 1.75 OINTMENT TOPICAL at 15:40

## 2025-07-27 RX ADMIN — BUMETANIDE 1 MG: 0.25 INJECTION INTRAMUSCULAR; INTRAVENOUS at 15:40

## 2025-07-27 RX ADMIN — LORATADINE 10 MG: 10 TABLET ORAL at 08:05

## 2025-07-27 ASSESSMENT — ACTIVITIES OF DAILY LIVING (ADL)
ADLS_ACUITY_SCORE: 78
ADLS_ACUITY_SCORE: 82
ADLS_ACUITY_SCORE: 82
ADLS_ACUITY_SCORE: 78
ADLS_ACUITY_SCORE: 82
ADLS_ACUITY_SCORE: 78
ADLS_ACUITY_SCORE: 78
ADLS_ACUITY_SCORE: 82
ADLS_ACUITY_SCORE: 78
ADLS_ACUITY_SCORE: 78
ADLS_ACUITY_SCORE: 82
ADLS_ACUITY_SCORE: 78
ADLS_ACUITY_SCORE: 78
ADLS_ACUITY_SCORE: 82
ADLS_ACUITY_SCORE: 82
ADLS_ACUITY_SCORE: 78
ADLS_ACUITY_SCORE: 82
ADLS_ACUITY_SCORE: 78
ADLS_ACUITY_SCORE: 82
ADLS_ACUITY_SCORE: 82

## 2025-07-27 NOTE — PLAN OF CARE
Goal Outcome Evaluation:      Plan of Care Reviewed With: patient    Overall Patient Progress: no changeOverall Patient Progress: no change      A&O x4. VSS on  1-2LPM NC, kept O2 above 90%. [place], uses bilat HAs. LS coarse with exp wheezing, due nebs given by RT and encouraged IS use. Scheduled bumex given. Blanchable redness to coccyx noted, mepilex placed. Barrier cream applied to periarea and groin. Incontinent to BM and urine sometimes, weight brief for uo. Heels kept offloaded and heel protectors in place. Turned and repo done.

## 2025-07-27 NOTE — PHARMACY-ANTICOAGULATION SERVICE
Clinical Pharmacy - Warfarin Dosing Consult     Pharmacy has been consulted to manage this patient s warfarin therapy.  Indication: Atrial Fibrillation  Therapy Goal: INR 2-2.5  Warfarin Prior to Admission: Yes  Warfarin PTA Regimen: 2 mg daily    INR   Date Value Ref Range Status   07/27/2025 2.05 (H) 0.85 - 1.15 Final   07/26/2025 2.17 (H) 0.85 - 1.15 Final       Recommend warfarin 2.5 mg today.  Pharmacy will monitor Felicewilmar KELLEE Teagueon daily and order warfarin doses to achieve specified goal.      Please contact pharmacy as soon as possible if the warfarin needs to be held for a procedure or if the warfarin goals change.

## 2025-07-27 NOTE — PROGRESS NOTES
[facility]    PROGRESS NOTE - Hospitalist Service    Assessment and Plan    Principal Problem:    Acute on chronic diastolic CHF  Active Problems:    Chronic atrial fib -- on Warfarin    Benign prostatic hyperplasia, unspecified whether lower urinary tract symptoms present    Chronic kidney disease, stage 3a (H)    Thrombocytopenia    History of CVA (cerebrovascular accident)    Anemia, unspecified type    Medication induced coagulopathy    Sensorineural hearing loss    COPD    Acute respiratory failure with hypoxia (H)    Prediabetes -- Hgb A1C 5.9 on 5/12/25    Stasis dermatitis    Melvin Abad is a 89 year old male with CHF, COPD, prediabetes, [place] who had pneumonia 2-3 months ago, who presents for evaluation of increasing edema with associated SOB.  His normal weight is around  206 lbs and currently he is up to 220 lbs.  He comes from [facility].  He recently by report had his Lasix increased from 40 to 60 mg a day.  Also recently started Bumex.  Oxygen saturations have been marginal.  He has a cough but no sputum production, and no fever or chills.         Acute on chronic diastolic CHF  - CHF protocol I/os and daily weights, dry wt about 206 lbs was 220 on admission  - IV lasix 40mg q6 changed to IV bumex 1gm Q8hrs. Given through this evening.  - Bumex 1mg BID starting 7/28 and monitor overnight to ensure adequate diuresis. Was on lasix 40mg daily.   - trend renal function and lytes  - ECHO completed 7/24  The left ventricle is normal in size.  There is mild concentric left ventricular hypertrophy. Left ventricular systolic function is normal. The visual ejection fraction is  55-60%. No regional wall motion abnormalities noted.  The aortic valve is trileaflet with aortic valve sclerosis. There is mild (1+)  aortic regurgitation. Moderately decreased right ventricular systolic function There is moderate (2+) tricuspid regurgitation. Right ventricular systolic pressure is  "elevated, consistent with moderate  pulmonary hypertension. 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  - will need CHF clinic follow up   - continue metoprolol and amlodipine    - BLE edema persists with venous stasis changes, appreciate WOC seeing patient   - Telemetry       Acute respiratory failure with hypoxia (H)  - diuresing   - wean as able    - O2 sats drop to 85% on RA previously  - checking CXR today   - started duonebs QID for now see if improvement  - continue IS    Chronic atrial fib -- on Warfarin  Medication induced coagulopathy  - Pharmacy to dose warfarin, will aim for INR 2.0 to 2.5 given   -- cont Metoprolol XL 50 mg daily      Chronic obstructive pulmonary disease   - continue albuterol inhaler prn   - checking CXR as above  - duonebs QID  - hold on steroids at this time unless concerning findings      Thrombocytopenia chronically low   - trend labs     CKD3a stable improved from baseline with diuresis   - Renal dose meds and avoid nephrotoxic agents   - Trend renal function  - monitor closely while diuresing      Anemia, chronic -- hgb 11.9, with baseline hgb 10-12  - B12 low normal 360 will start b12      Sensorineural hearing loss   - baseline    Prediabetes -- Hgb A1C 5.9 on 5/12/25     Stasis dermatitis  -- topical hydrocortisone cream 1% bid      BPH   -- continue Flomax      Clinically Significant Risk Factors               # Hypoalbuminemia: Lowest albumin = 3.3 g/dL at 7/27/2025  5:33 AM, will monitor as appropriate             # Obesity: Estimated body mass index is 31.16 kg/m  as calculated from the following:    Height as of this encounter: 1.753 m (5' 9\").    Weight as of this encounter: 95.7 kg (210 lb 15.7 oz)., PRESENT ON ADMISSION       # Financial/Environmental Concerns: none           COVID-19 PCR Results          10/15/2024    06:17   COVID-19 PCR Results   SARS CoV2 PCR Positive      COVID-19 Antibody " Results, Testing for Immunity           No data to display               Code Status: No CPR- Do NOT Intubate  VTE prophylaxis:  Warfarin  DIET: Orders Placed This Encounter      Combination Diet 2 gm Sodium Diet; Low Fat Diet (up to 50g)    Drains/Lines: Patient has No line.   Ulloa Catheter: Not present    Weight bearing status: WBAT, patient is non-ambulatory at baseline     Expected Discharge Date: 07/29/2025    Discharge Delays: IV Medication - consider oral or Home Infusion  Lab Result Pending (enter specific test & time in comments)  Oxygen Needs - Arrange Home O2  Destination: long-term care facility  Discharge Comments: still needs IV diuretics today and change to oral bumex on monday and would suggest monitoring overnight on oral diuretic to ensure adequate diuresis so likely tuesday return to LTC, plus wean off O2 if able    Medically Ready for Discharge: Anticipated in 2-4 Days      Subjective:  Weight continues to decrease and good output, feels less swollen each day. Gurgling upper airway weak cough checking CXR   PHYSICAL EXAM  Temp:  [98.1  F (36.7  C)-98.6  F (37  C)] 98.1  F (36.7  C)  Pulse:  [63-69] 63  Resp:  [14-18] 14  BP: (113-130)/(55-62) 120/58  SpO2:  [90 %-96 %] 95 %  Wt Readings from Last 1 Encounters:   07/27/25 95.7 kg (210 lb 15.7 oz)       Intake/Output Summary (Last 24 hours) at 7/24/2025 0801  Last data filed at 7/23/2025 2130  Gross per 24 hour   Intake 480 ml   Output 800 ml   Net -320 ml      Body mass index is 31.16 kg/m .    Physical Exam  Vitals and nursing note reviewed.   Constitutional:       General: He is not in acute distress.     Appearance: He is obese. He is ill-appearing.   HENT:      Head: Normocephalic and atraumatic.      Mouth/Throat:      Mouth: Mucous membranes are moist.      Pharynx: Oropharynx is clear.   Neck:      Comments: Difficult to assess JVD  Cardiovascular:      Rate and Rhythm: Normal rate and regular rhythm.      Pulses: Normal pulses.    Pulmonary:      Effort: Pulmonary effort is normal. No respiratory distress.      Breath sounds: Normal breath sounds. No wheezing or rales.      Comments: Upper airway gurgling, coarse BS throughout and occasional exp wheezing. No crackles. And unlabored breathing   Weak cough  Abdominal:      General: Bowel sounds are normal.      Palpations: Abdomen is soft.      Comments: ND, abdominal wall edema significantly decreased    Musculoskeletal:         General: Normal range of motion.      Cervical back: Normal range of motion and neck supple.      Comments: 1+ BLE edema distal BLE. Thighs 2+ pitting edema on thighs.    Skin:     General: Skin is warm and dry.      Capillary Refill: Capillary refill takes less than 2 seconds.      Comments: Venous stasis changes no open wounds and heels wrapped. Legs improved since admisison   Neurological:      General: No focal deficit present.      Mental Status: He is alert and oriented to person, place, and time. Mental status is at baseline.      Comments: At baseline   [facility]       PERTINENT LABS/IMAGING:      Imaging results reviewed over the past 24 hrs:   No results found for this or any previous visit (from the past 24 hours).    Most Recent 3 CBC's:  Recent Labs   Lab Test 07/27/25  0533 07/26/25  0546 07/25/25  0521 07/23/25  1220 07/18/25  0735   WBC  --   --  4.8 5.4 5.2   HGB 11.6* 11.6* 11.6* 11.9* 11.9*   MCV 98  98 97  97 97 97 97   PLT 93* 100* 100* 105* 103*     Most Recent 3 BMP's:  Recent Labs   Lab Test 07/27/25  0533 07/26/25  0546 07/25/25  0521    138 141   POTASSIUM 3.9 3.7 3.9   CHLORIDE 99 99 99   CO2 34* 33* 33*   BUN 22.2 24.5* 25.1*   CR 1.13 1.10 1.23*   ANIONGAP 8 6* 9   DARIUS 9.3 9.5 9.4   GLC 89 87 80     Most Recent 2 LFT's:  Recent Labs   Lab Test 07/23/25  1220 05/01/25  1242   AST 20 19   ALT 9 12   ALKPHOS 143 136   BILITOTAL 1.3* 1.0     Most Recent 3 Troponin's:  Recent Labs   Lab Test 09/28/20  1043 12/20/19  0903 12/18/19  0934    TROPI 0.031 0.031 0.015     Most Recent 3 BNP's:  Recent Labs   Lab Test 07/23/25  1220 05/01/25  1242 09/28/20  1043   NTBNPI  --  3,003* 1,622   NTBNP 2,733*  --   --      Most Recent 6 glucoses:  Recent Labs   Lab Test 07/27/25  0533 07/26/25  0546 07/25/25  0521 07/24/25  0530 07/23/25  1220 07/18/25  0735   GLC 89 87 80 86 144* 71     Most Recent Urinalysis:  Recent Labs   Lab Test 06/09/25  1818 02/05/25  1235   COLOR Yellow Light Yellow   APPEARANCE Clear Clear   URINEGLC Negative Negative   URINEBILI Negative Negative   URINEKETONE Negative Negative   SG 1.013 1.012   UBLD Negative Negative   URINEPH 6.5 7.0   PROTEIN Negative Negative   NITRITE Negative Negative   LEUKEST Negative Negative   RBCU  --  <1   WBCU  --  1         50 MINUTES SPENT BY ME on the date of service doing chart review, history, exam, documentation, discussion with nursing staff and specialist, & further activities per the note.  Elisha Guerrero MD  [facility] Medicine Service

## 2025-07-27 NOTE — PLAN OF CARE
"Goal Outcome Evaluation:      Plan of Care Reviewed With: patient    Overall Patient Progress: no change    A & O x4, able to make needs known.  [place], has pocket talker at bedside.  Takes pills whole.  VSS.  On 3L O2 oxymask while sleeping w SpO2 > 90%, lungs coarse, congested, and w crackles.  JEFF donita IV patent, received IV Bumex.  Sometimes incontinent BB, uses urinal/bedpan w assistance, +2 assist w ceiling lift for transfer from recliner to bed and Q2 repositions.  No c/o pain when at rest.  Edematous upper BLE and lower abdomen and scrotum.    2300  /61 (BP Location: Left arm, Patient Position: Semi-Mcgee's)   Pulse 69   Temp 98.6  F (37  C) (Oral)   Resp 18   Ht 1.753 m (5' 9\")   Wt 96.5 kg (212 lb 11.9 oz)   SpO2 (!) 90%   BMI 31.42 kg/m      Radha Puente RN on 7/27/2025 at 5:36 AM                       "

## 2025-07-27 NOTE — PROGRESS NOTES
Nebs given for upper airway wheezes. No change in SpO2,LS or RR after neb.  Pt has COPD per 2005 Xray. No PFTs. Pt uses Albuterol MDI at home as needed since 2024 when he had Influenza.   Pt has CHF w/ XRay showing new pleural effusions   RT to follow

## 2025-07-28 ENCOUNTER — APPOINTMENT (OUTPATIENT)
Dept: GENERAL RADIOLOGY | Facility: CLINIC | Age: OVER 89
DRG: 291 | End: 2025-07-28
Attending: FAMILY MEDICINE
Payer: COMMERCIAL

## 2025-07-28 LAB
ALBUMIN SERPL BCG-MCNC: 3.3 G/DL (ref 3.5–5.2)
ALLEN'S TEST: YES
ANION GAP SERPL CALCULATED.3IONS-SCNC: 8 MMOL/L (ref 7–15)
ANION GAP SERPL CALCULATED.3IONS-SCNC: 8 MMOL/L (ref 7–15)
BASE EXCESS BLDA CALC-SCNC: 12.9 MMOL/L (ref -3–3)
BUN SERPL-MCNC: 21.6 MG/DL (ref 8–23)
BUN SERPL-MCNC: 23.1 MG/DL (ref 8–23)
CALCIUM SERPL-MCNC: 9.1 MG/DL (ref 8.8–10.4)
CALCIUM SERPL-MCNC: 9.3 MG/DL (ref 8.8–10.4)
CHLORIDE SERPL-SCNC: 95 MMOL/L (ref 98–107)
CHLORIDE SERPL-SCNC: 98 MMOL/L (ref 98–107)
CREAT SERPL-MCNC: 1.09 MG/DL (ref 0.67–1.17)
CREAT SERPL-MCNC: 1.12 MG/DL (ref 0.67–1.17)
EGFRCR SERPLBLD CKD-EPI 2021: 60 ML/MIN/1.73M2
EGFRCR SERPLBLD CKD-EPI 2021: 62 ML/MIN/1.73M2
GLUCOSE SERPL-MCNC: 159 MG/DL (ref 70–99)
GLUCOSE SERPL-MCNC: 92 MG/DL (ref 70–99)
HCO3 BLD-SCNC: 39 MMOL/L (ref 21–28)
HCO3 SERPL-SCNC: 34 MMOL/L (ref 22–29)
HCO3 SERPL-SCNC: 35 MMOL/L (ref 22–29)
HGB BLD-MCNC: 11.7 G/DL (ref 13.3–17.7)
INR PPP: 2.12 (ref 0.85–1.15)
MAGNESIUM SERPL-MCNC: 2.1 MG/DL (ref 1.7–2.3)
MCV RBC AUTO: 97 FL (ref 78–100)
MCV RBC AUTO: 97 FL (ref 78–100)
O2/TOTAL GAS SETTING VFR VENT: 95 %
OXYHGB MFR BLDA: 89 % (ref 92–100)
PCO2 BLD: 56 MM HG (ref 35–45)
PH BLD: 7.46 [PH] (ref 7.35–7.45)
PHOSPHATE SERPL-MCNC: 2.8 MG/DL (ref 2.5–4.5)
PLATELET # BLD AUTO: 92 10E3/UL (ref 150–450)
PO2 BLD: 57 MM HG (ref 80–105)
POTASSIUM SERPL-SCNC: 3.8 MMOL/L (ref 3.4–5.3)
POTASSIUM SERPL-SCNC: 3.9 MMOL/L (ref 3.4–5.3)
PROTHROMBIN TIME: 23.1 SECONDS (ref 11.8–14.8)
SAO2 % BLDA: 90.6 % (ref 95–96)
SODIUM SERPL-SCNC: 138 MMOL/L (ref 135–145)
SODIUM SERPL-SCNC: 140 MMOL/L (ref 135–145)

## 2025-07-28 PROCEDURE — 258N000003 HC RX IP 258 OP 636: Performed by: FAMILY MEDICINE

## 2025-07-28 PROCEDURE — 94640 AIRWAY INHALATION TREATMENT: CPT

## 2025-07-28 PROCEDURE — 250N000009 HC RX 250: Performed by: FAMILY MEDICINE

## 2025-07-28 PROCEDURE — 36415 COLL VENOUS BLD VENIPUNCTURE: CPT | Performed by: INTERNAL MEDICINE

## 2025-07-28 PROCEDURE — 82040 ASSAY OF SERUM ALBUMIN: CPT | Performed by: INTERNAL MEDICINE

## 2025-07-28 PROCEDURE — 85610 PROTHROMBIN TIME: CPT | Performed by: INTERNAL MEDICINE

## 2025-07-28 PROCEDURE — 250N000013 HC RX MED GY IP 250 OP 250 PS 637: Performed by: INTERNAL MEDICINE

## 2025-07-28 PROCEDURE — 85049 AUTOMATED PLATELET COUNT: CPT | Performed by: INTERNAL MEDICINE

## 2025-07-28 PROCEDURE — 250N000009 HC RX 250: Performed by: INTERNAL MEDICINE

## 2025-07-28 PROCEDURE — 272N000272 HC CONTINUOUS NEBULIZER MICRO PUMP

## 2025-07-28 PROCEDURE — 71045 X-RAY EXAM CHEST 1 VIEW: CPT

## 2025-07-28 PROCEDURE — 99233 SBSQ HOSP IP/OBS HIGH 50: CPT | Performed by: FAMILY MEDICINE

## 2025-07-28 PROCEDURE — 85018 HEMOGLOBIN: CPT | Performed by: INTERNAL MEDICINE

## 2025-07-28 PROCEDURE — 999N000157 HC STATISTIC RCP TIME EA 10 MIN

## 2025-07-28 PROCEDURE — 999N000215 HC STATISTIC HFNC ADULT NON-CPAP

## 2025-07-28 PROCEDURE — 94640 AIRWAY INHALATION TREATMENT: CPT | Mod: 76

## 2025-07-28 PROCEDURE — 250N000013 HC RX MED GY IP 250 OP 250 PS 637: Performed by: FAMILY MEDICINE

## 2025-07-28 PROCEDURE — 82805 BLOOD GASES W/O2 SATURATION: CPT | Performed by: STUDENT IN AN ORGANIZED HEALTH CARE EDUCATION/TRAINING PROGRAM

## 2025-07-28 PROCEDURE — 36415 COLL VENOUS BLD VENIPUNCTURE: CPT | Performed by: FAMILY MEDICINE

## 2025-07-28 PROCEDURE — 83735 ASSAY OF MAGNESIUM: CPT | Performed by: INTERNAL MEDICINE

## 2025-07-28 PROCEDURE — 120N000001 HC R&B MED SURG/OB

## 2025-07-28 PROCEDURE — 250N000011 HC RX IP 250 OP 636: Mod: JZ | Performed by: INTERNAL MEDICINE

## 2025-07-28 PROCEDURE — 36600 WITHDRAWAL OF ARTERIAL BLOOD: CPT

## 2025-07-28 RX ORDER — SODIUM CHLORIDE 9 MG/ML
INJECTION, SOLUTION INTRAVENOUS CONTINUOUS
Status: DISCONTINUED | OUTPATIENT
Start: 2025-07-28 | End: 2025-08-04 | Stop reason: HOSPADM

## 2025-07-28 RX ORDER — WARFARIN SODIUM 2.5 MG/1
2.5 TABLET ORAL
Status: COMPLETED | OUTPATIENT
Start: 2025-07-28 | End: 2025-07-28

## 2025-07-28 RX ORDER — IPRATROPIUM BROMIDE AND ALBUTEROL SULFATE 2.5; .5 MG/3ML; MG/3ML
3 SOLUTION RESPIRATORY (INHALATION) EVERY 4 HOURS PRN
Status: DISCONTINUED | OUTPATIENT
Start: 2025-07-28 | End: 2025-08-04 | Stop reason: HOSPADM

## 2025-07-28 RX ORDER — POTASSIUM CHLORIDE 1500 MG/1
20 TABLET, EXTENDED RELEASE ORAL ONCE
Status: COMPLETED | OUTPATIENT
Start: 2025-07-28 | End: 2025-07-28

## 2025-07-28 RX ORDER — IPRATROPIUM BROMIDE AND ALBUTEROL SULFATE 2.5; .5 MG/3ML; MG/3ML
3 SOLUTION RESPIRATORY (INHALATION) ONCE
Status: DISCONTINUED | OUTPATIENT
Start: 2025-07-28 | End: 2025-07-28

## 2025-07-28 RX ADMIN — SODIUM CHLORIDE: 0.9 INJECTION, SOLUTION INTRAVENOUS at 09:09

## 2025-07-28 RX ADMIN — WHITE PETROLATUM: 1.75 OINTMENT TOPICAL at 09:16

## 2025-07-28 RX ADMIN — IPRATROPIUM BROMIDE AND ALBUTEROL SULFATE 3 ML: .5; 3 SOLUTION RESPIRATORY (INHALATION) at 12:49

## 2025-07-28 RX ADMIN — HYDROCORTISONE: 1 CREAM TOPICAL at 09:17

## 2025-07-28 RX ADMIN — TAMSULOSIN HYDROCHLORIDE 0.4 MG: 0.4 CAPSULE ORAL at 09:06

## 2025-07-28 RX ADMIN — LORATADINE 10 MG: 10 TABLET ORAL at 09:07

## 2025-07-28 RX ADMIN — BUMETANIDE 1 MG: 0.25 INJECTION INTRAMUSCULAR; INTRAVENOUS at 00:01

## 2025-07-28 RX ADMIN — GABAPENTIN 200 MG: 100 CAPSULE ORAL at 20:12

## 2025-07-28 RX ADMIN — WHITE PETROLATUM: 1.75 OINTMENT TOPICAL at 17:19

## 2025-07-28 RX ADMIN — WARFARIN SODIUM 2.5 MG: 2.5 TABLET ORAL at 17:21

## 2025-07-28 RX ADMIN — BUMETANIDE 0.25 MG/HR: 0.25 INJECTION INTRAMUSCULAR; INTRAVENOUS at 08:57

## 2025-07-28 RX ADMIN — POTASSIUM CHLORIDE 20 MEQ: 1500 TABLET, EXTENDED RELEASE ORAL at 11:13

## 2025-07-28 RX ADMIN — CYANOCOBALAMIN TAB 500 MCG 1000 MCG: 500 TAB at 09:07

## 2025-07-28 RX ADMIN — HYDROCORTISONE: 1 CREAM TOPICAL at 20:14

## 2025-07-28 RX ADMIN — GABAPENTIN 200 MG: 100 CAPSULE ORAL at 09:06

## 2025-07-28 RX ADMIN — IPRATROPIUM BROMIDE AND ALBUTEROL SULFATE 3 ML: .5; 3 SOLUTION RESPIRATORY (INHALATION) at 07:56

## 2025-07-28 RX ADMIN — IPRATROPIUM BROMIDE AND ALBUTEROL SULFATE 3 ML: .5; 3 SOLUTION RESPIRATORY (INHALATION) at 00:46

## 2025-07-28 RX ADMIN — SIMVASTATIN 40 MG: 40 TABLET, FILM COATED ORAL at 20:12

## 2025-07-28 RX ADMIN — ACETAMINOPHEN 650 MG: 325 TABLET ORAL at 05:51

## 2025-07-28 ASSESSMENT — ACTIVITIES OF DAILY LIVING (ADL)
ADLS_ACUITY_SCORE: 86
ADLS_ACUITY_SCORE: 84
ADLS_ACUITY_SCORE: 82
ADLS_ACUITY_SCORE: 78
ADLS_ACUITY_SCORE: 86
ADLS_ACUITY_SCORE: 78
ADLS_ACUITY_SCORE: 82
ADLS_ACUITY_SCORE: 78
ADLS_ACUITY_SCORE: 82
ADLS_ACUITY_SCORE: 84
ADLS_ACUITY_SCORE: 86
ADLS_ACUITY_SCORE: 82
ADLS_ACUITY_SCORE: 86
ADLS_ACUITY_SCORE: 82
ADLS_ACUITY_SCORE: 82
ADLS_ACUITY_SCORE: 78
ADLS_ACUITY_SCORE: 82
ADLS_ACUITY_SCORE: 82
ADLS_ACUITY_SCORE: 86

## 2025-07-28 NOTE — PROGRESS NOTES
Called to access pt by RN due to low SpO2 after turning the pt. Oxygen was increased from 2L NC to 15L oxymask with little improvement. Lung sounds coarse with upper airway wheeze. No complaints of shortness of breath, but slow to recover after turn. Pt started on Heated high flow nasal cannula at 50L 95% and PRN Duoneb given inline with no change in lung sounds. ABG drawn. Pt SpO2 slowly recovering on HHFNC, now 91-93%. RT will continue to follow pt weaning FiO2 as able.   Recent Labs   Lab 07/28/25  0111   PH 7.46*   PCO2 56*   PO2 57*   HCO3 39*   O2PER 95

## 2025-07-28 NOTE — PROGRESS NOTES
[facility]    Medicine Progress Note - Hospitalist Service    Date of Admission:  7/23/2025    Assessment & Plan   Melvin Abad is a 89 year old male with PMHx of left diastolic and right systolic CHF, COPD, prediabetes, hard of hearing who had pneumonia 2-3 months ago and lives at [facility] facility who presented for increasing edema with associated SOB and was found to have acute on chronic CHF exacerbation with dry weight of 206 lbs and admission weight of 220 lbs.  Patient initially received IV diuresis with Lasix but had minimal response so transition to IV Bumex and is down 10 pounds however unfortunately had flash pulmonary edema develop yesterday evening now requiring high flow nasal cannula with chest x-ray showing worsening pulmonary vasculature diffusely as well as ongoing bilateral pleural effusions.  Patient will be initiated on a Bumex drip at 0.25 mg/h with close monitoring of output and renal function and anticipated titration upward as appropriate to achieve ongoing diuresis efforts.  Will continue high flow nasal cannula for now with RT to titrate downward as tolerated.  Did discuss that if patient continues to have respiratory failure, may need to perform thoracentesis in upcoming days given worsening pleural effusions despite diuresis efforts.        Acute on chronic diastolic and right sided CHF  Flash pulmonary edema  Acute respiratory failure with hypoxia   Patient with known moderate right sided systolic failure and left sided diastolic failure with dry wt about 206 lbs on daily lasix at baseline presented with weight of 220 lbs on admission with signs of exacerbation.  Patient has received IV diuresis and currently is 210 lbs but patient developed flash pulmonary edema and new HFNC need the evening of 7/27/25.  - CHF protocol I/os and daily weights  -  transition to IV Bumex drip 0.25 mg/h with normal saline carrier to achieve 10 mL/h infusion.  Will  recheck creatinine at 1400 with hopeful increased to 0.5 mg/h this afternoon unless large diuresis is noted this morning.  - trend renal function and lytes  - will need CHF clinic follow up   - continue metoprolol and amlodipine    - BLE edema persists with venous stasis changes, appreciate [facility] seeing patient   - Telemetry      Chronic atrial fib -- on Warfarin  Medication induced coagulopathy  - Pharmacy to dose warfarin, will aim for INR 2.0 to 2.5 given   -- cont Metoprolol XL 50 mg daily      Chronic obstructive pulmonary disease  No obvious signs of exacerbation contributing to respiratory failure during this stay   -continue albuterol inhaler prn   - duonebs QID  - hold on steroids at this time unless concerning findings      Thrombocytopenia chronically low in the 110-140s   Platelets have been slightly lower than previous baseline and have been in the 90s to low 100 range so far during this stay but no signs of active bleeding and no obvious etiology for lowering noted.  - trend labs     CKD3a stable   Patient's creatinine appears to be in the 0.9-1.0 at baseline, was 1.21 in week prior to hospitalization while patient was having increased volume concerns and outpatient Lasix was being titrated upward.  Now normalizing back to previous baseline as patient is being diuresed.   - Renal dose meds and avoid nephrotoxic agents   - Trend renal function with recheck this afternoon and again tomorrow morning     Anemia, chronic -- hgb 11.9, with baseline hgb 10-12  B12 low normal 360 during this stay    -Vitamin b12 initiated during this stay      Sensorineural hearing loss  Baseline hard of hearing  -continue supportive care during this stay      Prediabetes -- Hgb A1C 5.9 on 5/12/25  Patient does not normally eat a diabetic diet so one has not been started during this stay.  Fasting blood sugars have been in the normal range in the 80s to 90s  - Continue to monitor fasting blood sugars but no further blood sugar  "monitoring or insulin is needed at this time    Stasis dermatitis noted  Noted and improving on the steroid cream started during this stay   -- topical hydrocortisone cream 1% bid for 7 day administration      BPH   -- continue Flomax           Diet: Combination Diet 2 gm Sodium Diet; Low Fat Diet (up to 50g)    DVT Prophylaxis: Warfarin  Ulloa Catheter: Not present  Lines: None     Cardiac Monitoring: ACTIVE order. Indication: Acute decompensated heart failure (48 hours)  Code Status: No CPR- Do NOT Intubate      Clinically Significant Risk Factors          # Hypochloremia: Lowest Cl = 95 mmol/L in last 2 days, will monitor as appropriate      # Hypoalbuminemia: Lowest albumin = 3.3 g/dL at 7/28/2025  5:39 AM, will monitor as appropriate   # Thrombocytopenia: Lowest platelets = 92 in last 2 days, will monitor for bleeding   # Hypertension: Noted on problem list            # Obesity: Estimated body mass index is 31.03 kg/m  as calculated from the following:    Height as of this encounter: 1.753 m (5' 9\").    Weight as of this encounter: 95.3 kg (210 lb 1.6 oz).      # Financial/Environmental Concerns: none         Social Drivers of Health    Tobacco Use: Medium Risk (7/23/2025)    Patient History     Smoking Tobacco Use: Former     Smokeless Tobacco Use: Never          Disposition Plan     Medically Ready for Discharge: Anticipated in 2-4 Days         Staci Abad MD  Hospitalist Service  [facility]  Securely message with [employer] (more info)  Text page via [employer] Paging/Directory   ______________________________________________________________________    Interval History   Patient developed sudden severe shortness of breath and hypoxia with O2 sats in the low 80% range last evening but did not improve with increasing amounts of nasal cannula supplementation and patient finally had respiratory stability on high flow nasal cannula 50 L 95 to 100% FiO2.  Evaluation this " morning shows likely flash pulmonary edema with increased pulmonary vasculature seen on x-ray and patient having diffuse crackles on exam.  Patient denies shortness of breath but he does feel like there is finally something in his lungs that he is trying to cough up and out.  He denies any new symptoms.  Patient is very aware of his age and is hesitant for any interventions, even a thoracentesis and would want to discuss this further going forward if it does become recommended.  No new nursing concerns.      Physical Exam   Vital Signs: Temp: 97.9  F (36.6  C) Temp src: Oral BP: 100/54 Pulse: 57   Resp: 20 SpO2: 96 % O2 Device: High Flow Nasal Cannula (HFNC) Oxygen Delivery: 50 LPM  Weight: 210 lbs 1.57 oz    Constitutional: awake, alert, cooperative, no apparent distress, Very hard of hearing , and appears stated age  Respiratory: Patient has mild tachypnea, diffuse coarse crackles are noted without wheezing auscultated.  Breath sounds decreased diffusely  Cardiovascular: Irregularly irregular rhythm  GI: Bowel sounds present, abdomen soft and nondistended  Musculoskeletal: Patient has ongoing 1-2+ pitting edema in bilateral lower extremities all the way up to the thighs  Neurologic: Awake, alert, oriented to name, place and situation    Medical Decision Making       62 MINUTES SPENT BY ME on the date of service doing chart review, history, exam, documentation & further activities per the note.      Data     I have personally reviewed the following data over the past 24 hrs:    N/A  \   11.7 (L)   / 92 (L)     138 95 (L) 23.1 (H) /  159 (H)   3.9 35 (H) 1.12 \     ALT: N/A AST: N/A AP: N/A TBILI: N/A   ALB: 3.3 (L) TOT PROTEIN: N/A LIPASE: N/A     INR:  2.12 (H) PTT:  N/A   D-dimer:  N/A Fibrinogen:  N/A       Imaging results reviewed over the past 24 hrs:   Recent Results (from the past 24 hours)   XR Chest Port 1 View    Narrative    EXAM: XR CHEST PORT 1 VIEW  LOCATION: [facility]  DATE: 7/28/2025    INDICATION: Acute severe worsening of respiratory failure in patient with previously suspected CHF exacerbation  COMPARISON: Chest x-ray 7/27/2025.      Impression    IMPRESSION: Increased bilateral hazy pulmonary opacities consistent with progression of pulmonary edema. Cardiomegaly appears similar to prior. Stable opacities at the lower lungs left worse than right that may be continued airspace disease at/or   atelectasis. Small bibasilar pleural fluid suggested. Bilateral shoulder DJD.

## 2025-07-28 NOTE — PHARMACY-ANTICOAGULATION SERVICE
Clinical Pharmacy - Warfarin Dosing Consult     Pharmacy has been consulted to manage this patient s warfarin therapy.  Indication: Atrial Fibrillation  Therapy Goal: INR 2-2.5  Warfarin Prior to Admission: Yes  Warfarin PTA Regimen: 2 mg daily    INR   Date Value Ref Range Status   07/28/2025 2.12 (H) 0.85 - 1.15 Final   07/27/2025 2.05 (H) 0.85 - 1.15 Final     Recommend warfarin 2.5 mg today.  Pharmacy will monitor Melvin Abad daily and order warfarin doses to achieve specified goal.      Please contact pharmacy as soon as possible if the warfarin needs to be held for a procedure or if the warfarin goals change.      Thank you,  Daniel Frank, PharmD  Clinical Pharmacist - System [facility]

## 2025-07-28 NOTE — PLAN OF CARE
Goal Outcome Evaluation:      Plan of Care Reviewed With: patient    Overall Patient Progress: improvingOverall Patient Progress: improving    Outcome Evaluation: On Bumex drip with 400ml voided this shift. Up in recliner per lift all morning then back to bed.  Denied pain. Legs elevated. Edema in feet and hips with coarse lung sounds. Continues on high flow oxygen. Telemetry showing A-fib with BBB. Potassium was replaced orally for level of 3.8 and will be rechecked in the morning. Oriented x4 and cooperative. Bed and chair alarms on.

## 2025-07-28 NOTE — PLAN OF CARE
Goal Outcome Evaluation:      Plan of Care Reviewed With: patient    Overall Patient Progress: decliningOverall Patient Progress: declining       Pt appeared to have rested on and off throughout the night. Denied need for pain intervention. Unable to get oxygen saturations above 90% with 15L of O2 on Oxymask. Changed pt to HFNC with 50L and 80% FiO2. Pt has no complaint of shortness of breath. Continuing to turn and reposition throughout the night. Voiding. Did receive 1mg of IV Bumex during the night.

## 2025-07-29 ENCOUNTER — APPOINTMENT (OUTPATIENT)
Dept: CT IMAGING | Facility: CLINIC | Age: OVER 89
DRG: 291 | End: 2025-07-29
Attending: FAMILY MEDICINE
Payer: COMMERCIAL

## 2025-07-29 ENCOUNTER — APPOINTMENT (OUTPATIENT)
Dept: ULTRASOUND IMAGING | Facility: CLINIC | Age: OVER 89
DRG: 291 | End: 2025-07-29
Attending: FAMILY MEDICINE
Payer: COMMERCIAL

## 2025-07-29 ENCOUNTER — DOCUMENTATION ONLY (OUTPATIENT)
Dept: OTHER | Facility: CLINIC | Age: OVER 89
End: 2025-07-29
Payer: COMMERCIAL

## 2025-07-29 ENCOUNTER — APPOINTMENT (OUTPATIENT)
Dept: SPEECH THERAPY | Facility: CLINIC | Age: OVER 89
DRG: 291 | End: 2025-07-29
Attending: FAMILY MEDICINE
Payer: COMMERCIAL

## 2025-07-29 ENCOUNTER — APPOINTMENT (OUTPATIENT)
Dept: GENERAL RADIOLOGY | Facility: CLINIC | Age: OVER 89
DRG: 291 | End: 2025-07-29
Attending: FAMILY MEDICINE
Payer: COMMERCIAL

## 2025-07-29 LAB
ALBUMIN SERPL BCG-MCNC: 3.6 G/DL (ref 3.5–5.2)
ALP SERPL-CCNC: 140 U/L (ref 40–150)
ALT SERPL W P-5'-P-CCNC: 10 U/L (ref 0–70)
ANION GAP SERPL CALCULATED.3IONS-SCNC: 11 MMOL/L (ref 7–15)
ANION GAP SERPL CALCULATED.3IONS-SCNC: 8 MMOL/L (ref 7–15)
APPEARANCE FLD: CLEAR
AST SERPL W P-5'-P-CCNC: 23 U/L (ref 0–45)
BASE EXCESS BLDV CALC-SCNC: 15.2 MMOL/L (ref -3–3)
BASOPHILS NFR FLD MANUAL: 1 %
BILIRUB SERPL-MCNC: 1.8 MG/DL
BUN SERPL-MCNC: 21.5 MG/DL (ref 8–23)
BUN SERPL-MCNC: 21.8 MG/DL (ref 8–23)
CALCIUM SERPL-MCNC: 9 MG/DL (ref 8.8–10.4)
CALCIUM SERPL-MCNC: 9.1 MG/DL (ref 8.8–10.4)
CHLORIDE SERPL-SCNC: 94 MMOL/L (ref 98–107)
CHLORIDE SERPL-SCNC: 95 MMOL/L (ref 98–107)
COLOR FLD: YELLOW
CREAT SERPL-MCNC: 1.09 MG/DL (ref 0.67–1.17)
CREAT SERPL-MCNC: 1.1 MG/DL (ref 0.67–1.17)
EGFRCR SERPLBLD CKD-EPI 2021: 61 ML/MIN/1.73M2
EGFRCR SERPLBLD CKD-EPI 2021: 62 ML/MIN/1.73M2
ERYTHROCYTE [DISTWIDTH] IN BLOOD BY AUTOMATED COUNT: 15.9 % (ref 10–15)
GLUCOSE BODY FLUID SOURCE: NORMAL
GLUCOSE FLD-MCNC: 137 MG/DL
GLUCOSE SERPL-MCNC: 152 MG/DL (ref 70–99)
GLUCOSE SERPL-MCNC: 92 MG/DL (ref 70–99)
HCO3 BLDV-SCNC: 42 MMOL/L (ref 21–28)
HCO3 SERPL-SCNC: 34 MMOL/L (ref 22–29)
HCO3 SERPL-SCNC: 36 MMOL/L (ref 22–29)
HCT VFR BLD AUTO: 35.1 % (ref 40–53)
HGB BLD-MCNC: 11.6 G/DL (ref 13.3–17.7)
INR PPP: 2.11 (ref 0.85–1.15)
LD BODY BODY FLUID SOURCE: NORMAL
LDH FLD L TO P-CCNC: 64 U/L
LDH SERPL L TO P-CCNC: 147 U/L (ref 0–250)
LYMPHOCYTES NFR FLD MANUAL: 42 %
MCH RBC QN AUTO: 31.6 PG (ref 26.5–33)
MCHC RBC AUTO-ENTMCNC: 33 G/DL (ref 31.5–36.5)
MCV RBC AUTO: 96 FL (ref 78–100)
MONOS+MACROS NFR FLD MANUAL: 0 %
NEUTS BAND NFR FLD MANUAL: 2 %
O2/TOTAL GAS SETTING VFR VENT: 51 %
OTHER CELLS FLD MANUAL: 55 %
OXYHGB MFR BLDV: 82 % (ref 70–75)
PCO2 BLDV: 58 MM HG (ref 40–50)
PH BLDV: 7.46 [PH] (ref 7.32–7.43)
PLATELET # BLD AUTO: 93 10E3/UL (ref 150–450)
PO2 BLDV: 47 MM HG (ref 25–47)
POTASSIUM SERPL-SCNC: 3.6 MMOL/L (ref 3.4–5.3)
POTASSIUM SERPL-SCNC: 3.6 MMOL/L (ref 3.4–5.3)
POTASSIUM SERPL-SCNC: 3.8 MMOL/L (ref 3.4–5.3)
PROT FLD-MCNC: 2.2 G/DL
PROT SERPL-MCNC: 6.6 G/DL (ref 6.4–8.3)
PROTEIN BODY FLUID SOURCE: NORMAL
PROTHROMBIN TIME: 23 SECONDS (ref 11.8–14.8)
RBC # BLD AUTO: 3.67 10E6/UL (ref 4.4–5.9)
SAO2 % BLDV: 83 % (ref 70–75)
SODIUM SERPL-SCNC: 138 MMOL/L (ref 135–145)
SODIUM SERPL-SCNC: 140 MMOL/L (ref 135–145)
SPECIMEN SOURCE FLD: NORMAL
WBC # BLD AUTO: 6.6 10E3/UL (ref 4–11)
WBC # FLD AUTO: 382 /UL

## 2025-07-29 PROCEDURE — 92610 EVALUATE SWALLOWING FUNCTION: CPT | Mod: GN | Performed by: SPEECH-LANGUAGE PATHOLOGIST

## 2025-07-29 PROCEDURE — 250N000013 HC RX MED GY IP 250 OP 250 PS 637: Performed by: INTERNAL MEDICINE

## 2025-07-29 PROCEDURE — 250N000009 HC RX 250: Performed by: FAMILY MEDICINE

## 2025-07-29 PROCEDURE — 84132 ASSAY OF SERUM POTASSIUM: CPT | Performed by: FAMILY MEDICINE

## 2025-07-29 PROCEDURE — 83615 LACTATE (LD) (LDH) ENZYME: CPT | Performed by: FAMILY MEDICINE

## 2025-07-29 PROCEDURE — 99233 SBSQ HOSP IP/OBS HIGH 50: CPT | Performed by: FAMILY MEDICINE

## 2025-07-29 PROCEDURE — 250N000013 HC RX MED GY IP 250 OP 250 PS 637: Performed by: FAMILY MEDICINE

## 2025-07-29 PROCEDURE — 71250 CT THORAX DX C-: CPT

## 2025-07-29 PROCEDURE — 82805 BLOOD GASES W/O2 SATURATION: CPT | Performed by: FAMILY MEDICINE

## 2025-07-29 PROCEDURE — 272N000706 US THORACENTESIS

## 2025-07-29 PROCEDURE — 84157 ASSAY OF PROTEIN OTHER: CPT | Performed by: FAMILY MEDICINE

## 2025-07-29 PROCEDURE — 999N000157 HC STATISTIC RCP TIME EA 10 MIN

## 2025-07-29 PROCEDURE — 82945 GLUCOSE OTHER FLUID: CPT | Performed by: FAMILY MEDICINE

## 2025-07-29 PROCEDURE — 85610 PROTHROMBIN TIME: CPT | Performed by: INTERNAL MEDICINE

## 2025-07-29 PROCEDURE — 36415 COLL VENOUS BLD VENIPUNCTURE: CPT | Performed by: INTERNAL MEDICINE

## 2025-07-29 PROCEDURE — 0W993ZZ DRAINAGE OF RIGHT PLEURAL CAVITY, PERCUTANEOUS APPROACH: ICD-10-PCS | Performed by: RADIOLOGY

## 2025-07-29 PROCEDURE — 71045 X-RAY EXAM CHEST 1 VIEW: CPT

## 2025-07-29 PROCEDURE — 89050 BODY FLUID CELL COUNT: CPT | Performed by: FAMILY MEDICINE

## 2025-07-29 PROCEDURE — 120N000001 HC R&B MED SURG/OB

## 2025-07-29 PROCEDURE — 82947 ASSAY GLUCOSE BLOOD QUANT: CPT | Performed by: FAMILY MEDICINE

## 2025-07-29 PROCEDURE — 87070 CULTURE OTHR SPECIMN AEROBIC: CPT | Performed by: FAMILY MEDICINE

## 2025-07-29 PROCEDURE — 80048 BASIC METABOLIC PNL TOTAL CA: CPT | Performed by: FAMILY MEDICINE

## 2025-07-29 PROCEDURE — 85018 HEMOGLOBIN: CPT | Performed by: FAMILY MEDICINE

## 2025-07-29 PROCEDURE — 36415 COLL VENOUS BLD VENIPUNCTURE: CPT | Performed by: FAMILY MEDICINE

## 2025-07-29 RX ORDER — WARFARIN SODIUM 2 MG/1
2 TABLET ORAL
Status: COMPLETED | OUTPATIENT
Start: 2025-07-29 | End: 2025-07-29

## 2025-07-29 RX ORDER — POTASSIUM CHLORIDE 1500 MG/1
20 TABLET, EXTENDED RELEASE ORAL ONCE
Status: COMPLETED | OUTPATIENT
Start: 2025-07-29 | End: 2025-07-29

## 2025-07-29 RX ORDER — LIDOCAINE HYDROCHLORIDE 10 MG/ML
10 INJECTION, SOLUTION EPIDURAL; INFILTRATION; INTRACAUDAL; PERINEURAL ONCE
Status: COMPLETED | OUTPATIENT
Start: 2025-07-29 | End: 2025-07-29

## 2025-07-29 RX ORDER — BENZOCAINE/MENTHOL 6 MG-10 MG
LOZENGE MUCOUS MEMBRANE 2 TIMES DAILY
Status: COMPLETED | OUTPATIENT
Start: 2025-07-29 | End: 2025-07-30

## 2025-07-29 RX ORDER — GUAIFENESIN 600 MG/1
600 TABLET, EXTENDED RELEASE ORAL 2 TIMES DAILY
Status: DISCONTINUED | OUTPATIENT
Start: 2025-07-29 | End: 2025-08-03

## 2025-07-29 RX ORDER — LIDOCAINE HYDROCHLORIDE AND EPINEPHRINE 10; 10 MG/ML; UG/ML
5 INJECTION, SOLUTION INFILTRATION; PERINEURAL ONCE
Status: DISCONTINUED | OUTPATIENT
Start: 2025-07-29 | End: 2025-07-29

## 2025-07-29 RX ADMIN — CYANOCOBALAMIN TAB 500 MCG 1000 MCG: 500 TAB at 10:22

## 2025-07-29 RX ADMIN — AMLODIPINE BESYLATE 5 MG: 5 TABLET ORAL at 10:22

## 2025-07-29 RX ADMIN — LIDOCAINE HYDROCHLORIDE ANHYDROUS 10 ML: 10 INJECTION, SOLUTION INFILTRATION at 13:20

## 2025-07-29 RX ADMIN — GABAPENTIN 200 MG: 100 CAPSULE ORAL at 20:24

## 2025-07-29 RX ADMIN — GABAPENTIN 200 MG: 100 CAPSULE ORAL at 10:22

## 2025-07-29 RX ADMIN — SIMVASTATIN 40 MG: 40 TABLET, FILM COATED ORAL at 20:24

## 2025-07-29 RX ADMIN — Medication: at 20:26

## 2025-07-29 RX ADMIN — GUAIFENESIN 600 MG: 600 TABLET, EXTENDED RELEASE ORAL at 13:56

## 2025-07-29 RX ADMIN — Medication: at 10:56

## 2025-07-29 RX ADMIN — WHITE PETROLATUM: 1.75 OINTMENT TOPICAL at 01:39

## 2025-07-29 RX ADMIN — WHITE PETROLATUM: 1.75 OINTMENT TOPICAL at 10:56

## 2025-07-29 RX ADMIN — GUAIFENESIN 600 MG: 600 TABLET, EXTENDED RELEASE ORAL at 20:24

## 2025-07-29 RX ADMIN — METOPROLOL SUCCINATE 50 MG: 50 TABLET, EXTENDED RELEASE ORAL at 10:22

## 2025-07-29 RX ADMIN — LORATADINE 10 MG: 10 TABLET ORAL at 10:22

## 2025-07-29 RX ADMIN — TAMSULOSIN HYDROCHLORIDE 0.4 MG: 0.4 CAPSULE ORAL at 10:22

## 2025-07-29 RX ADMIN — ACETAMINOPHEN 650 MG: 325 TABLET ORAL at 13:56

## 2025-07-29 RX ADMIN — POTASSIUM CHLORIDE 20 MEQ: 1500 TABLET, EXTENDED RELEASE ORAL at 10:22

## 2025-07-29 RX ADMIN — WARFARIN SODIUM 2 MG: 2 TABLET ORAL at 20:24

## 2025-07-29 ASSESSMENT — ACTIVITIES OF DAILY LIVING (ADL)
ADLS_ACUITY_SCORE: 80
ADLS_ACUITY_SCORE: 84
ADLS_ACUITY_SCORE: 80
ADLS_ACUITY_SCORE: 84
ADLS_ACUITY_SCORE: 80
ADLS_ACUITY_SCORE: 84
ADLS_ACUITY_SCORE: 84
ADLS_ACUITY_SCORE: 80
ADLS_ACUITY_SCORE: 84
ADLS_ACUITY_SCORE: 80
ADLS_ACUITY_SCORE: 84

## 2025-07-29 NOTE — PHARMACY-ANTICOAGULATION SERVICE
Clinical Pharmacy - Warfarin Dosing Consult     Pharmacy has been consulted to manage this patient s warfarin therapy.  Indication: Atrial Fibrillation  Therapy Goal: INR 2-2.5  Warfarin Prior to Admission: Yes  Warfarin PTA Regimen: 2 mg daily    INR   Date Value Ref Range Status   07/29/2025 2.11 (H) 0.85 - 1.15 Final   07/28/2025 2.12 (H) 0.85 - 1.15 Final       Recommend warfarin 2 mg today.  Pharmacy will monitor Melvin Abad daily and order warfarin doses to achieve specified goal.      Please contact pharmacy as soon as possible if the warfarin needs to be held for a procedure or if the warfarin goals change.      Thank you,  Sonal Quintanilla [facility]

## 2025-07-29 NOTE — PLAN OF CARE
Goal Outcome Evaluation:      Plan of Care Reviewed With: patient    Overall Patient Progress: improvingOverall Patient Progress: improving       Patient continues on the Bumex gtt with good UOP. Currently on HFNC at 35% and 30L. Lungs are coarse throughout. Frequent loose cough with clear thin sputum. Edema noted mainly to scrotum/penis and abdomen, but has generalized edema throughout body. Heel protectors in place. Plan for Xray in the morning.

## 2025-07-29 NOTE — PROGRESS NOTES
[facility]    Medicine Progress Note - Hospitalist Service    Date of Admission:  7/23/2025    Assessment & Plan    Melvin Abad is a 89 year old male with PMHx of left diastolic and right systolic CHF, COPD, prediabetes, hard of hearing who had pneumonia 2-3 months ago and lives at [facility] facility who presented for increasing edema with associated SOB and was found to have acute on chronic CHF exacerbation with dry weight of 206 lbs and admission weight of 220 lbs.  Patient initially received IV diuresis with Lasix but had minimal response so transition to IV Bumex and is down 10 pounds however unfortunately had flash pulmonary edema develop yesterday evening now requiring high flow nasal cannula with chest x-ray showing worsening pulmonary vasculature diffusely as well as ongoing bilateral pleural effusions.  Patient was initiated on a Bumex drip on 7/28 and following titration from 0.25 mg/hr up to 0.5 mg/hr patient is now having excellent diuresis efforts with net output of 2.7 L in the past 24 hours and weight down 8 lbs from yesterday (19 lbs from admission) and patient is now below previously known dry weight, however patient continues to have HFNC needs with ongoing high FiO2 needs (currently 40L and 60%).  Will continue high flow nasal cannula for now with RT to titrate downward as tolerated.  X-ray this morning shows ongoing severe pulmonary vascular congestion and bilateral small to moderate pleural effusion with possible opacity in the retrocardiac region noted.  In discussion with RT and nursing, there is concern that patient requires increased oxygen after meals but no obvious aspiration has been witnessed.  Will proceed with CT chest this morning as well as speech therapy consultation for further evaluation of ongoing acute respiratory failure.        Acute on chronic diastolic and right sided CHF  Flash pulmonary edema  Acute respiratory failure with hypoxia    Patient with known moderate right sided systolic failure and left sided diastolic failure with dry wt about 206 lbs on daily lasix at baseline presented with weight of 220 lbs on admission with signs of exacerbation.  Patient has received IV bolus diuresis with some weight loss noted but patient developed flash pulmonary edema and new HFNC need the evening of 7/27/25.  Patient transitioned to a Bumex drip on 7/28 and is now having excellent diuresis and is down 19 lbs total and below previously known dry weight but with ongoing evidence of vascular edema on x-ray and ongoing HFNC needs of 40L and 60% to maintain saturations with concern for increased oxygen needs after meals noted yesterday.    - Proceed with CT scan chest to further evaluation etiology of ongoing respiratory failure  - speech therapy evaluation today to assess for concern of aspiration causing increased oxygen needs  - CHF protocol I/os and daily weights  -  Continue IV Bumex drip 0.5 mg/h with normal saline carrier to achieve 10 mL/h infusion.  Will recheck creatinine at 1400 to ensure renal tolerance.  Monitor strict I/Os  - Continue HFNC and titrate as needed   - trend renal function and lytes  - will need CHF clinic follow up   - continue metoprolol and amlodipine    - BLE edema persists with venous stasis changes, appreciate [facility] seeing patient   - Telemetry      Chronic atrial fib -- on Warfarin  Medication induced coagulopathy  - Pharmacy to dose warfarin, will aim for INR 2.0 to 2.5 given   -- cont Metoprolol XL 50 mg daily      Chronic obstructive pulmonary disease  No obvious signs of exacerbation contributing to respiratory failure during this stay   -continue albuterol inhaler prn   - duonebs QID  - hold on steroids at this time unless concerning findings      Thrombocytopenia chronically low in the 110-140s   Platelets have been slightly lower than previous baseline and have been in the 90s to low 100 range so far during this stay but  no signs of active bleeding and no obvious etiology for lowering noted.  - trend labs     CKD3a stable   Patient's creatinine appears to be in the 0.9-1.0 at baseline, was 1.21 in week prior to hospitalization while patient was having increased volume concerns and outpatient Lasix was being titrated upward.  Now normalizing back to previous baseline as patient is being diuresed.   - Renal dose meds and avoid nephrotoxic agents   - Trend renal function with recheck this afternoon and again tomorrow morning given ongoing aggressive IV diuresis being performed     Anemia, chronic -- hgb 11.9, with baseline hgb 10-12  B12 low normal 360 during this stay    -Vitamin b12 initiated during this stay      Sensorineural hearing loss  Baseline hard of hearing  -continue supportive care during this stay      Prediabetes -- Hgb A1C 5.9 on 5/12/25  Patient does not normally eat a diabetic diet so one has not been started during this stay.  Fasting blood sugars have been in the normal range in the 80s to 90s  - Continue to monitor fasting blood sugars but no further blood sugar monitoring or insulin is needed at this time    Stasis dermatitis noted  Noted and improving on the steroid cream started during this stay   -- topical hydrocortisone cream 1% bid for 7 day administration (starting day 6 of 7)     BPH   -- continue Flomax           Diet: Combination Diet 2 gm Sodium Diet; Low Fat Diet (up to 50g)    DVT Prophylaxis: Warfarin  Ulloa Catheter: Not present  Lines: None     Cardiac Monitoring: ACTIVE order. Indication: Acute decompensated heart failure (48 hours)  Code Status: No CPR- Do NOT Intubate      Clinically Significant Risk Factors          # Hypochloremia: Lowest Cl = 95 mmol/L in last 2 days, will monitor as appropriate      # Hypoalbuminemia: Lowest albumin = 3.3 g/dL at 7/28/2025  5:39 AM, will monitor as appropriate   # Thrombocytopenia: Lowest platelets = 92 in last 2 days, will monitor for bleeding   #  "Hypertension: Noted on problem list            # Overweight: Estimated body mass index is 29.95 kg/m  as calculated from the following:    Height as of this encounter: 1.753 m (5' 9\").    Weight as of this encounter: 92 kg (202 lb 13.2 oz).      # Financial/Environmental Concerns: none         Social Drivers of Health    Tobacco Use: Medium Risk (7/23/2025)    Patient History     Smoking Tobacco Use: Former     Smokeless Tobacco Use: Never          Disposition Plan     Medically Ready for Discharge: Anticipated in 2-4 Days             Staci Abad MD  Hospitalist Service  [facility]  Securely message with [employer] (more info)  Text page via [facility] Paging/Directory   ______________________________________________________________________    Interval History   Patient continues to require HFNC oxygen with increased oxygen needs noted after patient eats and while he is sleeping at night (patient is a mouth breather).  Patient reports his breathing feels a little easier today and he cough up some clear sputum last night.  Patient states he doesn't notice difficult swallowing but sometimes it does feel like feed gets a little stuck in the back of his throat and he has to swallow a few times to get it to go down all the way, especially toward the end of his meal, but does not happen every day.  Patient is tired this morning from all the urinating he had to do last night but has no other concerns this morning.     Physical Exam   Vital Signs: Temp: 98.3  F (36.8  C) Temp src: Oral BP: 136/65 Pulse: 82   Resp: 26 SpO2: 95 % O2 Device: High Flow Nasal Cannula (HFNC) Oxygen Delivery: 40 LPM  Weight: 202 lbs 13.17 oz    Constitutional: awake, alert, cooperative, no apparent distress at rest, and appears stated age  Respiratory: ongoing mild tachypnea, decreased air exchange, fine crackles noted more in mid and base region than apex but improved greatly from yesterday   Cardiovascular: " irregularly irregular rhythm  GI: bowel sounds present, abdomen soft and non-tender  Musculoskeletal: tract to 1+ pitting edema noted in bilateral lower legs with much improvement noted in bilateral thighs   Neurologic: Awake, alert, oriented to name, place and overall to situation     Medical Decision Making       53 MINUTES SPENT BY ME on the date of service doing chart review, history, exam, documentation & further activities per the note.      Data     I have personally reviewed the following data over the past 24 hrs:    6.6  \   11.6 (L)   / 93 (L)     140 95 (L) 21.5 /  92   3.6; 3.6 34 (H) 1.09 \     INR:  2.11 (H) PTT:  N/A   D-dimer:  N/A Fibrinogen:  N/A       Imaging results reviewed over the past 24 hrs:   Recent Results (from the past 24 hours)   XR Chest Port 1 View    Narrative    EXAM: XR CHEST PORT 1 VIEW  LOCATION: [facility]  DATE: 7/29/2025    INDICATION: evaluate pulmonary vascularity and pleural effusions after aggressive diuresis efforts  COMPARISON: X-rays July 28 and July 27, 2025.      Impression    IMPRESSION: Cardiomegaly with pulmonary vascular engorgement and small to moderate volume bilateral pleural effusions are again observed. There is left retrocardiac parenchymal opacification similar as on the x-ray from yesterday morning. No   pneumothorax.

## 2025-07-29 NOTE — PLAN OF CARE
"Goal Outcome Evaluation:      Plan of Care Reviewed With: patient    Overall Patient Progress: no changeOverall Patient Progress: no change    Outcome Evaluation: A/Ox4, pleasant and cooperative. Continues with Bumex gtt at 0.5mg/hr. Voiding frequently with good output. 1500ml out this shift. Remains on Hi-flow O2, titrating to maintain sats >90%. Highest setting this shift was 60% on 40L. T&R Q2H. Heel protectors on. LS course, intermittent cough without sputum noted.  /65 (BP Location: Right arm)   Pulse 72   Temp 98.6  F (37  C) (Oral)   Resp 20   Ht 1.753 m (5' 9\")   Wt 95.3 kg (210 lb 1.6 oz)   SpO2 95%   BMI 31.03 kg/m              "

## 2025-07-29 NOTE — PROGRESS NOTES
"CT results have returned and show moderate to large bilateral pleural effusions with bilateral lower lobe compressive atelectasis as well as concern for occlusion of bilateral lower lobe bronchi which could be caused by mucous plugging or aspiration.  Patient currently is on 40L and 40% HFNC.  Discussed results of CT scan with patient and family present and after discussion of findings and treatment options patient is agreeable to try a thoracentesis but he makes it clear that he is not sure how much more aggressive he wants to get in the future.      Plan:  -proceed with thoracentesis of right side today and if patient tolerates with improvement in oxygen needed would consider thoracentesis on left side in upcoming days  -speech therapy to evaluate at lunch  -start guaifenesin 600 mg BID to help with mucous thinning  - given patient's ongoing conversations regarding his age and desires not to \"do too much\" and \"at my age there are things that just aren't worth doing\" will continue to have discussions regarding goals of care going forward.      Electronically Signed:  Staci Abad MD    "

## 2025-07-29 NOTE — PROGRESS NOTES
"   07/29/25 1200   Appointment Info   Signing Clinician's Name / Credentials (SLP) Sandie Stroud MA, CCC-SLP   General Information   Onset of Illness/Injury or Date of Surgery 07/28/25   Referring Physician Staci Abad MD   Pertinent History of Current Problem Per chart review, \"Melvin Abad is a 89 year old male with PMHx of left diastolic and right systolic CHF, COPD, prediabetes, hard of hearing who had pneumonia 2-3 months ago and lives at [facility] who presented for increasing edema with associated SOB and was found to have acute on chronic CHF exacerbation with dry weight of 206 lbs and admission weight of 220 lbs.  Patient initially received IV diuresis with Lasix but had minimal response so transition to IV Bumex and is down 10 pounds however unfortunately had flash pulmonary edema develop yesterday evening now requiring high flow nasal cannula with chest x-ray showing worsening pulmonary vasculature diffusely as well as ongoing bilateral pleural effusions.  Patient was initiated on a Bumex drip on 7/28 and following titration from 0.25 mg/hr up to 0.5 mg/hr patient is now having excellent diuresis efforts with net output of 2.7 L in the past 24 hours and weight down 8 lbs from yesterday (19 lbs from admission) and patient is now below previously known dry weight, however patient continues to have HFNC needs with ongoing high FiO2 needs (currently 40L and 60%).  Will continue high flow nasal cannula for now with RT to titrate downward as tolerated.  X-ray this morning shows ongoing severe pulmonary vascular congestion and bilateral small to moderate pleural effusion with possible opacity in the retrocardiac region noted.  In discussion with RT and nursing, there is concern that patient requires increased oxygen after meals but no obvious aspiration has been witnessed.  Will proceed with CT chest this morning as well as speech therapy consultation for further evaluation of ongoing acute " "respiratory failure.\"   General Observations Patient upright in bed with meal tray for duration of evaluation. Good awareness of situation.       Present no   Language English   Type of Evaluation   Type of Evaluation Swallow Evaluation   Oral Motor   Oral Musculature generally intact   Structural Abnormalities none present   Mucosal Quality adequate   Dentition (Oral Motor)   Dentition (Oral Motor) natural dentition;dental appliance/dentures   Dental Appliance/Denture (Oral Motor) upper;dentures, full   Facial Symmetry (Oral Motor)   Facial Symmetry (Oral Motor) WNL   Lip Function (Oral Motor)   Lip Range of Motion (Oral Motor) WNL   Lip Strength (Oral Motor) WNL   Tongue Function (Oral Motor)   Tongue ROM (Oral Motor) WNL   Tongue Strength (Oral Motor) WNL   Tongue Coordination/Speed (Oral Motor) WNL   Jaw Function (Oral Motor)   Jaw Function (Oral Motor) WNL   Cough/Swallow/Gag Reflex (Oral Motor)   Soft Palate/Velum (Oral Motor) WNL   Vocal Quality/Secretion Management (Oral Motor)   Vocal Quality (Oral Motor) WNL   Secretion Management (Oral Motor) WNL   General Swallowing Observations   Current Diet/Method of Nutritional Intake (General Swallowing Observations, NIS) regular diet;thin liquids (level 0)   Respiratory Support high-flow;nasal cannula   Past History of Dysphagia No known history of dysphagia   Swallowing Evaluation Clinical swallow evaluation   Clinical Swallow Evaluation   Feeding Assistance set up only required   Clinical Swallow Evaluation Textures Trialed thin liquids;pureed;soft & bite-sized;solid foods   Clinical Swallow Eval: Thin Liquid Texture Trial   Mode of Presentation, Thin Liquids cup;straw;self-fed   Volume of Liquid or Food Presented 4 ounces   Oral Phase of Swallow WFL   Pharyngeal Phase of Swallow intact   Diagnostic Statement Functional swallow with thin liquid trials. No overt s/sx of penetration/aspiration.   Clinical Swallow Evaluation: Puree Solid " Texture Trial   Mode of Presentation, Puree spoon;self-fed   Volume of Puree Presented 6 ounces   Oral Phase, Puree WFL   Pharyngeal Phase, Puree intact   Diagnostic Statement Functional swallow with puree trials. No overt s/sx of penetration/aspiration.   Clinical Swallow Eval: Soft & Bite Sized   Mode of Presentation spoon;self-fed   Volume Presented 3 ounces   Oral Phase WFL   Pharyngeal Phase intact   Diagnostic Statement Functional swallow with soft and bite sized trials. No overt s/sx of penetration/aspiration.   Clinical Swallow Evaluation: Solid Food Texture Trial   Mode of Presentation self-fed;other (see comments)  (fork)   Volume Presented 3 ounces   Oral Phase WFL   Pharyngeal Phase intact   Diagnostic Statement Functional swallow with hard solid trials. No overt s/sx of penetration/aspiration.   Esophageal Phase of Swallow   Patient reports or presents with symptoms of esophageal dysphagia No   Swallowing Recommendations   Diet Consistency Recommendations regular diet;thin liquids (level 0)   Mode of Delivery Recommendations slow rate of intake   Recommended Feeding/Eating Techniques (Swallow Eval) maintain upright posture during/after eating for 30 minutes   Medication Administration Recommendations, Swallowing (SLP) Pills with thin liquids   Instrumental Assessment Recommendations VFSS (videofluoroscopic swallowing study)   Comment, Swallowing Recommendations Patient presents with a functional oropharyngeal swallow characterized by adequate mastication and oral transit and timely pharyngeal response. No overt s/sx of penetration/aspiration. Patient appears appropriate to remain on a regular diet with thin liquids, however, due to increased work of breathing following meals, recommend MBSS in order to objectively assess oral and pharyngeal swallow function and rule out silent aspiration.   General Therapy Interventions   Dysphagia treatment   (Instrumental assessment)   Intervention Comments Patient  will benefit from a VFSS in order to objectively assess oral and pharyngeal phase of swallow.   Clinical Impression   Criteria for Skilled Therapeutic Interventions Met (SLP Eval) Yes, treatment indicated;Patient/family refuse skilled intervention at this time   SLP Diagnosis functional oropharyngeal swallow   Risks & Benefits of therapy have been explained evaluation/treatment results reviewed;care plan/treatment goals reviewed;risks/benefits reviewed;current/potential barriers reviewed;participants included;patient   Clinical Impression Comments Patient presents with a functional oropharyngeal swallow characterized by adequate mastication and oral transit and timely pharyngeal response. No overt s/sx of penetration/aspiration. Patient appears appropriate to remain on a regular diet with thin liquids, however, due to increased work of breathing following meals, recommend MBSS in order to objectively assess oral and pharyngeal swallow function and rule out silent aspiration.   SLP Total Evaluation Time   Eval: oral/pharyngeal swallow function, clinical swallow Minutes (52132) 30   SLP Discharge Planning   SLP Plan Eval only-will complete order with recommendations for MBSS. MD to obtain new order for MBSS if pt agreeable   SLP Discharge Recommendation home   SLP Rationale for DC Rec No overt s/sx of penetration/aspiration or diet modifications at this time that would impact pt's ability to return to prior level of living.   SLP Brief overview of current status  No overt s/sx of penetration/aspiration. Patient appears appropriate to remain on a regular diet with thin liquids, however, due to increased work of breathing following meals, recommend MBSS in order to objectively assess oral and pharyngeal swallow function and rule out silent aspiration.   SLP Time and Intention   Total Session Time (sum of timed and untimed services) 30

## 2025-07-29 NOTE — PLAN OF CARE
Goal Outcome Evaluation:      Plan of Care Reviewed With: patient    Overall Patient Progress: improvingOverall Patient Progress: improving    Outcome Evaluation: The patient is A&Ox4, [place], denies pain, has been on Ho-yusuf all day. Right Thoracentesis done at 1315, tolerated well, removed 1L fluid. The plan would be to do a left Thoracentesis on Thursday. Transported to CT this am without incident, assisted to the chair via ceiling lift afterwards. Had a good appetite, did aspirate on a candy bar this afternoon. Sats remained stable. Currently 20L 30%, weaning to NC shortly.

## 2025-07-29 NOTE — CONSULTS
"SPIRITUAL HEALTH SERVICES Consult Note     Medical Surgical Unit at [facility]     REFERRAL SOURCE/REASON FOR VISIT - Saw patient per follow-up and length of stay.     SUMMARY - I visited Ed again, for a second time.  He has been hospitalized for 6 days now.  Last evening, he continued with oxygen use, a cough and edema.  This morning, This morning, he continued on oxygen,with severe congestion and a pleural effusion.   When I visited, Ed was expressionless, but said, \"I remember you.\"  He reported, with some relief, \"They're going to take stuff off my lungs today.\"  I listened attentively to Ed and offered empathy and my continued prayers.  I also gave him a blessing.  He thanked me.           Plan - I will continue to follow patient and be available for any ongoing support needs until his discharge.      Joselito Armenta, Ph.D,   Spiritual Health Services  [address] [place], [place] [address] [phone]  [email or web address]     Assessment -      Patient/Family Understanding of Illness and Goals of Care - Patient understands his illness and goals.     Strengths, Coping, and Resources - family, joel, care staff     Meaning, Beliefs, and Spirituality - Patient reported at admission that he is Hoahaoism.        "

## 2025-07-30 LAB
ANION GAP SERPL CALCULATED.3IONS-SCNC: 7 MMOL/L (ref 7–15)
BASE EXCESS BLDV CALC-SCNC: 14.2 MMOL/L (ref -3–3)
BUN SERPL-MCNC: 21.2 MG/DL (ref 8–23)
CALCIUM SERPL-MCNC: 9.1 MG/DL (ref 8.8–10.4)
CHLORIDE SERPL-SCNC: 95 MMOL/L (ref 98–107)
CREAT SERPL-MCNC: 1.13 MG/DL (ref 0.67–1.17)
EGFRCR SERPLBLD CKD-EPI 2021: 59 ML/MIN/1.73M2
ERYTHROCYTE [DISTWIDTH] IN BLOOD BY AUTOMATED COUNT: 15.9 % (ref 10–15)
GLUCOSE SERPL-MCNC: 99 MG/DL (ref 70–99)
HCO3 BLDV-SCNC: 41 MMOL/L (ref 21–28)
HCO3 SERPL-SCNC: 36 MMOL/L (ref 22–29)
HCT VFR BLD AUTO: 35.1 % (ref 40–53)
HGB BLD-MCNC: 11.5 G/DL (ref 13.3–17.7)
INR PPP: 2.14 (ref 0.85–1.15)
MCH RBC QN AUTO: 31.6 PG (ref 26.5–33)
MCHC RBC AUTO-ENTMCNC: 32.8 G/DL (ref 31.5–36.5)
MCV RBC AUTO: 96 FL (ref 78–100)
O2/TOTAL GAS SETTING VFR VENT: 21 %
OXYHGB MFR BLDV: 81 % (ref 70–75)
PCO2 BLDV: 59 MM HG (ref 40–50)
PH BLDV: 7.45 [PH] (ref 7.32–7.43)
PLATELET # BLD AUTO: 88 10E3/UL (ref 150–450)
PO2 BLDV: 48 MM HG (ref 25–47)
POTASSIUM SERPL-SCNC: 3.5 MMOL/L (ref 3.4–5.3)
PROTHROMBIN TIME: 23.6 SECONDS (ref 11.8–14.8)
RBC # BLD AUTO: 3.64 10E6/UL (ref 4.4–5.9)
SAO2 % BLDV: 82 % (ref 70–75)
SODIUM SERPL-SCNC: 138 MMOL/L (ref 135–145)
WBC # BLD AUTO: 6.1 10E3/UL (ref 4–11)

## 2025-07-30 PROCEDURE — 120N000001 HC R&B MED SURG/OB

## 2025-07-30 PROCEDURE — 85018 HEMOGLOBIN: CPT | Performed by: FAMILY MEDICINE

## 2025-07-30 PROCEDURE — 36415 COLL VENOUS BLD VENIPUNCTURE: CPT | Performed by: FAMILY MEDICINE

## 2025-07-30 PROCEDURE — 85610 PROTHROMBIN TIME: CPT | Performed by: INTERNAL MEDICINE

## 2025-07-30 PROCEDURE — 250N000013 HC RX MED GY IP 250 OP 250 PS 637: Performed by: INTERNAL MEDICINE

## 2025-07-30 PROCEDURE — 250N000013 HC RX MED GY IP 250 OP 250 PS 637: Performed by: FAMILY MEDICINE

## 2025-07-30 PROCEDURE — 999N000157 HC STATISTIC RCP TIME EA 10 MIN

## 2025-07-30 PROCEDURE — 82805 BLOOD GASES W/O2 SATURATION: CPT | Performed by: FAMILY MEDICINE

## 2025-07-30 PROCEDURE — 80048 BASIC METABOLIC PNL TOTAL CA: CPT | Performed by: FAMILY MEDICINE

## 2025-07-30 PROCEDURE — 99232 SBSQ HOSP IP/OBS MODERATE 35: CPT | Performed by: FAMILY MEDICINE

## 2025-07-30 RX ORDER — POTASSIUM CHLORIDE 1500 MG/1
20 TABLET, EXTENDED RELEASE ORAL ONCE
Status: COMPLETED | OUTPATIENT
Start: 2025-07-30 | End: 2025-07-30

## 2025-07-30 RX ORDER — DIPHENHYDRAMINE HCL 25 MG
25 CAPSULE ORAL EVERY 6 HOURS PRN
Status: DISCONTINUED | OUTPATIENT
Start: 2025-07-30 | End: 2025-07-31

## 2025-07-30 RX ORDER — BUMETANIDE 0.5 MG/1
1 TABLET ORAL
Status: DISCONTINUED | OUTPATIENT
Start: 2025-07-30 | End: 2025-08-01

## 2025-07-30 RX ORDER — WARFARIN SODIUM 2 MG/1
2 TABLET ORAL
Status: COMPLETED | OUTPATIENT
Start: 2025-07-30 | End: 2025-07-30

## 2025-07-30 RX ADMIN — GABAPENTIN 200 MG: 100 CAPSULE ORAL at 20:06

## 2025-07-30 RX ADMIN — WHITE PETROLATUM: 1.75 OINTMENT TOPICAL at 08:18

## 2025-07-30 RX ADMIN — BUMETANIDE 1 MG: 0.5 TABLET ORAL at 12:44

## 2025-07-30 RX ADMIN — WHITE PETROLATUM: 1.75 OINTMENT TOPICAL at 02:19

## 2025-07-30 RX ADMIN — METOPROLOL SUCCINATE 50 MG: 50 TABLET, EXTENDED RELEASE ORAL at 08:16

## 2025-07-30 RX ADMIN — GUAIFENESIN 600 MG: 600 TABLET, EXTENDED RELEASE ORAL at 08:16

## 2025-07-30 RX ADMIN — DIPHENHYDRAMINE HYDROCHLORIDE 25 MG: 25 CAPSULE ORAL at 22:46

## 2025-07-30 RX ADMIN — Medication: at 20:07

## 2025-07-30 RX ADMIN — GUAIFENESIN 600 MG: 600 TABLET, EXTENDED RELEASE ORAL at 20:07

## 2025-07-30 RX ADMIN — GABAPENTIN 200 MG: 100 CAPSULE ORAL at 08:16

## 2025-07-30 RX ADMIN — Medication: at 08:18

## 2025-07-30 RX ADMIN — WHITE PETROLATUM: 1.75 OINTMENT TOPICAL at 17:12

## 2025-07-30 RX ADMIN — BUMETANIDE 1 MG: 0.5 TABLET ORAL at 17:11

## 2025-07-30 RX ADMIN — TAMSULOSIN HYDROCHLORIDE 0.4 MG: 0.4 CAPSULE ORAL at 08:16

## 2025-07-30 RX ADMIN — POTASSIUM CHLORIDE 20 MEQ: 1500 TABLET, EXTENDED RELEASE ORAL at 08:17

## 2025-07-30 RX ADMIN — SIMVASTATIN 40 MG: 40 TABLET, FILM COATED ORAL at 20:06

## 2025-07-30 RX ADMIN — LORATADINE 10 MG: 10 TABLET ORAL at 08:16

## 2025-07-30 RX ADMIN — CYANOCOBALAMIN TAB 500 MCG 1000 MCG: 500 TAB at 08:17

## 2025-07-30 RX ADMIN — WARFARIN SODIUM 2 MG: 2 TABLET ORAL at 17:12

## 2025-07-30 RX ADMIN — AMLODIPINE BESYLATE 5 MG: 5 TABLET ORAL at 08:17

## 2025-07-30 ASSESSMENT — ACTIVITIES OF DAILY LIVING (ADL)
ADLS_ACUITY_SCORE: 80
ADLS_ACUITY_SCORE: 78
ADLS_ACUITY_SCORE: 80
ADLS_ACUITY_SCORE: 80
ADLS_ACUITY_SCORE: 78
ADLS_ACUITY_SCORE: 80
ADLS_ACUITY_SCORE: 78
ADLS_ACUITY_SCORE: 80
ADLS_ACUITY_SCORE: 78
ADLS_ACUITY_SCORE: 80
ADLS_ACUITY_SCORE: 78

## 2025-07-30 NOTE — PLAN OF CARE
Goal Outcome Evaluation:      Plan of Care Reviewed With: patient    Overall Patient Progress: improvingOverall Patient Progress: improving    Outcome Evaluation: patient A&O, vss except 1L o2 NC, coarse lung sounds, hearing aids on , using urinal

## 2025-07-30 NOTE — PHARMACY-ANTICOAGULATION SERVICE
Clinical Pharmacy - Warfarin Dosing Consult     Pharmacy has been consulted to manage this patient s warfarin therapy.  Indication: Atrial Fibrillation  Therapy Goal: INR 2-2.5  Warfarin Prior to Admission: Yes  Warfarin PTA Regimen: 2 mg daily    INR   Date Value Ref Range Status   07/30/2025 2.14 (H) 0.85 - 1.15 Final   07/29/2025 2.11 (H) 0.85 - 1.15 Final     Recommend warfarin 2 mg today.  Pharmacy will monitor Melvin Abad daily and order warfarin doses to achieve specified goal.      Please contact pharmacy as soon as possible if the warfarin needs to be held for a procedure or if the warfarin goals change.      Thank you,  Daniel Frank, PharmD  Clinical Pharmacist - System [facility]

## 2025-07-30 NOTE — PLAN OF CARE
Goal Outcome Evaluation:      Plan of Care Reviewed With: patient    Overall Patient Progress: improvingOverall Patient Progress: improving    Outcome Evaluation: Pt A&O x4, VSS except for o2, pt is at 90% at room air. pt was switched from IV to oral bumex, voiding with urinal, lung sounds course and diminshed. Trace edema in feet, ankles, and hips. Pt appetite good, pt is to eat up in chair to avoid aspiration.

## 2025-07-31 ENCOUNTER — APPOINTMENT (OUTPATIENT)
Dept: GENERAL RADIOLOGY | Facility: CLINIC | Age: OVER 89
DRG: 291 | End: 2025-07-31
Attending: FAMILY MEDICINE
Payer: COMMERCIAL

## 2025-07-31 VITALS
HEIGHT: 69 IN | TEMPERATURE: 97.9 F | WEIGHT: 198.63 LBS | SYSTOLIC BLOOD PRESSURE: 118 MMHG | OXYGEN SATURATION: 97 % | BODY MASS INDEX: 29.42 KG/M2 | RESPIRATION RATE: 18 BRPM | HEART RATE: 65 BPM | DIASTOLIC BLOOD PRESSURE: 60 MMHG

## 2025-07-31 LAB
ANION GAP SERPL CALCULATED.3IONS-SCNC: 10 MMOL/L (ref 7–15)
BACTERIA PLR CULT: NORMAL
BASE EXCESS BLDV CALC-SCNC: 13.8 MMOL/L (ref -3–3)
BUN SERPL-MCNC: 23.9 MG/DL (ref 8–23)
CALCIUM SERPL-MCNC: 9.3 MG/DL (ref 8.8–10.4)
CHLORIDE SERPL-SCNC: 95 MMOL/L (ref 98–107)
CREAT SERPL-MCNC: 1.17 MG/DL (ref 0.67–1.17)
EGFRCR SERPLBLD CKD-EPI 2021: 57 ML/MIN/1.73M2
ERYTHROCYTE [DISTWIDTH] IN BLOOD BY AUTOMATED COUNT: 15.7 % (ref 10–15)
GLUCOSE SERPL-MCNC: 82 MG/DL (ref 70–99)
GRAM STAIN RESULT: NORMAL
GRAM STAIN RESULT: NORMAL
HCO3 BLDV-SCNC: 40 MMOL/L (ref 21–28)
HCO3 SERPL-SCNC: 35 MMOL/L (ref 22–29)
HCT VFR BLD AUTO: 35.1 % (ref 40–53)
HGB BLD-MCNC: 11.5 G/DL (ref 13.3–17.7)
INR PPP: 2.12 (ref 0.85–1.15)
MCH RBC QN AUTO: 31.9 PG (ref 26.5–33)
MCHC RBC AUTO-ENTMCNC: 32.8 G/DL (ref 31.5–36.5)
MCV RBC AUTO: 97 FL (ref 78–100)
O2/TOTAL GAS SETTING VFR VENT: 24 %
OXYHGB MFR BLDV: 84 % (ref 70–75)
PCO2 BLDV: 58 MM HG (ref 40–50)
PH BLDV: 7.45 [PH] (ref 7.32–7.43)
PLATELET # BLD AUTO: 88 10E3/UL (ref 150–450)
PO2 BLDV: 52 MM HG (ref 25–47)
POTASSIUM SERPL-SCNC: 3.7 MMOL/L (ref 3.4–5.3)
PROTHROMBIN TIME: 23.5 SECONDS (ref 11.8–14.8)
RBC # BLD AUTO: 3.61 10E6/UL (ref 4.4–5.9)
SAO2 % BLDV: 85.9 % (ref 70–75)
SODIUM SERPL-SCNC: 140 MMOL/L (ref 135–145)
WBC # BLD AUTO: 6.4 10E3/UL (ref 4–11)

## 2025-07-31 PROCEDURE — 250N000013 HC RX MED GY IP 250 OP 250 PS 637: Performed by: INTERNAL MEDICINE

## 2025-07-31 PROCEDURE — 250N000013 HC RX MED GY IP 250 OP 250 PS 637: Performed by: FAMILY MEDICINE

## 2025-07-31 PROCEDURE — 85610 PROTHROMBIN TIME: CPT | Performed by: INTERNAL MEDICINE

## 2025-07-31 PROCEDURE — 82805 BLOOD GASES W/O2 SATURATION: CPT | Performed by: FAMILY MEDICINE

## 2025-07-31 PROCEDURE — 85027 COMPLETE CBC AUTOMATED: CPT | Performed by: FAMILY MEDICINE

## 2025-07-31 PROCEDURE — 120N000001 HC R&B MED SURG/OB

## 2025-07-31 PROCEDURE — 80048 BASIC METABOLIC PNL TOTAL CA: CPT | Performed by: FAMILY MEDICINE

## 2025-07-31 PROCEDURE — 71045 X-RAY EXAM CHEST 1 VIEW: CPT

## 2025-07-31 PROCEDURE — 36415 COLL VENOUS BLD VENIPUNCTURE: CPT | Performed by: FAMILY MEDICINE

## 2025-07-31 PROCEDURE — 99233 SBSQ HOSP IP/OBS HIGH 50: CPT | Performed by: FAMILY MEDICINE

## 2025-07-31 RX ORDER — WARFARIN SODIUM 2 MG/1
2 TABLET ORAL
Status: COMPLETED | OUTPATIENT
Start: 2025-07-31 | End: 2025-07-31

## 2025-07-31 RX ORDER — DIPHENHYDRAMINE HYDROCHLORIDE, ZINC ACETATE 2; .1 G/100G; G/100G
CREAM TOPICAL 3 TIMES DAILY PRN
Status: DISCONTINUED | OUTPATIENT
Start: 2025-07-31 | End: 2025-08-04 | Stop reason: HOSPADM

## 2025-07-31 RX ADMIN — GABAPENTIN 200 MG: 100 CAPSULE ORAL at 20:04

## 2025-07-31 RX ADMIN — TAMSULOSIN HYDROCHLORIDE 0.4 MG: 0.4 CAPSULE ORAL at 08:29

## 2025-07-31 RX ADMIN — GUAIFENESIN 600 MG: 600 TABLET, EXTENDED RELEASE ORAL at 08:29

## 2025-07-31 RX ADMIN — BUMETANIDE 1 MG: 0.5 TABLET ORAL at 08:29

## 2025-07-31 RX ADMIN — GABAPENTIN 200 MG: 100 CAPSULE ORAL at 08:29

## 2025-07-31 RX ADMIN — WARFARIN SODIUM 2 MG: 2 TABLET ORAL at 17:36

## 2025-07-31 RX ADMIN — GUAIFENESIN 600 MG: 600 TABLET, EXTENDED RELEASE ORAL at 20:04

## 2025-07-31 RX ADMIN — LORATADINE 10 MG: 10 TABLET ORAL at 08:30

## 2025-07-31 RX ADMIN — METOPROLOL SUCCINATE 50 MG: 50 TABLET, EXTENDED RELEASE ORAL at 08:28

## 2025-07-31 RX ADMIN — BUMETANIDE 1 MG: 0.5 TABLET ORAL at 16:26

## 2025-07-31 RX ADMIN — SIMVASTATIN 40 MG: 40 TABLET, FILM COATED ORAL at 20:04

## 2025-07-31 RX ADMIN — ACETAMINOPHEN 650 MG: 325 TABLET ORAL at 09:42

## 2025-07-31 RX ADMIN — CYANOCOBALAMIN TAB 500 MCG 1000 MCG: 500 TAB at 08:28

## 2025-07-31 ASSESSMENT — ACTIVITIES OF DAILY LIVING (ADL)
ADLS_ACUITY_SCORE: 78
ADLS_ACUITY_SCORE: 80
ADLS_ACUITY_SCORE: 80
ADLS_ACUITY_SCORE: 78
ADLS_ACUITY_SCORE: 78
ADLS_ACUITY_SCORE: 80
ADLS_ACUITY_SCORE: 76
ADLS_ACUITY_SCORE: 78
ADLS_ACUITY_SCORE: 80
ADLS_ACUITY_SCORE: 80
ADLS_ACUITY_SCORE: 76
ADLS_ACUITY_SCORE: 80
ADLS_ACUITY_SCORE: 80
ADLS_ACUITY_SCORE: 78
ADLS_ACUITY_SCORE: 80
ADLS_ACUITY_SCORE: 78
ADLS_ACUITY_SCORE: 80

## 2025-07-31 NOTE — PROGRESS NOTES
[facility]    Medicine Progress Note - Hospitalist Service    Date of Admission:  7/23/2025    Assessment & Plan    Melvin Abad is a 89 year old male with PMHx of left diastolic and right systolic CHF, COPD, prediabetes, hard of hearing who had pneumonia 2-3 months ago and lives at [facility] facility who presented for increasing edema with associated SOB and was found to have acute on chronic CHF exacerbation with dry weight of 206 lbs and admission weight of 220 lbs.  Patient initially received IV diuresis with Lasix but had minimal response so transition to IV Bumex and is down 10 pounds however unfortunately had flash pulmonary edema develop yesterday evening now requiring high flow nasal cannula with chest x-ray showing worsening pulmonary vasculature diffusely as well as ongoing bilateral pleural effusions.  Patient was initiated on a Bumex drip on 7/28 and had excellent diuresis with weight down (20 lbs from admission) and patient is now below previously known dry weight, however patient continued to have HFNC needs with ongoing high FiO2 needs and CT showed large bilateral pleural effusions.  Patient underwent thoracentesis of the right lung yesterday with 1L output and transudate found on testing.  Patient's oxygenation has improved in the hours after thoracentesis and patient is weaned to 1L overnight and trialed on RA this morning and so far tolerating well.  Patient will transition to PO diuretics today and monitor for weight stability and renal tolerance and will plan to proceed with left thoracentesis tomorrow with hopeful discharge back to LTC facility in 2 days.       Acute on chronic diastolic and right sided CHF  Flash pulmonary edema  Acute respiratory failure with hypoxia   Patient with known moderate right sided systolic failure and left sided diastolic failure with dry wt about 206 lbs on daily lasix at baseline presented with weight of 220 lbs on admission  with signs of exacerbation.  Patient has received IV bolus diuresis with some weight loss noted but patient developed flash pulmonary edema and new HFNC need the evening of 7/27/25.  Patient transitioned to a Bumex drip on 7/28 and is now having excellent diuresis and is down 20 lbs total and below previously known dry weight.  S/p thoracentesis of right on 7/29 with 1 L off and transudate on testing.  Patient's respiratory status significantly improved in the hours after the thoracentesis and patient was on 1 L nasal cannula overnight and trialing room air so far today with adequate tolerance.  - Stop Bumex drip and transition to Bumex 1 mg twice daily and monitor for weight stability and renal tolerance.    -Will planned for thoracentesis of the left lung tomorrow when radiology is once more available at this facility.  - CHF protocol I/os and daily weights  - Monitor strict I/Os  - Continue to supply O2 supplementation as needed  - trend renal function and lytes  - will need CHF clinic follow up   - continue metoprolol and amlodipine       Chronic atrial fib -- on Warfarin  Medication induced coagulopathy  - Pharmacy to dose warfarin, will aim for INR 2.0 to 2.5 given   -- cont Metoprolol XL 50 mg daily      Chronic obstructive pulmonary disease  No obvious signs of exacerbation contributing to respiratory failure during this stay   -continue albuterol inhaler prn   - duonebs QID  - hold on steroids at this time unless concerning findings      Thrombocytopenia chronically low in the 110-140s   Platelets have been slightly lower than previous baseline and have been in the 90s to low 100 range so far during this stay but no signs of active bleeding and no obvious etiology for lowering noted.  - trend labs     CKD3a stable   Patient's creatinine appears to be in the 0.9-1.0 at baseline, was 1.21 in week prior to hospitalization while patient was having increased volume concerns and outpatient Lasix was being  "titrated upward.  Now normalizing back to previous baseline as patient is being diuresed.   - Renal dose meds and avoid nephrotoxic agents   - Trend renal function      Anemia, chronic -- hgb 11.9, with baseline hgb 10-12  B12 low normal 360 during this stay    -Vitamin b12 initiated during this stay      Sensorineural hearing loss  Baseline hard of hearing  -continue supportive care during this stay      Prediabetes -- Hgb A1C 5.9 on 5/12/25  Patient does not normally eat a diabetic diet so one has not been started during this stay.  Fasting blood sugars have been in the normal range in the 80s to 90s  - Continue to monitor fasting blood sugars but no further blood sugar monitoring or insulin is needed at this time    Stasis dermatitis noted  Noted and improving on the steroid cream started during this stay   -- topical hydrocortisone cream 1% bid for 7 day administration (starting day 7 of 7)     BPH   -- continue Flomax           Diet: Combination Diet 2 gm Sodium Diet; Low Fat Diet (up to 50g)    DVT Prophylaxis: Warfarin  Ulloa Catheter: Not present  Lines: None     Cardiac Monitoring: ACTIVE order. Indication: Acute decompensated heart failure (48 hours)  Code Status: No CPR- Do NOT Intubate      Clinically Significant Risk Factors          # Hypochloremia: Lowest Cl = 94 mmol/L in last 2 days, will monitor as appropriate      # Hypoalbuminemia: Lowest albumin = 3.3 g/dL at 7/28/2025  5:39 AM, will monitor as appropriate   # Thrombocytopenia: Lowest platelets = 88 in last 2 days, will monitor for bleeding   # Hypertension: Noted on problem list            # Overweight: Estimated body mass index is 29.82 kg/m  as calculated from the following:    Height as of this encounter: 1.753 m (5' 9\").    Weight as of this encounter: 91.6 kg (201 lb 15.1 oz).      # Financial/Environmental Concerns: none         Social Drivers of Health    Tobacco Use: Medium Risk (7/23/2025)    Patient History     Smoking Tobacco Use: " Former     Smokeless Tobacco Use: Never          Disposition Plan     Medically Ready for Discharge: Anticipated in 2-4 Days             Staci Abad MD  Hospitalist Service  [facility]  Securely message with [employer] (more info)  Text page via [employer] Paging/Directory   ______________________________________________________________________    Interval History   Patient tolerated thoracentesis procedure very well and in the hours after the procedure he had significant reduction in his FiO2 and pressure support needs with high flow and was transition to nasal cannula and wean down further overnight.  He is trialing room air this morning and so far is tolerating it well.  Other vital signs are stable.  Patient denies any new symptoms or concerns.  No new nursing concerns.      Physical Exam   Vital Signs: Temp: 98.1  F (36.7  C) Temp src: Oral BP: 131/69 Pulse: 68   Resp: 18 SpO2: 94 % O2 Device: Nasal cannula Oxygen Delivery: 1 LPM  Weight: 201 lbs 15.06 oz    Constitutional: Awake, alert, oriented and in no acute distress   Respiratory: No increased work of breathing, lung sounds are slightly diminished on left lower lung base compared to the right but no crackles or wheezes auscultatedawake, alert, cooperative, no apparent distress, and appears stated age  Cardiovascular: Irregularly irregular rhythm  GI: bowel sounds are present, abdomen is obese but soft and nondistended  Musculoskeletal: Trace pitting edema present in bilateral lower extremities  Neurologic: Awake, alert, oriented to person, place,and situation    Medical Decision Making       44 MINUTES SPENT BY ME on the date of service doing chart review, history, exam, documentation & further activities per the note.      Data     I have personally reviewed the following data over the past 24 hrs:    6.1  \   11.5 (L)   / 88 (L)     138 95 (L) 21.2 /  99   3.5 36 (H) 1.13 \     INR:  2.14 (H) PTT:  N/A   D-dimer:   N/A Fibrinogen:  N/A

## 2025-07-31 NOTE — PLAN OF CARE
"Goal Outcome Evaluation:      Plan of Care Reviewed With: patient    Overall Patient Progress: improvingOverall Patient Progress: improving    Outcome Evaluation: A&O x 4. Up in chair during breakfast and lunch via ceiling lift. New Mepilex applied on heels, skin is intact. Intermittent use of O2 support, but now on 1L/min of O2 via NC, SPO2 >90%. Lung bases have diminished sound. Patient frequently asked for his \"procedure\", thoracentesis, and was informed it would be tomorrow 7/31 as per other notes. Patient reported itchiness on his back that he asked for an \"allergic shot\". Cleansed his back with warm cloth and applied hydrocortisone anti-itch cream available in his room.     /69 (BP Location: Right arm)   Pulse 68   Temp 98.1  F (36.7  C) (Oral)   Resp 18   Ht 1.753 m (5' 9\")   Wt 91.6 kg (201 lb 15.1 oz)   SpO2 95%   BMI 29.82 kg/m              "

## 2025-07-31 NOTE — PLAN OF CARE
"Goal Outcome Evaluation:         Patient a/o x4. Mepilex, pads on heels, elevated with pillows. Other skin in tact. 1L of O2 via NC, SPO2 >90%. Coarse lung sounds, diminished in lower quadrants. Denies pain. Afib w/ BBB on tele.     /69 (BP Location: Right arm)   Pulse 68   Temp 98.1  F (36.7  C) (Oral)   Resp 18   Ht 1.753 m (5' 9\")   Wt 91.6 kg (201 lb 15.1 oz)   SpO2 95%   BMI 29.82 kg/m                       "

## 2025-07-31 NOTE — PROGRESS NOTES
[facility]    Medicine Progress Note - Hospitalist Service    Date of Admission:  7/23/2025    Assessment & Plan    Melvin Abad is a 89 year old male with PMHx of left diastolic and right systolic CHF, COPD, prediabetes, hard of hearing who had pneumonia 2-3 months ago and lives at [facility] facility who presented for increasing edema with associated SOB and was found to have acute on chronic CHF exacerbation with dry weight of 206 lbs and admission weight of 220 lbs.  Patient initially received IV diuresis with Lasix but had minimal response so transition to IV Bumex and is down 10 pounds however unfortunately had flash pulmonary edema develop yesterday evening now requiring high flow nasal cannula with chest x-ray showing worsening pulmonary vasculature diffusely as well as ongoing bilateral pleural effusions.  Patient was initiated on a Bumex drip on 7/28 and had excellent diuresis with weight down (20 lbs from admission) and patient is now below previously known dry weight, however patient continued to have HFNC needs with ongoing high FiO2 needs and CT showed large bilateral pleural effusions.  Patient underwent thoracentesis of the right lung yesterday with 1L output and transudate found on testing.  Patient's oxygenation has improved in the hours after thoracentesis and patient is weaned to 1L but continues to require this 1L to maintain saturations.  Plan is to perform left thoracentesis tomorrow with further weaning of oxygen as able    Today hospital course has been further complicated by urinary retention x1 and patient had straight catheter performed.  Possibly secondary to systemic benadryl given last night which can cause retention.  Will stop PO benadryl and given topical formation instead, continue to monitor closely, encourage sitting or standing position when attempting to void, and straight cath as appropriate.      Acute on chronic diastolic and right sided  CHF  Flash pulmonary edema  Acute respiratory failure with hypoxia   Patient with known moderate right sided systolic failure and left sided diastolic failure with dry wt about 206 lbs on daily lasix at baseline presented with weight of 220 lbs on admission with signs of exacerbation.  Patient has received IV bolus diuresis with some weight loss noted but patient developed flash pulmonary edema and new HFNC need the evening of 7/27/25.  Patient transitioned to a Bumex drip on 7/28 and is now having excellent diuresis and is down 20 lbs total and below previously known dry weight.  S/p thoracentesis of right on 7/29 with 1 L off and transudate on testing.  Patient's respiratory status significantly improved in the hours after the thoracentesis and patient was on 1 L nasal cannula but cannot be weaned to RA.    - Continue Bumex 1 mg twice daily and monitor for weight stability and renal tolerance.    - Will planned for thoracentesis of the left lung tomorrow when radiology is once more available at this facility.  - CHF protocol I/os and daily weights  - Monitor strict I/Os  - Continue to supply O2 supplementation as needed  - trend renal function and lytes  - will need CHF clinic follow up   - continue metoprolol and amlodipine       Urinary retention  Developing today x1 and of unclear etiology but may be secondary to benadryl given orally last night as this was the only new medication for the patient and can be associated with urinary retention   -will discontinue oral benadryl and use topical benadryl for patient's intermittent back itching  -encouraged upright positioning (sitting or standing) to attempt urination  -straight cath as needed for ongoing retention     Chronic atrial fib -- on Warfarin  Medication induced coagulopathy  - Pharmacy to dose warfarin, will aim for INR 2.0 to 2.5 given   -- cont Metoprolol XL 50 mg daily      Chronic obstructive pulmonary disease  No obvious signs of exacerbation  contributing to respiratory failure during this stay   -continue albuterol inhaler prn   - duonebs QID  - hold on steroids at this time unless concerning findings      Thrombocytopenia chronically low in the 110-140s   Platelets have been slightly lower than previous baseline and have been in the upper 80s to low 100 range so far during this stay but no signs of active bleeding and no obvious etiology for lowering noted.  - trend labs     CKD3a stable   Patient's creatinine appears to be in the 0.9-1.1 at baseline, was 1.21 in week prior to hospitalization while patient was having increased volume concerns and outpatient Lasix was being titrated upward.  Now normalizing back to previous baseline as patient is being diuresed.   - Renal dose meds and avoid nephrotoxic agents   - Trend renal function      Anemia, chronic -- hgb 11.9, with baseline hgb 10-12  B12 low normal 360 during this stay    -Vitamin b12 initiated during this stay      Sensorineural hearing loss  Baseline hard of hearing  -continue supportive care during this stay      Prediabetes -- Hgb A1C 5.9 on 5/12/25  Patient does not normally eat a diabetic diet so one has not been started during this stay.  Fasting blood sugars have been in the normal range in the 80s to 90s  - Continue to monitor fasting blood sugars but no further blood sugar monitoring or insulin is needed at this time    Stasis dermatitis noted  Noted and improving on the steroid cream started during this stay   -- topical hydrocortisone cream 1% bid for 7 day administration completed during this stay  -- prn benadryl cream for itching      BPH   -- continue Flomax           Diet: Combination Diet 2 gm Sodium Diet; Low Fat Diet (up to 50g)    DVT Prophylaxis: Warfarin  Ulloa Catheter: Not present  Lines: None     Cardiac Monitoring: None  Code Status: No CPR- Do NOT Intubate      Clinically Significant Risk Factors          # Hypochloremia: Lowest Cl = 94 mmol/L in last 2 days, will  "monitor as appropriate      # Hypoalbuminemia: Lowest albumin = 3.3 g/dL at 7/28/2025  5:39 AM, will monitor as appropriate   # Thrombocytopenia: Lowest platelets = 88 in last 2 days, will monitor for bleeding   # Hypertension: Noted on problem list            # Overweight: Estimated body mass index is 29.33 kg/m  as calculated from the following:    Height as of this encounter: 1.753 m (5' 9\").    Weight as of this encounter: 90.1 kg (198 lb 10.2 oz).      # Financial/Environmental Concerns: none         Social Drivers of Health    Tobacco Use: Medium Risk (7/23/2025)    Patient History     Smoking Tobacco Use: Former     Smokeless Tobacco Use: Never          Disposition Plan     Medically Ready for Discharge: Anticipated in 2-4 Days             Staci Abad MD  Hospitalist Service  [facility]  Securely message with [employer] (more info)  Text page via [employer] Paging/Directory   ______________________________________________________________________    Interval History   Patient has been stable overnight - still needing 1L NC oxygen but unable to titrate to RA.  Patient did have itching in upper back last evening and was given benadryl x1 with good results and slept well.  Today having new onset urinary retention with straight cath x1.  Vitals stable.  No new symptoms apart form above.     Physical Exam   Vital Signs: Temp: 98.1  F (36.7  C) Temp src: Oral BP: 131/69 Pulse: 68   Resp: 18 SpO2: 95 % O2 Device: Nasal cannula Oxygen Delivery: 1 LPM  Weight: 198 lbs 10.15 oz    Constitutional: awake, alert, cooperative, no apparent distress, and appears stated age  Respiratory: No increased work of breathing, decreased air exchange, clear to auscultation bilaterally, no crackles or wheezing  Cardiovascular: irregularly irregular rhythm   GI: bowel sounds present, abdomen soft and non-tender   Musculoskeletal: trace pitting edema in bilateral lower legs   Neurologic: Awake, alert, " oriented to name, place and situation     Medical Decision Making       55 MINUTES SPENT BY ME on the date of service doing chart review, history, exam, documentation & further activities per the note.      Data     I have personally reviewed the following data over the past 24 hrs:    6.4  \   11.5 (L)   / 88 (L)     140 95 (L) 23.9 (H) /  82   3.7 35 (H) 1.17 \     INR:  2.12 (H) PTT:  N/A   D-dimer:  N/A Fibrinogen:  N/A

## 2025-07-31 NOTE — PHARMACY-ANTICOAGULATION SERVICE
Clinical Pharmacy - Warfarin Dosing Consult     Pharmacy has been consulted to manage this patient s warfarin therapy.       INR   Date Value Ref Range Status   07/31/2025 2.12 (H) 0.85 - 1.15 Final   07/30/2025 2.14 (H) 0.85 - 1.15 Final       Recommend warfarin 2 mg today.  Pharmacy will monitor Melvin Abad daily and order warfarin doses to achieve specified goal.      Please contact pharmacy as soon as possible if the warfarin needs to be held for a procedure or if the warfarin goals change.

## 2025-07-31 NOTE — PROVIDER NOTIFICATION
"S-(situation): Patient not relieved with Hydrocortisone cream for itching on his back.    B-(background): Stasis dermatitis     A-(assessment): Patient noted to be asking for more ways to relieve the \"fire itch.\" Asked staff to help scratch his back.    R-(recommendations): Dr. Edwards was updated and new order for Benadryl PRN, given.    "

## 2025-08-01 ENCOUNTER — APPOINTMENT (OUTPATIENT)
Dept: GENERAL RADIOLOGY | Facility: CLINIC | Age: OVER 89
DRG: 291 | End: 2025-08-01
Attending: STUDENT IN AN ORGANIZED HEALTH CARE EDUCATION/TRAINING PROGRAM
Payer: COMMERCIAL

## 2025-08-01 ENCOUNTER — APPOINTMENT (OUTPATIENT)
Dept: ULTRASOUND IMAGING | Facility: CLINIC | Age: OVER 89
DRG: 291 | End: 2025-08-01
Attending: FAMILY MEDICINE
Payer: COMMERCIAL

## 2025-08-01 LAB
ANION GAP SERPL CALCULATED.3IONS-SCNC: 12 MMOL/L (ref 7–15)
BASE EXCESS BLDV CALC-SCNC: 14.2 MMOL/L (ref -3–3)
BUN SERPL-MCNC: 25.5 MG/DL (ref 8–23)
CALCIUM SERPL-MCNC: 9.1 MG/DL (ref 8.8–10.4)
CHLORIDE SERPL-SCNC: 92 MMOL/L (ref 98–107)
CREAT SERPL-MCNC: 1.14 MG/DL (ref 0.67–1.17)
EGFRCR SERPLBLD CKD-EPI 2021: 58 ML/MIN/1.73M2
ERYTHROCYTE [DISTWIDTH] IN BLOOD BY AUTOMATED COUNT: 15.6 % (ref 10–15)
GLUCOSE SERPL-MCNC: 83 MG/DL (ref 70–99)
HCO3 BLDV-SCNC: 41 MMOL/L (ref 21–28)
HCO3 SERPL-SCNC: 31 MMOL/L (ref 22–29)
HCT VFR BLD AUTO: 35 % (ref 40–53)
HGB BLD-MCNC: 11.4 G/DL (ref 13.3–17.7)
INR PPP: 2.07 (ref 0.85–1.15)
MCH RBC QN AUTO: 31.5 PG (ref 26.5–33)
MCHC RBC AUTO-ENTMCNC: 32.6 G/DL (ref 31.5–36.5)
MCV RBC AUTO: 97 FL (ref 78–100)
O2/TOTAL GAS SETTING VFR VENT: 44 %
OXYHGB MFR BLDV: 56 % (ref 70–75)
PCO2 BLDV: 62 MM HG (ref 40–50)
PH BLDV: 7.43 [PH] (ref 7.32–7.43)
PLATELET # BLD AUTO: 96 10E3/UL (ref 150–450)
PO2 BLDV: 31 MM HG (ref 25–47)
POTASSIUM SERPL-SCNC: 4.4 MMOL/L (ref 3.4–5.3)
PROTHROMBIN TIME: 23.1 SECONDS (ref 11.8–14.8)
RBC # BLD AUTO: 3.62 10E6/UL (ref 4.4–5.9)
SAO2 % BLDV: 57.1 % (ref 70–75)
SODIUM SERPL-SCNC: 135 MMOL/L (ref 135–145)
WBC # BLD AUTO: 5.7 10E3/UL (ref 4–11)

## 2025-08-01 PROCEDURE — 0W9B3ZZ DRAINAGE OF LEFT PLEURAL CAVITY, PERCUTANEOUS APPROACH: ICD-10-PCS | Performed by: STUDENT IN AN ORGANIZED HEALTH CARE EDUCATION/TRAINING PROGRAM

## 2025-08-01 PROCEDURE — 250N000013 HC RX MED GY IP 250 OP 250 PS 637: Performed by: INTERNAL MEDICINE

## 2025-08-01 PROCEDURE — 272N000482 HC MASK, CPAP TOTAL HEADGEAR, LATEX FREE

## 2025-08-01 PROCEDURE — 85027 COMPLETE CBC AUTOMATED: CPT | Performed by: FAMILY MEDICINE

## 2025-08-01 PROCEDURE — 82805 BLOOD GASES W/O2 SATURATION: CPT | Performed by: FAMILY MEDICINE

## 2025-08-01 PROCEDURE — 94799 UNLISTED PULMONARY SVC/PX: CPT

## 2025-08-01 PROCEDURE — 5A09357 ASSISTANCE WITH RESPIRATORY VENTILATION, LESS THAN 24 CONSECUTIVE HOURS, CONTINUOUS POSITIVE AIRWAY PRESSURE: ICD-10-PCS | Performed by: FAMILY MEDICINE

## 2025-08-01 PROCEDURE — 94640 AIRWAY INHALATION TREATMENT: CPT

## 2025-08-01 PROCEDURE — 272N000710 US THORACENTESIS

## 2025-08-01 PROCEDURE — 999N000157 HC STATISTIC RCP TIME EA 10 MIN

## 2025-08-01 PROCEDURE — 999N000065 XR CHEST PORT 1 VIEW

## 2025-08-01 PROCEDURE — 250N000013 HC RX MED GY IP 250 OP 250 PS 637: Performed by: FAMILY MEDICINE

## 2025-08-01 PROCEDURE — 80048 BASIC METABOLIC PNL TOTAL CA: CPT | Performed by: FAMILY MEDICINE

## 2025-08-01 PROCEDURE — 94660 CPAP INITIATION&MGMT: CPT

## 2025-08-01 PROCEDURE — 99232 SBSQ HOSP IP/OBS MODERATE 35: CPT | Performed by: FAMILY MEDICINE

## 2025-08-01 PROCEDURE — 999N000156 HC STATISTIC RCP CONSULT EA 30 MIN

## 2025-08-01 PROCEDURE — 85610 PROTHROMBIN TIME: CPT | Performed by: INTERNAL MEDICINE

## 2025-08-01 PROCEDURE — 272N000202 HC AEROBIKA WITH MANOMETER

## 2025-08-01 PROCEDURE — 999N000123 HC STATISTIC OXYGEN O2DAILY TECH TIME

## 2025-08-01 PROCEDURE — 36415 COLL VENOUS BLD VENIPUNCTURE: CPT | Performed by: FAMILY MEDICINE

## 2025-08-01 PROCEDURE — 120N000001 HC R&B MED SURG/OB

## 2025-08-01 PROCEDURE — 250N000009 HC RX 250: Performed by: INTERNAL MEDICINE

## 2025-08-01 RX ORDER — BUMETANIDE 2 MG/1
2 TABLET ORAL
Status: DISCONTINUED | OUTPATIENT
Start: 2025-08-01 | End: 2025-08-04 | Stop reason: HOSPADM

## 2025-08-01 RX ORDER — WARFARIN SODIUM 2 MG/1
2 TABLET ORAL
Status: COMPLETED | OUTPATIENT
Start: 2025-08-01 | End: 2025-08-01

## 2025-08-01 RX ORDER — LIDOCAINE 40 MG/G
CREAM TOPICAL
Status: DISCONTINUED | OUTPATIENT
Start: 2025-08-01 | End: 2025-08-04 | Stop reason: HOSPADM

## 2025-08-01 RX ADMIN — ACETAMINOPHEN 650 MG: 325 TABLET ORAL at 00:35

## 2025-08-01 RX ADMIN — GABAPENTIN 200 MG: 100 CAPSULE ORAL at 20:28

## 2025-08-01 RX ADMIN — WHITE PETROLATUM: 1.75 OINTMENT TOPICAL at 17:50

## 2025-08-01 RX ADMIN — PSYLLIUM HUSK 1 PACKET: 3.4 POWDER ORAL at 09:00

## 2025-08-01 RX ADMIN — IPRATROPIUM BROMIDE AND ALBUTEROL SULFATE 3 ML: .5; 3 SOLUTION RESPIRATORY (INHALATION) at 02:09

## 2025-08-01 RX ADMIN — ACETAMINOPHEN 650 MG: 325 TABLET ORAL at 20:28

## 2025-08-01 RX ADMIN — AMLODIPINE BESYLATE 5 MG: 5 TABLET ORAL at 09:03

## 2025-08-01 RX ADMIN — TAMSULOSIN HYDROCHLORIDE 0.4 MG: 0.4 CAPSULE ORAL at 09:03

## 2025-08-01 RX ADMIN — GUAIFENESIN 600 MG: 600 TABLET, EXTENDED RELEASE ORAL at 20:29

## 2025-08-01 RX ADMIN — SIMVASTATIN 40 MG: 40 TABLET, FILM COATED ORAL at 20:29

## 2025-08-01 RX ADMIN — METOPROLOL SUCCINATE 50 MG: 50 TABLET, EXTENDED RELEASE ORAL at 09:03

## 2025-08-01 RX ADMIN — GABAPENTIN 200 MG: 100 CAPSULE ORAL at 09:02

## 2025-08-01 RX ADMIN — CYANOCOBALAMIN TAB 500 MCG 1000 MCG: 500 TAB at 09:03

## 2025-08-01 RX ADMIN — BUMETANIDE 2 MG: 2 TABLET ORAL at 17:49

## 2025-08-01 RX ADMIN — WARFARIN SODIUM 2 MG: 2 TABLET ORAL at 17:49

## 2025-08-01 RX ADMIN — LORATADINE 10 MG: 10 TABLET ORAL at 09:03

## 2025-08-01 RX ADMIN — BUMETANIDE 1 MG: 0.5 TABLET ORAL at 09:02

## 2025-08-01 RX ADMIN — DIPHENHYDRAMINE HYDROCHLORIDE, ZINC ACETATE: 2; .1 CREAM TOPICAL at 09:01

## 2025-08-01 RX ADMIN — GUAIFENESIN 600 MG: 600 TABLET, EXTENDED RELEASE ORAL at 09:02

## 2025-08-01 RX ADMIN — DIPHENHYDRAMINE HYDROCHLORIDE, ZINC ACETATE: 2; .1 CREAM TOPICAL at 20:28

## 2025-08-01 RX ADMIN — DIPHENHYDRAMINE HYDROCHLORIDE, ZINC ACETATE: 2; .1 CREAM TOPICAL at 18:22

## 2025-08-01 RX ADMIN — MELATONIN 3 MG: 3 TAB ORAL at 20:29

## 2025-08-01 ASSESSMENT — ACTIVITIES OF DAILY LIVING (ADL)
ADLS_ACUITY_SCORE: 74
ADLS_ACUITY_SCORE: 74
ADLS_ACUITY_SCORE: 76
ADLS_ACUITY_SCORE: 74
ADLS_ACUITY_SCORE: 76
ADLS_ACUITY_SCORE: 74
ADLS_ACUITY_SCORE: 74
ADLS_ACUITY_SCORE: 76
ADLS_ACUITY_SCORE: 74
ADLS_ACUITY_SCORE: 76
ADLS_ACUITY_SCORE: 76
ADLS_ACUITY_SCORE: 74
ADLS_ACUITY_SCORE: 76

## 2025-08-01 NOTE — PHARMACY-ANTICOAGULATION SERVICE
Clinical Pharmacy - Warfarin Dosing Consult     Pharmacy has been consulted to manage this patient s warfarin therapy.  Indication: Atrial Fibrillation  Therapy Goal: INR 2-2.5  Warfarin Prior to Admission: Yes  Warfarin PTA Regimen: 2 mg daily  Recent documented change in oral intake/nutrition: Unknown    INR   Date Value Ref Range Status   08/01/2025 2.07 (H) 0.85 - 1.15 Final   07/31/2025 2.12 (H) 0.85 - 1.15 Final       Recommend warfarin 2 mg today.  Pharmacy will monitor Felicewilmar Abad daily and order warfarin doses to achieve specified goal.      Please contact pharmacy as soon as possible if the warfarin needs to be held for a procedure or if the warfarin goals change.

## 2025-08-01 NOTE — PLAN OF CARE
Goal Outcome Evaluation:      Plan of Care Reviewed With: patient    Overall Patient Progress: improvingOverall Patient Progress: improving    Outcome Evaluation: Pt has been A&Ox4. C/o pain to bilateral feet, dressings changed, heel protectors on and heels floated, tylenol given and effective. On 1L O2, weaning unsuccessful.  Pt is noted to desat when sleeping and laying flat. Amlodipine held due to SBP <120. Potassium 3.7 and is an am recheck. Did have to straight cath pt x1 due to being unable to urinate. Provider updated. Pt generally sleepy today. He did eat fair for meals and was up to the chair for lunch and dinner with ceiling lift. Has not urinated since straight cath at 1400. Bladder scanned for 341 at 1850. No urge to urinate and no abdominal discomfort at this time. Will continue to monitor.

## 2025-08-01 NOTE — PLAN OF CARE
"Goal Outcome Evaluation:      Plan of Care Reviewed With: patient           Pt a/o X4. Start of shift patient complained of not being able to urinate, Patient was able to go in urinal X4 this shift. Patient started wheezing, progressively got worse. Doctor notified, neb given. Sats still in low 80's cpap mask place on 7 LPM while sleeping, Sats in 90's. Patient refused CPAP, Oxymask placed Sats 86-89. Other VS stable. Patient c/o heel pain, tylenol given and effective. No abdominal discomfort.   /60 (BP Location: Right arm)   Pulse 66   Temp 97.9  F (36.6  C) (Oral)   Resp 20   Ht 1.753 m (5' 9\")   Wt 92.3 kg (203 lb 7.8 oz)   SpO2 93%   BMI 30.05 kg/m              "

## 2025-08-01 NOTE — PLAN OF CARE
Goal Outcome Evaluation:      Plan of Care Reviewed With: patient    Overall Patient Progress: no changeOverall Patient Progress: no change    Outcome Evaluation: received patient 1545 alert and oriented. O2 dip lower 86% hooked back to O2 at 4lpm then patient O2 stablized able to wean to 1L and satting above 86%. Lung sounds still wheezy and dimished on BLL. Still on oral bumex and having good UOP. D/C to [facility] [place] once medically stable.

## 2025-08-01 NOTE — PROGRESS NOTES
CLINICAL NUTRITION SERVICES - ASSESSMENT NOTE    RECOMMENDATIONS FOR MDs/PROVIDERS TO ORDER:  None at this time     Registered Dietitian Interventions:  Will offer trial of Ensure plus HP (any flavor) BID with breakfast and dinner to support PO intake - plan to monitor and adjust based on pt preference / tolerance     Future/Additional Recommendations:  Will follow to monitor oral intake, weight changes, GI symptoms/Bms, and nutritional supplement tolerance      REASON FOR ASSESSMENT  LOS    INFORMATION OBTAINED  Will meet with pt as able.     NUTRITION HISTORY  Pt admitted with increased edema. Recently had lasix increased from 40 to 60 mg a day. Also recently started bumex. O2 sats have been marginal. Has a cough and more difficulty breathing. Swelling to legs and genitals. Is not on O2 at home. Feels fatigue for past week. Resides at [facility].     Found to have acute on chronic CHF, acute respiratory failure with hypoxia, flash pulmonary edema, urinary retention    Dxhx of chronic a.fib, BPH, CKD stage 3a, thrombocytopenia, CVA, anemia, sensorineural hearing loss, COPD, prediabetes, stasis dermatitis, hyperlipidemia, diverticulosis, HTN, skin cancer, actinic keratosis, Gilbert syndrome, carpal tunnel syndrome, obesity, slow transit constipation, sleep disturbances, dry eyes, RLS, osteoarthritis, weakness, hematoma, influenza A, tobacco use, hyperglycemia, allergic rhinitis, neuropathy, vitamin D def, TIA     CURRENT NUTRITION ORDERS  Diet: Orders Placed This Encounter      Combination Diet 2 gm Sodium Diet; Low Fat Diet (up to 50g)    Snacks/Supplements: none       CURRENT INTAKE/TOLERANCE  Inconsistent ranging from %, mostly 50%, latest intake of 100% for breakfast this AM and 50% for lunch. Had 50-75% yesterday.     LABS  Nutrition-relevant labs: low chloride, elevated CO2, elevated urea nitrogen, low GFR, elevated bilirubin, elevated BGs, elevated BNP, low hemoglobin and hematocrit  "    MEDICATIONS  Nutrition-relevant medications: norvasc, bumex, vitamin B12, gabapentin, toprol, metamucil, zocor, flomax, warfarin, sodium chloride IVF stopped 7/30, PRN tylenol last given today 8/1    ANTHROPOMETRICS  Height: 175.3 cm (5' 9\")  Admission Weight: 100.7 kg (221 lb 14.4 oz) (07/23/25 1205)   Most Recent Weight: 92.3 kg (203 lb 7.8 oz) (08/01/25 0526) via bed scale   IBW: 160 lbs  BMI: Body mass index is 30.05 kg/m .   Weight History:   Wt Readings from Last 35 Encounters:   08/01/25 92.3 kg (203 lb 7.8 oz)   07/23/25 100.7 kg (221 lb 14.4 oz)   07/21/25 99.3 kg (218 lb 14.4 oz)   07/16/25 98.8 kg (217 lb 12.8 oz)   07/08/25 96.6 kg (212 lb 14.4 oz)   06/23/25 96.2 kg (212 lb 1.6 oz)   06/06/25 95.6 kg (210 lb 11.2 oz)   05/28/25 97.2 kg (214 lb 4.8 oz)   05/13/25 97.2 kg (214 lb 4.8 oz)   05/13/25 97.2 kg (214 lb 4.8 oz)   05/06/25 96 kg (211 lb 11.2 oz)   05/01/25 96.6 kg (213 lb)   05/01/25 97.4 kg (214 lb 11.2 oz)   04/04/25 95.3 kg (210 lb 1.6 oz)   03/27/25 95.3 kg (210 lb 1.6 oz)   03/17/25 95.4 kg (210 lb 6.4 oz)   03/13/25 96.2 kg (212 lb)   02/28/25 86 kg (189 lb 8 oz)   02/21/25 80.1 kg (176 lb 8 oz)   02/13/25 82.6 kg (182 lb)   02/07/25 91.2 kg (201 lb)   02/03/25 94.3 kg (208 lb)   02/02/25 94.3 kg (208 lb)   01/29/25 93.4 kg (206 lb)   11/14/24 92.5 kg (204 lb)   10/29/24 89.1 kg (196 lb 8 oz)   10/21/24 92.3 kg (203 lb 8 oz)   10/18/24 88.2 kg (194 lb 8 oz)   08/08/24 94.2 kg (207 lb 9.6 oz)   06/06/24 94.9 kg (209 lb 4.8 oz)   05/14/24 93.2 kg (205 lb 6.4 oz)   02/13/24 94.3 kg (208 lb)   02/12/24 94.3 kg (208 lb)   02/05/24 94.6 kg (208 lb 8 oz)   01/29/24 93.7 kg (206 lb 8 oz)   Does have edema currently that could be contributing to weight status. 2+ mild to hips, 1+ trace to flanks, 2+ mild to perineum, 3+ moderate scrotal, and 1+ trace to BLEs up to leg.     Per chart review of nursing home visit note on 7/23, pt typical weight of around 210 lbs. Had 3+ edema to BLEs, abdominal " swelling.     4.2% weight loss in approx. 1 month (does not meet criteria for malnutrition)     Dosing Weight: 77.7 kg, based on adjusted wt    ASSESSED NUTRITION NEEDS  Estimated Energy Needs: 1,943-2,331 kcals/day (25 - 30 kcals/kg)  Justification: Increased needs and Repletion  Estimated Protein Needs: 47-93 grams protein/day (0.6 - 1.2 grams of pro/kg)  Justification: CKD, Hypercatabolism with acute illness, Increased needs, and Repletion  Estimated Fluid Needs: 1,943-2,331 mL/day (25 - 30 mL/kg)  Justification: Maintenance    SYSTEM AND PHYSICAL FINDINGS    Unable to assess physical appearance   GI symptoms: abdomen is taut, non-tender (tender earlier in stay). Bowel sounds are hyperactive. Pt is passing flatus. Last BM was 7/31 - was very dark brown.   Skin/wounds: blanchable redness to heels, skin tear to left lower arm    MALNUTRITION  % Intake: Decreased intake does not meet criteria - some 100% intakes  % Weight Loss: Weight loss does not meet criteria   Subcutaneous Fat Loss: Unable to assess  Muscle Loss: Unable to assess  Fluid Accumulation/Edema: Does not meet criteria - CHF   Malnutrition Diagnosis: Unable to determine due to lack of NFPE  Malnutrition Present on Admission: Unable to assess    NUTRITION DIAGNOSIS  Inadequate oral intake related to poor appetite, acute on chronic illness as evidenced by inconsistent intakes of % during stay, weight loss of 4.2% in approx. 1 month, and increased needs above baseline     INTERVENTIONS  Collaboration by nutrition professional with other providers  Medical food supplement therapy - Ensure BID   Nutrition education content - will provide as able    GOALS  Patient to consume % of nutritionally adequate meal trays TID, or the equivalent with supplements/snacks.  Pt weight will remain stable during admission  Pt will tolerate nutritional supplement      MONITORING/EVALUATION  Progress toward goals will be monitored and evaluated per  "policy.    Court Guerra RD, LD  Clinical Dietitian  Office: [phone]  Vocera: \"Weekend Holiday Clinical Dietitian\"  "

## 2025-08-01 NOTE — PROGRESS NOTES
[facility]    Medicine Progress Note - Hospitalist Service    Date of Admission:  7/23/2025    Assessment & Plan   Melvin Abad is a 89 year old male with PMHx of left diastolic and right systolic CHF, COPD, prediabetes, hard of hearing who had pneumonia 2-3 months ago and lives at [facility] facility who presented for increasing edema with associated SOB and was found to have acute on chronic CHF exacerbation with dry weight of 206 lbs and admission weight of 220 lbs.  Patient initially received IV diuresis with Lasix but had minimal response so transition to IV Bumex however unfortunately had flash pulmonary edema develop and started requiring high flow nasal cannula with chest x-ray showing worsening pulmonary vasculature diffusely as well as ongoing bilateral pleural effusions.  Patient was initiated on a Bumex drip on 7/28 and had excellent diuresis with weight down (20 lbs from admission) and patient became below previously known dry weight, however patient continued to have HFNC needs with ongoing high FiO2 needs and CT showed large bilateral pleural effusions.  Patient underwent thoracentesis of the right lung with 1L output two days ago and transudate found on testing with respiratory needs decreasing and now has undergone drainage of the left lung today with over 1L output and patient having rapid improvement in oxygen needs (from 6 L NC this morning to 1 L this afternoon).   Patient was transitioned to PO diuretics yesterday (Bumex 1 mg daily) but unfortunately has had a 5 lbs weight gain and will titrate up to 2 mg BID today with ongoing close monitoring of respiratory status, weight and renal function.  Anticipate discharge back to LTC facility in 1-3 day once weight stabilizes on oral regimen, discharge oxygen needs known and renal function stable.      Acute on chronic diastolic and right sided CHF  Flash pulmonary edema  Acute respiratory failure with hypoxia    Patient with known moderate right sided systolic failure and left sided diastolic failure with dry wt about 206 lbs on daily lasix at baseline presented with weight of 220 lbs on admission with signs of exacerbation.  Patient has received IV bolus diuresis with some weight loss noted but patient developed flash pulmonary edema and new HFNC need the evening of 7/27/25.  Patient transitioned to a Bumex drip on 7/28 and is now having excellent diuresis and is down 20 lbs total and below previously known dry weight.  S/p thoracentesis of right on 7/29 with 1 L off and transudate on testing.  Patient underwent thoracentesis of the left lung today with over 1L output.  Patient's respiratory status significantly improved in the hours after the thoracentesis and patient is on 1 L nasal cannula currently  - Continue Bumex but increase to 2 mg twice daily and monitor for weight stability and renal tolerance.    - Consider prn metolazone during this stay and at time of discharge.   - CHF protocol I/os and daily weights  - Monitor strict I/Os  - Continue to supply O2 supplementation as needed  - trend renal function and lytes  - will need CHF clinic follow up   - continue metoprolol and amlodipine       Urinary retention  Developed on 7/31 and patient needed straight cath x1 - thought secondary to benadryl given the night before this issue as this was the only new medication for the patient and can be associated with urinary retention.  Patient now voiding well without further concern for retention.   -continue to avoid oral benadryl and use topical benadryl for patient's intermittent back itching  -encouraged upright positioning (sitting or standing) to attempt urination as patient can allow  -continue to monitor for resolution     Chronic atrial fib -- on Warfarin  Medication induced coagulopathy  - Pharmacy to dose warfarin, will aim for INR 2.0 to 2.5 given   -- cont Metoprolol XL 50 mg daily      Chronic obstructive  pulmonary disease  No obvious signs of exacerbation contributing to respiratory failure during this stay   -continue albuterol inhaler prn   - duonebs QID  - hold on steroids at this time unless concerning findings      Thrombocytopenia chronically low in the 110-140s   Platelets have been slightly lower than previous baseline and have been in the upper 80s to low 100 range so far during this stay but no signs of active bleeding and no obvious etiology for lowering noted.  - trend labs     CKD3a stable   Patient's creatinine appears to be in the 0.9-1.1 at baseline, was 1.21 in week prior to hospitalization while patient was having increased volume concerns and outpatient Lasix was being titrated upward.  Now normalizing back to previous baseline as patient is being diuresed.   - Renal dose meds and avoid nephrotoxic agents   - Trend renal function      Anemia, chronic -- hgb 11.9, with baseline hgb 10-12  B12 low normal 360 during this stay    -Vitamin b12 initiated during this stay      Sensorineural hearing loss  Baseline hard of hearing  -continue supportive care during this stay      Prediabetes -- Hgb A1C 5.9 on 5/12/25  Patient does not normally eat a diabetic diet so one has not been started during this stay.  Fasting blood sugars have been in the normal range in the 80s to 90s  - Continue to monitor fasting blood sugars but no further blood sugar monitoring or insulin is needed at this time    Stasis dermatitis noted  Noted and improving on the steroid cream started during this stay   -- topical hydrocortisone cream 1% bid for 7 day administration completed during this stay  -- prn benadryl cream for itching      BPH   -- continue Flomax           Diet: Combination Diet 2 gm Sodium Diet; Low Fat Diet (up to 50g)  Snacks/Supplements Adult: Ensure Plus High Protein; With Meals    DVT Prophylaxis: Warfarin  Ulloa Catheter: Not present  Lines: None     Cardiac Monitoring: None  Code Status: No CPR- Do NOT  "Intubate      Clinically Significant Risk Factors          # Hypochloremia: Lowest Cl = 92 mmol/L in last 2 days, will monitor as appropriate      # Hypoalbuminemia: Lowest albumin = 3.3 g/dL at 7/28/2025  5:39 AM, will monitor as appropriate   # Thrombocytopenia: Lowest platelets = 88 in last 2 days, will monitor for bleeding   # Hypertension: Noted on problem list            # Obesity: Estimated body mass index is 30.05 kg/m  as calculated from the following:    Height as of this encounter: 1.753 m (5' 9\").    Weight as of this encounter: 92.3 kg (203 lb 7.8 oz).      # Financial/Environmental Concerns: none         Social Drivers of Health    Tobacco Use: Medium Risk (7/23/2025)    Patient History     Smoking Tobacco Use: Former     Smokeless Tobacco Use: Never          Disposition Plan     Medically Ready for Discharge: Anticipated in 2-4 Days             Staci Abad MD  Hospitalist Service  [facility]  Securely message with [employer] (more info)  Text page via [employer] Paging/Directory   ______________________________________________________________________    Interval History   Patient needed 3-6L oxymask overnight and was weaned to 3L this morning when awake.  Other vitals stable.  Patient underwent left thoracentesis this morning and is now weaned to 1L NC oxygen and so far tolerating it well.  Weight up 5 lbs from yesterday with urine output with the 1 mg BID bumex dosing half of what it was the days prior.  Patient has no new concerns.  Voiding very well per nursing without further retention     Physical Exam   Vital Signs: Temp: 98  F (36.7  C) Temp src: Oral BP: 115/66 Pulse: 60   Resp: 18 SpO2: (!) 91 % O2 Device: None (Room air) Oxygen Delivery: 1 LPM  Weight: 203 lbs 7.75 oz    Constitutional: awake, alert, cooperative, no apparent distress, and appears stated age  Respiratory: no increased work of breathing, fine crackles in bilateral bases, no " wheezing  Cardiovascular: irregularly irregular rhythm  GI: bowel sounds present, abdomen soft and non-tender  Musculoskeletal: 1+ pitting edema present in bilateral lower legs   Neurologic: Awake, alert, oriented to name, place and situation     Medical Decision Making       48 MINUTES SPENT BY ME on the date of service doing chart review, history, exam, documentation & further activities per the note.      Data     I have personally reviewed the following data over the past 24 hrs:    5.7  \   11.4 (L)   / 96 (L)     135 92 (L) 25.5 (H) /  83   4.4 31 (H) 1.14 \     INR:  2.07 (H) PTT:  N/A   D-dimer:  N/A Fibrinogen:  N/A

## 2025-08-01 NOTE — PROGRESS NOTES
S- Respiratory Therapy  B- Airway    A-    08/01/25 1524   Vital Signs   Oximeter Heart Rate 69 bpm   Oxygen Therapy   SpO2 (!) 88 %   O2 Device None (Room air)   Breath Sounds   Breath Sounds All Fields   All Lung Fields Breath Sounds diminished   Positive Expiratory Pressure (PEP)   Daily Review of Necessity (PEP Therapy) completed   $PEP Complete   Type (PEP Therapy) vibratory/oscillatory   Device (PEP Therapy) flutter   Route (PEP Therapy) aerosol therapy   Breaths per Cycle (PEP Therapy) 10   Cycles (PEP Therapy) 1   PEP Pressure (cm H2O) 5   PEP Duration (min) 5   Settings (PEP Therapy) PEP 5   Patient Position sitting in chair   Posttreatment Assessment (PEP) breath sounds unchanged   Signs of Intolerance (PEP Therapy) none     Patient started on Aerobika for lung expansion. Patient tolerated treatment well.  On room air SpO2 89-91% .   R- RCP will follow

## 2025-08-01 NOTE — PROGRESS NOTES
Informed by pt's RN at 0250 that he has been having some desaturations into the low and mid 80's when asleep on 4L oxymask that was increased to 7L. Pt placed on hospital CPAP 5-15 Auto Titrate and 7L 02 with sats of 94% at 0315. Pt was given a Duoneb tx was given at 0210 for exp wheezing and diminishment in the RLL.   BS unchanged after neb tx.  Pt was very irritated about trialing CPAP but ultimately agreed. After an hour, RN placed pt back to 7L oxymask per pt's request.

## 2025-08-01 NOTE — PLAN OF CARE
"Goal Outcome Evaluation:    Plan of Care Reviewed With: patient  Patient a/o x4. VSS on 3L via NC. Patient denies pain. Pt had thoracentesis completed earlier in shift along with a follow up chest x-ray. Pt requiring less supplemental oxygen than earlier in shift. Thoracentesis site dressing CDI. Pt up with assist of 2 and ceiling lift. Heels offloaded and bilateral heel boots in place. Pt tolerating oral diet. K protocol is AM re-draw    /54   Pulse 68   Temp 98  F (36.7  C) (Oral)   Resp 18   Ht 1.753 m (5' 9\")   Wt 92.3 kg (203 lb 7.8 oz)   SpO2 (!) 91%   BMI 30.05 kg/m      "

## 2025-08-02 LAB
ANION GAP SERPL CALCULATED.3IONS-SCNC: 10 MMOL/L (ref 7–15)
BASE EXCESS BLDV CALC-SCNC: 11.7 MMOL/L (ref -3–3)
BUN SERPL-MCNC: 29 MG/DL (ref 8–23)
CALCIUM SERPL-MCNC: 9.2 MG/DL (ref 8.8–10.4)
CHLORIDE SERPL-SCNC: 96 MMOL/L (ref 98–107)
CREAT SERPL-MCNC: 1.24 MG/DL (ref 0.67–1.17)
EGFRCR SERPLBLD CKD-EPI 2021: 53 ML/MIN/1.73M2
GLUCOSE SERPL-MCNC: 77 MG/DL (ref 70–99)
HCO3 BLDV-SCNC: 39 MMOL/L (ref 21–28)
HCO3 SERPL-SCNC: 32 MMOL/L (ref 22–29)
INR PPP: 1.99 (ref 0.85–1.15)
MAGNESIUM SERPL-MCNC: 2.3 MG/DL (ref 1.7–2.3)
O2/TOTAL GAS SETTING VFR VENT: 28 %
OXYHGB MFR BLDV: 74 % (ref 70–75)
PCO2 BLDV: 61 MM HG (ref 40–50)
PH BLDV: 7.42 [PH] (ref 7.32–7.43)
PO2 BLDV: 43 MM HG (ref 25–47)
POTASSIUM SERPL-SCNC: 3.6 MMOL/L (ref 3.4–5.3)
PROTHROMBIN TIME: 22.4 SECONDS (ref 11.8–14.8)
SAO2 % BLDV: 74.9 % (ref 70–75)
SODIUM SERPL-SCNC: 138 MMOL/L (ref 135–145)

## 2025-08-02 PROCEDURE — 99232 SBSQ HOSP IP/OBS MODERATE 35: CPT | Performed by: STUDENT IN AN ORGANIZED HEALTH CARE EDUCATION/TRAINING PROGRAM

## 2025-08-02 PROCEDURE — 999N000157 HC STATISTIC RCP TIME EA 10 MIN

## 2025-08-02 PROCEDURE — 250N000013 HC RX MED GY IP 250 OP 250 PS 637: Performed by: INTERNAL MEDICINE

## 2025-08-02 PROCEDURE — 250N000013 HC RX MED GY IP 250 OP 250 PS 637: Performed by: STUDENT IN AN ORGANIZED HEALTH CARE EDUCATION/TRAINING PROGRAM

## 2025-08-02 PROCEDURE — 94799 UNLISTED PULMONARY SVC/PX: CPT

## 2025-08-02 PROCEDURE — 36415 COLL VENOUS BLD VENIPUNCTURE: CPT | Performed by: INTERNAL MEDICINE

## 2025-08-02 PROCEDURE — 83735 ASSAY OF MAGNESIUM: CPT | Performed by: STUDENT IN AN ORGANIZED HEALTH CARE EDUCATION/TRAINING PROGRAM

## 2025-08-02 PROCEDURE — 85610 PROTHROMBIN TIME: CPT | Performed by: INTERNAL MEDICINE

## 2025-08-02 PROCEDURE — 82805 BLOOD GASES W/O2 SATURATION: CPT | Performed by: FAMILY MEDICINE

## 2025-08-02 PROCEDURE — 250N000013 HC RX MED GY IP 250 OP 250 PS 637: Performed by: FAMILY MEDICINE

## 2025-08-02 PROCEDURE — 80048 BASIC METABOLIC PNL TOTAL CA: CPT | Performed by: FAMILY MEDICINE

## 2025-08-02 PROCEDURE — 120N000001 HC R&B MED SURG/OB

## 2025-08-02 RX ORDER — WARFARIN SODIUM 2 MG/1
2 TABLET ORAL
Status: COMPLETED | OUTPATIENT
Start: 2025-08-02 | End: 2025-08-02

## 2025-08-02 RX ORDER — POTASSIUM CHLORIDE 1500 MG/1
20 TABLET, EXTENDED RELEASE ORAL ONCE
Status: COMPLETED | OUTPATIENT
Start: 2025-08-02 | End: 2025-08-02

## 2025-08-02 RX ORDER — METOLAZONE 2.5 MG/1
5 TABLET ORAL ONCE
Status: COMPLETED | OUTPATIENT
Start: 2025-08-02 | End: 2025-08-02

## 2025-08-02 RX ADMIN — BUMETANIDE 2 MG: 2 TABLET ORAL at 08:06

## 2025-08-02 RX ADMIN — SIMVASTATIN 40 MG: 40 TABLET, FILM COATED ORAL at 21:13

## 2025-08-02 RX ADMIN — TAMSULOSIN HYDROCHLORIDE 0.4 MG: 0.4 CAPSULE ORAL at 08:05

## 2025-08-02 RX ADMIN — DEXTRAN 70, GLYCERIN, HYPROMELLOSE 2 DROP: 1; 2; 3 SOLUTION/ DROPS OPHTHALMIC at 11:58

## 2025-08-02 RX ADMIN — LORATADINE 10 MG: 10 TABLET ORAL at 08:06

## 2025-08-02 RX ADMIN — DEXTRAN 70, GLYCERIN, HYPROMELLOSE 2 DROP: 1; 2; 3 SOLUTION/ DROPS OPHTHALMIC at 14:12

## 2025-08-02 RX ADMIN — GUAIFENESIN 600 MG: 600 TABLET, EXTENDED RELEASE ORAL at 21:13

## 2025-08-02 RX ADMIN — WHITE PETROLATUM: 1.75 OINTMENT TOPICAL at 00:08

## 2025-08-02 RX ADMIN — WHITE PETROLATUM: 1.75 OINTMENT TOPICAL at 15:42

## 2025-08-02 RX ADMIN — ACETAMINOPHEN 650 MG: 325 TABLET ORAL at 09:49

## 2025-08-02 RX ADMIN — GABAPENTIN 200 MG: 100 CAPSULE ORAL at 21:14

## 2025-08-02 RX ADMIN — METOLAZONE 5 MG: 2.5 TABLET ORAL at 14:11

## 2025-08-02 RX ADMIN — WHITE PETROLATUM: 1.75 OINTMENT TOPICAL at 08:06

## 2025-08-02 RX ADMIN — BUMETANIDE 2 MG: 2 TABLET ORAL at 15:42

## 2025-08-02 RX ADMIN — PSYLLIUM HUSK 1 PACKET: 3.4 POWDER ORAL at 08:06

## 2025-08-02 RX ADMIN — POTASSIUM CHLORIDE 20 MEQ: 1500 TABLET, EXTENDED RELEASE ORAL at 08:05

## 2025-08-02 RX ADMIN — MELATONIN 3 MG: 3 TAB ORAL at 21:13

## 2025-08-02 RX ADMIN — GABAPENTIN 200 MG: 100 CAPSULE ORAL at 08:05

## 2025-08-02 RX ADMIN — WARFARIN SODIUM 2 MG: 2 TABLET ORAL at 17:10

## 2025-08-02 RX ADMIN — GUAIFENESIN 600 MG: 600 TABLET, EXTENDED RELEASE ORAL at 08:06

## 2025-08-02 RX ADMIN — DEXTRAN 70, GLYCERIN, HYPROMELLOSE 2 DROP: 1; 2; 3 SOLUTION/ DROPS OPHTHALMIC at 09:50

## 2025-08-02 RX ADMIN — CYANOCOBALAMIN TAB 500 MCG 1000 MCG: 500 TAB at 08:05

## 2025-08-02 ASSESSMENT — ACTIVITIES OF DAILY LIVING (ADL)
ADLS_ACUITY_SCORE: 76
ADLS_ACUITY_SCORE: 74
ADLS_ACUITY_SCORE: 76
ADLS_ACUITY_SCORE: 76
ADLS_ACUITY_SCORE: 74
ADLS_ACUITY_SCORE: 74
ADLS_ACUITY_SCORE: 69
ADLS_ACUITY_SCORE: 74
ADLS_ACUITY_SCORE: 69
ADLS_ACUITY_SCORE: 74
ADLS_ACUITY_SCORE: 76
ADLS_ACUITY_SCORE: 74
ADLS_ACUITY_SCORE: 76

## 2025-08-02 NOTE — PROGRESS NOTES
[facility]    Medicine Progress Note - Hospitalist Service    Date of Admission:  7/23/2025    Assessment & Plan     Melvin Abad is a 89 year old male with PMHx of left diastolic and right systolic CHF, COPD, prediabetes, hard of hearing who had pneumonia 2-3 months ago and lives at [facility] facility who presented for increasing edema with associated SOB and was found to have acute on chronic CHF exacerbation with dry weight of 206 lbs and admission weight of 220 lbs.  Patient initially received IV diuresis with Lasix but had minimal response so transition to IV Bumex however unfortunately had flash pulmonary edema develop and started requiring high flow nasal cannula with chest x-ray showing worsening pulmonary vasculature diffusely as well as ongoing bilateral pleural effusions.  Patient was initiated on a Bumex drip on 7/28 and had excellent diuresis with weight down (20 lbs from admission) and patient became below previously known dry weight, however patient continued to have HFNC needs with ongoing high FiO2 needs and CT showed large bilateral pleural effusions.  Patient underwent thoracentesis of the right lung with 1L output with transudate found on testing with respiratory needs decreasing and now has undergone drainage of the left lung today with over 1L output and patient having rapid improvement in oxygen needs (from 6 L to 2L)   Patient was transitioned to PO diuretics (Bumex 2 mg bid ).  Anticipate discharge back to LTC facility in 1-3 day once weight stabilizes on oral regimen, discharge oxygen needs known and renal function stable.      Acute on chronic diastolic and right sided CHF  Flash pulmonary edema  Acute respiratory failure with hypoxia   Patient with known moderate right sided systolic failure and left sided diastolic failure with dry wt about 206 lbs on daily lasix at baseline presented with weight of 220 lbs on admission with signs of exacerbation.   Patient has received IV bolus diuresis with some weight loss noted but patient developed flash pulmonary edema and new HFNC need the evening of 7/27/25.  Patient transitioned to a Bumex drip on 7/28 and is now having excellent diuresis and is down 20 lbs total and below previously known dry weight.  S/p thoracentesis of right on 7/29 with 1 L off and transudate on testing.  Patient underwent thoracentesis of the left lung with over 1L output.  Patient's respiratory status significantly improved in the hours after the thoracentesis and patient is on 1 L nasal cannula currently  - Continue Bumex  2 mg twice daily and monitor for weight stability and renal tolerance.    - Consider prn metolazone during this stay and at time of discharge  -- giving a dose of metolazone 5 mg today   - CHF protocol I/os and daily weights  - Monitor strict I/Os  - Continue to supply O2 supplementation as needed  - trend renal function and lytes  - will need CHF clinic follow up   - continue metoprolol and amlodipine .. Needed to be held      Urinary retention  Developed on 7/31 and patient needed straight cath x1 - thought secondary to benadryl given the night before this issue as this was the only new medication for the patient and can be associated with urinary retention.  Patient now voiding well without further concern for retention.   -continue to avoid oral benadryl and use topical benadryl for patient's intermittent back itching  -encouraged upright positioning (sitting or standing) to attempt urination as patient can allow  -continue to monitor for resolution      Chronic atrial fib -- on Warfarin  Medication induced coagulopathy  - Pharmacy to dose warfarin, will aim for INR 2.0 to 2.5 given   - cont Metoprolol XL 50 mg daily      Chronic obstructive pulmonary disease  No obvious signs of exacerbation contributing to respiratory failure during this stay   -continue albuterol inhaler prn   - duonebs QID  - hold on steroids at this  time unless concerning findings      Thrombocytopenia chronically low in the 110-140s   Platelets have been slightly lower than previous baseline and have been in the upper 80s to low 100 range so far during this stay but no signs of active bleeding and no obvious etiology for lowering noted.  - trend labs     CKD3a stable   Patient's creatinine appears to be in the 0.9-1.1 at baseline, was 1.21 in week prior to hospitalization while patient was having increased volume concerns and outpatient Lasix was being titrated upward.  Now normalizing back to previous baseline as patient is being diuresed.   - Renal dose meds and avoid nephrotoxic agents   - Trend renal function      Anemia, chronic -- hgb 11.9, with baseline hgb 10-12  B12 low normal 360 during this stay    -Vitamin b12 initiated during this stay      Sensorineural hearing loss  Baseline hard of hearing  -continue supportive care during this stay      Prediabetes -- Hgb A1C 5.9 on 5/12/25  Patient does not normally eat a diabetic diet so one has not been started during this stay.  Fasting blood sugars have been in the normal range in the 80s to 90s  - Continue to monitor fasting blood sugars but no further blood sugar monitoring or insulin is needed at this time     Stasis dermatitis noted  Noted and improving on the steroid cream started during this stay   -- topical hydrocortisone cream 1% bid for 7 day administration completed during this stay  -- prn benadryl cream for itching      BPH   -- continue Flomax           Diet: Combination Diet 2 gm Sodium Diet; Low Fat Diet (up to 50g)  Snacks/Supplements Adult: Ensure Plus High Protein; With Meals    DVT Prophylaxis: Warfarin  Ulloa Catheter: Not present  Lines: None     Cardiac Monitoring: None  Code Status: No CPR- Do NOT Intubate      Clinically Significant Risk Factors          # Hypochloremia: Lowest Cl = 92 mmol/L in last 2 days, will monitor as appropriate      # Hypoalbuminemia: Lowest albumin = 3.3  "g/dL at 7/28/2025  5:39 AM, will monitor as appropriate   # Thrombocytopenia: Lowest platelets = 96 in last 2 days, will monitor for bleeding   # Hypertension: Noted on problem list            # Overweight: Estimated body mass index is 29.63 kg/m  as calculated from the following:    Height as of this encounter: 1.753 m (5' 9\").    Weight as of this encounter: 91 kg (200 lb 9.9 oz).      # Financial/Environmental Concerns: none         Social Drivers of Health    Tobacco Use: Medium Risk (7/23/2025)    Patient History     Smoking Tobacco Use: Former     Smokeless Tobacco Use: Never          Disposition Plan     Medically Ready for Discharge: Anticipated in 2-4 Days             Lawanda Gibbs MD  Hospitalist Service  [facility]  Securely message with [employer] (more info)  Text page via [employer] Paging/Directory   ______________________________________________________________________    Interval History     Wet breath sounds   Is about to cough up secretions   Coughing up clear thick secretions       Physical Exam   Vital Signs: Temp: 98.3  F (36.8  C) Temp src: Oral BP: 105/54 Pulse: 59   Resp: 18 SpO2: 95 % O2 Device: Nasal cannula Oxygen Delivery: 2 LPM  Weight: 200 lbs 9.9 oz    Constitutional: awake, alert, cooperative, no apparent distress, and appears stated age, hard of hearing   Respiratory: wet sounding breath sounds   Cardiovascular: irregularly irregular rhythm   GI: soft, non-distended, non-tender, no masses palpated, no hepatosplenomegally  Musculoskeletal: +1 pitting edema     Medical Decision Making       35 MINUTES SPENT BY ME on the date of service doing chart review, history, exam, documentation & further activities per the note.      Data   ------------------------- PAST 24 HR DATA REVIEWED -----------------------------------------------    I have personally reviewed the following data over the past 24 hrs:    N/A  \   N/A   / N/A     138 96 (L) 29.0 (H) /  77   3.6 " 32 (H) 1.24 (H) \     INR:  1.99 (H) PTT:  N/A   D-dimer:  N/A Fibrinogen:  N/A       Imaging results reviewed over the past 24 hrs:   No results found for this or any previous visit (from the past 24 hours).

## 2025-08-02 NOTE — PHARMACY-ANTICOAGULATION SERVICE
Clinical Pharmacy - Warfarin Dosing Consult     Pharmacy has been consulted to manage this patient s warfarin therapy.  Indication: Atrial Fibrillation  Therapy Goal: INR 2-2.5  Warfarin Prior to Admission: Yes  Warfarin PTA Regimen: 2 mg daily  Recent documented change in oral intake/nutrition: Unknown    INR   Date Value Ref Range Status   08/02/2025 1.99 (H) 0.85 - 1.15 Final   08/01/2025 2.07 (H) 0.85 - 1.15 Final       Recommend warfarin 2 mg today.  Pharmacy will monitor Felicewilmar Abad daily and order warfarin doses to achieve specified goal.      Please contact pharmacy as soon as possible if the warfarin needs to be held for a procedure or if the warfarin goals change.

## 2025-08-02 NOTE — PLAN OF CARE
Goal Outcome Evaluation:      Plan of Care Reviewed With: patient    Overall Patient Progress: improvingOverall Patient Progress: improving     A & O X 4, VSS on RA - BP soft. Using 2L overnight. RA- 1L while awake.   Denies SOB- LS diminished. Using urinal with assistance.   Turn/ repo - pt is able to help a little with turns.  Ceiling lift. RENARD Shah RN on 8/2/2025 at 6:36 AM

## 2025-08-02 NOTE — PROGRESS NOTES
08/01/25 2012   Positive Expiratory Pressure (PEP)   Daily Review of Necessity (PEP Therapy) completed   $PEP Complete   Type (PEP Therapy) vibratory/oscillatory   Device (PEP Therapy) flutter   Route (PEP Therapy) mouthpiece utilized   Breaths per Cycle (PEP Therapy) 10   Cycles (PEP Therapy) 1   Settings (PEP Therapy) PEP 5   Patient Position HOB elevated   Posttreatment Assessment (PEP) breath sounds unchanged   Signs of Intolerance (PEP Therapy) none

## 2025-08-02 NOTE — PLAN OF CARE
Goal Outcome Evaluation:      Plan of Care Reviewed With: patient    Overall Patient Progress: improvingOverall Patient Progress: improving    Outcome Evaluation: Patient A&O, able to eat meals with fair appetite. Complained of mild to moderate foot pain prn tylenol given with fair results, cushion heel in place and repo patient feeling better.  O2 kept at 1lpm as patient dips to lower 80's. Call light appropriate. Bed and chair alarms on for safety. Metolazone given per provider ordered. Having good UOP.

## 2025-08-03 LAB
ANION GAP SERPL CALCULATED.3IONS-SCNC: 11 MMOL/L (ref 7–15)
BACTERIA PLR CULT: NO GROWTH
BUN SERPL-MCNC: 33.9 MG/DL (ref 8–23)
CALCIUM SERPL-MCNC: 9.4 MG/DL (ref 8.8–10.4)
CHLORIDE SERPL-SCNC: 91 MMOL/L (ref 98–107)
CREAT SERPL-MCNC: 1.2 MG/DL (ref 0.67–1.17)
EGFRCR SERPLBLD CKD-EPI 2021: 55 ML/MIN/1.73M2
GLUCOSE SERPL-MCNC: 91 MG/DL (ref 70–99)
GRAM STAIN RESULT: NORMAL
GRAM STAIN RESULT: NORMAL
HCO3 SERPL-SCNC: 35 MMOL/L (ref 22–29)
INR PPP: 1.84 (ref 0.85–1.15)
POTASSIUM SERPL-SCNC: 3.4 MMOL/L (ref 3.4–5.3)
PROTHROMBIN TIME: 21.1 SECONDS (ref 11.8–14.8)
SODIUM SERPL-SCNC: 137 MMOL/L (ref 135–145)

## 2025-08-03 PROCEDURE — 999N000123 HC STATISTIC OXYGEN O2DAILY TECH TIME

## 2025-08-03 PROCEDURE — 250N000013 HC RX MED GY IP 250 OP 250 PS 637: Performed by: INTERNAL MEDICINE

## 2025-08-03 PROCEDURE — 250N000013 HC RX MED GY IP 250 OP 250 PS 637: Performed by: FAMILY MEDICINE

## 2025-08-03 PROCEDURE — 80048 BASIC METABOLIC PNL TOTAL CA: CPT | Performed by: STUDENT IN AN ORGANIZED HEALTH CARE EDUCATION/TRAINING PROGRAM

## 2025-08-03 PROCEDURE — 99232 SBSQ HOSP IP/OBS MODERATE 35: CPT | Performed by: INTERNAL MEDICINE

## 2025-08-03 PROCEDURE — 120N000001 HC R&B MED SURG/OB

## 2025-08-03 PROCEDURE — 94799 UNLISTED PULMONARY SVC/PX: CPT

## 2025-08-03 PROCEDURE — 36415 COLL VENOUS BLD VENIPUNCTURE: CPT | Performed by: STUDENT IN AN ORGANIZED HEALTH CARE EDUCATION/TRAINING PROGRAM

## 2025-08-03 PROCEDURE — 85610 PROTHROMBIN TIME: CPT | Performed by: INTERNAL MEDICINE

## 2025-08-03 RX ORDER — NYSTATIN 100000 [USP'U]/G
POWDER TOPICAL 2 TIMES DAILY PRN
Status: SHIPPED
Start: 2025-08-03

## 2025-08-03 RX ORDER — WARFARIN SODIUM 2.5 MG/1
2.5 TABLET ORAL
Status: COMPLETED | OUTPATIENT
Start: 2025-08-03 | End: 2025-08-03

## 2025-08-03 RX ADMIN — SENNOSIDES, DOCUSATE SODIUM 1 TABLET: 50; 8.6 TABLET, FILM COATED ORAL at 13:18

## 2025-08-03 RX ADMIN — WHITE PETROLATUM: 1.75 OINTMENT TOPICAL at 08:32

## 2025-08-03 RX ADMIN — GABAPENTIN 200 MG: 100 CAPSULE ORAL at 20:49

## 2025-08-03 RX ADMIN — CYANOCOBALAMIN TAB 500 MCG 1000 MCG: 500 TAB at 08:32

## 2025-08-03 RX ADMIN — GABAPENTIN 200 MG: 100 CAPSULE ORAL at 08:32

## 2025-08-03 RX ADMIN — PSYLLIUM HUSK 1 PACKET: 3.4 POWDER ORAL at 08:32

## 2025-08-03 RX ADMIN — ACETAMINOPHEN 650 MG: 325 TABLET ORAL at 20:50

## 2025-08-03 RX ADMIN — BUMETANIDE 2 MG: 2 TABLET ORAL at 08:32

## 2025-08-03 RX ADMIN — BUMETANIDE 2 MG: 2 TABLET ORAL at 16:36

## 2025-08-03 RX ADMIN — TAMSULOSIN HYDROCHLORIDE 0.4 MG: 0.4 CAPSULE ORAL at 08:32

## 2025-08-03 RX ADMIN — LORATADINE 10 MG: 10 TABLET ORAL at 08:32

## 2025-08-03 RX ADMIN — WHITE PETROLATUM: 1.75 OINTMENT TOPICAL at 16:37

## 2025-08-03 RX ADMIN — WARFARIN SODIUM 2.5 MG: 2.5 TABLET ORAL at 17:30

## 2025-08-03 RX ADMIN — METOPROLOL SUCCINATE 50 MG: 50 TABLET, EXTENDED RELEASE ORAL at 08:31

## 2025-08-03 RX ADMIN — SIMVASTATIN 40 MG: 40 TABLET, FILM COATED ORAL at 20:50

## 2025-08-03 ASSESSMENT — ACTIVITIES OF DAILY LIVING (ADL)
ADLS_ACUITY_SCORE: 68
ADLS_ACUITY_SCORE: 70
ADLS_ACUITY_SCORE: 69
ADLS_ACUITY_SCORE: 70
ADLS_ACUITY_SCORE: 68
ADLS_ACUITY_SCORE: 69
ADLS_ACUITY_SCORE: 72
ADLS_ACUITY_SCORE: 70
ADLS_ACUITY_SCORE: 69
ADLS_ACUITY_SCORE: 72
ADLS_ACUITY_SCORE: 70
ADLS_ACUITY_SCORE: 72
ADLS_ACUITY_SCORE: 70
ADLS_ACUITY_SCORE: 70
ADLS_ACUITY_SCORE: 72
ADLS_ACUITY_SCORE: 72
ADLS_ACUITY_SCORE: 70
ADLS_ACUITY_SCORE: 72
ADLS_ACUITY_SCORE: 70
ADLS_ACUITY_SCORE: 70
ADLS_ACUITY_SCORE: 69

## 2025-08-03 NOTE — PROGRESS NOTES
Care Management Follow Up    Length of Stay (days): 11    Expected Discharge Date: 08/04/2025     Concerns to be Addressed: discharge planning     Patient plan of care discussed at interdisciplinary rounds: Yes    Anticipated Discharge Disposition: Return to long Term Care at [facility]      Anticipated Discharge Services: Transportation Services    Anticipated Discharge DME: None    Patient/family educated on Medicare website which has current facility and service quality ratings: no    Education Provided on the Discharge Plan: Yes    Patient/Family in Agreement with the Plan: yes    Referrals Placed by CM/SW: Transportation    Private pay costs discussed- Transport costs     Discussed  Partnership in Safe Discharge Planning  document with patient/family: No      Handoff Completed: No, handoff not indicated or clinically appropriate    Additional Information:  Plan for patient to possibly return to long term care at [facility] tomorrow, if medically ready.     Patient will need agency transport.  Call daughter in law, Toña [phone], for payment for [employer].    Next Steps: Care Management will continue to follow and assist with discharge planning.      MILES Doe  [facility]   [phone]

## 2025-08-03 NOTE — PLAN OF CARE
Goal Outcome Evaluation:      Plan of Care Reviewed With: patient    Overall Patient Progress: improvingOverall Patient Progress: improving    Outcome Evaluation: A & O x4 - VSS on 1L NC. denies pain. voiding frequently overnight (every hour - pt. Frustrated by this) some stress incontinence- using urinal. turn and repo. SL. Productive cough- LS diminished. Ceiling lift. SL.    Carolina Shah RN on 8/3/2025 at 7:50 AM

## 2025-08-03 NOTE — PHARMACY-ANTICOAGULATION SERVICE
Clinical Pharmacy - Warfarin Dosing Consult     Pharmacy has been consulted to manage this patient s warfarin therapy.  Indication: Atrial Fibrillation  Therapy Goal: INR 2-2.5  Warfarin Prior to Admission: Yes  Warfarin PTA Regimen: 2 mg daily  Recent documented change in oral intake/nutrition: Unknown    INR   Date Value Ref Range Status   08/03/2025 1.84 (H) 0.85 - 1.15 Final   08/02/2025 1.99 (H) 0.85 - 1.15 Final       Recommend warfarin 2.5 mg today.  Pharmacy will monitor Melvin Abad daily and order warfarin doses to achieve specified goal.      Please contact pharmacy as soon as possible if the warfarin needs to be held for a procedure or if the warfarin goals change.

## 2025-08-03 NOTE — PROGRESS NOTES
S- Respiratory Therapy  B- CHF, COPD  A- Patient continues to work with Aerobika positive expiratory fultter vibratory and oscillaatory device to aid in airway clearance.  Patient very tired today asleep at many points when writer checked on him.  Rhonchi lung sounds and loose congested cough. SpO2 on room air has remained in the upper 80's to low 90's.  R- RCP will follow

## 2025-08-03 NOTE — PROGRESS NOTES
[facility]    Medicine Progress Note - Hospitalist Service    Date of Admission:  7/23/2025    Assessment & Plan     89 year old male with PMHx of left diastolic and right systolic CHF, COPD, prediabetes, hard of hearing who had pneumonia 2-3 months ago and lives at [facility] facility who presented for increasing edema with associated SOB and was found to have acute on chronic CHF exacerbation with dry weight of 206 lbs and admission weight of 220 lbs.  Patient initially received IV diuresis with Lasix but had minimal response so transition to IV Bumex however unfortunately had flash pulmonary edema develop and started requiring high flow nasal cannula with chest x-ray showing worsening pulmonary vasculature diffusely as well as ongoing bilateral pleural effusions.  Patient was initiated on a Bumex drip on 7/28 and had excellent diuresis with weight down (20 lbs from admission) and patient became below previously known dry weight, however patient continued to have HFNC needs with ongoing high FiO2 needs and CT showed large bilateral pleural effusions.  Patient underwent thoracentesis of the right lung with 1L output with transudate found on testing with respiratory needs decreasing and now has undergone drainage of the left lung today with over 1L output and patient having rapid improvement in oxygen needs (from 6 L to 2L)   Patient was transitioned to PO diuretics (Bumex 2 mg bid ).  Anticipate discharge back to LTC facility in 1-3 day once weight stabilizes on oral regimen, discharge oxygen needs known and renal function stable.      Acute on chronic diastolic and right sided CHF  Flash pulmonary edema  Acute respiratory failure with hypoxia   Patient with known moderate right sided systolic failure and left sided diastolic failure with dry wt about 206 lbs on daily lasix at baseline presented with weight of 220 lbs on admission with signs of exacerbation.  Patient has received IV  bolus diuresis with some weight loss noted but patient developed flash pulmonary edema and new HFNC need the evening of 7/27/25.  Patient transitioned to a Bumex drip on 7/28 and is now having excellent diuresis and is down 20 lbs total and below previously known dry weight.  S/p thoracentesis of right on 7/29 with 1 L off and transudate on testing.  Patient underwent thoracentesis of the left lung with over 1L output.  Patient's respiratory status significantly improved in the hours after the thoracentesis and patient is on 1 L nasal cannula currently  - Continue Bumex  2 mg twice daily and monitor for weight stability and renal tolerance.    - Consider prn metolazone during this stay and at time of discharge  -- giving a dose of metolazone 5 mg today   - CHF protocol I/os and daily weights  - Monitor strict I/Os  - Continue to supply O2 supplementation as needed  - trend renal function and lytes  - will need CHF clinic follow up   - continue metoprolol and amlodipine .. Needed to be held      Urinary retention  Developed on 7/31 and patient needed straight cath x1 - thought secondary to benadryl given the night before this issue as this was the only new medication for the patient and can be associated with urinary retention.  Patient now voiding well without further concern for retention.   -continue to avoid oral benadryl and use topical benadryl for patient's intermittent back itching  -encouraged upright positioning (sitting or standing) to attempt urination as patient can allow  -continue to monitor for resolution      Chronic atrial fib -- on Warfarin  Medication induced coagulopathy  - Pharmacy to dose warfarin, will aim for INR 2.0 to 2.5 given   - cont Metoprolol XL 50 mg daily      Chronic obstructive pulmonary disease  No obvious signs of exacerbation contributing to respiratory failure during this stay   -continue albuterol inhaler prn   - duonebs QID  - hold on steroids at this time unless concerning  findings      Thrombocytopenia chronically low in the 110-140s   Platelets have been slightly lower than previous baseline and have been in the upper 80s to low 100 range so far during this stay but no signs of active bleeding and no obvious etiology for lowering noted.  - trend labs     CKD3a stable   Patient's creatinine appears to be in the 0.9-1.1 at baseline, was 1.21 in week prior to hospitalization while patient was having increased volume concerns and outpatient Lasix was being titrated upward.  Now normalizing back to previous baseline as patient is being diuresed.   - Renal dose meds and avoid nephrotoxic agents   - Trend renal function      Anemia, chronic -- hgb 11.9, with baseline hgb 10-12  B12 low normal 360 during this stay    -Vitamin b12 initiated during this stay      Sensorineural hearing loss  Baseline hard of hearing  -continue supportive care during this stay      Prediabetes -- Hgb A1C 5.9 on 5/12/25  Patient does not normally eat a diabetic diet so one has not been started during this stay.  Fasting blood sugars have been in the normal range in the 80s to 90s  - Continue to monitor fasting blood sugars but no further blood sugar monitoring or insulin is needed at this time     Stasis dermatitis noted  Noted and improving on the steroid cream started during this stay   -- topical hydrocortisone cream 1% bid for 7 day administration completed during this stay  -- prn benadryl cream for itching      BPH   -- continue Flomax           Diet: Combination Diet 2 gm Sodium Diet; Low Fat Diet (up to 50g)  Snacks/Supplements Adult: Ensure Plus High Protein; With Meals    DVT Prophylaxis: Warfarin  Ulloa Catheter: Not present  Lines: None     Cardiac Monitoring: None  Code Status: No CPR- Do NOT Intubate      Clinically Significant Risk Factors          # Hypochloremia: Lowest Cl = 91 mmol/L in last 2 days, will monitor as appropriate      # Hypoalbuminemia: Lowest albumin = 3.3 g/dL at 7/28/2025  5:39  "AM, will monitor as appropriate     # Hypertension: Noted on problem list            # Overweight: Estimated body mass index is 28.62 kg/m  as calculated from the following:    Height as of this encounter: 1.753 m (5' 9\").    Weight as of this encounter: 87.9 kg (193 lb 12.6 oz).        # Financial/Environmental Concerns: none         Social Drivers of Health    Tobacco Use: Medium Risk (7/23/2025)    Patient History     Smoking Tobacco Use: Former     Smokeless Tobacco Use: Never          Disposition Plan     Medically Ready for Discharge: discharge back to [facility] tomorrow             Shreyas Finch MD  Hospitalist Service  [facility]  Securely message with [employer] (more info)  Text page via [employer] Paging/Directory   (50 min total)  ______________________________________________________________________    Interval History     Breathing much better, off O2 today, great urine output after Metolazone 5 mg yesterday      Physical Exam   Vital Signs: Temp: 98.9  F (37.2  C) Temp src: Oral BP: 120/50 Pulse: 64   Resp: 18 SpO2: 94 % O2 Device: Nasal cannula Oxygen Delivery: 1 LPM  Weight: 193 lbs 12.55 oz    Constitutional: awake, alert, cooperative, no apparent distress, and appears stated age, hard of hearing   Respiratory: wet sounding breath sounds   Cardiovascular: irregularly irregular rhythm   GI: soft, non-distended, non-tender, no masses palpated, no hepatosplenomegally  Musculoskeletal: trace bilateral ankle pitting edema     Medical Decision Making       35 MINUTES SPENT BY ME on the date of service doing chart review, history, exam, documentation & further activities per the note.      Data   ------------------------- PAST 24 HR DATA REVIEWED -----------------------------------------------    I have personally reviewed the following data over the past 24 hrs:    N/A  \   N/A   / N/A     137 91 (L) 33.9 (H) /  91   3.4 35 (H) 1.20 (H) \     INR:  1.84 (H) PTT:  N/A   D-dimer: "  N/A Fibrinogen:  N/A       Imaging results reviewed over the past 24 hrs:   No results found for this or any previous visit (from the past 24 hours).

## 2025-08-04 VITALS
WEIGHT: 184.97 LBS | BODY MASS INDEX: 27.4 KG/M2 | SYSTOLIC BLOOD PRESSURE: 112 MMHG | HEIGHT: 69 IN | RESPIRATION RATE: 16 BRPM | DIASTOLIC BLOOD PRESSURE: 50 MMHG | HEART RATE: 55 BPM | TEMPERATURE: 98.2 F | OXYGEN SATURATION: 90 %

## 2025-08-04 LAB
ANION GAP SERPL CALCULATED.3IONS-SCNC: 10 MMOL/L (ref 7–15)
BUN SERPL-MCNC: 35.9 MG/DL (ref 8–23)
CALCIUM SERPL-MCNC: 9.5 MG/DL (ref 8.8–10.4)
CHLORIDE SERPL-SCNC: 90 MMOL/L (ref 98–107)
CREAT SERPL-MCNC: 1.21 MG/DL (ref 0.67–1.17)
EGFRCR SERPLBLD CKD-EPI 2021: 54 ML/MIN/1.73M2
ERYTHROCYTE [DISTWIDTH] IN BLOOD BY AUTOMATED COUNT: 15.2 % (ref 10–15)
GLUCOSE SERPL-MCNC: 94 MG/DL (ref 70–99)
HCO3 SERPL-SCNC: 38 MMOL/L (ref 22–29)
HCT VFR BLD AUTO: 34.1 % (ref 40–53)
HGB BLD-MCNC: 11.4 G/DL (ref 13.3–17.7)
INR PPP: 1.63 (ref 0.85–1.15)
MCH RBC QN AUTO: 31.7 PG (ref 26.5–33)
MCHC RBC AUTO-ENTMCNC: 33.4 G/DL (ref 31.5–36.5)
MCV RBC AUTO: 95 FL (ref 78–100)
PLATELET # BLD AUTO: 118 10E3/UL (ref 150–450)
POTASSIUM SERPL-SCNC: 3.1 MMOL/L (ref 3.4–5.3)
PROTHROMBIN TIME: 19.3 SECONDS (ref 11.8–14.8)
RBC # BLD AUTO: 3.6 10E6/UL (ref 4.4–5.9)
SODIUM SERPL-SCNC: 138 MMOL/L (ref 135–145)
WBC # BLD AUTO: 6 10E3/UL (ref 4–11)

## 2025-08-04 PROCEDURE — 85610 PROTHROMBIN TIME: CPT | Performed by: INTERNAL MEDICINE

## 2025-08-04 PROCEDURE — 85027 COMPLETE CBC AUTOMATED: CPT | Performed by: INTERNAL MEDICINE

## 2025-08-04 PROCEDURE — 80048 BASIC METABOLIC PNL TOTAL CA: CPT | Performed by: INTERNAL MEDICINE

## 2025-08-04 PROCEDURE — 36415 COLL VENOUS BLD VENIPUNCTURE: CPT | Performed by: INTERNAL MEDICINE

## 2025-08-04 PROCEDURE — 250N000013 HC RX MED GY IP 250 OP 250 PS 637: Performed by: FAMILY MEDICINE

## 2025-08-04 PROCEDURE — 250N000013 HC RX MED GY IP 250 OP 250 PS 637: Performed by: INTERNAL MEDICINE

## 2025-08-04 PROCEDURE — 250N000009 HC RX 250: Performed by: INTERNAL MEDICINE

## 2025-08-04 PROCEDURE — 99239 HOSP IP/OBS DSCHRG MGMT >30: CPT | Performed by: INTERNAL MEDICINE

## 2025-08-04 PROCEDURE — 94640 AIRWAY INHALATION TREATMENT: CPT

## 2025-08-04 RX ORDER — GABAPENTIN 100 MG/1
200 CAPSULE ORAL 2 TIMES DAILY
Qty: 30 CAPSULE | Refills: 0 | Status: SHIPPED | OUTPATIENT
Start: 2025-08-04

## 2025-08-04 RX ORDER — POTASSIUM CHLORIDE 1500 MG/1
20 TABLET, EXTENDED RELEASE ORAL 2 TIMES DAILY
Qty: 30 TABLET | Refills: 1 | Status: SHIPPED | OUTPATIENT
Start: 2025-08-04

## 2025-08-04 RX ORDER — WARFARIN SODIUM 2 MG/1
4 TABLET ORAL
Status: DISCONTINUED | OUTPATIENT
Start: 2025-08-04 | End: 2025-08-04 | Stop reason: HOSPADM

## 2025-08-04 RX ORDER — POTASSIUM CHLORIDE 1500 MG/1
40 TABLET, EXTENDED RELEASE ORAL
Status: COMPLETED | OUTPATIENT
Start: 2025-08-04 | End: 2025-08-04

## 2025-08-04 RX ORDER — WARFARIN SODIUM 2 MG/1
TABLET ORAL
Status: SHIPPED
Start: 2025-08-04

## 2025-08-04 RX ORDER — BUMETANIDE 2 MG/1
2 TABLET ORAL
Status: SHIPPED
Start: 2025-08-04

## 2025-08-04 RX ORDER — POLYETHYLENE GLYCOL 3350 17 G/17G
17 POWDER, FOR SOLUTION ORAL DAILY
Status: SHIPPED
Start: 2025-08-04

## 2025-08-04 RX ADMIN — LORATADINE 10 MG: 10 TABLET ORAL at 08:48

## 2025-08-04 RX ADMIN — METOPROLOL SUCCINATE 50 MG: 50 TABLET, EXTENDED RELEASE ORAL at 08:48

## 2025-08-04 RX ADMIN — WHITE PETROLATUM: 1.75 OINTMENT TOPICAL at 01:36

## 2025-08-04 RX ADMIN — POTASSIUM CHLORIDE 40 MEQ: 1500 TABLET, EXTENDED RELEASE ORAL at 08:56

## 2025-08-04 RX ADMIN — CYANOCOBALAMIN TAB 500 MCG 1000 MCG: 500 TAB at 08:47

## 2025-08-04 RX ADMIN — GABAPENTIN 200 MG: 100 CAPSULE ORAL at 08:47

## 2025-08-04 RX ADMIN — PSYLLIUM HUSK 1 PACKET: 3.4 POWDER ORAL at 08:46

## 2025-08-04 RX ADMIN — TAMSULOSIN HYDROCHLORIDE 0.4 MG: 0.4 CAPSULE ORAL at 08:48

## 2025-08-04 RX ADMIN — BUMETANIDE 2 MG: 2 TABLET ORAL at 08:48

## 2025-08-04 RX ADMIN — IPRATROPIUM BROMIDE AND ALBUTEROL SULFATE 3 ML: .5; 3 SOLUTION RESPIRATORY (INHALATION) at 02:31

## 2025-08-04 RX ADMIN — POTASSIUM CHLORIDE 40 MEQ: 1500 TABLET, EXTENDED RELEASE ORAL at 11:27

## 2025-08-04 RX ADMIN — WHITE PETROLATUM: 1.75 OINTMENT TOPICAL at 08:50

## 2025-08-04 ASSESSMENT — ACTIVITIES OF DAILY LIVING (ADL)
ADLS_ACUITY_SCORE: 70
ADLS_ACUITY_SCORE: 68
ADLS_ACUITY_SCORE: 70
ADLS_ACUITY_SCORE: 68
ADLS_ACUITY_SCORE: 70

## 2025-08-04 NOTE — PROGRESS NOTES
Care Management Discharge Note    Discharge Date: 08/04/2025     Discharge Disposition: Long Term Care    Discharge Services: Transportation Services    Discharge DME: None    Discharge Transportation: agency    Private pay costs discussed: transportation costs    Does the patient's insurance plan have a 3 day qualifying hospital stay waiver?  No    PAS Confirmation Code:    Patient/family educated on Medicare website which has current facility and service quality ratings: no    Education Provided on the Discharge Plan: Yes  Persons Notified of Discharge Plans: [facility]  Patient/Family in Agreement with the Plan: yes    Handoff Referral Completed: No, handoff not indicated or clinically appropriate    Additional Information:  Patient medically ready for discharge today, per provider. Plan is for patient to discharge back to [facility] (Main Phone: [phone] Admissions Phone: [phone] Fax: [phone]) [facility].     [facility] wheelchair transport arranged for 1230. Called [facility], to update on discharge time and provide phone number for [facility] for payment.    Patric at [facility], notified of discharge time.    MILES Olivarez  [facility] [phone]/ [place] [phone]  Care Management

## 2025-08-04 NOTE — DISCHARGE SUMMARY
[facility]    Discharge Summary  Hospitalist    Date of Admission:  7/23/2025  Date of Discharge:  8/4/2025  Discharging Provider: Shreyas Finch MD, MD    Discharge Diagnoses   Principal Problem:    Acute on chronic diastolic CHF      Chronic atrial fib -- on Warfarin      Benign prostatic hyperplasia      Chronic kidney disease, stage 3a (H)      Thrombocytopenia      History of CVA (cerebrovascular accident)      Anemia, unspecified type      Medication induced coagulopathy      Sensorineural hearing loss      COPD      Acute respiratory failure with hypoxia (H)      Prediabetes -- Hgb A1C 5.9 on 5/12/25      Stasis dermatitis      Hypokalemia 2nd to diuretics -- replaced       Low Vitamin B12 at 360, started replacement     History of Present Illness   89 year old male with hx of CHF, COPD, prediabetes, [place] who had pneumonia 2-3 months ago, who presents for evaluation of increasing edema with associated SOB.  His normal weight is around  206 lbs and currently he is up to 220 lbs.  He comes from [facility].  He recently by report had his Lasix increased from 40 to 60 mg a day.  Also recently started Bumex.  Oxygen saturations have been marginal.  He has a cough but no sputum production, and no fever or chills.       In ER, O2 sat 89% on room air, and 96% on 2 LPM nasal canula, BNP 2733 (nl up to 852), Creat 1.34 (was 1.01 on 2/7/25), Hgb 11.9 and plt's 105 K and Bili total elevated at 1.3. CXR with pulm vascular congestion.  EKG with atrial fib, rate 66, LAD with RBBB and PRWP.     Hospital Course   Melvin Abad was admitted on 7/23/2025.  The following problems were addressed during his hospitalization:     Acute on chronic diastolic CHF              -- IV lasix, follow weight and urine output, with dry wt about 206 lbs  Weight went from 221 lbs down to 184 lbs and weight still dropping after getting 5 mg of Metolazone once, and Creatinine stable starting at  1.34 and stable at 1.21 on day of discharge.    -- will check BMP on 8/7/25, and check weight daily and consider holding diuretic if wt drops below 175 lbs.         Chronic atrial fib -- on Warfarin              -- Pharmacy to dose warfarin, will aim for INR 2.0 to 2.5 given advanced age and prior spontaneous hematoma Feb 2025   -- INR down to 1.68 at time of discharge -- suspect liver functioning better as CHF treated and hepatic congestion improved, so Warfarin dose increased from 2 mg daily up to 2 mg daily with 4 mg on MWF.  Will check INR on 8/7/25.               -- cont Metoprolol XL 50 mg daily        Chronic obstructive pulmonary disease              -- continue albuterol inhaler prn        Thrombocytopenia -- 105K, no change, chronically low       DELPHINE (acute kidney injury)              -- BMP in the AM       Anemia, chronic -- hgb 11.9, with baseline hgb 10-12              -- checked Vit B12 and it was borderline low at 360 so Vit B12 1000 mcg orally daily started       Sensorineural hearing loss       Acute respiratory failure with hypoxia (H)              -- Oxygen, wean as able, will continue 2 LPM on discharge and wean as able        Prediabetes -- Hgb A1C 5.9 on 5/12/25       Stasis dermatitis              -- topical hydrocortisone cream 1% bid        BPH               -- continue Flomax     Going forward, would follow the patients weight, and would consider giving Metolazone 2.5 mg once a week or more if needed to keep his weight below 185 lbs.        Shreyas Finch MD  Pager: [phone]  Cell Phone:  [phone]       Significant Results and Procedures   As above    Pending Results   These results will be followed up by Dr. Finch  Unresulted Labs Ordered in the Past 30 Days of this Admission       No orders found from 6/23/2025 to 7/24/2025.            Code Status   DNR / DNI       Primary Care Physician   Apolonia Bedolla    Physical Exam   Temp: 98.2  F (36.8  C) Temp src: Oral BP: 112/50  Pulse: 55   Resp: 16 SpO2: (!) 90 % O2 Device: Nasal cannula Oxygen Delivery: 1 LPM  Vitals:    08/02/25 0453 08/03/25 0500 08/04/25 0525   Weight: 91 kg (200 lb 9.9 oz) 87.9 kg (193 lb 12.6 oz) 83.9 kg (184 lb 15.5 oz)     Vital Signs with Ranges  Temp:  [98.1  F (36.7  C)-98.6  F (37  C)] 98.2  F (36.8  C)  Pulse:  [55-81] 55  Resp:  [16-20] 16  BP: (102-115)/(49-85) 112/50  SpO2:  [67 %-95 %] 90 %  I/O last 3 completed shifts:  In: 440 [P.O.:440]  Out: 3200 [Urine:3200]    Exam on discharge:   Lungs clear  CV with irreg S1S2  Ankles with trace edema  Severe hearing loss    Discharge Disposition   Discharged to long-term care facility  Condition at discharge: Stable    Consultations This Hospital Stay   PHARMACY TO DOSE WARFARIN  OCCUPATIONAL THERAPY ADULT IP CONSULT  CARE MANAGEMENT / SOCIAL WORK IP CONSULT  CARE MANAGEMENT / SOCIAL WORK IP CONSULT  WOUND OSTOMY CONTINENCE NURSE  IP CONSULT  SPEECH LANGUAGE PATH ADULT IP CONSULT  PHYSICAL THERAPY ADULT IP CONSULT    Time Spent on this Encounter   I spent a total of 35 minutes discharging this patient.     Discharge Orders      General info for SNF    Length of Stay Estimate: Long Term Care  Condition at Discharge: Improving  Level of care:skilled   Rehabilitation Potential: Fair  Admission H&P remains valid and up-to-date: Yes  Recent Chemotherapy: N/A  Use Nursing Home Standing Orders: Yes     Mantoux instructions    Give two-step Mantoux (PPD) Per Facility Policy Yes     Follow Up and recommended labs and tests    Follow up with Nursing home physician in 3 days, review weight and check BMP and INR.     Reason for your hospital stay    Congestive heart failure     Daily weights    Call Provider for weight gain of more than 2 pounds per day or 5 pounds per week.  If weight drops below 175 lbs consider stopping diuretic     Activity - Up with nursing assistance     No CPR- Do NOT Intubate     Physical Therapy Adult Consult    Evaluate and treat as clinically  indicated.    Reason:  weakness     Oxygen (SNF/TCU) Discharge    May transport without O2     Diet    Follow this diet upon discharge: Current Diet:Orders Placed This Encounter      Snacks/Supplements Adult: Ensure Plus High Protein; With Meals      Combination Diet 2 gm Sodium Diet; Low Fat Diet (up to 50g)     Discharge Medications   Current Discharge Medication List        START taking these medications    Details   cyanocobalamin (CYANOCOBALAMIN) 1000 MCG tablet Take 1 tablet (1,000 mcg) by mouth daily.    Associated Diagnoses: Vitamin B12 deficiency (non anemic)      potassium chloride thomas ER (KLOR-CON M20) 20 MEQ CR tablet Take 1 tablet (20 mEq) by mouth 2 times daily.  Qty: 30 tablet, Refills: 1    Associated Diagnoses: Acute on chronic diastolic heart failure (H)           CONTINUE these medications which have CHANGED    Details   bumetanide (BUMEX) 2 MG tablet Take 1 tablet (2 mg) by mouth 2 times daily.    Associated Diagnoses: Acute on chronic diastolic heart failure (H)      gabapentin (NEURONTIN) 100 MG capsule Take 2 capsules (200 mg) by mouth 2 times daily.  Qty: 30 capsule, Refills: 0    Associated Diagnoses: Neuropathy      nystatin (MYCOSTATIN) 194324 UNIT/GM external powder Apply topically 2 times daily as needed for other (fungal skin infection).    Associated Diagnoses: Tinea corporis      polyethylene glycol (MIRALAX) 17 GM/Dose powder Take 17 g by mouth daily. Mix and hand to patient, Patient self-administers (he knows how much he needs to keep regular)    Associated Diagnoses: Constipation, unspecified constipation type      warfarin ANTICOAGULANT (COUMADIN/JANTOVEN) 2 MG tablet 2 mg every day except 4 mg on Mon, Wed, Frid -- for atrial fib, next INR on 8/7/25 and adjust dose for desired INR 2.0 to 2.5.    Associated Diagnoses: Chronic atrial fibrillation (H)           CONTINUE these medications which have NOT CHANGED    Details   !! acetaminophen (TYLENOL) 500 MG tablet Take 2 tablets by  mouth daily.      !! acetaminophen (TYLENOL) 500 MG tablet Take 2 tablets (1,000 mg) by mouth every 8 hours as needed for mild pain    Associated Diagnoses: Arthritis pain      albuterol (VENTOLIN HFA) 108 (90 Base) MCG/ACT inhaler Inhale 2 puffs into the lungs every 6 hours as needed for shortness of breath, wheezing or cough.  Qty: 18 g, Refills: 0    Comments: Pharmacy may dispense brand covered by insurance (Proair, or proventil or ventolin or generic albuterol inhaler)      diclofenac (VOLTAREN) 1 % topical gel Apply 4 g topically daily. Apply to BL lower legs and feet      FT TUSSIN ADULT 200 MG/10ML liquid Take 20 mLs by mouth every 8 hours as needed for cough or other (Congestion).      loratadine (CLARITIN) 10 MG tablet Take 1 tablet (10 mg) by mouth daily  Qty: 30 tablet, Refills: 0    Associated Diagnoses: Seasonal allergic rhinitis, unspecified trigger      metoprolol succinate ER (TOPROL XL) 50 MG 24 hr tablet Take 1 tablet (50 mg) by mouth daily.    Associated Diagnoses: Essential hypertension      Polyethylene Glycol 400 (VISINE DRY EYE RELIEF OP) Apply 2 drops to eye every 2 hours as needed (Dry eyes). Kept at bedside; self administration      psyllium (METAMUCIL/KONSYL) 58.6 % powder Take 18 g (1 Tablespoonful) by mouth daily Mix and hand to patient, Patient self-administers (he knows how much he needs to keep regular)    Associated Diagnoses: Slow transit constipation      simvastatin (ZOCOR) 40 MG tablet Take 1 tablet (40 mg) by mouth at bedtime  Qty: 30 tablet, Refills: 0    Associated Diagnoses: Hyperlipidemia LDL goal <100      tamsulosin (FLOMAX) 0.4 MG capsule Take 1 capsule (0.4 mg) by mouth daily  Qty: 30 capsule, Refills: 0    Associated Diagnoses: Urinary retention      Throat Lozenges (COUGH DROPS) LOZG Take 1 lozenge by mouth every hour as needed. Kept at bedside; self administers      Vitamin D3 (CHOLECALCIFEROL) 25 mcg (1000 units) tablet Take 1 tablet (25 mcg) by mouth daily     Associated Diagnoses: Vitamin D deficiency       !! - Potential duplicate medications found. Please discuss with provider.        STOP taking these medications       amLODIPine (NORVASC) 5 MG tablet Comments:   Reason for Stopping:         furosemide (LASIX) 20 MG tablet Comments:   Reason for Stopping:         furosemide (LASIX) 40 MG tablet Comments:   Reason for Stopping:             Allergies   No Known Allergies  Data   Most Recent 3 CBC's:  Recent Labs   Lab Test 08/04/25  0542 08/01/25  0527 07/31/25  0533   WBC 6.0 5.7 6.4   HGB 11.4* 11.4* 11.5*   MCV 95 97 97   * 96* 88*      Most Recent 3 BMP's:  Recent Labs   Lab Test 08/04/25  0542 08/03/25  0523 08/02/25  0642    137 138   POTASSIUM 3.1* 3.4 3.6   CHLORIDE 90* 91* 96*   CO2 38* 35* 32*   BUN 35.9* 33.9* 29.0*   CR 1.21* 1.20* 1.24*   ANIONGAP 10 11 10   DARIUS 9.5 9.4 9.2   GLC 94 91 77     Most Recent 2 LFT's:  Recent Labs   Lab Test 07/29/25  1344 07/23/25  1220   AST 23 20   ALT 10 9   ALKPHOS 140 143   BILITOTAL 1.8* 1.3*     Most Recent INR's and Anticoagulation Dosing History:  Anticoagulation Dose History  More data exists         Latest Ref Rng & Units 7/29/2025 7/30/2025 7/31/2025 8/1/2025 8/2/2025 8/3/2025 8/4/2025   Recent Dosing and Labs   warfarin ANTICOAGULANT (COUMADIN/JANTOVEN) 2 MG tablet - 2 mg, $Given 2 mg, $Given 2 mg, $Given 2 mg, $Given       INR 2.07 2 mg, $Given - -   warfarin ANTICOAGULANT (COUMADIN/JANTOVEN) 2.5 MG tablet - - - - - - 2.5 mg, $Given -   INR 0.85 - 1.15 2.11  2.14  2.12  2.07  1.99  1.84  1.63      Most Recent 3 Troponin's:  Recent Labs   Lab Test 09/28/20  1043 12/20/19  0903 12/18/19  0934   TROPI 0.031 0.031 0.015     Most Recent Cholesterol Panel:  Recent Labs   Lab Test 05/12/25  0741   CHOL 97   LDL 40   HDL 46   TRIG 57     Most Recent 6 Bacteria Isolates From Any Culture (See EPIC Reports for Culture Details):No lab results found.  Most Recent TSH, T4 and A1c Labs:  Recent Labs   Lab Test  05/12/25  0741 01/20/23  1424 04/10/19  1008   TSH  --   --  1.72   A1C 5.9*   < > 6.1*    < > = values in this interval not displayed.

## 2025-08-04 NOTE — PLAN OF CARE
Goal Outcome Evaluation:      Plan of Care Reviewed With: patient    Overall Patient Progress: no changeOverall Patient Progress: no change     Patient is A&Ox4. Denies pain. Skin tear to left arm is now a scab. Mepliex redressed and Aquaphor applied. Turned and repositioned. Patient was desating on RA to 82-84%. Placed on 1.5L NC with O2 now between 88-92%. Noted wheezing in the lungs. Neb x1 given. Infrequent loose productive cough. IV SL. Plan to possibly to back to [facility].

## 2025-08-04 NOTE — PLAN OF CARE
Goal Outcome Evaluation:      Plan of Care Reviewed With: patient    Overall Patient Progress: improvingOverall Patient Progress: improving    Outcome Evaluation: A&Ox4. Craig. VSS on RA. Pain in heels, tylenol given at bedtime. Bumex given with good UOP. Up in chair for meals via lift. Turn and repo. Productive cough, exp wheezes.

## 2025-08-04 NOTE — PROGRESS NOTES
S-(situation): Patient discharged to [facility] via [facility] with .    B-(background): SOB, CHF, Weight gain/Edema    A-(assessment): A/Ox4, VSS on RA, Up via ceiling lift.     R-(recommendations): Discharge instructions reviewed with patient - sent to nursing at [facility], call made, message left. Belongings gathered and sent with patient.     Discharge Nursing Criteria:   Patient Education given and documented: (STROKE, CHF, Diabetes):  {Yes   Care Plan and Patient education resolved: Yes    New Medications- pt has been educated about purpose and side effects: Yes    Vaccines  Influenza status verified at discharge:  Yes      MISC  Prescriptions if needed, hard copies sent with patient  NA  Home medications returned to patient: NA  Medication Bin checked and emptied on discharge Yes  Patient reports post-discharge pain management plan is effective: Yes

## 2025-08-04 NOTE — PROGRESS NOTES
08/03/25 2000   Vital Signs   Oximeter Heart Rate 71 bpm   Oxygen Therapy   SpO2 93 %   Assessment   Respiratory WDL X   Rhythm/Pattern, Respiratory pattern regular   Cough Frequency infrequent   Cough Type productive   Airway Safety Measures all equipment/monitors on and audible   Breath Sounds   Breath Sounds All Fields   All Lung Fields Breath Sounds Anterior:;crackles, coarse   Positive Expiratory Pressure (PEP)   $PEP Complete   Type (PEP Therapy) vibratory/oscillatory   Device (PEP Therapy) flutter   Route (PEP Therapy) mouthpiece utilized   Breaths per Cycle (PEP Therapy) 10   Cycles (PEP Therapy) 1   Settings (PEP Therapy) PEP 5   Patient Position HOB elevated   Posttreatment Assessment (PEP) breath sounds unchanged   Signs of Intolerance (PEP Therapy) none

## 2025-08-04 NOTE — PLAN OF CARE
Goal Outcome Evaluation:      Plan of Care Reviewed With: child, patient          Outcome Evaluation: Return to [facility]

## 2025-08-04 NOTE — PHARMACY-ANTICOAGULATION SERVICE
Clinical Pharmacy - Warfarin Dosing Consult     Pharmacy has been consulted to manage this patient s warfarin therapy.  Indication: Atrial Fibrillation  Therapy Goal: INR 2-2.5  Warfarin Prior to Admission: Yes  Warfarin PTA Regimen: 2 mg daily  Recent documented change in oral intake/nutrition: Unknown    INR   Date Value Ref Range Status   08/04/2025 1.63 (H) 0.85 - 1.15 Final   08/03/2025 1.84 (H) 0.85 - 1.15 Final       Recommend warfarin 4 mg today.  Pharmacy will monitor Felicewilmar Abad daily and order warfarin doses to achieve specified goal.      Please contact pharmacy as soon as possible if the warfarin needs to be held for a procedure or if the warfarin goals change.

## 2025-08-05 ENCOUNTER — LAB REQUISITION (OUTPATIENT)
Dept: LAB | Facility: CLINIC | Age: OVER 89
End: 2025-08-05

## 2025-08-06 ENCOUNTER — NURSING HOME VISIT (OUTPATIENT)
Dept: GERIATRICS | Facility: CLINIC | Age: OVER 89
End: 2025-08-06
Payer: COMMERCIAL

## 2025-08-06 VITALS
OXYGEN SATURATION: 91 % | RESPIRATION RATE: 16 BRPM | TEMPERATURE: 97 F | WEIGHT: 183.3 LBS | BODY MASS INDEX: 27.15 KG/M2 | SYSTOLIC BLOOD PRESSURE: 100 MMHG | HEART RATE: 68 BPM | HEIGHT: 69 IN | DIASTOLIC BLOOD PRESSURE: 54 MMHG

## 2025-08-06 DIAGNOSIS — Z86.73 HISTORY OF CVA (CEREBROVASCULAR ACCIDENT): ICD-10-CM

## 2025-08-06 DIAGNOSIS — R54 FRAILTY SYNDROME IN GERIATRIC PATIENT: ICD-10-CM

## 2025-08-06 DIAGNOSIS — J44.9 CHRONIC OBSTRUCTIVE PULMONARY DISEASE, UNSPECIFIED COPD TYPE (H): ICD-10-CM

## 2025-08-06 DIAGNOSIS — G25.81 RESTLESS LEGS SYNDROME (RLS): ICD-10-CM

## 2025-08-06 DIAGNOSIS — N40.0 BENIGN PROSTATIC HYPERPLASIA, UNSPECIFIED WHETHER LOWER URINARY TRACT SYMPTOMS PRESENT: ICD-10-CM

## 2025-08-06 DIAGNOSIS — E11.9 DIET-CONTROLLED DIABETES MELLITUS (H): ICD-10-CM

## 2025-08-06 DIAGNOSIS — S30.1XXD HEMATOMA OF RIGHT PSOAS REGION TO ANTICOAGULANT THERAPY, SUBSEQUENT ENCOUNTER: ICD-10-CM

## 2025-08-06 DIAGNOSIS — G81.94 LEFT HEMIPARESIS (H): ICD-10-CM

## 2025-08-06 DIAGNOSIS — I50.33 ACUTE ON CHRONIC DIASTOLIC HEART FAILURE (H): Primary | ICD-10-CM

## 2025-08-06 DIAGNOSIS — I10 ESSENTIAL HYPERTENSION: ICD-10-CM

## 2025-08-06 DIAGNOSIS — E78.5 HYPERLIPIDEMIA LDL GOAL <100: ICD-10-CM

## 2025-08-06 DIAGNOSIS — J96.01 ACUTE RESPIRATORY FAILURE WITH HYPOXIA (H): ICD-10-CM

## 2025-08-06 DIAGNOSIS — D63.8 ANEMIA OF CHRONIC DISEASE: ICD-10-CM

## 2025-08-06 DIAGNOSIS — I48.11 LONGSTANDING PERSISTENT ATRIAL FIBRILLATION (H): ICD-10-CM

## 2025-08-06 DIAGNOSIS — N18.32 CHRONIC KIDNEY DISEASE, STAGE 3B (H): ICD-10-CM

## 2025-08-06 DIAGNOSIS — Z71.89 GOALS OF CARE, COUNSELING/DISCUSSION: ICD-10-CM

## 2025-08-06 LAB
ANION GAP SERPL CALCULATED.3IONS-SCNC: 10 MMOL/L (ref 7–15)
BUN SERPL-MCNC: 47.5 MG/DL (ref 8–23)
CALCIUM SERPL-MCNC: 9.7 MG/DL (ref 8.8–10.4)
CHLORIDE SERPL-SCNC: 91 MMOL/L (ref 98–107)
CREAT SERPL-MCNC: 1.5 MG/DL (ref 0.67–1.17)
EGFRCR SERPLBLD CKD-EPI 2021: 42 ML/MIN/1.73M2
GLUCOSE SERPL-MCNC: 85 MG/DL (ref 70–99)
HCO3 SERPL-SCNC: 38 MMOL/L (ref 22–29)
POTASSIUM SERPL-SCNC: 3.5 MMOL/L (ref 3.4–5.3)
SODIUM SERPL-SCNC: 139 MMOL/L (ref 135–145)

## 2025-08-06 PROCEDURE — 36415 COLL VENOUS BLD VENIPUNCTURE: CPT | Performed by: FAMILY MEDICINE

## 2025-08-06 PROCEDURE — 80048 BASIC METABOLIC PNL TOTAL CA: CPT | Performed by: FAMILY MEDICINE

## 2025-08-06 PROCEDURE — P9604 ONE-WAY ALLOW PRORATED TRIP: HCPCS | Performed by: FAMILY MEDICINE

## 2025-08-07 PROBLEM — N18.32 CHRONIC KIDNEY DISEASE, STAGE 3B (H): Status: ACTIVE | Noted: 2025-08-07

## 2025-08-16 ENCOUNTER — HEALTH MAINTENANCE LETTER (OUTPATIENT)
Age: OVER 89
End: 2025-08-16

## 2025-08-20 ENCOUNTER — CLINICAL UPDATE (OUTPATIENT)
Dept: PHARMACY | Facility: CLINIC | Age: OVER 89
End: 2025-08-20
Payer: COMMERCIAL

## 2025-08-20 DIAGNOSIS — N40.0 BENIGN PROSTATIC HYPERPLASIA, UNSPECIFIED WHETHER LOWER URINARY TRACT SYMPTOMS PRESENT: ICD-10-CM

## 2025-08-20 DIAGNOSIS — I50.812 CHRONIC RIGHT HEART FAILURE (H): ICD-10-CM

## 2025-08-20 DIAGNOSIS — Z86.73 HISTORY OF CVA (CEREBROVASCULAR ACCIDENT): ICD-10-CM

## 2025-08-20 DIAGNOSIS — G25.81 RESTLESS LEGS SYNDROME (RLS): ICD-10-CM

## 2025-08-20 DIAGNOSIS — E78.5 HYPERLIPIDEMIA LDL GOAL <100: Primary | ICD-10-CM

## 2025-08-20 DIAGNOSIS — I10 ESSENTIAL HYPERTENSION: ICD-10-CM

## 2025-08-20 DIAGNOSIS — D69.6 THROMBOCYTOPENIA: ICD-10-CM

## 2025-08-20 DIAGNOSIS — I48.20 CHRONIC ATRIAL FIBRILLATION (H): ICD-10-CM

## 2025-08-20 PROCEDURE — 99207 PR NO CHARGE LOS: CPT | Performed by: PHARMACIST
